# Patient Record
Sex: MALE | Race: WHITE | Employment: OTHER | URBAN - METROPOLITAN AREA
[De-identification: names, ages, dates, MRNs, and addresses within clinical notes are randomized per-mention and may not be internally consistent; named-entity substitution may affect disease eponyms.]

---

## 2020-06-06 ENCOUNTER — APPOINTMENT (OUTPATIENT)
Dept: GENERAL RADIOLOGY | Age: 65
DRG: 854 | End: 2020-06-06
Payer: MEDICARE

## 2020-06-06 ENCOUNTER — APPOINTMENT (OUTPATIENT)
Dept: CT IMAGING | Age: 65
DRG: 854 | End: 2020-06-06
Payer: MEDICARE

## 2020-06-06 ENCOUNTER — HOSPITAL ENCOUNTER (INPATIENT)
Age: 65
LOS: 10 days | Discharge: HOME | DRG: 854 | End: 2020-06-16
Attending: EMERGENCY MEDICINE | Admitting: HOSPITALIST
Payer: MEDICARE

## 2020-06-06 PROBLEM — L08.9 DIABETIC FOOT INFECTION (HCC): Status: ACTIVE | Noted: 2020-06-06

## 2020-06-06 PROBLEM — E11.610 CHARCOT FOOT DUE TO DIABETES MELLITUS (HCC): Status: ACTIVE | Noted: 2020-06-06

## 2020-06-06 PROBLEM — L97.529 FOOT ULCERATION, LEFT, WITH UNSPECIFIED SEVERITY (HCC): Status: ACTIVE | Noted: 2020-06-06

## 2020-06-06 PROBLEM — R50.9 FEVER: Status: ACTIVE | Noted: 2020-06-06

## 2020-06-06 PROBLEM — D72.829 LEUKOCYTOSIS: Status: ACTIVE | Noted: 2020-06-06

## 2020-06-06 PROBLEM — E11.628 DIABETIC FOOT INFECTION (HCC): Status: ACTIVE | Noted: 2020-06-06

## 2020-06-06 LAB
A/G RATIO: 1.1 (ref 1.1–2.2)
ALBUMIN SERPL-MCNC: 4 G/DL (ref 3.4–5)
ALP BLD-CCNC: 84 U/L (ref 40–129)
ALT SERPL-CCNC: 7 U/L (ref 10–40)
ANION GAP SERPL CALCULATED.3IONS-SCNC: 15 MMOL/L (ref 3–16)
AST SERPL-CCNC: 10 U/L (ref 15–37)
BASE EXCESS VENOUS: -4.4 MMOL/L
BASOPHILS ABSOLUTE: 0 K/UL (ref 0–0.2)
BASOPHILS RELATIVE PERCENT: 0.2 %
BETA-HYDROXYBUTYRATE: 0.16 MMOL/L (ref 0–0.27)
BILIRUB SERPL-MCNC: 0.7 MG/DL (ref 0–1)
BILIRUBIN URINE: NEGATIVE
BLOOD, URINE: ABNORMAL
BUN BLDV-MCNC: 28 MG/DL (ref 7–20)
C-REACTIVE PROTEIN: 17.2 MG/L (ref 0–5.1)
CALCIUM SERPL-MCNC: 9.2 MG/DL (ref 8.3–10.6)
CARBOXYHEMOGLOBIN: 0.9 %
CHLORIDE BLD-SCNC: 102 MMOL/L (ref 99–110)
CLARITY: CLEAR
CO2: 18 MMOL/L (ref 21–32)
COLOR: YELLOW
CREAT SERPL-MCNC: 1.1 MG/DL (ref 0.8–1.3)
EKG ATRIAL RATE: 65 BPM
EKG ATRIAL RATE: 85 BPM
EKG DIAGNOSIS: NORMAL
EKG DIAGNOSIS: NORMAL
EKG P AXIS: 14 DEGREES
EKG P AXIS: 74 DEGREES
EKG P-R INTERVAL: 242 MS
EKG P-R INTERVAL: 344 MS
EKG Q-T INTERVAL: 404 MS
EKG Q-T INTERVAL: 480 MS
EKG QRS DURATION: 136 MS
EKG QRS DURATION: 144 MS
EKG QTC CALCULATION (BAZETT): 420 MS
EKG QTC CALCULATION (BAZETT): 571 MS
EKG R AXIS: -65 DEGREES
EKG R AXIS: -81 DEGREES
EKG T AXIS: 20 DEGREES
EKG T AXIS: 73 DEGREES
EKG VENTRICULAR RATE: 65 BPM
EKG VENTRICULAR RATE: 85 BPM
EOSINOPHILS ABSOLUTE: 0 K/UL (ref 0–0.6)
EOSINOPHILS RELATIVE PERCENT: 0 %
EPITHELIAL CELLS, UA: 0 /HPF (ref 0–5)
GFR AFRICAN AMERICAN: >60
GFR NON-AFRICAN AMERICAN: >60
GLOBULIN: 3.8 G/DL
GLUCOSE BLD-MCNC: 125 MG/DL (ref 70–99)
GLUCOSE BLD-MCNC: 138 MG/DL (ref 70–99)
GLUCOSE BLD-MCNC: 158 MG/DL (ref 70–99)
GLUCOSE BLD-MCNC: 175 MG/DL (ref 70–99)
GLUCOSE URINE: NEGATIVE MG/DL
HCO3 VENOUS: 19 MMOL/L (ref 23–29)
HCT VFR BLD CALC: 34.5 % (ref 40.5–52.5)
HEMOGLOBIN: 11.1 G/DL (ref 13.5–17.5)
HYALINE CASTS: 1 /LPF (ref 0–8)
KETONES, URINE: NEGATIVE MG/DL
LACTIC ACID, SEPSIS: 3.2 MMOL/L (ref 0.4–1.9)
LACTIC ACID, SEPSIS: 4 MMOL/L (ref 0.4–1.9)
LEUKOCYTE ESTERASE, URINE: NEGATIVE
LIPASE: 42 U/L (ref 13–60)
LYMPHOCYTES ABSOLUTE: 0.3 K/UL (ref 1–5.1)
LYMPHOCYTES RELATIVE PERCENT: 2.4 %
MCH RBC QN AUTO: 25.2 PG (ref 26–34)
MCHC RBC AUTO-ENTMCNC: 32.3 G/DL (ref 31–36)
MCV RBC AUTO: 77.9 FL (ref 80–100)
METHEMOGLOBIN VENOUS: 0.2 %
MICROSCOPIC EXAMINATION: YES
MONOCYTES ABSOLUTE: 0.5 K/UL (ref 0–1.3)
MONOCYTES RELATIVE PERCENT: 3.4 %
NEUTROPHILS ABSOLUTE: 13.4 K/UL (ref 1.7–7.7)
NEUTROPHILS RELATIVE PERCENT: 94 %
NITRITE, URINE: NEGATIVE
O2 CONTENT, VEN: 14 ML/DL
O2 SAT, VEN: 90 %
O2 THERAPY: ABNORMAL
PCO2, VEN: 27.6 MMHG (ref 40–50)
PDW BLD-RTO: 16.5 % (ref 12.4–15.4)
PERFORMED ON: ABNORMAL
PH UA: 5.5 (ref 5–8)
PH VENOUS: 7.44 (ref 7.35–7.45)
PLATELET # BLD: 247 K/UL (ref 135–450)
PMV BLD AUTO: 7.1 FL (ref 5–10.5)
PO2, VEN: 56 MMHG
POTASSIUM SERPL-SCNC: 4.3 MMOL/L (ref 3.5–5.1)
PRO-BNP: 703 PG/ML (ref 0–124)
PROTEIN UA: NEGATIVE MG/DL
RBC # BLD: 4.43 M/UL (ref 4.2–5.9)
RBC UA: 6 /HPF (ref 0–4)
SARS-COV-2, NAAT: NOT DETECTED
SEDIMENTATION RATE, ERYTHROCYTE: 40 MM/HR (ref 0–20)
SODIUM BLD-SCNC: 135 MMOL/L (ref 136–145)
SPECIFIC GRAVITY UA: 1.02 (ref 1–1.03)
TCO2 CALC VENOUS: 20 MMOL/L
TOTAL PROTEIN: 7.8 G/DL (ref 6.4–8.2)
TROPONIN: 0.02 NG/ML
TROPONIN: 0.04 NG/ML
URINE REFLEX TO CULTURE: ABNORMAL
URINE TYPE: ABNORMAL
UROBILINOGEN, URINE: 0.2 E.U./DL
WBC # BLD: 14.2 K/UL (ref 4–11)
WBC UA: 1 /HPF (ref 0–5)

## 2020-06-06 PROCEDURE — 51701 INSERT BLADDER CATHETER: CPT

## 2020-06-06 PROCEDURE — 87081 CULTURE SCREEN ONLY: CPT

## 2020-06-06 PROCEDURE — 83690 ASSAY OF LIPASE: CPT

## 2020-06-06 PROCEDURE — 99223 1ST HOSP IP/OBS HIGH 75: CPT | Performed by: INTERNAL MEDICINE

## 2020-06-06 PROCEDURE — 84484 ASSAY OF TROPONIN QUANT: CPT

## 2020-06-06 PROCEDURE — 6370000000 HC RX 637 (ALT 250 FOR IP): Performed by: HOSPITALIST

## 2020-06-06 PROCEDURE — 83605 ASSAY OF LACTIC ACID: CPT

## 2020-06-06 PROCEDURE — 96365 THER/PROPH/DIAG IV INF INIT: CPT

## 2020-06-06 PROCEDURE — 6360000002 HC RX W HCPCS: Performed by: EMERGENCY MEDICINE

## 2020-06-06 PROCEDURE — 36415 COLL VENOUS BLD VENIPUNCTURE: CPT

## 2020-06-06 PROCEDURE — 82010 KETONE BODYS QUAN: CPT

## 2020-06-06 PROCEDURE — 83880 ASSAY OF NATRIURETIC PEPTIDE: CPT

## 2020-06-06 PROCEDURE — 6360000002 HC RX W HCPCS: Performed by: HOSPITALIST

## 2020-06-06 PROCEDURE — 2060000000 HC ICU INTERMEDIATE R&B

## 2020-06-06 PROCEDURE — 2580000003 HC RX 258: Performed by: EMERGENCY MEDICINE

## 2020-06-06 PROCEDURE — 6370000000 HC RX 637 (ALT 250 FOR IP): Performed by: EMERGENCY MEDICINE

## 2020-06-06 PROCEDURE — 73630 X-RAY EXAM OF FOOT: CPT

## 2020-06-06 PROCEDURE — 93005 ELECTROCARDIOGRAM TRACING: CPT | Performed by: EMERGENCY MEDICINE

## 2020-06-06 PROCEDURE — 96375 TX/PRO/DX INJ NEW DRUG ADDON: CPT

## 2020-06-06 PROCEDURE — 87070 CULTURE OTHR SPECIMN AEROBIC: CPT

## 2020-06-06 PROCEDURE — 81001 URINALYSIS AUTO W/SCOPE: CPT

## 2020-06-06 PROCEDURE — 87040 BLOOD CULTURE FOR BACTERIA: CPT

## 2020-06-06 PROCEDURE — 85652 RBC SED RATE AUTOMATED: CPT

## 2020-06-06 PROCEDURE — 87186 SC STD MICRODIL/AGAR DIL: CPT

## 2020-06-06 PROCEDURE — 85025 COMPLETE CBC W/AUTO DIFF WBC: CPT

## 2020-06-06 PROCEDURE — 74176 CT ABD & PELVIS W/O CONTRAST: CPT

## 2020-06-06 PROCEDURE — 82803 BLOOD GASES ANY COMBINATION: CPT

## 2020-06-06 PROCEDURE — 80053 COMPREHEN METABOLIC PANEL: CPT

## 2020-06-06 PROCEDURE — 86140 C-REACTIVE PROTEIN: CPT

## 2020-06-06 PROCEDURE — 87205 SMEAR GRAM STAIN: CPT

## 2020-06-06 PROCEDURE — 2580000003 HC RX 258: Performed by: HOSPITALIST

## 2020-06-06 PROCEDURE — 96367 TX/PROPH/DG ADDL SEQ IV INF: CPT

## 2020-06-06 PROCEDURE — 93005 ELECTROCARDIOGRAM TRACING: CPT | Performed by: HOSPITALIST

## 2020-06-06 PROCEDURE — U0002 COVID-19 LAB TEST NON-CDC: HCPCS

## 2020-06-06 PROCEDURE — 87077 CULTURE AEROBIC IDENTIFY: CPT

## 2020-06-06 PROCEDURE — 93010 ELECTROCARDIOGRAM REPORT: CPT | Performed by: INTERNAL MEDICINE

## 2020-06-06 PROCEDURE — 99285 EMERGENCY DEPT VISIT HI MDM: CPT

## 2020-06-06 PROCEDURE — 71045 X-RAY EXAM CHEST 1 VIEW: CPT

## 2020-06-06 RX ORDER — 0.9 % SODIUM CHLORIDE 0.9 %
30 INTRAVENOUS SOLUTION INTRAVENOUS ONCE
Status: COMPLETED | OUTPATIENT
Start: 2020-06-06 | End: 2020-06-06

## 2020-06-06 RX ORDER — PANTOPRAZOLE SODIUM 40 MG/1
40 TABLET, DELAYED RELEASE ORAL 2 TIMES DAILY
Status: DISCONTINUED | OUTPATIENT
Start: 2020-06-06 | End: 2020-06-06

## 2020-06-06 RX ORDER — INSULIN GLARGINE 100 [IU]/ML
24 INJECTION, SOLUTION SUBCUTANEOUS NIGHTLY
Status: DISCONTINUED | OUTPATIENT
Start: 2020-06-06 | End: 2020-06-14

## 2020-06-06 RX ORDER — POLYETHYLENE GLYCOL 3350 17 G/17G
17 POWDER, FOR SOLUTION ORAL DAILY PRN
Status: DISCONTINUED | OUTPATIENT
Start: 2020-06-06 | End: 2020-06-16 | Stop reason: HOSPADM

## 2020-06-06 RX ORDER — SPIRONOLACTONE 25 MG/1
25 TABLET ORAL DAILY
Status: ON HOLD | COMMUNITY
End: 2022-09-20 | Stop reason: HOSPADM

## 2020-06-06 RX ORDER — TAMSULOSIN HYDROCHLORIDE 0.4 MG/1
0.4 CAPSULE ORAL NIGHTLY
Status: ON HOLD | COMMUNITY
End: 2020-07-31

## 2020-06-06 RX ORDER — NICOTINE POLACRILEX 4 MG
15 LOZENGE BUCCAL PRN
Status: DISCONTINUED | OUTPATIENT
Start: 2020-06-06 | End: 2020-06-16 | Stop reason: HOSPADM

## 2020-06-06 RX ORDER — TAMSULOSIN HYDROCHLORIDE 0.4 MG/1
0.4 CAPSULE ORAL NIGHTLY
Status: DISCONTINUED | OUTPATIENT
Start: 2020-06-06 | End: 2020-06-16 | Stop reason: HOSPADM

## 2020-06-06 RX ORDER — ONDANSETRON 2 MG/ML
4 INJECTION INTRAMUSCULAR; INTRAVENOUS ONCE
Status: COMPLETED | OUTPATIENT
Start: 2020-06-06 | End: 2020-06-06

## 2020-06-06 RX ORDER — ATORVASTATIN CALCIUM 20 MG/1
20 TABLET, FILM COATED ORAL DAILY
Status: DISCONTINUED | OUTPATIENT
Start: 2020-06-06 | End: 2020-06-08

## 2020-06-06 RX ORDER — DEXTROSE MONOHYDRATE 50 MG/ML
100 INJECTION, SOLUTION INTRAVENOUS PRN
Status: DISCONTINUED | OUTPATIENT
Start: 2020-06-06 | End: 2020-06-16 | Stop reason: HOSPADM

## 2020-06-06 RX ORDER — DEXTROSE MONOHYDRATE 25 G/50ML
12.5 INJECTION, SOLUTION INTRAVENOUS PRN
Status: DISCONTINUED | OUTPATIENT
Start: 2020-06-06 | End: 2020-06-16 | Stop reason: HOSPADM

## 2020-06-06 RX ORDER — SODIUM CHLORIDE 9 MG/ML
INJECTION, SOLUTION INTRAVENOUS CONTINUOUS
Status: DISCONTINUED | OUTPATIENT
Start: 2020-06-06 | End: 2020-06-07

## 2020-06-06 RX ORDER — FUROSEMIDE 40 MG/1
40 TABLET ORAL DAILY
Status: ON HOLD | COMMUNITY
End: 2020-06-14 | Stop reason: HOSPADM

## 2020-06-06 RX ORDER — ACETAMINOPHEN 650 MG/1
650 SUPPOSITORY RECTAL EVERY 6 HOURS PRN
Status: DISCONTINUED | OUTPATIENT
Start: 2020-06-06 | End: 2020-06-16 | Stop reason: HOSPADM

## 2020-06-06 RX ORDER — ACETAMINOPHEN 325 MG/1
650 TABLET ORAL EVERY 6 HOURS PRN
Status: DISCONTINUED | OUTPATIENT
Start: 2020-06-06 | End: 2020-06-16 | Stop reason: HOSPADM

## 2020-06-06 RX ORDER — SODIUM CHLORIDE 0.9 % (FLUSH) 0.9 %
10 SYRINGE (ML) INJECTION EVERY 12 HOURS SCHEDULED
Status: DISCONTINUED | OUTPATIENT
Start: 2020-06-06 | End: 2020-06-16 | Stop reason: HOSPADM

## 2020-06-06 RX ORDER — SUCRALFATE 1 G/1
1 TABLET ORAL 4 TIMES DAILY
Status: DISCONTINUED | OUTPATIENT
Start: 2020-06-06 | End: 2020-06-06

## 2020-06-06 RX ORDER — ONDANSETRON 2 MG/ML
4 INJECTION INTRAMUSCULAR; INTRAVENOUS EVERY 6 HOURS PRN
Status: DISCONTINUED | OUTPATIENT
Start: 2020-06-06 | End: 2020-06-16 | Stop reason: HOSPADM

## 2020-06-06 RX ORDER — ATORVASTATIN CALCIUM 20 MG/1
20 TABLET, FILM COATED ORAL DAILY
Status: ON HOLD | COMMUNITY
End: 2020-06-14 | Stop reason: SDUPTHER

## 2020-06-06 RX ORDER — SODIUM CHLORIDE 0.9 % (FLUSH) 0.9 %
10 SYRINGE (ML) INJECTION PRN
Status: DISCONTINUED | OUTPATIENT
Start: 2020-06-06 | End: 2020-06-16 | Stop reason: HOSPADM

## 2020-06-06 RX ORDER — ACETAMINOPHEN 500 MG
1000 TABLET ORAL ONCE
Status: COMPLETED | OUTPATIENT
Start: 2020-06-06 | End: 2020-06-06

## 2020-06-06 RX ADMIN — Medication 1500 MG: at 21:01

## 2020-06-06 RX ADMIN — ATORVASTATIN CALCIUM 20 MG: 20 TABLET, FILM COATED ORAL at 17:45

## 2020-06-06 RX ADMIN — ONDANSETRON 4 MG: 2 INJECTION INTRAMUSCULAR; INTRAVENOUS at 07:58

## 2020-06-06 RX ADMIN — ENOXAPARIN SODIUM 40 MG: 40 INJECTION SUBCUTANEOUS at 21:00

## 2020-06-06 RX ADMIN — SODIUM CHLORIDE, PRESERVATIVE FREE 10 ML: 5 INJECTION INTRAVENOUS at 21:00

## 2020-06-06 RX ADMIN — PIPERACILLIN AND TAZOBACTAM 3.38 G: 3; .375 INJECTION, POWDER, LYOPHILIZED, FOR SOLUTION INTRAVENOUS at 13:57

## 2020-06-06 RX ADMIN — VANCOMYCIN HYDROCHLORIDE 1500 MG: 5 INJECTION, POWDER, LYOPHILIZED, FOR SOLUTION INTRAVENOUS at 08:40

## 2020-06-06 RX ADMIN — TAMSULOSIN HYDROCHLORIDE 0.4 MG: 0.4 CAPSULE ORAL at 21:00

## 2020-06-06 RX ADMIN — PIPERACILLIN AND TAZOBACTAM 3.38 G: 3; .375 INJECTION, POWDER, LYOPHILIZED, FOR SOLUTION INTRAVENOUS at 07:58

## 2020-06-06 RX ADMIN — ACETAMINOPHEN 1000 MG: 500 TABLET, FILM COATED ORAL at 08:24

## 2020-06-06 RX ADMIN — INSULIN GLARGINE 24 UNITS: 100 INJECTION, SOLUTION SUBCUTANEOUS at 21:46

## 2020-06-06 RX ADMIN — INSULIN LISPRO 1 UNITS: 100 INJECTION, SOLUTION INTRAVENOUS; SUBCUTANEOUS at 17:45

## 2020-06-06 RX ADMIN — SODIUM CHLORIDE, PRESERVATIVE FREE 10 ML: 5 INJECTION INTRAVENOUS at 12:10

## 2020-06-06 RX ADMIN — SODIUM CHLORIDE: 9 INJECTION, SOLUTION INTRAVENOUS at 12:10

## 2020-06-06 RX ADMIN — PIPERACILLIN AND TAZOBACTAM 3.38 G: 3; .375 INJECTION, POWDER, LYOPHILIZED, FOR SOLUTION INTRAVENOUS at 23:38

## 2020-06-06 RX ADMIN — SODIUM CHLORIDE 3450 ML: 9 INJECTION, SOLUTION INTRAVENOUS at 07:58

## 2020-06-06 ASSESSMENT — PAIN SCALES - GENERAL
PAINLEVEL_OUTOF10: 3
PAINLEVEL_OUTOF10: 0
PAINLEVEL_OUTOF10: 0
PAINLEVEL_OUTOF10: 1
PAINLEVEL_OUTOF10: 0

## 2020-06-06 ASSESSMENT — PAIN DESCRIPTION - DESCRIPTORS: DESCRIPTORS: ACHING

## 2020-06-06 ASSESSMENT — PAIN DESCRIPTION - PAIN TYPE
TYPE: ACUTE PAIN

## 2020-06-06 ASSESSMENT — PAIN DESCRIPTION - FREQUENCY: FREQUENCY: CONTINUOUS

## 2020-06-06 ASSESSMENT — PAIN DESCRIPTION - ORIENTATION: ORIENTATION: MID

## 2020-06-06 ASSESSMENT — PAIN DESCRIPTION - LOCATION: LOCATION: ABDOMEN

## 2020-06-06 NOTE — PROGRESS NOTES
Pt arrived to floor from ER at 1120 via stretcher. Pt oriented to room, call light, policies and procedures, the menu and ordering. Call light within reach. Bed in lowest position, bed alarm on, and wheels locked. Pt verbalized understanding. No complaints, questions or concerns at this time.   Electronically signed by Haider Tracey RN on 6/6/2020 at 11:34 AM

## 2020-06-06 NOTE — ED PROVIDER NOTES
[facility]  eMERGENCY dEPARTMENT eNCOUnter      Pt Name: Quang Rendon  MRN: [id number]  [place] 1955  Date of evaluation: 6/6/2020  Provider: Sunita Maldonado MD    [address] [place] [address]       Chief Complaint   Patient presents with    Fever     started last night    Nausea     startedn last night    Emesis     started last night         CRITICAL CARE TIME   Total Critical Care time was 0 minutes, excluding separately reportable procedures. There was a high probability of clinically significant/life threatening deterioration in the patient's condition which required my urgent intervention. HISTORY OF PRESENT ILLNESS  (Location/Symptom, Timing/Onset, Context/Setting, Quality, Duration, Modifying Factors, Severity.)   Quang Rendon is a 75 y.o. male who presents to the emergency department complaining of getting sick since 1 AM this morning. He states she developed nausea has had vomited several times. He has had some mid abdominal pain, but is not hurting at this time. He states around 4 AM he had a temperature of 103 degrees, he did not take anything for his fever. He states that he believes he felt okay when he went to bed last night. The patient is a diabetic, he is insulin-dependent, he last took his insulin yesterday evening. He lives alone. He states he was too weak this morning to stand up and get out of bed. Nursing Notes were reviewed and I agree. REVIEW OF SYSTEMS    (2-9 systems for level 4, 10 or more for level 5)     General: Fever of 103 degrees. ENT: Nasal congestion. No earache or sore throat. Respiratory: Mild cough, shortness of breath. Cardiovascular: Midsternal chest pain sometime during the night, none at this time. GI: Mid abdominal pain during the night, not at this time. Nausea with multiple episodes of vomiting. No diarrhea. : No frequency urgency or dysuria. Neuro: Generalized weakness.     Except as noted above the remainder of the review of systems was reviewed and negative. PAST MEDICAL HISTORY     Past Medical History:   Diagnosis Date    Diabetes mellitus ([address]     Gallstones     Pancreatitis     Ulcers of both lower legs ([address]     bilateral feet         SURGICAL HISTORY       Past Surgical History:   Procedure Laterality Date    ERCP  4/8/14    LITHOTRYPSY & PANCREATIC STENT PLACEMENT    FOOT SURGERY Left 1967         CURRENT MEDICATIONS       Previous Medications    AMMONIUM LACTATE (LAC-HYDRIN) 12 % LOTION    Apply to BL feet and legs bid with dressing changes    GLUCOSE BLOOD VI TEST STRIPS (ASCENSIA AUTODISC VI;ONE TOUCH ULTRA TEST VI) STRIP    1 each by In Vitro route daily. As needed. GLUCOSE MONITORING KIT (FREESTYLE) MONITORING KIT    1 kit by Does not apply route daily as needed. INSULIN GLARGINE (LANTUS SOLOSTAR) 100 UNIT/ML INJECTION PEN    Inject 10 Units into the skin every morning (before breakfast). INSULIN PEN NEEDLE 32G X 4 MM MISC    1 each by Does not apply route daily. LANCETS MISC    Once daily    LISINOPRIL (PRINIVIL;ZESTRIL) 5 MG TABLET    Take 1 tablet by mouth daily. PANTOPRAZOLE (PROTONIX) 40 MG TABLET    Take 1 tablet by mouth 2 times daily for 30 days. SUCRALFATE (CARAFATE) 1 GM TABLET    Take 1 tablet by mouth 4 times daily for 7 days. ALLERGIES     Patient has no known allergies.     FAMILY HISTORY       Family History   Problem Relation Age of Onset    Other Mother         PVD s/p toe amputation by Dr Ghada Bonilla History     Socioeconomic History    Marital status: Single     Spouse name: None    Number of children: None    Years of education: None    Highest education level: None   Occupational History    None   Social Needs    Financial resource strain: None    Food insecurity     Worry: None     Inability: None    Transportation needs     Medical: None     Non-medical: None   Tobacco Use    Smoking status: Never Smoker    Smokeless tobacco: Never Used   Substance and Sexual Activity    Alcohol use: No    Drug use: No    Sexual activity: None   Lifestyle    Physical activity     Days per week: None     Minutes per session: None    Stress: None   Relationships    Social connections     Talks on phone: None     Gets together: None     Attends Jain service: None     Active member of club or organization: None     Attends meetings of clubs or organizations: None     Relationship status: None    Intimate partner violence     Fear of current or ex partner: None     Emotionally abused: None     Physically abused: None     Forced sexual activity: None   Other Topics Concern    None   Social History Narrative    None         PHYSICAL EXAM    (up to 7 for level 4, 8 or more for level 5)     ED Triage Vitals [06/06/20 0718]   BP Temp Temp Source Pulse Resp SpO2 Height Weight   134/71 101.3 °F (38.5 °C) Oral 88 16 98 % 6' 3\" (1.905 m) 253 lb 8.5 oz (115 kg)       General: Alert moderately obese white male in no acute distress. Head: Atraumatic and normocephalic. Eyes: No conjunctival injection. Pupils equal round reactive. No pallor. ENT: TMs are normal.  Nose clear. Oropharynx is moist without erythema. Neck: Supple without adenopathy, nontender. Heart: Regular rate and rhythm. No murmurs or gallops noted. Lungs: Breath sounds equal bilaterally and clear. Abdomen: Soft, nondistended, nontender. No masses organomegaly. Musculoskeletal: Large deep ulceration over the plantar aspect of the left foot as pictured below. Laceration extends deep into the subcutaneous tissue. There is diffuse erythema of the plantar aspect of the foot. He has 2-3+ nonpitting edema bilateral lower extremities. Skin: Diaphoretic and clammy. Stasis dermatitis over the lower extremities. Neuro: Awake, alert, oriented. Symmetrical reactive pupils. Intact extraocular movements. No facial asymmetry. following components:       Result Value    WBC 14.2 (*)     Hemoglobin 11.1 (*)     Hematocrit 34.5 (*)     MCV 77.9 (*)     MCH 25.2 (*)     RDW 16.5 (*)     Neutrophils Absolute 13.4 (*)     Lymphocytes Absolute 0.3 (*)     All other components within normal limits    Narrative:     Performed at:  [address] [facility] [place]   Phone [phone]   COMPREHENSIVE METABOLIC PANEL - Abnormal; Notable for the following components:    Sodium 135 (*)     CO2 18 (*)     Glucose 158 (*)     BUN 28 (*)     ALT 7 (*)     AST 10 (*)     All other components within normal limits    Narrative:     Performed at:  [address] [place]   Phone [phone]   BLOOD GAS, VENOUS - Abnormal; Notable for the following components:    pCO2, Dieter 27.6 (*)     HCO3, Venous 19 (*)     All other components within normal limits    Narrative:     Performed at:  [address] [place]   Phone [phone]   LACTATE, SEPSIS - Abnormal; Notable for the following components:    Lactic Acid, Sepsis 4.0 (*)     All other components within normal limits    Narrative:     Noemy Khan tel. [phone],  Chemistry results called to and read back by [facility] RN, 06/06/2020 08:11, by  WILMA  Performed at:  [address] [facility] [place]   Phone [phone]   URINE RT REFLEX TO CULTURE - Abnormal; Notable for the following components:    Blood, Urine TRACE (*)     All other components within normal limits    Narrative:     Performed at:  [address] [facility] [place]   Phone [phone]   TROPONIN - Abnormal; Notable for the following components:    Troponin 0.04 (*)     All other components within normal limits    Narrative:     Performed at:  [facility]  [address] [facility] [address]   Phone [phone]   BRAIN NATRIURETIC PEPTIDE - Abnormal; Notable for the following components:    Pro- (*)     All other components within normal limits    Narrative:     Performed at:  [facility]  [address] [facility] [address]   Phone [phone]   CULTURE, BLOOD 1   CULTURE, BLOOD 2   CULTURE, WOUND   LIPASE    Narrative:     Performed at:  [facility]  [address] [place] [facility] [address]   Phone [phone]   BETA-HYDROXYBUTYRATE    Narrative:     Performed at:  [facility]  [address]  [place] [facility] [address]   Phone [phone]   LACTATE, SEPSIS   MICROSCOPIC URINALYSIS       All other labs were within normal range or not returned as of this dictation. EMERGENCY DEPARTMENT COURSE and DIFFERENTIAL DIAGNOSIS/MDM:   Vitals:    Vitals:    06/06/20 0718 06/06/20 0830 06/06/20 0915 06/06/20 0930   BP: 134/71 (!) 111/55 119/64 (!) 114/52   Pulse: 88 82 78 75   Resp: 16 15 18 21   Temp: 101.3 °F (38.5 °C)      TempSrc: Oral      SpO2: 98% 98% 97% 95%   Weight: 253 lb 8.5 oz (115 kg)      Height: 6' 3\" (1.905 m)          This patient presents with onset of nausea vomiting and fever during the night. He states he is vomited multiple times. States he was too weak to get out of bed this morning. The patient is a diabetic, insulin-dependent. He has a large wound on his left foot. The wound has foul-smelling brownish to clear drainage. There is some diffuse erythema over the plantar aspect of the foot. He reported a temperature of 103 degrees this morning. His temperature was 101.3 on arrival.  He was not tachycardic. His blood pressure was stable. His abdomen was benign. He has no acute intra-abdominal findings on his CT scan. His lactic acid was elevated at 4.   His urinalysis shows no significant pyuria. No infiltrate on his chest x-ray. I think his diabetic foot infection as a source of his fever/sepsis. He was given sepsis fluids. Blood cultures were obtained. Wound culture was obtained. He was given IV Zosyn and vancomycin. The hospitalist will be consulted for admission. Test results, diagnosis, and treatment plan were discussed with the patient. Patient understand the treatment plan and need for admission and is agreeable. CONSULTS:  IP CONSULT TO HOSPITALIST    PROCEDURES:  None    FINAL IMPRESSION      1. Diabetic foot infection ([facility]    2. Septicemia ([facility]    3. Non-intractable vomiting with nausea, unspecified vomiting type          DISPOSITION/PLAN   DISPOSITION        PATIENT REFERRED TO:  No follow-up provider specified.     DISCHARGE MEDICATIONS:  New Prescriptions    No medications on file       (Please note that portions of this note were completed with a voice recognition program.  Efforts were made to edit the dictations but occasionally words are mis-transcribed.)    Maritza Gibbs MD  Attending Emergency Physician        Chelsea Schirmer, MD  06/06/20 4043

## 2020-06-06 NOTE — ED NOTES
Bed: B-07  Expected date:   Expected time:   Means of arrival: [facility]  Comments:  62M n,v,weakness     Ayse Singh RN  06/06/20 6332

## 2020-06-06 NOTE — H&P
Hospital Medicine History & Physical      PCP: KENNY Ruvalcaba - CNP    Date of Admission: 6/6/2020    Date of Service: Pt seen/examined on 06/06/20   and Admitted to Inpatient. Chief Complaint:      Chief Complaint   Patient presents with    Fever     started last night    Nausea     startedn last night    Emesis     started last night         History Of Present Illness: The patient is a 75 y.o. male who presents to [facility] with fever, vomiting, and cough. Patient woke up this morning with high-grade fever, nausea, vomiting, and cough. He also reports bilateral chest pain with cough and breathing. Initially was short of breath but that improved. He also reports generalized weakness and muscle aches. He has not left his house for the last few years, but he has visiting aides and nurses. In the ED he was found to have leukocytosis, fever, and significant foot wound. We were asked to admit him for further evaluation and treatment. When I saw the patient he was feeling better, denies any chest pain, or any shortness of breath. Past Medical History:        Diagnosis Date    Diabetes mellitus ([facility]     Gallstones     Pancreatitis     Ulcers of both lower legs ([address]     bilateral feet       Past Surgical History:        Procedure Laterality Date    ERCP  4/8/14    LITHOTRYPSY & PANCREATIC STENT PLACEMENT    FOOT SURGERY Left 1967       Medications Prior to Admission:    Prior to Admission medications    Medication Sig Start Date End Date Taking?  Authorizing Provider   atorvastatin (LIPITOR) 20 MG tablet Take 20 mg by mouth daily   Yes Historical Provider, MD   furosemide (LASIX) 40 MG tablet Take 40 mg by mouth daily   Yes Historical Provider, MD   spironolactone (ALDACTONE) 25 MG tablet Take 25 mg by mouth daily   Yes Historical Provider, MD tamsulosin (FLOMAX) 0.4 MG capsule Take 0.4 mg by mouth nightly    Yes Historical Provider, MD   metFORMIN (GLUCOPHAGE) 500 MG tablet Take 500 mg by mouth 2 times daily (with meals)   Yes Historical Provider, MD   lisinopril (PRINIVIL;ZESTRIL) 5 MG tablet Take 1 tablet by mouth daily. Patient taking differently: Take 20 mg by mouth daily  2/6/15  Yes Chikis Dobbins MD   ammonium lactate (LAC-HYDRIN) 12 % lotion Apply to BL feet and legs bid with dressing changes 2/6/15  Yes Chikis Dobbins MD   insulin glargine (LANTUS SOLOSTAR) 100 UNIT/ML injection pen Inject 10 Units into the skin every morning (before breakfast). Patient taking differently: Inject 24 Units into the skin nightly  2/6/15  Yes Chikis Dobbins MD   Insulin Pen Needle 32G X 4 MM MISC 1 each by Does not apply route daily. 2/6/15  Yes Chikis Dobbins MD   Lancets MISC Once daily 2/6/15  Yes Chikis Dobbins MD   glucose blood VI test strips (ASCENSIA AUTODISC VI;ONE TOUCH ULTRA TEST VI) strip 1 each by In Vitro route daily. As needed. 2/6/15  Yes Chikis Dobbins MD   glucose monitoring kit (FREESTYLE) monitoring kit 1 kit by Does not apply route daily as needed. 2/6/15  Yes Chikis Dobbins MD       Allergies:  Patient has no known allergies. Social History:  The patient currently lives HOME     TOBACCO:   reports that he has never smoked. He has never used smokeless tobacco.  ETOH:   reports no history of alcohol use. Family History:  Reviewed in detail and negative for DM, Early CAD, Cancer, CVA. Positive as follows:        Problem Relation Age of Onset    Other Mother         PVD s/p toe amputation by Dr Arsenio Anderson:       Constitutional: Negative for chills and fever. HENT: Negative for congestion and hearing loss. Eyes: Negative for blurred vision. Respiratory: PER HPI. Cardiovascular: Negative for , palpitations and leg swelling.    Gastrointestinal: Negative for abdominal pain, blood in stool, constipation, diarrhea, nausea and vomiting. Genitourinary: Negative for dysuria, frequency and urgency. Musculoskeletal: Negative for back pain and neck pain. Skin: Negative for rash. Neurological: Negative for dizziness and headaches. Psychiatric/Behavioral: Negative for depression. The patient is not nervous/anxious. PHYSICAL EXAM:    BP 98/62   Pulse 97   Temp 98.4 °F (36.9 °C) (Oral)   Resp 18   Ht 6' 3\" (1.905 m)   Wt 253 lb 8.5 oz (115 kg)   SpO2 97%   BMI 31.69 kg/m²     General appearance: No apparent distress appears stated age and cooperative. HEENT Normal cephalic, atraumatic without obvious deformity. Pupils equal, round, and reactive to light. Extra ocular muscles intact. Conjunctivae/corneas clear. Neck: Supple, No jugular venous distention/bruits. Trachea midline without thyromegaly or adenopathy with full range of motion. Lungs: Clear to auscultation, bilaterally without Rales/Wheezes/Rhonchi with good respiratory effort. Heart: Regular rate and rhythm with Normal S1/S2 without murmurs, rubs or gallops, point of maximum impulse non-displaced  Abdomen: Soft, non-tender or non-distended without rigidity or guarding and positive bowel sounds all four quadrants. Extremities: Bilateral lower extremity edema . multiple toe amputations   Left Charcot foot with large ulcer on the plantar side . Skin: Multiple skin lesions on the legs  Neurologic: Alert and oriented X 3, neurovascularly intact with sensory/motor intact upper extremities/lower extremities, bilaterally. Cranial nerves: II-XII intact, grossly non-focal.  Mental status: Alert, oriented, thought content appropriate. Capillary Refill: Acceptable  < 3 seconds  Peripheral Pulses: +3 Easily felt, not easily obliterated with pressure              CXR: No acute finding  EKG: Per ED provider no significant EKG findings. Cannot find the EKG. will order EKG.     CBC   Recent Labs     06/06/20  0745   WBC 14.2*   HGB 11.1*   HCT 34.5*    RENAL  Recent Labs     06/06/20  0744   *   K 4.3      CO2 18*   BUN 28*   CREATININE 1.1     LFT'S  Recent Labs     06/06/20  0744   AST 10*   ALT 7*   BILITOT 0.7   ALKPHOS 84     COAG  No results for input(s): INR in the last 72 hours. CARDIAC ENZYMES  Recent Labs     06/06/20  0744   TROPONINI 0.04*       U/A:    Lab Results   Component Value Date    COLORU YELLOW 06/06/2020    WBCUA 1 06/06/2020    RBCUA 6 06/06/2020    BACTERIA 3+ 04/01/2014    CLARITYU Clear 06/06/2020    SPECGRAV 1.018 06/06/2020    LEUKOCYTESUR Negative 06/06/2020    BLOODU TRACE 06/06/2020    GLUCOSEU Negative 06/06/2020       ABG  No results found for: YGJ5WUG, BEART, I8UUBORS, PHART, THGBART, KUB9EYX, PO2ART, YJU9DVJ        Active Hospital Problems    Diagnosis Date Noted    [facility] [G59.953, L08.9] 06/06/2020         PHYSICIANS CERTIFICATION:    I certify that Pepe Seymour is expected to be hospitalized for more than 2 midnights based on the following assessment and plan:      ASSESSMENT/PLAN:      Fever:  Sepsis: Fever and leukocytosis  Possible  diabetic foot infection   Only obvious source of infection is his foot  X-ray without signs of osteomyelitis. Check ESR and CRP. Follow blood culture. Was started on Vanco and Zosyn for possible diabetic foot infection  Podiatry and infectious disease consult      Cough and vomiting: This could be related to his foot infection.   Will rule out COVID  No signs of pneumonia on the x-ray      Lactic acidosis  Most likely secondary to above  Trending down      Type 2 diabetes mellitus complicated with possible diabetic foot infection  Lantus and sliding scale insulin    Hypertension  Hold Lasix, lisinopril, and Aldactone secondary to hypotension      Elevated troponin  Check EKG, trend troponin  0.04  Could be related to SEPSIS      DVT Prophylaxis: Lovenox   Diet: DIET CARB CONTROL;  Code Status: Full Code    Dispo -   Pending clinical improvement. James Kenny MD    Thank you Silvester Runner, APRN - CNP for the opportunity to be involved in this patient's care.

## 2020-06-06 NOTE — ED NOTES
Pt to CT. Pt aware we need to obtain a urine sample when he returns.       Gaurav Vital RN  06/06/20 8735

## 2020-06-06 NOTE — PROGRESS NOTES
Clinical Pharmacy Note  Vancomycin Consult    Pepe Seymour is a 75 y.o. male ordered Vancomycin for diabetic foot wound; consult received from Dr. Shivam Pendleton to manage therapy. Also receiving Zosyn. Patient Active Problem List   Diagnosis    Abdominal pain, acute    BPH (benign prostatic hyperplasia)    RLQ abdominal pain    Abnormal ECG    HTN (hypertension)    Diabetes mellitus type II, uncontrolled ([address]    Diabetic foot infection ([address]       Allergies:  Patient has no known allergies. Temp max:  Temp (24hrs), Av.2 °F (37.3 °C), Min:98 °F (36.7 °C), Max:101.3 °F (38.5 °C)      Recent Labs     20  0745   WBC 14.2*       Recent Labs     20  0744   BUN 28*   CREATININE 1.1         Intake/Output Summary (Last 24 hours) at 2020 1143  Last data filed at 2020 1010  Gross per 24 hour   Intake 300 ml   Output --   Net 300 ml       Culture Results:  Wound- pending    Ht Readings from Last 1 Encounters:   20 6' 3\" (1.905 m)        Wt Readings from Last 1 Encounters:   20 253 lb 8.5 oz (115 kg)         Estimated Creatinine Clearance: 90 mL/min (based on SCr of 1.1 mg/dL). Assessment/Plan:  Vancomycin 1500 mg IV  X 1 was given in ED this morning. Scr 1.1  Will continue 1500 mg every 12 hours ordered. Regimen projects a trough level of 15-20 mg/L. Level ordered for 20 @ 0800    Thank you for the consult. Will continue to follow.   Kanu Martin, [facility]

## 2020-06-06 NOTE — PROGRESS NOTES
4 Eyes Admission Assessment     I agree as the admission nurse that 2 RN's have performed a thorough Head to Toe Skin Assessment on the patient. ALL assessment sites listed below have been assessed on admission. Areas assessed by both nurses: Sandria Pickup  [x]   Head, Face, and Ears   [x]   Shoulders, Back, and Chest  [x]   Arms, Elbows, and Hands   [x]   Coccyx, Sacrum, and Ischum  [x]   Legs, Feet, and Heels        Does the Patient have Skin Breakdown? Yes a wound was noted on the Admission Assessment and an LDA was Initiated documentation include the Tiffanie-wound, Wound Assessment, Measurements, Dressing Treatment, Drainage, and Color\", Diabetic Foot Ulcer on LLE, Scattered wounds/Scabs on BLE.         Jorje Prevention initiated:  Yes   Wound Care Orders initiated:  Yes      [id number]  nurse consulted for Pressure Injury (Stage 3,4, Unstageable, DTI, NWPT, and Complex wounds):  Yes      Nurse 1 eSignature: Electronically signed by Lamar Schmitt RN on 6/6/20 at 2:23 PM EDT    **SHARE this note so that the co-signing nurse is able to place an eSignature**    Nurse 2 eSignature: Electronically signed by Maranda Rust RN on 6/6/20 at 11:55 AM EDT

## 2020-06-06 NOTE — PROGRESS NOTES
Pt resting in bed, denies pain. Podiatry called back in regards to consult, this RN given verbal wound care orders until tomorrow AM, pt LLE wound dressed in iodine wet-to-dry. Pt tolerated well, has neuropathy in BLE, decreased sensation, feeling no pain or discomfort. Pt able to move all extremities independently. Pt legs elevated on pillow supports. No other needs or concerns at this time, call light within reach. Will continue to monitor and reassess.  Electronically signed by Eliel Goldsmith RN on 6/6/2020 at 6:14 PM

## 2020-06-06 NOTE — ED NOTES
Spoke to Felix Pereira RN on floor for repot. Denies further questions.       Demi Wood RN  06/06/20 7432

## 2020-06-07 LAB
ANION GAP SERPL CALCULATED.3IONS-SCNC: 10 MMOL/L (ref 3–16)
BASOPHILS ABSOLUTE: 0 K/UL (ref 0–0.2)
BASOPHILS RELATIVE PERCENT: 0.2 %
BUN BLDV-MCNC: 25 MG/DL (ref 7–20)
CALCIUM SERPL-MCNC: 8 MG/DL (ref 8.3–10.6)
CHLORIDE BLD-SCNC: 106 MMOL/L (ref 99–110)
CO2: 21 MMOL/L (ref 21–32)
CREAT SERPL-MCNC: 0.9 MG/DL (ref 0.8–1.3)
EOSINOPHILS ABSOLUTE: 0.1 K/UL (ref 0–0.6)
EOSINOPHILS RELATIVE PERCENT: 0.8 %
ESTIMATED AVERAGE GLUCOSE: 151.3 MG/DL
GFR AFRICAN AMERICAN: >60
GFR NON-AFRICAN AMERICAN: >60
GLUCOSE BLD-MCNC: 120 MG/DL (ref 70–99)
GLUCOSE BLD-MCNC: 122 MG/DL (ref 70–99)
GLUCOSE BLD-MCNC: 123 MG/DL (ref 70–99)
GLUCOSE BLD-MCNC: 142 MG/DL (ref 70–99)
GLUCOSE BLD-MCNC: 151 MG/DL (ref 70–99)
GLUCOSE BLD-MCNC: 160 MG/DL (ref 70–99)
HBA1C MFR BLD: 6.9 %
HCT VFR BLD CALC: 27.9 % (ref 40.5–52.5)
HEMOGLOBIN: 9.1 G/DL (ref 13.5–17.5)
LACTIC ACID: 1.2 MMOL/L (ref 0.4–2)
LYMPHOCYTES ABSOLUTE: 1.6 K/UL (ref 1–5.1)
LYMPHOCYTES RELATIVE PERCENT: 10.4 %
MCH RBC QN AUTO: 25.6 PG (ref 26–34)
MCHC RBC AUTO-ENTMCNC: 32.6 G/DL (ref 31–36)
MCV RBC AUTO: 78.5 FL (ref 80–100)
MONOCYTES ABSOLUTE: 0.7 K/UL (ref 0–1.3)
MONOCYTES RELATIVE PERCENT: 4.3 %
MRSA CULTURE ONLY: NORMAL
NEUTROPHILS ABSOLUTE: 12.7 K/UL (ref 1.7–7.7)
NEUTROPHILS RELATIVE PERCENT: 84.3 %
PDW BLD-RTO: 16.9 % (ref 12.4–15.4)
PERFORMED ON: ABNORMAL
PLATELET # BLD: 177 K/UL (ref 135–450)
PMV BLD AUTO: 7.4 FL (ref 5–10.5)
POTASSIUM REFLEX MAGNESIUM: 4.2 MMOL/L (ref 3.5–5.1)
RBC # BLD: 3.56 M/UL (ref 4.2–5.9)
SODIUM BLD-SCNC: 137 MMOL/L (ref 136–145)
TROPONIN: 0.02 NG/ML
VANCOMYCIN TROUGH: 15.1 UG/ML (ref 10–20)
WBC # BLD: 15.1 K/UL (ref 4–11)

## 2020-06-07 PROCEDURE — 83036 HEMOGLOBIN GLYCOSYLATED A1C: CPT

## 2020-06-07 PROCEDURE — 2580000003 HC RX 258: Performed by: HOSPITALIST

## 2020-06-07 PROCEDURE — 85025 COMPLETE CBC W/AUTO DIFF WBC: CPT

## 2020-06-07 PROCEDURE — 80202 ASSAY OF VANCOMYCIN: CPT

## 2020-06-07 PROCEDURE — 0HBRXZZ EXCISION OF TOE NAIL, EXTERNAL APPROACH: ICD-10-PCS | Performed by: PODIATRIST

## 2020-06-07 PROCEDURE — 36415 COLL VENOUS BLD VENIPUNCTURE: CPT

## 2020-06-07 PROCEDURE — 94760 N-INVAS EAR/PLS OXIMETRY 1: CPT

## 2020-06-07 PROCEDURE — 0KBW0ZZ EXCISION OF LEFT FOOT MUSCLE, OPEN APPROACH: ICD-10-PCS | Performed by: PODIATRIST

## 2020-06-07 PROCEDURE — 80048 BASIC METABOLIC PNL TOTAL CA: CPT

## 2020-06-07 PROCEDURE — 6360000002 HC RX W HCPCS: Performed by: HOSPITALIST

## 2020-06-07 PROCEDURE — 2060000000 HC ICU INTERMEDIATE R&B

## 2020-06-07 PROCEDURE — 6370000000 HC RX 637 (ALT 250 FOR IP): Performed by: HOSPITALIST

## 2020-06-07 RX ORDER — ASPIRIN 81 MG/1
81 TABLET ORAL DAILY
Status: DISCONTINUED | OUTPATIENT
Start: 2020-06-07 | End: 2020-06-16 | Stop reason: HOSPADM

## 2020-06-07 RX ORDER — LINEZOLID 2 MG/ML
600 INJECTION, SOLUTION INTRAVENOUS EVERY 12 HOURS
Status: DISCONTINUED | OUTPATIENT
Start: 2020-06-07 | End: 2020-06-08

## 2020-06-07 RX ADMIN — TAMSULOSIN HYDROCHLORIDE 0.4 MG: 0.4 CAPSULE ORAL at 21:05

## 2020-06-07 RX ADMIN — ATORVASTATIN CALCIUM 20 MG: 20 TABLET, FILM COATED ORAL at 08:56

## 2020-06-07 RX ADMIN — SODIUM CHLORIDE, PRESERVATIVE FREE 10 ML: 5 INJECTION INTRAVENOUS at 08:57

## 2020-06-07 RX ADMIN — INSULIN LISPRO 1 UNITS: 100 INJECTION, SOLUTION INTRAVENOUS; SUBCUTANEOUS at 21:03

## 2020-06-07 RX ADMIN — LINEZOLID 600 MG: 600 INJECTION, SOLUTION INTRAVENOUS at 13:17

## 2020-06-07 RX ADMIN — INSULIN GLARGINE 24 UNITS: 100 INJECTION, SOLUTION SUBCUTANEOUS at 21:04

## 2020-06-07 RX ADMIN — PIPERACILLIN AND TAZOBACTAM 3.38 G: 3; .375 INJECTION, POWDER, LYOPHILIZED, FOR SOLUTION INTRAVENOUS at 17:44

## 2020-06-07 RX ADMIN — ENOXAPARIN SODIUM 40 MG: 40 INJECTION SUBCUTANEOUS at 21:05

## 2020-06-07 RX ADMIN — INSULIN LISPRO 1 UNITS: 100 INJECTION, SOLUTION INTRAVENOUS; SUBCUTANEOUS at 13:17

## 2020-06-07 RX ADMIN — SODIUM CHLORIDE, PRESERVATIVE FREE 10 ML: 5 INJECTION INTRAVENOUS at 21:06

## 2020-06-07 RX ADMIN — ASPIRIN 81 MG: 81 TABLET, COATED ORAL at 13:17

## 2020-06-07 RX ADMIN — ACETAMINOPHEN 650 MG: 325 TABLET ORAL at 21:04

## 2020-06-07 RX ADMIN — PIPERACILLIN AND TAZOBACTAM 3.38 G: 3; .375 INJECTION, POWDER, LYOPHILIZED, FOR SOLUTION INTRAVENOUS at 05:58

## 2020-06-07 ASSESSMENT — PAIN DESCRIPTION - ORIENTATION
ORIENTATION: MID
ORIENTATION: MID

## 2020-06-07 ASSESSMENT — PAIN DESCRIPTION - PAIN TYPE
TYPE: ACUTE PAIN

## 2020-06-07 ASSESSMENT — PAIN - FUNCTIONAL ASSESSMENT
PAIN_FUNCTIONAL_ASSESSMENT: PREVENTS OR INTERFERES SOME ACTIVE ACTIVITIES AND ADLS
PAIN_FUNCTIONAL_ASSESSMENT: PREVENTS OR INTERFERES SOME ACTIVE ACTIVITIES AND ADLS

## 2020-06-07 ASSESSMENT — PAIN SCALES - GENERAL
PAINLEVEL_OUTOF10: 0
PAINLEVEL_OUTOF10: 0
PAINLEVEL_OUTOF10: 1
PAINLEVEL_OUTOF10: 3
PAINLEVEL_OUTOF10: 0
PAINLEVEL_OUTOF10: 0

## 2020-06-07 ASSESSMENT — PAIN DESCRIPTION - DESCRIPTORS
DESCRIPTORS: ACHING
DESCRIPTORS: ACHING

## 2020-06-07 ASSESSMENT — PAIN DESCRIPTION - LOCATION
LOCATION: ABDOMEN
LOCATION: ABDOMEN

## 2020-06-07 ASSESSMENT — PAIN DESCRIPTION - FREQUENCY
FREQUENCY: CONTINUOUS
FREQUENCY: CONTINUOUS

## 2020-06-07 ASSESSMENT — PAIN DESCRIPTION - ONSET
ONSET: ON-GOING
ONSET: ON-GOING

## 2020-06-07 ASSESSMENT — PAIN DESCRIPTION - PROGRESSION
CLINICAL_PROGRESSION: GRADUALLY IMPROVING
CLINICAL_PROGRESSION: GRADUALLY WORSENING

## 2020-06-07 NOTE — PLAN OF CARE
Problem: Falls - Risk of:  Goal: Will remain free from falls  Description: Will remain free from falls  Outcome: Ongoing  Note: No falls noted this shift. Patient ambulates with x1 staff assistance without difficulty. Bed kept in low position. Safe environment maintained. Bedside table & call light in reach. Uses call light appropriately when needing assistance. Goal: Absence of physical injury  Description: Absence of physical injury  Outcome: Ongoing  Note: Fall risk assessment completed . Fall precautions in place, bed/ chair alarm on, side rails 2/4 up, call light in reach, educated pt on calling for assistance when needed, room clear of clutter. Pt verbalized understanding.

## 2020-06-07 NOTE — PROGRESS NOTES
Hospitalist Progress Note    CC:     Fever (started last night); Nausea (startedn last night); and Emesis (started last night)      Admit date: 6/6/2020  Days in hospital:  1    Subjective:     He was feeling well. Denies any more fever or cough. No acute events overnight    ROS:   Review of Systems   Constitutional: Negative for chills and fever. Respiratory: Negative for cough and shortness of breath. Cardiovascular: Negative for chest pain and palpitations. Gastrointestinal: Negative for abdominal pain, constipation and diarrhea. Genitourinary: Negative for dysuria and urgency. Neurological: Negative for headaches. Objective:    /61   Pulse 56   Temp 98.3 °F (36.8 °C) (Oral)   Resp 15   Ht 6' 3\" (1.905 m)   Wt 255 lb 1.2 oz (115.7 kg)   SpO2 96%   BMI 31.88 kg/m²     Gen: alert, NAD  HEENT: NC/AT, moist mucous membranes, no oropharyngeal erythema or exudate  Neck: supple, trachea midline, no anterior cervical or SC LAD  Heart: Normal s1/s2, RRR, no murmurs, gallops, or rubs. Lungs: CTA bilaterally, no wheezing, no rales, no rhonchi, no use of accessory muscles  Abd: bowel sounds present, soft, nondistended, none tender  Extrem: Left foot in dressing. Psych: A & O x3  , affect appropriate  Neuro: grossly intact, moves all four extremities spontaneously.       Medications:  Scheduled Meds:   aspirin  81 mg Oral Daily    linezolid  600 mg Intravenous Q12H    insulin glargine  24 Units Subcutaneous Nightly    sodium chloride flush  10 mL Intravenous 2 times per day    enoxaparin  40 mg Subcutaneous Nightly    piperacillin-tazobactam  3.375 g Intravenous Q8H    insulin lispro  0-6 Units Subcutaneous TID     insulin lispro  0-3 Units Subcutaneous Nightly    atorvastatin  20 mg Oral Daily    tamsulosin  0.4 mg Oral Nightly       PRN Meds:  sodium chloride flush, acetaminophen **OR** acetaminophen, polyethylene glycol, ondansetron, glucose, dextrose, glucagon fluids  Improved        Type 2 diabetes mellitus complicated with possible diabetic foot infection  A1c 6.9  Lantus and sliding scale insulin     Hypertension  Hold Lasix, lisinopril, and Aldactone secondary to hypotension        Elevated troponin  Check EKG, trend troponin  0.04>>0.02>>0.02  Was elevated back in 2014 and 2015 also.   Could be related to SEPSIS    Code status:  Full   DVT prophylaxis: Lovenox   Disposition:   Pending clinical improvement    Electronically signed by Naheed Lemon MD on 6/7/2020 at 1:22 PM

## 2020-06-07 NOTE — PLAN OF CARE
Problem: Falls - Risk of:  Goal: Will remain free from falls  Description: Will remain free from falls  6/7/2020 1257 by Steve Ramirez RN  Outcome: Ongoing  Note: Fall risk assessment completed. Fall precautions in place. Call light within reach. Pt educated on calling for assistance before getting up. Walkway free of clutter. Will continue to monitor.   Electronically signed by Steve Ramirez RN on 6/7/2020 at 12:58 PM

## 2020-06-07 NOTE — CONSULTS
Department of Podiatry Consult Note  Lynne Mendoza. Kelly Mehta DPM Attending       Reason for Consult:  LEFT foot wound with Sepsis  Requesting Physician:  Summer Myers MD    CHIEF COMPLAINT:  Heavy bleeding of wound to plantar LEFT foot    HISTORY OF PRESENT ILLNESS:                The patient is a 75 y.o. male with significant past medical history of diabetes with peripheral neuropathy and Charcot arthropathy who is consulted for large ovoid wound of plantar LEFT midfoot due to Charcot disfigurement / rocker-bottom structure. Patient relates having felt poorly for 8 hours on Saturday early morning with onset of fever and chills, emesis, with decision to report to Emergency Department. Upon admission, he is feeling much improved with IV antibiotics, and in fact able to eat with full return of apetitie.  Adelaida Echavarria states that there was a considerable amount of blood loss from his wound, but never any purulence    Past Medical History:        Diagnosis Date    Diabetes mellitus ([address]     Gallstones     Pancreatitis     Ulcers of both lower legs ([address]     bilateral feet     Past Surgical History:        Procedure Laterality Date    ERCP  4/8/14    LITHOTRYPSY & PANCREATIC STENT PLACEMENT    FOOT SURGERY Left 1967     Current Medications:    Current Facility-Administered Medications: insulin glargine (LANTUS) injection vial 24 Units, 24 Units, Subcutaneous, Nightly  sodium chloride flush 0.9 % injection 10 mL, 10 mL, Intravenous, 2 times per day  sodium chloride flush 0.9 % injection 10 mL, 10 mL, Intravenous, PRN  acetaminophen (TYLENOL) tablet 650 mg, 650 mg, Oral, Q6H PRN **OR** acetaminophen (TYLENOL) suppository 650 mg, 650 mg, Rectal, Q6H PRN  polyethylene glycol (GLYCOLAX) packet 17 g, 17 g, Oral, Daily PRN  enoxaparin (LOVENOX) injection 40 mg, 40 mg, Subcutaneous, Nightly  ondansetron (ZOFRAN) injection 4 mg, 4 mg, Intravenous, Q6H PRN  glucose (GLUTOSE) 40 % oral gel 15 g, 15 g, Oral, PRN  dextrose 50 % IV solution, 12.5 g, Intravenous, PRN  glucagon (rDNA) injection 1 mg, 1 mg, Intramuscular, PRN  dextrose 5 % solution, 100 mL/hr, Intravenous, PRN  piperacillin-tazobactam (ZOSYN) 3.375 g in dextrose 5 % 100 mL IVPB extended infusion (mini-bag), 3.375 g, Intravenous, Q8H  insulin lispro (HUMALOG) injection vial 0-6 Units, 0-6 Units, Subcutaneous, TID WC  insulin lispro (HUMALOG) injection vial 0-3 Units, 0-3 Units, Subcutaneous, Nightly  vancomycin (VANCOCIN) intermittent dosing (placeholder), , Other, RX Placeholder  vancomycin (VANCOCIN) 1500 mg in dextrose 5 % 250 mL IVPB, 1,500 mg, Intravenous, Q12H  0.9 % sodium chloride infusion, , Intravenous, Continuous  atorvastatin (LIPITOR) tablet 20 mg, 20 mg, Oral, Daily  tamsulosin (FLOMAX) capsule 0.4 mg, 0.4 mg, Oral, Nightly  Allergies:   Patient has no known allergies. Social History:    TOBACCO:  Never used tobacco  ETOH:  Never drank alcohol  ACTIVITIES OF DAILY LIVING:  Patient has assistance twice weekly from aids to perform home and hygiene duties  Patient currently lives lost his mother in 2019, now lives alone, reclusive  Family History:       Problem Relation Age of Onset    Other Mother         PVD s/p toe amputation by Dr Darvin Reed:    CONSTITUTIONAL:  Denies fever, chills or nausea, with emesis having resolved yesterday  INTEGUMENT/BREAST:  Diabetic ulcerations of BOTH feet, LEFT is of greatest concern being 4.4 cm length 2.7 cm width and depth of 0.6 cm with muscle and sub Q exposure, but no bone or joint exposed.  Xerotic skin of both lower extremities  MUSCULOSKELETAL:  Enlarged RIGHT hallux, LEFT foot Charcoid deformity with rocker-bottom architecture and plantar ulcer  NEUROLOGICAL:  positive for gait problems and numbness  PHYSICAL EXAM:      Vitals:    /61   Pulse 56   Temp 98.3 °F (36.8 °C) (Oral)   Resp 15   Ht 6' 3\" (1.905 m)   Wt 255 lb 1.2 oz (115.7 kg)   SpO2 96%   BMI 31.88 kg/m²     LABS:   Recent Labs 06/06/20  0745 06/07/20  0538   WBC 14.2* 15.1*   HGB 11.1* 9.1*   HCT 34.5* 27.9*    177     Recent Labs     06/07/20  0538      K 4.2      CO2 21   BUN 25*   CREATININE 0.9     Recent Labs     06/06/20  0744   PROT 7.8       LOWER EXTREMITY EXAMINATION   SKIN:        Plantar LEFT mid-foot wound of sub Q and muscle depth with mixed fibrous / granular wound previously treated with Silver-Cell by home nursing. Heavy periwound hyperkeratosis, but no purulence and no odor. Wound MUGS 3 of 4.4 x 2.7 x 0.6 cm depth Fascia / Muscle visible    Extreme elongation of 10 pedal nails    NEUROLOGIC:    Pain sensation isdecreased Bilateral.  Light touch is decreasedBilateral. positive history of paresthesia Bilateral. negativehistory of burning Bilateral. Deep tendon reflexes are 2 to include patellar and achilles Bilateral. [place]--Jose 5.07 monofilament sensitivity is abnormal 6 to 6 spots tested Bilateral.    VASCULAR:    bilaterally Dorsalis pedis is 2. bilaterally Posterior tibial pulse is difficult to assess due to overlying xerotic skin. 2+ edema bilaterally. mild Varicosities bilaterally. MUSCULOSKELETAL:  [place] is untested due to plantar LEFT foot wound. Muscle strength is 4/5 to all groups tested to include dorsiflexion, plantarflexion, inversion, eversion, and digital Bilateral . The ranges of motion of all joints tested from ankle distal is decreased there is no crepitus noted Bilateral.    Radiographs: 6/6/2020  Cellulitis and ulceration to the medial aspect of the left foot.  No   radiographic evidence of osteomyelitis       Awaiting results of MRI 6/7/202    IMPRESSION/RECOMMENDATIONS    1. Cellulitis of LEFT foot secondary to wound of plantar arch  +Afebrile, but continued Leukocytosis  Vanc / Zosyn with recommendations for Linezolid, for kidney sparing      2. MUGS 3 ulceration of LEFT foot  +Santyl for further chemical debridement and conversion to greater granular tissues    3. Onychomycosis of 10 pedal nails  +Debridement of 10 nails in both thickness and length    4. Diabetes with peripheral neuropathy and Charcoid arthropathy  +Need for CROW boot for LEFT foot, specialty shoe RIGHT foot as acromegaloid hallux    Procedure:   Debridement of 10 pedal nails in both thickness and length, with Iodine applied interdigitally for heavy dermatophytic burden    EXCISIONAL Debridement of LEFT plantar midfoot ulcer of MUGS 3 ulcer 4.4 x 2.7 cm with depth of Muscle / Fascia 0.6 cm. Using a #15 blade the wound was debrided of non-viable tissues with removal of Muscle and Fascia to final wound measurement of 4.5 x 3.0 x 0.7 cm depth with application of sterile antibacterial dressing, and eventual use of Collagenase    Disposition: Patient will require greater wound care attention out-patient, but don't anticipate surgical needs at this time unless leukocytosis does not improve. Negative radiographic evidence, elevated but not high Sed Rate, and improvement of constitutional symptoms. Nail and wound debridement performed    Thank you for the opportunity to take part in the patient's care.     Aydee Haile DPM  [facility]  [phone]

## 2020-06-08 LAB
ANION GAP SERPL CALCULATED.3IONS-SCNC: 11 MMOL/L (ref 3–16)
BASOPHILS ABSOLUTE: 0 K/UL (ref 0–0.2)
BASOPHILS RELATIVE PERCENT: 0.3 %
BUN BLDV-MCNC: 17 MG/DL (ref 7–20)
CALCIUM SERPL-MCNC: 8.3 MG/DL (ref 8.3–10.6)
CHLORIDE BLD-SCNC: 107 MMOL/L (ref 99–110)
CHOLESTEROL, TOTAL: 79 MG/DL (ref 0–199)
CO2: 20 MMOL/L (ref 21–32)
CREAT SERPL-MCNC: 1 MG/DL (ref 0.8–1.3)
EOSINOPHILS ABSOLUTE: 0.2 K/UL (ref 0–0.6)
EOSINOPHILS RELATIVE PERCENT: 1.7 %
FERRITIN: 75.6 NG/ML (ref 30–400)
GFR AFRICAN AMERICAN: >60
GFR NON-AFRICAN AMERICAN: >60
GLUCOSE BLD-MCNC: 131 MG/DL (ref 70–99)
GLUCOSE BLD-MCNC: 133 MG/DL (ref 70–99)
GLUCOSE BLD-MCNC: 138 MG/DL (ref 70–99)
GLUCOSE BLD-MCNC: 151 MG/DL (ref 70–99)
GLUCOSE BLD-MCNC: 155 MG/DL (ref 70–99)
GRAM STAIN RESULT: ABNORMAL
HCT VFR BLD CALC: 28.6 % (ref 40.5–52.5)
HCT VFR BLD CALC: 28.8 % (ref 40.5–52.5)
HDLC SERPL-MCNC: 21 MG/DL (ref 40–60)
HEMOGLOBIN: 9.3 G/DL (ref 13.5–17.5)
IMMATURE RETIC FRACT: 0.3 (ref 0.21–0.37)
IRON SATURATION: 6 % (ref 20–50)
IRON: 14 UG/DL (ref 59–158)
LDL CHOLESTEROL CALCULATED: 37 MG/DL
LYMPHOCYTES ABSOLUTE: 1.5 K/UL (ref 1–5.1)
LYMPHOCYTES RELATIVE PERCENT: 13.9 %
MCH RBC QN AUTO: 25.7 PG (ref 26–34)
MCHC RBC AUTO-ENTMCNC: 32.7 G/DL (ref 31–36)
MCV RBC AUTO: 78.6 FL (ref 80–100)
MONOCYTES ABSOLUTE: 0.5 K/UL (ref 0–1.3)
MONOCYTES RELATIVE PERCENT: 4.8 %
NEUTROPHILS ABSOLUTE: 8.6 K/UL (ref 1.7–7.7)
NEUTROPHILS RELATIVE PERCENT: 79.3 %
ORGANISM: ABNORMAL
PDW BLD-RTO: 16.7 % (ref 12.4–15.4)
PERFORMED ON: ABNORMAL
PLATELET # BLD: 194 K/UL (ref 135–450)
PMV BLD AUTO: 7.3 FL (ref 5–10.5)
POTASSIUM REFLEX MAGNESIUM: 4.3 MMOL/L (ref 3.5–5.1)
RBC # BLD: 3.64 M/UL (ref 4.2–5.9)
RETICULOCYTE ABSOLUTE COUNT: 0.05 M/UL
RETICULOCYTE COUNT PCT: 1.4 % (ref 0.5–2.18)
SODIUM BLD-SCNC: 138 MMOL/L (ref 136–145)
TOTAL IRON BINDING CAPACITY: 240 UG/DL (ref 260–445)
TRIGL SERPL-MCNC: 105 MG/DL (ref 0–150)
VLDLC SERPL CALC-MCNC: 21 MG/DL
WBC # BLD: 10.9 K/UL (ref 4–11)
WOUND/ABSCESS: ABNORMAL

## 2020-06-08 PROCEDURE — 82728 ASSAY OF FERRITIN: CPT

## 2020-06-08 PROCEDURE — 36415 COLL VENOUS BLD VENIPUNCTURE: CPT

## 2020-06-08 PROCEDURE — 6370000000 HC RX 637 (ALT 250 FOR IP): Performed by: NURSE PRACTITIONER

## 2020-06-08 PROCEDURE — 99223 1ST HOSP IP/OBS HIGH 75: CPT | Performed by: INTERNAL MEDICINE

## 2020-06-08 PROCEDURE — 99233 SBSQ HOSP IP/OBS HIGH 50: CPT | Performed by: INTERNAL MEDICINE

## 2020-06-08 PROCEDURE — 80048 BASIC METABOLIC PNL TOTAL CA: CPT

## 2020-06-08 PROCEDURE — 6370000000 HC RX 637 (ALT 250 FOR IP): Performed by: PODIATRIST

## 2020-06-08 PROCEDURE — 85045 AUTOMATED RETICULOCYTE COUNT: CPT

## 2020-06-08 PROCEDURE — 6360000002 HC RX W HCPCS: Performed by: HOSPITALIST

## 2020-06-08 PROCEDURE — 2580000003 HC RX 258: Performed by: HOSPITALIST

## 2020-06-08 PROCEDURE — 94760 N-INVAS EAR/PLS OXIMETRY 1: CPT

## 2020-06-08 PROCEDURE — 6370000000 HC RX 637 (ALT 250 FOR IP): Performed by: HOSPITALIST

## 2020-06-08 PROCEDURE — 83540 ASSAY OF IRON: CPT

## 2020-06-08 PROCEDURE — 85025 COMPLETE CBC W/AUTO DIFF WBC: CPT

## 2020-06-08 PROCEDURE — 83550 IRON BINDING TEST: CPT

## 2020-06-08 PROCEDURE — 80061 LIPID PANEL: CPT

## 2020-06-08 PROCEDURE — 2060000000 HC ICU INTERMEDIATE R&B

## 2020-06-08 RX ORDER — LISINOPRIL 5 MG/1
5 TABLET ORAL DAILY
Status: DISCONTINUED | OUTPATIENT
Start: 2020-06-08 | End: 2020-06-12

## 2020-06-08 RX ORDER — LACTOBACILLUS RHAMNOSUS GG 10B CELL
1 CAPSULE ORAL 2 TIMES DAILY WITH MEALS
Status: DISCONTINUED | OUTPATIENT
Start: 2020-06-08 | End: 2020-06-16 | Stop reason: HOSPADM

## 2020-06-08 RX ORDER — ATORVASTATIN CALCIUM 40 MG/1
40 TABLET, FILM COATED ORAL DAILY
Status: DISCONTINUED | OUTPATIENT
Start: 2020-06-09 | End: 2020-06-16 | Stop reason: HOSPADM

## 2020-06-08 RX ADMIN — LINEZOLID 600 MG: 600 INJECTION, SOLUTION INTRAVENOUS at 00:22

## 2020-06-08 RX ADMIN — ASPIRIN 81 MG: 81 TABLET, COATED ORAL at 10:17

## 2020-06-08 RX ADMIN — LINEZOLID 600 MG: 600 INJECTION, SOLUTION INTRAVENOUS at 12:18

## 2020-06-08 RX ADMIN — INSULIN LISPRO 1 UNITS: 100 INJECTION, SOLUTION INTRAVENOUS; SUBCUTANEOUS at 17:44

## 2020-06-08 RX ADMIN — LISINOPRIL 5 MG: 5 TABLET ORAL at 10:16

## 2020-06-08 RX ADMIN — COLLAGENASE SANTYL: 250 OINTMENT TOPICAL at 22:00

## 2020-06-08 RX ADMIN — PIPERACILLIN AND TAZOBACTAM 3.38 G: 3; .375 INJECTION, POWDER, LYOPHILIZED, FOR SOLUTION INTRAVENOUS at 00:58

## 2020-06-08 RX ADMIN — ATORVASTATIN CALCIUM 20 MG: 20 TABLET, FILM COATED ORAL at 10:17

## 2020-06-08 RX ADMIN — INSULIN GLARGINE 24 UNITS: 100 INJECTION, SOLUTION SUBCUTANEOUS at 20:08

## 2020-06-08 RX ADMIN — PIPERACILLIN AND TAZOBACTAM 3.38 G: 3; .375 INJECTION, POWDER, LYOPHILIZED, FOR SOLUTION INTRAVENOUS at 10:17

## 2020-06-08 RX ADMIN — SODIUM CHLORIDE, PRESERVATIVE FREE 10 ML: 5 INJECTION INTRAVENOUS at 10:18

## 2020-06-08 RX ADMIN — ENOXAPARIN SODIUM 40 MG: 40 INJECTION SUBCUTANEOUS at 20:07

## 2020-06-08 RX ADMIN — PIPERACILLIN AND TAZOBACTAM 3.38 G: 3; .375 INJECTION, POWDER, LYOPHILIZED, FOR SOLUTION INTRAVENOUS at 17:45

## 2020-06-08 RX ADMIN — TAMSULOSIN HYDROCHLORIDE 0.4 MG: 0.4 CAPSULE ORAL at 20:09

## 2020-06-08 RX ADMIN — INSULIN LISPRO 1 UNITS: 100 INJECTION, SOLUTION INTRAVENOUS; SUBCUTANEOUS at 12:17

## 2020-06-08 ASSESSMENT — PAIN SCALES - GENERAL: PAINLEVEL_OUTOF10: 0

## 2020-06-08 NOTE — PROGRESS NOTES
Infectious Disease Follow up Notes  Admit Date: 6/6/2020  Hospital Day: 3    Antibiotics :   IV Linezolid  IV Zosyn      CHIEF COMPLAINT:     Fevers  Emesis  Diabetic foot infection   Diabetic foot ulcer      Subjective interval History :  75 y.o. man with DM and Neuropahty, with on going ulcer Left foot and drainage now with worsening infection, fevers WBC elevation. X ray left foot negative for any bone changes MRI pending. Past Medical History:    Past Medical History:   Diagnosis Date    Diabetes mellitus ([address]     Gallstones     Pancreatitis     Ulcers of both lower legs ([address]     bilateral feet       Past Surgical History:    Past Surgical History:   Procedure Laterality Date    ERCP  4/8/14    LITHOTRYPSY & PANCREATIC STENT PLACEMENT    FOOT SURGERY Left 1967       Current Medications:    Outpatient Medications Marked as Taking for the 6/6/20 encounter [facility] Encounter)   Medication Sig Dispense Refill    atorvastatin (LIPITOR) 20 MG tablet Take 20 mg by mouth daily      furosemide (LASIX) 40 MG tablet Take 40 mg by mouth daily      spironolactone (ALDACTONE) 25 MG tablet Take 25 mg by mouth daily      tamsulosin (FLOMAX) 0.4 MG capsule Take 0.4 mg by mouth nightly       metFORMIN (GLUCOPHAGE) 500 MG tablet Take 500 mg by mouth 2 times daily (with meals)      lisinopril (PRINIVIL;ZESTRIL) 5 MG tablet Take 1 tablet by mouth daily. (Patient taking differently: Take 20 mg by mouth daily ) 30 tablet 3    ammonium lactate (LAC-HYDRIN) 12 % lotion Apply to BL feet and legs bid with dressing changes 1 Bottle 0    insulin glargine (LANTUS SOLOSTAR) 100 UNIT/ML injection pen Inject 10 Units into the skin every morning (before breakfast). (Patient taking differently: Inject 24 Units into the skin nightly ) 1 Pen 3    Insulin Pen Needle 32G X 4 MM MISC 1 each by Does not apply route daily.  100 each 3    Lancets MISC Once daily 100 each 3    glucose blood VI test strips (ASCENSIA AUTODISC VI;ONE TOUCH ULTRA TEST VI) strip 1 each by In Vitro route daily. As needed. 100 each 3    glucose monitoring kit (FREESTYLE) monitoring kit 1 kit by Does not apply route daily as needed. 1 kit 0       Allergies:  Patient has no known allergies. Immunizations : There is no immunization history on file for this patient. Social History:    Social History     Tobacco Use    Smoking status: Never Smoker    Smokeless tobacco: Never Used   Substance Use Topics    Alcohol use: No    Drug use: No     Social History     Tobacco Use   Smoking Status Never Smoker   Smokeless Tobacco Never Used      Family History   Problem Relation Age of Onset    Other Mother         PVD s/p toe amputation by Dr Mary Grace Brito:    No fever / chills / sweats. No weight loss. No visual change, eye pain, eye discharge. No oral lesion, sore throat, dysphagia. Denies cough / sputum/Sob   Denies chest pain, palpitations/ dizziness  Denies nausea/ vomiting/abdominal pain/diarrhea. Denies dysuria or change in urinary function. Denies joint swelling or pain. No myalgia, arthralgia. No rashes, skin lesions   Denies focal weakness, sensory change or other neurologic symptoms  No lymph node swelling or tenderness.       Left foot ulceration, cellulitis and Lower leg changes+ diabetic foot infection   PHYSICAL EXAM:      Vitals:  T max  101.3   BP (!) 143/69   Pulse 57   Temp 99 °F (37.2 °C) (Oral)   Resp 16   Ht 6' 3\" (1.905 m)   Wt 254 lb 10.1 oz (115.5 kg)   SpO2 98%   BMI 31.83 kg/m²     General Appearance: alert,in some  acute distress, no pallor, no icterus obesity ++  Skin: warm and dry, no rash or erythema  Head: normocephalic and atraumatic  Eyes: pupils equal, round, and reactive to light, conjunctivae normal  ENT: tympanic membrane, external ear and ear canal normal bilaterally, nose without deformity, nasal mucosa and turbinates normal without polyps  Neck: supple and non-tender without mass, no thyromegaly  no cervical lymphadenopathy  Pulmonary/Chest: clear to auscultation bilaterally- no wheezes, rales or rhonchi, normal air movement, no respiratory distress  Cardiovascular: normal rate, regular rhythm, normal S1 and S2, no murmurs, rubs, clicks, or gallops, no carotid bruits  Abdomen: soft, non-tender, non-distended, normal bowel sounds, no masses or organomegaly  Extremities: no cyanosis, clubbing or edema  Musculoskeletal: normal range of motion, no joint swelling, deformity or tenderness  Neurologic: reflexes normal and symmetric, no cranial nerve deficit  Psych:  Orientation, sensorium, mood normal  Lines:  IV  Left foot charcot changes and medial foot ulcer, cellulitis and skin changes from DM+   Ace wraps++     Data Review:    Lab Results   Component Value Date    WBC 10.9 06/08/2020    HGB 9.3 (L) 06/08/2020    HCT 28.6 (L) 06/08/2020    MCV 78.6 (L) 06/08/2020     06/08/2020     Lab Results   Component Value Date    CREATININE 1.0 06/08/2020    BUN 17 06/08/2020     06/08/2020    K 4.3 06/08/2020     06/08/2020    CO2 20 (L) 06/08/2020       Hepatic Function Panel:   Lab Results   Component Value Date    ALKPHOS 84 06/06/2020    ALT 7 06/06/2020    AST 10 06/06/2020    PROT 7.8 06/06/2020    BILITOT 0.7 06/06/2020    LABALBU 4.0 06/06/2020       UA:  Lab Results   Component Value Date    COLORU YELLOW 06/06/2020    CLARITYU Clear 06/06/2020    GLUCOSEU Negative 06/06/2020    BILIRUBINUR Negative 06/06/2020    KETUA Negative 06/06/2020    SPECGRAV 1.018 06/06/2020    BLOODU TRACE 06/06/2020    PHUR 5.5 06/06/2020    PROTEINU Negative 06/06/2020    UROBILINOGEN 0.2 06/06/2020    NITRU Negative 06/06/2020    LEUKOCYTESUR Negative 06/06/2020    LABMICR YES 06/06/2020    URINETYPE NotGiven 06/06/2020      Urine Microscopic:   Lab Results   Component Value Date    BACTERIA 3+ 04/01/2014    HYALCAST 1 06/06/2020    WBCUA 1 06/06/2020    RBCUA 6 06/06/2020    EPIU 0 06/06/2020       Lactic acid  3.2     CRP 17.2     Wbc  14.2  Down to  10.9    Vanco  15.1     Esr 40       MICRO: cultures reviewed and updated by me            Culture, Wound [[id number]] (Abnormal)  Collected: 06/06/20 0754   Order Status: Completed Specimen: Foot Updated: 06/08/20 0839    Gram Stain Result 3+ Gram positive cocci   3+ Gram positive rods   3+ Gram negative rods   1+ WBC's (Mononuclear)   1+ Epithelial Cells   Abnormal     Organism Proteus mirabilisAbnormal     WOUND/ABSCESS Heavy growth    Organism BHS Group B (Strep agalacticae)Abnormal     WOUND/ABSCESS --    Heavy growth   Susceptibility testing of penicillin and other beta lactams is   not necessary for beta hemolytic Streptococci since resistant   strains have not been identified. (CLSI M100)     Organism DiphtheroidsAbnormal     WOUND/ABSCESS --    Heavy growth   No further workup    Narrative:     ORDER#: [id number]                          ORDERED BY: Franklyn Ceron  SOURCE: Foot                               COLLECTED:  06/06/20 07:54  ANTIBIOTICS AT [place]. :                      RECEIVED :  06/06/20 07:54  Performed at:  [facility]  [address] [facility] [place] [employer] [address]   Phone [phone]   Culture, MRSA, Screening [[id number]] Collected: 06/06/20 1730   Order Status: Completed Specimen: Nares Updated: 06/07/20 1300    MRSA Culture Only --    No S aureus MRSA isolated   S aureus MSSA present    Narrative:     ORDER#: [id number]                          ORDERED BY: Ramin Smith  SOURCE: Nares                              COLLECTED:  06/06/20 17:30  ANTIBIOTICS AT [place]. :                      RECEIVED :  06/06/20 17:44  Performed at:  [facility]  [address] [facility] [place], [place] [employer] [address]   Phone [phone]   Culture, Blood 1 [[id number]] Collected: 06/06/20 0744   Order Status: Completed Specimen: Blood Updated: 06/07/20 0915    Blood Culture, Routine No Growth to date.  Any change in status will be called. Narrative:     ORDER#: [id number]                          ORDERED BY: Bishop Valenzuela  SOURCE: Blood                              COLLECTED:  06/06/20 07:44  ANTIBIOTICS AT [place]. :                      RECEIVED :  06/06/20 07:44  If child <=2 yrs old please draw pediatric bottle. ~Blood Culture #1  Performed at:  [facility]  [address], [place] [facility] [place] [employer] [place] [address]   Phone [phone]   Culture, Blood 2 [[id number]] Collected: 06/06/20 0754   Order Status: Completed Specimen: Blood Updated: 06/07/20 0915    Culture, Blood 2 No Growth to date.  Any change in status will be called. Narrative:     ORDER#: [id number]                          ORDERED BY: Bishop Valenzuela  SOURCE: Blood                              COLLECTED:  06/06/20 07:54  ANTIBIOTICS AT [place]. :                      RECEIVED :  06/06/20 07:54  If child <=2 yrs old please draw pediatric bottle. ~Blood Culture #2  Performed at:  [facility]  [address], [place] [employer] [address]   Phone [phone]   MRSA DNA Probe, Nasal [[id number]] Collected: 06/06/20 1730   Order Status: Canceled Specimen: Nasal from Nares      Proteus mirabilis (1)     Antibiotic Interpretation BRIAN Status    ampicillin Sensitive <=8 mcg/mL     ceFAZolin Sensitive <=2 mcg/mL     cefepime Sensitive <=2 mcg/mL     cefotaxime Sensitive <=2 mcg/mL     cefTRIAXone Sensitive <=1 mcg/mL     cefuroxime Sensitive 8 mcg/mL     ciprofloxacin Sensitive <=1 mcg/mL     ertapenem Sensitive <=0.5 mcg/mL     gentamicin Sensitive <=4 mcg/mL     meropenem Sensitive <=1 mcg/mL     piperacillin-tazobactam Sensitive <=16 mcg/mL     trimethoprim-sulfamethoxazole Sensitive <=2/38 mcg/mL           IMAGING:    XR FOOT LEFT (MIN 3 VIEWS)   Final Result   Cellulitis and ulceration to the medial aspect of the left foot.   No radiographic evidence of osteomyelitis. XR CHEST PORTABLE   Final Result   No acute cardiopulmonary process. CT ABDOMEN PELVIS WO CONTRAST Additional Contrast? None   Final Result   1. Atrophic pancreas with scattered calcifications along with 1 large   calcification in the central pancreatic duct which is dilated. Spectrum of   findings would favor chronic pancreatitis. No significant inflammation on   the current exam but early acute on chronic pancreatitis may be considered   clinically. 2. No CT evidence of mass lesion in the pancreatic head. 3. Cardiomegaly and bibasilar atelectasis in the visualized lungs.                All the pertinent images and reports for the current Hospitalization were reviewed by me     Scheduled Meds:   lisinopril  5 mg Oral Daily    aspirin  81 mg Oral Daily    linezolid  600 mg Intravenous Q12H    insulin glargine  24 Units Subcutaneous Nightly    sodium chloride flush  10 mL Intravenous 2 times per day    enoxaparin  40 mg Subcutaneous Nightly    piperacillin-tazobactam  3.375 g Intravenous Q8H    insulin lispro  0-6 Units Subcutaneous TID WC    insulin lispro  0-3 Units Subcutaneous Nightly    atorvastatin  20 mg Oral Daily    tamsulosin  0.4 mg Oral Nightly       Continuous Infusions:   dextrose         PRN Meds:  sodium chloride flush, acetaminophen **OR** acetaminophen, polyethylene glycol, ondansetron, glucose, dextrose, glucagon (rDNA), dextrose      Assessment:     Patient Active Problem List   Diagnosis    Abdominal pain, acute    BPH (benign prostatic hyperplasia)    RLQ abdominal pain    Abnormal ECG    HTN (hypertension)    Diabetes mellitus type II, uncontrolled ([facility]    Diabetic foot infection (HCC)    Fever    Leukocytosis    Charcot foot due to diabetes mellitus (HCC)    Foot ulceration, left, with unspecified severity (HCC)     Sepsis  Fevers  WBC elevation  Lactic acidosis  Left complicated diabetic foot infection  Charcot foot   Neuropathy   Dm+  Wound cx mixed errol    Left foot is the source for infection and sepsis, MRI Left foot pending and will cont IV Zosyn to cover Mixed infectious process      Labs, Microbiology, Radiology and all the pertinent results from current hospitalization and  care every where were reviewed  by me as a part of the evaluation   Plan:   1. Cont IV Zosyn x 3.375 gm Q 8 HR  2. D/c IV Linezolid  3. MRI left foot pending   4. Local foot care  5. Trend WBC      Discussed with patient/Family and Nursing staff   Risk of Complications/Morbidity: High      · Illness(es)/ Infection present that pose threat to bodily function. · There is potential for severe exacerbation of infection/side effects of treatment. · Therapy requires intensive monitoring for antimicrobial agent toxicity. Thanks for allowing me to participate in your patient's care and please call me with any questions or concerns.     Adrienne Navarro MD  Infectious Disease  [facility] Physician  Phone: [phone]   Fax : [phone]

## 2020-06-08 NOTE — PLAN OF CARE
Problem: Falls - Risk of:  Goal: Will remain free from falls  Description: Will remain free from falls  Outcome: Ongoing  Note: Fall risk assessment completed. Fall precautions in place. Call light within reach. Pt educated on calling for assistance before getting up. Walkway free of clutter. Will continue to monitor.    Electronically signed by Naga Nevarez RN on 6/8/2020 at 3:20 PM    Goal: Absence of physical injury  Description: Absence of physical injury  Outcome: Ongoing

## 2020-06-08 NOTE — PROGRESS NOTES
Hospital Medicine Progress Note      Admit Date: 6/6/2020         Overnight Events: none    CC: F/U for fever, vomiting, chest pain and a cough    Hospital Course: This is a 26-year-old male with a history of previous pancreatitis, gallstones, chronic bilateral ulcerations on his feet, and diabetes who presented to the ED with complaints of fever, vomiting and a cough. He also complained of chest pain with cough and breathing. He states that he was initially short of breath however that had resolved. He does complain of generalized body aches and weakness. No close contacts but has had visiting aides and nurses come to his house. Interval History/Subjective:   Denies any fevers, chills, cp, sob, nausea or other sx at present    Review of Systems:     Comprehensive ROS negative except as mentioned above. Past Medical History:        Diagnosis Date    Diabetes mellitus ([address]     Gallstones     Pancreatitis     Ulcers of both lower legs ([address]     bilateral feet       Past Surgical History:        Procedure Laterality Date    ERCP  4/8/14    LITHOTRYPSY & PANCREATIC STENT PLACEMENT    FOOT SURGERY Left 1967       Allergies:  Patient has no known allergies. Past medical and surgical history reviewed. Any changes have been noted.      Scheduled and prn Medications:    Scheduled Meds:   lisinopril  5 mg Oral Daily    [START ON 6/9/2020] atorvastatin  40 mg Oral Daily    aspirin  81 mg Oral Daily    linezolid  600 mg Intravenous Q12H    insulin glargine  24 Units Subcutaneous Nightly    sodium chloride flush  10 mL Intravenous 2 times per day    enoxaparin  40 mg Subcutaneous Nightly    piperacillin-tazobactam  3.375 g Intravenous Q8H    insulin lispro  0-6 Units Subcutaneous TID     insulin lispro  0-3 Units Subcutaneous Nightly    tamsulosin  0.4 mg Oral Nightly     Continuous Infusions:   dextrose       PRN Meds:.perflutren lipid microspheres, sodium chloride flush, acetaminophen **OR** acetaminophen, polyethylene glycol, ondansetron, glucose, dextrose, glucagon (rDNA), dextrose    PHYSICAL EXAM:  BP (!) 143/69   Pulse 57   Temp 99 °F (37.2 °C) (Oral)   Resp 16   Ht 6' 3\" (1.905 m)   Wt 254 lb 10.1 oz (115.5 kg)   SpO2 98%   BMI 31.83 kg/m²       Intake/Output Summary (Last 24 hours) at 6/8/2020 1806  Last data filed at 6/8/2020 1746  Gross per 24 hour   Intake 1820 ml   Output 1660 ml   Net 160 ml       General: Alert and oriented. Resting in bed in no apparent distress. cantankerous   HEENT: Normocephalic. Atraumatic. Pupils equal and reactive. EOM intact. Oral mucosa pink/moist/intact. Neck: Supple. Symmetrical. Trachea midline. Lungs: Clear to auscultation bilaterally. Respirations even and unlabored. Chest: Exam unremarkable. Cardiac: S1/S2 noted. Regular Rhythm and rate. Abdomen/GI: Soft. Non-tender. Non-distended. BS+. Extremities: PP+. Atraumatic. No redness/cyanosis/edema noted. Brisk cap refill. Left foot with large arch ulcer, right foot great toe \"collapsed\" and toe nail is on plantar foot  Skin: Dry and intact. No lesions noted. Neuro: Grossly intact. No focal deficits noted. LABS:    Lab Results   Component Value Date    WBC 10.9 06/08/2020    HGB 9.3 (L) 06/08/2020    HCT 28.8 (L) 06/08/2020    HCT 28.6 (L) 06/08/2020    MCV 78.6 (L) 06/08/2020     06/08/2020    LYMPHOPCT 13.9 06/08/2020    RBC 3.64 (L) 06/08/2020    MCH 25.7 (L) 06/08/2020    MCHC 32.7 06/08/2020    RDW 16.7 (H) 06/08/2020       Lab Results   Component Value Date    CREATININE 1.0 06/08/2020    BUN 17 06/08/2020     06/08/2020    K 4.3 06/08/2020     06/08/2020    CO2 20 (L) 06/08/2020       Lab Results   Component Value Date    MG 1.90 02/06/2015       Lab Results   Component Value Date    ALT 7 (L) 06/06/2020    AST 10 (L) 06/06/2020    ALKPHOS 84 06/06/2020    BILITOT 0.7 06/06/2020        No flowsheet data found.     Imaging:  XR FOOT LEFT (MIN 3 VIEWS)   Final Result Cellulitis and ulceration to the medial aspect of the left foot. No   radiographic evidence of osteomyelitis. XR CHEST PORTABLE   Final Result   No acute cardiopulmonary process. CT ABDOMEN PELVIS WO CONTRAST Additional Contrast? None   Final Result   1. Atrophic pancreas with scattered calcifications along with 1 large   calcification in the central pancreatic duct which is dilated. Spectrum of   findings would favor chronic pancreatitis. No significant inflammation on   the current exam but early acute on chronic pancreatitis may be considered   clinically. 2. No CT evidence of mass lesion in the pancreatic head. 3. Cardiomegaly and bibasilar atelectasis in the visualized lungs. MRI FOOT LEFT W WO CONTRAST    (Results Pending)       Assessment & Plan:      Sepsis, POA  -Thought to be related to diabetic foot wound/Charcot foot  -X-rays without signs of osteo, getting MRI to be sure  -ESR CRP mildly elevated  -Blood cultures with no growth to date  -ID consulted and recommends Zyvox and Zosyn for now  -Podiatry consulted, had bedside debridement and recommending chemical debridement with Santyl with daily dressing changes  -also with onchoymycosis and debridement of 10 nails.     Diabetic foot ulcer with Charcot foot  -Culture showing Proteus and group B strep  -ID following closely  - as above    Atypical chest pain  - had on arrival with elevated troponin and concern for new LBBB  - ECHO ordered to evaluate further  - asked cards to take a look    Cough and vomiting  -Unsure of etiology  -Could be related to foot infection  -CXR without acute findings  -Urinalysis unremarkable  -Resolved    Lactic acidosis  -Likely related to #1 #2  -IV fluids  -Levels improved    Diabetes mellitus type 2 without hyperglycemia  -A1c noted at 6.9  -Lantus and sliding scale for now  - BS stable, follow closely    Essential hypertension  -Holding Lasix, lisinopril and Aldactone secondary to hypotension  -Monitor closely to resume these  - resumed Lisinopril today    Obese  Body mass index is 31.83 kg/m². The patient and / or the family were informed of the results of any tests, a time was given to answer questions, a plan was proposed and they agreed with plan.     DVT prophylaxis: [x] Lovenox  [] SQ Heparin  [] SCDs because of  [] warfarin/oral direct thrombin inhibitor [] Encourage ambulation    GI prophylaxis: [] PPI/D4qeduoka  [x] not indicated    Probiotic if on abx: [x] Yes [] No [] Not Indicated    Diet: DIET CARB CONTROL;    Consults:  IP CONSULT TO HOSPITALIST  IP CONSULT TO INFECTIOUS DISEASES  IP CONSULT TO PODIATRY  IP CONSULT TO CARDIOLOGY    Disposition:  [] Home  [x] Home with home health [] Rehab [] Psych [] SNF  [] LTAC  [] Long term nursing home or group home [] Transfer to ICU  [] Transfer to PCU [] Other: has Visiting NP    Code Status: Full Code    ELOS: tbd      KENNY Nelson CNP  06/08/20

## 2020-06-08 NOTE — PROGRESS NOTES
Pt alert and oriented, AM medications and shift assessment complete, pt has no other needs at this time, will continue to monitor and assess. Electronically signed by Thomas Chaney RN on 6/8/2020 at 3:18 PM

## 2020-06-08 NOTE — CONSULTS
nursing record and/or below  Prior to Admission medications    Medication Sig Start Date End Date Taking? Authorizing Provider   atorvastatin (LIPITOR) 20 MG tablet Take 20 mg by mouth daily   Yes Historical Provider, MD   furosemide (LASIX) 40 MG tablet Take 40 mg by mouth daily   Yes Historical Provider, MD   spironolactone (ALDACTONE) 25 MG tablet Take 25 mg by mouth daily   Yes Historical Provider, MD   tamsulosin (FLOMAX) 0.4 MG capsule Take 0.4 mg by mouth nightly    Yes Historical Provider, MD   metFORMIN (GLUCOPHAGE) 500 MG tablet Take 500 mg by mouth 2 times daily (with meals)   Yes Historical Provider, MD   lisinopril (PRINIVIL;ZESTRIL) 5 MG tablet Take 1 tablet by mouth daily. Patient taking differently: Take 20 mg by mouth daily  2/6/15  Yes Arvin Marshall MD   ammonium lactate (LAC-HYDRIN) 12 % lotion Apply to BL feet and legs bid with dressing changes 2/6/15  Yes Arvin Marshall MD   insulin glargine (LANTUS SOLOSTAR) 100 UNIT/ML injection pen Inject 10 Units into the skin every morning (before breakfast). Patient taking differently: Inject 24 Units into the skin nightly  2/6/15  Yes Arvin Marshall MD   Insulin Pen Needle 32G X 4 MM MISC 1 each by Does not apply route daily. 2/6/15  Yes Arvin Marshall MD   Lancets MISC Once daily 2/6/15  Yes Arvin Marshall MD   glucose blood VI test strips (ASCENSIA AUTODISC VI;ONE TOUCH ULTRA TEST VI) strip 1 each by In Vitro route daily. As needed. 2/6/15  Yes Arvin Marshall MD   glucose monitoring kit (FREESTYLE) monitoring kit 1 kit by Does not apply route daily as needed.  2/6/15  Yes Arvin Marshall MD        CURRENT Medications:  lisinopril (PRINIVIL;ZESTRIL) tablet 5 mg, Daily  perflutren lipid microspheres (DEFINITY) injection 1.65 mg, ONCE PRN  aspirin EC tablet 81 mg, Daily  linezolid (ZYVOX) IVPB 600 mg, Q12H  insulin glargine (LANTUS) injection vial 24 Units, Nightly  sodium chloride flush 0.9 % injection 10 mL, 2 times per day  sodium chloride flush 0.9 % injection 10 mL, PRN  acetaminophen (TYLENOL) tablet 650 mg, Q6H PRN    Or  acetaminophen (TYLENOL) suppository 650 mg, Q6H PRN  polyethylene glycol (GLYCOLAX) packet 17 g, Daily PRN  enoxaparin (LOVENOX) injection 40 mg, Nightly  ondansetron (ZOFRAN) injection 4 mg, Q6H PRN  glucose (GLUTOSE) 40 % oral gel 15 g, PRN  dextrose 50 % IV solution, PRN  glucagon (rDNA) injection 1 mg, PRN  dextrose 5 % solution, PRN  piperacillin-tazobactam (ZOSYN) 3.375 g in dextrose 5 % 100 mL IVPB extended infusion (mini-bag), Q8H  insulin lispro (HUMALOG) injection vial 0-6 Units, TID WC  insulin lispro (HUMALOG) injection vial 0-3 Units, Nightly  atorvastatin (LIPITOR) tablet 20 mg, Daily  tamsulosin (FLOMAX) capsule 0.4 mg, Nightly        Allergies:  Patient has no known allergies. Review of Systems:   · Constitutional: no unanticipated weight loss. There's been no change in energy level, sleep pattern, or activity level. No fevers, chills. · Eyes: No visual changes or diplopia. No scleral icterus. · ENT: No Headaches, hearing loss or vertigo. No mouth sores or sore throat. · Cardiovascular: as reviewed in HPI  · Respiratory: No cough or wheezing, no sputum production. No hemoptysis. · Gastrointestinal: No abdominal pain, appetite loss, blood in stools. No change in bowel or bladder habits. · Genitourinary: No dysuria, trouble voiding, or hematuria. · Musculoskeletal:  No gait disturbance, no joint complaints. · Integumentary: No rash or pruritis. · Neurological: No headache, diplopia, change in muscle strength, numbness or tingling. · Psychiatric: No anxiety or depression. · Endocrine: No temperature intolerance. No excessive thirst, fluid intake, or urination. No tremor. · Hematologic/Lymphatic: No abnormal bruising or bleeding, blood clots or swollen lymph nodes. · Allergic/Immunologic: No nasal congestion or hives.     Objective:   PHYSICAL EXAM:    Vitals:    06/08/20 0914   BP:  143/69   Pulse:  57   Resp:  16   Temp: 101.3 (T max)   SpO2: 98%    Weight: 254 lb 10.1 oz (115.5 kg)       General Appearance:  Alert, cooperative, no respiratory distress, appears stated age. Head:  Normocephalic, without obvious abnormality, atraumatic. Eyes:  Pupils equal and round. No scleral icterus. Mouth: Moist mucosa, no pharyngeal erythema. Nose: Nares normal. No drainage or sinus tenderness. Neck: Supple, symmetrical, trachea midline. No adenopathy. No tenderness/mass/nodules. No carotid bruit or elevated JVD. Lungs:   Clear to auscultation bilaterally, respirations unlabored. No wheeze, rales, or rhonchi. Chest Wall:  No tenderness or deformity. Heart:  Regular rate. S1/S2 normal. No murmur, rub, or gallop. Abdomen:   Soft, non-tender, bowel sounds active. Musculoskeletal: No muscle wasting or digital clubbing.  + Charcot arthropathy. Extremities: Extremities normal, atraumatic. No cyanosis. 1+ BLE edema. Pulses: 2+ radial and carotid pulses, symmetric. Skin:  Left foot ulcer at plantar arch. Bilateral hyperpigmentation of lower extremities. Pysch: Normal mood and affect. Alert and oriented x 4. Neurologic:  Diminished sensation to bilateral lower extremity. Moves all extremities. Follows commands.       Labs     CBC:   Lab Results   Component Value Date    WBC 10.9 06/08/2020    RBC 3.64 06/08/2020    HGB 9.3 06/08/2020    HCT 28.6 06/08/2020    MCV 78.6 06/08/2020    RDW 16.7 06/08/2020     06/08/2020     CMP:  Lab Results   Component Value Date     06/08/2020    K 4.3 06/08/2020     06/08/2020    CO2 20 06/08/2020    BUN 17 06/08/2020    CREATININE 1.0 06/08/2020    GFRAA >60 06/08/2020    AGRATIO 1.1 06/06/2020    LABGLOM >60 06/08/2020    GLUCOSE 133 06/08/2020    PROT 7.8 06/06/2020    CALCIUM 8.3 06/08/2020    BILITOT 0.7 06/06/2020    ALKPHOS 84 06/06/2020    AST 10 06/06/2020    ALT 7 06/06/2020     PT/INR:  No results found for: PTINR  HgBA1c:  Lab Results   Component Value Date

## 2020-06-08 NOTE — DISCHARGE INSTR - COC
Continuity of Care Form    Patient Name: Fang Ross   :  1955  MRN:  [id number]    Admit date:  2020  Discharge date:  20    Code Status Order: Full Code   Advance Directives:   Advance Care Flowsheet Documentation     Date/Time Healthcare Directive Type of Healthcare Directive Copy in [address] Agent's Name Healthcare Agent's Phone Number    20 1123  Yes, patient has an advance directive for healthcare treatment  Durable power of  for health care  No, copy requested from family  Healthcare power of   Tod Norma  [phone]          Admitting Physician:  Ebony Julian MD  PCP: KENNY Weber CNP    Discharging Nurse: [facility] Unit/Room#: G4G-4667/3119-01  Discharging Unit Phone Number: [phone]    Emergency Contact:   Extended Emergency Contact Information  Primary Emergency Contact: Emerita Cheng  Address: [address] [place] [address] Phone: [phone]  Relation: None  Secondary Emergency Contact: eneida finch  Mobile Phone: [phone]  Relation: Other  Preferred language: English    Past Surgical History:  Past Surgical History:   Procedure Laterality Date    ERCP  14    LITHOTRYPSY & PANCREATIC STENT PLACEMENT    FOOT SURGERY Left        Immunization History: There is no immunization history on file for this patient.     Active Problems:  Patient Active Problem List   Diagnosis Code    Abdominal pain, acute R10.9    BPH (benign prostatic hyperplasia) N40.0    RLQ abdominal pain R10.31    Abnormal ECG R94.31    HTN (hypertension) I10    Diabetes mellitus type II, uncontrolled ([address] E11.65    Diabetic foot infection ([address] E11.628, L08.9    Fever R50.9    Leukocytosis D72.829    Charcot foot due to diabetes mellitus ([address] E11.610    Foot ulceration, left, with unspecified severity ([address] L97.529 Isolation/Infection:   Isolation          No Isolation        Patient Infection Status     Infection Onset Added Last Indicated Last Indicated By Review Planned Expiration Resolved Resolved By    None active    Resolved    COVID-19 Rule Out 06/06/20 06/06/20 06/06/20 COVID-19 (Ordered)   06/06/20 Rule-Out Test Resulted          Nurse Assessment:  Last Vital Signs: BP (!) 143/69   Pulse 57   Temp 99 °F (37.2 °C) (Oral)   Resp 16   Ht 6' 3\" (1.905 m)   Wt 254 lb 10.1 oz (115.5 kg)   SpO2 98%   BMI 31.83 kg/m²     Last documented pain score (0-10 scale): Pain Level: 1  Last Weight:   Wt Readings from Last 1 Encounters:   06/08/20 254 lb 10.1 oz (115.5 kg)     Mental Status:  oriented and alert    IV Access:  - PICC - site  L Basilic, insertion date: 6/11/20    Nursing Mobility/ADLs:  Walking   Assisted  Transfer  Assisted  Bathing  Assisted  Dressing  Assisted  Toileting  Assisted  Feeding  Independent  Med Admin  Independent  Med Delivery   whole    Wound Care Documentation and Therapy:  Incision 04/04/14 Toe (Comment  which one) Right (Active)   Number of days: 2257        Elimination:  Continence:   · Bowel: Yes  · Bladder: Yes  Urinary Catheter: None   Colostomy/Ileostomy/Ileal Conduit: No       Date of Last BM: 6/11/20    Intake/Output Summary (Last 24 hours) at 6/8/2020 1307  Last data filed at 6/8/2020 1218  Gross per 24 hour   Intake 1940 ml   Output 2360 ml   Net -420 ml     I/O last 3 completed shifts:   In: 1700 [P.O.:1300; IV Piggyback:400]  Out: 2450 [Urine:2450]    Safety Concerns:     History of Falls (last 30 days) and At Risk for Falls    Impairments/Disabilities:      None    Nutrition Therapy:  Current Nutrition Therapy:   - Oral Diet:  Carb Control 4 carbs/meal (1800kcals/day)    Routes of Feeding: Oral  Liquids: No Restrictions  Daily Fluid Restriction: no  Last Modified Barium Swallow with Video (Video Swallowing Test): not done    Treatments at the Time of Hospital Discharge: Respiratory Treatments:   Oxygen Therapy:  is not on home oxygen therapy. Ventilator:    - No ventilator support    Rehab Therapies: Physical Therapy and Occupational Therapy  Weight Bearing Status/Restrictions: No weight bearing restrictions with CROW boot in place, Non weight bearing to L foot without boot. Other Medical Equipment (for information only, NOT a DME order):  walker  Other Treatments: none    Patient's personal belongings (please select all that are sent with patient):  None    RN SIGNATURE:  Electronically signed by Kristy Cranker, RN on 6/16/20 at 10:41 AM EDT    CASE MANAGEMENT/SOCIAL WORK SECTION    Inpatient Status Date: 6/6/20    Readmission Risk Assessment Score:  Readmission Risk              Risk of Unplanned Readmission:        11           Discharging to Facility/ Agency   · Name: [facility]  · Address:  · Phone:[phone]  · Fax:    Dialysis Facility (if applicable)   · Name:  · Address:  · Dialysis Schedule:  · Phone:  · Fax:    / signature: Electronically signed by Nicolette Pereira on 6/16/20 at 10:16 AM EDT    PHYSICIAN SECTION    Prognosis: Good    Condition at Discharge: Stable    Rehab Potential (if transferring to Rehab): Good    Recommended Labs or Other Treatments After Discharge:   IV Ertapenem X 1 GM q 24 hrs x stop date 7/21  CBC with diff, BMP,ESR, CRP weekly  Fax results to  [phone]  PICC     [address] [facility]  Phone: [phone]   Fax : [phone]      Physician Certification: I certify the above information and transfer of Jenniffer Vera  is necessary for the continuing treatment of the diagnosis listed and that he requires [address] for less 30 days.      Update Admission H&P: No change in H&P    PHYSICIAN SIGNATURE:  Electronically signed by KENNY Sheehan CNP on 6/13/20 at 2:41 PM EDT/Dr. Robin Capone

## 2020-06-08 NOTE — PROGRESS NOTES
Patient voiding blood in urine. Urine is reddish pink in color. Small clots were noted. Pt complained of low abdominal pain and was medicated with tylenol with good results. Pt denied complaints of nausea/vomiting. Will monitor pt urine.

## 2020-06-08 NOTE — CARE COORDINATION
INITIAL CASE MANAGEMENT ASSESSMENT    Reviewed chart, met with patient to assess possible discharge needs. Explained Case Management role/services. Living Situation: Patient lives alone. He has 8 steps to enter home [place]. He reported he would need transport to home. ADLs: patient manges his own personal care. He has MOW through [address]. DME: walker and cane, mostly uses the cane    PT/OT Recs: not ordered as of yet     Active Services: COA for MOW and aide through [facility] ([phone]). Patient also reported he has an Rn through this agency. Sw confirmed COA services. [facility]. Transportation: patient will need transport to home--8 steps to enter; house [place] hill. Medications: takes on his own     PCP: KENNY Carr CNP      PLAN/COMMENTS: patient plans to return home at [place] and resume his home services. Electronically signed by Benji Davis on 6/8/2020 at 1:12 PM      2:00 PM  Lily from [employer] called and confirmed that they see patient on Tuesdays. They do not provide for his skilled care. Sw spoke with patient's PCP office--they reported patient is active with [facility]. Yoandy spoke with Fredi Brown at [facility] and confirmed they are active.      Electronically signed by Benji Davis on 6/8/2020 at 2:33 PM

## 2020-06-08 NOTE — PROGRESS NOTES
MRI checklist complete and in chart, pt in MRI gown.  Electronically signed by Kisha Gillespie RN on 6/8/2020 at 4:42 PM

## 2020-06-08 NOTE — PROGRESS NOTES
Department of Podiatry Progress Note  Benja Hairston DPM Attending       Reason for Consult:  LEFT foot wound with Sepsis  Requesting Physician:  Deshaun Valderrama MD    CHIEF COMPLAINT:  Heavy bleeding of wound to plantar LEFT foot    HISTORY OF PRESENT ILLNESS:                The patient is a 75 y.o. male with significant past medical history of diabetes with peripheral neuropathy and Charcot arthropathy who is consulted for large ovoid wound of plantar LEFT midfoot due to Charcot disfigurement / rocker-bottom structure. Patient relates having felt poorly for 8 hours on Saturday early morning with onset of fever and chills, emesis, with decision to report to Emergency Department. Upon admission, he is feeling much improved with IV antibiotics, and in fact able to eat with full return of apetitie.  Sariah Webb states that there was a considerable amount of blood loss from his wound, but never any purulence    Past Medical History:        Diagnosis Date    Diabetes mellitus ([facility] [address]     Gallstones     Pancreatitis     Ulcers of both lower legs (HCC)     bilateral feet     Past Surgical History:        Procedure Laterality Date    ERCP  4/8/14    LITHOTRYPSY & PANCREATIC STENT PLACEMENT    FOOT SURGERY Left 1967     Current Medications:    Current Facility-Administered Medications: lisinopril (PRINIVIL;ZESTRIL) tablet 5 mg, 5 mg, Oral, Daily  perflutren lipid microspheres (DEFINITY) injection 1.65 mg, 1.5 mL, Intravenous, ONCE PRN  [START ON 6/9/2020] atorvastatin (LIPITOR) tablet 40 mg, 40 mg, Oral, Daily  aspirin EC tablet 81 mg, 81 mg, Oral, Daily  linezolid (ZYVOX) IVPB 600 mg, 600 mg, Intravenous, Q12H  insulin glargine (LANTUS) injection vial 24 Units, 24 Units, Subcutaneous, Nightly  sodium chloride flush 0.9 % injection 10 mL, 10 mL, Intravenous, 2 times per day  sodium chloride flush 0.9 % injection 10 mL, 10 mL, Intravenous, PRN  acetaminophen (TYLENOL) tablet 650 mg, 650 mg, Oral, Q6H PRN **OR** acetaminophen (TYLENOL) suppository 650 mg, 650 mg, Rectal, Q6H PRN  polyethylene glycol (GLYCOLAX) packet 17 g, 17 g, Oral, Daily PRN  enoxaparin (LOVENOX) injection 40 mg, 40 mg, Subcutaneous, Nightly  ondansetron (ZOFRAN) injection 4 mg, 4 mg, Intravenous, Q6H PRN  glucose (GLUTOSE) 40 % oral gel 15 g, 15 g, Oral, PRN  dextrose 50 % IV solution, 12.5 g, Intravenous, PRN  glucagon (rDNA) injection 1 mg, 1 mg, Intramuscular, PRN  dextrose 5 % solution, 100 mL/hr, Intravenous, PRN  piperacillin-tazobactam (ZOSYN) 3.375 g in dextrose 5 % 100 mL IVPB extended infusion (mini-bag), 3.375 g, Intravenous, Q8H  insulin lispro (HUMALOG) injection vial 0-6 Units, 0-6 Units, Subcutaneous, TID WC  insulin lispro (HUMALOG) injection vial 0-3 Units, 0-3 Units, Subcutaneous, Nightly  tamsulosin (FLOMAX) capsule 0.4 mg, 0.4 mg, Oral, Nightly  Allergies:   Patient has no known allergies. Social History:    TOBACCO:  Never used tobacco  ETOH:  Never drank alcohol  ACTIVITIES OF DAILY LIVING:  Patient has assistance twice weekly from aids to perform home and hygiene duties  Patient currently lives lost his mother in 2019, now lives alone, reclusive  Family History:       Problem Relation Age of Onset    Colon Cancer Mother         PVD s/p toe amputation by Dr Rohit Kohler Father      REVIEW OF SYSTEMS:    CONSTITUTIONAL:  Denies fever, chills or nausea, with emesis having resolved yesterday  INTEGUMENT/BREAST:  Diabetic ulcerations of BOTH feet, LEFT is of greatest concern being 4.4 cm length 2.7 cm width and depth of 0.6 cm with muscle and sub Q exposure, but no bone or joint exposed.  Xerotic skin of both lower extremities  MUSCULOSKELETAL:  Enlarged RIGHT hallux, LEFT foot Charcoid deformity with rocker-bottom architecture and plantar ulcer  NEUROLOGICAL:  positive for gait problems and numbness  PHYSICAL EXAM:      Vitals:    BP (!) 143/69   Pulse 57   Temp 99 °F (37.2 °C) (Oral)   Resp 16   Ht 6' 3\" (1.905 m)   Wt 254 lb 10.1 oz (115.5 kg)   SpO2 98%   BMI 31.83 kg/m²     LABS:   Recent Labs     06/07/20  0538 06/08/20  0559   WBC 15.1* 10.9   HGB 9.1* 9.3*   HCT 27.9* 28.8*  28.6*    194     Recent Labs     06/08/20  0559      K 4.3      CO2 20*   BUN 17   CREATININE 1.0     Recent Labs     06/06/20  0744   PROT 7.8       LOWER EXTREMITY EXAMINATION   SKIN:        Plantar LEFT mid-foot wound of sub Q and muscle depth with mixed fibrous / granular wound previously treated with Silver-Cell by home nursing. Heavy periwound hyperkeratosis, but no purulence and no odor. Wound MUGS 3 of 4.4 x 2.7 x 0.6 cm depth Fascia / Muscle visible    Extreme elongation of 10 pedal nails    NEUROLOGIC:    Pain sensation isdecreased Bilateral.  Light touch is decreasedBilateral. positive history of paresthesia Bilateral. negativehistory of burning Bilateral. Deep tendon reflexes are 2 to include patellar and achilles Bilateral. [place]--Jose 5.07 monofilament sensitivity is abnormal 6 to 6 spots tested Bilateral.    VASCULAR:    bilaterally Dorsalis pedis is 2. bilaterally Posterior tibial pulse is difficult to assess due to overlying xerotic skin. 2+ edema bilaterally. mild Varicosities bilaterally. MUSCULOSKELETAL:  [place] is untested due to plantar LEFT foot wound. Muscle strength is 4/5 to all groups tested to include dorsiflexion, plantarflexion, inversion, eversion, and digital Bilateral . The ranges of motion of all joints tested from ankle distal is decreased there is no crepitus noted Bilateral.    Radiographs: 6/6/2020  Cellulitis and ulceration to the medial aspect of the left foot.  No   radiographic evidence of osteomyelitis       Awaiting results of MRI 6/7/202    IMPRESSION/RECOMMENDATIONS    1. Cellulitis of LEFT foot secondary to wound of plantar arch  +Afebrile, absent Leukocytosis  Vanc / Zosyn with recommendations for Linezolid, for kidney sparing      2.  MUGS 3 ulceration of LEFT foot  +Santyl for further chemical debridement and conversion to greater granular tissues    3. Onychomycosis of 10 pedal nails  +Debridement of 10 nails in both thickness and length    4. Diabetes with peripheral neuropathy and Charcoid arthropathy  +Need for CROW boot for LEFT foot, specialty shoe RIGHT foot as acromegaloid hallux    Debridement of 10 pedal nails previously  LEFT plantar midfoot ulcer of MUGS 3 ulcer  to final wound measurement of 4.5 x 3.0 x 0.7 cm depth with application of sterile antibacterial dressing, and eventual use of Collagenase    Disposition: awaiting MRI for determination of surgical needs, however Leukocytosis continues to decline and wound appears pink and without purulence    Thank you for the opportunity to take part in the patient's care.     Albert Basurto DPM  [facility]  [phone]

## 2020-06-09 ENCOUNTER — APPOINTMENT (OUTPATIENT)
Dept: MRI IMAGING | Age: 65
DRG: 854 | End: 2020-06-09
Payer: MEDICARE

## 2020-06-09 LAB
ALBUMIN SERPL-MCNC: 3.1 G/DL (ref 3.4–5)
ANION GAP SERPL CALCULATED.3IONS-SCNC: 11 MMOL/L (ref 3–16)
BASOPHILS ABSOLUTE: 0 K/UL (ref 0–0.2)
BASOPHILS RELATIVE PERCENT: 0.5 %
BUN BLDV-MCNC: 12 MG/DL (ref 7–20)
CALCIUM SERPL-MCNC: 8.7 MG/DL (ref 8.3–10.6)
CHLORIDE BLD-SCNC: 105 MMOL/L (ref 99–110)
CO2: 19 MMOL/L (ref 21–32)
CREAT SERPL-MCNC: 0.9 MG/DL (ref 0.8–1.3)
EOSINOPHILS ABSOLUTE: 0.1 K/UL (ref 0–0.6)
EOSINOPHILS RELATIVE PERCENT: 1.6 %
FOLATE: 5.16 NG/ML (ref 4.78–24.2)
GFR AFRICAN AMERICAN: >60
GFR NON-AFRICAN AMERICAN: >60
GLUCOSE BLD-MCNC: 100 MG/DL (ref 70–99)
GLUCOSE BLD-MCNC: 105 MG/DL (ref 70–99)
GLUCOSE BLD-MCNC: 113 MG/DL (ref 70–99)
GLUCOSE BLD-MCNC: 114 MG/DL (ref 70–99)
GLUCOSE BLD-MCNC: 145 MG/DL (ref 70–99)
HCT VFR BLD CALC: 30.6 % (ref 40.5–52.5)
HEMOGLOBIN: 10.2 G/DL (ref 13.5–17.5)
LV EF: 58 %
LVEF MODALITY: NORMAL
LYMPHOCYTES ABSOLUTE: 1.7 K/UL (ref 1–5.1)
LYMPHOCYTES RELATIVE PERCENT: 20.9 %
MCH RBC QN AUTO: 26 PG (ref 26–34)
MCHC RBC AUTO-ENTMCNC: 33.4 G/DL (ref 31–36)
MCV RBC AUTO: 77.9 FL (ref 80–100)
MONOCYTES ABSOLUTE: 0.4 K/UL (ref 0–1.3)
MONOCYTES RELATIVE PERCENT: 5.1 %
NEUTROPHILS ABSOLUTE: 5.9 K/UL (ref 1.7–7.7)
NEUTROPHILS RELATIVE PERCENT: 71.9 %
PDW BLD-RTO: 16.6 % (ref 12.4–15.4)
PERFORMED ON: ABNORMAL
PHOSPHORUS: 3.2 MG/DL (ref 2.5–4.9)
PLATELET # BLD: 223 K/UL (ref 135–450)
PMV BLD AUTO: 7.2 FL (ref 5–10.5)
POTASSIUM SERPL-SCNC: 4.1 MMOL/L (ref 3.5–5.1)
RBC # BLD: 3.93 M/UL (ref 4.2–5.9)
SODIUM BLD-SCNC: 135 MMOL/L (ref 136–145)
VITAMIN B-12: 902 PG/ML (ref 211–911)
WBC # BLD: 8.2 K/UL (ref 4–11)

## 2020-06-09 PROCEDURE — 6370000000 HC RX 637 (ALT 250 FOR IP): Performed by: NURSE PRACTITIONER

## 2020-06-09 PROCEDURE — 6360000004 HC RX CONTRAST MEDICATION: Performed by: NURSE PRACTITIONER

## 2020-06-09 PROCEDURE — 80069 RENAL FUNCTION PANEL: CPT

## 2020-06-09 PROCEDURE — 6370000000 HC RX 637 (ALT 250 FOR IP): Performed by: HOSPITALIST

## 2020-06-09 PROCEDURE — 82746 ASSAY OF FOLIC ACID SERUM: CPT

## 2020-06-09 PROCEDURE — 6370000000 HC RX 637 (ALT 250 FOR IP): Performed by: INTERNAL MEDICINE

## 2020-06-09 PROCEDURE — 85025 COMPLETE CBC W/AUTO DIFF WBC: CPT

## 2020-06-09 PROCEDURE — 2580000003 HC RX 258: Performed by: HOSPITALIST

## 2020-06-09 PROCEDURE — 94760 N-INVAS EAR/PLS OXIMETRY 1: CPT

## 2020-06-09 PROCEDURE — 6360000002 HC RX W HCPCS: Performed by: HOSPITALIST

## 2020-06-09 PROCEDURE — 2060000000 HC ICU INTERMEDIATE R&B

## 2020-06-09 PROCEDURE — 73720 MRI LWR EXTREMITY W/O&W/DYE: CPT

## 2020-06-09 PROCEDURE — 36415 COLL VENOUS BLD VENIPUNCTURE: CPT

## 2020-06-09 PROCEDURE — 82607 VITAMIN B-12: CPT

## 2020-06-09 PROCEDURE — A9577 INJ MULTIHANCE: HCPCS | Performed by: NURSE PRACTITIONER

## 2020-06-09 RX ADMIN — PIPERACILLIN AND TAZOBACTAM 3.38 G: 3; .375 INJECTION, POWDER, LYOPHILIZED, FOR SOLUTION INTRAVENOUS at 00:33

## 2020-06-09 RX ADMIN — PERFLUTREN 1.65 MG: 6.52 INJECTION, SUSPENSION INTRAVENOUS at 15:46

## 2020-06-09 RX ADMIN — ENOXAPARIN SODIUM 40 MG: 40 INJECTION SUBCUTANEOUS at 21:07

## 2020-06-09 RX ADMIN — Medication 1 CAPSULE: at 17:21

## 2020-06-09 RX ADMIN — Medication 1 CAPSULE: at 08:15

## 2020-06-09 RX ADMIN — INSULIN LISPRO 1 UNITS: 100 INJECTION, SOLUTION INTRAVENOUS; SUBCUTANEOUS at 17:24

## 2020-06-09 RX ADMIN — TAMSULOSIN HYDROCHLORIDE 0.4 MG: 0.4 CAPSULE ORAL at 21:07

## 2020-06-09 RX ADMIN — SODIUM CHLORIDE, PRESERVATIVE FREE 10 ML: 5 INJECTION INTRAVENOUS at 08:16

## 2020-06-09 RX ADMIN — GADOBENATE DIMEGLUMINE 20 ML: 529 INJECTION, SOLUTION INTRAVENOUS at 10:41

## 2020-06-09 RX ADMIN — ATORVASTATIN CALCIUM 40 MG: 40 TABLET, FILM COATED ORAL at 08:15

## 2020-06-09 RX ADMIN — SODIUM CHLORIDE, PRESERVATIVE FREE 10 ML: 5 INJECTION INTRAVENOUS at 21:57

## 2020-06-09 RX ADMIN — PIPERACILLIN AND TAZOBACTAM 3.38 G: 3; .375 INJECTION, POWDER, LYOPHILIZED, FOR SOLUTION INTRAVENOUS at 17:21

## 2020-06-09 RX ADMIN — INSULIN GLARGINE 24 UNITS: 100 INJECTION, SOLUTION SUBCUTANEOUS at 21:56

## 2020-06-09 RX ADMIN — COLLAGENASE SANTYL: 250 OINTMENT TOPICAL at 08:28

## 2020-06-09 RX ADMIN — ASPIRIN 81 MG: 81 TABLET, COATED ORAL at 08:15

## 2020-06-09 RX ADMIN — LISINOPRIL 5 MG: 5 TABLET ORAL at 08:15

## 2020-06-09 RX ADMIN — PIPERACILLIN AND TAZOBACTAM 3.38 G: 3; .375 INJECTION, POWDER, LYOPHILIZED, FOR SOLUTION INTRAVENOUS at 08:17

## 2020-06-09 ASSESSMENT — PAIN SCALES - GENERAL
PAINLEVEL_OUTOF10: 0
PAINLEVEL_OUTOF10: 0

## 2020-06-09 NOTE — PROGRESS NOTES
Pt resting comfortably in bed at this time. Shift assessment and PM medications administered. A&O x4. Pt request door to be closed due to noise in halls. Call light in reach. Will continue to monitor.

## 2020-06-09 NOTE — PLAN OF CARE
Problem: Falls - Risk of:  Goal: Will remain free from falls  Description: Will remain free from falls  6/9/2020 1404 by Steve Ramirez RN  Outcome: Ongoing  Note: Fall risk assessment completed. Fall precautions in place. Call light within reach. Pt educated on calling for assistance before getting up. Walkway free of clutter. Will continue to monitor. Electronically signed by Stvee Ramirez RN on 6/9/2020 at 2:04 PM      Problem: Skin Integrity:  Goal: Demonstration of wound healing without infection will improve  Description: Demonstration of wound healing without infection will improve  Outcome: Ongoing  Note: Will monitor skin and mucous membranes. Will turn patient every 2 hours, monitor for friction and sheering, and change dressings as needed. Will preform skin assessment every shift.    Electronically signed by Steve Ramirez RN on 6/9/2020 at 2:04 PM

## 2020-06-09 NOTE — PROGRESS NOTES
Dr. Maikol Seo changed dressing on left foot wound. NWB per Dr. Maikol Seo. New IV placed in right forearm. IV Zosyn infusing. Patient denies any further needs at this time. Will continue to monitor.

## 2020-06-09 NOTE — PROGRESS NOTES
Department of Podiatry Progress Note  Billie Trujillo  6/9/2020          HISTORY OF PRESENT ILLNESS:                The patient is a 75 y.o. male with significant past medical history of diabetes with peripheral neuropathy and Charcot arthropathy who is consulted for large ovoid wound of plantar LEFT midfoot due to Charcot disfigurement / rocker-bottom structure. Patient relates having felt poorly for 8 hours on Saturday early morning with onset of fever and chills, emesis, with decision to report to Emergency Department. Upon admission, he is feeling much improved with IV antibiotics, and in fact able to eat with full return of apetitie. Sariah Webb states that there was a considerable amount of blood loss from his wound, but never any purulence    The patient has been treated at home by [facility]. He has had a wound in the same location in the past that has healed up. The patient had a CROW boot made for him 3 to 4 years ago and wore it for only 2 hours before taking it off because he didn't like the way it made his leg feel. He reports he wears shoes most of the time. He lives by himself and is adamantly against going to rehab anywhere. He does not want surgery and has no plans to stay off of his foot. His mother whom he lived with has passed away and now he lives by himself in the inherited home. PHYSICAL EXAM:      Vitals:    BP (!) 140/55   Pulse 60   Temp 98.6 °F (37 °C) (Oral)   Resp 20   Ht 6' 3\" (1.905 m)   Wt 255 lb 8.2 oz (115.9 kg)   SpO2 94%   BMI 31.94 kg/m²     LABS:   Recent Labs     06/08/20  0559 06/09/20  0535   WBC 10.9 8.2   HGB 9.3* 10.2*   HCT 28.8*  28.6* 30.6*    223     Recent Labs     06/09/20  0535   *   K 4.1      CO2 19*   PHOS 3.2   BUN 12   CREATININE 0.9     No results for input(s): PROT, INR, APTT in the last 72 hours.     LOWER EXTREMITY EXAMINATION   SKIN:        Plantar LEFT mid-foot wound of sub Q and muscle depth with mixed fibrous / granular wound previously treated with Silver-Cell by home nursing. Heavy periwound hyperkeratosis, but no purulence and no odor. Wound MUGS 3 of 4.4 x 2.7 x 0.6 cm depth Fascia / Muscle visible with a hard end to palpation without grossly exposed bone. NEUROLOGIC:    Pain sensation isdecreased Bilateral.  Light touch is decreasedBilateral. positive history of paresthesia Bilateral. negativehistory of burning Bilateral. Deep tendon reflexes are 2 to include patellar and achilles Bilateral. [place]--Jose 5.07 monofilament sensitivity is abnormal 6 to 6 spots tested Bilateral.    VASCULAR:    bilaterally Dorsalis pedis is 2. bilaterally Posterior tibial pulse is difficult to assess due to overlying xerotic skin. 2+ edema bilaterally. mild Varicosities bilaterally. MUSCULOSKELETAL:  The patient has a fixed deformity that is part Charcot with rockerbottom and part endstage PTTD. He is walking off of the medial cuneiform with every step and has fixed lateral subluxation of the forefoot. There is evidence of accommodation of the deformity with excess soft tissue surrounding the deformity. The patient has no pain in the foot despite the severe deformity. Radiographs: 6/6/2020  Cellulitis and ulceration to the medial aspect of the left foot.  No   radiographic evidence of osteomyelitis       MRI:   Impression   Skin thickening and soft tissue edema noted along the medial plantar aspect   of the foot where there is a large ulceration extending to underlying 1st   metatarsal bone.       Mild ill-defined increased T2 signal noted within the lateral cuneiform with   mild corresponding low T1 signal. No discrete fracture line visualized. Findings may represent early osteomyelitis.  No other marrow changes   suspicious of osteomyelitis.            Assessment:  Diabetes with left foot deformity  Peripheral neuropathy  Neuropathic ulceration, left foot  Non-compliance with protective shoegear and bracing    Plan: 1. Left foot diabetic foot ulceration and osteomyelitis  - Osteo appears early without much destruction  - Plan for off loading  - Referral to [facility] for custom CROW boot manufacture  - Patient is poor surgical candidate given his outright refusal to off load, modify activity, or go to rehab. Therefore we will treat conservatively  - Patient can follow up in the wound center for treatment. - Continue antibiotics  - Daily wound care and off loading will be key to healing. The patient mentioned that he is aware he may eventually need his foot cut off, but he doesn't want that right now. Oswaldo Nunez discussion regarding his need to stay off of the foot  - PT/OT to work with patient on modified gait and use of the CROW boot once he has it made. DISPO: If he can get CROW prior to d/c home and get DME recommendations prior to d/c then he has less chance of bounce back for repeat foot infection. Thank you for the opportunity to take part in the patient's care.     Jed Leach DPM  [facility]  Pager: [phone]  Office: [phone]  Fax: [phone]

## 2020-06-09 NOTE — PROGRESS NOTES
Hospital Medicine Progress Note      Admit Date: 6/6/2020         Overnight Events: none    CC: F/U for fever, vomiting, chest pain and a cough    Hospital Course: This is a 78-year-old male with a history of previous pancreatitis, gallstones, chronic bilateral ulcerations on his feet, and diabetes who presented to the ED with complaints of fever, vomiting and a cough. He also complained of chest pain with cough and breathing. He states that he was initially short of breath however that had resolved. He does complain of generalized body aches and weakness. No close contacts but has had visiting aides and nurses come to his house. Given his complaints of chest pain and elevated troponin cardiology was consulted. Elevation thought to be related to demand ischemia. Echocardiogram pending at this time. Interval History/Subjective:   Denies fevers, chills, chest pain or shortness of breath. No nausea or vomiting. No other symptoms at present. Review of Systems:     Comprehensive ROS negative except as mentioned above. Past Medical History:        Diagnosis Date    Diabetes mellitus ([facility] [address]     Gallstones     Pancreatitis     Ulcers of both lower legs ([facility] [address]     bilateral feet       Past Surgical History:        Procedure Laterality Date    ERCP  4/8/14    LITHOTRYPSY & PANCREATIC STENT PLACEMENT    FOOT SURGERY Left 1967       Allergies:  Patient has no known allergies. Past medical and surgical history reviewed. Any changes have been noted.      Scheduled and prn Medications:    Scheduled Meds:   lisinopril  5 mg Oral Daily    atorvastatin  40 mg Oral Daily    lactobacillus  1 capsule Oral BID WC    collagenase   Topical Daily    aspirin  81 mg Oral Daily    insulin glargine  24 Units Subcutaneous Nightly    sodium chloride flush  10 mL Intravenous 2 times per day    enoxaparin  40 mg Subcutaneous Nightly    piperacillin-tazobactam  3.375 g Intravenous Q8H    insulin lispro 0-6 Units Subcutaneous TID WC    insulin lispro  0-3 Units Subcutaneous Nightly    tamsulosin  0.4 mg Oral Nightly     Continuous Infusions:   dextrose       PRN Meds:.perflutren lipid microspheres, sodium chloride flush, acetaminophen **OR** acetaminophen, polyethylene glycol, ondansetron, glucose, dextrose, glucagon (rDNA), dextrose    PHYSICAL EXAM:  BP (!) 140/55   Pulse 60   Temp 98.6 °F (37 °C) (Oral)   Resp 20   Ht 6' 3\" (1.905 m)   Wt 255 lb 8.2 oz (115.9 kg)   SpO2 94%   BMI 31.94 kg/m²       Intake/Output Summary (Last 24 hours) at 6/9/2020 1454  Last data filed at 6/9/2020 1401  Gross per 24 hour   Intake 1280 ml   Output 2075 ml   Net -795 ml       General: Alert and oriented. Resting in bed in no apparent distress. cantankerous   HEENT: Normocephalic. Atraumatic. Pupils equal and reactive. EOM intact. Oral mucosa pink/moist/intact. Neck: Supple. Symmetrical. Trachea midline. Lungs: Clear to auscultation bilaterally. Respirations even and unlabored. Chest: Exam unremarkable. Cardiac: S1/S2 noted. Regular Rhythm and rate. Abdomen/GI: Soft. Non-tender. Non-distended. BS+. Extremities: PP+. Atraumatic. No redness/cyanosis/edema noted. Brisk cap refill.  right foot great toe \"collapsed\" and toe nail is on plantar foot, left foot with ulceration noted, pink tissue inside with surrounding dead tissue  Skin: Dry and intact. No lesions noted. Neuro: Grossly intact. No focal deficits noted.              LABS:    Lab Results   Component Value Date    WBC 8.2 06/09/2020    HGB 10.2 (L) 06/09/2020    HCT 30.6 (L) 06/09/2020    MCV 77.9 (L) 06/09/2020     06/09/2020    LYMPHOPCT 20.9 06/09/2020    RBC 3.93 (L) 06/09/2020    MCH 26.0 06/09/2020    MCHC 33.4 06/09/2020    RDW 16.6 (H) 06/09/2020       Lab Results   Component Value Date    CREATININE 0.9 06/09/2020    BUN 12 06/09/2020     (L) 06/09/2020    K 4.1 06/09/2020     06/09/2020    CO2 19 (L) 06/09/2020       Lab Results Component Value Date    MG 1.90 02/06/2015       Lab Results   Component Value Date    ALT 7 (L) 06/06/2020    AST 10 (L) 06/06/2020    ALKPHOS 84 06/06/2020    BILITOT 0.7 06/06/2020        No flowsheet data found. Imaging:  MRI FOOT LEFT W WO CONTRAST   Final Result   Skin thickening and soft tissue edema noted along the medial plantar aspect   of the foot where there is a large ulceration extending to underlying 1st   metatarsal bone. Mild ill-defined increased T2 signal noted within the lateral cuneiform with   mild corresponding low T1 signal. No discrete fracture line visualized. Findings may represent early osteomyelitis. No other marrow changes   suspicious of osteomyelitis. XR FOOT LEFT (MIN 3 VIEWS)   Final Result   Cellulitis and ulceration to the medial aspect of the left foot. No   radiographic evidence of osteomyelitis. XR CHEST PORTABLE   Final Result   No acute cardiopulmonary process. CT ABDOMEN PELVIS WO CONTRAST Additional Contrast? None   Final Result   1. Atrophic pancreas with scattered calcifications along with 1 large   calcification in the central pancreatic duct which is dilated. Spectrum of   findings would favor chronic pancreatitis. No significant inflammation on   the current exam but early acute on chronic pancreatitis may be considered   clinically. 2. No CT evidence of mass lesion in the pancreatic head. 3. Cardiomegaly and bibasilar atelectasis in the visualized lungs. Assessment & Plan:      Sepsis, POA  -Thought to be related to diabetic foot wound/Charcot foot  -X-rays without signs of osteo  -MRI concerning for early changes of osteomyelitis, may need longer-term IV antibiotics. Patient wants to do home care however has no assistance at home for this.  Currently refusing any SNF  -ESR CRP mildly elevated  -Blood cultures with no growth to date  -ID consulted and following closely  -Day 3 of Zosyn, Zyvox stopped  -Podiatry consulted, had bedside debridement and recommending chemical debridement with Santyl with daily dressing changes  -also with onchoymycosis and debridement of 10 nails.  -Proteus and BHS strep on wound cx    Diabetic foot ulcer with Charcot foot  -Culture showing Proteus and group B strep  -ID following closely  - as above    Atypical chest pain with mildly elevated troponin  - had on arrival with elevated troponin and concern for new LBBB?  - ECHO pending  - asked cards to take a look  -Likely demand ischemia    Diabetes mellitus type 2 without hyperglycemia  -A1c noted at 6.9  -Lantus and sliding scale for now  - BS stable, follow closely    Essential hypertension  -Holding Lasix, lisinopril and Aldactone secondary to hypotension  -Monitor closely to resume these  -Lisinopril restarted yesterday, improved    Cough and vomiting  -Unsure of etiology  -Could be related to foot infection  -CXR without acute findings  -Urinalysis unremarkable  -Resolved    Lactic acidosis  -Likely related to #1 #2  -IV fluids  -Resolved        Obese  Body mass index is 31.94 kg/m². The patient and / or the family were informed of the results of any tests, a time was given to answer questions, a plan was proposed and they agreed with plan.     DVT prophylaxis: [x] Lovenox  [] SQ Heparin  [] SCDs because of  [] warfarin/oral direct thrombin inhibitor [] Encourage ambulation    GI prophylaxis: [] [id number]  [x] not indicated    Probiotic if on abx: [x] Yes [] No [] Not Indicated    Diet: DIET CARB CONTROL;    Consults:  IP CONSULT TO HOSPITALIST  IP CONSULT TO INFECTIOUS DISEASES  IP CONSULT TO PODIATRY  IP CONSULT TO CARDIOLOGY    Disposition:  [] Home  [x] Home with home health [] Rehab [] Psych [] SNF  [] LTAC  [] Long term nursing home or group home [] Transfer to ICU  [] Transfer to PCU [] Other: has Visiting NP    Code Status: Full Code    ELOS: tbd      KENNY Flores - JAY  06/09/20

## 2020-06-09 NOTE — PLAN OF CARE
Problem: Falls - Risk of:  Goal: Will remain free from falls  Description: Will remain free from falls  Note: Pt remains free from falls this shift. Fall precautions in place. Will continue to monitor.

## 2020-06-09 NOTE — PROGRESS NOTES
Pt resting in bed comfortably this AM. Pt voices no further needs at this time. Pt understands he will be having an MRI today. Call light in reach. Will continue to monitor.

## 2020-06-09 NOTE — PROGRESS NOTES
Pt in Echo during rounds and tolerating IV abx ok d/w RN - MRI  Foot done will follow on the results

## 2020-06-10 LAB
BLOOD CULTURE, ROUTINE: NORMAL
C-REACTIVE PROTEIN: 35.9 MG/L (ref 0–5.1)
CULTURE, BLOOD 2: NORMAL
GLUCOSE BLD-MCNC: 104 MG/DL (ref 70–99)
GLUCOSE BLD-MCNC: 147 MG/DL (ref 70–99)
GLUCOSE BLD-MCNC: 183 MG/DL (ref 70–99)
GLUCOSE BLD-MCNC: 97 MG/DL (ref 70–99)
PERFORMED ON: ABNORMAL
PERFORMED ON: NORMAL
SEDIMENTATION RATE, ERYTHROCYTE: 66 MM/HR (ref 0–20)

## 2020-06-10 PROCEDURE — 2580000003 HC RX 258: Performed by: HOSPITALIST

## 2020-06-10 PROCEDURE — 6360000002 HC RX W HCPCS: Performed by: HOSPITALIST

## 2020-06-10 PROCEDURE — 2060000000 HC ICU INTERMEDIATE R&B

## 2020-06-10 PROCEDURE — 36415 COLL VENOUS BLD VENIPUNCTURE: CPT

## 2020-06-10 PROCEDURE — 6370000000 HC RX 637 (ALT 250 FOR IP): Performed by: NURSE PRACTITIONER

## 2020-06-10 PROCEDURE — 6370000000 HC RX 637 (ALT 250 FOR IP): Performed by: HOSPITALIST

## 2020-06-10 PROCEDURE — 94760 N-INVAS EAR/PLS OXIMETRY 1: CPT

## 2020-06-10 PROCEDURE — 86140 C-REACTIVE PROTEIN: CPT

## 2020-06-10 PROCEDURE — 85652 RBC SED RATE AUTOMATED: CPT

## 2020-06-10 PROCEDURE — 99233 SBSQ HOSP IP/OBS HIGH 50: CPT | Performed by: INTERNAL MEDICINE

## 2020-06-10 PROCEDURE — 6370000000 HC RX 637 (ALT 250 FOR IP): Performed by: INTERNAL MEDICINE

## 2020-06-10 RX ORDER — SODIUM CHLORIDE 0.9 % (FLUSH) 0.9 %
10 SYRINGE (ML) INJECTION EVERY 12 HOURS SCHEDULED
Status: DISCONTINUED | OUTPATIENT
Start: 2020-06-10 | End: 2020-06-10 | Stop reason: SDUPTHER

## 2020-06-10 RX ORDER — LIDOCAINE HYDROCHLORIDE 10 MG/ML
5 INJECTION, SOLUTION EPIDURAL; INFILTRATION; INTRACAUDAL; PERINEURAL ONCE
Status: DISCONTINUED | OUTPATIENT
Start: 2020-06-10 | End: 2020-06-16 | Stop reason: HOSPADM

## 2020-06-10 RX ORDER — FUROSEMIDE 40 MG/1
40 TABLET ORAL DAILY
Status: DISCONTINUED | OUTPATIENT
Start: 2020-06-10 | End: 2020-06-12

## 2020-06-10 RX ORDER — SODIUM CHLORIDE 0.9 % (FLUSH) 0.9 %
10 SYRINGE (ML) INJECTION PRN
Status: DISCONTINUED | OUTPATIENT
Start: 2020-06-10 | End: 2020-06-10 | Stop reason: SDUPTHER

## 2020-06-10 RX ORDER — SPIRONOLACTONE 25 MG/1
25 TABLET ORAL DAILY
Status: DISCONTINUED | OUTPATIENT
Start: 2020-06-10 | End: 2020-06-16 | Stop reason: HOSPADM

## 2020-06-10 RX ADMIN — Medication 1 CAPSULE: at 18:00

## 2020-06-10 RX ADMIN — PIPERACILLIN AND TAZOBACTAM 3.38 G: 3; .375 INJECTION, POWDER, LYOPHILIZED, FOR SOLUTION INTRAVENOUS at 18:01

## 2020-06-10 RX ADMIN — INSULIN LISPRO 1 UNITS: 100 INJECTION, SOLUTION INTRAVENOUS; SUBCUTANEOUS at 18:00

## 2020-06-10 RX ADMIN — FUROSEMIDE 40 MG: 40 TABLET ORAL at 16:19

## 2020-06-10 RX ADMIN — PIPERACILLIN AND TAZOBACTAM 3.38 G: 3; .375 INJECTION, POWDER, LYOPHILIZED, FOR SOLUTION INTRAVENOUS at 09:04

## 2020-06-10 RX ADMIN — TAMSULOSIN HYDROCHLORIDE 0.4 MG: 0.4 CAPSULE ORAL at 21:34

## 2020-06-10 RX ADMIN — PIPERACILLIN AND TAZOBACTAM 3.38 G: 3; .375 INJECTION, POWDER, LYOPHILIZED, FOR SOLUTION INTRAVENOUS at 00:34

## 2020-06-10 RX ADMIN — Medication 1 CAPSULE: at 09:04

## 2020-06-10 RX ADMIN — ATORVASTATIN CALCIUM 40 MG: 40 TABLET, FILM COATED ORAL at 09:04

## 2020-06-10 RX ADMIN — LISINOPRIL 5 MG: 5 TABLET ORAL at 09:04

## 2020-06-10 RX ADMIN — SPIRONOLACTONE 25 MG: 25 TABLET ORAL at 16:19

## 2020-06-10 RX ADMIN — INSULIN GLARGINE 24 UNITS: 100 INJECTION, SOLUTION SUBCUTANEOUS at 21:34

## 2020-06-10 RX ADMIN — ENOXAPARIN SODIUM 40 MG: 40 INJECTION SUBCUTANEOUS at 21:33

## 2020-06-10 RX ADMIN — COLLAGENASE SANTYL: 250 OINTMENT TOPICAL at 09:06

## 2020-06-10 RX ADMIN — SODIUM CHLORIDE, PRESERVATIVE FREE 10 ML: 5 INJECTION INTRAVENOUS at 09:04

## 2020-06-10 RX ADMIN — ASPIRIN 81 MG: 81 TABLET, COATED ORAL at 09:04

## 2020-06-10 RX ADMIN — INSULIN LISPRO 1 UNITS: 100 INJECTION, SOLUTION INTRAVENOUS; SUBCUTANEOUS at 21:34

## 2020-06-10 ASSESSMENT — PAIN SCALES - GENERAL
PAINLEVEL_OUTOF10: 0

## 2020-06-10 NOTE — PROGRESS NOTES
Spoke with Andrew from [facility]. Practitioner unable to fit patient for left crow boot today. Will be by tomorrow around 0800. Patient made aware.

## 2020-06-10 NOTE — PROGRESS NOTES
Department of Podiatry Progress Note  Alicia Gutiérrez  6/10/2020          HISTORY OF PRESENT ILLNESS:                The patient is a 75 y.o. male with significant past medical history of diabetes with peripheral neuropathy and Charcot arthropathy who is consulted for large ovoid wound of plantar LEFT midfoot due to Charcot disfigurement / rocker-bottom structure. The patient was recommended for conservative treatment only for his refusal to do any form of compliance with non-weightbearing or rehab. Today he refused his PICC line. We discussed the importance of antibiotics to prevent spread of deep infection. He has some fairly strong beliefs on medical procedures which are based on false pre-conceived notions of his \"friends who had bad outcomes. \" He voiced some willingness to die instead of treat the foot. He has no plan, but doesn't have much interest in self preservation either. We talked about the need to treat the infection to avoid a worsening of the infection. He said he would think on it this evening and let us know tomorrow what he wants to do. We might need to get the Durable POA involved if the patient is not able to safely make a medical decision. PHYSICAL EXAM:      Vitals:    BP (!) 157/82   Pulse 56   Temp 98.4 °F (36.9 °C) (Oral)   Resp 14   Ht 6' 3\" (1.905 m)   Wt 251 lb 15.8 oz (114.3 kg)   SpO2 98%   BMI 31.50 kg/m²     LABS:   Recent Labs     06/08/20  0559 06/09/20  0535   WBC 10.9 8.2   HGB 9.3* 10.2*   HCT 28.8*  28.6* 30.6*    223     Recent Labs     06/09/20  0535   *   K 4.1      CO2 19*   PHOS 3.2   BUN 12   CREATININE 0.9     No results for input(s): PROT, INR, APTT in the last 72 hours. LOWER EXTREMITY EXAMINATION   SKIN:        Plantar LEFT mid-foot wound of sub Q and muscle depth with mixed fibrous / granular wound previously treated with Silver-Cell by home nursing. Heavy periwound hyperkeratosis, but no purulence and no odor.  Wound MUGS 3 of 4.4 x 2.7 x 0.6 cm depth Fascia / Muscle visible with a hard end to palpation without grossly exposed bone. No cellulitis. No evidence of progression    NEUROLOGIC:    Pain sensation isdecreased Bilateral.  Light touch is decreasedBilateral. positive history of paresthesia Bilateral. negativehistory of burning Bilateral. Deep tendon reflexes are 2 to include patellar and achilles Bilateral. [place]--Jose 5.07 monofilament sensitivity is abnormal 6 to 6 spots tested Bilateral.    VASCULAR:    bilaterally Dorsalis pedis is 2. bilaterally Posterior tibial pulse is difficult to assess due to overlying xerotic skin. 2+ edema bilaterally. mild Varicosities bilaterally. MUSCULOSKELETAL:  The patient has a fixed deformity that is part Charcot with rockerbottom and part endstage PTTD. He is walking off of the medial cuneiform with every step and has fixed lateral subluxation of the forefoot. There is evidence of accommodation of the deformity with excess soft tissue surrounding the deformity. The patient has no pain in the foot despite the severe deformity. Radiographs: 6/6/2020  Cellulitis and ulceration to the medial aspect of the left foot.  No   radiographic evidence of osteomyelitis       MRI:   Impression   Skin thickening and soft tissue edema noted along the medial plantar aspect   of the foot where there is a large ulceration extending to underlying 1st   metatarsal bone.       Mild ill-defined increased T2 signal noted within the lateral cuneiform with   mild corresponding low T1 signal. No discrete fracture line visualized. Findings may represent early osteomyelitis.  No other marrow changes   suspicious of osteomyelitis. Assessment:  Diabetes with left foot deformity  Peripheral neuropathy  Neuropathic ulceration, left foot  Non-compliance with protective shoegear and bracing    Plan:     1.  Left foot diabetic foot ulceration and osteomyelitis  - Osteo appears early without much destruction  - Plan for off loading  - Referral to [facility] for custom CROW boot manufacture  - Patient is poor surgical candidate given his outright refusal to off load, modify activity, or go to rehab. Therefore we will treat conservatively  - Patient can follow up in the wound center for treatment. - Continue antibiotics  - Daily wound care and off loading will be key to healing. The patient mentioned that he is aware he may eventually need his foot cut off, but he doesn't want that right now. Antonio Herrera discussion regarding his need to stay off of the foot  - PT/OT to work with patient on modified gait and use of the CROW boot once he has it made. DISPO: If he can get CROW prior to d/c home and get DME recommendations prior to d/c then he has less chance of bounce back for repeat foot infection. Thank you for the opportunity to take part in the patient's care.     Yoli Lucero DPM  [facility]  Pager: [phone]  Office: [phone]  Fax: [phone]

## 2020-06-10 NOTE — PROGRESS NOTES
This PCA offered pt a bath pt refused at this time RN Sabina Numbers aware.  Electronically signed by Gaurav Pardo on 6/10/2020 at 9:54 AM

## 2020-06-10 NOTE — PROGRESS NOTES
Pt AAO x4. No c/o pain at this time. Assessment completed and charted. Dressing to Left foot noted to be CDI. Pt denies needs at this time. Call light in reach. Will monitor.

## 2020-06-10 NOTE — PROGRESS NOTES
Infectious Disease Follow up Notes  Admit Date: 6/6/2020  Hospital Day: 5    Antibiotics :   IV Zosyn      CHIEF COMPLAINT:     Fevers  Emesis  Diabetic foot infection   Diabetic foot ulcer      Subjective interval History :  75 y.o. man with DM and Neuropahty, with on going ulcer Left foot and drainage now with worsening infection, fevers WBC elevation. X ray left foot negative. MRI Left foot with osteomyelitis and less drainage and has flat foot with deformity and Podiatry discussions noted and pt does not want surgery or off loading and will try IV abx and local care given the risk for limb loss pt understands       Past Medical History:    Past Medical History:   Diagnosis Date    Diabetes mellitus ([address]     Gallstones     Pancreatitis     Ulcers of both lower legs (HCC)     bilateral feet       Past Surgical History:    Past Surgical History:   Procedure Laterality Date    ERCP  4/8/14    LITHOTRYPSY & PANCREATIC STENT PLACEMENT    FOOT SURGERY Left 1967       Current Medications:    Outpatient Medications Marked as Taking for the 6/6/20 encounter [facility] Encounter)   Medication Sig Dispense Refill    atorvastatin (LIPITOR) 20 MG tablet Take 20 mg by mouth daily      furosemide (LASIX) 40 MG tablet Take 40 mg by mouth daily      spironolactone (ALDACTONE) 25 MG tablet Take 25 mg by mouth daily      tamsulosin (FLOMAX) 0.4 MG capsule Take 0.4 mg by mouth nightly       metFORMIN (GLUCOPHAGE) 500 MG tablet Take 500 mg by mouth 2 times daily (with meals)      lisinopril (PRINIVIL;ZESTRIL) 5 MG tablet Take 1 tablet by mouth daily.  (Patient taking differently: Take 20 mg by mouth daily ) 30 tablet 3    ammonium lactate (LAC-HYDRIN) 12 % lotion Apply to BL feet and legs bid with dressing changes 1 Bottle 0    insulin glargine (LANTUS SOLOSTAR) 100 UNIT/ML injection pen Inject 10 Units into the skin every morning (before erythema  Head: normocephalic and atraumatic  Eyes: pupils equal, round, and reactive to light, conjunctivae normal  ENT: tympanic membrane, external ear and ear canal normal bilaterally, nose without deformity, nasal mucosa and turbinates normal without polyps  Neck: supple and non-tender without mass, no thyromegaly  no cervical lymphadenopathy  Pulmonary/Chest: clear to auscultation bilaterally- no wheezes, rales or rhonchi, normal air movement, no respiratory distress  Cardiovascular: normal rate, regular rhythm, normal S1 and S2, no murmurs, rubs, clicks, or gallops, no carotid bruits  Abdomen: soft, non-tender, non-distended, normal bowel sounds, no masses or organomegaly  Extremities: no cyanosis, clubbing or edema  Musculoskeletal: normal range of motion, no joint swelling, deformity or tenderness  Neurologic: reflexes normal and symmetric, no cranial nerve deficit  Psych:  Orientation, sensorium, mood normal  Lines:  IV  Left foot charcot changes and medial foot ulcer, cellulitis and skin changes from DM+   Ace wraps++     Data Review:    Lab Results   Component Value Date    WBC 8.2 06/09/2020    HGB 10.2 (L) 06/09/2020    HCT 30.6 (L) 06/09/2020    MCV 77.9 (L) 06/09/2020     06/09/2020     Lab Results   Component Value Date    CREATININE 0.9 06/09/2020    BUN 12 06/09/2020     (L) 06/09/2020    K 4.1 06/09/2020     06/09/2020    CO2 19 (L) 06/09/2020       Hepatic Function Panel:   Lab Results   Component Value Date    ALKPHOS 84 06/06/2020    ALT 7 06/06/2020    AST 10 06/06/2020    PROT 7.8 06/06/2020    BILITOT 0.7 06/06/2020    LABALBU 3.1 06/09/2020       UA:  Lab Results   Component Value Date    COLORU YELLOW 06/06/2020    CLARITYU Clear 06/06/2020    GLUCOSEU Negative 06/06/2020    BILIRUBINUR Negative 06/06/2020    KETUA Negative 06/06/2020    SPECGRAV 1.018 06/06/2020    BLOODU TRACE 06/06/2020    PHUR 5.5 06/06/2020    PROTEINU Negative 06/06/2020    UROBILINOGEN 0.2 piperacillin-tazobactam Sensitive <=16 mcg/mL     trimethoprim-sulfamethoxazole Sensitive <=2/38 mcg/mL           IMAGING:    MRI FOOT LEFT W WO CONTRAST   Final Result   Skin thickening and soft tissue edema noted along the medial plantar aspect   of the foot where there is a large ulceration extending to underlying 1st   metatarsal bone. Mild ill-defined increased T2 signal noted within the lateral cuneiform with   mild corresponding low T1 signal. No discrete fracture line visualized. Findings may represent early osteomyelitis. No other marrow changes   suspicious of osteomyelitis. XR FOOT LEFT (MIN 3 VIEWS)   Final Result   Cellulitis and ulceration to the medial aspect of the left foot. No   radiographic evidence of osteomyelitis. XR CHEST PORTABLE   Final Result   No acute cardiopulmonary process. CT ABDOMEN PELVIS WO CONTRAST Additional Contrast? None   Final Result   1. Atrophic pancreas with scattered calcifications along with 1 large   calcification in the central pancreatic duct which is dilated. Spectrum of   findings would favor chronic pancreatitis. No significant inflammation on   the current exam but early acute on chronic pancreatitis may be considered   clinically. 2. No CT evidence of mass lesion in the pancreatic head. 3. Cardiomegaly and bibasilar atelectasis in the visualized lungs.                All the pertinent images and reports for the current Hospitalization were reviewed by me     Scheduled Meds:   lisinopril  5 mg Oral Daily    atorvastatin  40 mg Oral Daily    lactobacillus  1 capsule Oral BID WC    collagenase   Topical Daily    aspirin  81 mg Oral Daily    insulin glargine  24 Units Subcutaneous Nightly    sodium chloride flush  10 mL Intravenous 2 times per day    enoxaparin  40 mg Subcutaneous Nightly    piperacillin-tazobactam  3.375 g Intravenous Q8H    insulin lispro  0-6 Units Subcutaneous TID     insulin lispro  0-3 Units Subcutaneous Nightly    tamsulosin  0.4 mg Oral Nightly       Continuous Infusions:   dextrose         PRN Meds:  sodium chloride flush, acetaminophen **OR** acetaminophen, polyethylene glycol, ondansetron, glucose, dextrose, glucagon (rDNA), dextrose      Assessment:     Patient Active Problem List   Diagnosis    Abdominal pain, acute    BPH (benign prostatic hyperplasia)    RLQ abdominal pain    Abnormal ECG    HTN (hypertension)    Diabetes mellitus type II, uncontrolled ([address]    Diabetic foot infection ([address]    Fever    Leukocytosis    Charcot foot due to diabetes mellitus (HCC)    Foot ulceration, left, with unspecified severity ([address]    [facility]and ischemia [facility]    Controlled type 2 diabetes mellitus with hyperglycemia, with long-term current use of insulin (HCC)    Sepsis (HCC)     Sepsis  Fevers  WBC elevation  Lactic acidosis  Left complicated diabetic foot infection  Charcot foot   Neuropathy   Dm+  Wound cx mixed errol    Left foot is the source for infection and sepsis, MRI Left foot with osteomyelitis and given flat foot and if not off loading risk for worsening and limb loss  OPAT d/w pt he would like to try IV abx Podiatry notes reviewed      Labs, Microbiology, Radiology and all the pertinent results from current hospitalization and  care every where were reviewed  by me as a part of the evaluation   Plan:   1. Cont IV Zosyn x 3.375 gm Q 8 HR as in patient    2. PICC line   3. MRI left foot osteomyelitis   4. Local foot care  5. Trend WBC    6. At d/c will change to IV Ertapenem x 1 gm Q 24hr will improve compliance and therapy with once a day regimen   7. Need to off load the foot - CROW boot per Podiatry     Discussed with patient/Family and Nursing staff   Risk of Complications/Morbidity: High      · Illness(es)/ Infection present that pose threat to bodily function. · There is potential for severe exacerbation of infection/side effects of treatment.   · Therapy requires intensive monitoring for antimicrobial agent toxicity. Thanks for allowing me to participate in your patient's care and please call me with any questions or concerns.     Sherron Kong MD  Infectious Disease  [facility]) Physician  Phone: [phone]   Fax : [phone]

## 2020-06-10 NOTE — PROGRESS NOTES
Patient made aware of order for PICC to be placed for abx after discharge. Patient refused to give consent at this time. Patient states, \"I don't know what I want to do yet. \" Jeana Escamilla NP and Dr. Laura Farrell made aware.

## 2020-06-10 NOTE — PROGRESS NOTES
Pt rested quietly overnight. No c/o pain. Dressing to left foot remains CDI. Denies needs at this time. Call light in reach. Will monitor.

## 2020-06-10 NOTE — PLAN OF CARE
Problem: Falls - Risk of:  Goal: Will remain free from falls  Description: Will remain free from falls  6/10/2020 0045 by Fernanda Singh RN  Outcome: Ongoing     Problem: Falls - Risk of:  Goal: Absence of physical injury  Description: Absence of physical injury  Outcome: Ongoing   Fall risk assessment completed every shift. All precautions in place. Pt has call light within reach at all times. Room clear of clutter. Pt aware to call for assistance when getting up. Problem: Skin Integrity:  Goal: Demonstration of wound healing without infection will improve  Description: Demonstration of wound healing without infection will improve  6/10/2020 0045 by Fernanda Singh RN  Outcome: Ongoing     Problem: Skin Integrity:  Goal: Complications related to intravenous access or infusion will be avoided or minimized  Description: Complications related to intravenous access or infusion will be avoided or minimized  Outcome: Ongoing   Skin assessment completed every shift. Pt encouraged to turn/rotate every 2 hours. Assistance provided if pt unable to do so themselves.

## 2020-06-10 NOTE — PROGRESS NOTES
Patient alert and oriented. Denies pain. Breakfast eaten. No insulin coverage needed. Voids per urinal. Maintaining NWB status on left foot. Dressing clean, dry, and intact. IV Zosyn infusing. Call light and belongings within reach. Will continue to monitor.

## 2020-06-10 NOTE — PROGRESS NOTES
Hospital Medicine Progress Note      Admit Date: 6/6/2020         Overnight Events: none    CC: F/U for fever, vomiting, chest pain and a cough    Hospital Course: This is a 68-year-old male with a history of previous pancreatitis, gallstones, chronic bilateral ulcerations on his feet, and diabetes who presented to the ED with complaints of fever, vomiting and a cough. He also complained of chest pain with cough and breathing. He has associated generalized body aches and weakness. COVID testing negative. He was found to have Sepsis related to foot infection. Podiatry and ID were consulted. Xrays performed without notable osteo, however MRI shows signs of early disease. Wound cx grew Proteus and BHS. Given his complaints of chest pain and elevated troponin cardiology was consulted. Elevation thought to be related to demand ischemia. Echocardiogram was normal.     Interval History/Subjective:   Denies any fevers, chills, cp, sob, nausea, vomiting or other sx at this time. Review of Systems:     Comprehensive ROS negative except as mentioned above. Past Medical History:        Diagnosis Date    Diabetes mellitus ([address]     Gallstones     Pancreatitis     Ulcers of both lower legs ([address]     bilateral feet       Past Surgical History:        Procedure Laterality Date    ERCP  4/8/14    LITHOTRYPSY & PANCREATIC STENT PLACEMENT    FOOT SURGERY Left 1967       Allergies:  Patient has no known allergies. Past medical and surgical history reviewed. Any changes have been noted.      Scheduled and prn Medications:    Scheduled Meds:   furosemide  40 mg Oral Daily    spironolactone  25 mg Oral Daily    lisinopril  5 mg Oral Daily    atorvastatin  40 mg Oral Daily    lactobacillus  1 capsule Oral BID WC    collagenase   Topical Daily    aspirin  81 mg Oral Daily    insulin glargine  24 Units Subcutaneous Nightly    sodium chloride flush  10 mL Intravenous 2 times per day    enoxaparin  40 mg Subcutaneous Nightly    piperacillin-tazobactam  3.375 g Intravenous Q8H    insulin lispro  0-6 Units Subcutaneous TID WC    insulin lispro  0-3 Units Subcutaneous Nightly    tamsulosin  0.4 mg Oral Nightly     Continuous Infusions:   dextrose       PRN Meds:.sodium chloride flush, acetaminophen **OR** acetaminophen, polyethylene glycol, ondansetron, glucose, dextrose, glucagon (rDNA), dextrose    PHYSICAL EXAM:  BP (!) 157/82   Pulse 56   Temp 98.4 °F (36.9 °C) (Oral)   Resp 14   Ht 6' 3\" (1.905 m)   Wt 251 lb 15.8 oz (114.3 kg)   SpO2 98%   BMI 31.50 kg/m²       Intake/Output Summary (Last 24 hours) at 6/10/2020 1447  Last data filed at 6/10/2020 1225  Gross per 24 hour   Intake 1280 ml   Output 2950 ml   Net -1670 ml       General: Alert and oriented. Resting in bed in no apparent distress. HEENT: Normocephalic. Atraumatic. Pupils equal and reactive. EOM intact. Oral mucosa pink/moist/intact. Neck: Supple. Symmetrical. Trachea midline. Lungs: Clear to auscultation bilaterally. Respirations even and unlabored. Chest: Exam unremarkable. Cardiac: S1/S2 noted. Regular Rhythm and rate. Abdomen/GI: Soft. Non-tender. Non-distended. BS+. Extremities: PP+. Atraumatic. No redness/cyanosis/edema noted. Brisk cap refill.  right foot great toe \"collapsed\" and toe nail is on plantar foot, left foot with ulceration noted, pink tissue inside with surrounding dead tissue  Skin: Dry and intact. No lesions noted. Neuro: Grossly intact. No focal deficits noted.          LABS:    Lab Results   Component Value Date    WBC 8.2 06/09/2020    HGB 10.2 (L) 06/09/2020    HCT 30.6 (L) 06/09/2020    MCV 77.9 (L) 06/09/2020     06/09/2020    LYMPHOPCT 20.9 06/09/2020    RBC 3.93 (L) 06/09/2020    MCH 26.0 06/09/2020    MCHC 33.4 06/09/2020    RDW 16.6 (H) 06/09/2020       Lab Results   Component Value Date    CREATININE 0.9 06/09/2020    BUN 12 06/09/2020     (L) 06/09/2020    K 4.1 06/09/2020     06/09/2020    CO2 19 (L) 06/09/2020       Lab Results   Component Value Date    MG 1.90 02/06/2015       Lab Results   Component Value Date    ALT 7 (L) 06/06/2020    AST 10 (L) 06/06/2020    ALKPHOS 84 06/06/2020    BILITOT 0.7 06/06/2020        No flowsheet data found. Imaging:  MRI FOOT LEFT W WO CONTRAST   Final Result   Skin thickening and soft tissue edema noted along the medial plantar aspect   of the foot where there is a large ulceration extending to underlying 1st   metatarsal bone. Mild ill-defined increased T2 signal noted within the lateral cuneiform with   mild corresponding low T1 signal. No discrete fracture line visualized. Findings may represent early osteomyelitis. No other marrow changes   suspicious of osteomyelitis. XR FOOT LEFT (MIN 3 VIEWS)   Final Result   Cellulitis and ulceration to the medial aspect of the left foot. No   radiographic evidence of osteomyelitis. XR CHEST PORTABLE   Final Result   No acute cardiopulmonary process. CT ABDOMEN PELVIS WO CONTRAST Additional Contrast? None   Final Result   1. Atrophic pancreas with scattered calcifications along with 1 large   calcification in the central pancreatic duct which is dilated. Spectrum of   findings would favor chronic pancreatitis. No significant inflammation on   the current exam but early acute on chronic pancreatitis may be considered   clinically. 2. No CT evidence of mass lesion in the pancreatic head. 3. Cardiomegaly and bibasilar atelectasis in the visualized lungs. Assessment & Plan:      Diabetic foot ulcer with Charcot foot  -Culture showing Proteus and group B strep  -ID following closely  -X-rays without signs of osteo  -MRI concerning for early changes of osteomyelitis, poor candidate for any treatment, refusing ECF for IV abx and do not feel he would be capable of doing this at home by himself. He is also concerned about having a PICC placed.  He refused to off load ORTHOTIST/PROSTHETIST    Disposition:  [] Home  [x] Home with home health [] Rehab [] Psych [] SNF  [] LTAC  [] Long term nursing home or group home [] Transfer to ICU  [] Transfer to PCU [] Other: has Visiting NP    Code Status: Full Code    ELOS: tbd      KENNY Flores CNP  06/10/20

## 2020-06-10 NOTE — CARE COORDINATION
6/10 per patient request- this writer notified  ph# [phone] to cancel MOW delivery for tomorrow Thursday 6/11/2020 (spoke w/ Anita).   Electronically signed by John Salinas on 6/10/2020 at 12:11 PM

## 2020-06-11 LAB
GLUCOSE BLD-MCNC: 140 MG/DL (ref 70–99)
GLUCOSE BLD-MCNC: 140 MG/DL (ref 70–99)
GLUCOSE BLD-MCNC: 147 MG/DL (ref 70–99)
GLUCOSE BLD-MCNC: 169 MG/DL (ref 70–99)
PERFORMED ON: ABNORMAL

## 2020-06-11 PROCEDURE — 99233 SBSQ HOSP IP/OBS HIGH 50: CPT | Performed by: INTERNAL MEDICINE

## 2020-06-11 PROCEDURE — 2580000003 HC RX 258: Performed by: HOSPITALIST

## 2020-06-11 PROCEDURE — 36569 INSJ PICC 5 YR+ W/O IMAGING: CPT

## 2020-06-11 PROCEDURE — 76937 US GUIDE VASCULAR ACCESS: CPT

## 2020-06-11 PROCEDURE — 2060000000 HC ICU INTERMEDIATE R&B

## 2020-06-11 PROCEDURE — 02HV33Z INSERTION OF INFUSION DEVICE INTO SUPERIOR VENA CAVA, PERCUTANEOUS APPROACH: ICD-10-PCS | Performed by: PHYSICAL MEDICINE & REHABILITATION

## 2020-06-11 PROCEDURE — 6370000000 HC RX 637 (ALT 250 FOR IP): Performed by: INTERNAL MEDICINE

## 2020-06-11 PROCEDURE — 6360000002 HC RX W HCPCS: Performed by: HOSPITALIST

## 2020-06-11 PROCEDURE — 6370000000 HC RX 637 (ALT 250 FOR IP): Performed by: HOSPITALIST

## 2020-06-11 PROCEDURE — 94760 N-INVAS EAR/PLS OXIMETRY 1: CPT

## 2020-06-11 PROCEDURE — C1751 CATH, INF, PER/CENT/MIDLINE: HCPCS

## 2020-06-11 PROCEDURE — 6370000000 HC RX 637 (ALT 250 FOR IP): Performed by: NURSE PRACTITIONER

## 2020-06-11 RX ADMIN — Medication 1 CAPSULE: at 09:13

## 2020-06-11 RX ADMIN — INSULIN GLARGINE 24 UNITS: 100 INJECTION, SOLUTION SUBCUTANEOUS at 20:46

## 2020-06-11 RX ADMIN — FUROSEMIDE 40 MG: 40 TABLET ORAL at 09:12

## 2020-06-11 RX ADMIN — Medication 1 CAPSULE: at 17:23

## 2020-06-11 RX ADMIN — COLLAGENASE SANTYL: 250 OINTMENT TOPICAL at 09:16

## 2020-06-11 RX ADMIN — INSULIN LISPRO 1 UNITS: 100 INJECTION, SOLUTION INTRAVENOUS; SUBCUTANEOUS at 17:23

## 2020-06-11 RX ADMIN — INSULIN LISPRO 1 UNITS: 100 INJECTION, SOLUTION INTRAVENOUS; SUBCUTANEOUS at 20:43

## 2020-06-11 RX ADMIN — SODIUM CHLORIDE, PRESERVATIVE FREE 10 ML: 5 INJECTION INTRAVENOUS at 09:14

## 2020-06-11 RX ADMIN — SPIRONOLACTONE 25 MG: 25 TABLET ORAL at 09:13

## 2020-06-11 RX ADMIN — ATORVASTATIN CALCIUM 40 MG: 40 TABLET, FILM COATED ORAL at 09:12

## 2020-06-11 RX ADMIN — ENOXAPARIN SODIUM 40 MG: 40 INJECTION SUBCUTANEOUS at 20:46

## 2020-06-11 RX ADMIN — LISINOPRIL 5 MG: 5 TABLET ORAL at 09:12

## 2020-06-11 RX ADMIN — TAMSULOSIN HYDROCHLORIDE 0.4 MG: 0.4 CAPSULE ORAL at 20:46

## 2020-06-11 RX ADMIN — INSULIN LISPRO 1 UNITS: 100 INJECTION, SOLUTION INTRAVENOUS; SUBCUTANEOUS at 12:04

## 2020-06-11 RX ADMIN — PIPERACILLIN AND TAZOBACTAM 3.38 G: 3; .375 INJECTION, POWDER, LYOPHILIZED, FOR SOLUTION INTRAVENOUS at 02:00

## 2020-06-11 RX ADMIN — INSULIN LISPRO 1 UNITS: 100 INJECTION, SOLUTION INTRAVENOUS; SUBCUTANEOUS at 09:12

## 2020-06-11 RX ADMIN — PIPERACILLIN AND TAZOBACTAM 3.38 G: 3; .375 INJECTION, POWDER, LYOPHILIZED, FOR SOLUTION INTRAVENOUS at 09:13

## 2020-06-11 RX ADMIN — ASPIRIN 81 MG: 81 TABLET, COATED ORAL at 09:12

## 2020-06-11 RX ADMIN — PIPERACILLIN AND TAZOBACTAM 3.38 G: 3; .375 INJECTION, POWDER, LYOPHILIZED, FOR SOLUTION INTRAVENOUS at 17:23

## 2020-06-11 ASSESSMENT — PAIN SCALES - GENERAL: PAINLEVEL_OUTOF10: 0

## 2020-06-11 NOTE — PROGRESS NOTES
Pt A&O x4. VSS. Denies pain. AM meds completed. Representative from [facility] in room now fitting pt with crow boot. Will monitor.   Electronically signed by Radha Whitaker RN on 6/11/2020 at 9:19 AM

## 2020-06-11 NOTE — PROGRESS NOTES
Nutrition Assessment (Low Risk)    Type and Reason for Visit: Initial(LOS)    Nutrition Recommendations:   Carb Control diet   Will monitor nutritional adequacy, nutrition-related labs, weights, BMs, and clinical progress     Nutrition Assessment:  Patient assessed for nutritional risk. Deemed to be at low risk at this time. Will continue to monitor for changes in status.       Malnutrition Assessment:  · Malnutrition Status: No malnutrition    Nutrition Risk Level   Risk Level: Low    Nutrition Diagnosis:   · Problem: No nutrition diagnosis at this time    Nutrition Intervention:  Food and/or Delivery: Continue current diet  Nutrition Education/Counseling/Coordination of Care:  Continued Inpatient Monitoring      Electronically signed by Kaitlin Barrow RD, LD on 6/11/20 at 1:06 PM EDT    Contact Number: [phone]

## 2020-06-11 NOTE — PROGRESS NOTES
Dressing changed to L foot per orders. Pt tolerated well. Dressing clean, dry, intact. Denies pain. Pt eating, drinking, voiding well this shift. Will monitor.   Electronically signed by Matthew Dye RN on 6/11/2020 at 6:57 PM

## 2020-06-11 NOTE — PROGRESS NOTES
skin every morning (before breakfast). (Patient taking differently: Inject 24 Units into the skin nightly ) 1 Pen 3    Insulin Pen Needle 32G X 4 MM MISC 1 each by Does not apply route daily. 100 each 3    Lancets MISC Once daily 100 each 3    glucose blood VI test strips (ASCENSIA AUTODISC VI;ONE TOUCH ULTRA TEST VI) strip 1 each by In Vitro route daily. As needed. 100 each 3    glucose monitoring kit (FREESTYLE) monitoring kit 1 kit by Does not apply route daily as needed. 1 kit 0       Allergies:  Patient has no known allergies. Immunizations : There is no immunization history on file for this patient. Social History:    Social History     Tobacco Use    Smoking status: Never Smoker    Smokeless tobacco: Never Used   Substance Use Topics    Alcohol use: No    Drug use: No     Social History     Tobacco Use   Smoking Status Never Smoker   Smokeless Tobacco Never Used      Family History   Problem Relation Age of Onset    Colon Cancer Mother         PVD s/p toe amputation by Dr Barb Rajan Father         REVIEW OF SYSTEMS:    No fever / chills / sweats. No weight loss. No visual change, eye pain, eye discharge. No oral lesion, sore throat, dysphagia. Denies cough / sputum/Sob   Denies chest pain, palpitations/ dizziness  Denies nausea/ vomiting/abdominal pain/diarrhea. Denies dysuria or change in urinary function. Denies joint swelling or pain. No myalgia, arthralgia. No rashes, skin lesions   Denies focal weakness, sensory change or other neurologic symptoms  No lymph node swelling or tenderness.       Left foot ulceration, cellulitis and Lower leg changes+ diabetic foot infection   PHYSICAL EXAM:      Vitals:  T max  101.3   BP (!) 156/77   Pulse 53   Temp 98.3 °F (36.8 °C) (Oral)   Resp 14   Ht 6' 3\" (1.905 m)   Wt 255 lb 11.7 oz (116 kg)   SpO2 98%   BMI 31.96 kg/m²     General Appearance: alert,in some  acute distress, no pallor, no icterus obesity ++  Skin: warm and UROBILINOGEN 0.2 06/06/2020    NITRU Negative 06/06/2020    LEUKOCYTESUR Negative 06/06/2020    LABMICR YES 06/06/2020    URINETYPE NotGiven 06/06/2020      Urine Microscopic:   Lab Results   Component Value Date    BACTERIA 3+ 04/01/2014    HYALCAST 1 06/06/2020    WBCUA 1 06/06/2020    RBCUA 6 06/06/2020    EPIU 0 06/06/2020       Lactic acid  3.2     CRP  35.9    ESR 66     Wbc  14.2  Down to  10.9    Vanco  15.1     Esr 40       MICRO: cultures reviewed and updated by me            Culture, Wound [[id number]] (Abnormal)  Collected: 06/06/20 0754   Order Status: Completed Specimen: Foot Updated: 06/08/20 0839    Gram Stain Result 3+ Gram positive cocci   3+ Gram positive rods   3+ Gram negative rods   1+ WBC's (Mononuclear)   1+ Epithelial Cells   Abnormal     Organism Proteus mirabilisAbnormal     WOUND/ABSCESS Heavy growth    Organism BHS Group B (Strep agalacticae)Abnormal     WOUND/ABSCESS --    Heavy growth   Susceptibility testing of penicillin and other beta lactams is   not necessary for beta hemolytic Streptococci since resistant   strains have not been identified. (CLSI M100)     Organism DiphtheroidsAbnormal     WOUND/ABSCESS --    Heavy growth   No further workup    Narrative:     ORDER#: [id number]                          ORDERED BY: Shelbi Funez  SOURCE: Foot                               COLLECTED:  06/06/20 07:54  ANTIBIOTICS AT [facility]. :                      RECEIVED :  06/06/20 07:54  Performed at:  [facility]  [address] [place], [place] [address]   Phone [phone]   Culture, MRSA, Screening [[id number]] Collected: 06/06/20 1730   Order Status: Completed Specimen: Nares Updated: 06/07/20 1300    MRSA Culture Only --    No S aureus MRSA isolated   S aureus MSSA present    Narrative:     ORDER#: [id number]                          ORDERED BY: Lily Kohli  SOURCE: Nares                              COLLECTED:  06/06/20 17:30  ANTIBIOTICS AT [facility]. :                      RECEIVED :  06/06/20 17:44  Performed at:  [facility]  [address], [place] [address]   Phone [phone]   Culture, Blood 1 [[id number]] Collected: 06/06/20 0744   Order Status: Completed Specimen: Blood Updated: 06/07/20 0915    Blood Culture, Routine No Growth to date.  Any change in status will be called. Narrative:     ORDER#: [id number]                          ORDERED BY: Sujatha Turk  SOURCE: Blood                              COLLECTED:  06/06/20 07:44  ANTIBIOTICS AT [facility]. :                      RECEIVED :  06/06/20 07:44  If child <=2 yrs old please draw pediatric bottle. ~Blood Culture #1  Performed at:  [facility]  [address] [facility], [place] [address]   Phone [phone]   Culture, Blood 2 [[id number]] Collected: 06/06/20 0754   Order Status: Completed Specimen: Blood Updated: 06/07/20 0915    Culture, Blood 2 No Growth to date.  Any change in status will be called. Narrative:     ORDER#: [id number]                          ORDERED BY: Sujatha Turk  SOURCE: Blood                              COLLECTED:  06/06/20 07:54  ANTIBIOTICS AT [facility]. :                      RECEIVED :  06/06/20 07:54  If child <=2 yrs old please draw pediatric bottle. ~Blood Culture #2  Performed at:  [facility]  [address] [facility], [place] [address]   Phone [phone]   MRSA DNA Probe, Nasal [[id number]] Collected: 06/06/20 1730   Order Status: Canceled Specimen: Nasal from Nares      Proteus mirabilis (1)     Antibiotic Interpretation BRIAN Status    ampicillin Sensitive <=8 mcg/mL     ceFAZolin Sensitive <=2 mcg/mL     cefepime Sensitive <=2 mcg/mL     cefotaxime Sensitive <=2 mcg/mL     cefTRIAXone Sensitive <=1 mcg/mL     cefuroxime Sensitive 8 mcg/mL     ciprofloxacin Sensitive <=1 mcg/mL     ertapenem Sensitive <=0.5 mcg/mL     gentamicin Sensitive <=4 mcg/mL     meropenem Sensitive <=1 mcg/mL     piperacillin-tazobactam Sensitive <=16 mcg/mL     trimethoprim-sulfamethoxazole Sensitive <=2/38 mcg/mL           IMAGING:    MRI FOOT LEFT W WO CONTRAST   Final Result   Skin thickening and soft tissue edema noted along the medial plantar aspect   of the foot where there is a large ulceration extending to underlying 1st   metatarsal bone. Mild ill-defined increased T2 signal noted within the lateral cuneiform with   mild corresponding low T1 signal. No discrete fracture line visualized. Findings may represent early osteomyelitis. No other marrow changes   suspicious of osteomyelitis. XR FOOT LEFT (MIN 3 VIEWS)   Final Result   Cellulitis and ulceration to the medial aspect of the left foot. No   radiographic evidence of osteomyelitis. XR CHEST PORTABLE   Final Result   No acute cardiopulmonary process. CT ABDOMEN PELVIS WO CONTRAST Additional Contrast? None   Final Result   1. Atrophic pancreas with scattered calcifications along with 1 large   calcification in the central pancreatic duct which is dilated. Spectrum of   findings would favor chronic pancreatitis. No significant inflammation on   the current exam but early acute on chronic pancreatitis may be considered   clinically. 2. No CT evidence of mass lesion in the pancreatic head. 3. Cardiomegaly and bibasilar atelectasis in the visualized lungs.                All the pertinent images and reports for the current Hospitalization were reviewed by me     Scheduled Meds:   furosemide  40 mg Oral Daily    spironolactone  25 mg Oral Daily    lidocaine 1 % injection  5 mL Intradermal Once    lisinopril  5 mg Oral Daily    atorvastatin  40 mg Oral Daily    lactobacillus  1 capsule Oral BID WC    collagenase   Topical Daily    aspirin  81 mg Oral Daily    insulin glargine  24 Units Subcutaneous Nightly    sodium chloride flush  10 mL Intravenous 2 times per day    enoxaparin  40 mg Subcutaneous Nightly  piperacillin-tazobactam  3.375 g Intravenous Q8H    insulin lispro  0-6 Units Subcutaneous TID WC    insulin lispro  0-3 Units Subcutaneous Nightly    tamsulosin  0.4 mg Oral Nightly       Continuous Infusions:   dextrose         PRN Meds:  sodium chloride flush, acetaminophen **OR** acetaminophen, polyethylene glycol, ondansetron, glucose, dextrose, glucagon (rDNA), dextrose      Assessment:     Patient Active Problem List   Diagnosis    Abdominal pain, acute    BPH (benign prostatic hyperplasia)    RLQ abdominal pain    Abnormal ECG    HTN (hypertension)    Diabetes mellitus type II, uncontrolled ([address]    Diabetic foot infection ([address]    Fever    Leukocytosis    Charcot foot due to diabetes mellitus (HCC)    Foot ulceration, left, with unspecified severity ([address]    Demand ischemia ([facility])    Controlled type 2 diabetes mellitus with hyperglycemia, with long-term current use of insulin (HCC)    Sepsis ([facility])     Sepsis  Fevers  WBC elevation  Lactic acidosis  Left complicated diabetic foot infection  Charcot foot   Neuropathy   Dm+  Wound cx mixed errol    Left foot is the source for infection and sepsis, MRI Left foot with osteomyelitis and given flat foot and if not off loading risk for worsening and limb loss  OPAT d/w pt he would like to try IV abx Podiatry notes reviewed    PICC line orders placed and will choose IV Ertapenem for d/c planning     Labs, Microbiology, Radiology and all the pertinent results from current hospitalization and  care every where were reviewed  by me as a part of the evaluation   Plan:   1. Cont IV Zosyn x 3.375 gm Q 8 HR as in patient    2. PICC line   3. MRI left foot osteomyelitis   4. Local foot care  5. Trend WBC    6. At d/c will change to IV Ertapenem x 1 gm Q 24hr will improve compliance and therapy with once a day regimen x 6 weeks  7. Need to off load the foot - CROW boot per Podiatry   8. MAIKOL done     Discussed with patient/Family and Nursing staff   Risk of Complications/Morbidity: High      · Illness(es)/ Infection present that pose threat to bodily function. · There is potential for severe exacerbation of infection/side effects of treatment. · Therapy requires intensive monitoring for antimicrobial agent toxicity. Thanks for allowing me to participate in your patient's care and please call me with any questions or concerns.     Sita Merino MD  Infectious Disease  [facility] ([facility]) Physician  Phone: [phone]   Fax : [phone]

## 2020-06-11 NOTE — PROGRESS NOTES
Hospital Medicine Progress Note      Admit Date: 6/6/2020         Overnight Events: none    CC: F/U for fever, vomiting, chest pain and a cough    Hospital Course: This is a 49-year-old male with a history of previous pancreatitis, gallstones, chronic bilateral ulcerations on his feet, and diabetes who presented to the ED with complaints of fever, vomiting and a cough. He also complained of chest pain with cough and breathing. He has associated generalized body aches and weakness. COVID testing negative. He was found to have Sepsis related to foot infection. Podiatry and ID were consulted. Xrays performed without notable osteo, however MRI shows signs of early disease. Wound cx grew Proteus and BHS. Given his complaints of chest pain and elevated troponin cardiology was consulted. Elevation thought to be related to demand ischemia. Echocardiogram was normal.     Interval History/Subjective:   No fevers, chills, cp. Sob, nausea, vomiting or other sx    Review of Systems:     Comprehensive ROS negative except as mentioned above. Past Medical History:        Diagnosis Date    Diabetes mellitus ([address]     Gallstones     Pancreatitis     Ulcers of both lower legs ([address]     bilateral feet       Past Surgical History:        Procedure Laterality Date    ERCP  4/8/14    LITHOTRYPSY & PANCREATIC STENT PLACEMENT    FOOT SURGERY Left 1967       Allergies:  Patient has no known allergies. Past medical and surgical history reviewed. Any changes have been noted.      Scheduled and prn Medications:    Scheduled Meds:   furosemide  40 mg Oral Daily    spironolactone  25 mg Oral Daily    lidocaine 1 % injection  5 mL Intradermal Once    lisinopril  5 mg Oral Daily    atorvastatin  40 mg Oral Daily    lactobacillus  1 capsule Oral BID WC    collagenase   Topical Daily    aspirin  81 mg Oral Daily    insulin glargine  24 Units Subcutaneous Nightly    sodium chloride flush  10 mL Intravenous 2 times per day    enoxaparin  40 mg Subcutaneous Nightly    piperacillin-tazobactam  3.375 g Intravenous Q8H    insulin lispro  0-6 Units Subcutaneous TID WC    insulin lispro  0-3 Units Subcutaneous Nightly    tamsulosin  0.4 mg Oral Nightly     Continuous Infusions:   dextrose       PRN Meds:.sodium chloride flush, acetaminophen **OR** acetaminophen, polyethylene glycol, ondansetron, glucose, dextrose, glucagon (rDNA), dextrose    PHYSICAL EXAM:  BP (!) 156/77   Pulse 53   Temp 98.3 °F (36.8 °C) (Oral)   Resp 14   Ht 6' 3\" (1.905 m)   Wt 255 lb 11.7 oz (116 kg)   SpO2 98%   BMI 31.96 kg/m²       Intake/Output Summary (Last 24 hours) at 6/11/2020 1544  Last data filed at 6/11/2020 1419  Gross per 24 hour   Intake 1300 ml   Output 2670 ml   Net -1370 ml       General: Alert and oriented. Resting in bed in no apparent distress. HEENT: Normocephalic. Atraumatic. Pupils equal and reactive. EOM intact. Oral mucosa pink/moist/intact. Neck: Supple. Symmetrical. Trachea midline. Lungs: unable to assess today  Chest: Exam unremarkable. Cardiac: unable to assess today   Abdomen/GI: round appearing  Extremities: LLE wrapped  Skin: Dry and intact. No lesions noted. Neuro: Grossly intact. No focal deficits noted.  VILLARREAL without difficulty        LABS:    Lab Results   Component Value Date    WBC 8.2 06/09/2020    HGB 10.2 (L) 06/09/2020    HCT 30.6 (L) 06/09/2020    MCV 77.9 (L) 06/09/2020     06/09/2020    LYMPHOPCT 20.9 06/09/2020    RBC 3.93 (L) 06/09/2020    MCH 26.0 06/09/2020    MCHC 33.4 06/09/2020    RDW 16.6 (H) 06/09/2020       Lab Results   Component Value Date    CREATININE 0.9 06/09/2020    BUN 12 06/09/2020     (L) 06/09/2020    K 4.1 06/09/2020     06/09/2020    CO2 19 (L) 06/09/2020       Lab Results   Component Value Date    MG 1.90 02/06/2015       Lab Results   Component Value Date    ALT 7 (L) 06/06/2020    AST 10 (L) 06/06/2020    ALKPHOS 84 06/06/2020    BILITOT 0.7 06/06/2020 BHS strep on wound cx  - PICC line placement for long term abx at home    Sepsis, POA  -Thought to be related to diabetic foot wound/Charcot foot  -ESR CRP mildly elevated  -Blood cultures with no growth to date  -ID consulted and following closely  -Day 4 of Zosyn    Atypical chest pain with mildly elevated troponin  - had on arrival with elevated troponin and concern for new LBBB?  - ECHO normal  - Evaluated by cards, likely demand ischemia    Diabetes mellitus type 2 without hyperglycemia  -A1c noted at 6.9  -Lantus and sliding scale for now  - BS stable, follow closely    Essential hypertension  -initially meds were held d/t hypotension  - Lisinopril added but still with some elevation  - aldactone lasix resume  - monitor BP    Cough and vomiting  -Unsure of etiology  -Could be related to foot infection  -CXR without acute findings  -Urinalysis unremarkable  -Resolved    Lactic acidosis  -Likely related to #1 #2  -IV fluids  -Resolved    Obese  Body mass index is 31.96 kg/m². The patient and / or the family were informed of the results of any tests, a time was given to answer questions, a plan was proposed and they agreed with plan.     DVT prophylaxis: [x] Lovenox  [] SQ Heparin  [] SCDs because of  [] warfarin/oral direct thrombin inhibitor [] Encourage ambulation    GI prophylaxis: [] PPI/L3garzhvi  [x] not indicated    Probiotic if on abx: [x] Yes [] No [] Not Indicated    Diet: DIET CARB CONTROL;    Consults:  IP CONSULT TO HOSPITALIST  IP CONSULT TO INFECTIOUS DISEASES  IP CONSULT TO PODIATRY  IP CONSULT TO CARDIOLOGY  INPATIENT CONSULT TO ORTHOTIST/PROSTHETIST    Disposition:  [] Home  [x] Home with home health [] Rehab [] Psych [] SNF  [] LTAC  [] Long term nursing home or group home [] Transfer to ICU  [] Transfer to PCU [] Other: has Visiting NP    Code Status: Full Code    ELOS: KENNY Martin - JAY  06/11/20

## 2020-06-11 NOTE — PLAN OF CARE
Problem: Falls - Risk of:  Goal: Will remain free from falls  Description: Will remain free from falls  6/11/2020 0927 by Yesenia Osman RN  Outcome: Ongoing  Note: Fall risk assessment completed. Fall precautions in place. Call light within reach. Pt educated on calling for assistance before getting up. Walkway free of clutter. Will continue to monitor. Problem: Skin Integrity:  Goal: Demonstration of wound healing without infection will improve  Description: Demonstration of wound healing without infection will improve  Outcome: Ongoing  Note: VSS and WDL. Vitals monitored per floor orders. Wound assessed. Skin assessed. Wound care per orders. Labs monitored. Antibiotics administered as directed.

## 2020-06-11 NOTE — PROGRESS NOTES
Pt resting comfortably in bed this AM. Pt aware of possible PICC placement today. Pt voices no further concerns at this time. Call light in reach. Will continue to monitor.

## 2020-06-11 NOTE — PROGRESS NOTES
discussion regarding his need to stay off of the foot  - PT/OT to work with patient on modified gait and use of the CROW boot once he has it made. DISPO: If he can get CROW prior to d/c home and get DME recommendations prior to d/c then he has less chance of bounce back for repeat foot infection. Thank you for the opportunity to take part in the patient's care.     Melquiades Raza DPM  [facility]  Pager: [phone]  Office: [phone]  Fax: [phone]

## 2020-06-11 NOTE — PROGRESS NOTES
PICC PLACEMENT:  Preprocedure & timeout done w primary RN MARIZA; no difficulty accessing R BASILIC vein; picc tip is verified using Shopperception 3cg Confirmation system w positive pwave and no negative deflection visualized at 0cm out; waveform printed and placed in chart; reported same and ok to use PICC to primary RN; pt tolerated procedure very well; he is instructed to remain in bed at rest for 30 min post procedure to promote hemostasis to the insertion site; pt is left in a position of comfort w siderails up x2, call light in reach and bed is locked in lowest position; Order for OK to use PICC is placed in EMR

## 2020-06-12 LAB
ALBUMIN SERPL-MCNC: 3.3 G/DL (ref 3.4–5)
ANION GAP SERPL CALCULATED.3IONS-SCNC: 11 MMOL/L (ref 3–16)
BASOPHILS ABSOLUTE: 0 K/UL (ref 0–0.2)
BASOPHILS RELATIVE PERCENT: 0.6 %
BUN BLDV-MCNC: 16 MG/DL (ref 7–20)
CALCIUM SERPL-MCNC: 8.9 MG/DL (ref 8.3–10.6)
CHLORIDE BLD-SCNC: 101 MMOL/L (ref 99–110)
CO2: 22 MMOL/L (ref 21–32)
CREAT SERPL-MCNC: 0.9 MG/DL (ref 0.8–1.3)
EOSINOPHILS ABSOLUTE: 0.2 K/UL (ref 0–0.6)
EOSINOPHILS RELATIVE PERCENT: 1.8 %
GFR AFRICAN AMERICAN: >60
GFR NON-AFRICAN AMERICAN: >60
GLUCOSE BLD-MCNC: 123 MG/DL (ref 70–99)
GLUCOSE BLD-MCNC: 134 MG/DL (ref 70–99)
GLUCOSE BLD-MCNC: 142 MG/DL (ref 70–99)
GLUCOSE BLD-MCNC: 154 MG/DL (ref 70–99)
GLUCOSE BLD-MCNC: 158 MG/DL (ref 70–99)
HCT VFR BLD CALC: 35.2 % (ref 40.5–52.5)
HEMOGLOBIN: 11.6 G/DL (ref 13.5–17.5)
LYMPHOCYTES ABSOLUTE: 2 K/UL (ref 1–5.1)
LYMPHOCYTES RELATIVE PERCENT: 22.8 %
MCH RBC QN AUTO: 25.6 PG (ref 26–34)
MCHC RBC AUTO-ENTMCNC: 33 G/DL (ref 31–36)
MCV RBC AUTO: 77.6 FL (ref 80–100)
MONOCYTES ABSOLUTE: 0.6 K/UL (ref 0–1.3)
MONOCYTES RELATIVE PERCENT: 6.8 %
NEUTROPHILS ABSOLUTE: 5.8 K/UL (ref 1.7–7.7)
NEUTROPHILS RELATIVE PERCENT: 68 %
PDW BLD-RTO: 16.6 % (ref 12.4–15.4)
PERFORMED ON: ABNORMAL
PHOSPHORUS: 3.3 MG/DL (ref 2.5–4.9)
PLATELET # BLD: 290 K/UL (ref 135–450)
PMV BLD AUTO: 7 FL (ref 5–10.5)
POTASSIUM SERPL-SCNC: 4.2 MMOL/L (ref 3.5–5.1)
RBC # BLD: 4.54 M/UL (ref 4.2–5.9)
SODIUM BLD-SCNC: 134 MMOL/L (ref 136–145)
WBC # BLD: 8.6 K/UL (ref 4–11)

## 2020-06-12 PROCEDURE — 94760 N-INVAS EAR/PLS OXIMETRY 1: CPT

## 2020-06-12 PROCEDURE — 80069 RENAL FUNCTION PANEL: CPT

## 2020-06-12 PROCEDURE — 6360000002 HC RX W HCPCS: Performed by: INTERNAL MEDICINE

## 2020-06-12 PROCEDURE — 85025 COMPLETE CBC W/AUTO DIFF WBC: CPT

## 2020-06-12 PROCEDURE — 99232 SBSQ HOSP IP/OBS MODERATE 35: CPT | Performed by: INTERNAL MEDICINE

## 2020-06-12 PROCEDURE — 2580000003 HC RX 258: Performed by: HOSPITALIST

## 2020-06-12 PROCEDURE — 2580000003 HC RX 258: Performed by: INTERNAL MEDICINE

## 2020-06-12 PROCEDURE — 2060000000 HC ICU INTERMEDIATE R&B

## 2020-06-12 PROCEDURE — 6370000000 HC RX 637 (ALT 250 FOR IP): Performed by: HOSPITALIST

## 2020-06-12 PROCEDURE — 6370000000 HC RX 637 (ALT 250 FOR IP): Performed by: NURSE PRACTITIONER

## 2020-06-12 PROCEDURE — 6370000000 HC RX 637 (ALT 250 FOR IP): Performed by: INTERNAL MEDICINE

## 2020-06-12 PROCEDURE — 36415 COLL VENOUS BLD VENIPUNCTURE: CPT

## 2020-06-12 PROCEDURE — 6360000002 HC RX W HCPCS: Performed by: HOSPITALIST

## 2020-06-12 RX ORDER — LISINOPRIL 10 MG/1
10 TABLET ORAL DAILY
Status: DISCONTINUED | OUTPATIENT
Start: 2020-06-13 | End: 2020-06-16 | Stop reason: HOSPADM

## 2020-06-12 RX ADMIN — ATORVASTATIN CALCIUM 40 MG: 40 TABLET, FILM COATED ORAL at 09:54

## 2020-06-12 RX ADMIN — PIPERACILLIN AND TAZOBACTAM 3.38 G: 3; .375 INJECTION, POWDER, LYOPHILIZED, FOR SOLUTION INTRAVENOUS at 09:55

## 2020-06-12 RX ADMIN — INSULIN LISPRO 1 UNITS: 100 INJECTION, SOLUTION INTRAVENOUS; SUBCUTANEOUS at 20:49

## 2020-06-12 RX ADMIN — TAMSULOSIN HYDROCHLORIDE 0.4 MG: 0.4 CAPSULE ORAL at 20:48

## 2020-06-12 RX ADMIN — LISINOPRIL 5 MG: 5 TABLET ORAL at 09:54

## 2020-06-12 RX ADMIN — Medication 1 CAPSULE: at 17:16

## 2020-06-12 RX ADMIN — ENOXAPARIN SODIUM 40 MG: 40 INJECTION SUBCUTANEOUS at 20:48

## 2020-06-12 RX ADMIN — FUROSEMIDE 40 MG: 40 TABLET ORAL at 09:54

## 2020-06-12 RX ADMIN — SPIRONOLACTONE 25 MG: 25 TABLET ORAL at 09:54

## 2020-06-12 RX ADMIN — PIPERACILLIN AND TAZOBACTAM 3.38 G: 3; .375 INJECTION, POWDER, LYOPHILIZED, FOR SOLUTION INTRAVENOUS at 01:07

## 2020-06-12 RX ADMIN — INSULIN LISPRO 1 UNITS: 100 INJECTION, SOLUTION INTRAVENOUS; SUBCUTANEOUS at 11:49

## 2020-06-12 RX ADMIN — ASPIRIN 81 MG: 81 TABLET, COATED ORAL at 09:54

## 2020-06-12 RX ADMIN — SODIUM CHLORIDE, PRESERVATIVE FREE 10 ML: 5 INJECTION INTRAVENOUS at 09:55

## 2020-06-12 RX ADMIN — COLLAGENASE SANTYL: 250 OINTMENT TOPICAL at 10:00

## 2020-06-12 RX ADMIN — PIPERACILLIN AND TAZOBACTAM 3.38 G: 3; .375 INJECTION, POWDER, LYOPHILIZED, FOR SOLUTION INTRAVENOUS at 17:17

## 2020-06-12 RX ADMIN — ERTAPENEM SODIUM 1000 MG: 1 INJECTION, POWDER, LYOPHILIZED, FOR SOLUTION INTRAMUSCULAR; INTRAVENOUS at 09:55

## 2020-06-12 RX ADMIN — Medication 1 CAPSULE: at 09:54

## 2020-06-12 RX ADMIN — INSULIN GLARGINE 24 UNITS: 100 INJECTION, SOLUTION SUBCUTANEOUS at 20:49

## 2020-06-12 RX ADMIN — INSULIN LISPRO 1 UNITS: 100 INJECTION, SOLUTION INTRAVENOUS; SUBCUTANEOUS at 17:16

## 2020-06-12 ASSESSMENT — PAIN SCALES - GENERAL: PAINLEVEL_OUTOF10: 0

## 2020-06-12 NOTE — CARE COORDINATION
Referral received to check IV Benefits. Information faxed to [employer] office awaiting call back on costs. Therapy:Ertapenem 1 gm  QD, Zosyn Cont, cefepime 2g Q24    Pt.  Copay:$2.00/Wk  For Ertapenem, $23.00/Day for per ksenia  $1.30/week for Zosyn $23/day for per ksenia  $1.30/week for Cefepime $23/day for per ksenia    Electronically signed by Zeinab Rivas on 6/12/2020 at 12:37 PM   Cell Ph# [phone], Office # [phone]

## 2020-06-12 NOTE — PROGRESS NOTES
Physical Therapy  Status Note  6/12/2020  Bennett Fitzpatrick  [id number]  [id number]    Therapy orders noted and chart briefly reviewed. Also spoke with RN. Patient was fit for CROW boot for L LE  (by Tamiko Orozco) 6-, with anticipated delivery 6-. Will initiate therapy once CROW boot here.      Thank You,  Electronically signed by Kostas Myers, PT [id number] on 6/12/2020 at 11:29 AM

## 2020-06-12 NOTE — PROGRESS NOTES
Pt doing okay. Called [facility] and CROW boot should be here around 10am on 6/13/20. Pt dressing to L foot changed by Dr Rolande Kawasaki. Dressing remains clean, dry, intact. Pt denies pain. Denies nausea, but does have complaints of decreased appetite. When assessed further he says he is just sick of hospital food. I&O monitored. Will monitor.   Electronically signed by Mauricio Hoff RN on 6/12/2020 at 6:59 PM

## 2020-06-12 NOTE — PROGRESS NOTES
Hospital Medicine Progress Note      Admit Date: 6/6/2020         Overnight Events: none    CC: F/U for fever, vomiting, chest pain and a cough    Hospital Course: This is a 76-year-old male with a history of previous pancreatitis, gallstones, chronic bilateral ulcerations on his feet, and diabetes who presented to the ED with complaints of fever, vomiting and a cough. He also complained of chest pain with cough and breathing. He has associated generalized body aches and weakness. COVID testing negative. He was found to have Sepsis related to foot infection. Podiatry and ID were consulted. Xrays performed without notable osteo, however MRI shows signs of early disease. Wound cx grew Proteus and BHS. Given his complaints of chest pain and elevated troponin cardiology was consulted. Elevation thought to be related to demand ischemia. Echocardiogram was normal.     Interval History/Subjective:   Complains of nausea after eating but has now resolved. No vomiting. No headaches fevers or chills. No chest pain or shortness of breath. Review of Systems:     Comprehensive ROS negative except as mentioned above. Past Medical History:        Diagnosis Date    Diabetes mellitus ([address]     Gallstones     Pancreatitis     Ulcers of both lower legs ([address]     bilateral feet       Past Surgical History:        Procedure Laterality Date    ERCP  4/8/14    LITHOTRYPSY & PANCREATIC STENT PLACEMENT    FOOT SURGERY Left 1967       Allergies:  Patient has no known allergies. Past medical and surgical history reviewed. Any changes have been noted.      Scheduled and prn Medications:    Scheduled Meds:   [START ON 6/13/2020] lisinopril  10 mg Oral Daily    spironolactone  25 mg Oral Daily    lidocaine 1 % injection  5 mL Intradermal Once    atorvastatin  40 mg Oral Daily    lactobacillus  1 capsule Oral BID WC    collagenase   Topical Daily    aspirin  81 mg Oral Daily    insulin glargine  24 Units Subcutaneous Nightly    sodium chloride flush  10 mL Intravenous 2 times per day    enoxaparin  40 mg Subcutaneous Nightly    piperacillin-tazobactam  3.375 g Intravenous Q8H    insulin lispro  0-6 Units Subcutaneous TID     insulin lispro  0-3 Units Subcutaneous Nightly    tamsulosin  0.4 mg Oral Nightly     Continuous Infusions:   dextrose       PRN Meds:.sodium chloride flush, acetaminophen **OR** acetaminophen, polyethylene glycol, ondansetron, glucose, dextrose, glucagon (rDNA), dextrose    PHYSICAL EXAM:  BP (!) 145/77   Pulse 60   Temp 97.7 °F (36.5 °C) (Oral)   Resp 14   Ht 6' 3\" (1.905 m)   Wt 251 lb 5.2 oz (114 kg)   SpO2 97%   BMI 31.41 kg/m²       Intake/Output Summary (Last 24 hours) at 6/12/2020 1201  Last data filed at 6/12/2020 1103  Gross per 24 hour   Intake 1590 ml   Output 1625 ml   Net -35 ml       General: Alert and oriented. Resting in bed in no apparent distress. HEENT: Normocephalic. Atraumatic. Pupils equal and reactive. EOM intact. Oral mucosa pink/moist/intact. Neck: Supple. Symmetrical. Trachea midline. Lungs: Clear to auscultation all fields with good respiratory effort  Chest: Exam unremarkable. Cardiac: S1-S2 noted with no murmur gallop or rub  Abdomen/GI: Round, soft, nontender with positive bowel sounds all 4 quadrants  Extremities: Pulses intact bilaterally, left lower extremity wrapped in Ace, capillary refill within normal limits  Skin: Dry and intact. No lesions noted. Neuro: Grossly intact. No focal deficits noted.  VILLARREAL without difficulty        LABS:    Lab Results   Component Value Date    WBC 8.6 06/12/2020    HGB 11.6 (L) 06/12/2020    HCT 35.2 (L) 06/12/2020    MCV 77.6 (L) 06/12/2020     06/12/2020    LYMPHOPCT 22.8 06/12/2020    RBC 4.54 06/12/2020    MCH 25.6 (L) 06/12/2020    MCHC 33.0 06/12/2020    RDW 16.6 (H) 06/12/2020       Lab Results   Component Value Date    CREATININE 0.9 06/12/2020    BUN 16 06/12/2020     (L) 06/12/2020 K 4.2 06/12/2020     06/12/2020    CO2 22 06/12/2020       Lab Results   Component Value Date    MG 1.90 02/06/2015       Lab Results   Component Value Date    ALT 7 (L) 06/06/2020    AST 10 (L) 06/06/2020    ALKPHOS 84 06/06/2020    BILITOT 0.7 06/06/2020        No flowsheet data found. Imaging:  MRI FOOT LEFT W WO CONTRAST   Final Result   Skin thickening and soft tissue edema noted along the medial plantar aspect   of the foot where there is a large ulceration extending to underlying 1st   metatarsal bone. Mild ill-defined increased T2 signal noted within the lateral cuneiform with   mild corresponding low T1 signal. No discrete fracture line visualized. Findings may represent early osteomyelitis. No other marrow changes   suspicious of osteomyelitis. XR FOOT LEFT (MIN 3 VIEWS)   Final Result   Cellulitis and ulceration to the medial aspect of the left foot. No   radiographic evidence of osteomyelitis. XR CHEST PORTABLE   Final Result   No acute cardiopulmonary process. CT ABDOMEN PELVIS WO CONTRAST Additional Contrast? None   Final Result   1. Atrophic pancreas with scattered calcifications along with 1 large   calcification in the central pancreatic duct which is dilated. Spectrum of   findings would favor chronic pancreatitis. No significant inflammation on   the current exam but early acute on chronic pancreatitis may be considered   clinically. 2. No CT evidence of mass lesion in the pancreatic head. 3. Cardiomegaly and bibasilar atelectasis in the visualized lungs. Assessment & Plan:      Diabetic foot ulcer with Charcot foot  -Culture showing Proteus and group B strep  -ID following closely  -X-rays without signs of osteo  -MRI concerning for early changes of osteomyelitis, poor candidate for any treatment, refusing ECF for IV abx and do not feel he would be capable of doing this at home by himself. He is also concerned about having a PICC placed.

## 2020-06-12 NOTE — PROGRESS NOTES
Occupational Therapy  Evaluation Attempt    Yolie Cassette  6/12/2020  [id number]    OT orders noted. Pt being treated for large ovoid wound on plantar LEFT midfoot due to Charcot. Per Dr. Elita Seip note, pt is being fit for CROW boot and would like assessment of his mobility with CROW boot and any other DME needed for d/c. Per chart review pt is adamant about d/c home vs. SNF. [facility] fit patient for boot on 6/11/20 however takes ~2 days to make and will arrive on Saturday. Will complete formal evaluation and mobility when CROW boot arrives.      Electronically signed by Young Mariee, OTR/L#[id number] on 6/12/2020 at 11:29 AM

## 2020-06-12 NOTE — PROGRESS NOTES
Pt resting in bed comfortably. Shift assessment and PM medications administrated at this time. Pt request to be repositioned in bed. Blood sugar obtained and insulin given. Pt voices no further needs at this time. Call light in reach. Will continue to monitor.

## 2020-06-12 NOTE — PLAN OF CARE
Problem: Falls - Risk of:  Goal: Will remain free from falls  Description: Will remain free from falls  Outcome: Ongoing  Note: Fall risk assessment completed. Fall precautions in place. Call light within reach. Pt educated on calling for assistance before getting up. Walkway free of clutter. Will continue to monitor. Problem: Skin Integrity:  Goal: Demonstration of wound healing without infection will improve  Description: Demonstration of wound healing without infection will improve  Outcome: Ongoing  Note: VSS and WDL. Vitals monitored per floor orders. Antibiotics administered as directed. Labs monitored. Wound care per orders. Podiatry and ID following.

## 2020-06-12 NOTE — PLAN OF CARE
Problem: Falls - Risk of:  Goal: Will remain free from falls  Description: Will remain free from falls  6/12/2020 1945 by Castro Zelaya RN  Outcome: Ongoing  Note: Patient educated on fall prevention. Call light is within reach, bed locked in lowest position, personal items within reach, and bed alarm is on. Will round on patient per unit guidelines. Problem: Falls - Risk of:  Goal: Absence of physical injury  Description: Absence of physical injury  6/12/2020 1945 by Castro Zelaya RN  Outcome: Ongoing  Note: Pt is free of injury. No injury noted. Fall precautions in place. Call light within reach. Will monitor.         Problem: Skin Integrity:  Goal: Demonstration of wound healing without infection will improve  Description: Demonstration of wound healing without infection will improve  6/12/2020 1945 by Castro Zelaya RN  Outcome: Ongoing     Problem: Skin Integrity:  Goal: Complications related to intravenous access or infusion will be avoided or minimized  Description: Complications related to intravenous access or infusion will be avoided or minimized  6/12/2020 1945 by Castro Zelaya RN  Outcome: Ongoing

## 2020-06-12 NOTE — PROGRESS NOTES
Infectious Disease Follow up Notes  Admit Date: 6/6/2020  Hospital Day: 7    Antibiotics :   IV Zosyn      CHIEF COMPLAINT:     Fevers  Emesis  Diabetic foot infection   Diabetic foot ulcer      Subjective interval History :  75 y.o. man with DM and Neuropahty, with on going ulcer Left foot and drainage now with worsening infection, fevers WBC elevation. X ray left foot negative. MRI Left foot with osteomyelitis and less drainage and has flat foot with deformity and Podiatry discussions noted and pt does not want surgery. S/p  PICC placement and foot dressing+ and wants to do IV abx at home d/w  about IV abx options for home going        Past Medical History:    Past Medical History:   Diagnosis Date    Diabetes mellitus ([address]     Gallstones     Pancreatitis     Ulcers of both lower legs ([address]     bilateral feet       Past Surgical History:    Past Surgical History:   Procedure Laterality Date    ERCP  4/8/14    LITHOTRYPSY & PANCREATIC STENT PLACEMENT    FOOT SURGERY Left 1967       Current Medications:    Outpatient Medications Marked as Taking for the 6/6/20 encounter [facility] Encounter)   Medication Sig Dispense Refill    atorvastatin (LIPITOR) 20 MG tablet Take 20 mg by mouth daily      furosemide (LASIX) 40 MG tablet Take 40 mg by mouth daily      spironolactone (ALDACTONE) 25 MG tablet Take 25 mg by mouth daily      tamsulosin (FLOMAX) 0.4 MG capsule Take 0.4 mg by mouth nightly       metFORMIN (GLUCOPHAGE) 500 MG tablet Take 500 mg by mouth 2 times daily (with meals)      lisinopril (PRINIVIL;ZESTRIL) 5 MG tablet Take 1 tablet by mouth daily.  (Patient taking differently: Take 20 mg by mouth daily ) 30 tablet 3    ammonium lactate (LAC-HYDRIN) 12 % lotion Apply to BL feet and legs bid with dressing changes 1 Bottle 0    insulin glargine (LANTUS SOLOSTAR) 100 UNIT/ML injection pen Inject 10 Units into the skin every morning (before breakfast). (Patient taking differently: Inject 24 Units into the skin nightly ) 1 Pen 3    Insulin Pen Needle 32G X 4 MM MISC 1 each by Does not apply route daily. 100 each 3    Lancets MISC Once daily 100 each 3    glucose blood VI test strips (ASCENSIA AUTODISC VI;ONE TOUCH ULTRA TEST VI) strip 1 each by In Vitro route daily. As needed. 100 each 3    glucose monitoring kit (FREESTYLE) monitoring kit 1 kit by Does not apply route daily as needed. 1 kit 0       Allergies:  Patient has no known allergies. Immunizations : There is no immunization history on file for this patient. Social History:    Social History     Tobacco Use    Smoking status: Never Smoker    Smokeless tobacco: Never Used   Substance Use Topics    Alcohol use: No    Drug use: No     Social History     Tobacco Use   Smoking Status Never Smoker   Smokeless Tobacco Never Used      Family History   Problem Relation Age of Onset    Colon Cancer Mother         PVD s/p toe amputation by Dr Brook Wilson Father         REVIEW OF SYSTEMS:    No fever / chills / sweats. No weight loss. No visual change, eye pain, eye discharge. No oral lesion, sore throat, dysphagia. Denies cough / sputum/Sob   Denies chest pain, palpitations/ dizziness  Denies nausea/ vomiting/abdominal pain/diarrhea. Denies dysuria or change in urinary function. Denies joint swelling or pain. No myalgia, arthralgia. No rashes, skin lesions   Denies focal weakness, sensory change or other neurologic symptoms  No lymph node swelling or tenderness.       Left foot ulceration, cellulitis and Lower leg changes+ diabetic foot infection   PHYSICAL EXAM:      Vitals:  T max  101.3   BP (!) 145/77   Pulse 60   Temp 97.7 °F (36.5 °C) (Oral)   Resp 14   Ht 6' 3\" (1.905 m)   Wt 251 lb 5.2 oz (114 kg)   SpO2 97%   BMI 31.41 kg/m²     General Appearance: alert,in some  acute distress, no pallor, no icterus obesity ++  Skin: 06/06/2020    UROBILINOGEN 0.2 06/06/2020    NITRU Negative 06/06/2020    LEUKOCYTESUR Negative 06/06/2020    LABMICR YES 06/06/2020    URINETYPE NotGiven 06/06/2020      Urine Microscopic:   Lab Results   Component Value Date    BACTERIA 3+ 04/01/2014    HYALCAST 1 06/06/2020    WBCUA 1 06/06/2020    RBCUA 6 06/06/2020    EPIU 0 06/06/2020       Lactic acid  3.2     CRP  35.9    ESR 66     Wbc  14.2  Down to  10.9    Vanco  15.1     Esr 40       MICRO: cultures reviewed and updated by me            Culture, Wound [[id number]] (Abnormal)  Collected: 06/06/20 0754   Order Status: Completed Specimen: Foot Updated: 06/08/20 0839    Gram Stain Result 3+ Gram positive cocci   3+ Gram positive rods   3+ Gram negative rods   1+ WBC's (Mononuclear)   1+ Epithelial Cells   Abnormal     Organism Proteus mirabilisAbnormal     WOUND/ABSCESS Heavy growth    Organism BHS Group B (Strep agalacticae)Abnormal     WOUND/ABSCESS --    Heavy growth   Susceptibility testing of penicillin and other beta lactams is   not necessary for beta hemolytic Streptococci since resistant   strains have not been identified. (CLSI M100)     Organism DiphtheroidsAbnormal     WOUND/ABSCESS --    Heavy growth   No further workup    Narrative:     ORDER#: [id number]                          ORDERED BY: Chadd Alonso  SOURCE: Foot                               COLLECTED:  06/06/20 07:54  ANTIBIOTICS AT [facility]. :                      RECEIVED :  06/06/20 07:54  Performed at:  [facility]  [address] [facility] [place] [address]   Phone [phone]   Culture, MRSA, Screening [[id number]] Collected: 06/06/20 1730   Order Status: Completed Specimen: Nares Updated: 06/07/20 1300    MRSA Culture Only --    No S aureus MRSA isolated   S aureus MSSA present    Narrative:     ORDER#: [id number]                          ORDERED BY: Valente Duvall  SOURCE: Nares                              COLLECTED:  06/06/20 17:30  ANTIBIOTICS AT [facility].:                      RECEIVED :  06/06/20 17:44  Performed at:  [address] [place] [address]   Phone [phone]   Culture, Blood 1 [[id number]] Collected: 06/06/20 0744   Order Status: Completed Specimen: Blood Updated: 06/07/20 0915    Blood Culture, Routine No Growth to date.  Any change in status will be called. Narrative:     ORDER#: [id number]                          ORDERED BY: Jalen Rooney  SOURCE: Blood                              COLLECTED:  06/06/20 07:44  ANTIBIOTICS AT [facility]. :                      RECEIVED :  06/06/20 07:44  If child <=2 yrs old please draw pediatric bottle. ~Blood Culture #1  Performed at:  [address] [place] [address]   Phone [phone]   Culture, Blood 2 [[id number]] Collected: 06/06/20 0754   Order Status: Completed Specimen: Blood Updated: 06/07/20 0915    Culture, Blood 2 No Growth to date.  Any change in status will be called. Narrative:     ORDER#: [id number]                          ORDERED BY: Jalen Rooney  SOURCE: Blood                              COLLECTED:  06/06/20 07:54  ANTIBIOTICS AT [facility]. :                      RECEIVED :  06/06/20 07:54  If child <=2 yrs old please draw pediatric bottle. ~Blood Culture #2  Performed at:  [address] [place] [address]   Phone [phone]   MRSA DNA Probe, Nasal [[id number]] Collected: 06/06/20 1730   Order Status: Canceled Specimen: Nasal from Nares      Proteus mirabilis (1)     Antibiotic Interpretation BRIAN Status    ampicillin Sensitive <=8 mcg/mL     ceFAZolin Sensitive <=2 mcg/mL     cefepime Sensitive <=2 mcg/mL     cefotaxime Sensitive <=2 mcg/mL     cefTRIAXone Sensitive <=1 mcg/mL     cefuroxime Sensitive 8 mcg/mL     ciprofloxacin Sensitive <=1 mcg/mL     ertapenem Sensitive <=0.5 mcg/mL     gentamicin Sensitive <=4 mcg/mL meropenem Sensitive <=1 mcg/mL     piperacillin-tazobactam Sensitive <=16 mcg/mL     trimethoprim-sulfamethoxazole Sensitive <=2/38 mcg/mL           IMAGING:    MRI FOOT LEFT W WO CONTRAST   Final Result   Skin thickening and soft tissue edema noted along the medial plantar aspect   of the foot where there is a large ulceration extending to underlying 1st   metatarsal bone. Mild ill-defined increased T2 signal noted within the lateral cuneiform with   mild corresponding low T1 signal. No discrete fracture line visualized. Findings may represent early osteomyelitis. No other marrow changes   suspicious of osteomyelitis. XR FOOT LEFT (MIN 3 VIEWS)   Final Result   Cellulitis and ulceration to the medial aspect of the left foot. No   radiographic evidence of osteomyelitis. XR CHEST PORTABLE   Final Result   No acute cardiopulmonary process. CT ABDOMEN PELVIS WO CONTRAST Additional Contrast? None   Final Result   1. Atrophic pancreas with scattered calcifications along with 1 large   calcification in the central pancreatic duct which is dilated. Spectrum of   findings would favor chronic pancreatitis. No significant inflammation on   the current exam but early acute on chronic pancreatitis may be considered   clinically. 2. No CT evidence of mass lesion in the pancreatic head. 3. Cardiomegaly and bibasilar atelectasis in the visualized lungs.                All the pertinent images and reports for the current Hospitalization were reviewed by me     Scheduled Meds:   ertapenem (INVANZ) IVPB  1 g Intravenous Once    furosemide  40 mg Oral Daily    spironolactone  25 mg Oral Daily    lidocaine 1 % injection  5 mL Intradermal Once    lisinopril  5 mg Oral Daily    atorvastatin  40 mg Oral Daily    lactobacillus  1 capsule Oral BID WC    collagenase   Topical Daily    aspirin  81 mg Oral Daily    insulin glargine  24 Units Subcutaneous Nightly    sodium chloride flush  10 mL Intravenous 2 times per day    enoxaparin  40 mg Subcutaneous Nightly    piperacillin-tazobactam  3.375 g Intravenous Q8H    insulin lispro  0-6 Units Subcutaneous TID WC    insulin lispro  0-3 Units Subcutaneous Nightly    tamsulosin  0.4 mg Oral Nightly       Continuous Infusions:   dextrose         PRN Meds:  sodium chloride flush, acetaminophen **OR** acetaminophen, polyethylene glycol, ondansetron, glucose, dextrose, glucagon (rDNA), dextrose      Assessment:     Patient Active Problem List   Diagnosis    Abdominal pain, acute    BPH (benign prostatic hyperplasia)    RLQ abdominal pain    Abnormal ECG    HTN (hypertension)    Diabetes mellitus type II, uncontrolled ([address]    [facility]abetic foot [facility]    Fever    Leukocytosis    Charcot foot due to diabetes mellitus (HCC)    Foot ulceration, left, with unspecified severity ([facility]    Demand ischemia (HCC)    Controlled type 2 diabetes mellitus with hyperglycemia, with long-term current use of insulin (HCC)    Sepsis (HCC)     Sepsis  resolving   Fevers improving  WBC elevation  Lactic acidosis better   Left complicated diabetic foot infection  Charcot foot   Neuropathy   Dm+  Wound cx mixed errol    Left foot is the source for infection and sepsis, MRI Left foot with osteomyelitis and given flat foot and if not off loading risk for worsening and limb loss  OPAT d/w pt he would like to try IV abx Podiatry notes reviewed    PICC line orders placed and will choose IV Ertapenem for d/c planning  If its cost prohibitive for pt other options d/w  to coordinate      Labs, Microbiology, Radiology and all the pertinent results from current hospitalization and  care every where were reviewed  by me as a part of the evaluation   Plan:   1. Cont IV Zosyn x 3.375 gm Q 8 HR as in patient    2. PICC line  Placed   3. MRI left foot osteomyelitis   4. Local foot care  5. Trend WBC    6.  At d/c will change to IV Ertapenem x 1 gm Q 24hr will improve compliance and therapy with once a day regimen x 6 weeks  7. Need to off load the foot - CROW boot per Podiatry   8. MAIKOL done   9. IF Ertapenem is expensive will be able to choose IV Zosyn as continuous infusion or IV Ceftriaxone x 2 gm Q 24 h  is an option as well     Discussed with patient/Family and Nursing staff     Thanks for allowing me to participate in your patient's care and please call me with any questions or concerns.     Leonel Mohan MD  Infectious Disease  [facility]) Physician  Phone: [phone]   Fax : [phone]

## 2020-06-12 NOTE — PROGRESS NOTES
Pt A&O x4. VSS. AM meds completed. Dressing to L foot clean, dry, intact. Pt ate breakfast well. Denies nausea. Denies pain. Will monitor.   Electronically signed by Mauricio Hoff RN on 6/12/2020 at 10:07 AM

## 2020-06-12 NOTE — PROGRESS NOTES
Department of Podiatry Progress Note  Kizzy Crowder  6/12/2020          HISTORY OF PRESENT ILLNESS:                The patient is a 75 y.o. male with significant past medical history of diabetes with peripheral neuropathy and Charcot arthropathy who is consulted for large ovoid wound of plantar LEFT midfoot due to Charcot disfigurement / rocker-bottom structure. The patient was recommended for conservative treatment only for his refusal to do any form of compliance with non-weightbearing or rehab. No Crow boot today. Resting in bed. He is pleasantly surprised at the lack of swelling in his ankles. PHYSICAL EXAM:      Vitals:    BP (!) 145/77   Pulse 60   Temp 97.7 °F (36.5 °C) (Oral)   Resp 14   Ht 6' 3\" (1.905 m)   Wt 251 lb 5.2 oz (114 kg)   SpO2 97%   BMI 31.41 kg/m²     LABS:   Recent Labs     06/12/20  0508   WBC 8.6   HGB 11.6*   HCT 35.2*        Recent Labs     06/12/20  0508   *   K 4.2      CO2 22   PHOS 3.3   BUN 16   CREATININE 0.9     No results for input(s): PROT, INR, APTT in the last 72 hours. LOWER EXTREMITY EXAMINATION   SKIN:        Plantar LEFT mid-foot wound of sub Q and muscle depth with mixed fibrous / granular wound previously treated with Silver-Cell by home nursing. Heavy periwound hyperkeratosis, but no purulence and no odor. Wound MUGS 3 of 4.4 x 2.7 x 0.6 cm depth Fascia / Muscle visible with a hard end to palpation without grossly exposed bone. No cellulitis. No evidence of progression    NEUROLOGIC:    Pain sensation isdecreased Bilateral.  Light touch is decreasedBilateral. positive history of paresthesia Bilateral. negativehistory of burning Bilateral. Deep tendon reflexes are 2 to include patellar and achilles Bilateral. [place]--Jose 5.07 monofilament sensitivity is abnormal 6 to 6 spots tested Bilateral.    VASCULAR:    bilaterally Dorsalis pedis is 2. bilaterally Posterior tibial pulse is difficult to assess due to overlying xerotic skin. 2+ edema bilaterally. mild Varicosities bilaterally. MUSCULOSKELETAL:  The patient has a fixed deformity that is part Charcot with rockerbottom and part endstage PTTD. He is walking off of the medial cuneiform with every step and has fixed lateral subluxation of the forefoot. There is evidence of accommodation of the deformity with excess soft tissue surrounding the deformity. The patient has no pain in the foot despite the severe deformity. Radiographs: 6/6/2020  Cellulitis and ulceration to the medial aspect of the left foot.  No   radiographic evidence of osteomyelitis       MRI:   Impression   Skin thickening and soft tissue edema noted along the medial plantar aspect   of the foot where there is a large ulceration extending to underlying 1st   metatarsal bone.       Mild ill-defined increased T2 signal noted within the lateral cuneiform with   mild corresponding low T1 signal. No discrete fracture line visualized. Findings may represent early osteomyelitis.  No other marrow changes   suspicious of osteomyelitis. Assessment:  Diabetes with left foot deformity  Peripheral neuropathy  Neuropathic ulceration, left foot  Non-compliance with protective shoegear and bracing    Plan:     1. Left foot diabetic foot ulceration and osteomyelitis  - Osteo appears early without much destruction  - Plan for off loading  - Referral to [place] [id number] for custom CROW boot manufacture  - Patient is poor surgical candidate given his outright refusal to off load, modify activity, or go to rehab. Therefore we will treat conservatively  - Patient can follow up in the wound center for treatment. - Continue antibiotics  - Daily wound care and off loading will be key to healing. The patient mentioned that he is aware he may eventually need his foot cut off, but he doesn't want that right now.  Loen David discussion regarding his need to stay off of the foot  - PT/OT to work with patient on modified gait and use of the JOE boot once he has it made. DISPO: If he can get CROW prior to d/c home and get DME recommendations prior to d/c then he has less chance of bounce back for repeat foot infection. Thank you for the opportunity to take part in the patient's care.     Tc Fuchs DPM  [facility]  Pager: [phone]  Office: [phone]  Fax: [phone]

## 2020-06-13 LAB
GLUCOSE BLD-MCNC: 134 MG/DL (ref 70–99)
GLUCOSE BLD-MCNC: 139 MG/DL (ref 70–99)
GLUCOSE BLD-MCNC: 148 MG/DL (ref 70–99)
GLUCOSE BLD-MCNC: 183 MG/DL (ref 70–99)
PERFORMED ON: ABNORMAL

## 2020-06-13 PROCEDURE — 6360000002 HC RX W HCPCS: Performed by: NURSE PRACTITIONER

## 2020-06-13 PROCEDURE — 2580000003 HC RX 258: Performed by: HOSPITALIST

## 2020-06-13 PROCEDURE — 6370000000 HC RX 637 (ALT 250 FOR IP): Performed by: NURSE PRACTITIONER

## 2020-06-13 PROCEDURE — 97162 PT EVAL MOD COMPLEX 30 MIN: CPT

## 2020-06-13 PROCEDURE — 94760 N-INVAS EAR/PLS OXIMETRY 1: CPT

## 2020-06-13 PROCEDURE — 6370000000 HC RX 637 (ALT 250 FOR IP): Performed by: HOSPITALIST

## 2020-06-13 PROCEDURE — 2060000000 HC ICU INTERMEDIATE R&B

## 2020-06-13 PROCEDURE — 6370000000 HC RX 637 (ALT 250 FOR IP): Performed by: INTERNAL MEDICINE

## 2020-06-13 PROCEDURE — 2580000003 HC RX 258: Performed by: NURSE PRACTITIONER

## 2020-06-13 PROCEDURE — 97530 THERAPEUTIC ACTIVITIES: CPT

## 2020-06-13 PROCEDURE — 6360000002 HC RX W HCPCS: Performed by: HOSPITALIST

## 2020-06-13 PROCEDURE — 99232 SBSQ HOSP IP/OBS MODERATE 35: CPT | Performed by: INTERNAL MEDICINE

## 2020-06-13 RX ADMIN — INSULIN GLARGINE 24 UNITS: 100 INJECTION, SOLUTION SUBCUTANEOUS at 20:30

## 2020-06-13 RX ADMIN — ACETAMINOPHEN 650 MG: 325 TABLET ORAL at 23:32

## 2020-06-13 RX ADMIN — INSULIN LISPRO 1 UNITS: 100 INJECTION, SOLUTION INTRAVENOUS; SUBCUTANEOUS at 20:30

## 2020-06-13 RX ADMIN — PIPERACILLIN AND TAZOBACTAM 3.38 G: 3; .375 INJECTION, POWDER, LYOPHILIZED, FOR SOLUTION INTRAVENOUS at 17:30

## 2020-06-13 RX ADMIN — Medication 1 CAPSULE: at 09:18

## 2020-06-13 RX ADMIN — LISINOPRIL 10 MG: 10 TABLET ORAL at 09:18

## 2020-06-13 RX ADMIN — SPIRONOLACTONE 25 MG: 25 TABLET ORAL at 09:18

## 2020-06-13 RX ADMIN — INSULIN LISPRO 1 UNITS: 100 INJECTION, SOLUTION INTRAVENOUS; SUBCUTANEOUS at 12:04

## 2020-06-13 RX ADMIN — ASPIRIN 81 MG: 81 TABLET, COATED ORAL at 09:18

## 2020-06-13 RX ADMIN — COLLAGENASE SANTYL: 250 OINTMENT TOPICAL at 09:18

## 2020-06-13 RX ADMIN — PIPERACILLIN AND TAZOBACTAM 3.38 G: 3; .375 INJECTION, POWDER, LYOPHILIZED, FOR SOLUTION INTRAVENOUS at 09:18

## 2020-06-13 RX ADMIN — ATORVASTATIN CALCIUM 40 MG: 40 TABLET, FILM COATED ORAL at 09:18

## 2020-06-13 RX ADMIN — TAMSULOSIN HYDROCHLORIDE 0.4 MG: 0.4 CAPSULE ORAL at 20:30

## 2020-06-13 RX ADMIN — PIPERACILLIN AND TAZOBACTAM 3.38 G: 3; .375 INJECTION, POWDER, LYOPHILIZED, FOR SOLUTION INTRAVENOUS at 01:39

## 2020-06-13 RX ADMIN — Medication 1 CAPSULE: at 17:30

## 2020-06-13 RX ADMIN — ENOXAPARIN SODIUM 40 MG: 40 INJECTION SUBCUTANEOUS at 20:30

## 2020-06-13 ASSESSMENT — PAIN DESCRIPTION - ORIENTATION: ORIENTATION: MID

## 2020-06-13 ASSESSMENT — PAIN DESCRIPTION - PAIN TYPE: TYPE: ACUTE PAIN

## 2020-06-13 ASSESSMENT — PAIN DESCRIPTION - FREQUENCY: FREQUENCY: CONTINUOUS

## 2020-06-13 ASSESSMENT — PAIN - FUNCTIONAL ASSESSMENT: PAIN_FUNCTIONAL_ASSESSMENT: PREVENTS OR INTERFERES SOME ACTIVE ACTIVITIES AND ADLS

## 2020-06-13 ASSESSMENT — PAIN SCALES - GENERAL
PAINLEVEL_OUTOF10: 0
PAINLEVEL_OUTOF10: 3

## 2020-06-13 ASSESSMENT — PAIN DESCRIPTION - LOCATION: LOCATION: ABDOMEN

## 2020-06-13 ASSESSMENT — PAIN DESCRIPTION - PROGRESSION: CLINICAL_PROGRESSION: GRADUALLY IMPROVING

## 2020-06-13 ASSESSMENT — PAIN DESCRIPTION - DESCRIPTORS: DESCRIPTORS: ACHING

## 2020-06-13 ASSESSMENT — PAIN DESCRIPTION - ONSET: ONSET: ON-GOING

## 2020-06-13 NOTE — PROGRESS NOTES
Patient A&O. No complaints at this time. Shift uneventful. Call light within reach, able to make needs knows, fall precautions in place. Will continue to monitor. Electronically signed by Beatriz Turcios RN on 6/13/2020 at 6:18 AM

## 2020-06-13 NOTE — PROGRESS NOTES
 lactobacillus  1 capsule Oral BID WC    collagenase   Topical Daily    aspirin  81 mg Oral Daily    insulin glargine  24 Units Subcutaneous Nightly    sodium chloride flush  10 mL Intravenous 2 times per day    enoxaparin  40 mg Subcutaneous Nightly    piperacillin-tazobactam  3.375 g Intravenous Q8H    insulin lispro  0-6 Units Subcutaneous TID WC    insulin lispro  0-3 Units Subcutaneous Nightly    tamsulosin  0.4 mg Oral Nightly     Continuous Infusions:   dextrose       PRN Meds:.sodium chloride flush, acetaminophen **OR** acetaminophen, polyethylene glycol, ondansetron, glucose, dextrose, glucagon (rDNA), dextrose    PHYSICAL EXAM:  /71   Pulse 61   Temp 98.1 °F (36.7 °C) (Oral)   Resp 16   Ht 6' 3\" (1.905 m)   Wt 253 lb 1.4 oz (114.8 kg)   SpO2 97%   BMI 31.63 kg/m²       Intake/Output Summary (Last 24 hours) at 6/13/2020 1530  Last data filed at 6/13/2020 1454  Gross per 24 hour   Intake 2080 ml   Output 2250 ml   Net -170 ml       General: Alert and oriented. Resting in bed in no apparent distress. HEENT: Normocephalic. Atraumatic. Pupils equal and reactive. EOM intact. Oral mucosa pink/moist/intact. Neck: Supple. Symmetrical. Trachea midline. Lungs: good resp effort, rate is normal  Chest: Exam unremarkable. Cardiac: unable to assess  Abdomen/GI: unable to assess  Extremities:  left lower extremity wrapped in Ace, LLE boot on  Skin: Dry and intact. No lesions noted. Neuro: Grossly intact. No focal deficits noted.  VILLARREAL without difficulty        LABS:    Lab Results   Component Value Date    WBC 8.6 06/12/2020    HGB 11.6 (L) 06/12/2020    HCT 35.2 (L) 06/12/2020    MCV 77.6 (L) 06/12/2020     06/12/2020    LYMPHOPCT 22.8 06/12/2020    RBC 4.54 06/12/2020    MCH 25.6 (L) 06/12/2020    MCHC 33.0 06/12/2020    RDW 16.6 (H) 06/12/2020       Lab Results   Component Value Date    CREATININE 0.9 06/12/2020    BUN 16 06/12/2020     (L) 06/12/2020    K 4.2 06/12/2020  06/12/2020    CO2 22 06/12/2020       Lab Results   Component Value Date    MG 1.90 02/06/2015       Lab Results   Component Value Date    ALT 7 (L) 06/06/2020    AST 10 (L) 06/06/2020    ALKPHOS 84 06/06/2020    BILITOT 0.7 06/06/2020        No flowsheet data found. Imaging:  MRI FOOT LEFT W WO CONTRAST   Final Result   Skin thickening and soft tissue edema noted along the medial plantar aspect   of the foot where there is a large ulceration extending to underlying 1st   metatarsal bone. Mild ill-defined increased T2 signal noted within the lateral cuneiform with   mild corresponding low T1 signal. No discrete fracture line visualized. Findings may represent early osteomyelitis. No other marrow changes   suspicious of osteomyelitis. XR FOOT LEFT (MIN 3 VIEWS)   Final Result   Cellulitis and ulceration to the medial aspect of the left foot. No   radiographic evidence of osteomyelitis. XR CHEST PORTABLE   Final Result   No acute cardiopulmonary process. CT ABDOMEN PELVIS WO CONTRAST Additional Contrast? None   Final Result   1. Atrophic pancreas with scattered calcifications along with 1 large   calcification in the central pancreatic duct which is dilated. Spectrum of   findings would favor chronic pancreatitis. No significant inflammation on   the current exam but early acute on chronic pancreatitis may be considered   clinically. 2. No CT evidence of mass lesion in the pancreatic head. 3. Cardiomegaly and bibasilar atelectasis in the visualized lungs. Assessment & Plan:      Diabetic foot ulcer with Charcot foot  -Culture showing Proteus and group B strep  -ID following closely  -X-rays without signs of osteo  -MRI concerning for early changes of osteomyelitis, poor candidate for any treatment, refusing ECF for IV abx and do not feel he would be capable of doing this at home by himself. He is also concerned about having a PICC placed.  He refused to off load and states that he has to [employer] on it\" and CROW boot was ordered   -Podiatry consulted and following, had bedside debridement and recommending chemical debridement with Santyl with daily dressing changes- these have been ordered  -Proteus and BHS strep on wound cx- MAIKOL completed by ID  -PICC line placed 6/11- will give dose of invanz prior to discharge, d/w [address] [place] delivered today, PT/OT to see this afternoon and d/w them for 1st thing tomorrow to optimize discharge and plan for tomorrow morning    Sepsis, POA  -Thought to be related to diabetic foot wound/Charcot foot  -ESR CRP mildly elevated  -Blood cultures with no growth to date  -ID consulted and following closely  -Day 7 of Zosyn    Atypical chest pain with mildly elevated troponin  - had on arrival with elevated troponin and concern for new LBBB?  - ECHO normal  - Evaluated by cards, likely demand ischemia    Diabetes mellitus type 2 without hyperglycemia  -A1c noted at 6.9  -Lantus and sliding scale for now  -No changes    Essential hypertension  -Increase lisinopril to 10 mg, stop Lasix continue Aldactone    Cough and vomiting  -Unsure of etiology  -Could be related to foot infection  -CXR without acute findings  -Urinalysis unremarkable  -Resolved    Lactic acidosis  -Likely related to #1 #2  -IV fluids  -Resolved    Obese  Body mass index is 31.63 kg/m². The patient and / or the family were informed of the results of any tests, a time was given to answer questions, a plan was proposed and they agreed with plan.     DVT prophylaxis: [x] Lovenox  [] SQ Heparin  [] SCDs because of  [] warfarin/oral direct thrombin inhibitor [] Encourage ambulation    GI prophylaxis: [] PPI/T7nsueivs  [x] not indicated    Probiotic if on abx: [x] Yes [] No [] Not Indicated    Diet: DIET CARB CONTROL;    Consults:  IP CONSULT TO HOSPITALIST  IP CONSULT TO INFECTIOUS DISEASES  IP CONSULT TO PODIATRY  IP CONSULT TO CARDIOLOGY  INPATIENT CONSULT TO ORTHOTIST/PROSTHETIST  IP CONSULT TO PHARMACY    Disposition:  [] Home  [x] Home with home health [] Rehab [] Psych [] SNF  [] LTAC  [] Long term nursing home or group home [] Transfer to ICU  [] Transfer to PCU [] Other: has Visiting NP    Code Status: Full Code    ELOS: tbd      KENNY Valdes - CNP  06/13/20

## 2020-06-13 NOTE — PROGRESS NOTES
Patient A&O in the bed. Patient tolerating PO intake well. PM medications given without complications. Patient denies pain, nausea or vomiting. Dressing to L foot clean, dry, intact. Call light within reach, able to make needs known, fall precautions in place. Will monitor. Electronically signed by Vonda Mckeon RN on 6/12/2020 at 9:11 PM

## 2020-06-13 NOTE — PROGRESS NOTES
Department of Podiatry Progress Note  Forrest Stevens  6/13/2020          HISTORY OF PRESENT ILLNESS:                The patient is a 75 y.o. male with significant past medical history of diabetes with peripheral neuropathy and Charcot arthropathy who is consulted for large ovoid wound of plantar LEFT midfoot due to Charcot disfigurement / rocker-bottom structure. No new complaints. No boot yet today    PHYSICAL EXAM:      Vitals:    /71   Pulse 61   Temp 98.1 °F (36.7 °C) (Oral)   Resp 16   Ht 6' 3\" (1.905 m)   Wt 253 lb 1.4 oz (114.8 kg)   SpO2 94%   BMI 31.63 kg/m²     LABS:   Recent Labs     06/12/20  0508   WBC 8.6   HGB 11.6*   HCT 35.2*        Recent Labs     06/12/20  0508   *   K 4.2      CO2 22   PHOS 3.3   BUN 16   CREATININE 0.9     No results for input(s): PROT, INR, APTT in the last 72 hours. LOWER EXTREMITY EXAMINATION   SKIN:        Plantar LEFT mid-foot wound of sub Q and muscle depth with mixed fibrous / granular wound previously treated with Silver-Cell by home nursing. Heavy periwound hyperkeratosis, but no purulence and no odor. Wound MUGS 3 of 4.4 x 2.7 x 0.6 cm depth Fascia / Muscle visible with a hard end to palpation without grossly exposed bone. No cellulitis. No evidence of progression    NEUROLOGIC:    Pain sensation isdecreased Bilateral.  Light touch is decreasedBilateral. positive history of paresthesia Bilateral. negativehistory of burning Bilateral. Deep tendon reflexes are 2 to include patellar and achilles Bilateral. [place]--Jose 5.07 monofilament sensitivity is abnormal 6 to 6 spots tested Bilateral.    VASCULAR:    bilaterally Dorsalis pedis is 2. bilaterally Posterior tibial pulse is difficult to assess due to overlying xerotic skin. 2+ edema bilaterally. mild Varicosities bilaterally. MUSCULOSKELETAL:  The patient has a fixed deformity that is part Charcot with rockerbottom and part endstage PTTD.  He is walking off of the medial cuneiform with every step and has fixed lateral subluxation of the forefoot. There is evidence of accommodation of the deformity with excess soft tissue surrounding the deformity. The patient has no pain in the foot despite the severe deformity. Radiographs: 6/6/2020  Cellulitis and ulceration to the medial aspect of the left foot.  No   radiographic evidence of osteomyelitis       MRI:   Impression   Skin thickening and soft tissue edema noted along the medial plantar aspect   of the foot where there is a large ulceration extending to underlying 1st   metatarsal bone.       Mild ill-defined increased T2 signal noted within the lateral cuneiform with   mild corresponding low T1 signal. No discrete fracture line visualized. Findings may represent early osteomyelitis.  No other marrow changes   suspicious of osteomyelitis. Assessment:  Diabetes with left foot deformity  Peripheral neuropathy  Neuropathic ulceration, left foot  Non-compliance with protective shoegear and bracing    Plan:     1. Left foot diabetic foot ulceration and osteomyelitis  - Osteo appears early without much destruction  - Plan for off loading  - Referral to [facility]  for custom CROW boot manufacture  - Patient is poor surgical candidate given his outright refusal to off load, modify activity, or go to rehab. Therefore we will treat conservatively  - Patient can follow up in the wound center for treatment. - Continue antibiotics  - Daily wound care and off loading will be key to healing. The patient mentioned that he is aware he may eventually need his foot cut off, but he doesn't want that right now. María Elena Plain discussion regarding his need to stay off of the foot  - PT/OT to work with patient on modified gait and use of the CROW boot once he has it made. DISPO: If he can get CROW prior to d/c home and get DME recommendations prior to d/c then he has less chance of bounce back for repeat foot infection.     Thank you for the opportunity to take part in the patient's care.     Phuong Alejandre DPM  [facility]  Pager: [phone]  Office: [phone]  Fax: [phone]

## 2020-06-13 NOTE — PLAN OF CARE
Problem: Falls - Risk of:  Goal: Will remain free from falls  Description: Will remain free from falls  6/13/2020 0736 by Anthony Willis RN  Note: Fall precautions in place. Hourly rounding, call light & belongings in reach. Bed in lowest position, wheels locked. Side rails up x2. Walk way free of clutter. 6/12/2020 1945 by Tomás Morales RN  Outcome: Ongoing  Note: Patient educated on fall prevention. Call light is within reach, bed locked in lowest position, personal items within reach, and bed alarm is on. Will round on patient per unit guidelines. Goal: Absence of physical injury  Description: Absence of physical injury  6/12/2020 1945 by Tomás Morales RN  Outcome: Ongoing  Note: Pt is free of injury. No injury noted. Fall precautions in place. Call light within reach. Will monitor.         Problem: Skin Integrity:  Goal: Demonstration of wound healing without infection will improve  Description: Demonstration of wound healing without infection will improve  6/12/2020 1945 by Tomás Morales RN  Outcome: Ongoing  Goal: Complications related to intravenous access or infusion will be avoided or minimized  Description: Complications related to intravenous access or infusion will be avoided or minimized  6/12/2020 1945 by Tomás Morales RN  Outcome: Ongoing

## 2020-06-13 NOTE — PROGRESS NOTES
Infectious Disease Follow up Notes  Admit Date: 6/6/2020  Hospital Day: 8    Antibiotics :   IV Zosyn      CHIEF COMPLAINT:     Fevers  Emesis  Diabetic foot infection   Diabetic foot ulcer      Subjective interval History :  75 y.o. man with DM and Neuropahty, with on going ulcer Left foot and drainage now with worsening infection, fevers WBC elevation. X ray left foot negative. MRI Left foot with osteomyelitis and less drainage and has flat foot with deformity and Podiatry discussions noted and pt does not want surgery. S/p  PICC placement and received Ertapenem tolerated it well ad has questions about CROW boot and home IV therapy       Past Medical History:    Past Medical History:   Diagnosis Date    Diabetes mellitus ([address]     Gallstones     Pancreatitis     Ulcers of both lower legs (HCC)     bilateral feet       Past Surgical History:    Past Surgical History:   Procedure Laterality Date    ERCP  4/8/14    LITHOTRYPSY & PANCREATIC STENT PLACEMENT    FOOT SURGERY Left 1967       Current Medications:    Outpatient Medications Marked as Taking for the 6/6/20 encounter [facility] HOSPITAL Encounter)   Medication Sig Dispense Refill    atorvastatin (LIPITOR) 20 MG tablet Take 20 mg by mouth daily      furosemide (LASIX) 40 MG tablet Take 40 mg by mouth daily      spironolactone (ALDACTONE) 25 MG tablet Take 25 mg by mouth daily      tamsulosin (FLOMAX) 0.4 MG capsule Take 0.4 mg by mouth nightly       metFORMIN (GLUCOPHAGE) 500 MG tablet Take 500 mg by mouth 2 times daily (with meals)      lisinopril (PRINIVIL;ZESTRIL) 5 MG tablet Take 1 tablet by mouth daily.  (Patient taking differently: Take 20 mg by mouth daily ) 30 tablet 3    ammonium lactate (LAC-HYDRIN) 12 % lotion Apply to BL feet and legs bid with dressing changes 1 Bottle 0    insulin glargine (LANTUS SOLOSTAR) 100 UNIT/ML injection pen Inject 10 Units into the skin every morning (before breakfast). (Patient taking differently: Inject 24 Units into the skin nightly ) 1 Pen 3    Insulin Pen Needle 32G X 4 MM MISC 1 each by Does not apply route daily. 100 each 3    Lancets MISC Once daily 100 each 3    glucose blood VI test strips (ASCENSIA AUTODISC VI;ONE TOUCH ULTRA TEST VI) strip 1 each by In Vitro route daily. As needed. 100 each 3    glucose monitoring kit (FREESTYLE) monitoring kit 1 kit by Does not apply route daily as needed. 1 kit 0       Allergies:  Patient has no known allergies. Immunizations : There is no immunization history on file for this patient. Social History:    Social History     Tobacco Use    Smoking status: Never Smoker    Smokeless tobacco: Never Used   Substance Use Topics    Alcohol use: No    Drug use: No     Social History     Tobacco Use   Smoking Status Never Smoker   Smokeless Tobacco Never Used      Family History   Problem Relation Age of Onset    Colon Cancer Mother         PVD s/p toe amputation by Dr Geiger Fearing Father         REVIEW OF SYSTEMS:    No fever / chills / sweats. No weight loss. No visual change, eye pain, eye discharge. No oral lesion, sore throat, dysphagia. Denies cough / sputum/Sob   Denies chest pain, palpitations/ dizziness  Denies nausea/ vomiting/abdominal pain/diarrhea. Denies dysuria or change in urinary function. Denies joint swelling or pain. No myalgia, arthralgia. No rashes, skin lesions   Denies focal weakness, sensory change or other neurologic symptoms  No lymph node swelling or tenderness.       Left foot ulceration, cellulitis and Lower leg changes+ diabetic foot infection   PHYSICAL EXAM:      Vitals:  T max  101.3   /71   Pulse 61   Temp 98.1 °F (36.7 °C) (Oral)   Resp 16   Ht 6' 3\" (1.905 m)   Wt 253 lb 1.4 oz (114.8 kg)   SpO2 97%   BMI 31.63 kg/m²     General Appearance: alert,in some  acute distress, no pallor, no icterus obesity ++  Skin: warm and dry, no rash or erythema  Head: normocephalic and atraumatic  Eyes: pupils equal, round, and reactive to light, conjunctivae normal  ENT: tympanic membrane, external ear and ear canal normal bilaterally, nose without deformity, nasal mucosa and turbinates normal without polyps  Neck: supple and non-tender without mass, no thyromegaly  no cervical lymphadenopathy  Pulmonary/Chest: clear to auscultation bilaterally- no wheezes, rales or rhonchi, normal air movement, no respiratory distress  Cardiovascular: normal rate, regular rhythm, normal S1 and S2, no murmurs, rubs, clicks, or gallops, no carotid bruits  Abdomen: soft, non-tender, non-distended, normal bowel sounds, no masses or organomegaly  Extremities: no cyanosis, clubbing or edema  Musculoskeletal: normal range of motion, no joint swelling, deformity or tenderness  Neurologic: reflexes normal and symmetric, no cranial nerve deficit  Psych:  Orientation, sensorium, mood normal  Lines:  PICC  Left foot charcot changes and medial foot ulcer, cellulitis resolving and skin changes from DM+   Ace wraps++     Data Review:    Lab Results   Component Value Date    WBC 8.6 06/12/2020    HGB 11.6 (L) 06/12/2020    HCT 35.2 (L) 06/12/2020    MCV 77.6 (L) 06/12/2020     06/12/2020     Lab Results   Component Value Date    CREATININE 0.9 06/12/2020    BUN 16 06/12/2020     (L) 06/12/2020    K 4.2 06/12/2020     06/12/2020    CO2 22 06/12/2020       Hepatic Function Panel:   Lab Results   Component Value Date    ALKPHOS 84 06/06/2020    ALT 7 06/06/2020    AST 10 06/06/2020    PROT 7.8 06/06/2020    BILITOT 0.7 06/06/2020    LABALBU 3.3 06/12/2020       UA:  Lab Results   Component Value Date    COLORU YELLOW 06/06/2020    CLARITYU Clear 06/06/2020    GLUCOSEU Negative 06/06/2020    BILIRUBINUR Negative 06/06/2020    KETUA Negative 06/06/2020    SPECGRAV 1.018 06/06/2020    BLOODU TRACE 06/06/2020    PHUR 5.5 06/06/2020    PROTEINU Negative 06/06/2020 UROBILINOGEN 0.2 06/06/2020    NITRU Negative 06/06/2020    LEUKOCYTESUR Negative 06/06/2020    LABMICR YES 06/06/2020    URINETYPE NotGiven 06/06/2020      Urine Microscopic:   Lab Results   Component Value Date    BACTERIA 3+ 04/01/2014    HYALCAST 1 06/06/2020    WBCUA 1 06/06/2020    RBCUA 6 06/06/2020    EPIU 0 06/06/2020       Lactic acid  3.2     CRP  35.9    ESR 66     Wbc  14.2  Down to  10.9    Vanco  15.1     Esr 40       MICRO: cultures reviewed and updated by me            Culture, Wound [[id number]] (Abnormal)  Collected: 06/06/20 0754   Order Status: Completed Specimen: Foot Updated: 06/08/20 0839    Gram Stain Result 3+ Gram positive cocci   3+ Gram positive rods   3+ Gram negative rods   1+ WBC's (Mononuclear)   1+ Epithelial Cells   Abnormal     Organism Proteus mirabilisAbnormal     WOUND/ABSCESS Heavy growth    Organism BHS Group B (Strep agalacticae)Abnormal     WOUND/ABSCESS --    Heavy growth   Susceptibility testing of penicillin and other beta lactams is   not necessary for beta hemolytic Streptococci since resistant   strains have not been identified. (CLSI M100)     Organism DiphtheroidsAbnormal     WOUND/ABSCESS --    Heavy growth   No further workup    Narrative:     ORDER#: [id number]                          ORDERED BY: Dilcia Santos  SOURCE: Foot                               COLLECTED:  06/06/20 07:54  ANTIBIOTICS AT [facility]. :                      RECEIVED :  06/06/20 07:54  Performed at:  [facility]  [address] [facility] [address]   Phone [phone]   Culture, MRSA, Screening [[id number]] Collected: 06/06/20 1730   Order Status: Completed Specimen: Nares Updated: 06/07/20 1300    MRSA Culture Only --    No S aureus MRSA isolated   S aureus MSSA present    Narrative:     ORDER#: [id number]                          ORDERED BY: Maury Paige  SOURCE: Nares                              COLLECTED:  06/06/20 17:30  ANTIBIOTICS AT [place]. :                      RECEIVED :  06/06/20 17:44  Performed at:  [facility]  [address] [place] [employer] [address]   Phone [phone]   Culture, Blood 1 [[id number]] Collected: 06/06/20 0744   Order Status: Completed Specimen: Blood Updated: 06/07/20 0915    Blood Culture, Routine No Growth to date.  Any change in status will be called. Narrative:     ORDER#: [id number]                          ORDERED BY: Alfredito Jarrell  SOURCE: Blood                              COLLECTED:  06/06/20 07:44  ANTIBIOTICS AT [facility]. :                      RECEIVED :  06/06/20 07:44  If child <=2 yrs old please draw pediatric bottle. ~Blood Culture #1  Performed at:  [facility]  [address] [employer] [address]   Phone [phone]   Culture, Blood 2 [[id number]] Collected: 06/06/20 0754   Order Status: Completed Specimen: Blood Updated: 06/07/20 0915    Culture, Blood 2 No Growth to date.  Any change in status will be called. Narrative:     ORDER#: [id number]                          ORDERED BY: Alfredito Jarrell  SOURCE: Blood                              COLLECTED:  06/06/20 07:54  ANTIBIOTICS AT [facility]. :                      RECEIVED :  06/06/20 07:54  If child <=2 yrs old please draw pediatric bottle. ~Blood Culture #2  Performed at:  [facility]  [address] [employer] [address]   Phone [phone]   MRSA DNA Probe, Nasal [[id number]] Collected: 06/06/20 1730   Order Status: Canceled Specimen: Nasal from Nares      Proteus mirabilis (1)     Antibiotic Interpretation BRIAN Status    ampicillin Sensitive <=8 mcg/mL     ceFAZolin Sensitive <=2 mcg/mL     cefepime Sensitive <=2 mcg/mL     cefotaxime Sensitive <=2 mcg/mL     cefTRIAXone Sensitive <=1 mcg/mL     cefuroxime Sensitive 8 mcg/mL     ciprofloxacin Sensitive <=1 mcg/mL     ertapenem Sensitive <=0.5 mcg/mL     gentamicin Sensitive <=4 mcg/mL     meropenem Sensitive <=1 mcg/mL     piperacillin-tazobactam Sensitive <=16 mcg/mL     trimethoprim-sulfamethoxazole Sensitive <=2/38 mcg/mL           IMAGING:    MRI FOOT LEFT W WO CONTRAST   Final Result   Skin thickening and soft tissue edema noted along the medial plantar aspect   of the foot where there is a large ulceration extending to underlying 1st   metatarsal bone. Mild ill-defined increased T2 signal noted within the lateral cuneiform with   mild corresponding low T1 signal. No discrete fracture line visualized. Findings may represent early osteomyelitis. No other marrow changes   suspicious of osteomyelitis. XR FOOT LEFT (MIN 3 VIEWS)   Final Result   Cellulitis and ulceration to the medial aspect of the left foot. No   radiographic evidence of osteomyelitis. XR CHEST PORTABLE   Final Result   No acute cardiopulmonary process. CT ABDOMEN PELVIS WO CONTRAST Additional Contrast? None   Final Result   1. Atrophic pancreas with scattered calcifications along with 1 large   calcification in the central pancreatic duct which is dilated. Spectrum of   findings would favor chronic pancreatitis. No significant inflammation on   the current exam but early acute on chronic pancreatitis may be considered   clinically. 2. No CT evidence of mass lesion in the pancreatic head. 3. Cardiomegaly and bibasilar atelectasis in the visualized lungs.                All the pertinent images and reports for the current Hospitalization were reviewed by me     Scheduled Meds:   [START ON 6/14/2020] ertapenem (INVANZ) IVPB  1 g Intravenous Once    piperacillin-tazobactam  3.375 g Intravenous Q8H    lisinopril  10 mg Oral Daily    spironolactone  25 mg Oral Daily    lidocaine 1 % injection  5 mL Intradermal Once    atorvastatin  40 mg Oral Daily    lactobacillus  1 capsule Oral BID WC    collagenase   Topical Daily    aspirin  81 mg Oral Daily    insulin glargine  24 Units Subcutaneous Nightly    sodium chloride flush  10 mL Intravenous 2 times per day    enoxaparin  40 mg Subcutaneous Nightly    insulin lispro  0-6 Units Subcutaneous TID WC    insulin lispro  0-3 Units Subcutaneous Nightly    tamsulosin  0.4 mg Oral Nightly       Continuous Infusions:   dextrose         PRN Meds:  sodium chloride flush, acetaminophen **OR** acetaminophen, polyethylene glycol, ondansetron, glucose, dextrose, glucagon (rDNA), dextrose      Assessment:     Patient Active Problem List   Diagnosis    Abdominal pain, acute    BPH (benign prostatic hyperplasia)    RLQ abdominal pain    Abnormal ECG    HTN (hypertension)    Diabetes mellitus type II, uncontrolled ([address]    Diabetic foot infection ([address]    Fever    Leukocytosis    Charcot foot due to diabetes mellitus (HCC)    Foot ulceration, left, with unspecified severity ([address]    Demand ischemia ([address]    Controlled type 2 diabetes mellitus with hyperglycemia, with long-term current use of insulin (HCC)    Sepsis (HCC)     Sepsis  resolving   Fevers improving  WBC elevation  Lactic acidosis better   Left complicated diabetic foot infection  Charcot foot   Neuropathy   Dm+  Wound cx mixed errol    Left foot is the source for infection and sepsis, MRI Left foot with osteomyelitis and given flat foot and if not off loading risk for worsening and limb loss  OPAT d/w pt he would like to try IV abx Podiatry notes reviewed    PICC line orders placed and will choose IV Ertapenem for d/c planning  If its cost prohibitive for pt other options d/w  to coordinate      He has CROW boot per RN he did work with PT for training and pt insists on going home to do IV abx     Labs, Microbiology, Radiology and all the pertinent results from current hospitalization and  care every where were reviewed  by me as a part of the evaluation   Plan:   1. Cont IV Zosyn x 3.375 gm Q 8 HR as in patient    2. PICC line  Placed   3. MRI left foot osteomyelitis   4. Local foot care  5. Trend WBC    6. At d/c will change to IV Ertapenem x 1 gm Q 24hr will improve compliance and therapy with once a day regimen x 6 weeks  7. Need to off load the foot - CROW boot per Podiatry   8. MAIKOL done   9. IF Ertapenem is expensive will be able to choose IV Zosyn as continuous infusion or IV Ceftriaxone x 2 gm Q 24 h  is an option as well  D/w RN to up date me if the plan changes     Discussed with patient/Family and Nursing staff     Thanks for allowing me to participate in your patient's care and please call me with any questions or concerns.     Yuli Masters MD  Infectious Disease  [facility]) Physician  Phone: [phone]   Fax : [phone]

## 2020-06-13 NOTE — PLAN OF CARE
Problem: Falls - Risk of:  Goal: Will remain free from falls  Description: Will remain free from falls  6/13/2020 1945 by Chelsey Kennedy RN  Outcome: Ongoing  Note: Patient educated on fall prevention. Call light is within reach, bed locked in lowest position, personal items within reach, and bed alarm is on. Will round on patient per unit guidelines. Problem: Falls - Risk of:  Goal: Absence of physical injury  Description: Absence of physical injury  Outcome: Ongoing  Note: Pt is free of injury. No injury noted. Fall precautions in place. Call light within reach. Will monitor.         Problem: Skin Integrity:  Goal: Demonstration of wound healing without infection will improve  Description: Demonstration of wound healing without infection will improve  Outcome: Ongoing     Problem: Skin Integrity:  Goal: Complications related to intravenous access or infusion will be avoided or minimized  Description: Complications related to intravenous access or infusion will be avoided or minimized  Outcome: Ongoing

## 2020-06-13 NOTE — CARE COORDINATION
Discharge Planning:  SW spoke with pt to inform pt that his IV benefits have been verified. SW explained that Nii Avendaño with [facility] verified the following: \"Therapy:Ertapenem 1 gm  QD, Zosyn Cont, cefepime 2g Q24     Pt. Copay:$2.00/Wk  For Ertapenem, $23.00/Day for per ksenia  $1.30/week for Zosyn $23/day for per ksenia  $1.30/week for Cefepime $23/day for per ksenia\"    Currently, pt is being ordered Ertapenem at d/c. Pt stated that he will not be discharged tomorrow and that if a d/c order is written, he plans to appeal this. Sw will continue to follow to assist with d/c planning.   Electronically signed by Abebe Lane on 6/13/2020 at 2:57 PM

## 2020-06-13 NOTE — PROGRESS NOTES
Physical Therapy    Facility/Department: [address] ORTHOPEDICS  Initial Assessment  This note serves as patient discharge summary if pt discharges prior to next PT visit      NAME: Yogi Curry  : 1955  MRN: [id number]    Date of Service: 2020    Discharge Recommendations:  3-5 sessions per week   Yogi Curry scored a  on the AM-PAC short mobility form. Current research shows that an AM-PAC score of 17 or less is typically not associated with a discharge to the patient's home setting. Based on the patient's AM-PAC score and their current functional mobility deficits, it is recommended that the patient have 3-5 sessions per week of Physical Therapy at d/c to increase the patient's independence. At this time, this patient lacks the endurance, and/or tolerance for 3 hours of therapy/day, 5-7x/wk and would benefit most from a follow up treatment frequency of 3-5x/wk. Please see assessment section for further patient specific details. PT Equipment Recommendations  Other: continue to assess    Assessment   Body structures, Functions, Activity limitations: Decreased functional mobility ; Decreased safe awareness;Decreased ROM; Decreased cognition  Assessment: 74 yo male with history of DM, peripheral neuropathy, and Charcot arthropathy, with large ovoid wound of plantar left midfoot due to Charcot disfigurement / rocker-bottom structure. Now with CROW boot L LE, assume [facility] lives alone in home with multiple steps to enter. Reports independence in functional mobility throughout home with wh walker (not maintaining L LE NWBng), ADLs (sponge bathing, and ordering food to home). Aide assists once weekly for light cleaning and grocery shopping. 2020 status: Mod Indep bed mobility. Transfers EOB to wh walker SBA, but with weight bearing through L LE.   Unable to trial ambulation, as patient is unable to maintain L LE NWBng, other than during static stance at [employer] (10 seconds). Patient presents/states as if he plans to return to home and resume home mobility without regard for L LE WBng. He obviously would be better served, and medically protected by ongoing skilled therapy to attempt to progress mobility NWBng L LE. Patient has WC, which he states is non-functional in his home. Will continue to see while in the hospital    Treatment Diagnosis: Impaired functional mobility  Prognosis: Good  Decision Making: Medium Complexity  History: as noted  Clinical Presentation: Evolving  PT Education: Goals;PT Role;Transfer Training;Weight-bearing Education  Patient Education: See \"Subjetive\" section of note. This writer explains the risk to patient of ongoing WBng L LE, and the reason skilled therapy is recommended. He acknowleges, but does not seem to care. Activity Tolerance  Activity Tolerance: Patient Tolerated treatment well       Patient Diagnosis(es): The primary encounter diagnosis was Diabetic foot infection ([address] Diagnoses of Septicemia ([address] and Non-intractable vomiting with nausea, unspecified vomiting type were also pertinent to this visit. has a past medical history of Diabetes mellitus ([address] Gallstones, Pancreatitis, and Ulcers of both lower legs ([address] has a past surgical history that includes Foot surgery (Left, 1967) and ERCP (4/8/14). Restrictions  Restrictions/Precautions  Restrictions/Precautions: Weight Bearing, Fall Risk  Lower Extremity Weight Bearing Restrictions  Left Lower Extremity Weight Bearing: Non Weight Bearing  Position Activity Restriction  Other position/activity restrictions: In [facility]. RN placed call to Dr. Brigid Vides 6- to clarify L LE WBng status in CROW boot; assume L LE NWBng.  Vision/Hearing  Vision: Within Functional Limits  Hearing: Within functional limits     Subjective  General  Chart Reviewed:  Yes  Additional Pertinent Hx: Per Sophie Dial APRN - CNP consult note 6-: \"This is a 38-year-old male with a history of previous pancreatitis, gallstones, chronic bilateral ulcerations on his feet, and diabetes who presented to the ED with complaints of fever, vomiting and a cough. He also complained of chest pain with cough and breathing. He has associated generalized body aches and weakness. COVID testing negative. He was found to have Sepsis related to foot infection. Podiatry and ID were consulted. Xrays performed without notable osteo, however MRI shows signs of early disease. Wound cx grew Proteus and BHS. Given his complaints of chest pain and elevated troponin cardiology was consulted. Elevation thought to be related to demand ischemia. Echocardiogram was normal.\" Podiatry orders CROW boot for L foot, which is delivered 6-. Response To Previous Treatment: Not applicable  Family / Caregiver Present: No  Referring Practitioner: Dr. Arline Brown  Referral Date : 06/11/20(eval delayed whle awaiting CROW boot)  Subjective  Subjective: Patient in bed. Agreeable to therapy. Denies pain--but lacks sensation B feet. On one hand states he \"needs therapy\" prior to returning home, then states \"I'm going home no matter what. \" States \"my foot isn't going to heal anyway, so it doesn't matter if I put pressure on it. It is going to have to be cut off sooner or later. \"  Therapist then points out to patient that if L foot is gone (amputated), he will have to then also have to be able to ambulate NWBng L LE. So therapy for progression of activity NWBng L LE is important and necessary. Patient acknowledges, but shrugs shoulders. States, \"I think you all just want to send me to a nursing home. \" Therapist explains concerns of patient being [address] to home and being unable to function while maintaining L LE NWBng, thus leading for other medical complications.    Pain Screening  Patient Currently in Pain: Denies    Orientation  Orientation  Overall Orientation Status: Within Functional Limits     Social/Functional History  Social/Functional History  Lives With: Alone  Type of Home: House  Home Layout: Multi-level, Able to Live on Main level with bedroom/bathroom  Home Access: Stairs to enter with rails  Entrance Stairs - Number of Steps: 2 sets of ~5 steps   Entrance Stairs - Rails: Both(far spaced)  Bathroom Shower/Tub: Shower chair with back, Tub/Shower unit(takes sponge baths)  Bathroom Toilet: Standard(with riser w/rails)  Bathroom Equipment: 3-in-1 commode  Home Equipment: Wheelchair-manual, Cane, Rolling walker, Standard walker, Quad cane(WC doesn't fit through doorways-doesn't use)  ADL Assistance: Independent  Homemaking Assistance: Needs assistance(aide once weekly for grocery, cleaning. Patient Lance Cummings out\" his clothes. )  Ambulation Assistance: Independent(with wh walker. Short dstances. )  Transfer Assistance: Independent  Active : No  Additional Comments: Sleeps in recliner. Aide once weekly. States he \"doesn't leave the house. \"     Cognition   Cognition  Overall Cognitive Status: Exceptions  Arousal/Alertness: Appropriate responses to stimuli  Following Commands: Follows all commands without difficulty; Follows one step commands consistently  Attention Span: Appears intact  Memory: Appears intact  Safety Judgement: Decreased awareness of need for assistance;Decreased awareness of need for safety  Problem Solving: Assistance required to correct errors made;Assistance required to identify errors made;Assistance required to implement solutions;Assistance required to generate solutions  Insights: Decreased awareness of deficits  Initiation: Does not require cues  Sequencing: Requires cues for some    Objective  AROM RLE (degrees)  RLE AROM: WFL  RLE General AROM: Except: knee flexion ~100 degrees sitting. Reports knee is \"stiff. \" Trace ankle DF  AROM LLE (degrees)  LLE AROM : WFL  LLE General AROM: ankle DF to neutral  Strength RLE  Strength RLE: WFL  Strength LLE  Comment: able to maintain NWBng L LE in static stance at walker x 10 seconds. MMT 3+ > 4/5  Sensation  Overall Sensation Status: (decreased or absent B feet)  Bed mobility  Supine to Sit: Modified independent(HOB up, 1 rail)  Sit to Supine: Modified independent(HOB up)  Scooting: Supervision(Bed flat. B UEs pulling at headboard. Cues to not bear through L LE)  Comment: Patient able to place L foot in CROW boot. Therapist places front piece and fastens. Patient able to remove CROW Boot. Transfers  Sit to Stand: Stand by assistance(EOB to wh walker. Patient with WBng through L LE during. Unable to transfer to stand and maintain L LE NWBng. )  Stand to sit: Stand by assistance  Ambulation  Ambulation?: No(Patient unable to maintain L LE NWBng. )  Balance  Posture: Fair  Sitting - Static: Good  Sitting - Dynamic: Good  Standing - Static: Good(At RW. Limited ability to stand at wh walker and remain NWBng L LE)  Exercises  Straight Leg Raise: x 10 B  Heelslides: x 10 B   Knee Long Arc Quad: x 10 B  Comments: cues for slow controlled technique     Plan   Plan  Times per week: 3-5  Current Treatment Recommendations: Functional Mobility Training  Safety Devices  Type of devices: All fall risk precautions in place, Bed alarm in place, Gait belt, Left in bed, Nurse notified, Call light within reach(RN Samantha fontana)    AM-PAC Score  AM-PAC Inpatient Mobility Raw Score : 12 (06/13/20 1718)  AM-PAC Inpatient T-Scale Score : 35.33 (06/13/20 1718)  Mobility Inpatient CMS 0-100% Score: 68.66 (06/13/20 1718)  Mobility Inpatient CMS G-Code Modifier : CL (06/13/20 1718)     Goals  Short term goals  Time Frame for Short term goals: By acute DC  Short term goal 1: Transfers (sit<>stand, stand pivot) CGA-Min A NWBng L LE. Short term goal 2: 5' wh walker amb, NWBng L LE. Short term goal 3: Tolerates 10 reps each B LE exs. Patient Goals   Patient goals : \"I'm going home no matter what. \"       Therapy Time   Individual Concurrent Group Co-treatment   Time In 1620         Time Out 6999 Mirta [address] [phone], PT  Electronically signed by Marly Parsons, PT [id number] on 6/13/2020 at 6:20 PM

## 2020-06-13 NOTE — PROGRESS NOTES
Occupational Therapy  Attempt Note      Name: Mary Saldaña  : 1955  MRN: [id number]  Room: 3119  Date: 2020    Per OT note from 2020: \"OT orders noted. Pt being treated for large ovoid wound on plantar LEFT midfoot due to Charcot. Per Dr. Brigid Vides note, pt is being fit for CROW boot and would like assessment of his mobility with CROW boot and any other DME needed for d/c. Per chart review pt is adamant about d/c home vs. SNF. [facility] fit patient for boot on 20 however takes ~2 days to make and will arrive on Saturday. Will complete formal evaluation and mobility when CROW boot arrives. \"     However, as of 2020, per Dr Glenn Cameron note, pt still does not have the boot yet as of today. Therefore, will await to complete evaluation once CROW boot arrives.     Electronically signed by Jenelle Granados OTR/L #[id number] on 20 at 12:30 PM GEORGE

## 2020-06-14 LAB
GLUCOSE BLD-MCNC: 121 MG/DL (ref 70–99)
GLUCOSE BLD-MCNC: 137 MG/DL (ref 70–99)
GLUCOSE BLD-MCNC: 141 MG/DL (ref 70–99)
GLUCOSE BLD-MCNC: 144 MG/DL (ref 70–99)
PERFORMED ON: ABNORMAL

## 2020-06-14 PROCEDURE — 6370000000 HC RX 637 (ALT 250 FOR IP): Performed by: NURSE PRACTITIONER

## 2020-06-14 PROCEDURE — 6360000002 HC RX W HCPCS: Performed by: NURSE PRACTITIONER

## 2020-06-14 PROCEDURE — 6360000002 HC RX W HCPCS: Performed by: HOSPITALIST

## 2020-06-14 PROCEDURE — 2580000003 HC RX 258: Performed by: NURSE PRACTITIONER

## 2020-06-14 PROCEDURE — 2060000000 HC ICU INTERMEDIATE R&B

## 2020-06-14 PROCEDURE — 6370000000 HC RX 637 (ALT 250 FOR IP): Performed by: INTERNAL MEDICINE

## 2020-06-14 PROCEDURE — 2580000003 HC RX 258: Performed by: HOSPITALIST

## 2020-06-14 PROCEDURE — 6370000000 HC RX 637 (ALT 250 FOR IP): Performed by: HOSPITALIST

## 2020-06-14 RX ORDER — ASPIRIN 81 MG/1
81 TABLET ORAL DAILY
Qty: 30 TABLET | Refills: 0 | COMMUNITY
Start: 2020-06-15 | End: 2022-09-06

## 2020-06-14 RX ORDER — LISINOPRIL 10 MG/1
5 TABLET ORAL DAILY
Qty: 30 TABLET | Refills: 0 | Status: ON HOLD
Start: 2020-06-14 | End: 2021-08-09 | Stop reason: ALTCHOICE

## 2020-06-14 RX ORDER — INSULIN GLARGINE 100 [IU]/ML
26 INJECTION, SOLUTION SUBCUTANEOUS NIGHTLY
Status: DISCONTINUED | OUTPATIENT
Start: 2020-06-14 | End: 2020-06-16 | Stop reason: HOSPADM

## 2020-06-14 RX ORDER — INSULIN GLARGINE 100 [IU]/ML
26 INJECTION, SOLUTION SUBCUTANEOUS
Qty: 1 PEN | Refills: 0 | Status: ON HOLD
Start: 2020-06-14 | End: 2020-11-29 | Stop reason: SDUPTHER

## 2020-06-14 RX ORDER — LACTOBACILLUS RHAMNOSUS GG 10B CELL
1 CAPSULE ORAL 2 TIMES DAILY WITH MEALS
Qty: 30 CAPSULE | Refills: 0
Start: 2020-06-14 | End: 2020-07-14

## 2020-06-14 RX ORDER — ATORVASTATIN CALCIUM 40 MG/1
40 TABLET, FILM COATED ORAL DAILY
Qty: 30 TABLET | Refills: 0
Start: 2020-06-14 | End: 2021-06-23 | Stop reason: DRUGHIGH

## 2020-06-14 RX ADMIN — ACETAMINOPHEN 650 MG: 325 TABLET ORAL at 09:34

## 2020-06-14 RX ADMIN — ENOXAPARIN SODIUM 40 MG: 40 INJECTION SUBCUTANEOUS at 21:03

## 2020-06-14 RX ADMIN — ERTAPENEM SODIUM 1000 MG: 1 INJECTION, POWDER, LYOPHILIZED, FOR SOLUTION INTRAMUSCULAR; INTRAVENOUS at 09:50

## 2020-06-14 RX ADMIN — ASPIRIN 81 MG: 81 TABLET, COATED ORAL at 09:29

## 2020-06-14 RX ADMIN — INSULIN GLARGINE 26 UNITS: 100 INJECTION, SOLUTION SUBCUTANEOUS at 21:03

## 2020-06-14 RX ADMIN — COLLAGENASE SANTYL: 250 OINTMENT TOPICAL at 09:36

## 2020-06-14 RX ADMIN — TAMSULOSIN HYDROCHLORIDE 0.4 MG: 0.4 CAPSULE ORAL at 21:03

## 2020-06-14 RX ADMIN — Medication 1 CAPSULE: at 18:22

## 2020-06-14 RX ADMIN — Medication 1 CAPSULE: at 09:34

## 2020-06-14 RX ADMIN — PIPERACILLIN AND TAZOBACTAM 3.38 G: 3; .375 INJECTION, POWDER, LYOPHILIZED, FOR SOLUTION INTRAVENOUS at 01:47

## 2020-06-14 RX ADMIN — SPIRONOLACTONE 25 MG: 25 TABLET ORAL at 09:29

## 2020-06-14 RX ADMIN — LISINOPRIL 10 MG: 10 TABLET ORAL at 09:29

## 2020-06-14 RX ADMIN — INSULIN LISPRO 1 UNITS: 100 INJECTION, SOLUTION INTRAVENOUS; SUBCUTANEOUS at 12:22

## 2020-06-14 RX ADMIN — INSULIN LISPRO 1 UNITS: 100 INJECTION, SOLUTION INTRAVENOUS; SUBCUTANEOUS at 09:28

## 2020-06-14 RX ADMIN — SODIUM CHLORIDE, PRESERVATIVE FREE 10 ML: 5 INJECTION INTRAVENOUS at 21:07

## 2020-06-14 RX ADMIN — ATORVASTATIN CALCIUM 40 MG: 40 TABLET, FILM COATED ORAL at 09:29

## 2020-06-14 ASSESSMENT — PAIN DESCRIPTION - FREQUENCY: FREQUENCY: CONTINUOUS

## 2020-06-14 ASSESSMENT — PAIN - FUNCTIONAL ASSESSMENT: PAIN_FUNCTIONAL_ASSESSMENT: PREVENTS OR INTERFERES SOME ACTIVE ACTIVITIES AND ADLS

## 2020-06-14 ASSESSMENT — PAIN SCALES - WONG BAKER: WONGBAKER_NUMERICALRESPONSE: 0

## 2020-06-14 ASSESSMENT — PAIN DESCRIPTION - ORIENTATION: ORIENTATION: LOWER

## 2020-06-14 ASSESSMENT — PAIN DESCRIPTION - PROGRESSION: CLINICAL_PROGRESSION: GRADUALLY IMPROVING

## 2020-06-14 ASSESSMENT — PAIN DESCRIPTION - ONSET: ONSET: ON-GOING

## 2020-06-14 ASSESSMENT — PAIN SCALES - GENERAL
PAINLEVEL_OUTOF10: 0
PAINLEVEL_OUTOF10: 3

## 2020-06-14 ASSESSMENT — PAIN DESCRIPTION - DESCRIPTORS: DESCRIPTORS: ACHING

## 2020-06-14 ASSESSMENT — PAIN DESCRIPTION - PAIN TYPE: TYPE: ACUTE PAIN

## 2020-06-14 ASSESSMENT — PAIN DESCRIPTION - LOCATION: LOCATION: BACK

## 2020-06-14 NOTE — PROGRESS NOTES
Patient A&O in the bed. Patient tolerating PO intake well. PM medications given without complications. Patient called this nurse and said that he might have torn his lower back muscle while working with therapy yesterday, he thinks the boot for his leg is very heavy that caused his muscle tear. He rated his pain 3/10, prn tylenol and ice pack given. Pt satisfied for now. Call light within reach, able to make needs known, fall precautions in place. Will monitor. Electronically signed by Beatrice Kramer RN on 6/14/2020 at 12:50 AM

## 2020-06-14 NOTE — PROGRESS NOTES
Department of Podiatry Progress Note  Luke Dewey  6/14/2020          HISTORY OF PRESENT ILLNESS:                The patient is a 75 y.o. male with significant past medical history of diabetes with peripheral neuropathy and Charcot arthropathy who is consulted for large ovoid wound of plantar LEFT midfoot due to Charcot disfigurement / rocker-bottom structure. No new complaints. Patient has his boot today. He doesn't feel like he has had enough therapy yet. He thinks he can stay until Tuesday now and it will be enough. Clarified with patient and RN that he is supposed to walk in the boot. PHYSICAL EXAM:      Vitals:    BP (!) 145/78   Pulse 65   Temp 97.8 °F (36.6 °C) (Oral)   Resp 16   Ht 6' 3\" (1.905 m)   Wt 253 lb 4.9 oz (114.9 kg)   SpO2 96%   BMI 31.66 kg/m²     LABS:   Recent Labs     06/12/20  0508   WBC 8.6   HGB 11.6*   HCT 35.2*        Recent Labs     06/12/20  0508   *   K 4.2      CO2 22   PHOS 3.3   BUN 16   CREATININE 0.9     No results for input(s): PROT, INR, APTT in the last 72 hours. LOWER EXTREMITY EXAMINATION   SKIN:        Wound is improved in depth and size. There is still penetrating wound in the center, but it is not frankly open. Granular base. NEUROLOGIC:    Pain sensation isdecreased Bilateral.  Light touch is decreasedBilateral. positive history of paresthesia Bilateral. negativehistory of burning Bilateral. Deep tendon reflexes are 2 to include patellar and achilles Bilateral. Dix--Jose 5.07 monofilament sensitivity is abnormal 6 to 6 spots tested Bilateral.    VASCULAR:    bilaterally Dorsalis pedis is 2. bilaterally Posterior tibial pulse is difficult to assess due to overlying xerotic skin. 2+ edema bilaterally. mild Varicosities bilaterally. MUSCULOSKELETAL:  The patient has a fixed deformity that is part Charcot with rockerbottom and part endstage PTTD.  He is walking off of the medial cuneiform with every step and has fixed lateral subluxation of the forefoot. There is evidence of accommodation of the deformity with excess soft tissue surrounding the deformity. The patient has no pain in the foot despite the severe deformity. Radiographs: 6/6/2020  Cellulitis and ulceration to the medial aspect of the left foot.  No   radiographic evidence of osteomyelitis       MRI:   Impression   Skin thickening and soft tissue edema noted along the medial plantar aspect   of the foot where there is a large ulceration extending to underlying 1st   metatarsal bone.       Mild ill-defined increased T2 signal noted within the lateral cuneiform with   mild corresponding low T1 signal. No discrete fracture line visualized. Findings may represent early osteomyelitis.  No other marrow changes   suspicious of osteomyelitis. Assessment:  Diabetes with left foot deformity  Peripheral neuropathy  Neuropathic ulceration, left foot  Non-compliance with protective shoegear and bracing    Plan:     1. Left foot diabetic foot ulceration and osteomyelitis  - Osteo appears early without much destruction  - Plan for off loading  - Referral to [facility] for custom CROW boot manufacture  - Patient is poor surgical candidate given his outright refusal to off load, modify activity, or go to rehab. Therefore we will treat conservatively  - Patient can follow up in the wound center for treatment. - Continue antibiotics  - Daily wound care and off loading will be key to healing.  - PT/OT to work with patient on modified gait and use of the CROW boot once he has it made. OK to ambulate in the CROW boot. DISPO: ok to d/c when stable. Patient likely would benefit from rehab in SNF for wound care and limited mobility. Thank you for the opportunity to take part in the patient's care.     Barry Car DPM  [facility]  Pager: [phone]  Office: [phone]  Fax: [phone]

## 2020-06-14 NOTE — PLAN OF CARE
Problem: Falls - Risk of:  Goal: Will remain free from falls  Description: Will remain free from falls  Outcome: Ongoing  Note: Fall risk assessment completed. Fall precautions in place. Call light within reach. Pt educated on calling for assistance before getting up. Walkway free of clutter. Will continue to monitor. Electronically signed by Naga Nevarez RN on 6/14/2020 at 11:38 AM    Goal: Absence of physical injury  Description: Absence of physical injury  Outcome: Ongoing     Problem: Skin Integrity:  Goal: Demonstration of wound healing without infection will improve  Description: Demonstration of wound healing without infection will improve  Outcome: Ongoing  Note: Left diabetic foot ulcer continues to improve, will continue to monitor and assess.  Electronically signed by Naga Nevarez RN on 6/14/2020 at 11:38 AM    Goal: Complications related to intravenous access or infusion will be avoided or minimized  Description: Complications related to intravenous access or infusion will be avoided or minimized  Outcome: Ongoing

## 2020-06-14 NOTE — PROGRESS NOTES
AM assessment and medications complete, dressing change done to left foot with podiatry at bedside, no other needs at this time. Pt is c/o back pain he states because of his therapy he did yesterday. Pt took Tylenol for back pain, no other needs.  Electronically signed by Nona Joe RN on 6/14/2020 at 11:35 AM

## 2020-06-14 NOTE — PROGRESS NOTES
Hospital Medicine Progress Note      Admit Date: 6/6/2020         Overnight Events: none    CC: F/U for fever, vomiting, chest pain and a cough    Hospital Course: This is a 57-year-old male with a history of previous pancreatitis, gallstones, chronic bilateral ulcerations on his feet, and diabetes who presented to the ED with complaints of fever, vomiting and a cough. He also complained of chest pain with cough and breathing. He has associated generalized body aches and weakness. COVID testing negative. He was found to have Sepsis related to foot infection. Podiatry and ID were consulted. Xrays performed without notable osteo, however MRI shows signs of early disease. Wound cx grew Proteus and BHS. Given his complaints of chest pain and elevated troponin cardiology was consulted. Elevation thought to be related to demand ischemia. Echocardiogram was normal.   He received CROW boot on 6/13/2020 and was evaluated by PT/OT. Stable d/c and order placed on 6/14/2020. Now agreeable to rehab and consult placed, waiting on evaluation and precert. Interval History/Subjective:   Complains of low back pain overnight. Denies fevers chills chest pain or shortness of breath. No other symptoms noted at present. Review of Systems:     Comprehensive ROS negative except as mentioned above. Past Medical History:        Diagnosis Date    Diabetes mellitus ([address]     Gallstones     Pancreatitis     Ulcers of both lower legs ([address]     bilateral feet       Past Surgical History:        Procedure Laterality Date    ERCP  4/8/14    LITHOTRYPSY & PANCREATIC STENT PLACEMENT    FOOT SURGERY Left 1967       Allergies:  Patient has no known allergies. Past medical and surgical history reviewed. Any changes have been noted.      Scheduled and prn Medications:    Scheduled Meds:   insulin glargine  26 Units Subcutaneous Nightly    lisinopril  10 mg Oral Daily    spironolactone  25 mg Oral Daily    lidocaine 1 % injection  5 mL Intradermal Once    atorvastatin  40 mg Oral Daily    lactobacillus  1 capsule Oral BID WC    collagenase   Topical Daily    aspirin  81 mg Oral Daily    sodium chloride flush  10 mL Intravenous 2 times per day    enoxaparin  40 mg Subcutaneous Nightly    insulin lispro  0-6 Units Subcutaneous TID WC    insulin lispro  0-3 Units Subcutaneous Nightly    tamsulosin  0.4 mg Oral Nightly     Continuous Infusions:   dextrose       PRN Meds:.sodium chloride flush, acetaminophen **OR** acetaminophen, polyethylene glycol, ondansetron, glucose, dextrose, glucagon (rDNA), dextrose    PHYSICAL EXAM:  BP (!) 145/78   Pulse 65   Temp 97.8 °F (36.6 °C) (Oral)   Resp 16   Ht 6' 3\" (1.905 m)   Wt 253 lb 4.9 oz (114.9 kg)   SpO2 96%   BMI 31.66 kg/m²       Intake/Output Summary (Last 24 hours) at 6/14/2020 1502  Last data filed at 6/14/2020 1342  Gross per 24 hour   Intake 840 ml   Output 1390 ml   Net -550 ml       General: Alert and oriented. Resting in bed in no apparent distress. HEENT: Normocephalic. Atraumatic. Pupils equal and reactive. EOM intact. Oral mucosa pink/moist/intact. Neck: Supple. Symmetrical. Trachea midline. Lungs: good resp effort, cta all fields  Chest: Exam unremarkable. Cardiac: s1s2 noted without murmur gallop or rub. Abdomen/GI: bs +, nontender, soft  Extremities:  left lower extremity wrapped in Ace, LLE boot on  Skin: Dry and intact. No lesions noted. Neuro: Grossly intact. No focal deficits noted.  VILLARREAL without difficulty        LABS:    Lab Results   Component Value Date    WBC 8.6 06/12/2020    HGB 11.6 (L) 06/12/2020    HCT 35.2 (L) 06/12/2020    MCV 77.6 (L) 06/12/2020     06/12/2020    LYMPHOPCT 22.8 06/12/2020    RBC 4.54 06/12/2020    MCH 25.6 (L) 06/12/2020    MCHC 33.0 06/12/2020    RDW 16.6 (H) 06/12/2020       Lab Results   Component Value Date    CREATININE 0.9 06/12/2020    BUN 16 06/12/2020     (L) 06/12/2020    K 4.2 06/12/2020    CL 101 06/12/2020    CO2 22 06/12/2020       Lab Results   Component Value Date    MG 1.90 02/06/2015       Lab Results   Component Value Date    ALT 7 (L) 06/06/2020    AST 10 (L) 06/06/2020    ALKPHOS 84 06/06/2020    BILITOT 0.7 06/06/2020        No flowsheet data found. Imaging:  MRI FOOT LEFT W WO CONTRAST   Final Result   Skin thickening and soft tissue edema noted along the medial plantar aspect   of the foot where there is a large ulceration extending to underlying 1st   metatarsal bone. Mild ill-defined increased T2 signal noted within the lateral cuneiform with   mild corresponding low T1 signal. No discrete fracture line visualized. Findings may represent early osteomyelitis. No other marrow changes   suspicious of osteomyelitis. XR FOOT LEFT (MIN 3 VIEWS)   Final Result   Cellulitis and ulceration to the medial aspect of the left foot. No   radiographic evidence of osteomyelitis. XR CHEST PORTABLE   Final Result   No acute cardiopulmonary process. CT ABDOMEN PELVIS WO CONTRAST Additional Contrast? None   Final Result   1. Atrophic pancreas with scattered calcifications along with 1 large   calcification in the central pancreatic duct which is dilated. Spectrum of   findings would favor chronic pancreatitis. No significant inflammation on   the current exam but early acute on chronic pancreatitis may be considered   clinically. 2. No CT evidence of mass lesion in the pancreatic head. 3. Cardiomegaly and bibasilar atelectasis in the visualized lungs. Assessment & Plan:      Diabetic foot ulcer with Charcot foot  -Culture showing Proteus and group B strep  -ID following closely  -X-rays without signs of osteo  -MRI concerning for early changes of osteomyelitis, poor candidate for any treatment, refusing ECF for IV abx and do not feel he would be capable of doing this at home by himself. He is also concerned about having a PICC placed.  He refused to off load REHAB    Disposition:  [] Home  [x] Home with home health [] Rehab [] Psych [] SNF  [] LTAC  [] Long term nursing home or group home [] Transfer to ICU  [] Transfer to PCU [] Other: has Visiting NP    Code Status: Full Code    ELOS: tbd      KENNY Madrid - JAY  06/14/20

## 2020-06-14 NOTE — PROGRESS NOTES
Patient A&O. No complaints at this time. Shift uneventful. Call light within reach, able to make needs knows, fall precautions in place. Will continue to monitor. Electronically signed by Sona Santoro RN on 6/14/2020 at 6:15 AM

## 2020-06-15 LAB
GLUCOSE BLD-MCNC: 115 MG/DL (ref 70–99)
GLUCOSE BLD-MCNC: 136 MG/DL (ref 70–99)
GLUCOSE BLD-MCNC: 157 MG/DL (ref 70–99)
GLUCOSE BLD-MCNC: 158 MG/DL (ref 70–99)
PERFORMED ON: ABNORMAL

## 2020-06-15 PROCEDURE — 99232 SBSQ HOSP IP/OBS MODERATE 35: CPT | Performed by: INTERNAL MEDICINE

## 2020-06-15 PROCEDURE — 6370000000 HC RX 637 (ALT 250 FOR IP): Performed by: NURSE PRACTITIONER

## 2020-06-15 PROCEDURE — 6370000000 HC RX 637 (ALT 250 FOR IP): Performed by: INTERNAL MEDICINE

## 2020-06-15 PROCEDURE — 2060000000 HC ICU INTERMEDIATE R&B

## 2020-06-15 PROCEDURE — 97110 THERAPEUTIC EXERCISES: CPT

## 2020-06-15 PROCEDURE — 94760 N-INVAS EAR/PLS OXIMETRY 1: CPT

## 2020-06-15 PROCEDURE — 6360000002 HC RX W HCPCS: Performed by: HOSPITALIST

## 2020-06-15 PROCEDURE — 97116 GAIT TRAINING THERAPY: CPT

## 2020-06-15 PROCEDURE — 97166 OT EVAL MOD COMPLEX 45 MIN: CPT

## 2020-06-15 PROCEDURE — 6370000000 HC RX 637 (ALT 250 FOR IP): Performed by: HOSPITALIST

## 2020-06-15 PROCEDURE — 2580000003 HC RX 258: Performed by: HOSPITALIST

## 2020-06-15 PROCEDURE — 97530 THERAPEUTIC ACTIVITIES: CPT

## 2020-06-15 RX ADMIN — LISINOPRIL 10 MG: 10 TABLET ORAL at 08:35

## 2020-06-15 RX ADMIN — INSULIN LISPRO 1 UNITS: 100 INJECTION, SOLUTION INTRAVENOUS; SUBCUTANEOUS at 20:30

## 2020-06-15 RX ADMIN — COLLAGENASE SANTYL: 250 OINTMENT TOPICAL at 08:36

## 2020-06-15 RX ADMIN — ASPIRIN 81 MG: 81 TABLET, COATED ORAL at 08:35

## 2020-06-15 RX ADMIN — SODIUM CHLORIDE, PRESERVATIVE FREE 10 ML: 5 INJECTION INTRAVENOUS at 08:35

## 2020-06-15 RX ADMIN — TAMSULOSIN HYDROCHLORIDE 0.4 MG: 0.4 CAPSULE ORAL at 20:29

## 2020-06-15 RX ADMIN — Medication 1 CAPSULE: at 08:34

## 2020-06-15 RX ADMIN — SODIUM CHLORIDE, PRESERVATIVE FREE 10 ML: 5 INJECTION INTRAVENOUS at 20:29

## 2020-06-15 RX ADMIN — SPIRONOLACTONE 25 MG: 25 TABLET ORAL at 08:35

## 2020-06-15 RX ADMIN — ENOXAPARIN SODIUM 40 MG: 40 INJECTION SUBCUTANEOUS at 20:29

## 2020-06-15 RX ADMIN — Medication 1 CAPSULE: at 16:54

## 2020-06-15 RX ADMIN — INSULIN GLARGINE 26 UNITS: 100 INJECTION, SOLUTION SUBCUTANEOUS at 20:30

## 2020-06-15 RX ADMIN — ATORVASTATIN CALCIUM 40 MG: 40 TABLET, FILM COATED ORAL at 08:35

## 2020-06-15 RX ADMIN — INSULIN LISPRO 1 UNITS: 100 INJECTION, SOLUTION INTRAVENOUS; SUBCUTANEOUS at 12:03

## 2020-06-15 ASSESSMENT — PAIN SCALES - GENERAL
PAINLEVEL_OUTOF10: 0
PAINLEVEL_OUTOF10: 0

## 2020-06-15 NOTE — CARE COORDINATION
Yoandy spoke with Chadwick Miranda on acute rehab regarding status of precert. She will let Yoandy know.      Electronically signed by Sharath Rubio on 6/15/2020 at 9:36 AM

## 2020-06-15 NOTE — PROGRESS NOTES
Patient A&O in the bed. Patient tolerating PO intake well. PM medications given without complications. Patient denies pain, nausea or vomiting. Foot dressing CDI. Call light within reach, able to make needs known, fall precautions in place. Will monitor. Electronically signed by Jonathan Erickson RN on 6/14/2020 at 10:40 PM

## 2020-06-15 NOTE — PROGRESS NOTES
Occupational Therapy   Occupational Therapy Initial Assessment  Date: 6/15/2020   Patient Name: Quang Rendon  MRN: [id number]     : 1955    Date of Service: 6/15/2020    Assessment: Pt is 75 y.o. M who presents with fever, cough and vomiting. Pt found to have diabetic foot infection. Pt is WBAT in CROW boot. PTA pt lives alone in one story home with ~10 DONNY - reports transports with ambulance each time home. Pt reports independence in self-care tasks, has assist from aide for homemaking responsibilities, and completes functional mobility with RW. Currently, pt presents with ROM/strength in [facility] for self-care and transfers. Pt completed bed mobility without assist and sit <> stand transfers with min A. Pt completed functional mobility with RW with CGA and cues for safety. Pt required assist to don CROW boot and anticipate mod A for ADL needs. Pt with limited awareness and safety insight - beginning to verbalize understanding of the need to allow foot to heal but unsure of motivation/compliance. Pt is unsafe to d/c home alone and would benefit from continued therapy to increase mobility, safety, and independence. Discharge Recommendations:  5-7 sessions per week  OT Equipment Recommendations  Other: TBD     Quang Rendon scored a 17/24 on the AM-PAC ADL Inpatient form. Current research shows that an AM-PAC score of 17 or less is typically not associated with a discharge to the patient's home setting. Based on the patient's AM-PAC score and their current ADL deficits, it is recommended that the patient have 5-7 sessions per week of Occupational Therapy at d/c to increase the patient's independence. At this time, this patient demonstrates the endurance, and/or tolerance for 3 hours of therapy each day, with a treatment frequency of 5-7x/wk. Please see assessment section for further patient specific details.     Assessment   Performance deficits / Impairments: Decreased functional mobility ;Decreased safe awareness;Decreased ADL status; Decreased cognition;Decreased endurance;Decreased balance  Assessment: Pt is 75 y.o. M who presents with fever, cough and vomiting. Pt found to have diabetic foot infection. Pt is WBAT in CROW boot. PTA pt lives alone in one story home with ~10 DONNY - reports transports with ambulance each time home. Pt reports independence in self-care tasks, has assist from aide for homemaking responsibilities, and completes functional mobility with RW. Currently, pt presents with ROM/strength in [facility] for self-care and transfers. Pt completed bed mobility without assist and sit <> stand transfers with min A. Pt completed functional mobility with RW with CGA and cues for safety. Pt required assist to don CROW boot and anticipate mod A for ADL needs. Pt with limited awareness and safety insight - beginning to verbalize understanding of the need to allow foot to heal but unsure of motivation/compliance. Pt is unsafe to d/c home alone and would benefit from continued therapy to increase mobility, safety, and independence. Prognosis: Fair  Decision Making: Medium Complexity  History: PMH: DM, Charcot foot   Exam: ADLs, transfers, func mob, bed mob  Assistance / Modification: CGA/min A for mobility, min/mod A for ADLs   OT Education: OT Role;Plan of Care;ADL Adaptive Strategies;Transfer Training;Precautions  REQUIRES OT FOLLOW UP: Yes  Activity Tolerance  Activity Tolerance: Patient Tolerated treatment well;Treatment limited secondary to decreased cognition  Safety Devices  Safety Devices in place: Yes(RN [employer]) notified, Left in care of PT )  Type of devices: Left in chair;Nurse notified;Gait belt;Call light within reach           Patient Diagnosis(es): The primary encounter diagnosis was Diabetic foot infection ([facility] [address] Diagnoses of Septicemia ([facility] [address] and Non-intractable vomiting with nausea, unspecified vomiting type were also pertinent to this visit.      has a past medical history of Diabetes mellitus ([facility] [address] Gallstones, Pancreatitis, and Ulcers of both lower legs ([facility] [address] has a past surgical history that includes Foot surgery (Left, 1967) and ERCP (4/8/14). Restrictions  Restrictions/Precautions  Restrictions/Precautions: Weight Bearing, Fall Risk  Lower Extremity Weight Bearing Restrictions  Left Lower Extremity Weight Bearing: Weight Bearing As Tolerated  Partial Weight Bearing Percentage Or Pounds: in CROW boot  Position Activity Restriction  Other position/activity restrictions: WBAT in CROW boot Per Dr. Radha Nichols: \"PT/OT to work with patient on modified gait and use of the CROW boot once he has it made. OK to ambulate in the CROW boot. \"    Subjective   General  Chart Reviewed: Yes  Patient assessed for rehabilitation services?: Yes  Additional Pertinent Hx: Per Wes Lion MD: Pt is 75 y.o. who \"woke up this morning with high-grade fever, nausea, vomiting, and cough. He also reports bilateral chest pain with cough and breathing. Initially was short of breath but that improved. He also reports generalized weakness and muscle aches. He has not left his house for the last few years, but he has visiting aides and nurses. \"  Family / Caregiver Present: No  Referring Practitioner: Jodie Sneed DPM  Diagnosis: Diabetic foot infection  Subjective  Subjective: Pt met bedside, resting supine in bed. Pt agreeable for therapy evaluation and OOB activity.    Patient Currently in Pain: (No complaints of pain throughout session - reports hurt his back )  Vital Signs  Resp: 16  Patient Currently in Pain: (No complaints of pain throughout session - reports hurt his back )  Oxygen Therapy  SpO2: 95 %  Pulse Oximeter Device Mode: Intermittent  Pulse Oximeter Device Location: Finger     Social/Functional History  Social/Functional History  Lives With: Alone  Type of Home: House  Home Layout: Multi-level, Able to Live on Main level with bedroom/bathroom  Home Access: Stairs to enter with increase the patient's independence. At this time, this patient demonstrates the endurance, and/or tolerance for 3 hours of therapy each day, with a treatment frequency of 5-7x/wk. Please see assessment section for further patient specific details. If patient discharges prior to next session this note will serve as a discharge summary. Please see below for the latest assessment towards goals. AM-PAC Inpatient Daily Activity Raw Score: 17 (06/15/20 1058)  AM-PAC Inpatient ADL T-Scale Score : 37.26 (06/15/20 1058)  ADL Inpatient CMS 0-100% Score: 50.11 (06/15/20 1058)  ADL Inpatient CMS G-Code Modifier : CK (06/15/20 1058)    Goals  Short term goals  Time Frame for Short term goals: prior to d/c  Short term goal 1: Pt will complete functional mobility with RW with CGA  Short term goal 2: Pt will complete transfers with CGA  Short term goal 3: Pt will complete toileting with CGA  Short term goal 4: Pt will don/doff CROW boot with CGA   Patient Goals   Patient goals : \"to get more practice so I can walk with this\"       Therapy Time   Individual Concurrent Group Co-treatment   Time In 1010         Time Out 1040         Minutes 30         Timed Code Treatment Minutes: 15 Minutes(15 min eval )     If pt is discharged prior to next OT session, this note will serve as the discharge summary.     Vikash Graham, FEP/I#[id number]

## 2020-06-15 NOTE — PLAN OF CARE
Problem: Falls - Risk of:  Goal: Will remain free from falls  Description: Will remain free from falls  Outcome: Ongoing  Note: Fall risk assessment completed. Fall precautions in place. Bed in lowest position, wheels locked, bed/chair exit alarm in place, call light within reach, and non skid footwear on. Walkway free of clutter. Pt alert and oriented and able to make needs known. Pt educated to use call light when needing to get up, and pt utilizes call light to make needs known. Will continue to monitor. Goal: Absence of physical injury  Description: Absence of physical injury  Outcome: Ongoing     Problem: Skin Integrity:  Goal: Demonstration of wound healing without infection will improve  Description: Demonstration of wound healing without infection will improve  Outcome: Ongoing  Note: Will monitor skin and mucous members. Will turn patient every 2 hours, monitor for friction and sheering, and change dressings as needed. Will preform skin assessment every shift.       Goal: Complications related to intravenous access or infusion will be avoided or minimized  Description: Complications related to intravenous access or infusion will be avoided or minimized  Outcome: Ongoing   Electronically signed by Maximus Lockwood RN on 6/15/2020 at 1:12 PM

## 2020-06-15 NOTE — PROGRESS NOTES
Department of Podiatry Progress Note  Bennett Fitzpatrick  6/15/2020          HISTORY OF PRESENT ILLNESS:                The patient is a 75 y.o. male with significant past medical history of diabetes with peripheral neuropathy and Charcot arthropathy who is consulted for large ovoid wound of plantar LEFT midfoot due to Charcot disfigurement / rocker-bottom structure. No new complaints. Patient has his boot today. He doesn't feel like he has had enough therapy yet. He thinks he can stay until Tuesday now and it will be enough. Clarified with patient and RN that he is supposed to walk in the boot. PHYSICAL EXAM:      Vitals:    /74   Pulse 67   Temp 98.3 °F (36.8 °C) (Oral)   Resp 16   Ht 6' 3\" (1.905 m)   Wt 254 lb 6.6 oz (115.4 kg)   SpO2 95%   BMI 31.80 kg/m²     LABS:   No results for input(s): WBC, HGB, HCT, PLT in the last 72 hours. No results for input(s): NA, K, CL, CO2, PHOS, BUN, CREATININE in the last 72 hours. Invalid input(s): CA  No results for input(s): PROT, INR, APTT in the last 72 hours. LOWER EXTREMITY EXAMINATION   SKIN:        Wound is improved in depth and size. There is still penetrating wound in the center, but it is not frankly open. Granular base, 100%. NEUROLOGIC:    Pain sensation isdecreased Bilateral.  Light touch is decreasedBilateral. positive history of paresthesia Bilateral. negativehistory of burning Bilateral. Deep tendon reflexes are 2 to include patellar and achilles Bilateral. Manteo--Jose 5.07 monofilament sensitivity is abnormal 6 to 6 spots tested Bilateral.    VASCULAR:    bilaterally Dorsalis pedis is 2. bilaterally Posterior tibial pulse is difficult to assess due to overlying xerotic skin. 2+ edema bilaterally. mild Varicosities bilaterally. MUSCULOSKELETAL:  The patient has a fixed deformity that is part Charcot with rockerbottom and part endstage PTTD.  He is walking off of the medial cuneiform with every step and has fixed lateral subluxation of the forefoot. There is evidence of accommodation of the deformity with excess soft tissue surrounding the deformity. The patient has no pain in the foot despite the severe deformity. Radiographs: 6/6/2020  Cellulitis and ulceration to the medial aspect of the left foot.  No   radiographic evidence of osteomyelitis       MRI:   Impression   Skin thickening and soft tissue edema noted along the medial plantar aspect   of the foot where there is a large ulceration extending to underlying 1st   metatarsal bone.       Mild ill-defined increased T2 signal noted within the lateral cuneiform with   mild corresponding low T1 signal. No discrete fracture line visualized. Findings may represent early osteomyelitis.  No other marrow changes   suspicious of osteomyelitis. Assessment:  Diabetes with left foot deformity  Peripheral neuropathy  Neuropathic ulceration, left foot  Non-compliance with protective shoegear and bracing    Plan:     1. Left foot diabetic foot ulceration and osteomyelitis  - Osteo appears early without much destruction  - Plan for off loading  - [employer] for custom CROW wood  - Patient is poor surgical candidate given his outright refusal to off load, modify activity, or go to rehab. Therefore we will treat conservatively  - Patient can follow up in the wound center for treatment. - Continue antibiotics  - Daily wound care and off loading will be key to healing.  - PT/OT to work with patient on modified gait and use of the CROW boot. OK to ambulate in the CROW boot. DISPO: ok to d/c when stable. Patient likely would benefit from rehab in SNF for wound care and limited mobility, but understand patient difficulties and wishes for home. Thank you for the opportunity to take part in the patient's care.   Sg Enriquez [facility]  [facility]  [phone]

## 2020-06-15 NOTE — CONSULTS
Consult Note  Physical Medicine and Rehabilitation    Patient: Kizzy Crowder  [id number]  Date: 6/15/2020      Chief Complaint: weakness, fatigue, difficulty ambulating    History of Present Illness/Hospital Course:  Mr. Jeffrey Montes is a 76 yo M with pmh DM2, neuropathy, bilateral Charcot deformities, venous stasis, and chronic left plantar ulcer who initially presented 6/6/2020 with fever, cough, and vomiting. SARS-CoV-2 testing negative. Found to have sepsis due to cellulitis of chronic left foot ulcer. Cultures + proteus and beta hemolytic strep. MRI with early changes of osteomyelitis. He has been treated with IV antibiotics. Podiatry was consulted and performed debridement. Patient is WBAT in [address] [place] [address]. Course complicated by atypical chest pain and elevated troponin (thought to be demand ischemia). Currently, patient reports generalized weakness and fatigue. He feels somewhat unsteady with ambulation in CROW boot. Has mild pain in bilateral lower extremities. Worse with weight bearing, improves with rest. He has diminished sensation in bilateral lower extremities, stable. He is interested in coming to ARU to improve his function prior to returning home.       Prior Level of Function:  Modified independent for mobility (wheeled walker), ADLs  HHA assists with IADLs weekly    Current Level of Function:  Nichole for mobility  modA for ADLs    Pertinent Social History:  Support: Lives alone  Home set-up: Home with multiple (~5+5) steps to enter, FFSU available    Past Medical History:   Diagnosis Date    Diabetes mellitus ([facility] [address]     Gallstones     Pancreatitis     Ulcers of both lower legs ([facility] [address]     bilateral feet       Past Surgical History:   Procedure Laterality Date    ERCP  4/8/14    LITHOTRYPSY & PANCREATIC STENT PLACEMENT    FOOT SURGERY Left 1967       Family History   Problem Relation Age of Onset    Colon Cancer Mother         PVD s/p toe amputation by Dr Tessy Wiggins Father Social History     Socioeconomic History    Marital status: Single     Spouse name: None    Number of children: None    Years of education: None    Highest education level: None   Occupational History    None   Social Needs    Financial resource strain: None    Food insecurity     Worry: None     Inability: None    Transportation needs     Medical: None     Non-medical: None   Tobacco Use    Smoking status: Never Smoker    Smokeless tobacco: Never Used   Substance and Sexual Activity    Alcohol use: No    Drug use: No    Sexual activity: None   Lifestyle    Physical activity     Days per week: None     Minutes per session: None    Stress: None   Relationships    Social connections     Talks on phone: None     Gets together: None     Attends [employer] service: None     Active member of club or organization: None     Attends meetings of clubs or organizations: None     Relationship status: None    Intimate partner violence     Fear of current or ex partner: None     Emotionally abused: None     Physically abused: None     Forced sexual activity: None   Other Topics Concern    None   Social History Narrative    None           REVIEW OF SYSTEMS:   CONSTITUTIONAL: negative for fevers, chills, diaphoresis, appetite change, night sweats, unexpected weight change, +fatigue  EYES: negative for blurred vision, eye discharge, visual disturbance and icterus  HEENT: negative for hearing loss, tinnitus, ear drainage, sinus pressure, nasal congestion, epistaxis and snoring  RESPIRATORY: Negative for hemoptysis, sputum production; + cough  CARDIOVASCULAR: negative for chest pain, palpitations, exertional chest pressure/discomfort, syncope, edema  GASTROINTESTINAL: negative for nausea, diarrhea, blood in stool, abdominal pain, constipation; +vomiting  GENITOURINARY: negative for frequency, dysuria, urinary incontinence, decreased urine volume, and hematuria  HEMATOLOGIC/LYMPHATIC: negative for easy bruising, bleeding and lymphadenopathy  ALLERGIC/IMMUNOLOGIC: negative for recurrent infections, angioedema, anaphylaxis and drug reactions  ENDOCRINE: negative for weight changes and diabetic symptoms including polyuria, polydipsia and polyphagia  MUSCULOSKELETAL: refer to HPI  NEUROLOGICAL: negative for headaches, slurred speech, unilateral weakness  PSYCHIATRIC/BEHAVIORAL: negative for hallucinations, behavioral problems, confusion and agitation. Physical Examination:  Vitals:   Patient Vitals for the past 24 hrs:   BP Temp Temp src Pulse Resp SpO2 Weight   06/15/20 1009 -- -- -- -- 16 95 % --   06/15/20 0749 130/74 98.3 °F (36.8 °C) Oral 67 16 96 % --   06/15/20 0108 -- -- -- -- -- -- 254 lb 6.6 oz (115.4 kg)   06/14/20 2033 124/81 97.5 °F (36.4 °C) -- 68 18 100 % --     Const: Alert. WDWN. No distress  Eyes: Conjunctiva noninjected, no icterus noted; pupils equal, round, and reactive to light. HENT: Atraumatic, normocephalic; Oral mucosa moist  Neck: Trachea midline, neck supple. No thyromegaly noted. CV: Regular rate and rhythm, no murmur rub or gallop noted  Resp: Lungs decreased at bases bilaterally, no rales wheezes or ronchi, no retractions. Respirations unlabored. GI: Soft, nontender, nondistended. Normal bowel sounds. No palpable masses. Skin: Normal temperature and turgor. No rashes or breakdown noted. Ext: BLE venous stasis changes, LLE wound dressed, c/d/i. MSK: Bilateral feet with Charcot deformities and decreased ROM (L worse than R). No joint erythema, warmth noted. Neuro: Alert, oriented, appropriate. No cranial nerve deficits appreciated. Sensation intact to light touch except diminished/absent in distal BLEs. Motor examination reveals strength overall 5-/5 in BUE and 4/5 in BLE. No abnormalities with finger/nose. Reflexes absent, symmetric.   Psych: Stable mood, normal judgement, normal affect     Lab Results   Component Value Date    WBC 8.6 06/12/2020    HGB 11.6 (L) 06/12/2020    HCT 35.2 (L) 06/12/2020    MCV 77.6 (L) 06/12/2020     06/12/2020     Lab Results   Component Value Date    INR 1.17 (H) 04/01/2014    PROTIME 13.0 (H) 04/01/2014     Lab Results   Component Value Date    CREATININE 0.9 06/12/2020    BUN 16 06/12/2020     (L) 06/12/2020    K 4.2 06/12/2020     06/12/2020    CO2 22 06/12/2020     Lab Results   Component Value Date    ALT 7 (L) 06/06/2020    AST 10 (L) 06/06/2020    ALKPHOS 84 06/06/2020    BILITOT 0.7 06/06/2020       Most recent echocardiogram revealed:   Normal left ventricle size, wall thickness, and systolic function with an   estimated ejection fraction of 55-60%. No regional wall motion abnormalities   are seen. The right ventricle is not well visualized but appears grossly normal in   size and function. Mild tricuspid regurgitation. Most recent EKG revealed:  Sinus rhythm with prolonged AV conductionLeft axis deviationLeft bundle branch blockCannot rule out Septal infarct (cited on or before 05-FEB-2015)Abnormal ECGWhen compared with ECG of 05-FEB-2015 00:35,Questionable change in QRS durationQuestionable change in initial forces of Anteroseptal leadsLeft bundle branch block has replaced Right bundle branch block and left anterior fascicular blockConfirmed by Lakisha, [address] [place] [address] ([id number]) on 6/6/2020 8:49:14 PM    Most recent imaging studies revealed:    MRI left foot  Skin thickening and soft tissue edema noted along the medial plantar aspect   of the foot where there is a large ulceration extending to underlying 1st   metatarsal bone.       Mild ill-defined increased T2 signal noted within the lateral cuneiform with   mild corresponding low T1 signal. No discrete fracture line visualized. Findings may represent early osteomyelitis.  No other marrow changes   suspicious of osteomyelitis. Xray left foot  Cellulitis and ulceration to the medial aspect of the left foot.  No   radiographic evidence of osteomyelitis.      CT abdomen, pelvis  1. Atrophic pancreas with scattered calcifications along with 1 large   calcification in the central pancreatic duct which is dilated.  Spectrum of   findings would favor chronic pancreatitis.  No significant inflammation on   the current exam but early acute on chronic pancreatitis may be considered   clinically. 2. No CT evidence of mass lesion in the pancreatic head. 3. Cardiomegaly and bibasilar atelectasis in the visualized lungs. On my review, CXR displays mildly enlarged cardiac silhouette, no effusions or consolidations. Assessment:  1. Debility  2. Sepsis due to complicated left diabetic foot infection  3. Chronic left plantar ulcer with acute cellulitis and early changes of osteomyelitis - S/p debridement by Podiatry. Will need long-term IV antibiotics. Poor surgical candidate per Dr. Kp Pelaez due to patient refusal to off-load. Currently WBAT in [address] [place] [address]. 4. DM2 with neuropathy  5. Charcot deformity  6. Chronic venous stasis  7. Atypical chest pain with elevated troponin - demand ischemia    Impairments: decreased sensation BLEs, Charcot deformity with decreased foot/ankle ROM, decreased balance, endurance, generalized weakness    Recommendations:    Patient is a candidate for ARU. He has new functional deficits and ongoing medical complexity. Demonstrates ability to tolerate 3 hours therapy/day. Will require insurance pre-cert. His insurance does not typically cover for this diagnosis, therefore may need to consider SNF. Patient stating he will refuse. Informed him of risks of discharging home given complex wound care and IV antibiotic needs. Will follow. Thank you for this consult. Please contact me with any questions or concerns. [address].  Caty Bess MD 6/15/2020, 12:05 PM

## 2020-06-15 NOTE — CARE COORDINATION
Referral to [address] [place] [address] received and the patient was discussed with PM&R physician. The patient can be accepted to rehab pending insurance pre-cert approval. Pre-cert was initiated, although this particular insurance usually does not approve patient's for rehab with this diagnosis and a secondary discharge plan should be discussed and looked into.

## 2020-06-15 NOTE — PROGRESS NOTES
Infectious Disease Follow up Notes  Admit Date: 6/6/2020  Hospital Day: 10    Antibiotics :   IV Zosyn      CHIEF COMPLAINT:     Fevers  Emesis  Diabetic foot infection   Diabetic foot ulcer      Subjective interval History :  75 y.o. man with DM and Neuropahty, with on going ulcer Left foot and drainage now with worsening infection, fevers WBC elevation. X ray left foot negative. MRI Left foot with osteomyelitis and less drainage and has flat foot with deformity and Podiatry discussions noted and pt does not want surgery. Has received IV Ertapenem and no issues and tolerating the CROW boot thinks its heavy now being evaluated for ARU will follow     Past Medical History:    Past Medical History:   Diagnosis Date    Diabetes mellitus ([address]     Gallstones     Pancreatitis     Ulcers of both lower legs ([address]     bilateral feet       Past Surgical History:    Past Surgical History:   Procedure Laterality Date    ERCP  4/8/14    LITHOTRYPSY & PANCREATIC STENT PLACEMENT    FOOT SURGERY Left 1967       Current Medications:    Outpatient Medications Marked as Taking for the 6/6/20 encounter [facility] HOSP[facility] Encounter)   Medication Sig Dispense Refill    aspirin 81 MG EC tablet Take 1 tablet by mouth daily 30 tablet 0    insulin glargine (LANTUS SOLOSTAR) 100 UNIT/ML injection pen Inject 26 Units into the skin every morning (before breakfast) 1 pen 0    lactobacillus (CULTURELLE) capsule Take 1 capsule by mouth 2 times daily (with meals) 30 capsule 0    atorvastatin (LIPITOR) 40 MG tablet Take 1 tablet by mouth daily 30 tablet 0    lisinopril (PRINIVIL;ZESTRIL) 10 MG tablet Take 0.5 tablets by mouth daily 30 tablet 0    collagenase 250 UNIT/GM ointment Apply topically daily.  1 Tube 0    spironolactone (ALDACTONE) 25 MG tablet Take 25 mg by mouth daily      tamsulosin (FLOMAX) 0.4 MG capsule Take 0.4 mg by mouth nightly       metFORMIN (GLUCOPHAGE) 500 MG tablet Take 500 mg by mouth 2 times daily (with meals)      ammonium lactate (LAC-HYDRIN) 12 % lotion Apply to BL feet and legs bid with dressing changes 1 Bottle 0    Insulin Pen Needle 32G X 4 MM MISC 1 each by Does not apply route daily. 100 each 3    Lancets MISC Once daily 100 each 3    glucose blood VI test strips (ASCENSIA AUTODISC VI;ONE TOUCH ULTRA TEST VI) strip 1 each by In Vitro route daily. As needed. 100 each 3    glucose monitoring kit (FREESTYLE) monitoring kit 1 kit by Does not apply route daily as needed. 1 kit 0       Allergies:  Patient has no known allergies. Immunizations : There is no immunization history on file for this patient. Social History:    Social History     Tobacco Use    Smoking status: Never Smoker    Smokeless tobacco: Never Used   Substance Use Topics    Alcohol use: No    Drug use: No     Social History     Tobacco Use   Smoking Status Never Smoker   Smokeless Tobacco Never Used      Family History   Problem Relation Age of Onset    Colon Cancer Mother         PVD s/p toe amputation by Dr Tyree Kirkpatrick Father         REVIEW OF SYSTEMS:    No fever / chills / sweats. No weight loss. No visual change, eye pain, eye discharge. No oral lesion, sore throat, dysphagia. Denies cough / sputum/Sob   Denies chest pain, palpitations/ dizziness  Denies nausea/ vomiting/abdominal pain/diarrhea. Denies dysuria or change in urinary function. Denies joint swelling or pain. No myalgia, arthralgia. No rashes, skin lesions   Denies focal weakness, sensory change or other neurologic symptoms  No lymph node swelling or tenderness.       Left foot ulceration, cellulitis and Lower leg changes+ diabetic foot infection   PHYSICAL EXAM:      Vitals:  T max  101.3   /74   Pulse 67   Temp 98.3 °F (36.8 °C) (Oral)   Resp 16   Ht 6' 3\" (1.905 m)   Wt 254 lb 6.6 oz (115.4 kg)   SpO2 96%   BMI 31.80 kg/m²     General Appearance: alert,in some  acute distress, no pallor, no icterus obesity ++  Skin: warm and dry, no rash or erythema  Head: normocephalic and atraumatic  Eyes: pupils equal, round, and reactive to light, conjunctivae normal  ENT: tympanic membrane, external ear and ear canal normal bilaterally, nose without deformity, nasal mucosa and turbinates normal without polyps  Neck: supple and non-tender without mass, no thyromegaly  no cervical lymphadenopathy  Pulmonary/Chest: clear to auscultation bilaterally- no wheezes, rales or rhonchi, normal air movement, no respiratory distress  Cardiovascular: normal rate, regular rhythm, normal S1 and S2, no murmurs, rubs, clicks, or gallops, no carotid bruits  Abdomen: soft, non-tender, non-distended, normal bowel sounds, no masses or organomegaly  Extremities: no cyanosis, clubbing or edema  Musculoskeletal: normal range of motion, no joint swelling, deformity or tenderness  Neurologic: reflexes normal and symmetric, no cranial nerve deficit  Psych:  Orientation, sensorium, mood normal  Lines:  PICC  Left foot charcot changes and medial foot ulcer,  skin changes from DM+   Ace wraps++     Data Review:    Lab Results   Component Value Date    WBC 8.6 06/12/2020    HGB 11.6 (L) 06/12/2020    HCT 35.2 (L) 06/12/2020    MCV 77.6 (L) 06/12/2020     06/12/2020     Lab Results   Component Value Date    CREATININE 0.9 06/12/2020    BUN 16 06/12/2020     (L) 06/12/2020    K 4.2 06/12/2020     06/12/2020    CO2 22 06/12/2020       Hepatic Function Panel:   Lab Results   Component Value Date    ALKPHOS 84 06/06/2020    ALT 7 06/06/2020    AST 10 06/06/2020    PROT 7.8 06/06/2020    BILITOT 0.7 06/06/2020    LABALBU 3.3 06/12/2020       UA:  Lab Results   Component Value Date    COLORU YELLOW 06/06/2020    CLARITYU Clear 06/06/2020    GLUCOSEU Negative 06/06/2020    BILIRUBINUR Negative 06/06/2020    KETUA Negative 06/06/2020    SPECGRAV 1.018 06/06/2020    BLOODU TRACE 06/06/2020 PHUR 5.5 06/06/2020    PROTEINU Negative 06/06/2020    UROBILINOGEN 0.2 06/06/2020    NITRU Negative 06/06/2020    LEUKOCYTESUR Negative 06/06/2020    LABMICR YES 06/06/2020    URINETYPE NotGiven 06/06/2020      Urine Microscopic:   Lab Results   Component Value Date    BACTERIA 3+ 04/01/2014    HYALCAST 1 06/06/2020    WBCUA 1 06/06/2020    RBCUA 6 06/06/2020    EPIU 0 06/06/2020       Lactic acid  3.2     CRP  35.9    ESR 66     Wbc  14.2  Down to  10.9    Vanco  15.1     Esr 40       MICRO: cultures reviewed and updated by me            Culture, Wound [[id number]] (Abnormal)  Collected: 06/06/20 0754   Order Status: Completed Specimen: Foot Updated: 06/08/20 0839    Gram Stain Result 3+ Gram positive cocci   3+ Gram positive rods   3+ Gram negative rods   1+ WBC's (Mononuclear)   1+ Epithelial Cells   Abnormal     Organism Proteus mirabilisAbnormal     WOUND/ABSCESS Heavy growth    Organism BHS Group B (Strep agalacticae)Abnormal     WOUND/ABSCESS --    Heavy growth   Susceptibility testing of penicillin and other beta lactams is   not necessary for beta hemolytic Streptococci since resistant   strains have not been identified. (CLSI M100)     Organism DiphtheroidsAbnormal     WOUND/ABSCESS --    Heavy growth   No further workup    Narrative:     ORDER#: [id number]                          ORDERED BY: Gisela Eubanks  SOURCE: Foot                               COLLECTED:  06/06/20 07:54  ANTIBIOTICS AT [facility]. :                      RECEIVED :  06/06/20 07:54  Performed at:  [facility]  [address] [place]   Phone [phone]   Culture, MRSA, Screening [[id number]] Collected: 06/06/20 1730   Order Status: Completed Specimen: Nares Updated: 06/07/20 1300    MRSA Culture Only --    No S aureus MRSA isolated   S aureus MSSA present    Narrative:     ORDER#: [id number]                          ORDERED BY: Flory Lara  SOURCE: Nares                              COLLECTED:  06/06/20 17:30  ANTIBIOTICS AT [facility]. :                      RECEIVED :  06/06/20 17:44  Performed at:  [address] [place] [address]   Phone [phone]   Culture, Blood 1 [[id number]] Collected: 06/06/20 0744   Order Status: Completed Specimen: Blood Updated: 06/07/20 0915    Blood Culture, Routine No Growth to date.  Any change in status will be called. Narrative:     ORDER#: [id number]                          ORDERED BY: Charan Keyes  SOURCE: Blood                              COLLECTED:  06/06/20 07:44  ANTIBIOTICS AT [facility]. :                      RECEIVED :  06/06/20 07:44  If child <=2 yrs old please draw pediatric bottle. ~Blood Culture #1  Performed at:  [address] [place] [address]   Phone [phone]   Culture, Blood 2 [[id number]] Collected: 06/06/20 0754   Order Status: Completed Specimen: Blood Updated: 06/07/20 0915    Culture, Blood 2 No Growth to date.  Any change in status will be called. Narrative:     ORDER#: [id number]                          ORDERED BY: Charan Keyes  SOURCE: Blood                              COLLECTED:  06/06/20 07:54  ANTIBIOTICS AT [facility]. :                      RECEIVED :  06/06/20 07:54  If child <=2 yrs old please draw pediatric bottle. ~Blood Culture #2  Performed at:  [address] [place] [address]   Phone [phone]   MRSA DNA Probe, Nasal [[id number]] Collected: 06/06/20 1730   Order Status: Canceled Specimen: Nasal from Nares      Proteus mirabilis (1)     Antibiotic Interpretation BRIAN Status    ampicillin Sensitive <=8 mcg/mL     ceFAZolin Sensitive <=2 mcg/mL     cefepime Sensitive <=2 mcg/mL     cefotaxime Sensitive <=2 mcg/mL     cefTRIAXone Sensitive <=1 mcg/mL     cefuroxime Sensitive 8 mcg/mL     ciprofloxacin Sensitive <=1 mcg/mL     ertapenem Sensitive <=0.5 mcg/mL gentamicin Sensitive <=4 mcg/mL     meropenem Sensitive <=1 mcg/mL     piperacillin-tazobactam Sensitive <=16 mcg/mL     trimethoprim-sulfamethoxazole Sensitive <=2/38 mcg/mL           IMAGING:    MRI FOOT LEFT W WO CONTRAST   Final Result   Skin thickening and soft tissue edema noted along the medial plantar aspect   of the foot where there is a large ulceration extending to underlying 1st   metatarsal bone. Mild ill-defined increased T2 signal noted within the lateral cuneiform with   mild corresponding low T1 signal. No discrete fracture line visualized. Findings may represent early osteomyelitis. No other marrow changes   suspicious of osteomyelitis. XR FOOT LEFT (MIN 3 VIEWS)   Final Result   Cellulitis and ulceration to the medial aspect of the left foot. No   radiographic evidence of osteomyelitis. XR CHEST PORTABLE   Final Result   No acute cardiopulmonary process. CT ABDOMEN PELVIS WO CONTRAST Additional Contrast? None   Final Result   1. Atrophic pancreas with scattered calcifications along with 1 large   calcification in the central pancreatic duct which is dilated. Spectrum of   findings would favor chronic pancreatitis. No significant inflammation on   the current exam but early acute on chronic pancreatitis may be considered   clinically. 2. No CT evidence of mass lesion in the pancreatic head. 3. Cardiomegaly and bibasilar atelectasis in the visualized lungs.                All the pertinent images and reports for the current Hospitalization were reviewed by me     Scheduled Meds:   insulin glargine  26 Units Subcutaneous Nightly    lisinopril  10 mg Oral Daily    spironolactone  25 mg Oral Daily    lidocaine 1 % injection  5 mL Intradermal Once    atorvastatin  40 mg Oral Daily    lactobacillus  1 capsule Oral BID WC    collagenase   Topical Daily    aspirin  81 mg Oral Daily    sodium chloride flush  10 mL Intravenous 2 times per day    enoxaparin 40 mg Subcutaneous Nightly    insulin lispro  0-6 Units Subcutaneous TID WC    insulin lispro  0-3 Units Subcutaneous Nightly    tamsulosin  0.4 mg Oral Nightly       Continuous Infusions:   dextrose         PRN Meds:  sodium chloride flush, acetaminophen **OR** acetaminophen, polyethylene glycol, ondansetron, glucose, dextrose, glucagon (rDNA), dextrose      Assessment:     Patient Active Problem List   Diagnosis    Abdominal pain, acute    BPH (benign prostatic hyperplasia)    RLQ abdominal pain    Abnormal ECG    HTN (hypertension)    Diabetes mellitus type II, uncontrolled ([address]    [facility]    Fever    Leukocytosis    Charcot foot due to diabetes mellitus (HCC)    Foot ulceration, left, with unspecified severity ([address]    Demand ischemia ([facility])    Controlled type 2 diabetes mellitus with hyperglycemia, with long-term current use of insulin (HCC)    Sepsis ([facility])     Sepsis  resolving   Fevers improving  WBC elevation  Lactic acidosis better   Left complicated diabetic foot infection  Charcot foot   Neuropathy   Dm+  Wound cx mixed errol    Left foot is the source for infection and sepsis, MRI Left foot with osteomyelitis and given flat foot and if not off loading risk for worsening and limb loss  OPAT d/w pt he would like to try IV abx Podiatry notes reviewed    PICC line orders placed and will choose IV Ertapenem for d/c planning  If its cost prohibitive for pt other options d/w  to coordinate      He has CROW boot able to wear and ambulate thinks its heavy     ARU placement evaluation in process will follow and adjust IV abx plan accordingly     Labs, Microbiology, Radiology and all the pertinent results from current hospitalization and  care every where were reviewed  by me as a part of the evaluation   Plan:   1. Cont IV Zosyn x 3.375 gm Q 8 HR as in patient  If he goes to ARU for now has received IV Ertapenem today    2. PICC line  Placed   3.  MRI left foot osteomyelitis   4. Local foot care  5. Trend WBC    6. At d/c will change to IV Ertapenem x 1 gm Q 24hr will improve compliance and therapy with once a day regimen x 6 weeks if he goes home  7. Need to off load the foot - CROW boot per Podiatry   8. MAIKOL done   9. Await ARU decision     Discussed with patient/Family and Nursing staff     Thanks for allowing me to participate in your patient's care and please call me with any questions or concerns.     Raul Carpio MD  Infectious Disease  [facility]) Physician  Phone: [phone]   Fax : [phone]

## 2020-06-15 NOTE — PROGRESS NOTES
Hospital Medicine Progress Note      Admit Date: 6/6/2020         Overnight Events: none    CC: F/U for fever, vomiting, chest pain and a cough    Hospital Course: This is a 76-year-old male with a history of previous pancreatitis, gallstones, chronic bilateral ulcerations on his feet, and diabetes who presented to the ED with complaints of fever, vomiting and a cough. He also complained of chest pain with cough and breathing. He has associated generalized body aches and weakness. COVID testing negative. He was found to have Sepsis related to foot infection. Podiatry and ID were consulted. Xrays performed without notable osteo, however MRI shows signs of early disease. Wound cx grew Proteus and BHS. Given his complaints of chest pain and elevated troponin cardiology was consulted. Elevation thought to be related to demand ischemia. Echocardiogram was normal.   He received CROW boot on 6/13/2020 and was evaluated by PT/OT. Stable d/c and order placed on 6/14/2020. Now agreeable to rehab and consult placed, waiting on evaluation and precert. Interval History/Subjective:   Complains of low back pain overnight. Denies fevers chills chest pain or shortness of breath. No other symptoms noted at present. Review of Systems:     Comprehensive ROS negative except as mentioned above. Past Medical History:        Diagnosis Date    Diabetes mellitus ([address]     Gallstones     Pancreatitis     Ulcers of both lower legs ([address]     bilateral feet       Past Surgical History:        Procedure Laterality Date    ERCP  4/8/14    LITHOTRYPSY & PANCREATIC STENT PLACEMENT    FOOT SURGERY Left 1967       Allergies:  Patient has no known allergies. Past medical and surgical history reviewed. Any changes have been noted.      Scheduled and prn Medications:    Scheduled Meds:   insulin glargine  26 Units Subcutaneous Nightly    lisinopril  10 mg Oral Daily    spironolactone  25 mg Oral Daily    lidocaine 1 % injection  5 mL Intradermal Once    atorvastatin  40 mg Oral Daily    lactobacillus  1 capsule Oral BID WC    collagenase   Topical Daily    aspirin  81 mg Oral Daily    sodium chloride flush  10 mL Intravenous 2 times per day    enoxaparin  40 mg Subcutaneous Nightly    insulin lispro  0-6 Units Subcutaneous TID WC    insulin lispro  0-3 Units Subcutaneous Nightly    tamsulosin  0.4 mg Oral Nightly     Continuous Infusions:   dextrose       PRN Meds:.sodium chloride flush, acetaminophen **OR** acetaminophen, polyethylene glycol, ondansetron, glucose, dextrose, glucagon (rDNA), dextrose    PHYSICAL EXAM:  /74   Pulse 67   Temp 98.3 °F (36.8 °C) (Oral)   Resp 16   Ht 6' 3\" (1.905 m)   Wt 254 lb 6.6 oz (115.4 kg)   SpO2 95%   BMI 31.80 kg/m²       Intake/Output Summary (Last 24 hours) at 6/15/2020 1644  Last data filed at 6/15/2020 1217  Gross per 24 hour   Intake 1500 ml   Output 720 ml   Net 780 ml       General: Alert and oriented. Resting in bed in no apparent distress. HEENT: Normocephalic. Atraumatic. Pupils equal and reactive. EOM intact. Oral mucosa pink/moist/intact. Neck: Supple. Symmetrical. Trachea midline. Lungs: good resp effort, cta all fields  Chest: Exam unremarkable. Cardiac: s1s2 noted without murmur gallop or rub. Abdomen/GI: bs +, nontender, soft  Extremities:  left lower extremity wrapped in Ace, LLE boot on  Skin: Dry and intact. No lesions noted. Neuro: Grossly intact. No focal deficits noted.  VILLARREAL without difficulty        LABS:    Lab Results   Component Value Date    WBC 8.6 06/12/2020    HGB 11.6 (L) 06/12/2020    HCT 35.2 (L) 06/12/2020    MCV 77.6 (L) 06/12/2020     06/12/2020    LYMPHOPCT 22.8 06/12/2020    RBC 4.54 06/12/2020    MCH 25.6 (L) 06/12/2020    MCHC 33.0 06/12/2020    RDW 16.6 (H) 06/12/2020       Lab Results   Component Value Date    CREATININE 0.9 06/12/2020    BUN 16 06/12/2020     (L) 06/12/2020    K 4.2 06/12/2020     06/12/2020    CO2 22 06/12/2020       Lab Results   Component Value Date    MG 1.90 02/06/2015       Lab Results   Component Value Date    ALT 7 (L) 06/06/2020    AST 10 (L) 06/06/2020    ALKPHOS 84 06/06/2020    BILITOT 0.7 06/06/2020        No flowsheet data found. Imaging:  MRI FOOT LEFT W WO CONTRAST   Final Result   Skin thickening and soft tissue edema noted along the medial plantar aspect   of the foot where there is a large ulceration extending to underlying 1st   metatarsal bone. Mild ill-defined increased T2 signal noted within the lateral cuneiform with   mild corresponding low T1 signal. No discrete fracture line visualized. Findings may represent early osteomyelitis. No other marrow changes   suspicious of osteomyelitis. XR FOOT LEFT (MIN 3 VIEWS)   Final Result   Cellulitis and ulceration to the medial aspect of the left foot. No   radiographic evidence of osteomyelitis. XR CHEST PORTABLE   Final Result   No acute cardiopulmonary process. CT ABDOMEN PELVIS WO CONTRAST Additional Contrast? None   Final Result   1. Atrophic pancreas with scattered calcifications along with 1 large   calcification in the central pancreatic duct which is dilated. Spectrum of   findings would favor chronic pancreatitis. No significant inflammation on   the current exam but early acute on chronic pancreatitis may be considered   clinically. 2. No CT evidence of mass lesion in the pancreatic head. 3. Cardiomegaly and bibasilar atelectasis in the visualized lungs. Assessment & Plan:      Diabetic foot ulcer with Charcot foot  -Culture showing Proteus and group B strep  -ID following closely  -X-rays without signs of osteo  -MRI concerning for early changes of osteomyelitis, poor candidate for any treatment, refusing ECF for IV abx and do not feel he would be capable of doing this at home by himself. He is also concerned about having a PICC placed.  He refused to off load and states that he has to [employer] on it\" and CROW boot was ordered   -Podiatry consulted and following, had bedside debridement and recommending chemical debridement with Santyl with daily dressing changes- these have been ordered  -Proteus and BHS strep on wound cx- MAIKOL completed by ID  - CROW boot obtained and evaluated by Pt/ot  - now agreeable to rehab-if michelle approved for ARU will need home care  - received a dose of invanz today for d/c home but now agreeable to rehab and will need precert, d/w SW and nursing    Sepsis, POA  -Thought to be related to diabetic foot wound/Charcot foot  -ESR CRP mildly elevated  -Blood cultures with no growth to date  -ID consulted and following closely  - resolved    Atypical chest pain with mildly elevated troponin  - ECHO normal  - Evaluated by cards, likely demand ischemia  - resolved     Diabetes mellitus type 2 without hyperglycemia  -A1c noted at 6.9  -Lantus and sliding scale for now  -increased Lantus to 26 units    Essential hypertension  -BP stable with increase in Lisinopril, d/c with increased dose    Cough and vomiting  -Unsure of etiology  -Could be related to foot infection  -CXR without acute findings  -Urinalysis unremarkable  -Resolved    Lactic acidosis  -Likely related to #1 #2  -IV fluids  -Resolved    Obese  Body mass index is 31.8 kg/m². The patient and / or the family were informed of the results of any tests, a time was given to answer questions, a plan was proposed and they agreed with plan.     DVT prophylaxis: [x] Lovenox  [] SQ Heparin  [] SCDs because of  [] warfarin/oral direct thrombin inhibitor [] Encourage ambulation    GI prophylaxis: [] [id number]  [x] not indicated    Probiotic if on abx: [x] Yes [] No [] Not Indicated    Diet: DIET CARB CONTROL;    Consults:  IP CONSULT TO HOSPITALIST  IP CONSULT TO INFECTIOUS DISEASES  IP CONSULT TO PODIATRY  IP CONSULT TO CARDIOLOGY  INPATIENT CONSULT TO ORTHOTIST/PROSTHETIST  IP CONSULT TO PHARMACY  IP CONSULT TO PHYSICAL MEDICINE REHAB    Disposition:  [] Home  [x] Home with home health [] Rehab [] Psych [] SNF  [] LTAC  [] Long term nursing home or group home [] Transfer to ICU  [] Transfer to PCU [] Other: has Visiting NP    Code Status: Full Code    ELOS: tbd      KENNY Richard - JAY  06/15/20

## 2020-06-15 NOTE — PROGRESS NOTES
Physical Therapy    Facility/Department: [address] ORTHOPEDICS  Daily Treatment Note    This note serves as patient discharge summary if pt discharges prior to next PT visit      NAME: Alicia Gutiérrez  : 1955  MRN: [id number]    Date of Service: 6/15/2020    Discharge Recommendations:  5-7 sessions per week   Alicia Gutiérrez scored a 16/24 on the AM-PAC short mobility form. Current research shows that an AM-PAC score of 17 or less is typically not associated with a discharge to the patient's home setting. Based on the patient's AM-PAC score and their current functional mobility deficits, it is recommended that the patient have 5-7 sessions per week of Physical Therapy at d/c to increase the patient's independence. At this time, this patient demonstrates the endurance, and/or tolerance for 3 hours of therapy each day, with a treatment frequency of 5-7x/wk. Please see assessment section for further patient specific details. PT Equipment Recommendations  Other: continue to assess    Assessment   Body structures, Functions, Activity limitations: Decreased functional mobility ; Decreased safe awareness;Decreased ROM; Decreased cognition  Assessment: 76 yo male with history of DM, peripheral neuropathy, and Charcot arthropathy, with large ovoid wound of plantar left midfoot due to Charcot disfigurement / rocker-bottom structure. Now with CROW boot L LE, weight bearing as tolerated per podiatry. Patient lives alone in home with multiple steps to enter. Reports independence in functional mobility throughout home with wh walker (not maintaining L LE NWBng), ADLs (sponge bathing, and ordering food to home). Aide assists once weekly for light cleaning and grocery shopping. 6- status:  Transfers from [facility] chair with Min A, with patient using momentum to complete. Wh walker ambulation x 10' with CGA-Min A, with fair gait quality and decreased stance stability during turns.  Patient with motivation to improve function, but lacks insight to his deficits and the risk to him of further injury if he is unable to function safely. He is a high fall risk. He requires ongoing skilled therapy to optimize function, strength, and balance, but as important or more importantly, he requires instruction in safe self care and safety with mobility. He would also benefit from ongoing medical management for resolution/maximization of medical status. He would tolerate high intensity therapy. Will continue to see while in the hospital    Treatment Diagnosis: Impaired functional mobility  Prognosis: Good  Decision Making: Medium Complexity  History: as noted  PT Education: Goals;PT Role;Transfer Training;Gait Training;General Safety; Disease Specific Education  Activity Tolerance  Activity Tolerance: Patient Tolerated treatment well       Patient Diagnosis(es): The primary encounter diagnosis was Diabetic foot infection ([facility] [address] Diagnoses of Septicemia ([facility] [address] and Non-intractable vomiting with nausea, unspecified vomiting type were also pertinent to this visit. has a past medical history of Diabetes mellitus ([facility] [address] Gallstones, Pancreatitis, and Ulcers of both lower legs ([address] has a past surgical history that includes Foot surgery (Left, 1967) and ERCP (4/8/14). Restrictions  Restrictions/Precautions  Restrictions/Precautions: Weight Bearing, Fall Risk  Lower Extremity Weight Bearing Restrictions  Left Lower Extremity Weight Bearing: Weight Bearing As Tolerated  Partial Weight Bearing Percentage Or Pounds: in CROW boot  Position Activity Restriction  Other position/activity restrictions: WBAT in CROW boot Per Dr. Jennifer Jean: \"PT/OT to work with patient on modified gait and use of the CROW boot once he has it made. OK to ambulate in the CROW boot. \"        Subjective  General  Chart Reviewed: Yes  Patient assessed for rehabilitation services?: Yes  Additional Pertinent Hx: Per Juan Manuel COX - CNP consult note 6-: \"This is a 68-year-old male with a history of previous pancreatitis, gallstones, chronic bilateral ulcerations on his feet, and diabetes who presented to the ED with complaints of fever, vomiting and a cough. He also complained of chest pain with cough and breathing. He has associated generalized body aches and weakness. COVID testing negative. He was found to have Sepsis related to foot infection. Podiatry and ID were consulted. Xrays performed without notable osteo, however MRI shows signs of early disease. Wound cx grew Proteus and BHS. Given his complaints of chest pain and elevated troponin cardiology was consulted. Elevation thought to be related to demand ischemia. Echocardiogram was normal.\" Podiatry orders CROW boot for L foot, which is delivered 6-. Response To Previous Treatment: Patient with no complaints from previous session. Family / Caregiver Present: No  Referring Practitioner: Dr. Tiffanie Liz  Subjective  Subjective: Patient in [facility] chair. Agreeable to therapy. Denies pain, but without sensation, in B feet. Reports chronic LBP.     Pain Screening  Patient Currently in Pain: (No complaints of pain throughout session - reports hurt his back )    Orientation  Orientation  Overall Orientation Status: Within Functional Limits     Social/Functional History  Social/Functional History  Lives With: Alone  Type of Home: House  Home Layout: Multi-level, Able to Live on Main level with bedroom/bathroom  Home Access: Stairs to enter with rails  Entrance Stairs - Number of Steps: 2 sets of ~5 steps   Entrance Stairs - Rails: Both(far spaced)  Bathroom Shower/Tub: Shower chair with back, Tub/Shower unit(takes sponge baths)  Bathroom Toilet: Standard(with riser w/rails)  Bathroom Equipment: 3-in-1 commode  Home Equipment: Wheelchair-manual, Cane, Rolling walker, Standard walker, Quad cane(WC doesn't fit through doorways-doesn't use)  Receives Help From: (COA 1x/week, nurse 2x/week to change bandages, NP 1x/month - MOA )  ADL Assistance: Independent  Homemaking Assistance: Needs assistance(aide once weekly for grocery, cleaning. Patient Arcelia Trinidad out\" his clothes. )  Ambulation Assistance: Independent(with wh walker. Short dstances. )  Transfer Assistance: Independent  Active : No  Additional Comments: Sleeps in recliner. Aide once weekly. States he \"doesn't leave the house. \"     Cognition   Cognition  Overall Cognitive Status: Exceptions  Arousal/Alertness: Appropriate responses to stimuli  Following Commands: Follows all commands without difficulty; Follows one step commands consistently  Attention Span: Appears intact  Memory: Appears intact  Safety Judgement: Decreased awareness of need for assistance;Decreased awareness of need for safety  Problem Solving: Assistance required to correct errors made;Assistance required to identify errors made;Assistance required to implement solutions;Assistance required to generate solutions  Insights: Decreased awareness of deficits  Initiation: Does not require cues  Sequencing: Requires cues for some    Objective  Transfers  Sit to Stand: Stand by assistance;Contact guard assistance(BS chair to wh walker. Cues for technique)  Stand to sit: Minimal Assistance(walker to sitting in BS chair. Uses)  Ambulation  Ambulation?: Yes  Ambulation 1  Surface: level tile  Device: Rolling Walker(wide wh walker, to allow clearance of L CROW Boot)  Assistance: Contact guard assistance;Minimal assistance  Quality of Gait: L lower leg externally rotated, with wide base of support. At times stands behind wh walker base, but corrects with cues. Slow beto, partial step through pattern. Decreased B LE stance stability during turns. Distance: 10', multiple turns. Comments: Seems less impulsive than he was for other staff earlier this date. Reports and appears mod fatigue at above distance.    Stairs/Curb  Stairs?: Yes(Not yet with enough endurance to safely trial )  Balance  Posture: Fair  Sitting - Static: Good  Sitting - Dynamic: Good  Standing - Static: Good(At RW)  Standing - Dynamic: Good;-(at RW)  Exercises  Quad Sets: x 15 B cues throughout  Gluteal Sets: x 10 B  cues throughout  Knee Long Arc Quad: x 10 B  Comments: cues throughout for slow controlled technique     Plan   Plan  Times per week: 3-5  Current Treatment Recommendations: Functional Mobility Training  Safety Devices  Type of devices: Call light within reach, Gait belt, Left in chair, Chair alarm in place    AM-PAC Score  AM-PAC Inpatient Mobility Raw Score : 16 (06/15/20 1044)  AM-PAC Inpatient T-Scale Score : 40.78 (06/15/20 1044)  Mobility Inpatient CMS 0-100% Score: 54.16 (06/15/20 1044)  Mobility Inpatient CMS G-Code Modifier : CK (06/15/20 1044)     Goals  Short term goals  Time Frame for Short term goals: By acute DC. Goals revised 6-15  Short term goal 1: Transfers (sit<>stand, stand pivot) CGA-Min A NWBng L LE. 6-: Transfers Mod Indep  Short term goal 2: 5' wh walker amb, NWBng L LE. 6-: Wh walker amb 30' Mod indep  Short term goal 3: Tolerates 10 reps each B LE exs. 6-: goal met and ongoing. Patient Goals   Patient goals : \"I'm going home no matter what. \"       Therapy Time   Individual Concurrent Group Co-treatment   Time In 0386         Time Out 1100         Minutes 27            Abigail Jacobs PT  Electronically signed by Brayan Pereira, LUCIANO 1023 on 6/15/2020 at 11:14 AM

## 2020-06-15 NOTE — PROGRESS NOTES
Attempted to call Dr. Loni Jacobs for dressing change orders. Was told MD was doing the dressing change, pt states they did not do the change today when they came to see him. No orders in the chart. Will attempt to page again.   Electronically signed by Rufina Booth RN on 6/15/2020 at 7:34 PM

## 2020-06-15 NOTE — PLAN OF CARE
Problem: Falls - Risk of:  Goal: Will remain free from falls  Description: Will remain free from falls  6/14/2020 2239 by Beatrice Kramer RN  Outcome: Ongoing  Note: Patient educated on fall prevention. Call light is within reach, bed locked in lowest position, personal items within reach, and bed alarm is on. Will round on patient per unit guidelines. Problem: Falls - Risk of:  Goal: Absence of physical injury  Description: Absence of physical injury  6/14/2020 2239 by Beatrice Kramer RN  Outcome: Ongoing  Note: Pt is free of injury. No injury noted. Fall precautions in place. Call light within reach. Will monitor.         Problem: Skin Integrity:  Goal: Demonstration of wound healing without infection will improve  Description: Demonstration of wound healing without infection will improve  6/14/2020 2239 by Beatrice Kramer RN  Outcome: Ongoing     Problem: Skin Integrity:  Goal: Complications related to intravenous access or infusion will be avoided or minimized  Description: Complications related to intravenous access or infusion will be avoided or minimized  6/14/2020 2239 by Beatrice Kramer RN  Outcome: Ongoing

## 2020-06-15 NOTE — PROGRESS NOTES
Pt resting quietly in bed with eyes closed, resp easy and unlabored. No s/s of acute distress noted at this time. Appears comfortable. Call light is within reach. Will continue to monitor.        Electronically signed by Jerrell Zamora RN on 6/15/2020 at 3:22 AM

## 2020-06-15 NOTE — CARE COORDINATION
Yoandy informed patient precert for acute rehab was started but his insurance may not approve this level of care given his diagnosis. Yoandy asked patient about snf placement again. He stated, if he cannot go to acute rehab, he would dc home with home care and would have the IV antibiotics at home.      Electronically signed by Leticia Rivas on 6/15/2020 at 4:28 PM

## 2020-06-16 VITALS
BODY MASS INDEX: 31.3 KG/M2 | DIASTOLIC BLOOD PRESSURE: 73 MMHG | HEART RATE: 74 BPM | TEMPERATURE: 98.7 F | RESPIRATION RATE: 16 BRPM | WEIGHT: 251.77 LBS | SYSTOLIC BLOOD PRESSURE: 127 MMHG | HEIGHT: 75 IN | OXYGEN SATURATION: 97 %

## 2020-06-16 LAB
ANION GAP SERPL CALCULATED.3IONS-SCNC: 8 MMOL/L (ref 3–16)
BASOPHILS ABSOLUTE: 0.1 K/UL (ref 0–0.2)
BASOPHILS RELATIVE PERCENT: 0.8 %
BUN BLDV-MCNC: 22 MG/DL (ref 7–20)
CALCIUM SERPL-MCNC: 9.1 MG/DL (ref 8.3–10.6)
CHLORIDE BLD-SCNC: 101 MMOL/L (ref 99–110)
CO2: 23 MMOL/L (ref 21–32)
CREAT SERPL-MCNC: 0.8 MG/DL (ref 0.8–1.3)
EOSINOPHILS ABSOLUTE: 0.2 K/UL (ref 0–0.6)
EOSINOPHILS RELATIVE PERCENT: 2.1 %
GFR AFRICAN AMERICAN: >60
GFR NON-AFRICAN AMERICAN: >60
GLUCOSE BLD-MCNC: 129 MG/DL (ref 70–99)
GLUCOSE BLD-MCNC: 148 MG/DL (ref 70–99)
GLUCOSE BLD-MCNC: 153 MG/DL (ref 70–99)
HCT VFR BLD CALC: 37.1 % (ref 40.5–52.5)
HEMOGLOBIN: 12 G/DL (ref 13.5–17.5)
LYMPHOCYTES ABSOLUTE: 2.6 K/UL (ref 1–5.1)
LYMPHOCYTES RELATIVE PERCENT: 34.3 %
MCH RBC QN AUTO: 25.5 PG (ref 26–34)
MCHC RBC AUTO-ENTMCNC: 32.2 G/DL (ref 31–36)
MCV RBC AUTO: 79.1 FL (ref 80–100)
MONOCYTES ABSOLUTE: 0.5 K/UL (ref 0–1.3)
MONOCYTES RELATIVE PERCENT: 6.9 %
NEUTROPHILS ABSOLUTE: 4.2 K/UL (ref 1.7–7.7)
NEUTROPHILS RELATIVE PERCENT: 55.9 %
PDW BLD-RTO: 16.7 % (ref 12.4–15.4)
PERFORMED ON: ABNORMAL
PERFORMED ON: ABNORMAL
PLATELET # BLD: 300 K/UL (ref 135–450)
PMV BLD AUTO: 7 FL (ref 5–10.5)
POTASSIUM SERPL-SCNC: 4.2 MMOL/L (ref 3.5–5.1)
RBC # BLD: 4.69 M/UL (ref 4.2–5.9)
SODIUM BLD-SCNC: 132 MMOL/L (ref 136–145)
WBC # BLD: 7.6 K/UL (ref 4–11)

## 2020-06-16 PROCEDURE — 6360000002 HC RX W HCPCS: Performed by: NURSE PRACTITIONER

## 2020-06-16 PROCEDURE — 80048 BASIC METABOLIC PNL TOTAL CA: CPT

## 2020-06-16 PROCEDURE — 2580000003 HC RX 258: Performed by: NURSE PRACTITIONER

## 2020-06-16 PROCEDURE — 6370000000 HC RX 637 (ALT 250 FOR IP): Performed by: INTERNAL MEDICINE

## 2020-06-16 PROCEDURE — 85025 COMPLETE CBC W/AUTO DIFF WBC: CPT

## 2020-06-16 PROCEDURE — 6370000000 HC RX 637 (ALT 250 FOR IP): Performed by: HOSPITALIST

## 2020-06-16 PROCEDURE — 2580000003 HC RX 258: Performed by: HOSPITALIST

## 2020-06-16 PROCEDURE — 94760 N-INVAS EAR/PLS OXIMETRY 1: CPT

## 2020-06-16 PROCEDURE — 6370000000 HC RX 637 (ALT 250 FOR IP): Performed by: NURSE PRACTITIONER

## 2020-06-16 RX ADMIN — PIPERACILLIN AND TAZOBACTAM 3.38 G: 3; .375 INJECTION, POWDER, LYOPHILIZED, FOR SOLUTION INTRAVENOUS at 09:33

## 2020-06-16 RX ADMIN — INSULIN LISPRO 1 UNITS: 100 INJECTION, SOLUTION INTRAVENOUS; SUBCUTANEOUS at 08:06

## 2020-06-16 RX ADMIN — Medication 1 CAPSULE: at 08:06

## 2020-06-16 RX ADMIN — ASPIRIN 81 MG: 81 TABLET, COATED ORAL at 08:06

## 2020-06-16 RX ADMIN — LISINOPRIL 10 MG: 10 TABLET ORAL at 08:06

## 2020-06-16 RX ADMIN — ATORVASTATIN CALCIUM 40 MG: 40 TABLET, FILM COATED ORAL at 08:06

## 2020-06-16 RX ADMIN — SODIUM CHLORIDE, PRESERVATIVE FREE 10 ML: 5 INJECTION INTRAVENOUS at 08:07

## 2020-06-16 RX ADMIN — SPIRONOLACTONE 25 MG: 25 TABLET ORAL at 08:06

## 2020-06-16 NOTE — PLAN OF CARE
Problem: Falls - Risk of:  Goal: Will remain free from falls  Description: Will remain free from falls  Outcome: Ongoing  Note: Fall risk assessment completed . Fall precautions in place, bed alarm on, side rails 2/4 up, call light in reach, educated pt on calling for assistance when needed, room clear of clutter. Pt verbalized understanding.      Goal: Absence of physical injury  Description: Absence of physical injury  Outcome: Ongoing     Problem: Skin Integrity:  Goal: Demonstration of wound healing without infection will improve  Description: Demonstration of wound healing without infection will improve  Outcome: Ongoing  Note: Patient is alert and oriented, afebrile, has no complaints of pain, skin is intact and appropriate for ethnicity in color    Goal: Complications related to intravenous access or infusion will be avoided or minimized  Description: Complications related to intravenous access or infusion will be avoided or minimized  Outcome: Ongoing

## 2020-06-16 NOTE — PROGRESS NOTES
Patient discharged with belongings and durable medical equipment via stretcher via [employer] transportation to private residence. Patient given discharge instructions, patient verbalized understanding, no questions at this time. Prescriptions given to patient/family.    Electronically signed by Elaine Isidro RN on 6/16/2020 at 3:48 PM

## 2020-06-16 NOTE — CARE COORDINATION
Pre-cert was DENIED by insurance for rehab admission. Peer to Peer is available by calling Jenn Crespo at [phone] with MD name and available times. REF#  [id number].

## 2020-06-16 NOTE — PROGRESS NOTES
Changed patient foot dressing per MD orders, patient tolerated it well. No other needs voiced.  Call light is within reach    Electronically signed by Autumn Rosado RN on 6/15/2020 at 11:09 PM

## 2020-06-16 NOTE — PROGRESS NOTES
Dr. Kenyatta Flor called this RN and instructed me to change the dressing, he stated that I use Santyl, gauze, abd pad, and soft cling wrap.      Electronically signed by Stephanie Fernandez RN on 6/15/2020 at 11:08 PM

## 2020-06-16 NOTE — PROGRESS NOTES
and early changes of osteomyelitis - S/p debridement by Podiatry. Will need long-term IV antibiotics. Poor surgical candidate per Dr. Stanislav Renteria due to patient refusal to off-load. Currently WBAT in [address] [place] [address]. 4. DM2 with neuropathy  5. Charcot deformity  6. Chronic venous stasis  7. Atypical chest pain with elevated troponin - demand ischemia     Impairments: decreased sensation BLEs, Charcot deformity with decreased foot/ankle ROM, decreased balance, endurance, generalized weakness     Recommendations:     Insurance has denied ARU. SNF recommended for ongoing therapy, wound care, and IV antibiotics  However patient is adamantly opposed to SNF and prefers to discharge home. Recommend HH PT, OT, RN, Aide. Patient agreeable. I had a discussion with patient about the importance of pressure off-loading and use of CROW boot per Podiatry recommendations. He is aware of risks of further systemic infection and potential amputation.      Thank you for this consult. Please contact me with any questions or concerns. Danica De La Rosa.  Daja Rg MD 6/16/2020, 3:10 PM

## 2020-06-16 NOTE — CARE COORDINATION
Yoandy aware patient's acute rehab being denied by insurance. Yoandy spoke with patient regarding above and discussed snf option again. Patient does not want snf placement. He prefers to go home and resume [facility]. He is aware he will have IV antibiotics at home through [facility]. Patient reported he would need stretcher transport home and has 10 steps to enter. Yoandy set up [employer] for 245pm . Yoandy informed Vaishali at [facility] order--she will pull orders from epic. Yoandy informed Rubio Portillo at [facility] today. Rn aware of above.      Electronically signed by Chavez Rg on 6/16/2020 at 2:22 PM

## 2020-06-16 NOTE — PROGRESS NOTES
Checking on patient Q2H for nutrition needs, hygiene needs, comfort measures, mobility, fall risk interventions, and safe environment. All precautions and interventions in place. Educated patient on use of call light and telephone. Patient verbalizes understanding. Call light/telephone in reach.   Electronically signed by Federico Saldaña RN on 6/16/2020 at 7:20 AM

## 2020-06-16 NOTE — PROGRESS NOTES
Hospital Medicine Discharge Summary    Patient ID: Emmie Ashford      Patient's PCP: Tan Patel, APRN - CNP    Admit Date: 6/6/2020     Discharge Date:   6/16/2020    Admitting Physician: Ronni Steiner MD     Discharge Physician: KENNY De La O CNP     Discharge Diagnoses: Active Hospital Problems    Diagnosis Date Noted    Demand ischemia ([facility] [I24.8]     Controlled type 2 diabetes mellitus with hyperglycemia, with long-term current use of insulin ([facility]) [E11.65, Z79.4]     Sepsis ([facility] [A41.9]     Diabetic foot infection ([facility] [X56.471, L08.9] 06/06/2020    Fever [R50.9] 06/06/2020    Leukocytosis [D72.829] 06/06/2020    Charcot foot due to diabetes mellitus ([facility] [E11.610] 06/06/2020    Foot ulceration, left, with unspecified severity [facility]) Megan Morelos 06/06/2020       The patient was seen and examined on day of discharge and this discharge summary is in conjunction with any daily progress note from day of discharge. Hospital Course: This is a 73-year-old male with a history of previous pancreatitis, gallstones, chronic bilateral ulcerations on his feet, and diabetes who presented to the ED with complaints of fever, vomiting and a cough. He also complained of chest pain with cough and breathing. He has associated generalized body aches and weakness. COVID testing negative. He was found to have Sepsis related to foot infection. Podiatry and ID were consulted. Xrays performed without notable osteo, however MRI shows signs of early disease. Wound cx grew Proteus and BHS. Given his complaints of chest pain and elevated troponin cardiology was consulted. Elevation thought to be related to demand ischemia. Echocardiogram was normal.   He received CROW boot on 6/13/2020 and was evaluated by PT/OT. Stable d/c and order placed on 6/14/2020.  Now agreeable to rehab and consult placed, 6/16 Insurance denied pre-cert, agreeable to  Home care.     Diabetic foot ulcer with Charcot foot- improving  -Culture showing Proteus and group B strep  -ID following closely  -X-rays without signs of osteo  -MRI concerning for early changes of osteomyelitis, poor candidate for any treatment, refusing ECF for IV abx and do not feel he would be capable of doing this at home by himself. He is also concerned about having a PICC placed. He refused to off load and states that he has to [employer] on it\" and CROW boot was ordered   -Podiatry consulted and following, had bedside debridement and recommending chemical debridement with Santyl with daily dressing changes- these have been ordered  -Proteus and BHS strep on wound cx- MAIKOL completed by ID  - CROW boot obtained and evaluated by Pt/ot  - will go home with home care, Hernando Rohan till 7/21/2020     Sepsis, POA-resolved  -Thought to be related to diabetic foot wound/Charcot foot  -ESR CRP mildly elevated  -Blood cultures with no growth to date  -ID consulted and following closely  - resolved     Atypical chest pain with mildly elevated troponin-resolved  - ECHO normal  - Evaluated by cards, likely demand ischemia  - resolved      Diabetes mellitus type 2 without hyperglycemia-chronic and stable  -A1c noted at 6.9  -Lantus and sliding scale for now  -increased Lantus to 26 units     Essential hypertension-chronic and stable  -BP stable with increase in Lisinopril, d/c with increased dose     Cough and vomiting-resolved  -Unsure of etiology  -Could be related to foot infection  -CXR without acute findings  -Urinalysis unremarkable  -Resolved     Lactic acidosis-resolved  -Likely related to #1 #2  -IV fluids  -Resolved     Obese  Body mass index is 31.47 kg/m². Exam:     /73   Pulse 74   Temp 98.7 °F (37.1 °C) (Oral)   Resp 16   Ht 6' 3\" (1.905 m)   Wt 251 lb 12.3 oz (114.2 kg)   SpO2 97%   BMI 31.47 kg/m²     General: Alert and oriented. Resting in bed in no apparent distress. HEENT: Normocephalic. Atraumatic. Pupils equal and reactive. EOM intact. Oral mucosa pink/moist/intact. Neck: Supple. Symmetrical. Trachea midline. Lungs: good resp effort, cta all fields  Chest: Exam unremarkable. Cardiac: s1s2 noted without murmur gallop or rub. Abdomen/GI: bs +, nontender, soft  Extremities:  left lower extremity wrapped in Ace, LLE boot on  Skin: Dry and intact. No lesions noted. Neuro: Grossly intact. No focal deficits noted. VILLARREAL without difficulty      Consults:     IP CONSULT TO HOSPITALIST  IP CONSULT TO INFECTIOUS DISEASES  IP CONSULT TO PODIATRY  IP CONSULT TO CARDIOLOGY  INPATIENT CONSULT TO ORTHOTIST/PROSTHETIST  IP CONSULT TO PHARMACY  IP CONSULT TO PHYSICAL MEDICINE REHAB  IP CONSULT TO HOME CARE NEEDS    Significant Diagnostic Studies:     MRI FOOT LEFT W WO CONTRAST   Final Result   Skin thickening and soft tissue edema noted along the medial plantar aspect   of the foot where there is a large ulceration extending to underlying 1st   metatarsal bone. Mild ill-defined increased T2 signal noted within the lateral cuneiform with   mild corresponding low T1 signal. No discrete fracture line visualized. Findings may represent early osteomyelitis. No other marrow changes   suspicious of osteomyelitis. XR FOOT LEFT (MIN 3 VIEWS)   Final Result   Cellulitis and ulceration to the medial aspect of the left foot. No   radiographic evidence of osteomyelitis. XR CHEST PORTABLE   Final Result   No acute cardiopulmonary process. CT ABDOMEN PELVIS WO CONTRAST Additional Contrast? None   Final Result   1. Atrophic pancreas with scattered calcifications along with 1 large   calcification in the central pancreatic duct which is dilated. Spectrum of   findings would favor chronic pancreatitis. No significant inflammation on   the current exam but early acute on chronic pancreatitis may be considered   clinically. 2. No CT evidence of mass lesion in the pancreatic head.    3. Cardiomegaly and bibasilar atelectasis in the visualized lungs. Disposition:  Home with home care     Condition at Discharge: Stable    Discharge Instructions/Follow-up:  pcp in 1 week    Code Status:  Full Code     Activity: activity as tolerated    Diet: diabetic diet      Labs: For convenience and continuity at follow-up the following most recent labs are provided:      CBC:    Lab Results   Component Value Date    WBC 7.6 06/16/2020    HGB 12.0 06/16/2020    HCT 37.1 06/16/2020     06/16/2020       Renal:    Lab Results   Component Value Date     06/16/2020    K 4.2 06/16/2020    K 4.3 06/08/2020     06/16/2020    CO2 23 06/16/2020    BUN 22 06/16/2020    CREATININE 0.8 06/16/2020    CALCIUM 9.1 06/16/2020    PHOS 3.3 06/12/2020       Discharge Medications:     Current Discharge Medication List           Details   ertapenem (INVANZ) infusion Infuse 1,000 mg intravenously every 24 hours Compound per protocol. Qty: 74821 mg, Refills: 0      aspirin 81 MG EC tablet Take 1 tablet by mouth daily  Qty: 30 tablet, Refills: 0      lactobacillus (CULTURELLE) capsule Take 1 capsule by mouth 2 times daily (with meals)  Qty: 30 capsule, Refills: 0      collagenase 250 UNIT/GM ointment Apply topically daily.   Qty: 1 Tube, Refills: 0              Details   insulin glargine (LANTUS SOLOSTAR) 100 UNIT/ML injection pen Inject 26 Units into the skin every morning (before breakfast)  Qty: 1 pen, Refills: 0      atorvastatin (LIPITOR) 40 MG tablet Take 1 tablet by mouth daily  Qty: 30 tablet, Refills: 0      lisinopril (PRINIVIL;ZESTRIL) 10 MG tablet Take 0.5 tablets by mouth daily  Qty: 30 tablet, Refills: 0              Details   spironolactone (ALDACTONE) 25 MG tablet Take 25 mg by mouth daily      tamsulosin (FLOMAX) 0.4 MG capsule Take 0.4 mg by mouth nightly       metFORMIN (GLUCOPHAGE) 500 MG tablet Take 500 mg by mouth 2 times daily (with meals)      ammonium lactate (LAC-HYDRIN) 12 % lotion Apply to BL feet and legs bid with dressing changes  Qty: 1 Bottle, Refills: 0      Insulin Pen Needle 32G X 4 MM MISC 1 each by Does not apply route daily. Qty: 100 each, Refills: 3      Lancets MISC Once daily  Qty: 100 each, Refills: 3      glucose blood VI test strips (ASCENSIA AUTODISC VI;ONE TOUCH ULTRA TEST VI) strip 1 each by In Vitro route daily. As needed. Qty: 100 each, Refills: 3      glucose monitoring kit (FREESTYLE) monitoring kit 1 kit by Does not apply route daily as needed. Qty: 1 kit, Refills: 0             No future appointments. Time Spent on discharge is more than 1 hour in the examination, evaluation, counseling and review of medications and discharge plan. Signed:    Marolyn Hashimoto, APRN - CNP   6/16/2020      Thank you KENNY Weber CNP for the opportunity to be involved in this patient's care. If you have any questions or concerns please feel free to contact me at [phone].

## 2020-06-16 NOTE — PROGRESS NOTES
Report received from [facility]. This RN to resume care.      Electronically signed by Clemon Riedel, RN on 6/15/2020 at 11:26 PM

## 2020-07-31 ENCOUNTER — HOSPITAL ENCOUNTER (OUTPATIENT)
Age: 65
Setting detail: OBSERVATION
Discharge: HOME | DRG: 641 | End: 2020-08-04
Attending: EMERGENCY MEDICINE | Admitting: STUDENT IN AN ORGANIZED HEALTH CARE EDUCATION/TRAINING PROGRAM
Payer: MEDICARE

## 2020-07-31 ENCOUNTER — APPOINTMENT (OUTPATIENT)
Dept: CT IMAGING | Age: 65
DRG: 641 | End: 2020-07-31
Payer: MEDICARE

## 2020-07-31 PROBLEM — R19.7 DIARRHEA: Status: ACTIVE | Noted: 2020-07-31

## 2020-07-31 LAB
A/G RATIO: 1 (ref 1.1–2.2)
ALBUMIN SERPL-MCNC: 3.6 G/DL (ref 3.4–5)
ALP BLD-CCNC: 91 U/L (ref 40–129)
ALT SERPL-CCNC: 8 U/L (ref 10–40)
ANION GAP SERPL CALCULATED.3IONS-SCNC: 12 MMOL/L (ref 3–16)
AST SERPL-CCNC: 10 U/L (ref 15–37)
BASOPHILS ABSOLUTE: 0.1 K/UL (ref 0–0.2)
BASOPHILS RELATIVE PERCENT: 0.7 %
BILIRUB SERPL-MCNC: 0.5 MG/DL (ref 0–1)
BILIRUBIN URINE: NEGATIVE
BLOOD, URINE: ABNORMAL
BUN BLDV-MCNC: 32 MG/DL (ref 7–20)
C DIFF TOXIN/ANTIGEN: NORMAL
CALCIUM SERPL-MCNC: 9.4 MG/DL (ref 8.3–10.6)
CHLORIDE BLD-SCNC: 107 MMOL/L (ref 99–110)
CLARITY: CLEAR
CO2: 18 MMOL/L (ref 21–32)
COLOR: YELLOW
CREAT SERPL-MCNC: 1.1 MG/DL (ref 0.8–1.3)
EOSINOPHILS ABSOLUTE: 0.1 K/UL (ref 0–0.6)
EOSINOPHILS RELATIVE PERCENT: 1.5 %
EPITHELIAL CELLS, UA: 1 /HPF (ref 0–5)
GFR AFRICAN AMERICAN: >60
GFR NON-AFRICAN AMERICAN: >60
GLOBULIN: 3.7 G/DL
GLUCOSE BLD-MCNC: 115 MG/DL (ref 70–99)
GLUCOSE BLD-MCNC: 97 MG/DL (ref 70–99)
GLUCOSE URINE: NEGATIVE MG/DL
HCT VFR BLD CALC: 33 % (ref 40.5–52.5)
HEMOGLOBIN: 10.9 G/DL (ref 13.5–17.5)
HYALINE CASTS: 1 /LPF (ref 0–8)
KETONES, URINE: NEGATIVE MG/DL
LACTIC ACID: 1.5 MMOL/L (ref 0.4–2)
LEUKOCYTE ESTERASE, URINE: NEGATIVE
LIPASE: 8 U/L (ref 13–60)
LYMPHOCYTES ABSOLUTE: 2.1 K/UL (ref 1–5.1)
LYMPHOCYTES RELATIVE PERCENT: 22.4 %
MCH RBC QN AUTO: 25.9 PG (ref 26–34)
MCHC RBC AUTO-ENTMCNC: 32.9 G/DL (ref 31–36)
MCV RBC AUTO: 78.7 FL (ref 80–100)
MICROSCOPIC EXAMINATION: YES
MONOCYTES ABSOLUTE: 0.7 K/UL (ref 0–1.3)
MONOCYTES RELATIVE PERCENT: 7.3 %
NEUTROPHILS ABSOLUTE: 6.5 K/UL (ref 1.7–7.7)
NEUTROPHILS RELATIVE PERCENT: 68.1 %
NITRITE, URINE: NEGATIVE
PDW BLD-RTO: 17.3 % (ref 12.4–15.4)
PERFORMED ON: NORMAL
PH UA: 5 (ref 5–8)
PLATELET # BLD: 259 K/UL (ref 135–450)
PMV BLD AUTO: 7.8 FL (ref 5–10.5)
POTASSIUM REFLEX MAGNESIUM: 4.5 MMOL/L (ref 3.5–5.1)
PROTEIN UA: NEGATIVE MG/DL
RBC # BLD: 4.19 M/UL (ref 4.2–5.9)
RBC UA: 2 /HPF (ref 0–4)
SODIUM BLD-SCNC: 137 MMOL/L (ref 136–145)
SPECIFIC GRAVITY UA: 1.02 (ref 1–1.03)
TOTAL PROTEIN: 7.3 G/DL (ref 6.4–8.2)
URINE REFLEX TO CULTURE: ABNORMAL
URINE TYPE: ABNORMAL
UROBILINOGEN, URINE: 0.2 E.U./DL
WBC # BLD: 9.5 K/UL (ref 4–11)
WBC UA: 2 /HPF (ref 0–5)

## 2020-07-31 PROCEDURE — 74177 CT ABD & PELVIS W/CONTRAST: CPT

## 2020-07-31 PROCEDURE — 83690 ASSAY OF LIPASE: CPT

## 2020-07-31 PROCEDURE — 85025 COMPLETE CBC W/AUTO DIFF WBC: CPT

## 2020-07-31 PROCEDURE — 81001 URINALYSIS AUTO W/SCOPE: CPT

## 2020-07-31 PROCEDURE — G0378 HOSPITAL OBSERVATION PER HR: HCPCS

## 2020-07-31 PROCEDURE — 99285 EMERGENCY DEPT VISIT HI MDM: CPT

## 2020-07-31 PROCEDURE — 80053 COMPREHEN METABOLIC PANEL: CPT

## 2020-07-31 PROCEDURE — 36415 COLL VENOUS BLD VENIPUNCTURE: CPT

## 2020-07-31 PROCEDURE — 2580000003 HC RX 258: Performed by: EMERGENCY MEDICINE

## 2020-07-31 PROCEDURE — 6360000004 HC RX CONTRAST MEDICATION: Performed by: EMERGENCY MEDICINE

## 2020-07-31 PROCEDURE — 87040 BLOOD CULTURE FOR BACTERIA: CPT

## 2020-07-31 PROCEDURE — 87449 NOS EACH ORGANISM AG IA: CPT

## 2020-07-31 PROCEDURE — 83605 ASSAY OF LACTIC ACID: CPT

## 2020-07-31 PROCEDURE — 96361 HYDRATE IV INFUSION ADD-ON: CPT

## 2020-07-31 PROCEDURE — 87505 NFCT AGENT DETECTION GI: CPT

## 2020-07-31 PROCEDURE — 87324 CLOSTRIDIUM AG IA: CPT

## 2020-07-31 PROCEDURE — 96360 HYDRATION IV INFUSION INIT: CPT

## 2020-07-31 PROCEDURE — 2580000003 HC RX 258: Performed by: NURSE PRACTITIONER

## 2020-07-31 RX ORDER — MAGNESIUM SULFATE 1 G/100ML
1 INJECTION INTRAVENOUS PRN
Status: DISCONTINUED | OUTPATIENT
Start: 2020-07-31 | End: 2020-08-04 | Stop reason: HOSPADM

## 2020-07-31 RX ORDER — PROMETHAZINE HYDROCHLORIDE 25 MG/1
12.5 TABLET ORAL EVERY 6 HOURS PRN
Status: DISCONTINUED | OUTPATIENT
Start: 2020-07-31 | End: 2020-08-04 | Stop reason: HOSPADM

## 2020-07-31 RX ORDER — ASPIRIN 81 MG/1
81 TABLET ORAL DAILY
Status: DISCONTINUED | OUTPATIENT
Start: 2020-08-01 | End: 2020-08-04 | Stop reason: HOSPADM

## 2020-07-31 RX ORDER — 0.9 % SODIUM CHLORIDE 0.9 %
1000 INTRAVENOUS SOLUTION INTRAVENOUS ONCE
Status: COMPLETED | OUTPATIENT
Start: 2020-07-31 | End: 2020-07-31

## 2020-07-31 RX ORDER — FUROSEMIDE 40 MG/1
40 TABLET ORAL DAILY
Status: DISCONTINUED | OUTPATIENT
Start: 2020-08-01 | End: 2020-08-04 | Stop reason: HOSPADM

## 2020-07-31 RX ORDER — ONDANSETRON 2 MG/ML
4 INJECTION INTRAMUSCULAR; INTRAVENOUS EVERY 6 HOURS PRN
Status: DISCONTINUED | OUTPATIENT
Start: 2020-07-31 | End: 2020-08-04 | Stop reason: HOSPADM

## 2020-07-31 RX ORDER — LISINOPRIL 5 MG/1
5 TABLET ORAL DAILY
Status: DISCONTINUED | OUTPATIENT
Start: 2020-08-01 | End: 2020-08-04 | Stop reason: HOSPADM

## 2020-07-31 RX ORDER — ACETAMINOPHEN 650 MG/1
650 SUPPOSITORY RECTAL EVERY 6 HOURS PRN
Status: DISCONTINUED | OUTPATIENT
Start: 2020-07-31 | End: 2020-08-04 | Stop reason: HOSPADM

## 2020-07-31 RX ORDER — POTASSIUM CHLORIDE 7.45 MG/ML
10 INJECTION INTRAVENOUS PRN
Status: DISCONTINUED | OUTPATIENT
Start: 2020-07-31 | End: 2020-08-04 | Stop reason: HOSPADM

## 2020-07-31 RX ORDER — SODIUM CHLORIDE 9 MG/ML
INJECTION, SOLUTION INTRAVENOUS CONTINUOUS
Status: DISCONTINUED | OUTPATIENT
Start: 2020-07-31 | End: 2020-08-01

## 2020-07-31 RX ORDER — LACTOBACILLUS RHAMNOSUS GG 10B CELL
1 CAPSULE ORAL
Status: DISCONTINUED | OUTPATIENT
Start: 2020-08-01 | End: 2020-08-04 | Stop reason: HOSPADM

## 2020-07-31 RX ORDER — CHOLECALCIFEROL (VITAMIN D3) 125 MCG
1500 CAPSULE ORAL DAILY
Status: ON HOLD | COMMUNITY
End: 2021-06-23

## 2020-07-31 RX ORDER — SODIUM CHLORIDE 0.9 % (FLUSH) 0.9 %
10 SYRINGE (ML) INJECTION PRN
Status: DISCONTINUED | OUTPATIENT
Start: 2020-07-31 | End: 2020-08-04 | Stop reason: HOSPADM

## 2020-07-31 RX ORDER — SPIRONOLACTONE 25 MG/1
25 TABLET ORAL DAILY
Status: DISCONTINUED | OUTPATIENT
Start: 2020-08-01 | End: 2020-08-04 | Stop reason: HOSPADM

## 2020-07-31 RX ORDER — SODIUM CHLORIDE 0.9 % (FLUSH) 0.9 %
10 SYRINGE (ML) INJECTION EVERY 12 HOURS SCHEDULED
Status: DISCONTINUED | OUTPATIENT
Start: 2020-07-31 | End: 2020-08-04 | Stop reason: HOSPADM

## 2020-07-31 RX ORDER — ACETAMINOPHEN 325 MG/1
650 TABLET ORAL EVERY 6 HOURS PRN
Status: DISCONTINUED | OUTPATIENT
Start: 2020-07-31 | End: 2020-08-04 | Stop reason: HOSPADM

## 2020-07-31 RX ORDER — FUROSEMIDE 40 MG/1
40 TABLET ORAL DAILY
Status: ON HOLD | COMMUNITY
End: 2020-07-31

## 2020-07-31 RX ADMIN — SODIUM CHLORIDE 1000 ML: 9 INJECTION, SOLUTION INTRAVENOUS at 13:10

## 2020-07-31 RX ADMIN — SODIUM CHLORIDE, PRESERVATIVE FREE 10 ML: 5 INJECTION INTRAVENOUS at 22:05

## 2020-07-31 RX ADMIN — IOPAMIDOL 75 ML: 755 INJECTION, SOLUTION INTRAVENOUS at 14:16

## 2020-07-31 RX ADMIN — SODIUM CHLORIDE 1000 ML: 9 INJECTION, SOLUTION INTRAVENOUS at 18:36

## 2020-07-31 RX ADMIN — SODIUM CHLORIDE: 9 INJECTION, SOLUTION INTRAVENOUS at 22:05

## 2020-07-31 ASSESSMENT — PAIN DESCRIPTION - ONSET: ONSET: PROGRESSIVE

## 2020-07-31 ASSESSMENT — PAIN SCALES - GENERAL
PAINLEVEL_OUTOF10: 0
PAINLEVEL_OUTOF10: 3

## 2020-07-31 ASSESSMENT — PAIN DESCRIPTION - PROGRESSION: CLINICAL_PROGRESSION: GRADUALLY WORSENING

## 2020-07-31 ASSESSMENT — PAIN - FUNCTIONAL ASSESSMENT: PAIN_FUNCTIONAL_ASSESSMENT: PREVENTS OR INTERFERES SOME ACTIVE ACTIVITIES AND ADLS

## 2020-07-31 ASSESSMENT — PAIN DESCRIPTION - LOCATION: LOCATION: BUTTOCKS

## 2020-07-31 ASSESSMENT — PAIN DESCRIPTION - DESCRIPTORS: DESCRIPTORS: TENDER

## 2020-07-31 ASSESSMENT — PAIN DESCRIPTION - FREQUENCY: FREQUENCY: INTERMITTENT

## 2020-07-31 ASSESSMENT — PAIN DESCRIPTION - PAIN TYPE: TYPE: ACUTE PAIN

## 2020-07-31 NOTE — ED NOTES
Patient alert, oriented x4. Patient provided stool sample for specimen. Specimen sent to lab via tube system. Provider Dr. Zaina Beltran notified.       Claribel Wayne RN  07/31/20 2811

## 2020-07-31 NOTE — ED PROVIDER NOTES
[facility]      Pt Name: Julia Tapia  MRN: [id number]  [place] 1955  Date of evaluation: 7/31/2020  Provider: Rebecca Robb, [facility]  Chief Complaint   Patient presents with    Diarrhea       I wore personal protective equipment when I was in the room the entire time. This includes gloves, N95 mask, face shield, and a glove over my stethoscope for protection. HPI  Julia Tapia is a 75 y.o. male who presents with diarrhea that is been present for the 5 days. It started Monday night. He has had 3 bouts of diarrhea today. His nurse practitioner saw him in stating need to come to the emergency department. He is having perirectal pain secondary to the diarrhea. He states his nurse practitioner told him that he looked \"pale. \"  He denies any blood in his stools he states is been brown and watery. He was on antibiotics from June 16, 2022 July 21, 2020. He does not know the name of the antibiotic. No any fever chills. Denies any nausea vomiting. He has been lightheaded. He states his blood pressure is low. He describes her symptoms as severe. Standing up makes it worse and rest makes it better. ? REVIEW OF SYSTEMS  All systems negative except as noted in the HPI. Reviewed Nurses' notes and concur. No LMP for male patient. PAST MEDICAL HISTORY  Past Medical History:   Diagnosis Date    Diabetes mellitus ([address]     Gallstones     Pancreatitis     Ulcers of both lower legs ([address]     bilateral feet       FAMILY HISTORY  Family History   Problem Relation Age of Onset    Colon Cancer Mother         PVD s/p toe amputation by Dr Chelsi Martin   reports that he has never smoked. He has never used smokeless tobacco. He reports that he does not drink alcohol or use drugs.     SURGICAL HISTORY  Past Surgical History:   Procedure Laterality Date    ERCP  4/8/14    LITHOTRYPSY & PANCREATIC STENT PLACEMENT    FOOT SURGERY Left 1967       CURRENT MEDICATIONS      ALLERGIES  No Known Allergies      PHYSICAL EXAM  VITAL SIGNS: /67   Pulse 65   Temp 98.2 °F (36.8 °C) (Oral)   Resp 16   Wt 254 lb (115.2 kg)   SpO2 100%   BMI 31.75 kg/m²   Constitutional: Well-developed, well-nourished, appears normal, nontoxic, activity: Resting comfortably on the cart, in no obvious pain, speaking full sentences. HEENT: Normocephalic, Atraumatic, Bilateral ears are normal, Oropharynx moist, No oral exudates, Nose normal.  Eyes: PERRLA, EOMI, Conjunctiva normal, No discharge. No scleral icterus. Neck: Normal range of motion, No tenderness, Supple,  Lymphatic: No lymphadenopathy noted. Cardiovascular: Normal heart rate, Normal rhythm, no murmurs, no gallops, no rubs. Thorax & Lungs: Normal breath sounds, No respiratory distress, No wheezing,  Abdomen: Soft, no tender with mild tympany and distension, no masses, no pulsatile masses, no hepatosplenomegaly, cavernous bowel sounds. Skin: Warm, Dry, No erythema, No rash. Back: No tenderness, Full range of motion, No scoliosis. Extremities: Chronic woody edema, No tenderness, No cyanosis, No clubbing. No amputations, capillary refill less than 2 seconds. Musculoskeletal: Good range of motion in all major joints, No tenderness to palpation or major deformities noted.   Neurologic: Alert & oriented x 3  Psychiatric: Affect normal, Mood normal.    LABORATORY  Labs Reviewed   CBC WITH AUTO DIFFERENTIAL - Abnormal; Notable for the following components:       Result Value    RBC 4.19 (*)     Hemoglobin 10.9 (*)     Hematocrit 33.0 (*)     MCV 78.7 (*)     MCH 25.9 (*)     RDW 17.3 (*)     All other components within normal limits    Narrative:     Performed at:  [address] [address]   Phone [phone]   COMPREHENSIVE METABOLIC PANEL W/ REFLEX TO MG FOR LOW K - Abnormal; Notable for the following components:    CO2 18 (*)     Glucose 115 (*)     BUN 32 (*)     Albumin/Globulin Ratio 1.0 (*)     ALT 8 (*)     AST 10 (*)     All other components within normal limits    Narrative:     Performed at:  [address] [address]   Phone [phone]   LIPASE - Abnormal; Notable for the following components:    Lipase 8.0 (*)     All other components within normal limits    Narrative:     Performed at:  [address] [address]   Phone [phone]   URINE RT REFLEX TO CULTURE - Abnormal; Notable for the following components:    Blood, Urine TRACE (*)     All other components within normal limits    Narrative:     Performed at:  [address] [address]   Phone [phone]   C DIFF TOXIN/ANTIGEN    Narrative:     ORDER#: [id number]                          ORDERED BY: Chung Pathak  SOURCE: Stool                              COLLECTED:  07/31/20 16:42  ANTIBIOTICS AT [facility].:                      RECEIVED :  07/31/20 17:00  Collect White vial (sterile container)  Performed at:  [address] [address]   Phone [phone]   GASTROINTESTINAL PANEL, MOLECULAR   C DIFF TOXIN/ANTIGEN   CULTURE, BLOOD 1   CULTURE, BLOOD 2   LACTIC ACID, PLASMA    Narrative:     Performed at:  [address] [place]   Phone [phone]   MICROSCOPIC URINALYSIS    Narrative:     Performed at:  [address] [place]   Phone [phone]   BASIC METABOLIC PANEL W/ REFLEX TO MG FOR LOW K   CBC   HEMOGLOBIN A1C   POCT GLUCOSE     RADIOLOGY/PROCEDURES  I personally reviewed the images for this case.   CT CHEST ABDOMEN PELVIS W CONTRAST   Final Result Tiny indeterminate noncalcified pulmonary nodules. No pneumonia or edema. Gaseous distention is seen throughout the abdomen. No bowel obstruction. Calcifications are seen in the pancreas with large stone in the pancreatic   duct and pancreatic ductal dilatation. Correlate with serum lipase      Nonobstructing left renal calculi      RECOMMENDATIONS:   Fleischner Society guidelines for follow-up and management of incidentally   detected pulmonary nodules:      Multiple Solid Nodules:      Nodule size less than 6 mm   In a low-risk patient, no routine follow-up. In a high-risk patient, optional CT at 12 months. Nodule size equals 6-8 mm   In a low-risk patient, CT at 3-6 months, then consider CT at 18-24 months. In a high-risk patient, CT at 3-6 months, then CT at 18-24 months. Nodule size greater than 8 mm   In a low-risk patient, CT at 3-6 months, then consider CT at 18-24 months. In a high-risk patient, CT at 3-6 months, then CT at 18-24 months. - Low risk patients include individuals with minimal or absent history of   smoking and other known risk factors. - High risk patients include individuals with a history or smoking or known   risk factors. Radiology 2017 http://pubs. rsna.org/doi/full/10.1148/radiol. 8580107307             COURSE & MEDICAL DECISION MAKING  Pertinent Labs & Imaging studies reviewed.  (See chart for details)    Vitals:    07/31/20 1526 07/31/20 1649 07/31/20 1832 07/31/20 1939   BP: 109/69 111/70 105/62 113/67   Pulse: 68 68 63 65   Resp: 16 16 16 16   Temp:  98.2 °F (36.8 °C)  98.2 °F (36.8 °C)   TempSrc:  Oral  Oral   SpO2: 99% 100% 100% 100%   Weight:           Medications   0.9 % sodium chloride infusion (has no administration in time range)   aspirin EC tablet 81 mg (has no administration in time range)   furosemide (LASIX) tablet 40 mg (has no administration in time range)   lisinopril (PRINIVIL;ZESTRIL) tablet 5 mg (has no administration in time range)   spironolactone (ALDACTONE) tablet 25 mg (has no administration in time range)   insulin lispro (HUMALOG) injection vial 0-12 Units (has no administration in time range)   insulin lispro (HUMALOG) injection vial 0-6 Units (has no administration in time range)   sodium chloride flush 0.9 % injection 10 mL (has no administration in time range)   sodium chloride flush 0.9 % injection 10 mL (has no administration in time range)   acetaminophen (TYLENOL) tablet 650 mg (has no administration in time range)     Or   acetaminophen (TYLENOL) suppository 650 mg (has no administration in time range)   promethazine (PHENERGAN) tablet 12.5 mg (has no administration in time range)     Or   ondansetron (ZOFRAN) injection 4 mg (has no administration in time range)   enoxaparin (LOVENOX) injection 40 mg (has no administration in time range)   magnesium sulfate 1 g in dextrose 5% 100 mL IVPB (has no administration in time range)   potassium chloride 10 mEq/100 mL IVPB (Peripheral Line) (has no administration in time range)   lactobacillus (CULTURELLE) capsule 1 capsule (has no administration in time range)   0.9 % sodium chloride bolus (0 mLs Intravenous Stopped 7/31/20 1424)   iopamidol (ISOVUE-370) 76 % injection 75 mL (75 mLs Intravenous Given 7/31/20 1416)   0.9 % sodium chloride bolus (0 mLs Intravenous Stopped 7/31/20 1938)       Current Discharge Medication List          SEP-1 CORE MEASURE DATA  Exclusion criteria: the patient is NOT to be included for sepsis due to:  SIRS criteria are not met    Patient remained stable in the ED. his laboratory work and x-rays did not reveal a cause for his symptoms. He did have a metabolic acidosis suggesting moderate diarrhea. His blood pressure was in the 90s and finally responded to 2 L of fluids. Therefore I feel he needs be admitted for further evaluation treatment. His C. difficile was negative. Patient was admitted to the hospital for observation.   Hospitalist was

## 2020-07-31 NOTE — ED NOTES
Bed: B-08  Expected date: 7/31/20  Expected time:   Means of arrival: [facility]  Comments:  Sam Chavez RN  07/31/20 1322

## 2020-07-31 NOTE — ED TRIAGE NOTES
Patient brought from home via EMS. Patient home health nurse there for nurse visit. Patient with wound to left bottom of foot. Nurse felt patient needed to be seen due to diarrhea. Patient quit taking antibiotics on the 21st of July. Patient was on antibiotics for wound to foot. Patient with hx of diabetes. Patient denies blood in stool at this time. Patient is alert, pale, oriented x4. Patient uses a cane at home.

## 2020-08-01 LAB
ANION GAP SERPL CALCULATED.3IONS-SCNC: 10 MMOL/L (ref 3–16)
BUN BLDV-MCNC: 21 MG/DL (ref 7–20)
CALCIUM SERPL-MCNC: 8.2 MG/DL (ref 8.3–10.6)
CHLORIDE BLD-SCNC: 109 MMOL/L (ref 99–110)
CO2: 18 MMOL/L (ref 21–32)
CREAT SERPL-MCNC: 0.8 MG/DL (ref 0.8–1.3)
ESTIMATED AVERAGE GLUCOSE: 142.7 MG/DL
GFR AFRICAN AMERICAN: >60
GFR NON-AFRICAN AMERICAN: >60
GI BACTERIAL PATHOGENS BY PCR: NORMAL
GLUCOSE BLD-MCNC: 114 MG/DL (ref 70–99)
GLUCOSE BLD-MCNC: 142 MG/DL (ref 70–99)
GLUCOSE BLD-MCNC: 151 MG/DL (ref 70–99)
GLUCOSE BLD-MCNC: 152 MG/DL (ref 70–99)
GLUCOSE BLD-MCNC: 93 MG/DL (ref 70–99)
HBA1C MFR BLD: 6.6 %
HCT VFR BLD CALC: 32 % (ref 40.5–52.5)
HEMOGLOBIN: 10.6 G/DL (ref 13.5–17.5)
MCH RBC QN AUTO: 26.1 PG (ref 26–34)
MCHC RBC AUTO-ENTMCNC: 33 G/DL (ref 31–36)
MCV RBC AUTO: 79.1 FL (ref 80–100)
PDW BLD-RTO: 17.4 % (ref 12.4–15.4)
PERFORMED ON: ABNORMAL
PLATELET # BLD: 238 K/UL (ref 135–450)
PMV BLD AUTO: 7.5 FL (ref 5–10.5)
POTASSIUM REFLEX MAGNESIUM: 4.2 MMOL/L (ref 3.5–5.1)
RBC # BLD: 4.04 M/UL (ref 4.2–5.9)
SODIUM BLD-SCNC: 137 MMOL/L (ref 136–145)
WBC # BLD: 6.6 K/UL (ref 4–11)

## 2020-08-01 PROCEDURE — 94760 N-INVAS EAR/PLS OXIMETRY 1: CPT

## 2020-08-01 PROCEDURE — 85027 COMPLETE CBC AUTOMATED: CPT

## 2020-08-01 PROCEDURE — 2580000003 HC RX 258: Performed by: NURSE PRACTITIONER

## 2020-08-01 PROCEDURE — 36415 COLL VENOUS BLD VENIPUNCTURE: CPT

## 2020-08-01 PROCEDURE — 6370000000 HC RX 637 (ALT 250 FOR IP): Performed by: NURSE PRACTITIONER

## 2020-08-01 PROCEDURE — G0378 HOSPITAL OBSERVATION PER HR: HCPCS

## 2020-08-01 PROCEDURE — 83036 HEMOGLOBIN GLYCOSYLATED A1C: CPT

## 2020-08-01 PROCEDURE — 80048 BASIC METABOLIC PNL TOTAL CA: CPT

## 2020-08-01 RX ADMIN — LISINOPRIL 5 MG: 5 TABLET ORAL at 08:38

## 2020-08-01 RX ADMIN — SODIUM CHLORIDE, PRESERVATIVE FREE 10 ML: 5 INJECTION INTRAVENOUS at 21:10

## 2020-08-01 RX ADMIN — INSULIN LISPRO 2 UNITS: 100 INJECTION, SOLUTION INTRAVENOUS; SUBCUTANEOUS at 12:21

## 2020-08-01 RX ADMIN — FUROSEMIDE 40 MG: 40 TABLET ORAL at 08:38

## 2020-08-01 RX ADMIN — SPIRONOLACTONE 25 MG: 25 TABLET ORAL at 08:38

## 2020-08-01 RX ADMIN — Medication 1 CAPSULE: at 08:38

## 2020-08-01 RX ADMIN — ASPIRIN 81 MG: 81 TABLET, COATED ORAL at 08:38

## 2020-08-01 RX ADMIN — INSULIN LISPRO 1 UNITS: 100 INJECTION, SOLUTION INTRAVENOUS; SUBCUTANEOUS at 21:10

## 2020-08-01 ASSESSMENT — PAIN SCALES - GENERAL
PAINLEVEL_OUTOF10: 0
PAINLEVEL_OUTOF10: 0

## 2020-08-01 NOTE — H&P
Hospital Medicine History & Physical      PCP: Americo Edy, APRN - CNP    Date of Admission: 7/31/2020    Date of Service: Pt seen/examined on 7/31/2020 and Admitted to Inpatient     Chief Complaint: Low blood pressure, diarrhea      History Of Present Illness: The patient is a 75 y.o. male who presents to [facility] with diarrhea since Monday, x5 days. Has roughly 3 bouts of brown liquid diarrhea daily. He complains of some generalized abdominal cramping. Denies food intolerance. States he hears a lot of rumbling and gurgling in the lower abdomen. Reports some increased hunger actually. Denies syncope or near syncope. Denies fevers or chills. Denies cough or cold. Denies hematochezia or melena. Reports being tested for COVID last month and was negative. Nurse practitioner or a home nurse that came to check his foot wound visited him today, took his blood pressure and reported that the reading was 75/60. She suggested that he come in and be evaluated. Patient states his blood pressure has been steadily dropping over the course of the last 5 to 6 months. Denies any new medications. Has a chronic foot ulcer that is being managed by wound care. States that the wound care team visits him 3 times a week. He is being managed by podiatrist Dr. Ryan hCapin. Reports that he was hospitalized earlier in the month and actually just finished antibiotics for the chronic foot wound last week. Antibiotic therapy: June 16 through July 21.     Past Medical History:        Diagnosis Date    Diabetes mellitus ([address]     Gallstones     Pancreatitis     Ulcers of both lower legs ([address]     bilateral feet       Past Surgical History:        Procedure Laterality Date    ERCP  4/8/14    LITHOTRYPSY & PANCREATIC STENT PLACEMENT    FOOT SURGERY Left 1967 Medications Prior to Admission:    Prior to Admission medications    Medication Sig Start Date End Date Taking? Authorizing Provider   vitamin B-12 (CYANOCOBALAMIN) 500 MCG tablet Take 1,500 mcg by mouth daily   Yes Historical Provider, MD   aspirin 81 MG EC tablet Take 1 tablet by mouth daily 6/15/20  Yes KENNY Mckeon CNP   insulin glargine (LANTUS SOLOSTAR) 100 UNIT/ML injection pen Inject 26 Units into the skin every morning (before breakfast)  Patient taking differently: Inject 24 Units into the skin every morning (before breakfast)  6/14/20  Yes KENNY Mckeon CNP   atorvastatin (LIPITOR) 40 MG tablet Take 1 tablet by mouth daily 6/14/20  Yes KENNY Mckeon CNP   lisinopril (PRINIVIL;ZESTRIL) 10 MG tablet Take 0.5 tablets by mouth daily  Patient taking differently: Take 20 mg by mouth daily  6/14/20 7/31/20 Yes KENNY Mckeon CNP   spironolactone (ALDACTONE) 25 MG tablet Take 25 mg by mouth daily   Yes Historical Provider, MD   metFORMIN (GLUCOPHAGE) 500 MG tablet Take 500 mg by mouth 2 times daily (with meals)   Yes Historical Provider, MD   ammonium lactate (LAC-HYDRIN) 12 % lotion Apply to BL feet and legs bid with dressing changes 2/6/15   Sathya Murray MD   Insulin Pen Needle 32G X 4 MM MISC 1 each by Does not apply route daily. 2/6/15   Sathya Murray MD   Lancets MISC Once daily 2/6/15   Sathya Murray MD   glucose blood VI test strips (ASCENSIA AUTODISC VI;ONE TOUCH ULTRA TEST VI) strip 1 each by In Vitro route daily. As needed. 2/6/15   Sathya Murray MD   glucose monitoring kit (FREESTYLE) monitoring kit 1 kit by Does not apply route daily as needed. 2/6/15   Sathya Murray MD       Allergies:  Patient has no known allergies. Social History:  The patient currently lives by self at home. TOBACCO:   reports that he has never smoked. He has never used smokeless tobacco.  ETOH:   reports no history of alcohol use.       Family History:  Reviewed in detail and negative for DM, Early CAD, Cancer, CVA. Positive as follows:        Problem Relation Age of Onset    Colon Cancer Mother         PVD s/p toe amputation by Dr Rubio Saenz Father        REVIEW OF SYSTEMS:   Positive for diarrhea, chronic left plantar foot ulcer and as noted in the HPI. All other systems reviewed and negative. PHYSICAL EXAM:    /69   Pulse 58   Temp 97.8 °F (36.6 °C) (Oral)   Resp 16   Ht 6' 3\" (1.905 m)   Wt 251 lb 1.7 oz (113.9 kg)   SpO2 98%   BMI 31.39 kg/m²     General appearance: No apparent distress appears stated age and cooperative. HEENT Normal cephalic, atraumatic without obvious deformity. Pupils equal, round, and reactive to light. Extra ocular muscles intact. Conjunctivae/corneas clear. Neck: Supple, No jugular venous distention/bruits. Trachea midline without thyromegaly or adenopathy with full range of motion. Lungs: Clear to auscultation, bilaterally without Rales/Wheezes/Rhonchi with good respiratory effort. Heart: Regular rate and rhythm with Normal S1/S2 without murmurs, rubs or gallops, point of maximum impulse non-displaced  Abdomen: Soft, non-tender or non-distended without rigidity or guarding and positive bowel sounds all four quadrants, somewhat hyperactive  Extremities: No clubbing, cyanosis, or edema bilaterally. Full range of motion without deformity and normal gait intact. Skin: Skin color pale, texture, turgor normal.  No rashes or lesions. Left foot plantar stage III ulcer. No eschar. No bogginess. No surrounding erythema. Granulating tissue. Wound bed is moist.  Reference picture and record. Neurologic: Alert and oriented X 3, neurovascularly intact with sensory/motor intact upper extremities/lower extremities, bilaterally. Cranial nerves: II-XII intact, grossly non-focal.  Mental status: Alert, oriented, thought content appropriate.   Capillary Refill: Acceptable  < 3 seconds  Peripheral Pulses: +3 Easily felt, not easily obliterated with pressure            CXR:  I have reviewed the CXR with the following interpretation: N/A  EKG:  I have reviewed the EKG with the following interpretation: N/A    CBC   Recent Labs     07/31/20  1257   WBC 9.5   HGB 10.9*   HCT 33.0*         RENAL  Recent Labs     07/31/20  1257      K 4.5      CO2 18*   BUN 32*   CREATININE 1.1     LFT'S  Recent Labs     07/31/20  1257   AST 10*   ALT 8*   BILITOT 0.5   ALKPHOS 91     COAG  No results for input(s): INR in the last 72 hours. CARDIAC ENZYMES  No results for input(s): CKTOTAL, CKMB, CKMBINDEX, TROPONINI in the last 72 hours. U/A:    Lab Results   Component Value Date    COLORU YELLOW 07/31/2020    WBCUA 2 07/31/2020    RBCUA 2 07/31/2020    BACTERIA 3+ 04/01/2014    CLARITYU Clear 07/31/2020    SPECGRAV 1.024 07/31/2020    LEUKOCYTESUR Negative 07/31/2020    BLOODU TRACE 07/31/2020    GLUCOSEU Negative 07/31/2020       ABG  No results found for: NFN7IUL, BEART, S7GLNAFB, PHART, THGBART, FIU5SZT, PO2ART, ZGB3TJU        Active Hospital Problems    Diagnosis Date Noted    Diarrhea [R19.7] 07/31/2020         PHYSICIANS CERTIFICATION:    I certify that Heri Desai is expected to be hospitalized for less than than 2 midnights based on the following assessment and plan:      ASSESSMENT/PLAN:    Diarrhea: DDX: functional, viral, foodborne, medication side effect                            Fungal, bacterial, clostridium, etc,                            Initial C. difficile in the ED negative. Follow-up C. difficile                                     stool testing ordered                            GI pathogen stool testing also ordered for ova, parasites,                                      Giardia, Salmonella, etc.                            Probiotic                            CT abdomen and pelvis negative small bowel obstruction,                                  no evidence of colitis. Fluid in the colon noted.     Pulmonary nodule: Noted on CT chest abdomen and pelvis    Hypotension: Seems to have resolved:                        Patient received IV fluids in the ED                        Blood pressure: 104-117/60-69                        We will continue to monitor                        Held Flomax, continued home hypertensive meds  History of hypertension: Continue home medications  Hyperlipidemia: Continued home medication statin    Diabetes: Sliding scale insulin                   Blood sugar stable 115                   Hemoglobin A1c    Chronic foot ulcer: No evidence of cellulitis or systemic infection at this time                                Wound care consulted                           Dehydration: BUN 32, continue IV fluids                        Trend labs    NAG MA: CO2 18, anion gap 12, BUN 32                 Likely from fluid loss 2/2 diarrhea                 IV fluids                 Trend labs    FEN: Sodium 135, potassium 4.5           Stable, replete for abnormality                         DVT Prophylaxis: Lovenox  Diet: DIET CARB CONTROL;  Code Status: Full Code  PT/OT Eval Status: To be evaluated    Dispo -admitted stable       KENNY Vu CNP    Thank you KENNY Knight CNP for the opportunity to be involved in this patient's care. If you have any questions or concerns please feel free to contact me at [phone].

## 2020-08-01 NOTE — PROGRESS NOTES
Pharmacy Medication Reconciliation Note     List of medications patient is currently taking is complete. Source of information:   Nurse Elisabeth reviewed all meds on home list and whether or not they were taken.   Notes regarding home medications:   Verified with Nurse and med list is up to date    Marilee Gordillo Pharmacy Intern  7/31/2020  9:16 PM

## 2020-08-01 NOTE — PLAN OF CARE
Problem: Falls - Risk of:  Goal: Will remain free from falls  Description: Will remain free from falls  8/1/2020 0850 by Nolvia Choudhury RN  Outcome: Ongoing  8/1/2020 0123 by Macrina Bonilla RN  Outcome: Ongoing  Goal: Absence of physical injury  Description: Absence of physical injury  8/1/2020 0850 by Nolvia Choudhury RN  Outcome: Ongoing  8/1/2020 0123 by Macrina Bonilla RN  Outcome: Ongoing     Problem: Pain Control  Goal: Maintain pain level at or below patient's acceptable level (or 5 if patient is unable to determine acceptable level)  8/1/2020 0850 by Nolvia Choudhury RN  Outcome: Ongoing  8/1/2020 0123 by Macrina Bonilla RN  Outcome: Ongoing  Goal: Improvement in pain related behaviors BP/HR WNL  8/1/2020 0850 by Nolvia Choudhury RN  Outcome: Ongoing  8/1/2020 0123 by Macrina Bonilla RN  Outcome: Ongoing     Problem: Skin Integrity/Risk  Goal: No skin breakdown during hospitalization  8/1/2020 0850 by Nolvia Choudhury RN  Outcome: Ongoing  8/1/2020 0123 by Macrina Bonilla RN  Outcome: Ongoing  Goal: Wound healing  8/1/2020 0850 by Nolvia Choudhury RN  Outcome: Ongoing  8/1/2020 0123 by Macrina Bonilla RN  Outcome: Ongoing     Problem: Spiritual  Goal: Coping methods/spiritual issues explored  8/1/2020 0850 by Nolvia Choudhury RN  Outcome: Ongoing  8/1/2020 0123 by Macrina Bonilla RN  Outcome: Ongoing  Goal: Acknowledge understanding of advance directives  8/1/2020 0850 by Nolvia Choudhury RN  Outcome: Ongoing  8/1/2020 0123 by Macrina Bonilla RN  Outcome: Ongoing  Goal: Supportive care provided  8/1/2020 0850 by Nolvia Choudhury RN  Outcome: Ongoing  8/1/2020 0123 by Macrina Bonilla RN  Outcome: Ongoing

## 2020-08-01 NOTE — PROGRESS NOTES
4 Eyes Skin Assessment     The patient is being assess for  Admission    I agree that 2 RN's have performed a thorough Head to Toe Skin Assessment on the patient. ALL assessment sites listed below have been assessed. Areas assessed by both nurses:   [x]   Head, Face, and Ears   [x]   Shoulders, Back, and Chest  [x]   Arms, Elbows, and Hands   [x]   Coccyx, Sacrum, and IschIum  [x]   Legs, Feet, and Heels- existing wound to left foot, patient refused this nurse to assess        Does the Patient have Skin Breakdown?   Yes LDA WOUND CARE was Initiated documentation include the Tiffanie-wound, Wound Assessment, Measurements, Dressing Treatment, Drainage, and Color\",         Jorje Prevention initiated:  NA   Wound Care Orders initiated:  No      WOC nurse consulted for Pressure Injury (Stage 3,4, Unstageable, DTI, NWPT, and Complex wounds), New and Established Ostomies:  Yes      Nurse 1 eSignature: Electronically signed by Monse Vazquez RN on 8/1/20 at 1:55 AM EDT    **SHARE this note so that the co-signing nurse is able to place an eSignature**    Nurse 2 eSignature: Electronically signed by Carmen Lanza RN on 8/1/2020 at 1:55 AM

## 2020-08-01 NOTE — PROGRESS NOTES
Patient admitted to [phone] from ED. Patient oriented to room, call light, tv, telephone, and floor policies and procedures. Patient is alert and oriented, admission assessment complete. VSS. 4 eyes complete- patient stated to leave his foot wound (angelianic, present on admission, sees Dr. Dumont Record) alone and that the wound care nurse can address it in the morning. BS 97, snack given. IVF fluids infusing per order. Patient resting in bed with call light in reach, will continue to monitor.

## 2020-08-01 NOTE — PROGRESS NOTES
Hospitalist Progress Note      PCP: KENNY Barrow - CNP    Date of Admission: 7/31/2020    Chief Complaint: [facility] Course:     Subjective: hasn't had a stool since admission. Feeling better with fluids. Medications:  Reviewed    Infusion Medications   Scheduled Medications    aspirin  81 mg Oral Daily    furosemide  40 mg Oral Daily    lisinopril  5 mg Oral Daily    spironolactone  25 mg Oral Daily    insulin lispro  0-12 Units Subcutaneous TID WC    insulin lispro  0-6 Units Subcutaneous Nightly    sodium chloride flush  10 mL Intravenous 2 times per day    enoxaparin  40 mg Subcutaneous Daily    lactobacillus  1 capsule Oral Daily with breakfast     PRN Meds: sodium chloride flush, acetaminophen **OR** acetaminophen, promethazine **OR** ondansetron, magnesium sulfate, potassium chloride      Intake/Output Summary (Last 24 hours) at 8/1/2020 1930  Last data filed at 8/1/2020 1643  Gross per 24 hour   Intake 2562 ml   Output 2035 ml   Net 527 ml       Exam:    /71   Pulse 64   Temp 98 °F (36.7 °C) (Oral)   Resp 16   Ht 6' 3\" (1.905 m)   Wt 251 lb 8 oz (114.1 kg)   SpO2 98%   BMI 31.44 kg/m²     General appearance: No apparent distress, appears stated age and cooperative. HEENT: Pupils equal, round, and reactive to light. Conjunctivae/corneas clear. Neck: Supple, with full range of motion. No jugular venous distention. Trachea midline. Respiratory:  Normal respiratory effort. Clear to auscultation, bilaterally without Rales/Wheezes/Rhonchi. Cardiovascular: Regular rate and rhythm with normal S1/S2 without murmurs, rubs or gallops. Abdomen: Soft, non-tender, non-distended with normal bowel sounds. Musculoskeletal: No clubbing, cyanosis or edema bilaterally. Full range of motion without deformity. Skin: Skin color, texture, turgor normal.  No rashes or lesions. Neurologic:  Neurovascularly intact without any focal sensory/motor deficits.  Cranial nerves: II-XII intact, grossly non-focal.  Psychiatric: Alert and oriented, thought content appropriate, normal insight  Capillary Refill: Brisk,< 3 seconds   Peripheral Pulses: +2 palpable, equal bilaterally       Labs:   Recent Labs     07/31/20  1257 08/01/20  0726   WBC 9.5 6.6   HGB 10.9* 10.6*   HCT 33.0* 32.0*    238     Recent Labs     07/31/20  1257 08/01/20  0726    137   K 4.5 4.2    109   CO2 18* 18*   BUN 32* 21*   CREATININE 1.1 0.8   CALCIUM 9.4 8.2*     Recent Labs     07/31/20  1257   AST 10*   ALT 8*   BILITOT 0.5   ALKPHOS 91     No results for input(s): INR in the last 72 hours. No results for input(s): Lisa Moellers in the last 72 hours. Urinalysis:      Lab Results   Component Value Date    NITRU Negative 07/31/2020    WBCUA 2 07/31/2020    BACTERIA 3+ 04/01/2014    RBCUA 2 07/31/2020    BLOODU TRACE 07/31/2020    SPECGRAV 1.024 07/31/2020    GLUCOSEU Negative 07/31/2020       Radiology:  CT CHEST ABDOMEN PELVIS W CONTRAST   Final Result   Tiny indeterminate noncalcified pulmonary nodules. No pneumonia or edema. Gaseous distention is seen throughout the abdomen. No bowel obstruction. Calcifications are seen in the pancreas with large stone in the pancreatic   duct and pancreatic ductal dilatation. Correlate with serum lipase      Nonobstructing left renal calculi      RECOMMENDATIONS:   Fleischner Society guidelines for follow-up and management of incidentally   detected pulmonary nodules:      Multiple Solid Nodules:      Nodule size less than 6 mm   In a low-risk patient, no routine follow-up. In a high-risk patient, optional CT at 12 months. Nodule size equals 6-8 mm   In a low-risk patient, CT at 3-6 months, then consider CT at 18-24 months. In a high-risk patient, CT at 3-6 months, then CT at 18-24 months. Nodule size greater than 8 mm   In a low-risk patient, CT at 3-6 months, then consider CT at 18-24 months.    In a high-risk patient, CT at 3-6 months, then CT at 18-24 months. - Low risk patients include individuals with minimal or absent history of   smoking and other known risk factors. - High risk patients include individuals with a history or smoking or known   risk factors. Radiology 2017 http://pubs. rsna.org/doi/full/10.1148/radiol. 0125295541                 Assessment/Plan:    Active Hospital Problems    Diagnosis Date Noted    Diarrhea [R19.7] 07/31/2020       Diarrhea: DDX:  - functional vs infectious  - has actually spontaneously improved being in the hospital  - C dif and stool studis pending  - no signs of ischemia on imaging  - cont to monitor and treat symptoms     Pulmonary nodule: Noted on CT chest abdomen and pelvis     Hypotension:   Resolved with fluids. Cont to monitor    History of hypertension: Continue home medications  Hyperlipidemia: Continued home medication statin     Diabetes: Sliding scale insulin                   Blood sugar stable 115                   Hemoglobin A1c     Chronic foot ulcer:   No evidence of cellulitis or systemic infection at this time  Wound care consulted                            DVT Prophylaxis: lovenox  Diet: DIET CARB CONTROL;  Code Status: Full Code    PT/OT Eval Status: pending    Dispo - MedSurg. Pt requesting PT eval prior to discharge, doesn't feel safe going home.     Alex Gonzalez MD

## 2020-08-01 NOTE — PLAN OF CARE
Problem: Falls - Risk of:  Goal: Will remain free from falls  Description: Will remain free from falls  Outcome: Ongoing     Problem: Pain Control  Goal: Maintain pain level at or below patient's acceptable level (or 5 if patient is unable to determine acceptable level)  Outcome: Ongoing     Problem: Skin Integrity/Risk  Goal: No skin breakdown during hospitalization  Outcome: Ongoing     Problem: Skin Integrity/Risk  Goal: Wound healing  Outcome: Ongoing

## 2020-08-02 LAB
GLUCOSE BLD-MCNC: 105 MG/DL (ref 70–99)
GLUCOSE BLD-MCNC: 112 MG/DL (ref 70–99)
GLUCOSE BLD-MCNC: 122 MG/DL (ref 70–99)
GLUCOSE BLD-MCNC: 133 MG/DL (ref 70–99)
PERFORMED ON: ABNORMAL

## 2020-08-02 PROCEDURE — 96372 THER/PROPH/DIAG INJ SC/IM: CPT

## 2020-08-02 PROCEDURE — 2580000003 HC RX 258: Performed by: NURSE PRACTITIONER

## 2020-08-02 PROCEDURE — 6360000002 HC RX W HCPCS: Performed by: NURSE PRACTITIONER

## 2020-08-02 PROCEDURE — 6370000000 HC RX 637 (ALT 250 FOR IP): Performed by: NURSE PRACTITIONER

## 2020-08-02 PROCEDURE — G0378 HOSPITAL OBSERVATION PER HR: HCPCS

## 2020-08-02 RX ADMIN — ENOXAPARIN SODIUM 40 MG: 40 INJECTION SUBCUTANEOUS at 09:06

## 2020-08-02 RX ADMIN — SODIUM CHLORIDE, PRESERVATIVE FREE 10 ML: 5 INJECTION INTRAVENOUS at 20:33

## 2020-08-02 RX ADMIN — SODIUM CHLORIDE, PRESERVATIVE FREE 10 ML: 5 INJECTION INTRAVENOUS at 09:04

## 2020-08-02 RX ADMIN — Medication 1 CAPSULE: at 09:07

## 2020-08-02 RX ADMIN — ASPIRIN 81 MG: 81 TABLET, COATED ORAL at 09:07

## 2020-08-02 RX ADMIN — SPIRONOLACTONE 25 MG: 25 TABLET ORAL at 09:07

## 2020-08-02 RX ADMIN — Medication 10 ML: at 20:23

## 2020-08-02 RX ADMIN — FUROSEMIDE 40 MG: 40 TABLET ORAL at 09:07

## 2020-08-02 RX ADMIN — LISINOPRIL 5 MG: 5 TABLET ORAL at 09:07

## 2020-08-02 ASSESSMENT — PAIN SCALES - GENERAL
PAINLEVEL_OUTOF10: 0

## 2020-08-02 ASSESSMENT — PAIN DESCRIPTION - PROGRESSION
CLINICAL_PROGRESSION: NOT CHANGED

## 2020-08-02 NOTE — DISCHARGE INSTR - ACTIVITY
Up as tolerated with walker - Non-weight bearing on left foot. Learning About Diabetes and Exercise  Can you exercise if you have diabetes? When you have diabetes, it's important to get regular exercise. This helps control your blood sugar level. You can still play sports, run, ride a bike, go swimming, and do other activities when you have diabetes. How can exercise help you manage diabetes? Your body turns the food you eat into glucose, a type of sugar. You need this sugar for energy. When you have diabetes, the sugar builds up in your blood. But when you exercise, your body uses sugar. This helps keep it from building up in your blood and results in lower blood sugar and better control of diabetes. Exercise may help you in other ways too. It can help you reach and stay at a healthy weight. It also helps improve blood pressure and cholesterol, which can reduce the risk of heart disease. Exercise can make you feel stronger and happier. It can help you relax and sleep better, and give you confidence in other things you do. How can you exercise safely? Before you start a new exercise program, talk to your doctor about how and when to exercise. You may need to have a medical exam and tests before you begin. Some types of exercise can be harmful if your diabetes is causing other problems, such as problems with your feet. Your doctor can tell you what types of exercise are good choices for you. These tips can help you exercise safely when you have diabetes. If your diabetes is controlled by diet or medicine that doesn't lower your blood sugar, you don't need to eat a snack before you exercise. · Check your blood sugar before you exercise. And be careful about what you eat. ? If your blood sugar is less than 100, eat a carbohydrate snack before you exercise. ? Be careful when you exercise if your blood sugar is over 300. High blood sugar can make you dehydrated.  And that makes your blood sugar levels go to know your test results and keep a list of the medicines you take. Where can you learn more? Go to [email or web address] org and sign in to your [employer] account. Enter C737 in the [facility] box to learn more about \"Learning About Diabetes and Exercise. \"     If you do not have an account, please click on the \"Sign Up Now\" link. Current as of: December 20, 2019               Content Version: 12.5  © 6927-2640 [employer], [employer]. Care instructions adapted under license by [facility] [address] If you have questions about a medical condition or this instruction, always ask your healthcare professional. [address] any warranty or liability for your use of this information.

## 2020-08-02 NOTE — PROGRESS NOTES
8/2/2020 Pt refused PT. Pt says he doesn't need physical therapy and he moves around fine. When he was voicing concerns about discharge it was in regards to having diarrhea not his physical mobility. Pt discharged from PT as per his wishes. Encouraged continued mobility during the day. Pt agreed.

## 2020-08-02 NOTE — PLAN OF CARE
Problem: Falls - Risk of:  Goal: Will remain free from falls  Description: Will remain free from falls  Outcome: Ongoing  Goal: Absence of physical injury  Description: Absence of physical injury  Outcome: Ongoing     Problem: Pain Control  Goal: Maintain pain level at or below patient's acceptable level (or 5 if patient is unable to determine acceptable level)  Outcome: Ongoing  Goal: Improvement in pain related behaviors BP/HR WNL  Outcome: Ongoing     Problem: Skin Integrity/Risk  Goal: No skin breakdown during hospitalization  Outcome: Ongoing  Goal: Wound healing  Outcome: Ongoing     Problem: Spiritual  Goal: Coping methods/spiritual issues explored  Outcome: Ongoing  Goal: Acknowledge understanding of advance directives  Outcome: Ongoing  Goal: Supportive care provided  Outcome: Ongoing

## 2020-08-02 NOTE — DISCHARGE INSTR - COC
Continuity of Care Form    Patient Name: Elkin Arias   :  1955  MRN:  [id number]    Admit date:  2020  Discharge date:  20    Code Status Order: Full Code   Advance Directives:   [facility] Documentation     Date/Time Healthcare Directive Type of Healthcare Directive Copy in [facility] [address] Agent's Name Healthcare Agent's Phone Number    20  Yes, patient has an advance directive for healthcare treatment  Durable power of  for health care;Living will;Health care treatment directive  No, copy requested from family  Healthcare power of   Phani Teran (cousin)  --          Admitting Physician:  Jun Rodriguez DO  PCP: KENNY Hoffman - CNP    Discharging Nurse: [facility] Unit/Room#: E1D-9084/6631-95  Discharging Unit Phone Number: [phone]    Emergency Contact:   Extended Emergency Contact Information  Primary Emergency Contact: misaelbrendaeneida  Home Phone: [phone]  Mobile Phone: [phone]  Relation: Other  Preferred language: English    Past Surgical History:  Past Surgical History:   Procedure Laterality Date    ERCP  14    LITHOTRYPSY & PANCREATIC STENT PLACEMENT    FOOT SURGERY Left        Immunization History: There is no immunization history on file for this patient.     Active Problems:  Patient Active Problem List   Diagnosis Code    Abdominal pain, acute R10.9    BPH (benign prostatic hyperplasia) N40.0    RLQ abdominal pain R10.31    Abnormal ECG R94.31    HTN (hypertension) I10    Diabetes mellitus type II, uncontrolled ([address] E11.65    Diabetic foot infection ([address] E11.628, L08.9    Fever R50.9    Leukocytosis D72.829    Charcot foot due to diabetes mellitus ([address] E11.610    Foot ulceration, left, with unspecified severity ([address] L97.529    Demand ischemia ([address] I24.8    Controlled type 2 diabetes mellitus with hyperglycemia, with long-term current 24 hours) at 8/2/2020 1344  Last data filed at 8/2/2020 0845  Gross per 24 hour   Intake 240 ml   Output 1475 ml   Net -1235 ml     I/O last 3 completed shifts: In: 720 [P.O.:720]  Out: 1925 [Urine:1925]    Safety Concerns: At Risk for Falls    Impairments/Disabilities:      None    Nutrition Therapy:  Current Nutrition Therapy:   - Oral Diet:  Carb Control 4 carbs/meal (1800kcals/day)    Routes of Feeding: Oral  Liquids: No Restrictions  Daily Fluid Restriction: no  Last Modified Barium Swallow with Video (Video Swallowing Test): not done    Treatments at the Time of Hospital Discharge:   Respiratory Treatments: NA  Oxygen Therapy:  is not on home oxygen therapy. Ventilator:    - No ventilator support    Rehab Therapies: Physical Therapy and Occupational Therapy  Weight Bearing Status/Restrictions: Non-weight bearing on left leg  Other Medical Equipment (for information only, NOT a DME order):  cane and walker  Other Treatments: NA    Patient's personal belongings (please select all that are sent with patient):  None    RN SIGNATURE:  Electronically signed by Alison Jean RN on 8/4/20 at 9:55 AM EDT    CASE MANAGEMENT/SOCIAL WORK SECTION    Inpatient Status Date: 7/31/2020    Readmission Risk Assessment Score:      Discharging to Facility/ Agency   · Name: [facility]  · Address:  · Phone: [phone]  · Fax: [phone]      / signature: Electronically signed by BILLY Choi, ADEW on 8/2/20 at 2:54 PM EDT    PHYSICIAN SECTION    Prognosis: Good    Condition at Discharge: Stable    Rehab Potential (if transferring to Rehab): Good    Recommended Labs or Other Treatments After Discharge: pt, ot    Physician Certification: I certify the above information and transfer of Gerry Worthington  is necessary for the continuing treatment of the diagnosis listed and that he requires [address] for greater 30 days.      Update Admission H&P: No change in H&P    PHYSICIAN SIGNATURE:  Electronically signed by Greg Hoffman DO on 8/4/20 at 2:52 PM EDT

## 2020-08-03 PROBLEM — M19.90 CHRONIC OSTEOARTHRITIS: Status: ACTIVE | Noted: 2020-08-03

## 2020-08-03 LAB
ANION GAP SERPL CALCULATED.3IONS-SCNC: 15 MMOL/L (ref 3–16)
BASOPHILS ABSOLUTE: 0.1 K/UL (ref 0–0.2)
BASOPHILS RELATIVE PERCENT: 0.8 %
BUN BLDV-MCNC: 16 MG/DL (ref 7–20)
C-REACTIVE PROTEIN: 6.6 MG/L (ref 0–5.1)
CALCIUM SERPL-MCNC: 8.9 MG/DL (ref 8.3–10.6)
CHLORIDE BLD-SCNC: 106 MMOL/L (ref 99–110)
CO2: 19 MMOL/L (ref 21–32)
CREAT SERPL-MCNC: 0.8 MG/DL (ref 0.8–1.3)
EOSINOPHILS ABSOLUTE: 0.2 K/UL (ref 0–0.6)
EOSINOPHILS RELATIVE PERCENT: 2.8 %
GFR AFRICAN AMERICAN: >60
GFR NON-AFRICAN AMERICAN: >60
GLUCOSE BLD-MCNC: 107 MG/DL (ref 70–99)
GLUCOSE BLD-MCNC: 109 MG/DL (ref 70–99)
GLUCOSE BLD-MCNC: 131 MG/DL (ref 70–99)
GLUCOSE BLD-MCNC: 139 MG/DL (ref 70–99)
GLUCOSE BLD-MCNC: 172 MG/DL (ref 70–99)
GLUCOSE BLD-MCNC: 174 MG/DL (ref 70–99)
HCT VFR BLD CALC: 35.4 % (ref 40.5–52.5)
HEMOGLOBIN: 11.7 G/DL (ref 13.5–17.5)
LYMPHOCYTES ABSOLUTE: 1.9 K/UL (ref 1–5.1)
LYMPHOCYTES RELATIVE PERCENT: 27.7 %
MCH RBC QN AUTO: 25.8 PG (ref 26–34)
MCHC RBC AUTO-ENTMCNC: 33 G/DL (ref 31–36)
MCV RBC AUTO: 78.2 FL (ref 80–100)
MONOCYTES ABSOLUTE: 0.5 K/UL (ref 0–1.3)
MONOCYTES RELATIVE PERCENT: 7.8 %
NEUTROPHILS ABSOLUTE: 4.1 K/UL (ref 1.7–7.7)
NEUTROPHILS RELATIVE PERCENT: 60.9 %
PDW BLD-RTO: 17.1 % (ref 12.4–15.4)
PERFORMED ON: ABNORMAL
PLATELET # BLD: 242 K/UL (ref 135–450)
PMV BLD AUTO: 7.4 FL (ref 5–10.5)
POTASSIUM REFLEX MAGNESIUM: 4.3 MMOL/L (ref 3.5–5.1)
PROCALCITONIN: 0.04 NG/ML (ref 0–0.15)
RBC # BLD: 4.53 M/UL (ref 4.2–5.9)
SEDIMENTATION RATE, ERYTHROCYTE: 29 MM/HR (ref 0–20)
SODIUM BLD-SCNC: 140 MMOL/L (ref 136–145)
WBC # BLD: 6.8 K/UL (ref 4–11)

## 2020-08-03 PROCEDURE — G0378 HOSPITAL OBSERVATION PER HR: HCPCS

## 2020-08-03 PROCEDURE — 85652 RBC SED RATE AUTOMATED: CPT

## 2020-08-03 PROCEDURE — 1200000000 HC SEMI PRIVATE

## 2020-08-03 PROCEDURE — 84145 PROCALCITONIN (PCT): CPT

## 2020-08-03 PROCEDURE — 6370000000 HC RX 637 (ALT 250 FOR IP): Performed by: NURSE PRACTITIONER

## 2020-08-03 PROCEDURE — 6360000002 HC RX W HCPCS: Performed by: NURSE PRACTITIONER

## 2020-08-03 PROCEDURE — 99222 1ST HOSP IP/OBS MODERATE 55: CPT | Performed by: INTERNAL MEDICINE

## 2020-08-03 PROCEDURE — 80048 BASIC METABOLIC PNL TOTAL CA: CPT

## 2020-08-03 PROCEDURE — 2580000003 HC RX 258: Performed by: FAMILY MEDICINE

## 2020-08-03 PROCEDURE — 2580000003 HC RX 258: Performed by: NURSE PRACTITIONER

## 2020-08-03 PROCEDURE — 93925 LOWER EXTREMITY STUDY: CPT

## 2020-08-03 PROCEDURE — 85025 COMPLETE CBC W/AUTO DIFF WBC: CPT

## 2020-08-03 PROCEDURE — 86140 C-REACTIVE PROTEIN: CPT

## 2020-08-03 PROCEDURE — 36415 COLL VENOUS BLD VENIPUNCTURE: CPT

## 2020-08-03 PROCEDURE — 6360000002 HC RX W HCPCS: Performed by: FAMILY MEDICINE

## 2020-08-03 RX ORDER — DIPHENOXYLATE HYDROCHLORIDE AND ATROPINE SULFATE 2.5; .025 MG/1; MG/1
1 TABLET ORAL 4 TIMES DAILY PRN
Status: DISCONTINUED | OUTPATIENT
Start: 2020-08-03 | End: 2020-08-04 | Stop reason: HOSPADM

## 2020-08-03 RX ADMIN — ENOXAPARIN SODIUM 40 MG: 40 INJECTION SUBCUTANEOUS at 08:50

## 2020-08-03 RX ADMIN — PIPERACILLIN AND TAZOBACTAM 3.38 G: 3; .375 INJECTION, POWDER, LYOPHILIZED, FOR SOLUTION INTRAVENOUS at 18:09

## 2020-08-03 RX ADMIN — SPIRONOLACTONE 25 MG: 25 TABLET ORAL at 08:50

## 2020-08-03 RX ADMIN — PIPERACILLIN AND TAZOBACTAM 3.38 G: 3; .375 INJECTION, POWDER, LYOPHILIZED, FOR SOLUTION INTRAVENOUS at 02:11

## 2020-08-03 RX ADMIN — INSULIN LISPRO 1 UNITS: 100 INJECTION, SOLUTION INTRAVENOUS; SUBCUTANEOUS at 20:44

## 2020-08-03 RX ADMIN — PIPERACILLIN AND TAZOBACTAM 3.38 G: 3; .375 INJECTION, POWDER, LYOPHILIZED, FOR SOLUTION INTRAVENOUS at 10:04

## 2020-08-03 RX ADMIN — LISINOPRIL 5 MG: 5 TABLET ORAL at 09:00

## 2020-08-03 RX ADMIN — INSULIN LISPRO 2 UNITS: 100 INJECTION, SOLUTION INTRAVENOUS; SUBCUTANEOUS at 17:19

## 2020-08-03 RX ADMIN — SODIUM CHLORIDE, PRESERVATIVE FREE 10 ML: 5 INJECTION INTRAVENOUS at 08:51

## 2020-08-03 RX ADMIN — Medication 10 ML: at 18:09

## 2020-08-03 RX ADMIN — ASPIRIN 81 MG: 81 TABLET, COATED ORAL at 08:49

## 2020-08-03 RX ADMIN — FUROSEMIDE 40 MG: 40 TABLET ORAL at 08:49

## 2020-08-03 RX ADMIN — SODIUM CHLORIDE, PRESERVATIVE FREE 10 ML: 5 INJECTION INTRAVENOUS at 21:49

## 2020-08-03 RX ADMIN — Medication 1 CAPSULE: at 08:50

## 2020-08-03 ASSESSMENT — PAIN SCALES - GENERAL
PAINLEVEL_OUTOF10: 0

## 2020-08-03 ASSESSMENT — PAIN DESCRIPTION - PAIN TYPE
TYPE: ACUTE PAIN;CHRONIC PAIN

## 2020-08-03 NOTE — PROGRESS NOTES
Pt leaving unit with transport; going to vascular. VSS, patient reports no pain or complaints at this time.    Electronically signed by Vesna Raoms RN on 8/3/2020 at 1:38 PM

## 2020-08-03 NOTE — PROGRESS NOTES
Pt back to unit from vascular. Reporting no pain. Urinated after arrival and had one small bowel movement that has improved from watery to \"mush-like\". Pt currently resting quietly in bed and has no complaints at this time.     Electronically signed by Hussain Smith RN on 8/3/2020 at 1:35 PM

## 2020-08-03 NOTE — PROGRESS NOTES
Occupational Therapy    OT referral received and appreciated. Patient's chart reviewed. Patient adamantly refusing OT evaluation this date reporting no concerns with mobility or ability to care for self at home. Per chart review, patient with hx of non compliance with therapy recommendations. OT educated patient's on Kranthi Turner DP note stating \"non-weightbearing is a MUST. \"  Patient stated, \" I don't care what he says, I am going to walk on it. \" OT will sign off and not follow up per patient's request. RN notified of refusal.     Electronically signed by ELVIRA Moore on 8/3/2020 at 11:20 AM

## 2020-08-03 NOTE — PROGRESS NOTES
Hospitalist Progress Note      PCP: Emelyn Valentine, APRN - JAY    Date of Admission: 7/31/2020    Chief Complaint: [facility] Course:     Subjective:  Has had three loose bowel movements last night. No fevers, chills. Medications:  Reviewed    Infusion Medications   Scheduled Medications    piperacillin-tazobactam  3.375 g Intravenous Q8H    aspirin  81 mg Oral Daily    furosemide  40 mg Oral Daily    lisinopril  5 mg Oral Daily    spironolactone  25 mg Oral Daily    insulin lispro  0-12 Units Subcutaneous TID WC    insulin lispro  0-6 Units Subcutaneous Nightly    sodium chloride flush  10 mL Intravenous 2 times per day    enoxaparin  40 mg Subcutaneous Daily    lactobacillus  1 capsule Oral Daily with breakfast     PRN Meds: sodium chloride flush, acetaminophen **OR** acetaminophen, promethazine **OR** ondansetron, magnesium sulfate, potassium chloride      Intake/Output Summary (Last 24 hours) at 8/3/2020 0139  Last data filed at 8/2/2020 1858  Gross per 24 hour   Intake 840 ml   Output --   Net 840 ml       Exam:    /73   Pulse (!) 49 Comment: nurse notified   Temp 97.7 °F (36.5 °C) (Oral)   Resp 16   Ht 6' 3\" (1.905 m)   Wt 243 lb 9.7 oz (110.5 kg)   SpO2 97%   BMI 30.45 kg/m²     General appearance: No apparent distress, appears stated age and cooperative. HEENT: Pupils equal, round, and reactive to light. Conjunctivae/corneas clear. Neck: Supple, with full range of motion. No jugular venous distention. Trachea midline. Respiratory:  Normal respiratory effort. Clear to auscultation, bilaterally without Rales/Wheezes/Rhonchi. Cardiovascular: Regular rate and rhythm with normal S1/S2 without murmurs, rubs or gallops. Abdomen: Soft, non-tender, non-distended with normal bowel sounds. Musculoskeletal: No clubbing, cyanosis or edema bilaterally. Full range of motion without deformity.   Skin: Skin color, texture, turgor normal.  Bilateral lower legs with erythema, mild edema. Left plantar foot with 4cm wound, covered with dressing from several days ago. No oozing. Neurologic:  Neurovascularly intact without any focal sensory/motor deficits. Cranial nerves: II-XII intact, grossly non-focal.  Psychiatric: Alert and oriented, thought content appropriate, normal insight  Capillary Refill: Brisk,< 3 seconds   Peripheral Pulses: +2 palpable, equal bilaterally       Labs:   Recent Labs     07/31/20  1257 08/01/20  0726   WBC 9.5 6.6   HGB 10.9* 10.6*   HCT 33.0* 32.0*    238     Recent Labs     07/31/20  1257 08/01/20  0726    137   K 4.5 4.2    109   CO2 18* 18*   BUN 32* 21*   CREATININE 1.1 0.8   CALCIUM 9.4 8.2*     Recent Labs     07/31/20  1257   AST 10*   ALT 8*   BILITOT 0.5   ALKPHOS 91     No results for input(s): INR in the last 72 hours. No results for input(s): Serene [place] in the last 72 hours. Urinalysis:      Lab Results   Component Value Date    NITRU Negative 07/31/2020    WBCUA 2 07/31/2020    BACTERIA 3+ 04/01/2014    RBCUA 2 07/31/2020    BLOODU TRACE 07/31/2020    SPECGRAV 1.024 07/31/2020    GLUCOSEU Negative 07/31/2020       Radiology:  CT CHEST ABDOMEN PELVIS W CONTRAST   Final Result   Tiny indeterminate noncalcified pulmonary nodules. No pneumonia or edema. Gaseous distention is seen throughout the abdomen. No bowel obstruction. Calcifications are seen in the pancreas with large stone in the pancreatic   duct and pancreatic ductal dilatation. Correlate with serum lipase      Nonobstructing left renal calculi      RECOMMENDATIONS:   Fleischner Society guidelines for follow-up and management of incidentally   detected pulmonary nodules:      Multiple Solid Nodules:      Nodule size less than 6 mm   In a low-risk patient, no routine follow-up. In a high-risk patient, optional CT at 12 months. Nodule size equals 6-8 mm   In a low-risk patient, CT at 3-6 months, then consider CT at 18-24 months.    In a high-risk patient, CT at 3-6 months, then CT at 18-24 months. Nodule size greater than 8 mm   In a low-risk patient, CT at 3-6 months, then consider CT at 18-24 months. In a high-risk patient, CT at 3-6 months, then CT at 18-24 months. - Low risk patients include individuals with minimal or absent history of   smoking and other known risk factors. - High risk patients include individuals with a history or smoking or known   risk factors. Radiology 2017 http://pubs. rsna.org/doi/full/10.1148/radiol. 7018736907                 Assessment/Plan:    Active Hospital Problems    Diagnosis Date Noted    Chronic osteoarthritis [M19.90] 08/03/2020    Diarrhea [R19.7] 07/31/2020       Chronic oesteomyelitis with possible cellulitis:   -One week ago finished IV Ertapenem for 6 weeks for early osteomyelitis of left plantar foot wound  -on exam, has possible cellulitis  -check arterial duplex ultrasound, consider MRI  -zosyn empirically. Hold on vanc as was not indicated 6 weeks ago  -consult ID and podiatry     Diarrhea: DDX:  - functional vs infectious  - has actually spontaneously improved being in the hospital  - C dif and stool studies pending  - no signs of ischemia on imaging  - cont to monitor and treat symptoms     Pulmonary nodule:   - Noted on CT chest abdomen and pelvis     Hypotension:   - Resolved with fluids.  - Cont to monitor    History of hypertension: Continue home medications  Hyperlipidemia: Continued home medication statin     Diabetes: Sliding scale insulin                   Blood sugar stable 115                   Hemoglobin A1c                           DVT Prophylaxis: lovenox  Diet: DIET CARB CONTROL;  Code Status: Full Code    PT/OT Eval Status: N/A    Dispo - MedSurg.       Celsa Sargent MD

## 2020-08-03 NOTE — CONSULTS
Infectious Diseases Inpatient Consult Note      Reason for Consult: Left foot infection and diarrhea      Requesting Physician:  Malika Juarez     Primary Care Physician: KENNY Lisa CNP    History Obtained From:  Pt and Medical records    CHIEF COMPLAINT:     Chief Complaint   Patient presents with    Diarrhea      Left foot infection     HISTORY OF PRESENT ILLNESS:  75 y.o. man with Diabetic foot infection and Left foot medial ulcer and callus from charcot foot with deformity was here in June 2020 and diagnosed with osteomyelitis of the foot and pt declined surgical intervention opted for IV abx and completed a long course of IV Ertapenem at home now admitted with diarrhea and abd pain. He has not been wearing the boot per patient and podiatry is following here and CT abd/pelvis on admit with pancreatic duct dilatation wit stone and C diff negative and GI pathogen on the stool negative we are consulted for IV abx recommendations. ESR, CRP have improved from June 2020.         Past Medical History:    Past Medical History:   Diagnosis Date    Diabetes mellitus ([address]     Gallstones     Pancreatitis     Ulcers of both lower legs ([address]     bilateral feet       Past Surgical History:    Past Surgical History:   Procedure Laterality Date    ERCP  4/8/14    LITHOTRYPSY & PANCREATIC STENT PLACEMENT    FOOT SURGERY Left 1967       Current Medications:    Outpatient Medications Marked as Taking for the 7/31/20 encounter [facility] Encounter)   Medication Sig Dispense Refill    vitamin B-12 (CYANOCOBALAMIN) 500 MCG tablet Take 1,500 mcg by mouth daily      aspirin 81 MG EC tablet Take 1 tablet by mouth daily 30 tablet 0    insulin glargine (LANTUS SOLOSTAR) 100 UNIT/ML injection pen Inject 26 Units into the skin every morning (before breakfast) (Patient taking differently: Inject 24 Units into the skin every morning (before breakfast) ) 1 pen 0    atorvastatin (LIPITOR) 40 MG tablet Take 1 tablet by mouth daily 30 tablet 0    lisinopril (PRINIVIL;ZESTRIL) 10 MG tablet Take 0.5 tablets by mouth daily (Patient taking differently: Take 20 mg by mouth daily ) 30 tablet 0    spironolactone (ALDACTONE) 25 MG tablet Take 25 mg by mouth daily      metFORMIN (GLUCOPHAGE) 500 MG tablet Take 500 mg by mouth 2 times daily (with meals)         Allergies:  Patient has no known allergies. Immunizations : There is no immunization history on file for this patient. Social History:    Social History     Tobacco Use    Smoking status: Never Smoker    Smokeless tobacco: Never Used   Substance Use Topics    Alcohol use: No    Drug use: No     Social History     Tobacco Use   Smoking Status Never Smoker   Smokeless Tobacco Never Used      Family History   Problem Relation Age of Onset    Colon Cancer Mother         PVD s/p toe amputation by Dr Merlin Marroquin Father          REVIEW OF SYSTEMS:    No fever / chills / sweats. No weight loss. No visual change, eye pain, eye discharge. No oral lesion, sore throat, dysphagia. Denies cough / sputum/Sob   Denies chest pain, palpitations/ dizziness  Denies nausea/ vomiting/abdominal pain/diarrhea. Denies dysuria or change in urinary function. Denies joint swelling or pain. No myalgia, arthralgia. No rashes, skin lesions   Denies focal weakness, sensory change or other neurologic symptoms  No lymph node swelling or tenderness.     Diarrhea, lEFT FOOT wound     PHYSICAL EXAM:      Vitals:    /77   Pulse 65   Temp 98 °F (36.7 °C) (Oral)   Resp 16   Ht 6' 3\" (1.905 m)   Wt 243 lb 9.7 oz (110.5 kg)   SpO2 99%   BMI 30.45 kg/m²     General Appearance: alert,in no acute distress, ++  pallor, no icterus   Skin: warm and dry, no rash or erythema  Head: normocephalic and atraumatic  Eyes: pupils equal, round, and reactive to light, conjunctivae normal  ENT: tympanic membrane, external ear and ear canal normal bilaterally, nose without deformity, nasal mucosa and turbinates normal without polyps  Neck: supple and non-tender without mass, no thyromegaly  no cervical lymphadenopathy  Pulmonary/Chest: clear to auscultation bilaterally- no wheezes, rales or rhonchi, normal air movement, no respiratory distress  Cardiovascular: normal rate, regular rhythm, normal S1 and S2, no murmurs, rubs, clicks, or gallops, no carotid bruits  Abdomen: soft, non-tender, non-distended, normal bowel sounds, no masses or organomegaly  Extremities: no cyanosis, clubbing or edema  Musculoskeletal: normal range of motion, no joint swelling, deformity or tenderness  Neurologic: reflexes normal and symmetric, no cranial nerve deficit  Psych:  Orientation, sensorium, mood normal  Lines:  IV  Left foot charcot foot medial ulcer wound images reviewed  Rt foot chronic changes and Rt leg skin flaking off with pigmentation          DATA:    Lab Results   Component Value Date    WBC 6.8 08/03/2020    HGB 11.7 (L) 08/03/2020    HCT 35.4 (L) 08/03/2020    MCV 78.2 (L) 08/03/2020     08/03/2020     Lab Results   Component Value Date    CREATININE 0.8 08/03/2020    BUN 16 08/03/2020     08/03/2020    K 4.3 08/03/2020     08/03/2020    CO2 19 (L) 08/03/2020       Hepatic Function Panel:   Lab Results   Component Value Date    ALKPHOS 91 07/31/2020    ALT 8 07/31/2020    AST 10 07/31/2020    PROT 7.3 07/31/2020    BILITOT 0.5 07/31/2020    LABALBU 3.6 07/31/2020     UA:  Lab Results   Component Value Date    COLORU YELLOW 07/31/2020    CLARITYU Clear 07/31/2020    GLUCOSEU Negative 07/31/2020    BILIRUBINUR Negative 07/31/2020    KETUA Negative 07/31/2020    SPECGRAV 1.024 07/31/2020    BLOODU TRACE 07/31/2020    PHUR 5.0 07/31/2020    PROTEINU Negative 07/31/2020    UROBILINOGEN 0.2 07/31/2020    NITRU Negative 07/31/2020    LEUKOCYTESUR Negative 07/31/2020    LABMICR YES 07/31/2020    URINETYPE NotGiven 07/31/2020      Urine Microscopic:   Lab Results   Component Value Date management of incidentally   detected pulmonary nodules:      Multiple Solid Nodules:      Nodule size less than 6 mm   In a low-risk patient, no routine follow-up. In a high-risk patient, optional CT at 12 months. Nodule size equals 6-8 mm   In a low-risk patient, CT at 3-6 months, then consider CT at 18-24 months. In a high-risk patient, CT at 3-6 months, then CT at 18-24 months. Nodule size greater than 8 mm   In a low-risk patient, CT at 3-6 months, then consider CT at 18-24 months. In a high-risk patient, CT at 3-6 months, then CT at 18-24 months. - Low risk patients include individuals with minimal or absent history of   smoking and other known risk factors. - High risk patients include individuals with a history or smoking or known   risk factors. Radiology 2017 http://pubs. rsna.org/doi/full/10.1148/radiol. 0774024432         VL DUP LOWER EXTREMITY ARTERIES BILATERAL    (Results Pending)       All pertinent images and reports for the current Hospitalization were reviewed by me.     IMPRESSION:    Patient Active Problem List   Diagnosis    Abdominal pain, acute    BPH (benign prostatic hyperplasia)    RLQ abdominal pain    Abnormal ECG    HTN (hypertension)    Diabetes mellitus type II, uncontrolled ([address]    Diabetic foot infection ([address]    Fever    Leukocytosis    Charcot foot due to diabetes mellitus ([address]    Foot ulceration, left, with unspecified severity ([address]    Demand ischemia ([address]    Controlled type 2 diabetes mellitus with hyperglycemia, with long-term current use of insulin (HCC)    Sepsis (HCC)    Diarrhea    Chronic osteoarthritis         Diarrhea on admit  Infectious screen negative  C diff -ve  Diabetic foot infection Left  Left foot charcot ulcer  Neuropathy+  ESR  CRP trend down   Completed x 6 weeks of IV abx course recently   Pt admits not wearing his CROW boot as he cannot ambulate well per patient   CT abd/pelvis with pancreatic duct stone but not much elevation in Pancreatic enzymes     Unfortunately the foot wound may not heal with out off loading and he is having difficulty with CROW boot and to keep the pressure off the wound -       Labs, Microbiology, Radiology and pertinent results from current hospitalization and care every where were reviewed by me as a part of the consultation. PLAN :  1. Cont IV Zosyn as in patient   2. Change to Oral Augmentin x 875 mg Q 12 HRS   10 days   3.  C diff and GI pathogens -ve  4. Cont local care   5. Can try lomotil for Diarrhea OTC  6. [id number] for d/c plans     Discussed with patient/Family and Nursing     Thanks for allowing me to participate in your patient's care please call me with any questions or concerns.     Dr. Edwin Carr MD  [address] [place] [address] Physician  Phone: [phone]   Fax : [phone]

## 2020-08-03 NOTE — CONSULTS
acetaminophen (TYLENOL) suppository 650 mg, 650 mg, Rectal, Q6H PRN  promethazine (PHENERGAN) tablet 12.5 mg, 12.5 mg, Oral, Q6H PRN **OR** ondansetron (ZOFRAN) injection 4 mg, 4 mg, Intravenous, Q6H PRN  enoxaparin (LOVENOX) injection 40 mg, 40 mg, Subcutaneous, Daily  magnesium sulfate 1 g in dextrose 5% 100 mL IVPB, 1 g, Intravenous, PRN  potassium chloride 10 mEq/100 mL IVPB (Peripheral Line), 10 mEq, Intravenous, PRN  lactobacillus (CULTURELLE) capsule 1 capsule, 1 capsule, Oral, Daily with breakfast  Allergies:   Patient has no known allergies. Social History:    TOBACCO:  Never used tobacco  ETOH:  Never drank alcohol  Family History:       Problem Relation Age of Onset    Colon Cancer Mother         PVD s/p toe amputation by Dr Darlin Triana Father      REVIEW OF SYSTEMS:    CONSTITUTIONAL:  Denies current F/C/N/V  INTEGUMENT/BREAST:  positive for skin color change, changes in hair, changes in nails and ulceration of muscle depth to plantar LEFT mid-arch  MUSCULOSKELETAL:  positive for  arthralgias, joint swelling, decreased range of motion and Charcot arthropathy  NEUROLOGICAL:  positive for gait problems and numbness  PHYSICAL EXAM:      Vitals:    BP (!) 143/84 Comment: nurse notified  Pulse 61   Temp (!) 39.2 °F (4 °C)   Resp 16   Ht 6' 3\" (1.905 m)   Wt 243 lb 9.7 oz (110.5 kg)   SpO2 99%   BMI 30.45 kg/m²     LABS:   Recent Labs     08/01/20  0726 08/03/20  0744   WBC 6.6 6.8   HGB 10.6* 11.7*   HCT 32.0* 35.4*    242     Recent Labs     08/01/20  0726      K 4.2      CO2 18*   BUN 21*   CREATININE 0.8     Recent Labs     07/31/20  1257   PROT 7.3       LOWER EXTREMITY EXAMINATION   SKIN:    Open wound of MUGS 2 to plantar LEFT mid arch, secondary to Charcot athtropathy deformity. Wound measures 3.1 x 3.3 cm and is of muscle depth, without erythema, iwthout calor, without drainage. One [place] percent granular base with minimal periwound hyperkeratosis.  No debridement necessary. NEUROLOGIC:    Pain sensation isdecreased Left. Light touch is decreasedLeft. positive history of paresthesia Bilateral. negativehistory of burning Left. Deep tendon reflexes are 2 to include patellar and achilles Bilateral. [place]--[place] 5.07 monofilament sensitivity is abnormal 5 to 5 spots tested Bilateral.    VASCULAR:    left Dorsalis pedis is 3. left Posterior tibial pulse is 2. 1+ edema left. mild Varicosities bilaterally. MUSCULOSKELETAL:  [place] is untested, but is NON-weight bearing to LEFT foot due to wound. Muscle strength is 4/5 to all groups tested to include dorsiflexion, plantarflexion, inversion, eversion, and digital Left . The ranges of motion of all joints tested from ankle distal is decreased there is no crepitus noted Left. IMPRESSION/RECOMMENDATIONS    1. Med. Ulcer Grading System wound 2 of LEFT plantar foot, of muscle depth, without evidence of infection. Silver Cell wound dressing so as to promote further granulation, after the [employer] wound cleanse    2. Charcot arthropathy with residual foot deformity that leads to chronicity of ulcer  +Off-loading, and non-weight bearing is a MUST for wound healing    Disposition: The foot wound does NOT appear to be cause of GI upset, diarrhea as there are no indicators of infection. No imaging necessary, and no surgery anticipated. Patient may be treated as OUT-patient follow up with 3x weekly nursing visits for dressing changes, once cleared medically. [employer] provided for home use, as wound was cleansed and dressed. Thank you for the opportunity to take part in the patient's care.     Meera Granados DPM  [facility]  [phone]

## 2020-08-03 NOTE — PROGRESS NOTES
Nutrition Assessment     Type and Reason for Visit: Positive Nutrition Screen, Wound(diabetic wound)    Nutrition Recommendations/Plan:   Continue Carb control diet. Start Ensure HP daily. Nutrition Assessment:     Patient assessed for nutritional risk. Deemed to be at low risk at this time. Will continue to monitor for changes in status. Malnutrition Assessment:  Malnutrition Status:  No malnutrition    Nutrition Related Findings: BM 8/3      Current Nutrition Therapies:    DIET CARB CONTROL;     Anthropometric Measures:  · Height: 6' 3\" (190.5 cm)  · Current Body Wt: 243 lb (110.2 kg)   · BMI: 30.4    Nutrition Diagnosis:   · Increased nutrient needs related to acute injury/trauma as evidenced by wounds      Nutrition Interventions:   Food and/or Nutrient Delivery:  Continue Current Diet, Start Nutritional supplement  Nutrition Education/Counseling:  No recommendation at this time   Coordination of Nutrition Care:  Continued Inpatient Monitoring    Electronically signed by Eda Currie RD, TARA on 8/3/20 at 11:11 AM EDT    Contact: [phone]

## 2020-08-04 VITALS
OXYGEN SATURATION: 97 % | WEIGHT: 244.8 LBS | HEIGHT: 75 IN | SYSTOLIC BLOOD PRESSURE: 116 MMHG | HEART RATE: 74 BPM | TEMPERATURE: 98.1 F | BODY MASS INDEX: 30.44 KG/M2 | DIASTOLIC BLOOD PRESSURE: 67 MMHG | RESPIRATION RATE: 16 BRPM

## 2020-08-04 LAB
ANION GAP SERPL CALCULATED.3IONS-SCNC: 14 MMOL/L (ref 3–16)
BASOPHILS ABSOLUTE: 0.1 K/UL (ref 0–0.2)
BASOPHILS RELATIVE PERCENT: 0.8 %
BLOOD CULTURE, ROUTINE: NORMAL
BUN BLDV-MCNC: 18 MG/DL (ref 7–20)
CALCIUM SERPL-MCNC: 8.6 MG/DL (ref 8.3–10.6)
CHLORIDE BLD-SCNC: 105 MMOL/L (ref 99–110)
CO2: 19 MMOL/L (ref 21–32)
CREAT SERPL-MCNC: 0.9 MG/DL (ref 0.8–1.3)
EOSINOPHILS ABSOLUTE: 0.2 K/UL (ref 0–0.6)
EOSINOPHILS RELATIVE PERCENT: 2.5 %
GFR AFRICAN AMERICAN: >60
GFR NON-AFRICAN AMERICAN: >60
GLUCOSE BLD-MCNC: 108 MG/DL (ref 70–99)
GLUCOSE BLD-MCNC: 114 MG/DL (ref 70–99)
GLUCOSE BLD-MCNC: 192 MG/DL (ref 70–99)
HCT VFR BLD CALC: 36 % (ref 40.5–52.5)
HEMOGLOBIN: 11.9 G/DL (ref 13.5–17.5)
LYMPHOCYTES ABSOLUTE: 2 K/UL (ref 1–5.1)
LYMPHOCYTES RELATIVE PERCENT: 26.1 %
MCH RBC QN AUTO: 25.8 PG (ref 26–34)
MCHC RBC AUTO-ENTMCNC: 33 G/DL (ref 31–36)
MCV RBC AUTO: 78.3 FL (ref 80–100)
MONOCYTES ABSOLUTE: 0.6 K/UL (ref 0–1.3)
MONOCYTES RELATIVE PERCENT: 7.6 %
NEUTROPHILS ABSOLUTE: 4.7 K/UL (ref 1.7–7.7)
NEUTROPHILS RELATIVE PERCENT: 63 %
PDW BLD-RTO: 17.3 % (ref 12.4–15.4)
PERFORMED ON: ABNORMAL
PERFORMED ON: ABNORMAL
PLATELET # BLD: 244 K/UL (ref 135–450)
PMV BLD AUTO: 7.5 FL (ref 5–10.5)
POTASSIUM REFLEX MAGNESIUM: 4.2 MMOL/L (ref 3.5–5.1)
RBC # BLD: 4.6 M/UL (ref 4.2–5.9)
SEDIMENTATION RATE, ERYTHROCYTE: 21 MM/HR (ref 0–20)
SODIUM BLD-SCNC: 138 MMOL/L (ref 136–145)
WBC # BLD: 7.5 K/UL (ref 4–11)

## 2020-08-04 PROCEDURE — 6360000002 HC RX W HCPCS: Performed by: FAMILY MEDICINE

## 2020-08-04 PROCEDURE — 6370000000 HC RX 637 (ALT 250 FOR IP): Performed by: INTERNAL MEDICINE

## 2020-08-04 PROCEDURE — 85652 RBC SED RATE AUTOMATED: CPT

## 2020-08-04 PROCEDURE — 2580000003 HC RX 258: Performed by: FAMILY MEDICINE

## 2020-08-04 PROCEDURE — G0378 HOSPITAL OBSERVATION PER HR: HCPCS

## 2020-08-04 PROCEDURE — 94760 N-INVAS EAR/PLS OXIMETRY 1: CPT

## 2020-08-04 PROCEDURE — 2580000003 HC RX 258: Performed by: NURSE PRACTITIONER

## 2020-08-04 PROCEDURE — 85025 COMPLETE CBC W/AUTO DIFF WBC: CPT

## 2020-08-04 PROCEDURE — 6360000002 HC RX W HCPCS: Performed by: NURSE PRACTITIONER

## 2020-08-04 PROCEDURE — 80048 BASIC METABOLIC PNL TOTAL CA: CPT

## 2020-08-04 PROCEDURE — 6370000000 HC RX 637 (ALT 250 FOR IP): Performed by: NURSE PRACTITIONER

## 2020-08-04 RX ORDER — LACTOBACILLUS RHAMNOSUS GG 10B CELL
1 CAPSULE ORAL 2 TIMES DAILY WITH MEALS
Qty: 40 CAPSULE | Refills: 0 | Status: SHIPPED | OUTPATIENT
Start: 2020-08-04 | End: 2020-08-24

## 2020-08-04 RX ORDER — AMOXICILLIN AND CLAVULANATE POTASSIUM 875; 125 MG/1; MG/1
1 TABLET, FILM COATED ORAL 2 TIMES DAILY
Qty: 20 TABLET | Refills: 0 | Status: SHIPPED | OUTPATIENT
Start: 2020-08-04 | End: 2020-08-14

## 2020-08-04 RX ORDER — DIPHENOXYLATE HYDROCHLORIDE AND ATROPINE SULFATE 2.5; .025 MG/1; MG/1
1 TABLET ORAL 4 TIMES DAILY PRN
Qty: 40 TABLET | Refills: 0 | Status: SHIPPED | OUTPATIENT
Start: 2020-08-04 | End: 2020-08-14

## 2020-08-04 RX ADMIN — INSULIN LISPRO 2 UNITS: 100 INJECTION, SOLUTION INTRAVENOUS; SUBCUTANEOUS at 11:43

## 2020-08-04 RX ADMIN — ASPIRIN 81 MG: 81 TABLET, COATED ORAL at 09:17

## 2020-08-04 RX ADMIN — PIPERACILLIN AND TAZOBACTAM 3.38 G: 3; .375 INJECTION, POWDER, LYOPHILIZED, FOR SOLUTION INTRAVENOUS at 02:00

## 2020-08-04 RX ADMIN — SPIRONOLACTONE 25 MG: 25 TABLET ORAL at 09:18

## 2020-08-04 RX ADMIN — PIPERACILLIN AND TAZOBACTAM 3.38 G: 3; .375 INJECTION, POWDER, LYOPHILIZED, FOR SOLUTION INTRAVENOUS at 09:18

## 2020-08-04 RX ADMIN — Medication 1 CAPSULE: at 09:17

## 2020-08-04 RX ADMIN — SODIUM CHLORIDE, PRESERVATIVE FREE 10 ML: 5 INJECTION INTRAVENOUS at 09:18

## 2020-08-04 RX ADMIN — ENOXAPARIN SODIUM 40 MG: 40 INJECTION SUBCUTANEOUS at 09:18

## 2020-08-04 RX ADMIN — LISINOPRIL 5 MG: 5 TABLET ORAL at 09:17

## 2020-08-04 RX ADMIN — FUROSEMIDE 40 MG: 40 TABLET ORAL at 09:18

## 2020-08-04 RX ADMIN — DIPHENOXYLATE HYDROCHLORIDE AND ATROPINE SULFATE 1 TABLET: 2.5; .025 TABLET ORAL at 09:17

## 2020-08-04 NOTE — PLAN OF CARE
Problem: Infection:  Goal: Will remain free from infection  Description: Will remain free from infection  Outcome: Ongoing     Problem: Safety:  Goal: Free from accidental physical injury  Description: Free from accidental physical injury  Outcome: Ongoing  Goal: Free from intentional harm  Description: Free from intentional harm  Outcome: Ongoing     Problem: Daily Care:  Goal: Daily care needs are met  Description: Daily care needs are met  Outcome: Ongoing     Problem: Pain:  Goal: Patient's pain/discomfort is manageable  Description: Patient's pain/discomfort is manageable  Outcome: Ongoing     Problem: Skin Integrity:  Goal: Skin integrity will stabilize  Description: Skin integrity will stabilize  Outcome: Ongoing     Problem: Discharge Planning:  Goal: Patients continuum of care needs are met  Description: Patients continuum of care needs are met  Outcome: Ongoing     Problem: Skin Integrity:  Goal: Will show no infection signs and symptoms  Description: Will show no infection signs and symptoms  Outcome: Ongoing  Goal: Absence of new skin breakdown  Description: Absence of new skin breakdown  Outcome: Ongoing

## 2020-08-04 NOTE — PROGRESS NOTES
Orders to d/c patient. IV removed- pt tolerated well. AVS information reviewed and signed. Pt acknowledged understanding of information on AVS including: Activity, Diet, New medications, Continued medications, medications to stop taking, follow up appointments, and education on diagnosis. Pt being D/C'd to home with home care. Waiting on transport to arrive about [phone].

## 2020-08-04 NOTE — DISCHARGE SUMMARY
with serum lipase      Nonobstructing left renal calculi      RECOMMENDATIONS:   Fleischner Society guidelines for follow-up and management of incidentally   detected pulmonary nodules:      Multiple Solid Nodules:      Nodule size less than 6 mm   In a low-risk patient, no routine follow-up. In a high-risk patient, optional CT at 12 months. Nodule size equals 6-8 mm   In a low-risk patient, CT at 3-6 months, then consider CT at 18-24 months. In a high-risk patient, CT at 3-6 months, then CT at 18-24 months. Nodule size greater than 8 mm   In a low-risk patient, CT at 3-6 months, then consider CT at 18-24 months. In a high-risk patient, CT at 3-6 months, then CT at 18-24 months. - Low risk patients include individuals with minimal or absent history of   smoking and other known risk factors. - High risk patients include individuals with a history or smoking or known   risk factors. Radiology 2017 http://pubs. rsna.org/doi/full/10.1148/radiol. 1761530671             Discharge Medications:   Current Discharge Medication List      START taking these medications    Details   diphenoxylate-atropine (LOMOTIL) 2.5-0.025 MG per tablet Take 1 tablet by mouth 4 times daily as needed for Diarrhea for up to 10 days.   Qty: 40 tablet, Refills: 0    Associated Diagnoses: Diarrhea, unspecified type      lactobacillus (CULTURELLE) capsule Take 1 capsule by mouth 2 times daily (with meals) for 20 days  Qty: 40 capsule, Refills: 0      amoxicillin-clavulanate (AUGMENTIN) 875-125 MG per tablet Take 1 tablet by mouth 2 times daily for 10 days  Qty: 20 tablet, Refills: 0           Current Discharge Medication List        Current Discharge Medication List      CONTINUE these medications which have NOT CHANGED    Details   vitamin B-12 (CYANOCOBALAMIN) 500 MCG tablet Take 1,500 mcg by mouth daily      aspirin 81 MG EC tablet Take 1 tablet by mouth daily  Qty: 30 tablet, Refills: 0      insulin glargine (LANTUS

## 2020-08-04 NOTE — DISCHARGE SUMMARY
Hospital Medicine Discharge Summary    Patient: Dagmar Russo     Gender: male  : 1955   Age: 75 y.o. MRN: [id number]    Code Status: Full Code     Primary Care Provider: KENNY Duvall CNP    Admit Date: 2020   Discharge Date:  2020    Admitting Physician: Susie Shepard,   Discharge Physician: Devaughn Botello, DO     Discharge Diagnoses: Active Hospital Problems    Diagnosis Date Noted    Chronic osteoarthritis [M19.90] 2020    Diarrhea [R19.7] 2020       Hospital Course: A 76 yo male admitted with diarrhea. Stool studies were negative. Chronic cellulitis and plantar foot wound:   -One week ago finished IV Ertapenem for 6 weeks for early osteomyelitis of left plantar foot wound  -on exam, has possible cellulitis  - No significant limitation in arterial blood flow on arterial duplex ultrasound  - zosyn empirically as in pt, discharged with augmentin x 10 more days  - podiatry consulted: Good wound healing no surgical intervention  -consult ID: augmentin     Diarrhea: DDX:  - functional vs infectious   - has actually spontaneously improved being in the hospital  - C dif and stool studies negative  - no signs of ischemia on imaging  - lomotil      Pulmonary nodule:   - Noted on CT chest abdomen and pelvis  - repeat ct in 6 weeks     History of hypertension: Continue home medications  Hyperlipidemia: Continued home medication statin     Diabetes: Sliding scale insulin                   Blood sugar stable 115                                  Disposition:  Home    Exam:     /79   Pulse 55   Temp 97.2 °F (36.2 °C) (Oral)   Resp 16   Ht 6' 3\" (1.905 m)   Wt 244 lb 12.8 oz (111 kg)   SpO2 98%   BMI 30.60 kg/m²     General appearance:  No apparent distress, appears stated age and cooperative. HEENT:  Normal cephalic, atraumatic without obvious deformity. Pupils equal, round, and reactive to light. Extra ocular muscles intact. Conjunctivae/corneas clear. Neck: Supple, with full range of motion. No jugular venous distention. Trachea midline. Respiratory:  Normal respiratory effort. Clear to auscultation, bilaterally without Rales/Wheezes/Rhonchi. Cardiovascular:  Regular rate and rhythm with normal S1/S2 without murmurs, rubs or gallops. Abdomen: Soft, non-tender, non-distended with normal bowel sounds. Musculoskeletal:  No clubbing, cyanosis or edema bilaterally. Full range of motion without deformity. Skin: Skin color, texture, turgor normal.  No rashes or lesions. Neurologic:  Neurovascularly intact without any focal sensory/motor deficits. Cranial nerves: II-XII intact, grossly non-focal.  Psychiatric:  Alert and oriented, thought content appropriate, normal insight    Consults:     IP CONSULT TO PODIATRY  IP CONSULT TO INFECTIOUS DISEASES  IP CONSULT TO HOME CARE NEEDS    Labs: For convenience and continuity at follow-up the following most recent labs are provided:    Lab Results   Component Value Date    WBC 7.5 08/04/2020    HGB 11.9 08/04/2020    HCT 36.0 08/04/2020    MCV 78.3 08/04/2020     08/04/2020     08/04/2020    K 4.2 08/04/2020     08/04/2020    CO2 19 08/04/2020    BUN 18 08/04/2020    CREATININE 0.9 08/04/2020    CALCIUM 8.6 08/04/2020    PHOS 3.3 06/12/2020    ALKPHOS 91 07/31/2020    ALT 8 07/31/2020    AST 10 07/31/2020    BILITOT 0.5 07/31/2020    LABALBU 3.6 07/31/2020    LDLCALC 37 06/08/2020    TRIG 105 06/08/2020     Lab Results   Component Value Date    INR 1.17 (H) 04/01/2014       Radiology:  VL DUP LOWER EXTREMITY ARTERIES BILATERAL   Final Result      CT CHEST ABDOMEN PELVIS W CONTRAST   Final Result   Tiny indeterminate noncalcified pulmonary nodules. No pneumonia or edema. Gaseous distention is seen throughout the abdomen. No bowel obstruction. Calcifications are seen in the pancreas with large stone in the pancreatic   duct and pancreatic ductal dilatation.   Correlate with serum lipase      Nonobstructing left renal calculi      RECOMMENDATIONS:   Fleischner Society guidelines for follow-up and management of incidentally   detected pulmonary nodules:      Multiple Solid Nodules:      Nodule size less than 6 mm   In a low-risk patient, no routine follow-up. In a high-risk patient, optional CT at 12 months. Nodule size equals 6-8 mm   In a low-risk patient, CT at 3-6 months, then consider CT at 18-24 months. In a high-risk patient, CT at 3-6 months, then CT at 18-24 months. Nodule size greater than 8 mm   In a low-risk patient, CT at 3-6 months, then consider CT at 18-24 months. In a high-risk patient, CT at 3-6 months, then CT at 18-24 months. - Low risk patients include individuals with minimal or absent history of   smoking and other known risk factors. - High risk patients include individuals with a history or smoking or known   risk factors. Radiology 2017 http://pubs. rsna.org/doi/full/10.1148/radiol. 8184367314             Discharge Medications:   Current Discharge Medication List      START taking these medications    Details   diphenoxylate-atropine (LOMOTIL) 2.5-0.025 MG per tablet Take 1 tablet by mouth 4 times daily as needed for Diarrhea for up to 10 days.   Qty: 40 tablet, Refills: 0    Associated Diagnoses: Diarrhea, unspecified type      lactobacillus (CULTURELLE) capsule Take 1 capsule by mouth 2 times daily (with meals) for 20 days  Qty: 40 capsule, Refills: 0           Current Discharge Medication List        Current Discharge Medication List      CONTINUE these medications which have NOT CHANGED    Details   vitamin B-12 (CYANOCOBALAMIN) 500 MCG tablet Take 1,500 mcg by mouth daily      aspirin 81 MG EC tablet Take 1 tablet by mouth daily  Qty: 30 tablet, Refills: 0      insulin glargine (LANTUS SOLOSTAR) 100 UNIT/ML injection pen Inject 26 Units into the skin every morning (before breakfast)  Qty: 1 pen, Refills: 0      atorvastatin (LIPITOR) 40 MG tablet Take 1 tablet by mouth daily  Qty: 30 tablet, Refills: 0      lisinopril (PRINIVIL;ZESTRIL) 10 MG tablet Take 0.5 tablets by mouth daily  Qty: 30 tablet, Refills: 0      spironolactone (ALDACTONE) 25 MG tablet Take 25 mg by mouth daily      metFORMIN (GLUCOPHAGE) 500 MG tablet Take 500 mg by mouth 2 times daily (with meals)      ammonium lactate (LAC-HYDRIN) 12 % lotion Apply to BL feet and legs bid with dressing changes  Qty: 1 Bottle, Refills: 0      Insulin Pen Needle 32G X 4 MM MISC 1 each by Does not apply route daily. Qty: 100 each, Refills: 3      Lancets MISC Once daily  Qty: 100 each, Refills: 3      glucose blood VI test strips (ASCENSIA AUTODISC VI;ONE TOUCH ULTRA TEST VI) strip 1 each by In Vitro route daily. As needed. Qty: 100 each, Refills: 3      glucose monitoring kit (FREESTYLE) monitoring kit 1 kit by Does not apply route daily as needed. Qty: 1 kit, Refills: 0           Current Discharge Medication List      STOP taking these medications       furosemide (LASIX) 40 MG tablet Comments:   Reason for Stopping:         tamsulosin (FLOMAX) 0.4 MG capsule Comments:   Reason for Stopping: Follow-up appointments:  one week    Provider Follow-up:    pcp    Condition at Discharge:  Stable    The patient was seen and examined on day of discharge and this discharge summary is in conjunction with any daily progress note from day of discharge. Time Spent on discharge is 45 minutes  in the examination, evaluation, counseling and review of medications and discharge plan. Signed:    Sanju Shoemaker DO   8/4/2020      Thank you KENNY Lynne - JAY for the opportunity to be involved in this patient's care. If you have any questions or concerns please feel free to contact me at [phone].

## 2020-08-04 NOTE — PROGRESS NOTES
Hospitalist Progress Note      PCP: Kailee Nino, KENNY - CNP    Date of Admission: 7/31/2020    Chief Complaint: [facility] Course:     Subjective:  Has few loose stools, no new complaints    Medications:  Reviewed    Infusion Medications   Scheduled Medications    piperacillin-tazobactam  3.375 g Intravenous Q8H    aspirin  81 mg Oral Daily    furosemide  40 mg Oral Daily    lisinopril  5 mg Oral Daily    spironolactone  25 mg Oral Daily    insulin lispro  0-12 Units Subcutaneous TID WC    insulin lispro  0-6 Units Subcutaneous Nightly    sodium chloride flush  10 mL Intravenous 2 times per day    enoxaparin  40 mg Subcutaneous Daily    lactobacillus  1 capsule Oral Daily with breakfast     PRN Meds: diphenoxylate-atropine, sodium chloride flush, acetaminophen **OR** acetaminophen, promethazine **OR** ondansetron, magnesium sulfate, potassium chloride      Intake/Output Summary (Last 24 hours) at 8/3/2020 7671  Last data filed at 8/3/2020 1809  Gross per 24 hour   Intake 1425.81 ml   Output --   Net 1425.81 ml       Exam:    /71   Pulse 67   Temp 98.1 °F (36.7 °C) (Oral)   Resp 16   Ht 6' 3\" (1.905 m)   Wt 243 lb 9.7 oz (110.5 kg)   SpO2 98%   BMI 30.45 kg/m²     General appearance: No apparent distress, appears stated age and cooperative. HEENT: Pupils equal, round, and reactive to light. Conjunctivae/corneas clear. Neck: Supple, with full range of motion. No jugular venous distention. Trachea midline. Respiratory:  Normal respiratory effort. Clear to auscultation, bilaterally without Rales/Wheezes/Rhonchi. Cardiovascular: Regular rate and rhythm with normal S1/S2 without murmurs, rubs or gallops. Abdomen: Soft, non-tender, non-distended with normal bowel sounds. Musculoskeletal: No clubbing, cyanosis or edema bilaterally. Full range of motion without deformity. Skin: Skin color, texture, turgor normal.  Bilateral lower legs with erythema, mild edema.   Left then consider CT at 18-24 months. In a high-risk patient, CT at 3-6 months, then CT at 18-24 months. Nodule size greater than 8 mm   In a low-risk patient, CT at 3-6 months, then consider CT at 18-24 months. In a high-risk patient, CT at 3-6 months, then CT at 18-24 months. - Low risk patients include individuals with minimal or absent history of   smoking and other known risk factors. - High risk patients include individuals with a history or smoking or known   risk factors. Radiology 2017 http://pubs. rsna.org/doi/full/10.1148/radiol. 1174899461                 Assessment/Plan:    Active Hospital Problems    Diagnosis Date Noted    Chronic osteoarthritis [M19.90] 08/03/2020    Diarrhea [R19.7] 07/31/2020       Chronic cellulitis and plantar foot wound:   -One week ago finished IV Ertapenem for 6 weeks for early osteomyelitis of left plantar foot wound  -on exam, has possible cellulitis  -check arterial duplex ultrasound, consider MRI  -zosyn empirically. Hold on vanc as was not indicated 6 weeks ago  - podiatry consulted:  Good wound healing no surgical intervention  -consult ID:  recs pending    Diarrhea: DDX:  - functional vs infectious  - has actually spontaneously improved being in the hospital  - C dif and stool studies pending  - no signs of ischemia on imaging  - cont to monitor and treat symptoms     Pulmonary nodule:   - Noted on CT chest abdomen and pelvis     Hypotension:   - Resolved with fluids.  - Cont to monitor    History of hypertension: Continue home medications  Hyperlipidemia: Continued home medication statin     Diabetes: Sliding scale insulin                   Blood sugar stable 115                   Hemoglobin A1c                           DVT Prophylaxis: lovenox  Diet: DIET CARB CONTROL; Dietary Nutrition Supplements: Low Calorie High Protein Supplement  Code Status: Full Code    PT/OT Eval Status: N/A    Dispo - MedSurg.       Andrea Evans MD

## 2020-11-08 ENCOUNTER — APPOINTMENT (OUTPATIENT)
Dept: GENERAL RADIOLOGY | Age: 65
End: 2020-11-08
Payer: MEDICARE

## 2020-11-08 ENCOUNTER — HOSPITAL ENCOUNTER (EMERGENCY)
Age: 65
Discharge: HOME | End: 2020-11-08
Attending: EMERGENCY MEDICINE
Payer: MEDICARE

## 2020-11-08 VITALS
BODY MASS INDEX: 31.69 KG/M2 | HEART RATE: 80 BPM | WEIGHT: 254.85 LBS | RESPIRATION RATE: 16 BRPM | OXYGEN SATURATION: 99 % | SYSTOLIC BLOOD PRESSURE: 114 MMHG | DIASTOLIC BLOOD PRESSURE: 68 MMHG | HEIGHT: 75 IN | TEMPERATURE: 98.1 F

## 2020-11-08 LAB
A/G RATIO: 1 (ref 1.1–2.2)
ALBUMIN SERPL-MCNC: 3.7 G/DL (ref 3.4–5)
ALP BLD-CCNC: 81 U/L (ref 40–129)
ALT SERPL-CCNC: 8 U/L (ref 10–40)
ANION GAP SERPL CALCULATED.3IONS-SCNC: 12 MMOL/L (ref 3–16)
AST SERPL-CCNC: 12 U/L (ref 15–37)
BASOPHILS ABSOLUTE: 0 K/UL (ref 0–0.2)
BASOPHILS RELATIVE PERCENT: 0.3 %
BILIRUB SERPL-MCNC: 0.8 MG/DL (ref 0–1)
BUN BLDV-MCNC: 25 MG/DL (ref 7–20)
CALCIUM SERPL-MCNC: 9.5 MG/DL (ref 8.3–10.6)
CHLORIDE BLD-SCNC: 102 MMOL/L (ref 99–110)
CO2: 20 MMOL/L (ref 21–32)
CREAT SERPL-MCNC: 1 MG/DL (ref 0.8–1.3)
EKG ATRIAL RATE: 77 BPM
EKG DIAGNOSIS: NORMAL
EKG P-R INTERVAL: 352 MS
EKG Q-T INTERVAL: 440 MS
EKG QRS DURATION: 148 MS
EKG QTC CALCULATION (BAZETT): 497 MS
EKG R AXIS: -72 DEGREES
EKG T AXIS: 31 DEGREES
EKG VENTRICULAR RATE: 77 BPM
EOSINOPHILS ABSOLUTE: 0 K/UL (ref 0–0.6)
EOSINOPHILS RELATIVE PERCENT: 0 %
GFR AFRICAN AMERICAN: >60
GFR NON-AFRICAN AMERICAN: >60
GLOBULIN: 3.8 G/DL
GLUCOSE BLD-MCNC: 150 MG/DL (ref 70–99)
HCT VFR BLD CALC: 35.5 % (ref 40.5–52.5)
HEMOGLOBIN: 11.4 G/DL (ref 13.5–17.5)
LYMPHOCYTES ABSOLUTE: 0.6 K/UL (ref 1–5.1)
LYMPHOCYTES RELATIVE PERCENT: 3.8 %
MCH RBC QN AUTO: 25.6 PG (ref 26–34)
MCHC RBC AUTO-ENTMCNC: 32.2 G/DL (ref 31–36)
MCV RBC AUTO: 79.5 FL (ref 80–100)
MONOCYTES ABSOLUTE: 0.9 K/UL (ref 0–1.3)
MONOCYTES RELATIVE PERCENT: 6.3 %
NEUTROPHILS ABSOLUTE: 13.1 K/UL (ref 1.7–7.7)
NEUTROPHILS RELATIVE PERCENT: 89.6 %
PDW BLD-RTO: 17 % (ref 12.4–15.4)
PLATELET # BLD: 227 K/UL (ref 135–450)
PMV BLD AUTO: 7.7 FL (ref 5–10.5)
POTASSIUM REFLEX MAGNESIUM: 4.5 MMOL/L (ref 3.5–5.1)
PRO-BNP: 1130 PG/ML (ref 0–124)
RBC # BLD: 4.46 M/UL (ref 4.2–5.9)
SODIUM BLD-SCNC: 134 MMOL/L (ref 136–145)
TOTAL PROTEIN: 7.5 G/DL (ref 6.4–8.2)
TROPONIN: <0.01 NG/ML
WBC # BLD: 14.6 K/UL (ref 4–11)

## 2020-11-08 PROCEDURE — 80053 COMPREHEN METABOLIC PANEL: CPT

## 2020-11-08 PROCEDURE — 2580000003 HC RX 258: Performed by: EMERGENCY MEDICINE

## 2020-11-08 PROCEDURE — 73560 X-RAY EXAM OF KNEE 1 OR 2: CPT

## 2020-11-08 PROCEDURE — 83880 ASSAY OF NATRIURETIC PEPTIDE: CPT

## 2020-11-08 PROCEDURE — 93010 ELECTROCARDIOGRAM REPORT: CPT | Performed by: INTERNAL MEDICINE

## 2020-11-08 PROCEDURE — 93005 ELECTROCARDIOGRAM TRACING: CPT | Performed by: EMERGENCY MEDICINE

## 2020-11-08 PROCEDURE — 73562 X-RAY EXAM OF KNEE 3: CPT

## 2020-11-08 PROCEDURE — 84484 ASSAY OF TROPONIN QUANT: CPT

## 2020-11-08 PROCEDURE — 99284 EMERGENCY DEPT VISIT MOD MDM: CPT

## 2020-11-08 PROCEDURE — 85025 COMPLETE CBC W/AUTO DIFF WBC: CPT

## 2020-11-08 PROCEDURE — 71101 X-RAY EXAM UNILAT RIBS/CHEST: CPT

## 2020-11-08 RX ORDER — 0.9 % SODIUM CHLORIDE 0.9 %
500 INTRAVENOUS SOLUTION INTRAVENOUS ONCE
Status: COMPLETED | OUTPATIENT
Start: 2020-11-08 | End: 2020-11-08

## 2020-11-08 RX ADMIN — SODIUM CHLORIDE 500 ML: 9 INJECTION, SOLUTION INTRAVENOUS at 03:33

## 2020-11-08 ASSESSMENT — PAIN DESCRIPTION - ORIENTATION: ORIENTATION: LEFT;LOWER

## 2020-11-08 ASSESSMENT — PAIN DESCRIPTION - PAIN TYPE: TYPE: ACUTE PAIN

## 2020-11-08 ASSESSMENT — PAIN DESCRIPTION - LOCATION: LOCATION: BACK;RIB CAGE

## 2020-11-08 ASSESSMENT — PAIN SCALES - GENERAL
PAINLEVEL_OUTOF10: 3
PAINLEVEL_OUTOF10: 0

## 2020-11-08 ASSESSMENT — PAIN DESCRIPTION - DESCRIPTORS: DESCRIPTORS: ACHING

## 2020-11-08 NOTE — ED NOTES
Pt spilled his urinal on himself so complete bed change was performed. Pt now resting comfortably in bed with no other needs at this time. Will continue to monitor.      Harsh Colunga RN  11/08/20 [id number]

## 2020-11-08 NOTE — ED NOTES
Pt keeps asking to call other places to get him a ride home because \"he can't stay in this hospital any longer\". I explained to patient that [facility] is the only ambulance company that has accepted the ride.       Loco Floyd RN  11/08/20 6630

## 2020-11-08 NOTE — ED NOTES
[place] ambulance scheduled to pick patient up at 10:30am. Pt is aware of this time.      Madeline Garza RN  11/08/20 2030

## 2020-11-26 ENCOUNTER — APPOINTMENT (OUTPATIENT)
Dept: CT IMAGING | Age: 65
End: 2020-11-26
Payer: MEDICARE

## 2020-11-26 ENCOUNTER — HOSPITAL ENCOUNTER (OUTPATIENT)
Age: 65
Setting detail: OBSERVATION
Discharge: HOME | End: 2020-11-29
Attending: EMERGENCY MEDICINE | Admitting: INTERNAL MEDICINE
Payer: MEDICARE

## 2020-11-26 PROBLEM — R07.9 CHEST PAIN: Status: ACTIVE | Noted: 2020-11-26

## 2020-11-26 LAB
A/G RATIO: 1 (ref 1.1–2.2)
ALBUMIN SERPL-MCNC: 3.7 G/DL (ref 3.4–5)
ALP BLD-CCNC: 108 U/L (ref 40–129)
ALT SERPL-CCNC: 7 U/L (ref 10–40)
ANION GAP SERPL CALCULATED.3IONS-SCNC: 11 MMOL/L (ref 3–16)
AST SERPL-CCNC: 8 U/L (ref 15–37)
BASOPHILS ABSOLUTE: 0 K/UL (ref 0–0.2)
BASOPHILS RELATIVE PERCENT: 0.6 %
BILIRUB SERPL-MCNC: 0.5 MG/DL (ref 0–1)
BILIRUBIN URINE: NEGATIVE
BLOOD, URINE: NEGATIVE
BUN BLDV-MCNC: 24 MG/DL (ref 7–20)
CALCIUM SERPL-MCNC: 9.1 MG/DL (ref 8.3–10.6)
CHLORIDE BLD-SCNC: 102 MMOL/L (ref 99–110)
CLARITY: ABNORMAL
CO2: 21 MMOL/L (ref 21–32)
COLOR: YELLOW
CREAT SERPL-MCNC: 1 MG/DL (ref 0.8–1.3)
EOSINOPHILS ABSOLUTE: 0.1 K/UL (ref 0–0.6)
EOSINOPHILS RELATIVE PERCENT: 0.7 %
EPITHELIAL CELLS, UA: 1 /HPF (ref 0–5)
GFR AFRICAN AMERICAN: >60
GFR NON-AFRICAN AMERICAN: >60
GLOBULIN: 3.8 G/DL
GLUCOSE BLD-MCNC: 136 MG/DL (ref 70–99)
GLUCOSE URINE: NEGATIVE MG/DL
HCT VFR BLD CALC: 37.8 % (ref 40.5–52.5)
HEMOGLOBIN: 12.2 G/DL (ref 13.5–17.5)
HYALINE CASTS: 3 /LPF (ref 0–8)
KETONES, URINE: NEGATIVE MG/DL
LACTIC ACID: 2.1 MMOL/L (ref 0.4–2)
LEUKOCYTE ESTERASE, URINE: NEGATIVE
LIPASE: 11 U/L (ref 13–60)
LYMPHOCYTES ABSOLUTE: 1.8 K/UL (ref 1–5.1)
LYMPHOCYTES RELATIVE PERCENT: 22.7 %
MCH RBC QN AUTO: 25.6 PG (ref 26–34)
MCHC RBC AUTO-ENTMCNC: 32.3 G/DL (ref 31–36)
MCV RBC AUTO: 79.4 FL (ref 80–100)
MICROSCOPIC EXAMINATION: YES
MONOCYTES ABSOLUTE: 0.5 K/UL (ref 0–1.3)
MONOCYTES RELATIVE PERCENT: 6.6 %
NEUTROPHILS ABSOLUTE: 5.6 K/UL (ref 1.7–7.7)
NEUTROPHILS RELATIVE PERCENT: 69.4 %
NITRITE, URINE: NEGATIVE
PDW BLD-RTO: 16.2 % (ref 12.4–15.4)
PH UA: 5.5 (ref 5–8)
PLATELET # BLD: 260 K/UL (ref 135–450)
PMV BLD AUTO: 7.6 FL (ref 5–10.5)
POTASSIUM REFLEX MAGNESIUM: 4.9 MMOL/L (ref 3.5–5.1)
PRO-BNP: 414 PG/ML (ref 0–124)
PROTEIN UA: NEGATIVE MG/DL
RBC # BLD: 4.76 M/UL (ref 4.2–5.9)
RBC UA: 3 /HPF (ref 0–4)
SARS-COV-2, NAAT: NOT DETECTED
SODIUM BLD-SCNC: 134 MMOL/L (ref 136–145)
SPECIFIC GRAVITY UA: 1.02 (ref 1–1.03)
TOTAL PROTEIN: 7.5 G/DL (ref 6.4–8.2)
TROPONIN: <0.01 NG/ML
URINE TYPE: ABNORMAL
UROBILINOGEN, URINE: 0.2 E.U./DL
WBC # BLD: 8.1 K/UL (ref 4–11)
WBC UA: 0 /HPF (ref 0–5)

## 2020-11-26 PROCEDURE — 6360000004 HC RX CONTRAST MEDICATION: Performed by: EMERGENCY MEDICINE

## 2020-11-26 PROCEDURE — 83605 ASSAY OF LACTIC ACID: CPT

## 2020-11-26 PROCEDURE — 6370000000 HC RX 637 (ALT 250 FOR IP): Performed by: EMERGENCY MEDICINE

## 2020-11-26 PROCEDURE — 80053 COMPREHEN METABOLIC PANEL: CPT

## 2020-11-26 PROCEDURE — 93005 ELECTROCARDIOGRAM TRACING: CPT | Performed by: EMERGENCY MEDICINE

## 2020-11-26 PROCEDURE — 84484 ASSAY OF TROPONIN QUANT: CPT

## 2020-11-26 PROCEDURE — P9612 CATHETERIZE FOR URINE SPEC: HCPCS

## 2020-11-26 PROCEDURE — 85025 COMPLETE CBC W/AUTO DIFF WBC: CPT

## 2020-11-26 PROCEDURE — 99284 EMERGENCY DEPT VISIT MOD MDM: CPT

## 2020-11-26 PROCEDURE — 81001 URINALYSIS AUTO W/SCOPE: CPT

## 2020-11-26 PROCEDURE — 96375 TX/PRO/DX INJ NEW DRUG ADDON: CPT

## 2020-11-26 PROCEDURE — 96374 THER/PROPH/DIAG INJ IV PUSH: CPT

## 2020-11-26 PROCEDURE — G0378 HOSPITAL OBSERVATION PER HR: HCPCS

## 2020-11-26 PROCEDURE — 74177 CT ABD & PELVIS W/CONTRAST: CPT

## 2020-11-26 PROCEDURE — 83690 ASSAY OF LIPASE: CPT

## 2020-11-26 PROCEDURE — 6360000002 HC RX W HCPCS: Performed by: EMERGENCY MEDICINE

## 2020-11-26 PROCEDURE — 2580000003 HC RX 258: Performed by: EMERGENCY MEDICINE

## 2020-11-26 PROCEDURE — 83880 ASSAY OF NATRIURETIC PEPTIDE: CPT

## 2020-11-26 PROCEDURE — U0002 COVID-19 LAB TEST NON-CDC: HCPCS

## 2020-11-26 RX ORDER — POTASSIUM CHLORIDE 7.45 MG/ML
10 INJECTION INTRAVENOUS PRN
Status: DISCONTINUED | OUTPATIENT
Start: 2020-11-26 | End: 2020-11-29 | Stop reason: HOSPADM

## 2020-11-26 RX ORDER — MAGNESIUM SULFATE IN WATER 40 MG/ML
2 INJECTION, SOLUTION INTRAVENOUS PRN
Status: DISCONTINUED | OUTPATIENT
Start: 2020-11-26 | End: 2020-11-29 | Stop reason: HOSPADM

## 2020-11-26 RX ORDER — TAMSULOSIN HYDROCHLORIDE 0.4 MG/1
0.4 CAPSULE ORAL EVERY EVENING
COMMUNITY

## 2020-11-26 RX ORDER — DEXTROSE MONOHYDRATE 50 MG/ML
100 INJECTION, SOLUTION INTRAVENOUS PRN
Status: DISCONTINUED | OUTPATIENT
Start: 2020-11-26 | End: 2020-11-29 | Stop reason: HOSPADM

## 2020-11-26 RX ORDER — SODIUM CHLORIDE 9 MG/ML
1000 INJECTION, SOLUTION INTRAVENOUS ONCE
Status: COMPLETED | OUTPATIENT
Start: 2020-11-26 | End: 2020-11-27

## 2020-11-26 RX ORDER — PROMETHAZINE HYDROCHLORIDE 25 MG/1
12.5 TABLET ORAL EVERY 6 HOURS PRN
Status: DISCONTINUED | OUTPATIENT
Start: 2020-11-26 | End: 2020-11-29 | Stop reason: HOSPADM

## 2020-11-26 RX ORDER — ONDANSETRON 2 MG/ML
4 INJECTION INTRAMUSCULAR; INTRAVENOUS ONCE
Status: COMPLETED | OUTPATIENT
Start: 2020-11-26 | End: 2020-11-26

## 2020-11-26 RX ORDER — FAMOTIDINE 20 MG/1
20 TABLET, FILM COATED ORAL DAILY PRN
Status: DISCONTINUED | OUTPATIENT
Start: 2020-11-26 | End: 2020-11-29 | Stop reason: HOSPADM

## 2020-11-26 RX ORDER — 0.9 % SODIUM CHLORIDE 0.9 %
1000 INTRAVENOUS SOLUTION INTRAVENOUS ONCE
Status: COMPLETED | OUTPATIENT
Start: 2020-11-26 | End: 2020-11-26

## 2020-11-26 RX ORDER — DEXTROSE MONOHYDRATE 25 G/50ML
12.5 INJECTION, SOLUTION INTRAVENOUS PRN
Status: DISCONTINUED | OUTPATIENT
Start: 2020-11-26 | End: 2020-11-29 | Stop reason: HOSPADM

## 2020-11-26 RX ORDER — MECLIZINE HCL 12.5 MG/1
12.5 TABLET ORAL ONCE
Status: COMPLETED | OUTPATIENT
Start: 2020-11-26 | End: 2020-11-26

## 2020-11-26 RX ORDER — SODIUM CHLORIDE 0.9 % (FLUSH) 0.9 %
10 SYRINGE (ML) INJECTION EVERY 12 HOURS SCHEDULED
Status: DISCONTINUED | OUTPATIENT
Start: 2020-11-26 | End: 2020-11-29 | Stop reason: HOSPADM

## 2020-11-26 RX ORDER — NICOTINE POLACRILEX 4 MG
15 LOZENGE BUCCAL PRN
Status: DISCONTINUED | OUTPATIENT
Start: 2020-11-26 | End: 2020-11-29 | Stop reason: HOSPADM

## 2020-11-26 RX ORDER — ACETAMINOPHEN 325 MG/1
650 TABLET ORAL EVERY 6 HOURS PRN
Status: DISCONTINUED | OUTPATIENT
Start: 2020-11-26 | End: 2020-11-29 | Stop reason: HOSPADM

## 2020-11-26 RX ORDER — ONDANSETRON 2 MG/ML
4 INJECTION INTRAMUSCULAR; INTRAVENOUS EVERY 6 HOURS PRN
Status: DISCONTINUED | OUTPATIENT
Start: 2020-11-26 | End: 2020-11-29 | Stop reason: HOSPADM

## 2020-11-26 RX ORDER — SODIUM CHLORIDE 0.9 % (FLUSH) 0.9 %
10 SYRINGE (ML) INJECTION PRN
Status: DISCONTINUED | OUTPATIENT
Start: 2020-11-26 | End: 2020-11-29 | Stop reason: HOSPADM

## 2020-11-26 RX ORDER — ACETAMINOPHEN 650 MG/1
650 SUPPOSITORY RECTAL EVERY 6 HOURS PRN
Status: DISCONTINUED | OUTPATIENT
Start: 2020-11-26 | End: 2020-11-29 | Stop reason: HOSPADM

## 2020-11-26 RX ORDER — ASPIRIN 300 MG/1
300 SUPPOSITORY RECTAL ONCE
Status: DISCONTINUED | OUTPATIENT
Start: 2020-11-26 | End: 2020-11-28

## 2020-11-26 RX ADMIN — ONDANSETRON 4 MG: 2 INJECTION INTRAMUSCULAR; INTRAVENOUS at 19:29

## 2020-11-26 RX ADMIN — MECLIZINE 12.5 MG: 12.5 TABLET ORAL at 19:29

## 2020-11-26 RX ADMIN — IOPAMIDOL 75 ML: 755 INJECTION, SOLUTION INTRAVENOUS at 20:17

## 2020-11-26 RX ADMIN — SODIUM CHLORIDE 1000 ML: 9 INJECTION, SOLUTION INTRAVENOUS at 19:29

## 2020-11-26 ASSESSMENT — PAIN DESCRIPTION - ORIENTATION: ORIENTATION: LEFT

## 2020-11-26 ASSESSMENT — PAIN DESCRIPTION - PAIN TYPE: TYPE: ACUTE PAIN

## 2020-11-26 ASSESSMENT — PAIN SCALES - GENERAL
PAINLEVEL_OUTOF10: 2
PAINLEVEL_OUTOF10: 2

## 2020-11-26 ASSESSMENT — PAIN DESCRIPTION - DESCRIPTORS: DESCRIPTORS: DULL;ACHING

## 2020-11-26 ASSESSMENT — PAIN DESCRIPTION - FREQUENCY: FREQUENCY: INTERMITTENT

## 2020-11-26 ASSESSMENT — PAIN DESCRIPTION - LOCATION: LOCATION: CHEST

## 2020-11-27 ENCOUNTER — APPOINTMENT (OUTPATIENT)
Dept: GENERAL RADIOLOGY | Age: 65
End: 2020-11-27
Payer: MEDICARE

## 2020-11-27 PROBLEM — R42 VERTIGO: Status: ACTIVE | Noted: 2020-11-27

## 2020-11-27 PROBLEM — K86.1 CHRONIC CALCIFIC PANCREATITIS (HCC): Status: ACTIVE | Noted: 2020-11-27

## 2020-11-27 PROBLEM — I44.1 ATRIOVENTRICULAR BLOCK, MOBITZ TYPE 1, WENCKEBACH: Status: ACTIVE | Noted: 2020-11-27

## 2020-11-27 LAB
A/G RATIO: 1 (ref 1.1–2.2)
ALBUMIN SERPL-MCNC: 3.5 G/DL (ref 3.4–5)
ALP BLD-CCNC: 104 U/L (ref 40–129)
ALT SERPL-CCNC: 7 U/L (ref 10–40)
ANION GAP SERPL CALCULATED.3IONS-SCNC: 7 MMOL/L (ref 3–16)
AST SERPL-CCNC: 8 U/L (ref 15–37)
BASOPHILS ABSOLUTE: 0.1 K/UL (ref 0–0.2)
BASOPHILS RELATIVE PERCENT: 0.7 %
BILIRUB SERPL-MCNC: 0.5 MG/DL (ref 0–1)
BUN BLDV-MCNC: 18 MG/DL (ref 7–20)
CALCIUM SERPL-MCNC: 9.1 MG/DL (ref 8.3–10.6)
CHLORIDE BLD-SCNC: 103 MMOL/L (ref 99–110)
CO2: 26 MMOL/L (ref 21–32)
CREAT SERPL-MCNC: 0.9 MG/DL (ref 0.8–1.3)
EKG ATRIAL RATE: 70 BPM
EKG ATRIAL RATE: 77 BPM
EKG DIAGNOSIS: NORMAL
EKG DIAGNOSIS: NORMAL
EKG P AXIS: 14 DEGREES
EKG Q-T INTERVAL: 422 MS
EKG Q-T INTERVAL: 468 MS
EKG QRS DURATION: 138 MS
EKG QRS DURATION: 140 MS
EKG QTC CALCULATION (BAZETT): 451 MS
EKG QTC CALCULATION (BAZETT): 483 MS
EKG R AXIS: -54 DEGREES
EKG R AXIS: -69 DEGREES
EKG T AXIS: 36 DEGREES
EKG T AXIS: 37 DEGREES
EKG VENTRICULAR RATE: 56 BPM
EKG VENTRICULAR RATE: 79 BPM
EOSINOPHILS ABSOLUTE: 0.1 K/UL (ref 0–0.6)
EOSINOPHILS RELATIVE PERCENT: 1.6 %
ESTIMATED AVERAGE GLUCOSE: 154.2 MG/DL
GFR AFRICAN AMERICAN: >60
GFR NON-AFRICAN AMERICAN: >60
GLOBULIN: 3.6 G/DL
GLUCOSE BLD-MCNC: 101 MG/DL (ref 70–99)
GLUCOSE BLD-MCNC: 112 MG/DL (ref 70–99)
GLUCOSE BLD-MCNC: 135 MG/DL (ref 70–99)
GLUCOSE BLD-MCNC: 233 MG/DL (ref 70–99)
GLUCOSE BLD-MCNC: 85 MG/DL (ref 70–99)
GLUCOSE BLD-MCNC: 95 MG/DL (ref 70–99)
GLUCOSE BLD-MCNC: 98 MG/DL (ref 70–99)
HBA1C MFR BLD: 7 %
HCT VFR BLD CALC: 35.5 % (ref 40.5–52.5)
HEMOGLOBIN: 11.7 G/DL (ref 13.5–17.5)
LV EF: 60 %
LV EF: 78 %
LVEF MODALITY: NORMAL
LVEF MODALITY: NORMAL
LYMPHOCYTES ABSOLUTE: 2 K/UL (ref 1–5.1)
LYMPHOCYTES RELATIVE PERCENT: 26 %
MCH RBC QN AUTO: 25.9 PG (ref 26–34)
MCHC RBC AUTO-ENTMCNC: 32.9 G/DL (ref 31–36)
MCV RBC AUTO: 78.8 FL (ref 80–100)
MONOCYTES ABSOLUTE: 0.6 K/UL (ref 0–1.3)
MONOCYTES RELATIVE PERCENT: 7.6 %
NEUTROPHILS ABSOLUTE: 5 K/UL (ref 1.7–7.7)
NEUTROPHILS RELATIVE PERCENT: 64.1 %
PDW BLD-RTO: 16.9 % (ref 12.4–15.4)
PERFORMED ON: ABNORMAL
PERFORMED ON: NORMAL
PLATELET # BLD: 266 K/UL (ref 135–450)
PMV BLD AUTO: 7.6 FL (ref 5–10.5)
POTASSIUM REFLEX MAGNESIUM: 4.1 MMOL/L (ref 3.5–5.1)
RBC # BLD: 4.5 M/UL (ref 4.2–5.9)
SODIUM BLD-SCNC: 136 MMOL/L (ref 136–145)
TOTAL PROTEIN: 7.1 G/DL (ref 6.4–8.2)
WBC # BLD: 7.8 K/UL (ref 4–11)

## 2020-11-27 PROCEDURE — 93005 ELECTROCARDIOGRAM TRACING: CPT | Performed by: INTERNAL MEDICINE

## 2020-11-27 PROCEDURE — 83036 HEMOGLOBIN GLYCOSYLATED A1C: CPT

## 2020-11-27 PROCEDURE — G0378 HOSPITAL OBSERVATION PER HR: HCPCS

## 2020-11-27 PROCEDURE — 2580000003 HC RX 258: Performed by: INTERNAL MEDICINE

## 2020-11-27 PROCEDURE — 85025 COMPLETE CBC W/AUTO DIFF WBC: CPT

## 2020-11-27 PROCEDURE — 3430000000 HC RX DIAGNOSTIC RADIOPHARMACEUTICAL: Performed by: INTERNAL MEDICINE

## 2020-11-27 PROCEDURE — 6360000002 HC RX W HCPCS: Performed by: INTERNAL MEDICINE

## 2020-11-27 PROCEDURE — 73630 X-RAY EXAM OF FOOT: CPT

## 2020-11-27 PROCEDURE — 6370000000 HC RX 637 (ALT 250 FOR IP): Performed by: INTERNAL MEDICINE

## 2020-11-27 PROCEDURE — 80053 COMPREHEN METABOLIC PANEL: CPT

## 2020-11-27 PROCEDURE — 99204 OFFICE O/P NEW MOD 45 MIN: CPT | Performed by: INTERNAL MEDICINE

## 2020-11-27 PROCEDURE — 93306 TTE W/DOPPLER COMPLETE: CPT

## 2020-11-27 PROCEDURE — 93010 ELECTROCARDIOGRAM REPORT: CPT | Performed by: INTERNAL MEDICINE

## 2020-11-27 PROCEDURE — A9502 TC99M TETROFOSMIN: HCPCS | Performed by: INTERNAL MEDICINE

## 2020-11-27 PROCEDURE — 78452 HT MUSCLE IMAGE SPECT MULT: CPT

## 2020-11-27 PROCEDURE — 93017 CV STRESS TEST TRACING ONLY: CPT

## 2020-11-27 PROCEDURE — 36415 COLL VENOUS BLD VENIPUNCTURE: CPT

## 2020-11-27 RX ADMIN — SODIUM CHLORIDE, PRESERVATIVE FREE 10 ML: 5 INJECTION INTRAVENOUS at 08:11

## 2020-11-27 RX ADMIN — INSULIN LISPRO 2 UNITS: 100 INJECTION, SOLUTION INTRAVENOUS; SUBCUTANEOUS at 22:34

## 2020-11-27 RX ADMIN — TETROFOSMIN 30 MILLICURIE: 1.38 INJECTION, POWDER, LYOPHILIZED, FOR SOLUTION INTRAVENOUS at 13:10

## 2020-11-27 RX ADMIN — SODIUM CHLORIDE, PRESERVATIVE FREE 10 ML: 5 INJECTION INTRAVENOUS at 00:23

## 2020-11-27 RX ADMIN — SODIUM CHLORIDE 1000 ML: 9 INJECTION, SOLUTION INTRAVENOUS at 00:23

## 2020-11-27 RX ADMIN — TETROFOSMIN 10 MILLICURIE: 1.38 INJECTION, POWDER, LYOPHILIZED, FOR SOLUTION INTRAVENOUS at 10:35

## 2020-11-27 RX ADMIN — REGADENOSON 0.4 MG: 0.08 INJECTION, SOLUTION INTRAVENOUS at 12:25

## 2020-11-27 RX ADMIN — SODIUM CHLORIDE, PRESERVATIVE FREE 10 ML: 5 INJECTION INTRAVENOUS at 21:06

## 2020-11-27 ASSESSMENT — PAIN SCALES - GENERAL: PAINLEVEL_OUTOF10: 0

## 2020-11-27 NOTE — ED PROVIDER NOTES
[facility]      Pt Name: Angele Aschoff  MRN: [id number]  [place] 1955  Date of evaluation: 11/26/2020  Provider: Kamila Skaggs MD    CHIEF COMPLAINT       Chief Complaint   Patient presents with    Nausea     started last night, unable to keep anything down     Dizziness     started yesterday          HISTORY OF PRESENT ILLNESS   (Location/Symptom, Timing/Onset,Context/Setting, Quality, Duration, Modifying Factors, Severity)  Note limiting factors. Angele Aschoff is a 77 y.o. male who presents to the emergency department for evaluation of nausea, vomiting, and dizziness. Patient reports that symptoms started last night. He states that he has dizziness, which he cannot describe if it is more lightheaded or spinning sensation but does report that it is worse when he lies down in bed or moves his head. He does report mild dizziness at the time of my initial evaluation. He also reports nausea and inability to keep food or liquid down today. He was unable to keep his medications down today either secondary to vomiting. He states he has had some dull left-sided chest pain throughout the day, which does not radiate and he has not noted any exacerbating or alleviating factors. He also reports some epigastric abdominal discomfort. He denies any diarrhea, recent fevers, cough, shortness of breath. He does have bilateral foot ulcers, with a wound VAC to the right foot. NursingNotes were reviewed. REVIEW OF SYSTEMS    (2-9 systems for level 4, 10 or more for level 5)       Constitutional: No fever or chills. Dizziness. Eye: No visual disturbances. No eye pain. Ear/Nose/Mouth/Throat: No nasal congestion. No sore throat. Respiratory: No cough, No shortness of breath, No sputum production. Cardiovascular: No chest pain. No palpitations. Gastrointestinal: Epigastric abdominal pain.   Nausea and vomiting  Genitourinary: No dysuria. No hematuria. Hematology/Lymphatics: No bleeding or bruising tendency. Immunologic: No malaise. No swollen glands. Musculoskeletal: No back pain. No joint pain. Integumentary: No rash. No abrasions. Neurologic: No headache. No focal numbness or weakness. PAST MEDICAL HISTORY     Past Medical History:   Diagnosis Date    Diabetes mellitus ([address]     Gallstones     Pancreatitis     Ulcers of both lower legs ([address]     bilateral feet         SURGICALHISTORY       Past Surgical History:   Procedure Laterality Date    ERCP  4/8/14    LITHOTRYPSY & PANCREATIC STENT PLACEMENT    FOOT SURGERY Left 1967         CURRENT MEDICATIONS       Previous Medications    AMMONIUM LACTATE (LAC-HYDRIN) 12 % LOTION    Apply to BL feet and legs bid with dressing changes    ASPIRIN 81 MG EC TABLET    Take 1 tablet by mouth daily    ATORVASTATIN (LIPITOR) 40 MG TABLET    Take 1 tablet by mouth daily    GLUCOSE BLOOD VI TEST STRIPS (ASCENSIA AUTODISC VI;ONE TOUCH ULTRA TEST VI) STRIP    1 each by In Vitro route daily. As needed. GLUCOSE MONITORING KIT (FREESTYLE) MONITORING KIT    1 kit by Does not apply route daily as needed. INSULIN GLARGINE (LANTUS SOLOSTAR) 100 UNIT/ML INJECTION PEN    Inject 26 Units into the skin every morning (before breakfast)    INSULIN PEN NEEDLE 32G X 4 MM MISC    1 each by Does not apply route daily. LANCETS MISC    Once daily    LISINOPRIL (PRINIVIL;ZESTRIL) 10 MG TABLET    Take 0.5 tablets by mouth daily    METFORMIN (GLUCOPHAGE) 500 MG TABLET    Take 500 mg by mouth 2 times daily (with meals)    SPIRONOLACTONE (ALDACTONE) 25 MG TABLET    Take 25 mg by mouth daily    TAMSULOSIN (FLOMAX) 0.4 MG CAPSULE    Take 0.4 mg by mouth daily    VITAMIN B-12 (CYANOCOBALAMIN) 500 MCG TABLET    Take 1,500 mcg by mouth daily       ALLERGIES     Patient has no known allergies.     FAMILY HISTORY       Family History   Problem Relation Age of Onset    Colon Cancer Mother         PVD s/p toe amputation by Dr Daniele Garcia Father           SOCIAL HISTORY       Social History     Socioeconomic History    Marital status: Single     Spouse name: None    Number of children: None    Years of education: None    Highest education level: None   Occupational History    None   Social Needs    Financial resource strain: None    Food insecurity     Worry: None     Inability: None    Transportation needs     Medical: None     Non-medical: None   Tobacco Use    Smoking status: Never Smoker    Smokeless tobacco: Never Used   Substance and Sexual Activity    Alcohol use: No    Drug use: No    Sexual activity: None   Lifestyle    Physical activity     Days per week: None     Minutes per session: None    Stress: None   Relationships    Social connections     Talks on phone: None     Gets together: None     Attends Jewish service: None     Active member of club or organization: None     Attends meetings of clubs or organizations: None     Relationship status: None    Intimate partner violence     Fear of current or ex partner: None     Emotionally abused: None     Physically abused: None     Forced sexual activity: None   Other Topics Concern    None   Social History Narrative    None       SCREENINGS   NIH Stroke Scale  Level of Consciousness (1a. ): Alert  LOC Questions (1b. ):  Answers both correctly  LOC Commands (1c. ): Performs both tasks correctly  Best Gaze (2. ): Normal  Visual (3. ): No visual loss  Facial Palsy (4. ): Normal symmetrical movement  Motor Arm, Left (5a. ): No drift  Motor Arm, Right (5b. ): No drift  Motor Leg, Left (6a. ): No drift  Motor Leg, Right (6b. ): No drift  Limb Ataxia (7. ): Absent  Best Language (9. ): No aphasia  Extinction and Inattention (11): No abnormalityGlasgow Coma Scale  Eye Opening: Spontaneous  Best Verbal Response: Oriented  Best Motor Response: Obeys commands  Aby Coma Scale Score: 15        PHYSICAL EXAM    (up to 7 for level 4, 8 or more for level 5)     ED Triage Vitals [11/26/20 1906]   BP Temp Temp Source Pulse Resp SpO2 Height Weight   130/81 97.6 °F (36.4 °C) Oral 79 18 100 % 6' 2\" (1.88 m) 255 lb 4.7 oz (115.8 kg)       General: Alert and oriented, No acute distress. Eye: Normal conjunctiva. Pupils equal and reactive. HENT: Oral mucosa is moist.  Respiratory: Lungs are clear to auscultation, Respirations are non-labored. Cardiovascular: Normal rate, Regular rhythm. Gastrointestinal: Soft, Non-tender, Non-distended. Musculoskeletal: No swelling. Integumentary: Warm, Dry. Neurologic: Alert, Oriented, No focal defects. Normal speech and cranial nerve exam.  Motor and sensation intact in all extremities, no ataxia with finger-to-nose or heel-to-shin testing. NIHSS of 0. Normal test of skew. No truncal ataxia  Psychiatric: Cooperative. DIAGNOSTIC RESULTS     EKG: All EKG's are interpreted by the Emergency Department Physician who either signs or Co-signsthis chart in the absence of a cardiologist.    Per my interpretation:    Electrocardiogram (ECG) 11/26/2020 1913  RATE: normal  RHYTHM: normal sinus  AXIS: normal  INTERVALS: normal  ST-T WAVE CHANGES: No evidence of ST segment elevation or T-wave inversion, right bundle branch block no change from prior EKG  Prior for comparison 11/8/2020    RADIOLOGY:   Dimitrios Bigger such as CT, Ultrasound and MRI are read by the radiologist. Plain radiographic images are visualized and preliminarily interpreted by the emergency physician with the below findings:      Interpretation per the Radiologist below, if available at the time ofthis note:    CT ABDOMEN PELVIS W IV CONTRAST Additional Contrast? None   Final Result   1. No acute findings within the abdomen or pelvis   2. CT findings consistent with chronic pancreatitis   3. Nonobstructing left intrarenal calculi   4.  Small hiatal hernia               ED BEDSIDE ULTRASOUND:   Performed by ED Physician - none    LABS:  Labs Reviewed   CBC WITH AUTO DIFFERENTIAL - Abnormal; Notable for the following components:       Result Value    Hemoglobin 12.2 (*)     Hematocrit 37.8 (*)     MCV 79.4 (*)     MCH 25.6 (*)     RDW 16.2 (*)     All other components within normal limits    Narrative:     Performed at:  [address] [facility]   Phone [phone]   COMPREHENSIVE METABOLIC PANEL W/ REFLEX TO MG FOR LOW K - Abnormal; Notable for the following components:    Sodium 134 (*)     Glucose 136 (*)     BUN 24 (*)     Albumin/Globulin Ratio 1.0 (*)     ALT 7 (*)     AST 8 (*)     All other components within normal limits    Narrative:     Performed at:  [address] [facility]   Phone [phone]   LIPASE - Abnormal; Notable for the following components:    Lipase 11.0 (*)     All other components within normal limits    Narrative:     Performed at:  [address] [facility]   Phone [phone]   BRAIN NATRIURETIC PEPTIDE - Abnormal; Notable for the following components:    Pro- (*)     All other components within normal limits    Narrative:     Performed at:  [address] [facility]   Phone [phone]   URINALYSIS - Abnormal; Notable for the following components:    Clarity, UA CLOUDY (*)     All other components within normal limits    Narrative:     Performed at:  [address] [facility]   Phone [phone]   LACTIC ACID, PLASMA - Abnormal; Notable for the following components:    Lactic Acid 2.1 (*)     All other components within normal limits    Narrative:     Performed at:  [address] [facility]   Phone [phone] TROPONIN    Narrative:     Performed at:  [facility]  [address] [place], [place] [address]   Phone [phone]    Narrative:     Performed at:  [facility]  [address] [place], [place] [address]   Phone [phone]   MICROSCOPIC URINALYSIS    Narrative:     Performed at:  [facility]  [address] [facility] [address]   Phone [phone]       All other labs were within normal range or not returned as of this dictation. EMERGENCY DEPARTMENT COURSE and DIFFERENTIAL DIAGNOSIS/MDM:   Vitals:    Vitals:    11/26/20 1906 11/26/20 2009 11/26/20 2124 11/26/20 2138   BP: 130/81 138/65 132/82 (!) 141/78   Pulse: 79 76 76 75   Resp: 18 16 17 18   Temp: 97.6 °F (36.4 °C)      TempSrc: Oral      SpO2: 100% 99% 97% 100%   Weight: 255 lb 4.7 oz (115.8 kg)      Height: 6' 2\" (1.88 m)        HEART Score:  History: Slightly suspicious -0  EKG: nonspecific repolarization disturbance -1  Age: greater than 65 - 2  Risk factors (Hypertension, high cholesterol, diabetes, obesity, smoking, family history, known CAD, PAD)  1-2 risk factors-1  Initial troponin: normal - 0  Total heart score: 4    Medical decision making: This is a 57-year-old male who presents for evaluation of dizziness, and intractable nausea and vomiting since last night, states he was unable to tolerate liquids or his medications at home today secondary to vomiting. The patient does endorse some epigastric discomfort as well as some dull left-sided chest pain. On exam he is well-appearing, afebrile, with normal vital signs. Differential diagnosis includes atypical ACS, pancreatitis, cholecystitis, bowel obstruction, dehydration. Patient has a normal neurologic exam with an NIHSS of 0 at the time of assessment, therefore do not suspect posterior CVA. EKG shows no evidence of acute ischemia. Troponin is negative.   Patient

## 2020-11-27 NOTE — CONSULTS
[address]                                  CONSULTATION    PATIENT NAME: Arik Wilkins                :        1955  MED REC NO:   [id number]                          ROOM:       3109  ACCOUNT NO:   [de-identified]                           ADMIT DATE: 2020  PROVIDER:     Gisela Hurd MD    CARDIAC CONSULTATION    CONSULT DATE:  2020    HISTORY OF PRESENT ILLNESS:  This is a 70-year-old male with a history  of diabetes, chronic pancreatitis, who presented to [facility] Emergency  Room with symptoms of dizziness, nausea, vomiting. His symptoms started  Wednesday night and got progressively worse. He also had complaint of  left-sided chest pain which was sharp in nature. He denies any  diaphoresis or so. He felt that his head was spinning and he felt he  was losing his balance. He had no diaphoresis with these symptoms. In  the emergency room, his workup showed a slight troponin elevation  leading to this cardiac consultation. He has not been experiencing any  exertional chest tightness or pressure. He was in the emergency room  not too long ago with abdominal pain and he was diagnosed with  pancreatitis. REVIEW OF SYSTEMS:  Please see HPI. All other systems are reviewed and  they are negative. PAST MEDICAL HISTORY:  1. History of diabetes. 2.  History of pancreatitis. 3.  Ulcers of both lower extremities, being seen by Dr. Jaylin Henry. 4.  History of gallstones. PAST SURGICAL HISTORY:  He had a lithotripsy and foot surgery in the  past as well as ERCP. SOCIAL HISTORY:  Denies any smoking or alcohol abuse. FAMILY HISTORY:  Grandparents  of heart disease at early age. MEDICATIONS:  Have been reviewed. ALLERGIES:  Have been reviewed.     PHYSICAL EXAMINATION:  VITAL SIGNS:  Pulse is 66 and regular, blood pressure 124/59,  respirations are 16, oxygen saturation 98%.  CONSTITUTIONAL:  He is alert and oriented. HEENT:  His neck is supple. No JVD. No thyromegaly. CARDIAC:  Regular rate and rhythm. No gallop, murmur, or rub. LUNGS:  Largely clear to auscultation and palpation. ABDOMEN:  Soft, nontender. Bowel sounds are present. EXTREMITIES:  Changes of chronic venous insufficiency plus ulceration in  his right lower extremity with evidence of possible gangrene. NEUROLOGICAL:  No focal deficit. LABORATORY DATA:  Sodium 136, potassium 4.1, chloride 103, BUN is 16,  creatinine 0.9. ProBNP 415. Troponin is less than 0.01. White count  7.8, hemoglobin 11.7, hematocrit is 35.5. His COVID status is negative. EKG showed possibly a junctional rhythm with evidence of AV  dissociation. The patient also appears to have episodes of third-degree  AV block at times on the monitor. IMPRESSION:  1. This is a 68-year-old male who has presented with atypical chest  pain. The patient's pain appears atypical for angina but has multiple  risk factors including diabetes, hypertension, and positive family  history. Underlying ischemic heart disease will have to be excluded. 2.  Cardiac arrhythmias. The patient appears to have third-degree AV  block. Etiology remains unclear. I am not sure whether the patient  will need a pacemaker or not but we will certainly consult EP service  for that. 3.  History of lower extremity ulcerations. Rule out peripheral  arterial disease. RECOMMENDATION:  1. We will have cardiac chemical stress test to make sure there is no  evidence of ischemic heart disease. 2.  We will ask EP to see the patient about the patient's cardiac  arrhythmias to see if there is any further recommendation including  pacemaker has to be considered or not. The patient had a vascular  Doppler study which was negative in 08/2020. Complexity of medical decision making is high.   I appreciate the  opportunity to participate in the care of this pleasant male.        Baron Yamel MD    D: 11/27/2020 10:32:55       T: 11/27/2020 13:28:19     [email or web address]  Job#: [id number]     Doc#: [id number]    CC:

## 2020-11-27 NOTE — PROGRESS NOTES
4 Eyes Skin Assessment     NAME:  Alva Cook  YOB: 1955  MEDICAL RECORD NUMBER:  [id number]    The patient is being assess for  Admission    I agree that 2 RN's have performed a thorough Head to Toe Skin Assessment on the patient. ALL assessment sites listed below have been assessed. Areas assessed by both nurses:    Head, Face, Ears, Shoulders, Back, Chest, Arms, Elbows, Hands, Sacrum. Buttock, Coccyx, Ischium and Legs. Feet and Heels        Does the Patient have a Wound? Yes wound(s) were present on assessment.  LDA wound assessment was Initiated and completed        Jorje Prevention initiated:  No   Wound Care Orders initiated:  Yes    Pressure Injury (Stage 3,4, Unstageable, DTI, NWPT, and Complex wounds) if present place consult order under [de-identified] Yes    New and Established Ostomies if present place consult order under : No      Nurse 1 eSignature: Electronically signed by Ean Fuller RN on 11/27/20 at 12:02 AM EST    **SHARE this note so that the co-signing nurse is able to place an eSignature**    Nurse 2 eSignature: Electronically signed by Matt Sanchez RN on 11/27/20 at 12:05 AM EST

## 2020-11-27 NOTE — PROGRESS NOTES
Patient A&O. Patient is currently NPO, and denies pain at this time. Call light within reach. Will continue to monitor and reassess.  Electronically signed by Ying Thomas RN on 11/27/2020

## 2020-11-27 NOTE — PROGRESS NOTES
This nurse spoke with Dr. Gt Trotter regarding podiatry consult. Dr. Gt Trotter will see patient in the AM.  See new orders for x-rays.  Electronically signed by Antoinette Cartagena RN on 11/27/2020 at 6:47 PM

## 2020-11-27 NOTE — PROGRESS NOTES
EKG performed. Mobitz 1 ekg result MD notified. Patient states ,\" I do feel a little dizzy when I put my head back. \" Electronically signed by Taras Portillo RN on 11/27/2020 at 5:10 AM

## 2020-11-27 NOTE — PROGRESS NOTES
Dr. Rosalind Felix with cardioloogy has seen the patient. Dr. Rosalind Felix is recommending stress test.  Keep patient NPO and hold lovenox at this time per Dr. Rosalind Felix.   Electronically signed by Dragan Fuller RN on 11/27/2020

## 2020-11-27 NOTE — PRE-PROCEDURE INSTRUCTIONS
Writer received call from [employer] at [facility] on behalf of Dr. Kirsten Suarez requesting nuclearr stress test today. Troponin T negative x 1.     Electronically signed by Paige Plascencia RN on 11/27/2020 at 10:30 AM

## 2020-11-27 NOTE — PROGRESS NOTES
Physical Therapy    Vincent Handler    Attempt to see patient for evaluation but going down for stress test at present. Will stop by this afternoon. Patient's nurse, Erin rubalcava.   Electronically signed by Phil Hernandez PT [id number] on 11/27/2020 at 11:34 AM

## 2020-11-27 NOTE — CARE COORDINATION
[facility] Continence Nurse  Consult Note       NAME:  Mukund Cannon RECORD NUMBER:  [id number]  AGE: 77 y.o. GENDER: male  : 1955  TODAY'S DATE:  2020    Subjective   Reason for WOCN Evaluation and Assessment: diabetic foot ulcers      Forrest Stevens is a 77 y.o. male referred by:   [] Physician  [x] Nursing  [] Other:     Wound Identification:  Wound Type: diabetic  Contributing Factors: diabetes and poor glucose control    Wound History: Pt follows with Dr. Julieta Pérez outpatient. [facility] for home care who does home vac and dressing change 3x a week. Current Wound Care Treatment:  Aquacel silver packing to L foot wound, R foot wound home vac    Patient Goal of Care:  [x] Wound Healing  [] Odor Control  [] Palliative Care  [] Pain Control   [] Other:         PAST MEDICAL HISTORY        Diagnosis Date    Diabetes mellitus ([address]     Gallstones     Pancreatitis     Ulcers of both lower legs ([address]     bilateral feet       PAST SURGICAL HISTORY    Past Surgical History:   Procedure Laterality Date    ERCP  14    LITHOTRYPSY & PANCREATIC STENT PLACEMENT    FOOT SURGERY Left        FAMILY HISTORY    Family History   Problem Relation Age of Onset    Colon Cancer Mother         PVD s/p toe amputation by Dr Justice Chamberlain Father        SOCIAL HISTORY    Social History     Tobacco Use    Smoking status: Never Smoker    Smokeless tobacco: Never Used   Substance Use Topics    Alcohol use: No    Drug use: No       ALLERGIES    No Known Allergies    MEDICATIONS    No current facility-administered medications on file prior to encounter.       Current Outpatient Medications on File Prior to Encounter   Medication Sig Dispense Refill    tamsulosin (FLOMAX) 0.4 MG capsule Take 0.4 mg by mouth daily      vitamin B-12 (CYANOCOBALAMIN) 500 MCG tablet Take 1,500 mcg by mouth daily      aspirin 81 MG EC tablet Take 1 tablet by mouth daily 30 tablet 0 E11.65    Diabetic foot infection ([facility]) E11.628, L08.9    Fever R50.9    Leukocytosis D72.829    Charcot foot due to diabetes mellitus ([facility] E11.610    Foot ulceration, left, with unspecified severity ([facility] L97.529    Demand ischemia ([facility]) I24.8    Controlled type 2 diabetes mellitus with hyperglycemia, with long-term current use of insulin ([facility]) E11.65, Z79.4    Sepsis ([facility]) A41.9    Diarrhea R19.7    Chronic osteoarthritis M19.90    Chest pain R07.9    Chronic calcific pancreatitis ([facility]) K86.1    Atrioventricular block, Mobitz type 1, Wenckebach I44.1    Vertigo R42       Measurements:  Incision 04/04/14 Toe (Comment  which one) Right (Active)   Number of days: 2429       Wound Foot Left; Inner (Active)   Number of days:        Wound 07/31/20 Heel Left (Active)   Number of days: 118       Wound 11/27/20 Foot Left;Plantar (Active)   Wound Image   11/27/20 1209   Wound Etiology Diabetic 11/27/20 1209   Dressing Status New dressing applied 11/27/20 1209   Wound Cleansed Cleansed with saline 11/27/20 1209   Dressing/Treatment Alginate with Ag; Ace wrap;ABD;Moist to dry; Roll gauze 11/27/20 1209   Wound Length (cm) 2.5 cm 11/27/20 1209   Wound Width (cm) 2.5 cm 11/27/20 1209   Wound Depth (cm) 1 cm 11/27/20 1209   Wound Surface Area (cm^2) 6.25 cm^2 11/27/20 1209   Wound Volume (cm^3) 6.25 cm^3 11/27/20 1209   Wound Assessment Pink/red 11/27/20 1209   Drainage Amount Small 11/27/20 1209   Drainage Description Serosanguinous 11/27/20 1209   Odor None 11/27/20 1209   Tiffanie-wound Assessment Maceration 11/27/20 1209   Margins Defined edges 11/27/20 1209   Number of days: 0       Wound 11/27/20 Foot Right;Plantar (Active)   Wound Image   11/27/20 1209   Wound Etiology Diabetic 11/27/20 1209   Dressing Status New dressing applied 11/27/20 1209   Dressing/Treatment ABD; Ace wrap;Alginate with Ag;Moist to dry; Roll gauze 11/27/20 1209   Wound Length (cm) 1.5 cm 11/27/20 1209   Wound Width (cm) 1.5 cm 11/27/20 1209 Wound Depth (cm) 0.5 cm 11/27/20 1209   Wound Surface Area (cm^2) 2.25 cm^2 11/27/20 1209   Wound Volume (cm^3) 1.12 cm^3 11/27/20 1209   Wound Assessment Granulation tissue;Pink/red 11/27/20 1209   Drainage Amount Scant 11/27/20 1209   Drainage Description Serosanguinous 11/27/20 1209   Odor Mild 11/27/20 1209   Tiffanie-wound Assessment Maceration 11/27/20 1209   Margins Defined edges 11/27/20 1209   Number of days: 0      Pt awake and alert sitting up in bed. Vac removed to R foot. Dressing removed to L foot. Images taken. Fould odor noted, however pt reports he cannot wash his feet. Unsure if odor from feet or from wound itself. Moisture barrier applied to BLE prior to dressing from denuded skin from what pt reports is from the tape. More skin breakdown noted on RLE with a scab to the R knee. Wounds redressed with aquacel silver ribbon, moist to dry guaze, kerlix and ace. Possible d/c today after stress test, if not would recommend podiatry consult. Dressings every other day. Pt reports no pain. Response to treatment:  Well tolerated by patient.      Plan   Plan of Care: Wound 11/27/20 Foot Left;Plantar-Dressing/Treatment: Alginate with Ag, Ace wrap, ABD, Moist to dry, Roll gauze  Wound 11/27/20 Foot Right;Plantar-Dressing/Treatment: ABD, Ace wrap, Alginate with Ag, Moist to dry, Roll gauze  Possible d/c today, if not podiatry consult    Specialty Bed Required : No   [] Low Air Loss   [] Pressure Redistribution  [] Fluid Immersion  [] Bariatric  [] Total Pressure Relief  [] Other:     Current Diet: Diet NPO Effective Now Exceptions are: Ice Chips, Sips with Meds, Popsicles  Dietician consult:  No    Discharge Plan:  Placement for patient upon discharge: home with support    Patient appropriate for Outpatient [address]: No    Referrals:  []   [] [address] [facility]  [] Supplies  [] Other    Patient/Caregiver Teaching:  Level of patient/caregiver understanding able to:   [] Indicates understanding       [] Needs reinforcement  [] Unsuccessful      [x] Verbal Understanding  [] Demonstrated understanding       [] No evidence of learning  [] Refused teaching         [] N/A       Electronically signed by Rick Johnson RN, on 11/27/2020 at 12:12 PM

## 2020-11-27 NOTE — PROGRESS NOTES
Waiting on the results of patient's tests. Not sure when the results will come in bc of the computer issues. Patient is wanting to eat, and is refusing to stand for his orthostatic vital signs because he cannot eat. Patient he wanted to talk to the doctor about eating and the orthostatic vitals. Dr. María Elena Zuniga aware and notified. Ok to start cardiac diet per Dr. María Elena Zuniga. See new order.   Electronically signed by Melia Salguero RN on 11/27/2020

## 2020-11-27 NOTE — ED NOTES
Dr. Loren Santiago re evaluated patient. Patient updated and agreeable to plan of care at this time.       Osiris Crowe RN  11/26/20 8213

## 2020-11-27 NOTE — CONSULTS
History and Physical  [address]   EP Cardiology    Chief Complaint:  Mobitz 1 av block, vertigo    HPI:     Patient is a 77 y.o. male presents with nausea and vomiting with subsequent atypical lower chest pain, now resolved. He has associated clockwise spinning of room intermittently when he puts his head back which lasts for a few seconds, until he stabilizes head. Head had no other neurologic sx. He says the vertigo feeling has subsided mostly. He does have hearing lost more in right hear. He denies tinnitus. He is not aware of any cardiac disease or rhythm issue. He is home bound from diabetes, feet wound sores, and weakness. EP consult for assessment of av block.        Past Medical History:   Diagnosis Date    [facility] mellitus [facility]     Gallstones     Pancreatitis     Ulcers of both lower legs ([address]     bilateral feet      Past Surgical History:   Procedure Laterality Date    ERCP  4/8/14    LITHOTRYPSY & PANCREATIC STENT PLACEMENT    FOOT SURGERY Left 1967        Medications Prior to Admission: tamsulosin (FLOMAX) 0.4 MG capsule, Take 0.4 mg by mouth daily  vitamin B-12 (CYANOCOBALAMIN) 500 MCG tablet, Take 1,500 mcg by mouth daily  aspirin 81 MG EC tablet, Take 1 tablet by mouth daily  insulin glargine (LANTUS SOLOSTAR) 100 UNIT/ML injection pen, Inject 26 Units into the skin every morning (before breakfast) (Patient taking differently: Inject 24 Units into the skin nightly )  atorvastatin (LIPITOR) 40 MG tablet, Take 1 tablet by mouth daily  lisinopril (PRINIVIL;ZESTRIL) 10 MG tablet, Take 0.5 tablets by mouth daily (Patient taking differently: Take 20 mg by mouth daily )  spironolactone (ALDACTONE) 25 MG tablet, Take 25 mg by mouth daily  metFORMIN (GLUCOPHAGE) 500 MG tablet, Take 500 mg by mouth 2 times daily (with meals)  ammonium lactate (LAC-HYDRIN) 12 % lotion, Apply to BL feet and legs bid with dressing changes  Insulin Pen Needle 32G X 4 MM MISC, 1 each by Does not apply route daily. Lancets MISC, Once daily  glucose blood VI test strips (ASCENSIA AUTODISC VI;ONE TOUCH ULTRA TEST VI) strip, 1 each by In Vitro route daily. As needed. glucose monitoring kit (FREESTYLE) monitoring kit, 1 kit by Does not apply route daily as needed.     No Known Allergies   Social History     Tobacco Use    Smoking status: Never Smoker    Smokeless tobacco: Never Used   Substance Use Topics    Alcohol use: No      Family History   Problem Relation Age of Onset    Colon Cancer Mother         PVD s/p toe amputation by Dr Kristin Osborn Father         Review of Systems:  · Constitutional: No Fever or Weight Loss  · Eyes: No decreased vision  · ENT:  Hearing Loss and Vertigo  · Cardiovascular: post vomiting chest pain( resolved)  · Respiratory: No cough or wheezing    · Gastrointestinal:  abdominal pain intermittently, chronic pancreatis   · Genitourinary: No dysuria, trouble voiding, or hematuria  · Musculoskeletal:  N weakness   · Integumentary: No rash or pruritis  · Neurological: No TIA or stroke symptoms  · Psychiatric: No anxiety or depression  · Endocrine: No malaise, fatigue or temperature intolerance  · Hematologic/Lymphatic: No bleeding problems, blood clots or swollen lymph nodes  · Allergic/Immunologic: No nasal congestion or hives      Objective Data:     /72   Pulse 72   Temp 98.7 °F (37.1 °C) (Oral)   Resp 20   Ht 6' 2\" (1.88 m)   Wt 255 lb (115.7 kg)   SpO2 99%   BMI 32.74 kg/m²     General appearance: alert, appears stated age, cooperative and moderately obese  Alert, awake, oriented x 3  Eyes:  No erythema  Head: atraumatic  Neck: no JVD  Lungs: clear to auscultation bilaterally  Heart: regular rate and rhythm, S1, S2 normal, no murmur, click, rub or gallop  Abdomen: soft, non-tender; bowel sounds normal; no masses,  no organomegaly  Extremities: both feet wrapped  Skin: Skin color, texture, turgor normal. No rashes or lesions  Hematologic: no remarkable bruising ECG: normal sinus rhythm, 2nd degree, Mobitz type 1 heart block and left anterior hemiblock or and intermittent RBBB     Data Review    Echo:  Summary   Normal left ventricle size, wall thickness, and systolic function with an   estimated ejection fraction of 55-60%. No regional wall motion abnormalities   are seen. The right ventricle is not well visualized but appears grossly normal in   size and function. Mild tricuspid regurgitation. Signature      ------------------------------------------------------------------   Electronically signed by Romina Small MD   (Interpreting physician) on 06/09/2020 at 04:29 PM   ------------------------------------------------------------------      Summary [facility] 2/5/2915:  1) Abdominal pain-  -Hx of pancreatic stone s/p ERCP x 2 in April 2014 with unsuccessful attempt with stone extraction. Stent place  -CT with chronic pancreatitis and retained pancreatic duct stent.  -Lipase normal  -LFTs normal  -WBC 9.6. Afebrile  -Significant reflux esophagitis and duodenal bulb erosions noted on ERCP    Recent Labs     11/26/20 1924 11/27/20  0436   * 136   K 4.9 4.1    103   CO2 21 26   BUN 24* 18   CREATININE 1.0 0.9     Recent Labs     11/26/20 1924 11/27/20  0436   WBC 8.1 7.8   HGB 12.2* 11.7*   HCT 37.8* 35.5*   MCV 79.4* 78.8*    266     Lab Results   Component Value Date    TROPONINI <0.01 11/26/2020         Assessment:     Active Problems:    Chest pain    Chronic calcific pancreatitis (HCC)    Atrioventricular block, Mobitz type 1, Wenckebach    Vertigo  Resolved Problems:    * No resolved hospital problems. *      Plan:     1. The patient's history suggests vertigo as dizziness, really clockwise spinning occurs with abrupt head movement. He denies any syncope or feeling near syncopal. His ekgs over the years have shown progressive prolongation of av interval, now a wenckebach pattern.  While he also has RBBB intermittently, I suspect this to be progressive av [place] disease and very unlikely related to his presentation. His troponins are negative, but ischemia can cause av block so that is possible. He has risks for cad and CT revealed calcification of coronaries. Will await stress results, but unlikely his av block is symptomatic or will need any treatment at this time. It deserves monitoring going forward though. 2. His vomiting could be related to chronic pancreatitis which he says flares from time to time. 3. Will review findings with colleagues and EP will follow as needed.

## 2020-11-27 NOTE — ED NOTES
Patient alert and orientated x4. Patient stated he felt a \"slight\" improvement of nausea and dizziness since medication intervention. RN to continue to monitor.       Brice Crowe RN  11/26/20 2016

## 2020-11-27 NOTE — ED NOTES
Fall risk screening completed. Fall risk bracelet applied to patient. Non-skid socks provided and placed on patient. The fall risk is indicated using  dome light . Based on score, a bed alarm was indicated and applied. The call light is within the patient's reach, and instructions for use were provided. The bed is in the lowest position with wheels locked. The patient has been advised to notify staff, using the call light, if there is a need to get up or use restroom. The patient verbalized understanding of safety precautions and how to contact staff for assistance.       Pramod Jaquez RN  11/26/20 5814

## 2020-11-27 NOTE — PROGRESS NOTES
Medication Reconciliation     List of medications patient is currently taking is complete. Source of information:   1. Conversation with patient at bedside  2. EPIC records        Notes regarding home medications:  1. Patient received all of his AM home medications today.       Tarik Harrell, Pharmacy Intern  11/26/2020 9:46 PM

## 2020-11-27 NOTE — ED NOTES
ED SBAR report provider to [facility]. Patient to be transported to Room 3109 via stretcher by transport tech. Patient transported with bedside cardiac monitor and with IV medications infusing. IV site clean, dry, and intact. MEWS score and pain assessed as 0 and documented. Updated patient on plan of care.      Yelena Ruiz RN  11/26/20 0688

## 2020-11-27 NOTE — PROGRESS NOTES
Patient is Mobitz I on telemetry. Dr. Tavares Deutsch aware and notified. Dr. Tavares Deutsch stated to consult cardiology, and to keep patient NPO as well as hold lovenox until cardiology sees patient.  Electronically signed by Gina Zheng RN on 11/27/2020

## 2020-11-27 NOTE — H&P
Hospital Medicine History & Physical      PCP: KENNY Ruvalcaba - CNP    Date of Admission: 11/26/2020    Date of Service: Pt seen/examined on 11/26/20 and  Placed in Observation. Chief Complaint: Nausea, vomiting, chest pain, dizziness      History Of Present Illness:      77 y.o. male with history of diabetes mellitus type 2 with peripheral neuropathy and bilateral lower leg ulcers status post wound VAC, chronic pancreatitis presented to emergency room with complaints of nausea, dizziness, vomiting and chest pain. .  The patient had a mechanical fall on 11/8/20 and sustained left rib pain and low back pain. Chest x-ray at that time was negative. Since then he has complained of intermittent left sided chest pain. 2 days ago he started having nausea, vomiting and yesterday he started complaining of intermittent dizziness, described as vertigo with change in position of his head. . No tinnitus. .. States he was unable to hold medications down with the nausea and vomiting  In the emergency room, BP was 130/80, temperature 99.6, respiratory rate 18, oxygen saturation 90% on room air. . CT abdomen pelvis with contrast showed no acute findings in the abdomen or pelvis. .. He had evidence of chronic pancreatitis, nonobstructing left intrarenal calculi, small hiatal hernia. . Serum lipase was normal..  Patient's symptoms resolved on admission with no further episodes of dizziness/vertigo, nausea vomiting or abdominal pain. [place]  He continued to have mild chest pain with palpation and deep breathing    Past Medical History:          Diagnosis Date    Diabetes mellitus ([address]     Gallstones     Pancreatitis     Ulcers of both lower legs ([address]     bilateral feet       Past Surgical History:          Procedure Laterality Date    ERCP  4/8/14    LITHOTRYPSY & PANCREATIC STENT PLACEMENT    FOOT SURGERY Left 1967       Medications Prior to Admission:      Prior to Admission medications    Medication Sig Start Date End Date Taking? Authorizing Provider   tamsulosin (FLOMAX) 0.4 MG capsule Take 0.4 mg by mouth daily   Yes Historical Provider, MD   vitamin B-12 (CYANOCOBALAMIN) 500 MCG tablet Take 1,500 mcg by mouth daily   Yes Historical Provider, MD   aspirin 81 MG EC tablet Take 1 tablet by mouth daily 6/15/20  Yes KENNY Shell CNP   insulin glargine (LANTUS SOLOSTAR) 100 UNIT/ML injection pen Inject 26 Units into the skin every morning (before breakfast)  Patient taking differently: Inject 24 Units into the skin nightly  6/14/20  Yes KENNY Shell CNP   atorvastatin (LIPITOR) 40 MG tablet Take 1 tablet by mouth daily 6/14/20  Yes KENNY Shell CNP   lisinopril (PRINIVIL;ZESTRIL) 10 MG tablet Take 0.5 tablets by mouth daily  Patient taking differently: Take 20 mg by mouth daily  6/14/20 11/26/20 Yes KENNY Shell CNP   spironolactone (ALDACTONE) 25 MG tablet Take 25 mg by mouth daily   Yes Historical Provider, MD   metFORMIN (GLUCOPHAGE) 500 MG tablet Take 500 mg by mouth 2 times daily (with meals)   Yes Historical Provider, MD   ammonium lactate (LAC-HYDRIN) 12 % lotion Apply to BL feet and legs bid with dressing changes 2/6/15   Akil Das MD   Insulin Pen Needle 32G X 4 MM MISC 1 each by Does not apply route daily. 2/6/15   Akil Das MD   Lancets MISC Once daily 2/6/15   Akil Das MD   glucose blood VI test strips (ASCENSIA AUTODISC VI;ONE TOUCH ULTRA TEST VI) strip 1 each by In Vitro route daily. As needed. 2/6/15   Akil Das MD   glucose monitoring kit (FREESTYLE) monitoring kit 1 kit by Does not apply route daily as needed. 2/6/15   Akil Das MD       Allergies:  Patient has no known allergies. Social History:      The patient currently lives     TOBACCO:   reports that he has never smoked. He has never used smokeless tobacco.  ETOH:   reports no history of alcohol use. Family History:       Reviewed in detail and negative for DM, CAD, Cancer, CVA.  Positive as follows:        Problem Relation Age of Onset    Colon Cancer Mother         PVD s/p toe amputation by Dr Tessy Wiggins Father        REVIEW OF SYSTEMS:   Pertinent positives as noted in the HPI. All other systems reviewed and negative. PHYSICAL EXAM:    BP (!) 124/59   Pulse 66   Temp 98.4 °F (36.9 °C) (Oral)   Resp 16   Ht 6' 2\" (1.88 m)   Wt 255 lb (115.7 kg)   SpO2 98%   BMI 32.74 kg/m²     General appearance:  No apparent distress, appears stated age and cooperative. HEENT:  Normal cephalic, atraumatic without obvious deformity. Pupils equal, round, and reactive to light. Extra ocular muscles intact. Conjunctivae/corneas clear. Neck: Supple, with full range of motion. No jugular venous distention. Trachea midline. Respiratory:  Normal respiratory effort. Clear to auscultation, bilaterally without Rales/Wheezes/Rhonchi. Cardiovascular:  Regular rate and rhythm with normal S1/S2 without murmurs, rubs or gallops. Abdomen: Soft, non-tender, non-distended with normal bowel sounds. Musculoskeletal:  No clubbing, cyanosis or edema bilaterally. Bilateral leg ulcers, chronic venous stasis changes  Neurologic:  Neurovascularly intact without any focal sensory/motor deficits.  Cranial nerves: II-XII intact, grossly non-focal.  Psychiatric:  Alert and oriented, thought content appropriate, normal insight  Capillary Refill: Brisk,< 3 seconds   Peripheral Pulses: +2 palpable, equal bilaterally       CXR:  I have reviewed the CXR with the following interpretation:   EKG:  I have reviewed the EKG with the following interpretation:     Labs:     Recent Labs     11/26/20 1924 11/27/20 0436   WBC 8.1 7.8   HGB 12.2* 11.7*   HCT 37.8* 35.5*    266     Recent Labs     11/26/20 1924 11/27/20 0436   * 136   K 4.9 4.1    103   CO2 21 26   BUN 24* 18   CREATININE 1.0 0.9   CALCIUM 9.1 9.1     Recent Labs     11/26/20 1924 11/27/20 0436   AST 8* 8*   ALT 7* 7*   BILITOT 0.5 0.5 ALKPHOS 108 104     No results for input(s): INR in the last 72 hours. Recent Labs     11/26/20 1924   TROPONINI <0.01       Urinalysis:      Lab Results   Component Value Date    NITRU Negative 11/26/2020    WBCUA 0 11/26/2020    BACTERIA 3+ 04/01/2014    RBCUA 3 11/26/2020    BLOODU Negative 11/26/2020    SPECGRAV 1.017 11/26/2020    GLUCOSEU Negative 11/26/2020         ASSESSMENT:      -Atypical chest pain-most likely musculoskeletal but need to rule out ACS  -Abnormal EKG--Mobitz type II  -Positional vertigo--resolved-suspect peripheral  -Nausea/vomiting-onset 24 hours before onset of vertigo-resolved  -Chronic calcific pancreatitis--normal serum lipase-prior history of ERCP with stent placement-denies alcohol use  -Nonobstructing left intrarenal calculi  -Diabetes mellitus type 2  -Essential hypertension  -Chronic diabetic foot ulcers status post right leg wound VAC-follows with podiatry--reviewed arterial Doppler ultrasound 8/2020 that showed no significant limitation in arterial blood flow  -Diabetic peripheral neuropathy    PLAN:  Cycle troponins  Echocardiogram and nuclear medicine stress test ordered by cardiology  Continue chronic home medications  Continue wound care and follow-up with podiatry discharge  Resume chronic home medications  If dizziness/vertigo recurs, may consider neuro imaging but will observe for now as symptoms are resolved  Check orthostatic vital signs    DVT Prophylaxis: Lovenox  Diet: Diet NPO, After Midnight Exceptions are: Ice Chips, Sips with Meds, Popsicles  Code Status: Full Code           Doug Oliver MD    Thank you KENNY De Anda - JAY for the opportunity to be involved in this patient's care. If you have any questions or concerns please feel free to contact me at [phone].

## 2020-11-28 LAB
A/G RATIO: 0.9 (ref 1.1–2.2)
ALBUMIN SERPL-MCNC: 3.4 G/DL (ref 3.4–5)
ALP BLD-CCNC: 102 U/L (ref 40–129)
ALT SERPL-CCNC: 6 U/L (ref 10–40)
ANION GAP SERPL CALCULATED.3IONS-SCNC: 10 MMOL/L (ref 3–16)
AST SERPL-CCNC: 8 U/L (ref 15–37)
BASOPHILS ABSOLUTE: 0 K/UL (ref 0–0.2)
BASOPHILS RELATIVE PERCENT: 0.6 %
BILIRUB SERPL-MCNC: 0.4 MG/DL (ref 0–1)
BUN BLDV-MCNC: 18 MG/DL (ref 7–20)
CALCIUM SERPL-MCNC: 9.1 MG/DL (ref 8.3–10.6)
CHLORIDE BLD-SCNC: 102 MMOL/L (ref 99–110)
CO2: 23 MMOL/L (ref 21–32)
CREAT SERPL-MCNC: 1.1 MG/DL (ref 0.8–1.3)
EOSINOPHILS ABSOLUTE: 0.1 K/UL (ref 0–0.6)
EOSINOPHILS RELATIVE PERCENT: 1.4 %
GFR AFRICAN AMERICAN: >60
GFR NON-AFRICAN AMERICAN: >60
GLOBULIN: 3.6 G/DL
GLUCOSE BLD-MCNC: 122 MG/DL (ref 70–99)
GLUCOSE BLD-MCNC: 131 MG/DL (ref 70–99)
GLUCOSE BLD-MCNC: 133 MG/DL (ref 70–99)
GLUCOSE BLD-MCNC: 155 MG/DL (ref 70–99)
GLUCOSE BLD-MCNC: 165 MG/DL (ref 70–99)
GLUCOSE BLD-MCNC: 166 MG/DL (ref 70–99)
GLUCOSE BLD-MCNC: 172 MG/DL (ref 70–99)
GLUCOSE BLD-MCNC: 225 MG/DL (ref 70–99)
HCT VFR BLD CALC: 35.5 % (ref 40.5–52.5)
HEMOGLOBIN: 11.9 G/DL (ref 13.5–17.5)
LYMPHOCYTES ABSOLUTE: 2 K/UL (ref 1–5.1)
LYMPHOCYTES RELATIVE PERCENT: 27.1 %
MCH RBC QN AUTO: 26.2 PG (ref 26–34)
MCHC RBC AUTO-ENTMCNC: 33.7 G/DL (ref 31–36)
MCV RBC AUTO: 77.7 FL (ref 80–100)
MONOCYTES ABSOLUTE: 0.7 K/UL (ref 0–1.3)
MONOCYTES RELATIVE PERCENT: 8.9 %
NEUTROPHILS ABSOLUTE: 4.6 K/UL (ref 1.7–7.7)
NEUTROPHILS RELATIVE PERCENT: 62 %
PDW BLD-RTO: 16.1 % (ref 12.4–15.4)
PERFORMED ON: ABNORMAL
PLATELET # BLD: 250 K/UL (ref 135–450)
PMV BLD AUTO: 7.6 FL (ref 5–10.5)
POTASSIUM REFLEX MAGNESIUM: 4.5 MMOL/L (ref 3.5–5.1)
RBC # BLD: 4.56 M/UL (ref 4.2–5.9)
SODIUM BLD-SCNC: 135 MMOL/L (ref 136–145)
TOTAL PROTEIN: 7 G/DL (ref 6.4–8.2)
WBC # BLD: 7.3 K/UL (ref 4–11)

## 2020-11-28 PROCEDURE — G0378 HOSPITAL OBSERVATION PER HR: HCPCS

## 2020-11-28 PROCEDURE — 2580000003 HC RX 258: Performed by: INTERNAL MEDICINE

## 2020-11-28 PROCEDURE — 97162 PT EVAL MOD COMPLEX 30 MIN: CPT

## 2020-11-28 PROCEDURE — 97116 GAIT TRAINING THERAPY: CPT

## 2020-11-28 PROCEDURE — 36415 COLL VENOUS BLD VENIPUNCTURE: CPT

## 2020-11-28 PROCEDURE — 97530 THERAPEUTIC ACTIVITIES: CPT

## 2020-11-28 PROCEDURE — 97166 OT EVAL MOD COMPLEX 45 MIN: CPT

## 2020-11-28 PROCEDURE — 80053 COMPREHEN METABOLIC PANEL: CPT

## 2020-11-28 PROCEDURE — 6360000002 HC RX W HCPCS: Performed by: INTERNAL MEDICINE

## 2020-11-28 PROCEDURE — 96376 TX/PRO/DX INJ SAME DRUG ADON: CPT

## 2020-11-28 PROCEDURE — 6370000000 HC RX 637 (ALT 250 FOR IP): Performed by: INTERNAL MEDICINE

## 2020-11-28 PROCEDURE — 96372 THER/PROPH/DIAG INJ SC/IM: CPT

## 2020-11-28 PROCEDURE — 85025 COMPLETE CBC W/AUTO DIFF WBC: CPT

## 2020-11-28 RX ORDER — ATORVASTATIN CALCIUM 40 MG/1
40 TABLET, FILM COATED ORAL DAILY
Status: DISCONTINUED | OUTPATIENT
Start: 2020-11-28 | End: 2020-11-29 | Stop reason: HOSPADM

## 2020-11-28 RX ORDER — LISINOPRIL 5 MG/1
5 TABLET ORAL DAILY
Status: DISCONTINUED | OUTPATIENT
Start: 2020-11-28 | End: 2020-11-28

## 2020-11-28 RX ORDER — LISINOPRIL 5 MG/1
2.5 TABLET ORAL DAILY
Status: DISCONTINUED | OUTPATIENT
Start: 2020-11-28 | End: 2020-11-29 | Stop reason: HOSPADM

## 2020-11-28 RX ADMIN — ATORVASTATIN CALCIUM 40 MG: 40 TABLET, FILM COATED ORAL at 10:19

## 2020-11-28 RX ADMIN — METFORMIN HYDROCHLORIDE 500 MG: 500 TABLET ORAL at 10:18

## 2020-11-28 RX ADMIN — METFORMIN HYDROCHLORIDE 500 MG: 500 TABLET ORAL at 18:23

## 2020-11-28 RX ADMIN — SODIUM CHLORIDE, PRESERVATIVE FREE 10 ML: 5 INJECTION INTRAVENOUS at 21:00

## 2020-11-28 RX ADMIN — INSULIN LISPRO 1 UNITS: 100 INJECTION, SOLUTION INTRAVENOUS; SUBCUTANEOUS at 22:29

## 2020-11-28 RX ADMIN — LISINOPRIL 2.5 MG: 5 TABLET ORAL at 10:18

## 2020-11-28 RX ADMIN — SODIUM CHLORIDE, PRESERVATIVE FREE 10 ML: 5 INJECTION INTRAVENOUS at 10:19

## 2020-11-28 RX ADMIN — INSULIN LISPRO 2 UNITS: 100 INJECTION, SOLUTION INTRAVENOUS; SUBCUTANEOUS at 11:06

## 2020-11-28 RX ADMIN — INSULIN LISPRO 1 UNITS: 100 INJECTION, SOLUTION INTRAVENOUS; SUBCUTANEOUS at 17:04

## 2020-11-28 RX ADMIN — INSULIN LISPRO 1 UNITS: 100 INJECTION, SOLUTION INTRAVENOUS; SUBCUTANEOUS at 14:58

## 2020-11-28 RX ADMIN — ONDANSETRON 4 MG: 2 INJECTION INTRAMUSCULAR; INTRAVENOUS at 22:29

## 2020-11-28 RX ADMIN — ENOXAPARIN SODIUM 40 MG: 40 INJECTION SUBCUTANEOUS at 10:19

## 2020-11-28 NOTE — DISCHARGE INSTR - COC
Continuity of Care Form    Patient Name: Ortiz Dooley   :  1955  MRN:  [id number]    Admit date:  2020  Discharge date:  20    Code Status Order: Full Code   Advance Directives:   [facility] Documentation       Date/Time Healthcare Directive Type of Healthcare Directive Copy in [facility] [address] Agent's Name Healthcare Agent's Phone Number    20 0031  No, patient does not have an advance directive for healthcare treatment -- -- -- -- --            Admitting Physician:  Adilene Golden DO  PCP: KENNY Conn CNP    Discharging Nurse: [facility] Unit/Room#: Q1J-0410/3109-01  Discharging Unit Phone Number: [phone]    Emergency Contact:   Extended Emergency Contact Information  Primary Emergency Contact: eneida finch  Home Phone: [phone]  Mobile Phone: [phone]  Relation: Other  Preferred language: English    Past Surgical History:  Past Surgical History:   Procedure Laterality Date    ERCP  14    LITHOTRYPSY & PANCREATIC STENT PLACEMENT    FOOT SURGERY Left        Immunization History: There is no immunization history on file for this patient.     Active Problems:  Patient Active Problem List   Diagnosis Code    Abdominal pain, acute R10.9    BPH (benign prostatic hyperplasia) N40.0    RLQ abdominal pain R10.31    Abnormal ECG R94.31    HTN (hypertension) I10    Diabetes mellitus type II, uncontrolled ([address] E11.65    Diabetic foot infection ([address] E11.628, L08.9    Fever R50.9    Leukocytosis D72.829    Charcot foot due to diabetes mellitus ([address] E11.610    Foot ulceration, left, with unspecified severity ([address] L97.529    Demand ischemia ([address] I24.8    Controlled type 2 diabetes mellitus with hyperglycemia, with long-term current use of insulin (HCC) E11.65, Z79.4    Sepsis (HCC) A41.9    Diarrhea R19.7    Chronic osteoarthritis M19.90    Chest pain R07.9    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    Atrioventricular block, Mobitz type 1, Wenckebach I44.1    Vertigo R42       Isolation/Infection:   Isolation            No Isolation          Patient Infection Status       Infection Onset Added Last Indicated Last Indicated By Review Planned Expiration Resolved Resolved By    None active    Resolved    COVID-19 Rule Out 11/26/20 11/26/20 11/26/20 COVID-19 (Ordered)   11/26/20 Rule-Out Test Resulted    C-diff Rule Out 07/31/20 07/31/20 07/31/20 Clostridium Difficile Toxin/Antigen (Ordered)   07/31/20 Rule-Out Test Resulted    C-diff Rule Out 07/31/20 07/31/20 07/31/20 Clostridium Difficile Toxin/Antigen (Ordered)   07/31/20 Rule-Out Test Resulted    COVID-19 Rule Out 06/06/20 06/06/20 06/06/20 COVID-19 (Ordered)   06/06/20 Rule-Out Test Resulted            Nurse Assessment:  Last Vital Signs: /75   Pulse 78   Temp 98.5 °F (36.9 °C) (Oral)   Resp 16   Ht 6' 2\" (1.88 m)   Wt 254 lb 10.1 oz (115.5 kg)   SpO2 97%   BMI 32.69 kg/m²     Last documented pain score (0-10 scale): Pain Level: 0  Last Weight:   Wt Readings from Last 1 Encounters:   11/28/20 254 lb 10.1 oz (115.5 kg)     Mental Status:  oriented and alert    IV Access:  - None    Nursing Mobility/ADLs:  Walking   Independent  Transfer  Independent  Bathing  Independent  Dressing  Independent  Toileting  Independent  Feeding  Independent  Med Admin  Independent  Med Delivery   whole    Wound Care Documentation and Therapy:  Incision 04/04/14 Toe (Comment  which one) Right (Active)   Number of days: 2430       Wound Foot Left; Inner (Active)   Number of days:        Wound 07/31/20 Heel Left (Active)   Number of days: 119       Wound 11/27/20 Foot Left;Plantar (Active)   Wound Image   11/27/20 1209   Wound Etiology Diabetic 11/27/20 1209   Dressing Status New dressing applied 11/27/20 1209   Wound Cleansed Cleansed with saline 11/27/20 1209   Dressing/Treatment Alginate with Ag; Ace wrap;ABD;Moist to dry; Roll gauze 11/27/20 1209   Wound Length (cm) 2.5 cm 11/27/20 1209   Wound Width (cm) 2.5 cm 11/27/20 1209   Wound Depth (cm) 1 cm 11/27/20 1209   Wound Surface Area (cm^2) 6.25 cm^2 11/27/20 1209   Wound Volume (cm^3) 6.25 cm^3 11/27/20 1209   Wound Assessment Pink/red 11/27/20 1209   Drainage Amount Small 11/27/20 1209   Drainage Description Serosanguinous 11/27/20 1209   Odor None 11/27/20 1209   Tiffanie-wound Assessment Maceration 11/27/20 1209   Margins Defined edges 11/27/20 1209   Number of days: 1       Wound 11/27/20 Foot Right;Plantar (Active)   Wound Image   11/27/20 1209   Wound Etiology Diabetic 11/27/20 1209   Dressing Status New dressing applied 11/27/20 1209   Dressing/Treatment ABD; Ace wrap;Alginate with Ag;Moist to dry; Roll gauze 11/27/20 1209   Wound Length (cm) 1.5 cm 11/27/20 1209   Wound Width (cm) 1.5 cm 11/27/20 1209   Wound Depth (cm) 0.5 cm 11/27/20 1209   Wound Surface Area (cm^2) 2.25 cm^2 11/27/20 1209   Wound Volume (cm^3) 1.12 cm^3 11/27/20 1209   Wound Assessment Granulation tissue;Pink/red 11/27/20 1209   Drainage Amount Scant 11/27/20 1209   Drainage Description Serosanguinous 11/27/20 1209   Odor Mild 11/27/20 1209   Tiffanie-wound Assessment Maceration 11/27/20 1209   Margins Defined edges 11/27/20 1209   Number of days: 1        Elimination:  Continence:   · Bowel: Yes  · Bladder: Yes  Urinary Catheter: None   Colostomy/Ileostomy/Ileal Conduit: No       Date of Last BM: 11/25/2020    Intake/Output Summary (Last 24 hours) at 11/28/2020 1432  Last data filed at 11/28/2020 1424  Gross per 24 hour   Intake 120 ml   Output 990 ml   Net -870 ml     I/O last 3 completed shifts:   In: 120 [P.O.:120]  Out: 0 [Urine:880]    Safety Concerns:     History of Falls (last 30 days)    Impairments/Disabilities:      None    Nutrition Therapy:  Current Nutrition Therapy:   - Oral Diet:  General    Routes of Feeding: Oral  Liquids: No Restrictions  Daily Fluid Restriction: no  Last Modified Barium Swallow with Video (Video Swallowing Test): not done    Treatments at the Time of Hospital Discharge:   Respiratory Treatments:   Oxygen Therapy:  is not on home oxygen therapy. Ventilator:    - No ventilator support    Rehab Therapies: Physical Therapy and Occupational Therapy  Weight Bearing Status/Restrictions: Non-weight bearing on left leg  Other Medical Equipment (for information only, NOT a DME order):  walker  Other Treatments:     Patient's personal belongings (please select all that are sent with patient):  None    RN SIGNATURE:  Electronically signed by Donna Yeung RN on 11/29/20 at 11:16 AM EST    CASE MANAGEMENT/SOCIAL WORK SECTION    Inpatient Status Date: obs    Readmission Risk Assessment Score:  Readmission Risk              Risk of Unplanned Readmission:        0           Discharging to Facility/ Agency   · Name: [facility]  · Address:[address] [place] [address] [address]  · Phone:[phone]  · Fax:    Dialysis Facility (if applicable)   · Name:  · Address:  · Dialysis Schedule:  · Phone:  · Fax:    / signature: Electronically signed by JUDY Pete on 11/29/20 at 9:18 AM EST    PHYSICIAN SECTION    Prognosis: Fair    Condition at Discharge: Stable    Rehab Potential (if transferring to Rehab): N/A    Recommended Labs or Other Treatments After Discharge:  NON-weight bearing to LEFT foot due to wound  Wound care at home/home podiatary  If Wounds not healing and concerning needs to start following up at the wound care clinic with Dr. Danny Marino home care  PT/OT    Physician Certification: I certify the above information and transfer of Valery Kellogg  is necessary for the continuing treatment of the diagnosis listed and that he requires Home Care for greater 30 days.      Update Admission H&P: No change in H&P    PHYSICIAN SIGNATURE:  Electronically signed by Gisela Thomas MD on 11/28/20 at 2:35 PM EST

## 2020-11-28 NOTE — PROGRESS NOTES
Occupational Therapy   Occupational Therapy Initial Assessment  Date: 2020   Patient Name: Pavithra Moe  MRN: [id number]     : 1955    Date of Service: 2020    Assessment: Pt is 77 y.o. M who presents with light headedness, dizziness, and L side chest pain. Pt with history of wounds on bilateral feet - noncompliant with previous care. PTA pt lives with alone in multilevel home with 10 DONNY. Pt reports independence in sponge bathing/dressing/toileting, has aide for IADL tasks, and completes functional mobility with RW/cane. Currently, pt presents with ROM/strength in [facility] for self-care and transfers. Pt completed bed mobility with SBA and sit <> stand transfers with CGA. Pt completed functional mobility with RW with CGA. Pt required assist to don shoes - anticipate would benefit from increased assistance for thoroughness however not agreeable. Pt refuses continued therapy - plans to d/c home, would benefit more from skilled therapy. Pt would benefit from continued home therapy to increase mobility, safety, and independence. Discharge Recommendations:  Patient would benefit from continued therapy after discharge, Home with Home health OT       Assessment   Performance deficits / Impairments: Decreased functional mobility ; Decreased safe awareness;Decreased balance;Decreased ADL status; Decreased endurance;Decreased strength  Assessment: Pt is 77 y.o. M who presents with light headedness, dizziness, and L side chest pain. Pt with history of wounds on bilateral feet - noncompliant with previous care. PTA pt lives with alone in multilevel home with 10 DONNY. Pt reports independence in sponge bathing/dressing/toileting, has aide for IADL tasks, and completes functional mobility with RW/cane. Currently, pt presents with ROM/strength in [facility] for self-care and transfers. Pt completed bed mobility with SBA and sit <> stand transfers with CGA. Pt completed functional mobility with RW with CGA. Pt required assist to don shoes - anticipate would benefit from increased assistance for thoroughness however not agreeable. Pt refuses continued therapy - plans to d/c home, would benefit more from skilled therapy. Pt would benefit from continued home therapy to increase mobility, safety, and independence. Prognosis: Fair  Decision Making: Medium Complexity  History: PMH: DM, bilatearl foot wounds, pancreatitis  Exam: ADLs, transfers, func mob, bed mob  Assistance / Modification: CGA for mobility, min/mod A for ADLs  OT Education: Transfer Training;OT Role;Plan of Care;Precautions; ADL Adaptive Strategies  REQUIRES OT FOLLOW UP: Yes  Activity Tolerance  Activity Tolerance: Patient Tolerated treatment well  Safety Devices  Safety Devices in place: Yes(DANITA Newsome) notified)  Type of devices: Nurse notified;Gait belt;Call light within reach; Chair alarm in place; Left in chair           Patient Diagnosis(es): The primary encounter diagnosis was Chronic pancreatitis, unspecified pancreatitis type ([facility] A diagnosis of Chest pain, unspecified type was also pertinent to this visit. has a past medical history of Diabetes mellitus ([address] Gallstones, Pancreatitis, and Ulcers of both lower legs ([address] has a past surgical history that includes Foot surgery (Left, 1967) and ERCP (4/8/14). Restrictions  Restrictions/Precautions  Restrictions/Precautions: Fall Risk  Position Activity Restriction  Other position/activity restrictions: Unknown weight bearing - noncompliant at baseline    Subjective   General  Chart Reviewed: Yes  Patient assessed for rehabilitation services?: Yes  Additional Pertinent Hx: Per  Patel Scott MD: Pt is \"34 y.o. male with history of diabetes mellitus type 2 with peripheral neuropathy and bilateral lower leg ulcers status post wound VAC, chronic pancreatitis presented to emergency room with complaints of nausea, dizziness, vomiting and chest pain.  2 days ago he started having nausea, Limits  Hearing: Within functional limits      Orientation  Overall Orientation Status: Within Functional Limits     Standing Balance  Time: ~1-2 minutes  Activity: Func mob, transfers    Functional Mobility  Functional - Mobility Device: Rolling Walker  Activity: Other  Assist Level: Contact guard assistance  Functional Mobility Comments: Pt completed functional mobility ~15 minutes with RW with close CGA. Pt with difficulty stepping inside RW d/t position of feet - cued to go slowly. Anticipate pt is likely supposed to have weight bearing precautions however has been noncompliant. ADL  Toileting: (bed saturated, declined need to use bathroom)  Additional Comments: PTA pt reports independence in self-care tasks and sponge bathing. Anticipate pt to be close to baseline - would benefit from more assistance but previously declines additional assist.     Tone RUE  RUE Tone: Normotonic  Tone LUE  LUE Tone: Normotonic  Coordination  Movements Are Fluid And Coordinated: Yes     Bed mobility  Supine to Sit: Stand by assistance  Sit to Supine: Unable to assess(Up in chair at end of session)     Transfers  Sit to stand: Contact guard assistance  Stand to sit: Contact guard assistance  Transfer Comments: CGA for sit <> stand from EOB to RW and sit to recliner chair - effortful.      Cognition  Overall Cognitive Status: Exceptions  Safety Judgement: Decreased awareness of need for assistance;Decreased awareness of need for safety  Problem Solving: Assistance required to generate solutions;Assistance required to implement solutions        Sensation  Overall Sensation Status: (he reports no sensation in bilateral distal LEs)        LUE AROM (degrees)  LUE AROM : WFL  Left Hand AROM (degrees)  Left Hand AROM: WFL  RUE AROM (degrees)  RUE AROM : WFL  Right Hand AROM (degrees)  Right Hand AROM: WFL     LUE Strength  Gross LUE Strength: WFL  RUE Strength  Gross RUE Strength: WFL          Plan   Plan  Times per week: 3-5  Current Treatment Recommendations: Strengthening, Endurance Training, Balance Training, Functional Mobility Training, Safety Education & Training, Patient/Caregiver Education & Training, Self-Care / ADL, Equipment Evaluation, Education, & procurement      AM-PAC Score   Would benefit from continued skilled therapy however declines, would recommend home health OT however will likely decline. Deshaun Mccarthy scored a 17/24 on the AM-[facility] ADL Inpatient form. Current research shows that an AM-PAC score of 18 or greater is typically associated with a discharge to the patient's home setting. Based on the patient's AM-PAC score, and their current ADL deficits, it is recommended that the patient have 2-3 sessions per week of Occupational Therapy at d/c to increase the patient's independence. At this time, this patient demonstrates the endurance and safety to discharge home with home health OT (home vs OP services) and a follow up treatment frequency of 2-3x/wk. Please see assessment section for further patient specific details. If patient discharges prior to next session this note will serve as a discharge summary. Please see below for the latest assessment towards goals.         AM-[facility] Inpatient Daily Activity Raw Score: 17 (11/28/20 0957)  -PAC Inpatient ADL T-Scale Score : 37.26 (11/28/20 0957)  ADL Inpatient CMS 0-100% Score: 50.11 (11/28/20 0957)  ADL Inpatient CMS G-Code Modifier : CK (11/28/20 0957)    Goals  Short term goals  Time Frame for Short term goals: prior to d/c  Short term goal 1: Pt will complete functional mobility and transfers with SBA  Short term goal 2: Pt will complete toileting with SBA  Short term goal 3: Pt will complete bathing with SBA  Short term goal 4: Pt will complete dressing with SBA  Patient Goals   Patient goals : \"to go home\"       Therapy Time   Individual Concurrent Group Co-treatment   Time In       0925   Time Out       1005   Minutes       40   Timed Code Treatment Minutes:

## 2020-11-28 NOTE — PROGRESS NOTES
Cardiology and Electrophysiology on board  -Abnormal EKG--Mobitz type II, seen by EP, unlikely that vertigo is related to this, as vertigo seem more positional.   -Positional vertigo--resolved-suspect peripheral  -Nausea/vomiting-onset 24 hours before onset of vertigo-resolved, likely due to chronic pancreatitis  -Chronic calcific pancreatitis--normal serum lipase-prior history of ERCP with stent placement-denies alcohol use  -Nonobstructing left intrarenal calculi  -Diabetes mellitus type 2  -Essential hypertension  -Chronic diabetic foot ulcers/Charcot arthopathy status post right leg wound VAC-follows with podiatry--reviewed arterial Doppler ultrasound 8/2020 that showed no significant limitation in arterial blood flow  -Diabetic peripheral neuropathy  - Iron deficiency Anemia - last iron profile -   6/8/2020   Ferritin 75.6   Iron 14 (L)   Iron Saturation 6 (L)   TIBC 240 (L)        PLAN:  NM stress ruled out acute ACS  Add Voleteran gel  Orthostatic vitals are negative  EP saw patient, and unlikely the vertigo episodes related to Mobitz type II block  Continue chronic home medications  Continue wound care and follow-up with podiatry discharge  Resume chronic home medications    DVT Prophylaxis:Lovenox  Diet: DIET GENERAL;  Code Status: Full Code    PT/OT Eval Status: ordered, reviewed recs    Dispo - still complains of chest pain, ACS was ruled out, still having nausea and doesn't feel good, continue tele today and if no acute events will be discharged back home  Poditary discussed with him, he doesn't want to follow wound care center and wants home care as doesn't leave home    Efrain Browne MD  Hospitalist

## 2020-11-28 NOTE — PROGRESS NOTES
Pt A&Ox4. Morning meds given. Assessment complete. IV remains in place and patent. Denies any needs at this time. Up in chair with alarm on. Call light within reach. Will continue to monitor.     Electronically signed by Tommy Moses RN on 11/28/2020 at 11:31 AM

## 2020-11-28 NOTE — PLAN OF CARE
Problem: Falls - Risk of:  Goal: Will remain free from falls  Description: Will remain free from falls  11/27/2020 1953 by Merlyn Miles RN  Outcome: Ongoing  Note: Patient educated on fall prevention. Call light is within reach, bed locked in lowest position, personal items within reach, and bed alarm is on. Will round on patient per unit guidelines. Problem: Falls - Risk of:  Goal: Absence of physical injury  Description: Absence of physical injury  11/27/2020 1955 by Merlyn Miles RN  Outcome: Ongoing  Note: Pt is free of injury. No injury noted. Fall precautions in place. Call light within reach. Will monitor.         Problem: Skin Integrity:  Goal: Will show no infection signs and symptoms  Description: Will show no infection signs and symptoms  11/27/2020 1955 by Merlyn Miles RN  Outcome: Ongoing     Problem: Skin Integrity:  Goal: Absence of new skin breakdown  Description: Absence of new skin breakdown  11/27/2020 1955 by Merlyn Miles RN  Outcome: Ongoing

## 2020-11-28 NOTE — CONSULTS
Department of Podiatry Consult Note        Reason for Consult:  Chronically open wound of RIGHT and LEFT medial plantar foot  Requesting Physician: Fede Niño MD    CHIEF COMPLAINT: Nausea, vomiting, dizziness and chest pain    HISTORY OF PRESENT ILLNESS:                The patient is a 77 y.o. male with significant past medical history of Diabetes with peripheral neuropathy and resultant Charcot arthropathy who is consulted for chronically open wound of RIGHT/LEFT mid-plantar arch secondary to Charcot deformity. Mr. Ludwin Angelo is well known to the Podiatry service, however he has not been following up at the wound care center stating that he is unable to get out of his house. He relates that he has been having a podiatrist come to his house and provide local wound care. He relates that the wound on the right foot was caused when his callous was debrided by Dr. Renetta Holbrook.     Past Medical History:        Diagnosis Date    Diabetes mellitus ([facility] [address]     Gallstones     Pancreatitis     Ulcers of both lower legs ([facility] [address]     bilateral feet     Past Surgical History:        Procedure Laterality Date    ERCP  4/8/14    LITHOTRYPSY & PANCREATIC STENT PLACEMENT    FOOT SURGERY Left 1967     Current Medications:    Current Facility-Administered Medications: atorvastatin (LIPITOR) tablet 40 mg, 40 mg, Oral, Daily  metFORMIN (GLUCOPHAGE) tablet 500 mg, 500 mg, Oral, BID WC  lisinopril (PRINIVIL;ZESTRIL) tablet 2.5 mg, 2.5 mg, Oral, Daily  sodium chloride flush 0.9 % injection 10 mL, 10 mL, Intravenous, 2 times per day  sodium chloride flush 0.9 % injection 10 mL, 10 mL, Intravenous, PRN  potassium chloride 10 mEq/100 mL IVPB (Peripheral Line), 10 mEq, Intravenous, PRN  magnesium sulfate 2 g in 50 mL IVPB premix, 2 g, Intravenous, PRN  acetaminophen (TYLENOL) tablet 650 mg, 650 mg, Oral, Q6H PRN **OR** acetaminophen (TYLENOL) suppository 650 mg, 650 mg, Rectal, Q6H PRN  promethazine (PHENERGAN) tablet 12.5 mg, 12.5 mg, Oral, Q6H PRN **OR** ondansetron (ZOFRAN) injection 4 mg, 4 mg, Intravenous, Q6H PRN  famotidine (PEPCID) tablet 20 mg, 20 mg, Oral, Daily PRN  enoxaparin (LOVENOX) injection 40 mg, 40 mg, Subcutaneous, Daily  insulin lispro (HUMALOG) injection vial 0-6 Units, 0-6 Units, Subcutaneous, Q4H  glucose (GLUTOSE) 40 % oral gel 15 g, 15 g, Oral, PRN  dextrose 50 % IV solution, 12.5 g, Intravenous, PRN  glucagon (rDNA) injection 1 mg, 1 mg, Intramuscular, PRN  dextrose 5 % solution, 100 mL/hr, Intravenous, PRN  Allergies:   Patient has no known allergies.   Social History:    TOBACCO:  Never used tobacco  ETOH:  Never drank alcohol  Family History:       Problem Relation Age of Onset    Colon Cancer Mother         PVD s/p toe amputation by Dr Jm Huang Father      REVIEW OF SYSTEMS:    CONSTITUTIONAL:  Denies current F/C/N/V  INTEGUMENT/BREAST:  positive for skin color change, changes in hair, changes in nails and ulceration of muscle depth to plantar LEFT mid-arch  MUSCULOSKELETAL:  positive for  arthralgias, joint swelling, decreased range of motion and Charcot arthropathy  NEUROLOGICAL:  positive for gait problems and numbness  PHYSICAL EXAM:      Vitals:    /75   Pulse 78   Temp 98.5 °F (36.9 °C) (Oral)   Resp 16   Ht 6' 2\" (1.88 m)   Wt 254 lb 10.1 oz (115.5 kg)   SpO2 97%   BMI 32.69 kg/m²     LABS:   Recent Labs     11/27/20  0436 11/28/20  0507   WBC 7.8 7.3   HGB 11.7* 11.9*   HCT 35.5* 35.5*    250     Recent Labs     11/28/20  0507   *   K 4.5      CO2 23   BUN 18   CREATININE 1.1     Recent Labs     11/27/20  0436 11/28/20  0507   PROT 7.1 7.0       EXAMINATION:    3 XRAY VIEWS OF THE RIGHT FOOT; 3 XRAY VIEWS OF THE LEFT FOOT         11/27/2020 7:48 pm         COMPARISON:    Left foot radiograph 06/06/2020, right foot radiograph 02/03/2015         HISTORY:    ORDERING SYSTEM PROVIDED HISTORY: foot wound    TECHNOLOGIST PROVIDED HISTORY:    Reason for exam:->foot wound underlying findings of necrotizing    fasciitis or osteomyelitis. 2. Soft tissue swelling in each foot likely due to cellulitis. 3. Osteoarthritic changes in each foot as above, most severe at the left    calcaneocuboid joint. 4. Bony demineralization. LOWER EXTREMITY EXAMINATION   SKIN:    Open wound noted right/left medial arch secondary to Charcot athtropathy deformity. There are large hyperkeratotic rims surrounding both of the ulcerations. Wound has fibrotic and nonviable tissue that extends down through and includes the subcutaneous tissue. After debridement the wound has a granular base. There is no surrounding erythema, edema, warmth or malodor noted. The wound does not probe or track to bone. Patient has brawny pigmentary changes noted on the bilateral lower extremities. NEUROLOGIC:    Pain sensation isdecreased Left/right. Light touch is decreasedLeft. positive history of paresthesia Bilateral. negativehistory of burning Left. Deep tendon reflexes are 2 to include patellar and achilles Bilateral. [place]--Jose 5.07 monofilament sensitivity is abnormal 5 to 5 spots tested Bilateral.  Epicritic sensation is grossly absent at all sites tested bilaterally. Negative clonus and babinski reflex is down going. VASCULAR:    DP/PT pulses palpable bilaterally. CFT brisk to all digits. Digits are pink and warm to the touch. Hair growth is absent. No calf pain with palpation noted. 1+ edema left. mild Varicosities bilaterally. MUSCULOSKELETAL:  [place] is untested, but is NON-weight bearing to LEFT foot due to wound. Muscle strength is 4/5 to all groups tested to include dorsiflexion, plantarflexion, inversion, eversion, and digital Left . The ranges of motion of all joints tested from ankle distal is decreased there is no crepitus noted Left/right.   Patient has a rigid pes plano valgus foot type noted bilaterally with complete collapse of the medial arh bilaterally left>right. Procedure Note  Indications:  Based on my examination of this patient's wound(s)/ulcer(s) today, debridement is required to promote healing and evaluate the wound base. Performed by: Murali Rogers DPM    Consent obtained:  Yes, patient gave verbal consent for me to debride his ulcerations. Time out taken:  Yes    Pain Control:  none necessary due to peripheral neuropathy       Debridement: Excisional Debridement    Using #15 blade scalpel the wound(s)/ulcer(s) was/were debrided down through and including the removal of epidermis, dermis and subcutaneous tissue. Devitalized Tissue Debrided:  fibrin, slough and callus    Pre Debridement Measurements:  Are located in the Wound/Ulcer Documentation Flow Sheet    Wound/Ulcer #: 1 and 2    Post Debridement Measurements:  Wound/Ulcer Descriptions are Pre Debridement except measurements:    Incision 04/04/14 Toe (Comment  which one) Right (Active)   Number of days: 2430       Wound Foot Left; Inner (Active)   Number of days:        Wound 07/31/20 Heel Left (Active)   Number of days: 119       Wound 11/27/20 Foot Left;Plantar (Active)   Wound Image -  11/27/20 1209   Wound Etiology Diabetic 11/27/20 1209   Dressing Status New dressing applied 11/27/20 1209   Wound Cleansed Cleansed with saline 11/27/20 1209   Dressing/Treatment Alginate with Ag; Ace wrap;ABD;Moist to dry; Roll gauze 11/27/20 1209   Wound Length (cm) 2.5 cm 11/27/20 1209   Wound Width (cm) 2.5 cm 11/27/20 1209   Wound Depth (cm) 1 cm 11/27/20 1209   Wound Surface Area (cm^2) 6.25 cm^2 11/27/20 1209   Wound Volume (cm^3) 6.25 cm^3 11/27/20 1209   Wound Assessment Pink/red 11/27/20 1209   Drainage Amount Small 11/27/20 1209   Drainage Description Serosanguinous 11/27/20 1209   Odor None 11/27/20 1209   Tiffanie-wound Assessment Maceration 11/27/20 1209   Margins Defined edges 11/27/20 1209   Number of days: 0       Wound 11/27/20 Foot Right;Plantar (Active)   Wound Image -  11/27/20 1209 Wound Etiology Diabetic 11/27/20 1209   Dressing Status New dressing applied 11/27/20 1209   Dressing/Treatment ABD; Ace wrap;Alginate with Ag;Moist to dry; Roll gauze 11/27/20 1209   Wound Length (cm) 1.5 cm 11/27/20 1209   Wound Width (cm) 1.5 cm 11/27/20 1209   Wound Depth (cm) 0.5 cm 11/27/20 1209   Wound Surface Area (cm^2) 2.25 cm^2 11/27/20 1209   Wound Volume (cm^3) 1.12 cm^3 11/27/20 1209   Wound Assessment Granulation tissue;Pink/red 11/27/20 1209   Drainage Amount Scant 11/27/20 1209   Drainage Description Serosanguinous 11/27/20 1209   Odor Mild 11/27/20 1209   Tiffanie-wound Assessment Maceration 11/27/20 1209   Margins Defined edges 11/27/20 1209   Number of days: 0          Percent of Wound(s)/Ulcer(s) Debrided: 100%    Total Surface Area Debrided:  8.45 sq cm     Diabetic/Pressure/Non Pressure Ulcers only:  Ulcer: Diabetic ulcer, fat layer exposed     Estimated Blood Loss:  Minimal    Hemostasis Achieved:  by pressure    Procedural Pain:  0  / 10     Post Procedural Pain:  0 / 10     Response to treatment:  Well tolerated by patient. IMPRESSION/RECOMMENDATIONS    1. High grade wound 2 of LEFT ad RIGHT plantar foot, without evidence of infection. Silver Cell wound dressing so as to promote further granulation, after the [employer] wound cleanse    2. Charcot arthropathy with residual foot deformity that leads to chronicity of ulcer  +Off-loading, and non-weight bearing is a MUST for wound healing  +xrays reviewed, no evidence of osteomyelitis    Disposition: The foot wounds do NOT appear to be cause of GI upset as there are no indicators of infection. Xrays reviewed and no surgery anticipated. Patient may be treated as OUT-patient follow up with 3x weekly nursing visits for dressing changes, once cleared medically. Continue local wound care daily. Discussed with patient that the ulcer has not improved much since his previous admission in June.   Recommend closer follow up for more aggressive

## 2020-11-28 NOTE — PLAN OF CARE
Problem: Falls - Risk of:  Goal: Will remain free from falls  Description: Will remain free from falls  11/28/2020 0750 by Brian Alberto RN  Outcome: Ongoing  Note: Fall risk assessment completed. Fall precautions in place. Call light within reach. Pt educated on calling for assistance before getting up. Walkway free of clutter. Will continue to monitor. Problem: Falls - Risk of:  Goal: Absence of physical injury  Description: Absence of physical injury  11/28/2020 0750 by Brian Alberto RN  Outcome: Ongoing  Note: Pt is free of injury. No injury noted. Fall precautions in place. Call light within reach. Will monitor.         Problem: Skin Integrity:  Goal: Will show no infection signs and symptoms  Description: Will show no infection signs and symptoms  11/28/2020 0750 by Brian Alberto RN  Outcome: Ongoing     Problem: Skin Integrity:  Goal: Absence of new skin breakdown  Description: Absence of new skin breakdown  11/28/2020 0750 by Brian Alberto RN  Outcome: Ongoing  Note: No new skin breakdown

## 2020-11-28 NOTE — PROGRESS NOTES
Physical Therapy    Facility/Department: [facility]  Initial Assessment    NAME: Isaac Maria  : 1955  MRN: [id number]    Date of Service: 2020    Assessment / Discharge Recommendations:  -anticipate discharge to home when medically ready  -will need professional transport into his home (as with prior admissions here)  -recommend nursing use the gemini keys while here due to his severe loss of sensation in bilateral LEs and insufficient friction for him to sense his foot placement (he has carpeting throughout his home0      Body structures, Functions, Activity limitations: Decreased functional mobility ; Decreased balance;Decreased ADL status; Decreased sensation  Prognosis: Good  Decision Making: Medium Complexity  REQUIRES PT FOLLOW UP: Yes  Activity Tolerance  Activity Tolerance: Patient Tolerated treatment well       Patient Diagnosis(es): The primary encounter diagnosis was Chronic pancreatitis, unspecified pancreatitis type ([facility] [address] A diagnosis of Chest pain, unspecified type was also pertinent to this visit. has a past medical history of Diabetes mellitus ([address] Gallstones, Pancreatitis, and Ulcers of both lower legs ([address] has a past surgical history that includes Foot surgery (Left, ) and ERCP (14).     Restrictions  Restrictions/Precautions  Restrictions/Precautions: Fall Risk  Position Activity Restriction  Other position/activity restrictions: Unknown weight bearing - noncompliant at baseline  Vision/Hearing  Vision: Within Functional Limits  Hearing: Within functional limits     Subjective  General  Chart Reviewed: Yes  Patient assessed for rehabilitation services?: Yes  Additional Pertinent Hx: here due to nausea lightheadedness chest pain     PMH includes DM and ongoing garcia to save his feet secondary to Charcot deformities  Response To Previous Treatment: Not applicable  Family / Caregiver Present: No  Follows Commands: Within Functional Limits  Pain Screening  Patient Currently in Pain: No  Orientation  Orientation  Overall Orientation Status: Within Functional Limits  Social/Functional History  Social/Functional History  Lives With: Alone  Type of Home: House  Home Layout: Multi-level, Able to Live on Main level with bedroom/bathroom, Laundry in basement  Home Access: Stairs to enter with rails  Entrance Stairs - Number of Steps: 2 sets of ~5 steps  Entrance Stairs - Rails: Both(too far to reach at same time)  Bathroom Shower/Tub: Shower chair with back, Tub/Shower unit(Sponge bathing)  Bathroom Toilet: Standard(with riser with rails)  Bathroom Equipment: 3-in-1 commode  Home Equipment: Standard walker, Rolling walker, Wheelchair-manual, Quad cane, Cane(WC doesn't fit through doorways-doesn't use)  Receives Help From: (COA 1x/week, nurse 2x/week to change bandages, NP 1x/month - MOA)  ADL Assistance: Independent  Homemaking Assistance: (aide once weekly for grocery, cleaning. Patient \"washes out\" his clothes)  Ambulation Assistance: Independent(short distances with RW or cane)  Transfer Assistance: Independent  Active : No  Additional Comments: Sleeps in recliner. Aide once weekly. States he \"doesn't leave the house. \"  Objective  Sensation  Overall Sensation Status: (he reports no sensation in bilateral distal LEs)  Bed mobility  Supine to Sit: Stand by assistance  Sit to Supine: Unable to assess  Transfers  Sit to Stand: Contact guard assistance  Stand to sit: Contact guard assistance  Ambulation  Ambulation?: Yes  Ambulation 1  Surface: level tile  Device: Rolling Walker  Assistance: Contact guard assistance  Quality of Gait: wide base of support with small step through pattern and feet - due to Charcot deformities very externally rotated  Distance: in room for ~20 feet  Comments: mildly unsteady - not good friction of his slippers to the floor  - he is used to carpeting at home  Stairs/Curb  Stairs?: No  Balance  Comments: midline in sitting at the EOB and in static stance on the walker   -dynamic is fair on rolling walker  Plan   Plan  Times per week: 3-5  Current Treatment Recommendations: Functional Mobility Training, Positioning  Safety Devices  Type of devices:  All fall risk precautions in place, Call light within reach, Chair alarm in place, Left in chair, Nurse notified(Tayler)    AM-PAC Score  AM-PAC Inpatient Mobility Raw Score : 18 (11/28/20 0956)  AM-PAC Inpatient T-Scale Score : 43.63 (11/28/20 0956)  Mobility Inpatient CMS 0-100% Score: 46.58 (11/28/20 0956)  Mobility Inpatient CMS G-Code Modifier : CK (11/28/20 0956)    Goals  Short term goals  Time Frame for Short term goals: 1-2 days  Short term goal 1[place] [facility] [place]  Short term goal 2: transfers at supervision  Short term goal 3: ambulation at [facility] with PT OT using rolling walker (nursing to use stedy) as needed for household distances  Patient Goals   Patient goals : get home       Therapy Time   Individual Concurrent Group Co-treatment   Time In       0925   Time Out       1005   Minutes       LUCIANO Sherwood

## 2020-11-28 NOTE — PROGRESS NOTES
Patient A&O. No complaints at this time. Shift uneventful. Call light within reach, able to make needs knows, fall precautions in place. Will continue to monitor. .Electronically signed by Ervin Britton RN on 11/28/2020 at 6:32 AM

## 2020-11-29 VITALS
HEIGHT: 74 IN | WEIGHT: 252.65 LBS | HEART RATE: 73 BPM | TEMPERATURE: 98.7 F | SYSTOLIC BLOOD PRESSURE: 130 MMHG | RESPIRATION RATE: 16 BRPM | BODY MASS INDEX: 32.42 KG/M2 | OXYGEN SATURATION: 99 % | DIASTOLIC BLOOD PRESSURE: 81 MMHG

## 2020-11-29 LAB
GLUCOSE BLD-MCNC: 130 MG/DL (ref 70–99)
GLUCOSE BLD-MCNC: 144 MG/DL (ref 70–99)
GLUCOSE BLD-MCNC: 145 MG/DL (ref 70–99)
GLUCOSE BLD-MCNC: 157 MG/DL (ref 70–99)
GLUCOSE BLD-MCNC: 233 MG/DL (ref 70–99)
PERFORMED ON: ABNORMAL

## 2020-11-29 PROCEDURE — 6370000000 HC RX 637 (ALT 250 FOR IP): Performed by: INTERNAL MEDICINE

## 2020-11-29 PROCEDURE — 96372 THER/PROPH/DIAG INJ SC/IM: CPT

## 2020-11-29 PROCEDURE — G0378 HOSPITAL OBSERVATION PER HR: HCPCS

## 2020-11-29 PROCEDURE — 6360000002 HC RX W HCPCS: Performed by: INTERNAL MEDICINE

## 2020-11-29 PROCEDURE — 2580000003 HC RX 258: Performed by: INTERNAL MEDICINE

## 2020-11-29 PROCEDURE — 96365 THER/PROPH/DIAG IV INF INIT: CPT

## 2020-11-29 RX ORDER — FERROUS SULFATE TAB EC 324 MG (65 MG FE EQUIVALENT) 324 (65 FE) MG
324 TABLET DELAYED RESPONSE ORAL EVERY OTHER DAY
Qty: 30 TABLET | Refills: 2 | Status: ON HOLD | OUTPATIENT
Start: 2020-11-30 | End: 2021-06-23

## 2020-11-29 RX ORDER — FERROUS SULFATE TAB EC 324 MG (65 MG FE EQUIVALENT) 324 (65 FE) MG
324 TABLET DELAYED RESPONSE ORAL EVERY OTHER DAY
Qty: 30 TABLET | Refills: 2 | Status: SHIPPED | OUTPATIENT
Start: 2020-11-30 | End: 2020-11-29

## 2020-11-29 RX ORDER — FERROUS SULFATE TAB EC 324 MG (65 MG FE EQUIVALENT) 324 (65 FE) MG
324 TABLET DELAYED RESPONSE ORAL EVERY OTHER DAY
Status: DISCONTINUED | OUTPATIENT
Start: 2020-11-30 | End: 2020-11-29 | Stop reason: HOSPADM

## 2020-11-29 RX ORDER — INSULIN GLARGINE 100 [IU]/ML
16 INJECTION, SOLUTION SUBCUTANEOUS
Qty: 1 PEN | Refills: 0 | Status: ON HOLD | OUTPATIENT
Start: 2020-11-29 | End: 2021-08-09 | Stop reason: ALTCHOICE

## 2020-11-29 RX ADMIN — INSULIN LISPRO 1 UNITS: 100 INJECTION, SOLUTION INTRAVENOUS; SUBCUTANEOUS at 10:59

## 2020-11-29 RX ADMIN — ENOXAPARIN SODIUM 40 MG: 40 INJECTION SUBCUTANEOUS at 09:41

## 2020-11-29 RX ADMIN — LISINOPRIL 2.5 MG: 5 TABLET ORAL at 09:40

## 2020-11-29 RX ADMIN — SODIUM CHLORIDE, PRESERVATIVE FREE 10 ML: 5 INJECTION INTRAVENOUS at 09:41

## 2020-11-29 RX ADMIN — INSULIN LISPRO 1 UNITS: 100 INJECTION, SOLUTION INTRAVENOUS; SUBCUTANEOUS at 06:48

## 2020-11-29 RX ADMIN — INSULIN LISPRO 1 UNITS: 100 INJECTION, SOLUTION INTRAVENOUS; SUBCUTANEOUS at 02:41

## 2020-11-29 RX ADMIN — ATORVASTATIN CALCIUM 40 MG: 40 TABLET, FILM COATED ORAL at 09:40

## 2020-11-29 RX ADMIN — METFORMIN HYDROCHLORIDE 500 MG: 500 TABLET ORAL at 09:40

## 2020-11-29 RX ADMIN — IRON SUCROSE 300 MG: 20 INJECTION, SOLUTION INTRAVENOUS at 06:47

## 2020-11-29 NOTE — PLAN OF CARE
Problem: Falls - Risk of:  Goal: Will remain free from falls  Description: Will remain free from falls  11/28/2020 2025 by Roberto Lopez RN  Outcome: Ongoing  Note: Patient educated on fall prevention. Call light is within reach, bed locked in lowest position, personal items within reach, and bed alarm is on. Will round on patient per unit guidelines. Problem: Falls - Risk of:  Goal: Absence of physical injury  Description: Absence of physical injury  11/28/2020 2025 by Roberto Lopez RN  Outcome: Ongoing  Note: Pt is free of injury. No injury noted. Fall precautions in place. Call light within reach. Will monitor.         Problem: Skin Integrity:  Goal: Will show no infection signs and symptoms  Description: Will show no infection signs and symptoms  11/28/2020 2025 by Roberto Lopez RN  Outcome: Ongoing     Problem: Skin Integrity:  Goal: Absence of new skin breakdown  Description: Absence of new skin breakdown  11/28/2020 2025 by Roberto Lopez RN  Outcome: Ongoing

## 2020-11-29 NOTE — DISCHARGE SUMMARY
Hospital Medicine Discharge Summary    Patient ID: Mina Issa      Patient's PCP: Josselynjessica Layla, APRN - CNP    Admit Date: 11/27/2020      Discharge Date:   11/29/2020    Admitting Physician: Serafin Martinez DO     Discharge Physician: Ethel Parker MD     Discharge Diagnoses:  1. Chest pain ruled out ACS, likely MSK  2. Peripheral vertigo  3. Mobitz type II block  4. Nausea and vomit ting due to chronic pancreatitis  5. Iron deficiency Anemia  6. Hypertension  7. Type 2 DM     The patient was seen and examined on day of discharge and this discharge summary is in conjunction with any daily progress note from day of discharge. Hospital Course:     77 y. o. male with history of diabetes mellitus type 2 with peripheral neuropathy and bilateral lower leg ulcers status post wound VAC, chronic pancreatitis presented to emergency room with complaints of nausea, dizziness, vomiting and chest pain. .  The patient had a mechanical fall on 11/8/20 and sustained left rib pain and low back pain.  Chest x-ray at that time was negative. Since then he has complained of intermittent left sided chest pain.     2 days ago he started having nausea, vomiting and yesterday he started complaining of intermittent dizziness, described as vertigo with change in position of his head. . No tinnitus. .. States he was unable to hold medications down with the nausea and vomiting  In the emergency room, BP was 130/80, temperature 99.6, respiratory rate 18, oxygen saturation 90% on room air. . CT abdomen pelvis with contrast showed no acute findings in the abdomen or pelvis. ..Silas Guess had evidence of chronic pancreatitis, nonobstructing left intrarenal calculi, small hiatal hernia. . Serum lipase was normal..  Patient's symptoms resolved on admission with no further episodes of dizziness/vertigo, nausea vomiting or abdominal pain. . He continued to have mild chest pain with palpation and deep breathing     -Atypical chest pain-most likely musculoskeletal, ACS ruled out with negative NM Stress test. Cardiology and Electrophysiology on board  -Abnormal EKG--Mobitz type II, seen by EP, unlikely that vertigo is related to this, as vertigo seem more positional.   -Positional vertigo--resolved-suspect peripheral  -Nausea/vomiting-onset 24 hours before onset of vertigo-resolved, likely due to chronic pancreatitis  -Chronic calcific pancreatitis--normal serum lipase-prior history of ERCP with stent placement-denies alcohol use  -Nonobstructing left intrarenal calculi  -Diabetes mellitus type 2  -Essential hypertension  -Chronic diabetic foot ulcers/Charcot arthopathy status post right leg wound VAC-follows with podiatry--reviewed arterial Doppler ultrasound 8/2020 that showed no significant limitation in arterial blood flow  -Diabetic peripheral neuropathy  - Iron deficiency Anemia - last iron profile -    6/8/2020   Ferritin 75.6   Iron 14 (L)   Iron Saturation 6 (L)   TIBC 240 (L)         PLAN:  NM stress ruled out acute ACS  Add Voleteran gel for MSK cause of chest pain  Orthostatic vitals are negative  EP saw patient, and unlikely the vertigo episodes related to Mobitz type II block  Continue chronic home medications  Continue wound care, he wants to follow with home podiatrist and says he doesn't leave his home and home care is preferred. He was given one dose of IV venofer 300 mg and then dc on Iron sulfate 324 mg every other day  I discussed with the patient that his home insulin doses are high and that here only requiring low dose upto 6 units with sliding scale. He says he doesn't follow Diabetic diet at home and he doesn't have hypoglycemic episodes at home. He is on Lantus 24 units at home. I will reduce it by 1/3rd to 16 units. I have asked him to log his blood glucose levels and follow up with PCP.  He was discharged in stable condition    Physical Exam Performed:     /81   Pulse 73   Temp 98.7 °F (37.1 °C) (Oral) Resp 16   Ht 6' 2\" (1.88 m)   Wt 252 lb 10.4 oz (114.6 kg)   SpO2 99%   BMI 32.44 kg/m²       General appearance:  No apparent distress, appears stated age and cooperative. HEENT:  Normal cephalic, atraumatic without obvious deformity. Pupils equal, round, and reactive to light. Extra ocular muscles intact. Conjunctivae/corneas clear. Neck: Supple, with full range of motion. No jugular venous distention. Trachea midline. Respiratory:  Normal respiratory effort. Clear to auscultation, bilaterally without Rales/Wheezes/Rhonchi. Cardiovascular:  Regular rate and rhythm with normal S1/S2 without murmurs, rubs or gallops. Abdomen: Soft, non-tender, non-distended with normal bowel sounds. Musculoskeletal:    LLE Charcot arthropathy with residual foot deformity that leads to chronicity of ulcer  High grade wound 2 of LEFT ad RIGHT plantar foot, without evidence of infection  Skin: Skin color, texture, turgor normal.  No rashes or lesions. Neurologic:  Neurovascularly intact without any focal sensory/motor deficits. Cranial nerves: II-XII intact, grossly non-focal.  Psychiatric:  Alert and oriented, thought content appropriate, normal insight  Capillary Refill: Brisk,< 3 seconds   Peripheral Pulses: +2 palpable, equal bilaterally       Labs: For convenience and continuity at follow-up the following most recent labs are provided:      CBC:    Lab Results   Component Value Date    WBC 7.3 11/28/2020    HGB 11.9 11/28/2020    HCT 35.5 11/28/2020     11/28/2020       Renal:    Lab Results   Component Value Date     11/28/2020    K 4.5 11/28/2020     11/28/2020    CO2 23 11/28/2020    BUN 18 11/28/2020    CREATININE 1.1 11/28/2020    CALCIUM 9.1 11/28/2020    PHOS 3.3 06/12/2020         Significant Diagnostic Studies    Radiology:   XR FOOT RIGHT (MIN 3 VIEWS)   Final Result   1.  Persistent though improved ulcerations in the medial and plantar left   midfoot and forefoot with no definite underlying findings of necrotizing   fasciitis or osteomyelitis. 2. Soft tissue swelling in each foot likely due to cellulitis. 3. Osteoarthritic changes in each foot as above, most severe at the left   calcaneocuboid joint. 4. Bony demineralization. XR FOOT LEFT (MIN 3 VIEWS)   Final Result   1. Persistent though improved ulcerations in the medial and plantar left   midfoot and forefoot with no definite underlying findings of necrotizing   fasciitis or osteomyelitis. 2. Soft tissue swelling in each foot likely due to cellulitis. 3. Osteoarthritic changes in each foot as above, most severe at the left   calcaneocuboid joint. 4. Bony demineralization. NM Cardiac Stress Test Nuclear Imaging   Final Result      CT ABDOMEN PELVIS W IV CONTRAST Additional Contrast? None   Final Result   1. No acute findings within the abdomen or pelvis   2. CT findings consistent with chronic pancreatitis   3. Nonobstructing left intrarenal calculi   4.  Small hiatal hernia                Consults:     IP CONSULT TO CARDIOLOGY  IP CONSULT TO PODIATRY  IP CONSULT TO HOME CARE NEEDS    Disposition:  Home with Home care    Condition at Discharge: Stable    Discharge Instructions/Follow-up:    NON-weight bearing to LEFT foot due to wound  Wound care  Podiatry home care  PT/OT  Start Iron tablets  Monitor you blood glucose at home  You did not require high doses of insulin at home so I have reduced Lantus dose to 16 units every morning  Montior blood glucose 4 times daily, keep log  Follow up with primary care for further adjustments of Insulin  Watch for low glucose levels, if levels stay below 100 then please consult your primary care physician    Code Status:  Full Code    Activity: activity as tolerated    Diet: diabetic diet      Discharge Medications:     Current Discharge Medication List           Details   ferrous sulfate 324 (65 Fe) MG EC tablet Take 1 tablet by mouth every other day  Qty: 30 tablet, Refills: 2              Details   insulin glargine (LANTUS SOLOSTAR) 100 UNIT/ML injection pen Inject 16 Units into the skin every morning (before breakfast)  Qty: 1 pen, Refills: 0              Details   tamsulosin (FLOMAX) 0.4 MG capsule Take 0.4 mg by mouth daily      vitamin B-12 (CYANOCOBALAMIN) 500 MCG tablet Take 1,500 mcg by mouth daily      aspirin 81 MG EC tablet Take 1 tablet by mouth daily  Qty: 30 tablet, Refills: 0      atorvastatin (LIPITOR) 40 MG tablet Take 1 tablet by mouth daily  Qty: 30 tablet, Refills: 0      lisinopril (PRINIVIL;ZESTRIL) 10 MG tablet Take 0.5 tablets by mouth daily  Qty: 30 tablet, Refills: 0      spironolactone (ALDACTONE) 25 MG tablet Take 25 mg by mouth daily      metFORMIN (GLUCOPHAGE) 500 MG tablet Take 500 mg by mouth 2 times daily (with meals)      ammonium lactate (LAC-HYDRIN) 12 % lotion Apply to BL feet and legs bid with dressing changes  Qty: 1 Bottle, Refills: 0      Insulin Pen Needle 32G X 4 MM MISC 1 each by Does not apply route daily. Qty: 100 each, Refills: 3      Lancets MISC Once daily  Qty: 100 each, Refills: 3      glucose blood VI test strips (ASCENSIA AUTODISC VI;ONE TOUCH ULTRA TEST VI) strip 1 each by In Vitro route daily. As needed. Qty: 100 each, Refills: 3      glucose monitoring kit (FREESTYLE) monitoring kit 1 kit by Does not apply route daily as needed. Qty: 1 kit, Refills: 0             Time Spent on discharge is more than 45 minutes in the examination, evaluation, counseling and review of medications and discharge plan. Signed:    Yoni Wilson MD   11/29/2020      Thank you KENNY Hayes - JAY for the opportunity to be involved in this patient's care. If you have any questions or concerns please feel free to contact me at [phone].

## 2020-11-29 NOTE — PROGRESS NOTES
Data- discharge order received, pt verbalized agreement to discharge, disposition to previous residence, with HHC/DME. Action- discharge instructions prepared and given to pt, pt verbalized understanding. Medication information packet given r/t NEW and/or CHANGED prescriptions emphasizing name/purpose/side effects, pt verbalized understanding. Discharge instruction summary: Diet- general, Activity- up as tolerated, NWB L foot, Primary Care Physician as follows: Gris Gonzalez, KENNY - [phone] f/u appointment within two weeks, prescription medications filled to preferred pharmacy. Response- Pt belongings gathered, IV removed. Disposition is home (with HHC/DME needs), transported with [address] ambulance via stretcher, no complications.      Electronically signed by Edilson Daniels RN on 11/29/2020 at 2:23 PM

## 2020-11-29 NOTE — CARE COORDINATION
SW spoke with pt on the phone. He reports he is active with [facility] home and would like them resumed. Pt reports he does not leave his house as he cannot d/t steps in the front. Once he is inside he reports he can get around. Pt is NWB on left foot but has open wound on both. Pt reports he always goes home by stretcher and prefers [address] as he used them last time. He reports having some steps and a hill. Scheduled transport for 2-230 thru [address] Ambulance. Faxed orders to [facility] and tried to call to notify of dc but no answer.   Electronically signed by [id number] JUDY Moulton on 11/29/2020 at 9:26 AM  Spoke with [facility] and they will resume [facility], [facility].  Electronically signed by [id number] JUDY Moulton on 11/29/2020 at 12:12 PM

## 2020-11-29 NOTE — PROGRESS NOTES
Pt A&Ox4. Morning meds given. Assessment complete. IV remains in place and patent. No needs voiced at this time. Fall prercautions in place. Call light within reach. Will continue to monitor.     Electronically signed by Portia Caldera RN on 11/29/2020 at 10:25 AM

## 2020-11-29 NOTE — PLAN OF CARE
Problem: Falls - Risk of:  Goal: Will remain free from falls  Description: Will remain free from falls  11/29/2020 0753 by Afia Stratton RN  Outcome: Ongoing  Note: Fall risk assessment completed. Fall precautions in place. Call light within reach. Pt educated on calling for assistance before getting up. Walkway free of clutter. Will continue to monitor. Problem: Falls - Risk of:  Goal: Absence of physical injury  Description: Absence of physical injury  11/29/2020 0753 by Afia Stratton RN  Outcome: Ongoing  Note: Pt is free of injury. No injury noted. Fall precautions in place. Call light within reach. Will monitor.         Problem: Skin Integrity:  Goal: Will show no infection signs and symptoms  Description: Will show no infection signs and symptoms  11/29/2020 0753 by Afia Stratton RN  Outcome: Ongoing     Problem: Skin Integrity:  Goal: Absence of new skin breakdown  Description: Absence of new skin breakdown  11/29/2020 0753 by Afia Stratton RN  Outcome: Ongoing  Note: No new skin breakdown noted

## 2021-06-23 ENCOUNTER — APPOINTMENT (OUTPATIENT)
Dept: GENERAL RADIOLOGY | Age: 66
DRG: 639 | End: 2021-06-23
Payer: MEDICARE

## 2021-06-23 ENCOUNTER — APPOINTMENT (OUTPATIENT)
Dept: CT IMAGING | Age: 66
DRG: 639 | End: 2021-06-23
Payer: MEDICARE

## 2021-06-23 ENCOUNTER — HOSPITAL ENCOUNTER (INPATIENT)
Age: 66
LOS: 1 days | Discharge: HOME HEALTH | DRG: 639 | End: 2021-06-24
Attending: STUDENT IN AN ORGANIZED HEALTH CARE EDUCATION/TRAINING PROGRAM | Admitting: INTERNAL MEDICINE
Payer: MEDICARE

## 2021-06-23 DIAGNOSIS — R62.7 FAILURE TO THRIVE IN ADULT: ICD-10-CM

## 2021-06-23 DIAGNOSIS — S39.012A LUMBOSACRAL STRAIN, INITIAL ENCOUNTER: Primary | ICD-10-CM

## 2021-06-23 DIAGNOSIS — M54.16 LUMBAR RADICULOPATHY, ACUTE: ICD-10-CM

## 2021-06-23 PROBLEM — D50.9 MICROCYTIC ANEMIA: Status: ACTIVE | Noted: 2021-06-23

## 2021-06-23 PROBLEM — I44.2 CHB (COMPLETE HEART BLOCK) (HCC): Status: ACTIVE | Noted: 2021-06-23

## 2021-06-23 LAB
A/G RATIO: 0.9 (ref 1.1–2.2)
ALBUMIN SERPL-MCNC: 3.8 G/DL (ref 3.4–5)
ALP BLD-CCNC: 91 U/L (ref 40–129)
ALT SERPL-CCNC: <5 U/L (ref 10–40)
ANION GAP SERPL CALCULATED.3IONS-SCNC: 12 MMOL/L (ref 3–16)
AST SERPL-CCNC: 9 U/L (ref 15–37)
BASOPHILS ABSOLUTE: 0.1 K/UL (ref 0–0.2)
BASOPHILS RELATIVE PERCENT: 1.2 %
BILIRUB SERPL-MCNC: 0.6 MG/DL (ref 0–1)
BUN BLDV-MCNC: 19 MG/DL (ref 7–20)
CALCIUM SERPL-MCNC: 9.3 MG/DL (ref 8.3–10.6)
CHLORIDE BLD-SCNC: 101 MMOL/L (ref 99–110)
CO2: 22 MMOL/L (ref 21–32)
CREAT SERPL-MCNC: 0.9 MG/DL (ref 0.8–1.3)
EKG ATRIAL RATE: 39 BPM
EKG DIAGNOSIS: NORMAL
EKG P-R INTERVAL: 528 MS
EKG Q-T INTERVAL: 520 MS
EKG QRS DURATION: 130 MS
EKG QTC CALCULATION (BAZETT): 418 MS
EKG R AXIS: -38 DEGREES
EKG T AXIS: 20 DEGREES
EKG VENTRICULAR RATE: 39 BPM
EOSINOPHILS ABSOLUTE: 0.1 K/UL (ref 0–0.6)
EOSINOPHILS RELATIVE PERCENT: 1.2 %
GFR AFRICAN AMERICAN: >60
GFR NON-AFRICAN AMERICAN: >60
GLOBULIN: 4.2 G/DL
GLUCOSE BLD-MCNC: 106 MG/DL (ref 70–99)
GLUCOSE BLD-MCNC: 119 MG/DL (ref 70–99)
GLUCOSE BLD-MCNC: 157 MG/DL (ref 70–99)
HCT VFR BLD CALC: 33.1 % (ref 40.5–52.5)
HEMOGLOBIN: 11.1 G/DL (ref 13.5–17.5)
LYMPHOCYTES ABSOLUTE: 1.8 K/UL (ref 1–5.1)
LYMPHOCYTES RELATIVE PERCENT: 20.6 %
MCH RBC QN AUTO: 25.4 PG (ref 26–34)
MCHC RBC AUTO-ENTMCNC: 33.6 G/DL (ref 31–36)
MCV RBC AUTO: 75.5 FL (ref 80–100)
MONOCYTES ABSOLUTE: 0.5 K/UL (ref 0–1.3)
MONOCYTES RELATIVE PERCENT: 5.9 %
NEUTROPHILS ABSOLUTE: 6 K/UL (ref 1.7–7.7)
NEUTROPHILS RELATIVE PERCENT: 71.1 %
PDW BLD-RTO: 18.3 % (ref 12.4–15.4)
PERFORMED ON: ABNORMAL
PERFORMED ON: ABNORMAL
PLATELET # BLD: 270 K/UL (ref 135–450)
PMV BLD AUTO: 7.4 FL (ref 5–10.5)
POTASSIUM REFLEX MAGNESIUM: 4.3 MMOL/L (ref 3.5–5.1)
PROCALCITONIN: 0.04 NG/ML (ref 0–0.15)
RBC # BLD: 4.38 M/UL (ref 4.2–5.9)
SODIUM BLD-SCNC: 135 MMOL/L (ref 136–145)
TOTAL PROTEIN: 8 G/DL (ref 6.4–8.2)
WBC # BLD: 8.5 K/UL (ref 4–11)

## 2021-06-23 PROCEDURE — 99285 EMERGENCY DEPT VISIT HI MDM: CPT

## 2021-06-23 PROCEDURE — 93005 ELECTROCARDIOGRAM TRACING: CPT | Performed by: STUDENT IN AN ORGANIZED HEALTH CARE EDUCATION/TRAINING PROGRAM

## 2021-06-23 PROCEDURE — 72131 CT LUMBAR SPINE W/O DYE: CPT

## 2021-06-23 PROCEDURE — 94761 N-INVAS EAR/PLS OXIMETRY MLT: CPT

## 2021-06-23 PROCEDURE — 96365 THER/PROPH/DIAG IV INF INIT: CPT

## 2021-06-23 PROCEDURE — G0378 HOSPITAL OBSERVATION PER HR: HCPCS

## 2021-06-23 PROCEDURE — 99221 1ST HOSP IP/OBS SF/LOW 40: CPT | Performed by: INTERNAL MEDICINE

## 2021-06-23 PROCEDURE — 2100000000 HC CCU R&B

## 2021-06-23 PROCEDURE — 96375 TX/PRO/DX INJ NEW DRUG ADDON: CPT

## 2021-06-23 PROCEDURE — 6370000000 HC RX 637 (ALT 250 FOR IP): Performed by: INTERNAL MEDICINE

## 2021-06-23 PROCEDURE — 36415 COLL VENOUS BLD VENIPUNCTURE: CPT

## 2021-06-23 PROCEDURE — 96366 THER/PROPH/DIAG IV INF ADDON: CPT

## 2021-06-23 PROCEDURE — 2580000003 HC RX 258: Performed by: STUDENT IN AN ORGANIZED HEALTH CARE EDUCATION/TRAINING PROGRAM

## 2021-06-23 PROCEDURE — 71045 X-RAY EXAM CHEST 1 VIEW: CPT

## 2021-06-23 PROCEDURE — 2580000003 HC RX 258: Performed by: INTERNAL MEDICINE

## 2021-06-23 PROCEDURE — 96367 TX/PROPH/DG ADDL SEQ IV INF: CPT

## 2021-06-23 PROCEDURE — 6370000000 HC RX 637 (ALT 250 FOR IP)

## 2021-06-23 PROCEDURE — 93010 ELECTROCARDIOGRAM REPORT: CPT | Performed by: INTERNAL MEDICINE

## 2021-06-23 PROCEDURE — 6360000002 HC RX W HCPCS: Performed by: STUDENT IN AN ORGANIZED HEALTH CARE EDUCATION/TRAINING PROGRAM

## 2021-06-23 PROCEDURE — 96374 THER/PROPH/DIAG INJ IV PUSH: CPT

## 2021-06-23 PROCEDURE — 80053 COMPREHEN METABOLIC PANEL: CPT

## 2021-06-23 PROCEDURE — 84145 PROCALCITONIN (PCT): CPT

## 2021-06-23 PROCEDURE — 85025 COMPLETE CBC W/AUTO DIFF WBC: CPT

## 2021-06-23 PROCEDURE — 6370000000 HC RX 637 (ALT 250 FOR IP): Performed by: STUDENT IN AN ORGANIZED HEALTH CARE EDUCATION/TRAINING PROGRAM

## 2021-06-23 PROCEDURE — 6360000002 HC RX W HCPCS: Performed by: INTERNAL MEDICINE

## 2021-06-23 RX ORDER — POLYETHYLENE GLYCOL 3350 17 G/17G
17 POWDER, FOR SOLUTION ORAL DAILY PRN
Status: DISCONTINUED | OUTPATIENT
Start: 2021-06-23 | End: 2021-06-24 | Stop reason: HOSPADM

## 2021-06-23 RX ORDER — ASPIRIN 81 MG/1
81 TABLET ORAL DAILY
Status: DISCONTINUED | OUTPATIENT
Start: 2021-06-23 | End: 2021-06-24 | Stop reason: HOSPADM

## 2021-06-23 RX ORDER — SODIUM CHLORIDE 9 MG/ML
25 INJECTION, SOLUTION INTRAVENOUS PRN
Status: DISCONTINUED | OUTPATIENT
Start: 2021-06-23 | End: 2021-06-24 | Stop reason: HOSPADM

## 2021-06-23 RX ORDER — ATORVASTATIN CALCIUM 40 MG/1
40 TABLET, FILM COATED ORAL DAILY
Status: DISCONTINUED | OUTPATIENT
Start: 2021-06-23 | End: 2021-06-23

## 2021-06-23 RX ORDER — CYCLOBENZAPRINE HCL 10 MG
10 TABLET ORAL ONCE
Status: COMPLETED | OUTPATIENT
Start: 2021-06-23 | End: 2021-06-23

## 2021-06-23 RX ORDER — LISINOPRIL 20 MG/1
20 TABLET ORAL DAILY
Status: DISCONTINUED | OUTPATIENT
Start: 2021-06-23 | End: 2021-06-23

## 2021-06-23 RX ORDER — DIAZEPAM 5 MG/1
5 TABLET ORAL ONCE
Status: COMPLETED | OUTPATIENT
Start: 2021-06-23 | End: 2021-06-23

## 2021-06-23 RX ORDER — ONDANSETRON 2 MG/ML
4 INJECTION INTRAMUSCULAR; INTRAVENOUS EVERY 6 HOURS PRN
Status: DISCONTINUED | OUTPATIENT
Start: 2021-06-23 | End: 2021-06-24 | Stop reason: HOSPADM

## 2021-06-23 RX ORDER — MAGNESIUM SULFATE IN WATER 40 MG/ML
2000 INJECTION, SOLUTION INTRAVENOUS PRN
Status: DISCONTINUED | OUTPATIENT
Start: 2021-06-23 | End: 2021-06-24 | Stop reason: HOSPADM

## 2021-06-23 RX ORDER — POTASSIUM CHLORIDE 20 MEQ/1
40 TABLET, EXTENDED RELEASE ORAL PRN
Status: DISCONTINUED | OUTPATIENT
Start: 2021-06-23 | End: 2021-06-24 | Stop reason: HOSPADM

## 2021-06-23 RX ORDER — DEXTROSE MONOHYDRATE 25 G/50ML
12.5 INJECTION, SOLUTION INTRAVENOUS PRN
Status: DISCONTINUED | OUTPATIENT
Start: 2021-06-23 | End: 2021-06-24 | Stop reason: HOSPADM

## 2021-06-23 RX ORDER — SODIUM CHLORIDE 0.9 % (FLUSH) 0.9 %
5-40 SYRINGE (ML) INJECTION EVERY 12 HOURS SCHEDULED
Status: DISCONTINUED | OUTPATIENT
Start: 2021-06-23 | End: 2021-06-24 | Stop reason: HOSPADM

## 2021-06-23 RX ORDER — INSULIN GLARGINE 100 [IU]/ML
20 INJECTION, SOLUTION SUBCUTANEOUS NIGHTLY
Status: DISCONTINUED | OUTPATIENT
Start: 2021-06-23 | End: 2021-06-24 | Stop reason: HOSPADM

## 2021-06-23 RX ORDER — ACETAMINOPHEN 325 MG/1
650 TABLET ORAL EVERY 6 HOURS PRN
Status: DISCONTINUED | OUTPATIENT
Start: 2021-06-23 | End: 2021-06-24 | Stop reason: HOSPADM

## 2021-06-23 RX ORDER — ACETAMINOPHEN 325 MG/1
650 TABLET ORAL ONCE
Status: COMPLETED | OUTPATIENT
Start: 2021-06-23 | End: 2021-06-23

## 2021-06-23 RX ORDER — SODIUM CHLORIDE, SODIUM LACTATE, POTASSIUM CHLORIDE, AND CALCIUM CHLORIDE .6; .31; .03; .02 G/100ML; G/100ML; G/100ML; G/100ML
1000 INJECTION, SOLUTION INTRAVENOUS ONCE
Status: COMPLETED | OUTPATIENT
Start: 2021-06-23 | End: 2021-06-23

## 2021-06-23 RX ORDER — KETOROLAC TROMETHAMINE 30 MG/ML
15 INJECTION, SOLUTION INTRAMUSCULAR; INTRAVENOUS ONCE
Status: COMPLETED | OUTPATIENT
Start: 2021-06-23 | End: 2021-06-23

## 2021-06-23 RX ORDER — TAMSULOSIN HYDROCHLORIDE 0.4 MG/1
0.4 CAPSULE ORAL EVERY EVENING
Status: DISCONTINUED | OUTPATIENT
Start: 2021-06-23 | End: 2021-06-24 | Stop reason: HOSPADM

## 2021-06-23 RX ORDER — SODIUM HYPOCHLORITE 5 MG/ML
SOLUTION TOPICAL DAILY
Status: DISCONTINUED | OUTPATIENT
Start: 2021-06-23 | End: 2021-06-24 | Stop reason: HOSPADM

## 2021-06-23 RX ORDER — SODIUM CHLORIDE 0.9 % (FLUSH) 0.9 %
5-40 SYRINGE (ML) INJECTION PRN
Status: DISCONTINUED | OUTPATIENT
Start: 2021-06-23 | End: 2021-06-24 | Stop reason: HOSPADM

## 2021-06-23 RX ORDER — POTASSIUM CHLORIDE 7.45 MG/ML
10 INJECTION INTRAVENOUS PRN
Status: DISCONTINUED | OUTPATIENT
Start: 2021-06-23 | End: 2021-06-24 | Stop reason: HOSPADM

## 2021-06-23 RX ORDER — ONDANSETRON 4 MG/1
4 TABLET, ORALLY DISINTEGRATING ORAL EVERY 8 HOURS PRN
Status: DISCONTINUED | OUTPATIENT
Start: 2021-06-23 | End: 2021-06-24 | Stop reason: HOSPADM

## 2021-06-23 RX ORDER — SPIRONOLACTONE 25 MG/1
25 TABLET ORAL DAILY
Status: DISCONTINUED | OUTPATIENT
Start: 2021-06-23 | End: 2021-06-24 | Stop reason: HOSPADM

## 2021-06-23 RX ORDER — DEXTROSE MONOHYDRATE 50 MG/ML
100 INJECTION, SOLUTION INTRAVENOUS PRN
Status: DISCONTINUED | OUTPATIENT
Start: 2021-06-23 | End: 2021-06-24 | Stop reason: HOSPADM

## 2021-06-23 RX ORDER — ACETAMINOPHEN 650 MG/1
650 SUPPOSITORY RECTAL EVERY 6 HOURS PRN
Status: DISCONTINUED | OUTPATIENT
Start: 2021-06-23 | End: 2021-06-24 | Stop reason: HOSPADM

## 2021-06-23 RX ORDER — ATORVASTATIN CALCIUM 20 MG/1
20 TABLET, FILM COATED ORAL NIGHTLY
COMMUNITY
Start: 2021-04-20

## 2021-06-23 RX ORDER — NICOTINE POLACRILEX 4 MG
15 LOZENGE BUCCAL PRN
Status: DISCONTINUED | OUTPATIENT
Start: 2021-06-23 | End: 2021-06-24 | Stop reason: HOSPADM

## 2021-06-23 RX ORDER — LISINOPRIL 10 MG/1
10 TABLET ORAL DAILY
Status: DISCONTINUED | OUTPATIENT
Start: 2021-06-23 | End: 2021-06-24 | Stop reason: HOSPADM

## 2021-06-23 RX ORDER — ATORVASTATIN CALCIUM 20 MG/1
20 TABLET, FILM COATED ORAL NIGHTLY
Status: DISCONTINUED | OUTPATIENT
Start: 2021-06-23 | End: 2021-06-24 | Stop reason: HOSPADM

## 2021-06-23 RX ADMIN — Medication 10 ML: at 20:44

## 2021-06-23 RX ADMIN — KETOROLAC TROMETHAMINE 15 MG: 30 INJECTION, SOLUTION INTRAMUSCULAR at 08:16

## 2021-06-23 RX ADMIN — LISINOPRIL 10 MG: 10 TABLET ORAL at 13:55

## 2021-06-23 RX ADMIN — ASPIRIN 81 MG: 81 TABLET, COATED ORAL at 13:55

## 2021-06-23 RX ADMIN — ACETAMINOPHEN 650 MG: 325 TABLET ORAL at 08:16

## 2021-06-23 RX ADMIN — INSULIN GLARGINE 20 UNITS: 100 INJECTION, SOLUTION SUBCUTANEOUS at 20:44

## 2021-06-23 RX ADMIN — VANCOMYCIN HYDROCHLORIDE 1500 MG: 500 INJECTION, POWDER, LYOPHILIZED, FOR SOLUTION INTRAVENOUS at 18:35

## 2021-06-23 RX ADMIN — DIAZEPAM 5 MG: 5 TABLET ORAL at 08:16

## 2021-06-23 RX ADMIN — ATORVASTATIN CALCIUM 20 MG: 20 TABLET, FILM COATED ORAL at 20:44

## 2021-06-23 RX ADMIN — SODIUM CHLORIDE, POTASSIUM CHLORIDE, SODIUM LACTATE AND CALCIUM CHLORIDE 1000 ML: 600; 310; 30; 20 INJECTION, SOLUTION INTRAVENOUS at 08:18

## 2021-06-23 RX ADMIN — CYCLOBENZAPRINE 10 MG: 10 TABLET, FILM COATED ORAL at 08:16

## 2021-06-23 RX ADMIN — TAMSULOSIN HYDROCHLORIDE 0.4 MG: 0.4 CAPSULE ORAL at 17:49

## 2021-06-23 RX ADMIN — SPIRONOLACTONE 25 MG: 25 TABLET ORAL at 13:55

## 2021-06-23 RX ADMIN — MEROPENEM 500 MG: 500 INJECTION, POWDER, FOR SOLUTION INTRAVENOUS at 17:49

## 2021-06-23 RX ADMIN — INSULIN LISPRO 1 UNITS: 100 INJECTION, SOLUTION INTRAVENOUS; SUBCUTANEOUS at 20:44

## 2021-06-23 RX ADMIN — ACETAMINOPHEN 650 MG: 325 TABLET ORAL at 22:33

## 2021-06-23 ASSESSMENT — PAIN DESCRIPTION - LOCATION: LOCATION: BACK

## 2021-06-23 ASSESSMENT — PAIN SCALES - GENERAL
PAINLEVEL_OUTOF10: 3
PAINLEVEL_OUTOF10: 9
PAINLEVEL_OUTOF10: 9
PAINLEVEL_OUTOF10: 3

## 2021-06-23 ASSESSMENT — PAIN DESCRIPTION - PROGRESSION: CLINICAL_PROGRESSION: NOT CHANGED

## 2021-06-23 ASSESSMENT — PAIN DESCRIPTION - ONSET: ONSET: ON-GOING

## 2021-06-23 ASSESSMENT — PAIN - FUNCTIONAL ASSESSMENT: PAIN_FUNCTIONAL_ASSESSMENT: PREVENTS OR INTERFERES WITH MANY ACTIVE NOT PASSIVE ACTIVITIES

## 2021-06-23 ASSESSMENT — PAIN DESCRIPTION - ORIENTATION: ORIENTATION: LOWER;MID

## 2021-06-23 ASSESSMENT — PAIN DESCRIPTION - PAIN TYPE: TYPE: ACUTE PAIN

## 2021-06-23 ASSESSMENT — PAIN DESCRIPTION - DESCRIPTORS: DESCRIPTORS: ACHING

## 2021-06-23 ASSESSMENT — PAIN DESCRIPTION - FREQUENCY: FREQUENCY: CONTINUOUS

## 2021-06-23 NOTE — H&P
Hospital Medicine History & Physical      PCP: Tan Patel, APRN - CNP    Date of Admission: 6/23/2021    Date of Service: Pt seen/examined on 6/23/21 and Admitted to Inpatient     Chief Complaint: Low back pain    History Of Present Illness: The patient is a 77 y.o. male who presents to [facility] with low back pain. Patient also states he had some weakness and fatigue and he was unable to ambulate for the past couple of days. He finally called EMS his he was unable to get around his house or eat or drink anything. Patient does follow with outpatient podiatry for his bilateral lower extremity ulcers. His left lower extremity has a wound VAC in place. In the emergency department patient's heart rate would drop down to the 30s in the 40s and an EKG showed Mobitz type I with concern for the possibility of complete heart block. Cardiology was consulted and confirmed that the patient was in Mobitz type I. Patient will be admitted to the hospital for further care and intervention of his bilateral lower extremity ulcers with foul-smelling appearance. Patient denies any fever chills, nausea vomiting, diarrhea constipation chest pain or shortness of breath. Patient also denies any bowel or bladder incontinence or any saddle anesthesia. Past Medical History:        Diagnosis Date    Diabetes mellitus ([address]     Gallstones     Pancreatitis     Ulcers of both lower legs ([address]     bilateral feet       Past Surgical History:        Procedure Laterality Date    ERCP  4/8/14    LITHOTRYPSY & PANCREATIC STENT PLACEMENT    FOOT SURGERY Left 1967       Medications Prior to Admission:    Prior to Admission medications    Medication Sig Start Date End Date Taking?  Authorizing Provider   atorvastatin (LIPITOR) 20 MG tablet Take 20 mg by mouth nightly  4/20/21 3+ 04/01/2014    CLARITYU CLOUDY 11/26/2020    SPECGRAV 1.017 11/26/2020    LEUKOCYTESUR Negative 11/26/2020    BLOODU Negative 11/26/2020    GLUCOSEU Negative 11/26/2020       ABG  No results found for: USB8WMZ, BEART, T6KSFBKC, PHART, THGBART, NLW1EZY, PO2ART, ZIP1ZSL      PHYSICIANS CERTIFICATION:    I certify that Vincent Handler is expected to be hospitalized for more than 2 midnights based on the following assessment and plan:      ASSESSMENT/PLAN:    Weakness and fatigue  Possibly secondary to bilateral lower extremity foot infections  Continue IV vancomycin  Physical and Occupational Therapy    Diabetic foot ulcers  Podiatry consulted  Vancomycin    Diabetes mellitus type 2  Continue Lantus and sliding scale  Carb controlled diet    Hypertension  Continue home medications    Hyperlipidemia  Continue home statin    BPH  Continue Flomax    DVT Prophylaxis: Lovenox  Diet: ADULT DIET; Regular; 4 carb choices (60 gm/meal); Low Fat/Low Chol/High Fiber/2 gm Na  Code Status: Full Code  PT/OT Eval Status: Ordered    Dispo -inpatient       Chloe Cabrera MD    Thank you KENNY Mar CNP for the opportunity to be involved in this patient's care. If you have any questions or concerns please feel free to contact me at [phone].

## 2021-06-23 NOTE — PROGRESS NOTES
Physical Therapy  PT referral received and chart reviewed. Pt just admit to hospital setting. Await Podiatry consult.   Thank you, Rosa Valenzuela, PT [id number]

## 2021-06-23 NOTE — ED NOTES
Pt to ED with c/o mid lower back pain that radiates to his left groin, worse with movement, unable to ambulate d/t pain. Started 3 days ago. Pt with wounds to left medial foot with wound vac dressing in place, did not bring wound vac. Wounds to right lower foot and ankle with dry dressing in place. States he has a nurse that comes to his home to do his wound care.      Shanelle Manzanares RN  06/23/21 Antonio Fong, DANITA  06/23/21 1937

## 2021-06-23 NOTE — ED NOTES
HR in the 40's, will xiao down into the low 30's. Pt remains alert and oriented x4. ED MD Holly made aware. EKG being obtained.       Elodia Mott RN  06/23/21 1000

## 2021-06-23 NOTE — ED PROVIDER NOTES
Denice Lebron 42      Pt Name: Amara Roberts  MRN: [id number]  [place] 1955  Date of evaluation: 6/23/2021  Provider: Maren Long MD    [address] [place] [address]       Chief Complaint   Patient presents with    Back Pain     Pt to ED with c/o mid lower back pain, radiates to his left groin, worse with movement, unable to ambulate d/t pain. Started 3 days ago. HISTORY OF PRESENT ILLNESS   (Location/Symptom, Timing/Onset,Context/Setting, Quality, Duration, Modifying Factors, Severity)  Note limiting factors. Amara Roberts is a 77 y.o. male who presents to the emergency department c/o back pain x 3 days. Onset gradual, localized to the L paraspinal lumbar back, radiates to the posterior L hip. Sharp in character, severe when attempting to ambulate. Exacerbated by movement, alleviated by immobilization, denies falls, trauma. Denies focal weakness, saddle anesthesia, incontinence, fevers, IVDU. Symptoms not otherwise alleviated or exacerbated by other factors. NursingNotes were reviewed. REVIEW OF SYSTEMS    (2-9 systems for level 4, 10 or more for level 5)       Constitutional: No fever or chills. Eye: No visual disturbances. No eye pain. Ear/Nose/Mouth/Throat: No nasal congestion. No sore throat. Respiratory: No cough, No shortness of breath, No sputum production. Cardiovascular: No chest pain. No palpitations. Gastrointestinal: No abdominal pain. No nausea or vomiting  Genitourinary: No dysuria. No hematuria. Hematology/Lymphatics: No bleeding or bruising tendency. Immunologic: No malaise. No swollen glands. Musculoskeletal: + back pain. No joint pain. Integumentary: No rash. No abrasions. Foot ulcers as below. Neurologic: No headache. No focal numbness or weakness.       PAST MEDICAL HISTORY     Past Medical History:   Diagnosis Date    Diabetes mellitus ([facility]     Gallstones     Pancreatitis     Ulcers of both lower legs ([facility] bilateral feet         SURGICALHISTORY       Past Surgical History:   Procedure Laterality Date    ERCP  4/8/14    LITHOTRYPSY & PANCREATIC STENT PLACEMENT    FOOT SURGERY Left 1967         CURRENT MEDICATIONS       Current Discharge Medication List      CONTINUE these medications which have NOT CHANGED    Details   atorvastatin (LIPITOR) 20 MG tablet Take 20 mg by mouth nightly       insulin glargine (LANTUS SOLOSTAR) 100 UNIT/ML injection pen Inject 16 Units into the skin every morning (before breakfast)  Qty: 1 pen, Refills: 0      tamsulosin (FLOMAX) 0.4 MG capsule Take 0.4 mg by mouth every evening       aspirin 81 MG EC tablet Take 1 tablet by mouth daily  Qty: 30 tablet, Refills: 0      lisinopril (PRINIVIL;ZESTRIL) 10 MG tablet Take 0.5 tablets by mouth daily  Qty: 30 tablet, Refills: 0      spironolactone (ALDACTONE) 25 MG tablet Take 25 mg by mouth daily      metFORMIN (GLUCOPHAGE) 500 MG tablet Take 500 mg by mouth 2 times daily (with meals)      Insulin Pen Needle 32G X 4 MM MISC 1 each by Does not apply route daily. Qty: 100 each, Refills: 3      Lancets MISC Once daily  Qty: 100 each, Refills: 3      glucose blood VI test strips (ASCENSIA AUTODISC VI;ONE TOUCH ULTRA TEST VI) strip 1 each by In Vitro route daily. As needed. Qty: 100 each, Refills: 3      glucose monitoring kit (FREESTYLE) monitoring kit 1 kit by Does not apply route daily as needed. Qty: 1 kit, Refills: 0             ALLERGIES     Patient has no known allergies.     FAMILY HISTORY       Family History   Problem Relation Age of Onset    Colon Cancer Mother         PVD s/p toe amputation by Dr Lori Baum Father           SOCIAL HISTORY       Social History     Socioeconomic History    Marital status: Single     Spouse name: None    Number of children: None    Years of education: None    Highest education level: None   Occupational History    None   Tobacco Use    Smoking status: Never Smoker    Smokeless tobacco: Never Used   Vaping Use    Vaping Use: Never used   Substance and Sexual Activity    Alcohol use: No    Drug use: No    Sexual activity: Not Currently   Other Topics Concern    None   Social History Narrative    None     Social Determinants of Health     Financial Resource Strain:     Difficulty of Paying Living Expenses:    Food Insecurity:     Worried About Running Out of Food in the Last Year:     Ran Out of Food in the Last Year:    Transportation Needs:     Lack of Transportation (Medical):  Lack of Transportation (Non-Medical):    Physical Activity:     Days of Exercise per Week:     Minutes of Exercise per Session:    Stress:     Feeling of Stress :    Social Connections:     Frequency of Communication with Friends and Family:     Frequency of Social Gatherings with Friends and Family:     Attends [employer] Services:     Active Member of Clubs or Organizations:     Attends Club or Organization Meetings:     Marital Status:    Intimate Partner Violence:     Fear of Current or Ex-Partner:     Emotionally Abused:     Physically Abused:     Sexually Abused:        SCREENINGS    Aby Coma Scale  Eye Opening: Spontaneous  Best Verbal Response: Oriented  Best Motor Response: Obeys commands  Sumner Coma Scale Score: 15        PHYSICAL EXAM    (up to 7 for level 4, 8 or more for level 5)     ED Triage Vitals [06/23/21 0709]   BP Temp Temp Source Pulse Resp SpO2 Height Weight   (!) 130/96 98.8 °F (37.1 °C) Oral 74 20 100 % 6' 2\" (1.88 m) 253 lb 12 oz (115.1 kg)       General: Alert and oriented appropriately for age, No acute distress. Eye: Normal conjunctiva. Pupils equal and reactive. HENT: Oral mucosa is moist.  Respiratory: Respirations even and non-labored. Lungs CTAB. Cardiovascular: Irregular rhythm, bradycardic. Pitting edema bilaterally. Gastrointestinal: Soft, Non-tender, Non-distended. Musculoskeletal: No swelling.   L paraspinal lumbar ttp as well as ttp overlying the L4 vertebral spinous process. Integumentary: Warm, xerotic, chronic skin thickening with some lichenification of the feet bilaterally, on the left foot a medial DFU is present with wound vac in place without associated ttp or surrounding erythema, no drainage from the vac site. On the right, chronic nonhealing DFU to the R foot with healthy appearing nonexudative granulation tissue at the base. No purulent drainage and no surrounding erythema. Neurologic: Alert and appropriate for age. GCS 15. No focal deficits. Psychiatric: Cooperative. DIAGNOSTIC RESULTS     EKG: All EKG's are interpreted by the Emergency Department Physician who either signs or Co-signsthis chart in the absence of a cardiologist.    The Ekg interpreted by me shows  3rd degree AV block with a rate of 39 bpm, ventricular escape rhythm. Axis is   Left axis deviation  QTc is  normal  Intervals and Durations are remarkable for what appears to be dissociation of the p wave with the QRS complexes and intraventricular block of different morphology from prior EKG  ST Segments: nonspecific ST abnl, nonspecific intraventricular block likely indicative of ventricular escape rhythm. Compared to prior EKG dated 11/27/2020, it appears as though 2nd degree Mobitz one has deteriorated into complete heart block. RADIOLOGY:   Non-plain filmimages such as CT, Ultrasound and MRI are read by the radiologist. Plain radiographic images are visualized and preliminarily interpreted by the emergency physician with the below findings:      Interpretation per the Radiologist below, if available at the time ofthis note:    XR CHEST PORTABLE   Preliminary Result   Stable mild cardiomegaly without acute airspace consolidation or CHF. CT LUMBAR SPINE WO CONTRAST   Preliminary Result   1. Mild chronic dextroscoliosis of the lumbar spine, without acute fracture   or subluxation. 2. Multilevel lumbar degenerative disc disease is as detailed above.    Consider diabetic foot wounds (L one has a wound vac), HTN, BPH who p/w L paraspinal lumbar pain, c/w lumbar radiculopathy, given his debiliated overall status and overall degree of unkemptness, cannot be certain of no recent falls or trauma and CT lumbar spine obtained that was neg for fracture, showed degenerative changes. Pt remains neuro intact but unable to ambulate 2/2 pain after meds given. Unable to take care of his ADLs (did not eat x 24 hours, unable to ambulate to/from the restroom). Needs PT/OT, likely rehab, pt amenable to rehab, will need wound care to see him for his LLE wound vac. Pt had episode of bradycardia more severe than initial with rate in the 30s, appears CHB on the monitor, EKG confirms, despite irregularity, there is a p wave between the QRS complex and an associated T wave and these p waves appear to march through while pt has irregular ventricular beats. Overall c/w 3rd deg block, consulted cardiology, no recommendations for interventions given as pt is stable, remains on monitor, pads at bedside in case pt does become unstable, admitted to the hospitalist, went up in stable condition. Medications   diazePAM (VALIUM) tablet 5 mg (5 mg Oral Given 6/23/21 0816)   lactated ringers bolus (0 mLs Intravenous Stopped 6/23/21 1006)   ketorolac (TORADOL) injection 15 mg (15 mg Intravenous Given 6/23/21 0816)   cyclobenzaprine (FLEXERIL) tablet 10 mg (10 mg Oral Given 6/23/21 0816)   acetaminophen (TYLENOL) tablet 650 mg (650 mg Oral Given 6/23/21 0816)         CRITICAL CARE TIME   Total Critical Care time was 15 minutes, excluding separately reportable procedures. There was a high probability of clinically significant/life threatening deterioration in the patient's condition which required my urgent intervention. Interpretation of EKG and d/w EP cardiology regarding pt's heart block, telemetry monitoring and frequent reassessments of pt's hemodynamic status.     CONSULTS:  IP CONSULT TO CARDIOLOGY  IP CONSULT TO PODIATRY  PHARMACY TO DOSE VANCOMYCIN    PROCEDURES:  Unless otherwise noted below, none         FINAL IMPRESSION      1. Lumbosacral strain, initial encounter    2. Lumbar radiculopathy, acute    3.  Failure to thrive in adult          DISPOSITION/PLAN   DISPOSITION Admitted 06/23/2021 09:49:32 AM           (Please note that portions of this note were completed with a voice recognition program.Efforts were made to edit the dictations but occasionally words are mis-transcribed.)    Maren Long MD (electronically signed)  Attending Emergency Physician          Maren Long MD  06/23/21 4265

## 2021-06-23 NOTE — CONSULTS
History and Physical  [place] [address]   Cardiology    Chief Complaint: Av block    HPI:     Patient is a 77 y.o. male presents with severe back pain and found to have an irreg rhythm with variable av block. There was concern that this may represent high degree av block. The patient's back pain has resolved post Toradol, tylenol, flexeril, and valium. He denies any cardiac sx, including no chest pain, and no syncope. He is quite limited in activyt for chronic foot ulcers and infection and leg strength per patient. Past Medical History:   Diagnosis Date    Diabetes mellitus ([address]     Gallstones     Pancreatitis     Ulcers of both lower legs ([address]     bilateral feet      Past Surgical History:   Procedure Laterality Date    ERCP  4/8/14    LITHOTRYPSY & PANCREATIC STENT PLACEMENT    FOOT SURGERY Left 1967        Medications Prior to Admission: atorvastatin (LIPITOR) 20 MG tablet, Take 20 mg by mouth nightly   insulin glargine (LANTUS SOLOSTAR) 100 UNIT/ML injection pen, Inject 16 Units into the skin every morning (before breakfast) (Patient taking differently: Inject 20 Units into the skin nightly )  tamsulosin (FLOMAX) 0.4 MG capsule, Take 0.4 mg by mouth every evening   aspirin 81 MG EC tablet, Take 1 tablet by mouth daily  lisinopril (PRINIVIL;ZESTRIL) 10 MG tablet, Take 0.5 tablets by mouth daily (Patient taking differently: Take 10 mg by mouth daily )  spironolactone (ALDACTONE) 25 MG tablet, Take 25 mg by mouth daily  metFORMIN (GLUCOPHAGE) 500 MG tablet, Take 500 mg by mouth 2 times daily (with meals)  [DISCONTINUED] ferrous sulfate 324 (65 Fe) MG EC tablet, Take 1 tablet by mouth every other day  [DISCONTINUED] vitamin B-12 (CYANOCOBALAMIN) 500 MCG tablet, Take 1,500 mcg by mouth daily  Insulin Pen Needle 32G X 4 MM MISC, 1 each by Does not apply route daily.   Lancets MISC, Once daily  glucose blood VI test strips (ASCENSIA AUTODISC VI;ONE TOUCH ULTRA TEST VI) strip, 1 each by In Vitro route daily. As needed. glucose monitoring kit (FREESTYLE) monitoring kit, 1 kit by Does not apply route daily as needed.   [DISCONTINUED] ammonium lactate (LAC-HYDRIN) 12 % lotion, Apply to BL feet and legs bid with dressing changes    No Known Allergies   Social History     Tobacco Use    Smoking status: Never Smoker    Smokeless tobacco: Never Used   Substance Use Topics    Alcohol use: No      Family History   Problem Relation Age of Onset    Colon Cancer Mother         PVD s/p toe amputation by Dr Geiger Fearing Father         Review of Systems:  · Constitutional: No Fever or Weight Loss  · Eyes: No decreased vision  · ENT: No Headaches, Hearing Loss or Vertigo  · Cardiovascular: No chest pain, dyspnea on exertion, palpitations or loss of consciousness  · Respiratory: No cough or wheezing    · Gastrointestinal: No abdominal pain, appetite loss, blood in stools, constipation, diarrhea or heartburn  · Genitourinary: No dysuria, trouble voiding, or hematuria  · Musculoskeletal:   gait disturbance, weakness   · Integumentary: wound vac left foot  · Neurological: No TIA or stroke symptoms  · Psychiatric: No anxiety or depression  · Endocrine: No malaise, fatigue or temperature intolerance  · Hematologic/Lymphatic: No bleeding problems, blood clots or swollen lymph nodes  · Allergic/Immunologic: No nasal congestion or hives      Objective Data:     BP (!) 109/59   Pulse (!) 39   Temp 98.5 °F (36.9 °C) (Oral)   Resp 12   Ht 6' 2\" (1.88 m)   Wt 253 lb 12 oz (115.1 kg)   SpO2 97%   BMI 32.58 kg/m²     General appearance: alert, appears stated age and cooperative  Alert, awake, oriented x 3  Eyes:  No erythema  Head: atraumatic  Neck:  no JVD  Lungs: clear to auscultation bilaterally  Heart: regular rate and rhythm, S1, S2 normal, no murmur, click, rub or gallop  Abdomen: soft, non-tender; bowel sounds normal; no masses,  no organomegaly  Extremities: left foot wrapped, right scaly  Skin: as above  Hematologic: no remarkable bruising       ECG: normal sinus rhythm, 2nd degree, Mobitz type 1 heart block and left anterior hemiblock and intermittent RBBB   No ST changes  Data Review    Echo:   Summary   Normal LV size, septal hypertrophy; EF   60%. Grade I diastolic dysfunction   with normal LV filling pressures. Mild mitral regurgitation. The left atrium is mildly dilated. Trivial aortic regurgitation. Dilated RV with reduced systolic function. Signature      ------------------------------------------------------------------   Electronically signed by Steve Madsen MD (Interpreting   physician) on 11/27/2020 at 05:57 PM   ------------------------------------------------------------------  Summary 11/27/20    Normal myocardial perfusion study.    Normal LV size and systolic function.    Overall findings represent a low risk study.             Recent Labs     06/23/21  0750   *   K 4.3      CO2 22   BUN 19   CREATININE 0.9     Recent Labs     06/23/21  0750   WBC 8.5   HGB 11.1*   HCT 33.1*   MCV 75.5*        Lab Results   Component Value Date    TROPONINI <0.01 11/26/2020         Assessment:     Active Problems:    Diabetic foot infection (HCC)    Foot ulceration, left, with unspecified severity (HCC)    Atrioventricular block, Mobitz type 1, Wenckebach    Microcytic anemia  Resolved Problems:    * No resolved hospital problems. *      Plan:     1. Review of ekgs over the years reveals progressive pr prolongation and now mobitz 1 av block probably more prominent than 8 months ago. He would be candidate for pacer if he had sx, but he denies any and is pretty sedentary with leg and foot issues. Further the chronic skin infection and foot ulcers make him a poor candidate for endovascular pacer device. If he needed one, and he may in the future, then a Micra like device might be preferable. Will monitor here for sx or worsening status and could follow as outpatient. 2.  Reviewed with staff

## 2021-06-23 NOTE — CONSULTS
Clinical Pharmacy Note  Vancomycin Consult    Ortiz Dooley is a 77 y.o. male ordered Vancomycin for skin/soft tissue infection; consult received from Dr. Martín Portillo to manage therapy. Also receiving Merrem 500 mg every 6 hours. Patient Active Problem List   Diagnosis    Abdominal pain, acute    BPH (benign prostatic hyperplasia)    RLQ abdominal pain    Abnormal ECG    HTN (hypertension)    Diabetes mellitus type II, uncontrolled ([address]    Diabetic foot infection ([address]    Fever    Leukocytosis    Charcot foot due to diabetes mellitus ([address]    Foot ulceration, left, with unspecified severity ([address]    Demand ischemia ([address]    Controlled type 2 diabetes mellitus with hyperglycemia, with long-term current use of insulin (HCC)    Sepsis (HCC)    Diarrhea    Chronic osteoarthritis    Chest pain    Chronic calcific pancreatitis (HCC)    Atrioventricular block, Mobitz type 1, Wenckebach    Vertigo    Microcytic anemia       Allergies:  Patient has no known allergies. Temp max:  Temp (24hrs), Av.7 °F (37.1 °C), Min:98.5 °F (36.9 °C), Max:98.8 °F (37.1 °C)      Recent Labs     21  0750   WBC 8.5       Recent Labs     21  0750   BUN 19   CREATININE 0.9         Intake/Output Summary (Last 24 hours) at 2021 1926  Last data filed at 2021 1603  Gross per 24 hour   Intake --   Output 325 ml   Net -325 ml       Culture Results:      Ht Readings from Last 1 Encounters:   21 6' 2\" (1.88 m)        Wt Readings from Last 1 Encounters:   21 253 lb 12 oz (115.1 kg)         Estimated Creatinine Clearance: 107 mL/min (based on SCr of 0.9 mg/dL). Assessment/Plan:  Dx: skin/soft tissue infection: bilateral lower extremity ulcers with foul-smelling appearance. Weight = 115 kg--Dosed based on adjusted body weight = 95 kg  SrCr = 0.9  Vancomycin T- prior to the 4th dose = 21 at 0500  Goal T = 15 - 20 mg/L       Thank you for the consult.      Choco Quispe, RP, PRS 6/23/2021  7:28 PM

## 2021-06-23 NOTE — CARE COORDINATION
Wound consult noted. Spoke with RN, Gloria Ervin. Advised to do saline dressing and remove current home VAC dressing since pt left the pump at home. Call placed to Patricia with [address] ([phone]). Left foot wound tx is collagen or Fred to wound and pump set at 150 mm cont. Has had issues with maceration at times. Right foot wound had been calloused, but now is wound. Using santyl to the edges, fred and ABD. Patricia advises the pt is a hoarder and does not leave his home.  Reagan Goldsmith, MSN, RN, [employer]

## 2021-06-23 NOTE — ED NOTES
Bed: A-17  Expected date: 6/23/21  Expected time: 7:00 AM  Means of arrival: [facility]  Comments:  65M lower back pain        Tanner George [facility]  06/23/21 1644

## 2021-06-23 NOTE — PROGRESS NOTES
@[id number]- hand-off report given to DANITA Reed. End of shift checks completed at bedside.     Electronically signed by Severiano Pickler, RN on 6/23/2021 at 7:27 PM

## 2021-06-23 NOTE — ED NOTES
ED SBAR report provider to DANITA Rodriguez. Patient to be transported to Room 1308 via stretcher by RN. Patient transported with bedside cardiac monitor and with IV medications infusing. IV site clean, dry, and intact. MEWS score and pain assessed and documented. Updated patient on plan of care.      Leana Castorena RN  06/23/21 1037

## 2021-06-23 NOTE — ED NOTES
Pt's home care nurse Patricia with [address] called to check on pt and left her number to call for any questions regarding pt's current wound care orders. Her contact number is [phone].      Alireza Hale RN  06/23/21 1002

## 2021-06-24 VITALS
DIASTOLIC BLOOD PRESSURE: 79 MMHG | BODY MASS INDEX: 32.57 KG/M2 | OXYGEN SATURATION: 98 % | SYSTOLIC BLOOD PRESSURE: 142 MMHG | HEART RATE: 67 BPM | TEMPERATURE: 98 F | HEIGHT: 74 IN | RESPIRATION RATE: 17 BRPM | WEIGHT: 253.75 LBS

## 2021-06-24 LAB
A/G RATIO: 1 (ref 1.1–2.2)
ALBUMIN SERPL-MCNC: 3.5 G/DL (ref 3.4–5)
ALP BLD-CCNC: 73 U/L (ref 40–129)
ALT SERPL-CCNC: <5 U/L (ref 10–40)
ANION GAP SERPL CALCULATED.3IONS-SCNC: 9 MMOL/L (ref 3–16)
AST SERPL-CCNC: 7 U/L (ref 15–37)
BASOPHILS ABSOLUTE: 0.1 K/UL (ref 0–0.2)
BASOPHILS RELATIVE PERCENT: 1.2 %
BILIRUB SERPL-MCNC: 0.4 MG/DL (ref 0–1)
BUN BLDV-MCNC: 19 MG/DL (ref 7–20)
CALCIUM SERPL-MCNC: 8.8 MG/DL (ref 8.3–10.6)
CHLORIDE BLD-SCNC: 102 MMOL/L (ref 99–110)
CO2: 25 MMOL/L (ref 21–32)
CREAT SERPL-MCNC: 1 MG/DL (ref 0.8–1.3)
EOSINOPHILS ABSOLUTE: 0.1 K/UL (ref 0–0.6)
EOSINOPHILS RELATIVE PERCENT: 1.7 %
GFR AFRICAN AMERICAN: >60
GFR NON-AFRICAN AMERICAN: >60
GLOBULIN: 3.6 G/DL
GLUCOSE BLD-MCNC: 112 MG/DL (ref 70–99)
GLUCOSE BLD-MCNC: 122 MG/DL (ref 70–99)
GLUCOSE BLD-MCNC: 175 MG/DL (ref 70–99)
HCT VFR BLD CALC: 32.5 % (ref 40.5–52.5)
HEMOGLOBIN: 10.9 G/DL (ref 13.5–17.5)
LYMPHOCYTES ABSOLUTE: 1.6 K/UL (ref 1–5.1)
LYMPHOCYTES RELATIVE PERCENT: 27.3 %
MCH RBC QN AUTO: 25.5 PG (ref 26–34)
MCHC RBC AUTO-ENTMCNC: 33.5 G/DL (ref 31–36)
MCV RBC AUTO: 76.2 FL (ref 80–100)
MONOCYTES ABSOLUTE: 0.5 K/UL (ref 0–1.3)
MONOCYTES RELATIVE PERCENT: 8.3 %
NEUTROPHILS ABSOLUTE: 3.7 K/UL (ref 1.7–7.7)
NEUTROPHILS RELATIVE PERCENT: 61.5 %
PDW BLD-RTO: 17.7 % (ref 12.4–15.4)
PERFORMED ON: ABNORMAL
PERFORMED ON: ABNORMAL
PLATELET # BLD: 268 K/UL (ref 135–450)
PMV BLD AUTO: 7.3 FL (ref 5–10.5)
POTASSIUM REFLEX MAGNESIUM: 4.7 MMOL/L (ref 3.5–5.1)
RBC # BLD: 4.26 M/UL (ref 4.2–5.9)
SODIUM BLD-SCNC: 136 MMOL/L (ref 136–145)
TOTAL PROTEIN: 7.1 G/DL (ref 6.4–8.2)
WBC # BLD: 6 K/UL (ref 4–11)

## 2021-06-24 PROCEDURE — 6360000002 HC RX W HCPCS: Performed by: INTERNAL MEDICINE

## 2021-06-24 PROCEDURE — 97530 THERAPEUTIC ACTIVITIES: CPT

## 2021-06-24 PROCEDURE — 94761 N-INVAS EAR/PLS OXIMETRY MLT: CPT

## 2021-06-24 PROCEDURE — 80053 COMPREHEN METABOLIC PANEL: CPT

## 2021-06-24 PROCEDURE — 96366 THER/PROPH/DIAG IV INF ADDON: CPT

## 2021-06-24 PROCEDURE — 6370000000 HC RX 637 (ALT 250 FOR IP): Performed by: INTERNAL MEDICINE

## 2021-06-24 PROCEDURE — 36415 COLL VENOUS BLD VENIPUNCTURE: CPT

## 2021-06-24 PROCEDURE — 2580000003 HC RX 258: Performed by: INTERNAL MEDICINE

## 2021-06-24 PROCEDURE — 85025 COMPLETE CBC W/AUTO DIFF WBC: CPT

## 2021-06-24 PROCEDURE — 97162 PT EVAL MOD COMPLEX 30 MIN: CPT

## 2021-06-24 PROCEDURE — G0378 HOSPITAL OBSERVATION PER HR: HCPCS

## 2021-06-24 PROCEDURE — 96372 THER/PROPH/DIAG INJ SC/IM: CPT

## 2021-06-24 PROCEDURE — 6370000000 HC RX 637 (ALT 250 FOR IP): Performed by: PODIATRIST

## 2021-06-24 PROCEDURE — 6370000000 HC RX 637 (ALT 250 FOR IP)

## 2021-06-24 RX ORDER — ACETAMINOPHEN 500 MG
500 TABLET ORAL 4 TIMES DAILY PRN
Qty: 120 TABLET | Refills: 0 | Status: SHIPPED | OUTPATIENT
Start: 2021-06-24 | End: 2022-09-06

## 2021-06-24 RX ADMIN — ENOXAPARIN SODIUM 40 MG: 40 INJECTION SUBCUTANEOUS at 08:15

## 2021-06-24 RX ADMIN — Medication 10 ML: at 08:15

## 2021-06-24 RX ADMIN — MEROPENEM 500 MG: 500 INJECTION, POWDER, FOR SOLUTION INTRAVENOUS at 07:56

## 2021-06-24 RX ADMIN — LISINOPRIL 10 MG: 10 TABLET ORAL at 08:15

## 2021-06-24 RX ADMIN — Medication: at 08:28

## 2021-06-24 RX ADMIN — SPIRONOLACTONE 25 MG: 25 TABLET ORAL at 08:15

## 2021-06-24 RX ADMIN — ACETAMINOPHEN 650 MG: 325 TABLET ORAL at 08:24

## 2021-06-24 RX ADMIN — ACETAMINOPHEN 650 MG: 325 TABLET ORAL at 15:28

## 2021-06-24 RX ADMIN — INSULIN LISPRO 1 UNITS: 100 INJECTION, SOLUTION INTRAVENOUS; SUBCUTANEOUS at 12:19

## 2021-06-24 RX ADMIN — ASPIRIN 81 MG: 81 TABLET, COATED ORAL at 08:14

## 2021-06-24 RX ADMIN — VANCOMYCIN HYDROCHLORIDE 1500 MG: 500 INJECTION, POWDER, LYOPHILIZED, FOR SOLUTION INTRAVENOUS at 05:49

## 2021-06-24 RX ADMIN — MEROPENEM 500 MG: 500 INJECTION, POWDER, FOR SOLUTION INTRAVENOUS at 01:15

## 2021-06-24 ASSESSMENT — PAIN - FUNCTIONAL ASSESSMENT: PAIN_FUNCTIONAL_ASSESSMENT: ACTIVITIES ARE NOT PREVENTED

## 2021-06-24 ASSESSMENT — PAIN DESCRIPTION - LOCATION: LOCATION: BACK

## 2021-06-24 ASSESSMENT — PAIN SCALES - GENERAL
PAINLEVEL_OUTOF10: 2
PAINLEVEL_OUTOF10: 4
PAINLEVEL_OUTOF10: 1
PAINLEVEL_OUTOF10: 2

## 2021-06-24 ASSESSMENT — PAIN DESCRIPTION - PAIN TYPE: TYPE: CHRONIC PAIN

## 2021-06-24 ASSESSMENT — PAIN DESCRIPTION - DESCRIPTORS: DESCRIPTORS: ACHING

## 2021-06-24 NOTE — CARE COORDINATION
Advance Care Planning     Advance Care Planning Activator (Inpatient)  Conversation Note      Date of ACP Conversation: 2021     Conversation Conducted with: Patient with Decision Making Capacity    ACP Activator: One Casimiro Reddy Tae Decision Maker: Jaimie Bray [phone]        Care Preferences    Ventilation: \"If you were in your present state of health and suddenly became very ill and were unable to breathe on your own, what would your preference be about the use of a ventilator (breathing machine) if it were available to you? \"      Would the patient desire the use of ventilator (breathing machine)?: yes    \"If your health worsens and it becomes clear that your chance of recovery is unlikely, what would your preference be about the use of a ventilator (breathing machine) if it were available to you? \"     Would the patient desire the use of ventilator (breathing machine)?: Yes      Resuscitation  \"CPR works best to restart the heart when there is a sudden event, like a heart attack, in someone who is otherwise healthy. Unfortunately, CPR does not typically restart the heart for people who have serious health conditions or who are very sick. \"    \"In the event your heart stopped as a result of an underlying serious health condition, would you want attempts to be made to restart your heart (answer \"yes\" for attempt to resuscitate) or would you prefer a natural death (answer \"no\" for do not attempt to resuscitate)? \" yes       [] Yes   [] No   Educated Patient / Lory Apgar regarding differences between Advance Directives and portable DNR orders.     Length of ACP Conversation in minutes:  5     Conversation Outcomes:  [x] ACP discussion completed  [] Existing advance directive reviewed with patient; no changes to patient's previously recorded wishes  [] New Advance Directive completed  [] Portable Do Not Rescitate prepared for Provider review and signature  [] POLST/POST/MOLST/MOST prepared for

## 2021-06-24 NOTE — PROGRESS NOTES
Physical Therapy    Facility/Department: [address] CVICU  Initial Assessment    NAME: Billie Trujillo  : 1955  MRN: [id number]    Date of Service: 2021    Discharge Recommendations:  Continue to assess pending progress   PT Equipment Recommendations  Equipment Needed: No    Assessment   Body structures, Functions, Activity limitations: Decreased functional mobility   Assessment: 78 y/o male admit 2021 with Lumbosacral Strain, Lumbar Radiculopathy and Inability to Amb. CT Lumbar Spine : negative acute findings. Podiatry consult for Chronic B Foot Wounds : R LE : \"Off Load NWB\", L LE : \"NWB. \"   Ongoing h/o noncompliance with Medical Care and Wgt Bear Restrictions. PMH as noted including DM, Neuropathy, B LE Chronic Ulcers, R L LE Wound Vac. PTA pt living alone in own home; resides on main level (does not leave home) with visiting nurse and HHA (1x/wk to obtain groceries). Pt will not adhere to wgt bear restrictions given by podiatry and declines any other Home Care recommendations. Will cont to monitor. Prognosis: Fair  Decision Making: Medium Complexity  History: 78 y/o male admit 2021 with Lumbosacral Strain, Lumbar Radiculopathy and Inability to Amb. CT Lumbar Spine : negative acute findings. Podiatry consult for Chronic B Foot Wounds : R LE : \"Off Load NWB\", L LE : \"NWB. \"   Ongoing h/o noncompliance with Medical Care and Wgt Bear Restrictions. PMH as noted including DM, Neuropathy, B LE Chronic Ulcers, R L LE Wound Vac. Exam: See above. Clinical Presentation: See above. Patient Education: Role of PT, POC, Need to call for assist.  Barriers to Learning: Limited compliance. REQUIRES PT FOLLOW UP: Yes  Activity Tolerance  Activity Tolerance: Patient Tolerated treatment well  Activity Tolerance: No c/o back pain with oob activities. Pt has not been/nor will he adhere to wgt bear restrictions given by podiatry.        Patient Diagnosis(es): The primary encounter diagnosis was Lumbosacral strain, initial encounter. Diagnoses of Lumbar radiculopathy, acute and Failure to thrive in adult were also pertinent to this visit. has a past medical history of Diabetes mellitus ([address] Gallstones, Pancreatitis, and Ulcers of both lower legs ([address] has a past surgical history that includes Foot surgery (Left, 1967) and ERCP (4/8/14). Restrictions  Restrictions/Precautions  Restrictions/Precautions: Up as Tolerated  Position Activity Restriction  Other position/activity restrictions: Per Podiatry (6/23/2021): R LE \"Off Load NWB\", L LE \"NWB. \"  H/O Noncompliance with medical care and wgt bear restrictions. Vision/Hearing  Vision: Impaired (Wears Glasses (doesn't have them with him). )  Hearing: Within functional limits     Subjective  General  Chart Reviewed: Yes  Patient assessed for rehabilitation services?: Yes  Additional Pertinent Hx: 76 y/o male admit 6/23/2021 with Lumbosacral Strain, Lumbar Radiculopathy and Inability to Amb. CT Lumbar Spine : negative acute findings. Podiatry consult for Chronic B Foot Wounds : R LE : \"Off Load NWB\", L LE : \"NWB. \"   Ongoing h/o noncompliance with Medical Care and Wgt Bear Restrictions. PMH as noted including DM, Neuropathy, B LE Chronic Ulcers, R L LE Wound Vac. Family / Caregiver Present: No  Referring Practitioner: Dr. Katie Parker  Diagnosis: Lumbosacral Strain, Lumbar Radiculopathy and Inability to Amb. H/O Chronic B Foot Ulcers. Other (Comment): Pt follows simple commands; alittle distractable. Subjective  Subjective: Pt agreeable to PT Eval/Rx.   Pain Screening  Patient Currently in Pain: Yes          Orientation  Orientation  Overall Orientation Status: Within Functional Limits (Diminished sight current medical issues and recommendations.)  Social/Functional History  Social/Functional History  Lives With: Alone  Type of Home: House  Home Layout: Multi-level, Able to Live on Main level with bedroom/bathroom, Laundry in basement  Home Access: Stairs to enter with rails (5 steps to enter. (Pt doesn't go out of home). )  Bathroom Shower/Tub: Tub/Shower unit (Pt sponge bathes.)  Bathroom Toilet: Standard (With [employer]  Bathroom Equipment: 3-in-1 commode  Bathroom Accessibility: Accessible  Home Equipment: Standard walker, Rolling walker, Cane, Wheelchair-manual, Quad cane  ADL Assistance: Independent  Homemaking Assistance:  (Pt denies any homemaking assist for cleaning. HHA gets groceries 1x/wk.)  Ambulation Assistance: Independent (Pt reports that he amb with cane (despite wgt restriction recommendation/unwilling to comply). )  Transfer Assistance: Independent  Active : No  Additional Comments: Pt reports that he sleeps on recliner or couch. Visiting nurse 3x/wk. HHA 1x/wk for grocery shopping. Cognition   Cognition  Cognition Comment: Pt alert/oriented although longstand limited compliance with medical and mobility restrictions/recommendations. Objective     Observation/Palpation  Observation: Dressing/Ace Wrap L Foot/Ankle. Dressing R Medial Foot. Ortho deformities B Ankle/Foot. AROM RLE (degrees)  RLE AROM: WFL  AROM LLE (degrees)  LLE AROM : WFL  AROM RUE (degrees)  RUE AROM : WFL  AROM LUE (degrees)  LUE AROM : WFL  Strength RLE  Comment: Adequate for functional activities. Strength LLE  Comment: Adequate for functional activities. Sensation  Overall Sensation Status: Impaired (Impaired B distal LEs.)  Bed mobility  Supine to Sit: Supervision  Transfers  Sit to Stand: Stand by assistance (With Nhung Andrews. Cues for safe hand placement.)  Stand to sit: Stand by assistance (With Nhung Andresw. Cues for safe hand placement.)  Comment: Per Podiatry (6/23/2021): R LE \"Off Load NWB\", L LE \"NWB. \"  H/O Noncompliance with medical care and wgt bear restrictions. PT attempt pt ed re : wgt bear restrictions although pt becoming abit more agitated and refused to comply stating \"this is how I always do it. \"  (Nursing informed).   Ambulation  Ambulation?: Yes  Ambulation 1  Surface: level tile  Device: Rolling Walker  Distance: Pt amb 5-6 steps bed to chair with walker SBA only. No c/o increase back pain. No specific LOB noted. Comments: Per Podiatry (6/23/2021): R LE \"Off Load NWB\", L LE \"NWB. \"  H/O Noncompliance with medical care and wgt bear restrictions. Pt unwilling to comply with wgt bear restrictions. Plan   Plan  Times per week: 3-5x week while in acute care setting. Current Treatment Recommendations: Functional Mobility Training, Transfer Training, Gait Training, Safety Education & Training, Patient/Caregiver Education & Training  Safety Devices  Type of devices: Call light within reach, Chair alarm in place, Left in chair, Nurse notified      AM-PAC Score  AM-PAC Inpatient Mobility Raw Score : 17 (06/24/21 0955)  AM-PAC Inpatient T-Scale Score : 42.13 (06/24/21 0955)  Mobility Inpatient CMS 0-100% Score: 50.57 (06/24/21 0955)  Mobility Inpatient CMS G-Code Modifier : CK (06/24/21 0955)          Goals  Short term goals  Time Frame for Short term goals: Upon d/c acute care setting. Short term goal 1: Bed Mob Independent. Short term goal 2: Transfers with assist device Supervision. Short term goal 3: Amb with assist device 25' SBA. Patient Goals   Patient goals : Go home.        Therapy Time   Individual Concurrent Group Co-treatment   Time In 0645         Time Out 0715         Minutes Edward 1091 Kate Salgado, PT

## 2021-06-24 NOTE — PROGRESS NOTES
1750- Transport here to get patient, last PIV removed without issues. Discharge instructions previously reviewed w/ patient and patient verbalized understanding. Patient discharging to home to resume home health care r/t wounds. Patient left via stretcher at this time.     Electronically signed by Maria Del Carmen Wolfe RN on 6/24/2021 at 5:51 PM

## 2021-06-24 NOTE — CARE COORDINATION
[facility] Continence Nurse  Consult Note       NAME:  Mukund Cannon RECORD NUMBER:  [id number]  AGE: 77 y.o. GENDER: male  : 1955  TODAY'S DATE:  2021    Subjective   Reason for WOCN Evaluation and Assessment: Chronic diabetic foot ulcers      Yogi Curry is a 77 y.o. male referred by:   [] Physician  [x] Nursing  [] Other:     Wound Identification:  Wound Type: diabetic  Contributing Factors: diabetes, poor hygiene and non-adherence, poor glucose control    Wound History: Chronic diabetic foot ulcers. Pt follows with Dr. Amber Ron outpatient. [facility] for home care who does home vac and dressing change 3x a week. Diabetes with peripheral neuropathy. High 2 ulceration, bilaterally. Charcot with midfoot breach and fixed deformity, bilaterally. Current Wound Care Treatment:  Negative pressure therapy    Patient Goal of Care:  [x] Wound Healing  [] Odor Control  [] Palliative Care  [] Pain Control   [] Other:         PAST MEDICAL HISTORY        Diagnosis Date    Diabetes mellitus ([address]     Gallstones     Pancreatitis     Ulcers of both lower legs ([facility]     bilateral feet       PAST SURGICAL HISTORY    Past Surgical History:   Procedure Laterality Date    ERCP  14    LITHOTRYPSY & PANCREATIC STENT PLACEMENT    FOOT SURGERY Left        FAMILY HISTORY    Family History   Problem Relation Age of Onset    Colon Cancer Mother         PVD s/p toe amputation by Dr Florecita Deutsch Father        SOCIAL HISTORY    Social History     Tobacco Use    Smoking status: Never Smoker    Smokeless tobacco: Never Used   Vaping Use    Vaping Use: Never used   Substance Use Topics    Alcohol use: No    Drug use: No       ALLERGIES    No Known Allergies    MEDICATIONS    No current facility-administered medications on file prior to encounter.      Current Outpatient Medications on File Prior to Encounter   Medication Sig Dispense Refill    atorvastatin (LIPITOR) 20 MG tablet Take 20 mg by mouth nightly       insulin glargine (LANTUS SOLOSTAR) 100 UNIT/ML injection pen Inject 16 Units into the skin every morning (before breakfast) (Patient taking differently: Inject 20 Units into the skin nightly ) 1 pen 0    tamsulosin (FLOMAX) 0.4 MG capsule Take 0.4 mg by mouth every evening       aspirin 81 MG EC tablet Take 1 tablet by mouth daily 30 tablet 0    lisinopril (PRINIVIL;ZESTRIL) 10 MG tablet Take 0.5 tablets by mouth daily (Patient taking differently: Take 10 mg by mouth daily ) 30 tablet 0    spironolactone (ALDACTONE) 25 MG tablet Take 25 mg by mouth daily      metFORMIN (GLUCOPHAGE) 500 MG tablet Take 500 mg by mouth 2 times daily (with meals)      Insulin Pen Needle 32G X 4 MM MISC 1 each by Does not apply route daily. 100 each 3    Lancets MISC Once daily 100 each 3    glucose blood VI test strips (ASCENSIA AUTODISC VI;ONE TOUCH ULTRA TEST VI) strip 1 each by In Vitro route daily. As needed. 100 each 3    glucose monitoring kit (FREESTYLE) monitoring kit 1 kit by Does not apply route daily as needed.  1 kit 0       Objective    BP (!) 142/79   Pulse 64   Temp 98 °F (36.7 °C) (Oral)   Resp 25   Ht 6' 2\" (1.88 m)   Wt 253 lb 12 oz (115.1 kg)   SpO2 98%   BMI 32.58 kg/m²     LABS:  WBC:    Lab Results   Component Value Date    WBC 6.0 06/24/2021     H/H:    Lab Results   Component Value Date    HGB 10.9 06/24/2021    HCT 32.5 06/24/2021     PTT:  No results found for: APTT, PTT[APTT}  PT/INR:    Lab Results   Component Value Date    PROTIME 13.0 04/01/2014    INR 1.17 04/01/2014     HgBA1c:    Lab Results   Component Value Date    LABA1C 7.0 11/27/2020       Assessment   Jorje Risk Score: Jorje Scale Score: 21    Patient Active Problem List   Diagnosis Code    Abdominal pain, acute R10.9    BPH (benign prostatic hyperplasia) N40.0    RLQ abdominal pain R10.31    Abnormal ECG R94.31    HTN (hypertension) I10    Diabetes mellitus type II, Wound Length (cm) 3.5 cm 06/24/21 1222   Wound Width (cm) 2.5 cm 06/24/21 1222   Wound Depth (cm) 2 cm 06/24/21 1222   Wound Surface Area (cm^2) 8.75 cm^2 06/24/21 1222   Wound Volume (cm^3) 17.5 cm^3 06/24/21 1222   Wound Assessment Hyper granulation tissue;Pink/red 06/24/21 1222   Drainage Amount Scant 06/24/21 1222   Drainage Description Other (Comment) 06/24/21 1222   Odor Malodorous/putrid 06/24/21 1222   Tiffanie-wound Assessment Intact 06/23/21 2100   Margins Attached edges; Defined edges 06/23/21 2100   Number of days: 1           Pt awake and alert sitting up in bed. Current dressings removed. Foul odor noted. Images obtained. Pt reports he cannot wash his feet. Wounds redressed with dakins soaked 4x4, dry dressing, kerlix, ace wrap. Pt to d/c home today. Response to treatment:  Well tolerated by patient. Pain Assessment:  Severity:  0 / 10  Quality of pain: N/A  Wound Pain Timing/Severity: none  Premedicated: No    Plan   Plan of Care:   R & L diabetic foot wounds: dakins soaked dressings, 4x4, kerlix, ace wrap; daily  Pt to d.c home today. Per Dr. Kp Pelaez pt is not a surgical candidate and is not a candidate for wound vac due to being non compliant. Specialty Bed Required : No   [] Low Air Loss   [] Pressure Redistribution  [] Fluid Immersion  [] Bariatric  [] Total Pressure Relief  [] Other:     Current Diet: ADULT DIET;  Regular; 4 carb choices (60 gm/meal)  Dietician consult:  Yes    Discharge Plan:  Placement for patient upon discharge: home with support    Patient appropriate for Outpatient [address]: N/A    Referrals:  []   [] [address] [employer] [facility]  [] Supplies  [] Other    Patient/Caregiver Teaching:  Level of patient/caregiver understanding able to:   [] Indicates understanding       [] Needs reinforcement  [] Unsuccessful      [x] Verbal Understanding  [] Demonstrated understanding       [] No evidence of learning  [] Refused teaching         [] N/A       Electronically signed by Delbert Petersen RN,  on 6/24/2021 at 12:36 PM

## 2021-06-24 NOTE — PROGRESS NOTES
CLINICAL PHARMACY NOTE: MEDS TO BEDS    Total # of Prescriptions Filled: 1   The following medications were delivered to the patient:  Current Discharge Medication List      START taking these medications    Details   acetaminophen (TYLENOL) 500 MG tablet Take 1 tablet by mouth 4 times daily as needed for Pain  Qty: 120 tablet, Refills: 0         ·     Additional Documentation:

## 2021-06-24 NOTE — CONSULTS
Department of Podiatric Surgery  History and Physical        CHIEF COMPLAINT:    Chief Complaint   Patient presents with    Back Pain     Pt to ED with c/o mid lower back pain, radiates to his left groin, worse with movement, unable to ambulate d/t pain. Started 3 days ago. Reason for Consultation:  Bilateral foot ulcerations    History Obtained From:  patient, electronic medical record    HISTORY OF PRESENT ILLNESS:      The patient is a 77 y.o. male who presents with bilateral foot ulcerations. He is well known to me from prior hospitalizations and he has refused treatment more than dressing changes for these long standing ulcerations. He lives alone without support and reports it took 4 firefighters to bring him down the stairs to get to the hospital. He reports that the traveling podiatrist who comes to his house and cuts his nails every 2 months ordered the wound VAC which has been used since last November. The patient openly admits that he walks on the wounds and has no intention of leaving his house for a SNF or any type of rehab. Thus, he has limited his reconstruction options as he has never followed up as an oupatient despite multiple hospitalizations. We will treat conservatively as these are chronic stable ulcerations with major foot deformity and underlying chronic infections. He is not a surgical candidate due to his persistent and willing non-compliance and follow up.     Past Medical History:        Diagnosis Date    Diabetes mellitus ([address]     Gallstones     Pancreatitis     Ulcers of both lower legs ([facility])     bilateral feet     Past Surgical History:        Procedure Laterality Date    ERCP  4/8/14    LITHOTRYPSY & PANCREATIC STENT PLACEMENT    FOOT SURGERY Left 1967                 FAMILY HISTORY    Family History   Problem Relation Age of Onset    Colon Cancer Mother         PVD s/p toe amputation by Dr Lori Baum Father        SOCIAL HISTORY    Social History Tobacco Use    Smoking status: Never Smoker    Smokeless tobacco: Never Used   Vaping Use    Vaping Use: Never used   Substance Use Topics    Alcohol use: No    Drug use: No       ALLERGIES    No Known Allergies    MEDICATIONS    No current facility-administered medications on file prior to encounter. Current Outpatient Medications on File Prior to Encounter   Medication Sig Dispense Refill    atorvastatin (LIPITOR) 20 MG tablet Take 20 mg by mouth nightly       insulin glargine (LANTUS SOLOSTAR) 100 UNIT/ML injection pen Inject 16 Units into the skin every morning (before breakfast) (Patient taking differently: Inject 20 Units into the skin nightly ) 1 pen 0    tamsulosin (FLOMAX) 0.4 MG capsule Take 0.4 mg by mouth every evening       aspirin 81 MG EC tablet Take 1 tablet by mouth daily 30 tablet 0    lisinopril (PRINIVIL;ZESTRIL) 10 MG tablet Take 0.5 tablets by mouth daily (Patient taking differently: Take 10 mg by mouth daily ) 30 tablet 0    spironolactone (ALDACTONE) 25 MG tablet Take 25 mg by mouth daily      metFORMIN (GLUCOPHAGE) 500 MG tablet Take 500 mg by mouth 2 times daily (with meals)      Insulin Pen Needle 32G X 4 MM MISC 1 each by Does not apply route daily. 100 each 3    Lancets MISC Once daily 100 each 3    glucose blood VI test strips (ASCENSIA AUTODISC VI;ONE TOUCH ULTRA TEST VI) strip 1 each by In Vitro route daily. As needed. 100 each 3    glucose monitoring kit (FREESTYLE) monitoring kit 1 kit by Does not apply route daily as needed. 1 kit 0       Allergies:  Patient has no known allergies.     REVIEW OF SYSTEMS:  CONSTITUTIONAL:  negative  HEENT:  negative  RESPIRATORY:  negative  CARDIOVASCULAR:  negative  ENDOCRINE:  positive for diabetic symptoms including foot ulcerations  MUSCULOSKELETAL:  negative    PHYSICAL EXAM:  VITALS:  BP (!) 109/59   Pulse 56   Temp 98.5 °F (36.9 °C) (Oral)   Resp 16   Ht 6' 2\" (1.88 m)   Wt 253 lb 12 oz (115.1 kg)   SpO2 97% BMI 32.58 kg/m²   CONSTITUTIONAL:  awake, alert, cooperative, no apparent distress, and appears stated age  EYES:  pupils equal, round and reactive to light, extra ocular muscles intact, sclera clear, conjunctiva normal      LOWER EXTREMITY:  VASCULAR: Pedal Pulses present. negative signs of ischemia. MUSCULOSKELETAL:  positive gross deformity. Patient has bilateral foot deformity secondary to Charcot deformities of the midfoot. NEUROLOGIC:  Epicritic sensation, light touch, joint position sense absent  SKIN:  Right foot ulceration is surrounded by a thick hypergranular tissue and a granular wound base. NO active drainage. The wound is located sub 1st MCJ. The left foot wound is located similarly in the medial arch sub 1st MCJ, but this wound is very deep with bone palpable and strong malodor. The wound is draining a mucous type consistence tan drainage. There is no clifton-wound eythema noted. I/O:    Intake/Output Summary (Last 24 hours) at 6/23/2021 2008  Last data filed at 6/23/2021 1603  Gross per 24 hour   Intake --   Output 325 ml   Net -325 ml              Wt Readings from Last 3 Encounters:   06/23/21 253 lb 12 oz (115.1 kg)   11/29/20 252 lb 10.4 oz (114.6 kg)   11/08/20 254 lb 13.6 oz (115.6 kg)       LABS:    Recent Labs     06/23/21  0750   WBC 8.5   HGB 11.1*   HCT 33.1*           Recent Labs     06/23/21  0750   *   K 4.3      CO2 22   BUN 19   CREATININE 0.9        Recent Labs     06/23/21  0750   PROT 8.0       IMAGING:  X-rays ordered for tomorrow. MICRO:   Cultures pending    ASSESSMENT   Diabetes with peripheral neuropathy  High 2 ulceration, bilaterally  Charcot with midfoot breach and fixed deformity, bilaterally    PLAN:  Evaluation and Management x 30 minutes with greater than 50% of the time spent with the patient discussing the etiology and treatment options of the chief complaint.     1. Right foot  Dakins ordered for wet to dry dressing  Off load  Non-weightbearing    2. Left foot  Dakins ordered  Not a good candidate for further wound vac given he is walking on the foot which is counter productive to use of a wound vac and advanced wound care products. Non-weightbearing on the left foot    DISPO: As above. Will follow, but have no plans for surgery. Patient's feet have been stable despite the wounds and will be at more risk if we attempt limb salvage reconstruction and he refuses after care than if we keep the chronic, but stable wounds. Thanks for the opportunity to participate in this patient's care.      Maeve Madrigal DPM DPM  [facility]  Pager: [phone]  Office: [phone]  Fax: [phone]

## 2021-06-24 NOTE — DISCHARGE SUMMARY
Hospital Medicine Discharge Summary    Patient ID: Mina Issa      Patient's PCP: Giancarlo Rich, APRN - CNP    Admit Date: 6/23/2021     Discharge Date:   6/24/21    Admitting Physician: Jessica Zaldivar MD     Discharge Physician: Jessica Zaldivar MD     Discharge Diagnoses: Active Hospital Problems    Diagnosis Date Noted    Microcytic anemia [D50.9] 06/23/2021    Atrioventricular block, Mobitz type 1, Wenckebach [I44.1] 11/27/2020    Foot ulceration, left, with unspecified severity [facility]) [L97.529] 06/06/2020    Diabetic foot infection ([facility] [address] [S76.103, L08.9] 06/06/2020       The patient was seen and examined on day of discharge and this discharge summary is in conjunction with any daily progress note from day of discharge. Hospital Course: The patient is a 77 y.o. male who presents to [facility] with low back pain. Patient also states he had some weakness and fatigue and he was unable to ambulate for the past couple of days. He finally called EMS his he was unable to get around his house or eat or drink anything. Patient does follow with outpatient podiatry for his bilateral lower extremity ulcers. His left lower extremity has a wound VAC in place. In the emergency department patient's heart rate would drop down to the 30s in the 40s and an EKG showed Mobitz type I with concern for the possibility of complete heart block. Cardiology was consulted and confirmed that the patient was in Mobitz type I. Patient will be admitted to the hospital for further care and intervention of his bilateral lower extremity ulcers with foul-smelling appearance.       Patient denies any fever chills, nausea vomiting, diarrhea constipation chest pain or shortness of breath. Patient also denies any bowel or bladder incontinence or any saddle anesthesia.     Weakness and fatigue  Possibly secondary to bilateral lower extremity foot infections  Continue IV vancomycin, stop antibiotics at discharge  Physical and Occupational Therapy: 3-5 sessions per week, patient refuses placement SNF and instead will be sent home with home care     Diabetic foot ulcers  Podiatry consulted: Chronic issues without any need for immediate surgical intervention. Continue to follow-up in the outpatient  Vancomycin  Not a good candidate for further wound VAC as patient walking on his foot    Mobitz type I  Not a candidate for intervention as patient is asymptomatic  If patient does become symptomatic would require pacer  Electrophysiology saw patient while in hospital     Diabetes mellitus type 2  Continue Lantus and sliding scale  Carb controlled diet     Hypertension  Continue home medications     Hyperlipidemia  Continue home statin     BPH  Continue Flomax      Exam:     BP (!) 142/79   Pulse 64   Temp 98 °F (36.7 °C) (Oral)   Resp 25   Ht 6' 2\" (1.88 m)   Wt 253 lb 12 oz (115.1 kg)   SpO2 98%   BMI 32.58 kg/m²     General appearance: No apparent distress appears stated age and cooperative. HEENT Normal cephalic, atraumatic without obvious deformity. Pupils equal, round, and reactive to light. Extra ocular muscles intact. Conjunctivae/corneas clear. Neck: Supple, No jugular venous distention/bruits. Trachea midline without thyromegaly or adenopathy with full range of motion. Lungs: Clear to auscultation, bilaterally without Rales/Wheezes/Rhonchi with good respiratory effort. Heart: Regular rate and rhythm with Normal S1/S2   Abdomen: Soft, non-tender or non-distended without rigidity or guarding and positive bowel sounds all four quadrants. Extremities: BL LE foot ulcers with L foot wound vac  Skin: Skin color, texture, turgor normal.  No rashes or lesions. Neurologic: Alert and oriented X 3, neurovascularly intact with sensory/motor intact upper extremities/lower extremities, bilaterally.   Cranial nerves: II-XII intact, grossly non-focal.  Mental status: Alert, oriented, thought content appropriate. Capillary Refill: Acceptable  < 3 seconds  Peripheral Pulses: +3 Easily felt, not easily obliterated with pressure      Consults:     IP CONSULT TO CARDIOLOGY  IP CONSULT TO PODIATRY  IP CONSULT TO HOME CARE NEEDS    Significant Diagnostic Studies:     XR CHEST PORTABLE   Preliminary Result   Stable mild cardiomegaly without acute airspace consolidation or CHF. CT LUMBAR SPINE WO CONTRAST   Preliminary Result   1. Mild chronic dextroscoliosis of the lumbar spine, without acute fracture   or subluxation. 2. Multilevel lumbar degenerative disc disease is as detailed above. Consider further characterization with a dedicated lumbar MRI, if clinically   indicated and feasible. Disposition: Home with home health    Condition at Discharge: Stable    Discharge Instructions/Follow-up: PCP    Code Status:  Full Code     Activity: activity as tolerated    Diet: diabetic diet      Labs:  For convenience and continuity at follow-up the following most recent labs are provided:      CBC:    Lab Results   Component Value Date    WBC 6.0 06/24/2021    HGB 10.9 06/24/2021    HCT 32.5 06/24/2021     06/24/2021       Renal:    Lab Results   Component Value Date     06/24/2021    K 4.7 06/24/2021     06/24/2021    CO2 25 06/24/2021    BUN 19 06/24/2021    CREATININE 1.0 06/24/2021    CALCIUM 8.8 06/24/2021    PHOS 3.3 06/12/2020       Discharge Medications:     Current Discharge Medication List           Details   acetaminophen (TYLENOL) 500 MG tablet Take 1 tablet by mouth 4 times daily as needed for Pain  Qty: 120 tablet, Refills: 0              Details   atorvastatin (LIPITOR) 20 MG tablet Take 20 mg by mouth nightly       insulin glargine (LANTUS SOLOSTAR) 100 UNIT/ML injection pen Inject 16 Units into the skin every morning (before breakfast)  Qty: 1 pen, Refills: 0      tamsulosin (FLOMAX) 0.4 MG capsule Take 0.4 mg by mouth every evening       aspirin 81 MG EC tablet Take 1 tablet by mouth daily  Qty: 30 tablet, Refills: 0      lisinopril (PRINIVIL;ZESTRIL) 10 MG tablet Take 0.5 tablets by mouth daily  Qty: 30 tablet, Refills: 0      spironolactone (ALDACTONE) 25 MG tablet Take 25 mg by mouth daily      metFORMIN (GLUCOPHAGE) 500 MG tablet Take 500 mg by mouth 2 times daily (with meals)      Insulin Pen Needle 32G X 4 MM MISC 1 each by Does not apply route daily. Qty: 100 each, Refills: 3      Lancets MISC Once daily  Qty: 100 each, Refills: 3      glucose blood VI test strips (ASCENSIA AUTODISC VI;ONE TOUCH ULTRA TEST VI) strip 1 each by In Vitro route daily. As needed. Qty: 100 each, Refills: 3      glucose monitoring kit (FREESTYLE) monitoring kit 1 kit by Does not apply route daily as needed. Qty: 1 kit, Refills: 0             No future appointments. Time Spent on discharge is more than 30 minutes in the examination, evaluation, counseling and review of medications and discharge plan. Signed:    Kirsty Coates MD   6/24/2021      Thank you KENNY Fisher - JAY for the opportunity to be involved in this patient's care. If you have any questions or concerns please feel free to contact me at [phone].

## 2021-06-24 NOTE — DISCHARGE INSTR - COC
Continuity of Care Form    Patient Name: Haley Kelly   :  1955  MRN:  [id number]    Admit date:  2021  Discharge date:  21    Code Status Order: Full Code   Advance Directives:      Admitting Physician:  Lyndon Rosa MD  PCP: KENNY Mejia CNP    Discharging Nurse: [facility] Unit/Room#: V2Q-8383/8164-41  Discharging Unit Phone Number: [phone]    Emergency Contact:   Extended Emergency Contact Information  Primary Emergency Contact: eneida finch  Home Phone: [phone]  Mobile Phone: [phone]  Relation: Other  Preferred language: English    Past Surgical History:  Past Surgical History:   Procedure Laterality Date    ERCP  14    LITHOTRYPSY & PANCREATIC STENT PLACEMENT    FOOT SURGERY Left        Immunization History: There is no immunization history on file for this patient.     Active Problems:  Patient Active Problem List   Diagnosis Code    Abdominal pain, acute R10.9    BPH (benign prostatic hyperplasia) N40.0    RLQ abdominal pain R10.31    Abnormal ECG R94.31    HTN (hypertension) I10    Diabetes mellitus type II, uncontrolled ([address] E11.65    Diabetic foot infection ([address] E11.628, L08.9    Fever R50.9    Leukocytosis D72.829    Charcot foot due to diabetes mellitus ([address] E11.610    Foot ulceration, left, with unspecified severity ([address] L97.529    Demand ischemia ([facility]) I24.8    Controlled type 2 diabetes mellitus with hyperglycemia, with long-term current use of insulin (HCC) E11.65, Z79.4    Sepsis (HCC) A41.9    Diarrhea R19.7    Chronic osteoarthritis M19.90    Chest pain R07.9    Chronic calcific pancreatitis ([facility]) K86.1    Atrioventricular block, Mobitz type 1, Wenckebach I44.1    Vertigo R42    Microcytic anemia D50.9       Isolation/Infection:   Isolation            No Isolation          Patient Infection Status       Infection Onset Added Last Indicated Last Indicated By Review Planned Expiration Resolved Resolved By    None active    Resolved    COVID-19 Rule Out 11/26/20 11/26/20 11/26/20 COVID-19 (Ordered)   11/26/20 Rule-Out Test Resulted    C-diff Rule Out 07/31/20 07/31/20 07/31/20 Clostridium Difficile Toxin/Antigen (Ordered)   07/31/20 Rule-Out Test Resulted    C-diff Rule Out 07/31/20 07/31/20 07/31/20 Clostridium Difficile Toxin/Antigen (Ordered)   07/31/20 Rule-Out Test Resulted    COVID-19 Rule Out 06/06/20 06/06/20 06/06/20 COVID-19 (Ordered)   06/06/20 Rule-Out Test Resulted            Nurse Assessment:  Last Vital Signs: BP (!) 142/79   Pulse 64   Temp 98 °F (36.7 °C) (Oral)   Resp 25   Ht 6' 2\" (1.88 m)   Wt 253 lb 12 oz (115.1 kg)   SpO2 98%   BMI 32.58 kg/m²     Last documented pain score (0-10 scale): Pain Level: 2  Last Weight:   Wt Readings from Last 1 Encounters:   06/23/21 253 lb 12 oz (115.1 kg)     Mental Status:  oriented, alert, coherent and logical    IV Access:  - None    Nursing Mobility/ADLs:  Walking   Assisted  Transfer  Assisted  Bathing  Assisted  Dressing  Assisted  Toileting  Independent  Feeding  Independent  Med Admin  Independent  Med Delivery   whole    Wound Care Documentation and Therapy:  Incision 04/04/14 Toe (Comment  which one) Right (Active)   Number of days: 2638       Wound Foot Left; Inner (Active)   Number of days:        Wound 07/31/20 Heel Left (Active)   Number of days: 327       Wound 11/27/20 Foot Left;Plantar (Active)   Number of days: 208       Wound 11/27/20 Foot Right;Plantar (Active)   Number of days: 208       Wound 06/23/21 Foot Left; Inner (Active)   Wound Etiology Diabetic 06/23/21 2100   Dressing Status Clean;Dry; Intact 06/24/21 0800   Dressing/Treatment Moist to dry 06/23/21 2100   Wound Assessment Other (Comment) 06/23/21 2100   Drainage Amount None 06/23/21 2100   Drainage Description Thin;Clear;Yellow 06/23/21 1200   Odor Malodorous/putrid 06/23/21 1200   Tiffanie-wound Assessment Intact 06/23/21 2100   Margins Attached edges; Defined edges 06/23/21 2100   Number of days: 1        Elimination:  Continence:   · Bowel: Yes  · Bladder: Yes  Urinary Catheter: None   Colostomy/Ileostomy/Ileal Conduit: No       Date of Last BM: 6/23/21    Intake/Output Summary (Last 24 hours) at 6/24/2021 1003  Last data filed at 6/24/2021 0828  Gross per 24 hour   Intake 700 ml   Output 1700 ml   Net -1000 ml     I/O last 3 completed shifts:  In: -   Out: 1400 [Urine:1400]    Safety Concerns: At Risk for Falls    Impairments/Disabilities:      foot ulcers    Nutrition Therapy:  Current Nutrition Therapy:   - Oral Diet:  Carb Control 4 carbs/meal (1800kcals/day)    Routes of Feeding: Oral  Liquids: Thin Liquids  Daily Fluid Restriction: no  Last Modified Barium Swallow with Video (Video Swallowing Test): not done    Treatments at the Time of Hospital Discharge:   Respiratory Treatments:   Oxygen Therapy:  is not on home oxygen therapy. Ventilator:    - No ventilator support    Rehab Therapies: Physical Therapy  Weight Bearing Status/Restrictions: see below restrictions  Other Medical Equipment (for information only, NOT a DME order):  walker  Other Treatments: home wound care per podiatrist:    1. Right foot  Dakins ordered for wet to dry dressing  Off load  Non-weightbearing     2. Left foot  Dakins ordered  Not a good candidate for further wound vac given he is walking on the foot which is counter productive to use of a wound vac and advanced wound care products.    Non-weightbearing on the left foot    Patient's personal belongings (please select all that are sent with patient):  clothing, credit card, key    RN SIGNATURE:  Electronically signed by Landy Ho RN on 6/24/21 at 10:31 AM EDT    CASE MANAGEMENT/SOCIAL WORK SECTION    Inpatient Status Date: 6/23/21      Readmission Risk Assessment Score:  Readmission Risk              Risk of Unplanned Readmission:  13           Discharging to Facility/ Agency   Discharging to Facility/ Agency   · Name: [facility]  · Address:  [address], [address] [place] [address]   · Phone:  [phone]  · Fax:  [phone]      / signature: Electronically signed by Migue Basurto on 6/24/21 at 1:10 PM EDT    PHYSICIAN SECTION    Prognosis: Fair    Condition at Discharge: Stable    Rehab Potential (if transferring to Rehab): Fair    Recommended Labs or Other Treatments After Discharge: PT, OT, HHA, VN    Physician Certification: I certify the above information and transfer of Kizzy Crowder  is necessary for the continuing treatment of the diagnosis listed and that he requires [address] for less 30 days.      Update Admission H&P: No change in H&P    PHYSICIAN SIGNATURE:  Electronically signed by Ary Steele MD on 6/24/21 at 10:04 AM EDT

## 2021-06-24 NOTE — PROGRESS NOTES
Occupational Therapy    OT order received and chart reviewed. Attempted to see pt for OT evaluation. Pt declining therapy stating, \"I don't need therapy. I do just fine and am going home today. \" Pt reports he has all the necessary DME he needs for home. Will sign off at this time, per pt request. RN aware.     Electronically signed by Shamir Gore OT on 6/24/2021 at 11:28 AM

## 2021-08-08 ENCOUNTER — APPOINTMENT (OUTPATIENT)
Dept: GENERAL RADIOLOGY | Age: 66
DRG: 419 | End: 2021-08-08
Payer: MEDICARE

## 2021-08-08 ENCOUNTER — HOSPITAL ENCOUNTER (INPATIENT)
Age: 66
LOS: 5 days | Discharge: HOME HEALTH | DRG: 419 | End: 2021-08-13
Attending: INTERNAL MEDICINE | Admitting: INTERNAL MEDICINE
Payer: MEDICARE

## 2021-08-08 ENCOUNTER — APPOINTMENT (OUTPATIENT)
Dept: CT IMAGING | Age: 66
DRG: 419 | End: 2021-08-08
Payer: MEDICARE

## 2021-08-08 DIAGNOSIS — L08.9 DIABETIC FOOT INFECTION (HCC): ICD-10-CM

## 2021-08-08 DIAGNOSIS — E11.628 DIABETIC FOOT INFECTION (HCC): ICD-10-CM

## 2021-08-08 DIAGNOSIS — K86.89 PANCREATIC STONES: ICD-10-CM

## 2021-08-08 DIAGNOSIS — K81.0 ACUTE CHOLECYSTITIS: Primary | ICD-10-CM

## 2021-08-08 DIAGNOSIS — K81.9 CHOLECYSTITIS: ICD-10-CM

## 2021-08-08 PROBLEM — K80.00 ACUTE CALCULOUS CHOLECYSTITIS: Status: ACTIVE | Noted: 2021-08-08

## 2021-08-08 LAB
A/G RATIO: 1 (ref 1.1–2.2)
ALBUMIN SERPL-MCNC: 3.6 G/DL (ref 3.4–5)
ALP BLD-CCNC: 87 U/L (ref 40–129)
ALT SERPL-CCNC: <5 U/L (ref 10–40)
ANION GAP SERPL CALCULATED.3IONS-SCNC: 11 MMOL/L (ref 3–16)
AST SERPL-CCNC: 7 U/L (ref 15–37)
BASOPHILS ABSOLUTE: 0 K/UL (ref 0–0.2)
BASOPHILS RELATIVE PERCENT: 0.3 %
BILIRUB SERPL-MCNC: 0.6 MG/DL (ref 0–1)
BUN BLDV-MCNC: 20 MG/DL (ref 7–20)
CALCIUM SERPL-MCNC: 9 MG/DL (ref 8.3–10.6)
CHLORIDE BLD-SCNC: 100 MMOL/L (ref 99–110)
CO2: 22 MMOL/L (ref 21–32)
CREAT SERPL-MCNC: 0.9 MG/DL (ref 0.8–1.3)
EOSINOPHILS ABSOLUTE: 0 K/UL (ref 0–0.6)
EOSINOPHILS RELATIVE PERCENT: 0.4 %
GFR AFRICAN AMERICAN: >60
GFR NON-AFRICAN AMERICAN: >60
GLOBULIN: 3.7 G/DL
GLUCOSE BLD-MCNC: 157 MG/DL (ref 70–99)
HCT VFR BLD CALC: 32.5 % (ref 40.5–52.5)
HEMOGLOBIN: 10.8 G/DL (ref 13.5–17.5)
LACTIC ACID: 1.7 MMOL/L (ref 0.4–2)
LIPASE: 11 U/L (ref 13–60)
LYMPHOCYTES ABSOLUTE: 0.6 K/UL (ref 1–5.1)
LYMPHOCYTES RELATIVE PERCENT: 6.5 %
MCH RBC QN AUTO: 25.5 PG (ref 26–34)
MCHC RBC AUTO-ENTMCNC: 33.2 G/DL (ref 31–36)
MCV RBC AUTO: 76.8 FL (ref 80–100)
MONOCYTES ABSOLUTE: 0.6 K/UL (ref 0–1.3)
MONOCYTES RELATIVE PERCENT: 6.4 %
NEUTROPHILS ABSOLUTE: 8.5 K/UL (ref 1.7–7.7)
NEUTROPHILS RELATIVE PERCENT: 86.4 %
PDW BLD-RTO: 16.9 % (ref 12.4–15.4)
PLATELET # BLD: 213 K/UL (ref 135–450)
PMV BLD AUTO: 7.5 FL (ref 5–10.5)
POTASSIUM SERPL-SCNC: 4.2 MMOL/L (ref 3.5–5.1)
RBC # BLD: 4.22 M/UL (ref 4.2–5.9)
SARS-COV-2, NAAT: NOT DETECTED
SODIUM BLD-SCNC: 133 MMOL/L (ref 136–145)
TOTAL PROTEIN: 7.3 G/DL (ref 6.4–8.2)
WBC # BLD: 9.9 K/UL (ref 4–11)

## 2021-08-08 PROCEDURE — 83690 ASSAY OF LIPASE: CPT

## 2021-08-08 PROCEDURE — 73620 X-RAY EXAM OF FOOT: CPT

## 2021-08-08 PROCEDURE — 2580000003 HC RX 258: Performed by: PHYSICIAN ASSISTANT

## 2021-08-08 PROCEDURE — 73630 X-RAY EXAM OF FOOT: CPT

## 2021-08-08 PROCEDURE — 85025 COMPLETE CBC W/AUTO DIFF WBC: CPT

## 2021-08-08 PROCEDURE — 6360000004 HC RX CONTRAST MEDICATION: Performed by: PHYSICIAN ASSISTANT

## 2021-08-08 PROCEDURE — 1200000000 HC SEMI PRIVATE

## 2021-08-08 PROCEDURE — 96374 THER/PROPH/DIAG INJ IV PUSH: CPT

## 2021-08-08 PROCEDURE — 87635 SARS-COV-2 COVID-19 AMP PRB: CPT

## 2021-08-08 PROCEDURE — 2500000003 HC RX 250 WO HCPCS: Performed by: PHYSICIAN ASSISTANT

## 2021-08-08 PROCEDURE — 36415 COLL VENOUS BLD VENIPUNCTURE: CPT

## 2021-08-08 PROCEDURE — 87040 BLOOD CULTURE FOR BACTERIA: CPT

## 2021-08-08 PROCEDURE — 80053 COMPREHEN METABOLIC PANEL: CPT

## 2021-08-08 PROCEDURE — 83605 ASSAY OF LACTIC ACID: CPT

## 2021-08-08 PROCEDURE — 6360000002 HC RX W HCPCS: Performed by: PHYSICIAN ASSISTANT

## 2021-08-08 PROCEDURE — 99284 EMERGENCY DEPT VISIT MOD MDM: CPT

## 2021-08-08 PROCEDURE — 71046 X-RAY EXAM CHEST 2 VIEWS: CPT

## 2021-08-08 PROCEDURE — 74177 CT ABD & PELVIS W/CONTRAST: CPT

## 2021-08-08 PROCEDURE — 96375 TX/PRO/DX INJ NEW DRUG ADDON: CPT

## 2021-08-08 PROCEDURE — 6370000000 HC RX 637 (ALT 250 FOR IP): Performed by: PHYSICIAN ASSISTANT

## 2021-08-08 RX ORDER — 0.9 % SODIUM CHLORIDE 0.9 %
500 INTRAVENOUS SOLUTION INTRAVENOUS ONCE
Status: COMPLETED | OUTPATIENT
Start: 2021-08-08 | End: 2021-08-08

## 2021-08-08 RX ORDER — ACETAMINOPHEN 325 MG/1
650 TABLET ORAL ONCE
Status: COMPLETED | OUTPATIENT
Start: 2021-08-08 | End: 2021-08-08

## 2021-08-08 RX ORDER — LISINOPRIL 10 MG/1
10 TABLET ORAL DAILY
COMMUNITY
End: 2022-08-15

## 2021-08-08 RX ORDER — ONDANSETRON 2 MG/ML
4 INJECTION INTRAMUSCULAR; INTRAVENOUS ONCE
Status: COMPLETED | OUTPATIENT
Start: 2021-08-08 | End: 2021-08-08

## 2021-08-08 RX ORDER — CIPROFLOXACIN 2 MG/ML
400 INJECTION, SOLUTION INTRAVENOUS ONCE
Status: DISCONTINUED | OUTPATIENT
Start: 2021-08-08 | End: 2021-08-08

## 2021-08-08 RX ADMIN — ACETAMINOPHEN 650 MG: 325 TABLET ORAL at 21:51

## 2021-08-08 RX ADMIN — IOPAMIDOL 75 ML: 755 INJECTION, SOLUTION INTRAVENOUS at 20:03

## 2021-08-08 RX ADMIN — SODIUM CHLORIDE 500 ML: 9 INJECTION, SOLUTION INTRAVENOUS at 19:20

## 2021-08-08 RX ADMIN — ONDANSETRON 4 MG: 2 INJECTION INTRAMUSCULAR; INTRAVENOUS at 19:21

## 2021-08-08 RX ADMIN — METRONIDAZOLE 500 MG: 500 INJECTION, SOLUTION INTRAVENOUS at 21:54

## 2021-08-08 ASSESSMENT — ENCOUNTER SYMPTOMS
BLOOD IN STOOL: 0
ABDOMINAL PAIN: 1
COUGH: 1
CONSTIPATION: 1
VOMITING: 1
NAUSEA: 1
RHINORRHEA: 1

## 2021-08-08 ASSESSMENT — PAIN SCALES - GENERAL
PAINLEVEL_OUTOF10: 2
PAINLEVEL_OUTOF10: 0
PAINLEVEL_OUTOF10: 0
PAINLEVEL_OUTOF10: 3
PAINLEVEL_OUTOF10: 3

## 2021-08-08 ASSESSMENT — PAIN DESCRIPTION - PAIN TYPE: TYPE: ACUTE PAIN

## 2021-08-08 ASSESSMENT — PAIN DESCRIPTION - DESCRIPTORS: DESCRIPTORS: ACHING

## 2021-08-08 ASSESSMENT — PAIN DESCRIPTION - PROGRESSION: CLINICAL_PROGRESSION: NOT CHANGED

## 2021-08-08 ASSESSMENT — PAIN DESCRIPTION - FREQUENCY: FREQUENCY: CONTINUOUS

## 2021-08-08 ASSESSMENT — PAIN DESCRIPTION - LOCATION: LOCATION: ABDOMEN

## 2021-08-08 ASSESSMENT — PAIN DESCRIPTION - ORIENTATION: ORIENTATION: MID

## 2021-08-08 ASSESSMENT — PAIN - FUNCTIONAL ASSESSMENT: PAIN_FUNCTIONAL_ASSESSMENT: ACTIVITIES ARE NOT PREVENTED

## 2021-08-08 ASSESSMENT — PAIN DESCRIPTION - ONSET: ONSET: ON-GOING

## 2021-08-08 NOTE — ED NOTES
Report given to receiving DANITA Granados, all questions answered.       Darcie Lane, DANITA  08/08/21 Odalis Brewer

## 2021-08-08 NOTE — ED PROVIDER NOTES
[address] [facility]      Pt Name: Luke Dewey  MRN: [id number]  [place] 1955  Date of evaluation: 8/8/2021  Provider: CONNIE Jhonson Mc    This patient was not seen and evaluated by the attending physician No att. providers found. CHIEF COMPLAINT       Chief Complaint   Patient presents with    Abdominal Pain     Pt to ED via [facility] with c/o n/v, abd pain and fatigue, started 2 days ago.  Fatigue       CRITICAL CARE TIME   I performed a total Critical Care time of 35 minutes, excluding separately reportable procedures. There was a high probability of clinically significant/life threatening deterioration in the patient's condition which required my urgent intervention. Not limited to multiple reexaminations, discussions with attending physician and consultants. HISTORY OF PRESENT ILLNESS  (Location/Symptom, Timing/Onset, Context/Setting, Quality, Duration, Modifying Factors, Severity.)   Luke Dewey is a 77 y.o. male who presents to the emergency department via EMS from home where he lives alone. He states that yesterday he developed a cough productive with white phlegm, rhinorrhea with a white discharge, fatigue, fever, mid upper abdominal pain and nausea and vomiting. He states that he has had episodes of similar abdominal pain on and off since 2014. He has history of gallstones and pancreatitis and has had ERCP procedure in the past.  He states his morning he checked his temperature and was 100.9 °F he took Tylenol but vomited after this reports about 5 episodes of nonbloody emesis. Felt constipated with his last bowel movement couple days ago. He has not noted any dark tarry or bloody stools. Patient states that he is visiting home health nurses who take care of bilateral lower foot wounds that have been present for years. Denies alcohol drug or tobacco abuse. No prior abdominal surgeries.   Past daily.    LANCETS MISC    Once daily    LISINOPRIL (PRINIVIL;ZESTRIL) 10 MG TABLET    Take 0.5 tablets by mouth daily    LISINOPRIL (PRINIVIL;ZESTRIL) 10 MG TABLET    Take 10 mg by mouth daily    METFORMIN (GLUCOPHAGE) 500 MG TABLET    Take 500 mg by mouth 2 times daily (with meals)    SPIRONOLACTONE (ALDACTONE) 25 MG TABLET    Take 25 mg by mouth daily    TAMSULOSIN (FLOMAX) 0.4 MG CAPSULE    Take 0.4 mg by mouth every evening        ALLERGIES     Patient has no known allergies. FAMILY HISTORY           Problem Relation Age of Onset    Colon Cancer Mother         PVD s/p toe amputation by Dr Brendan Gan Father      Family Status   Relation Name Status    Mother  (Not Specified)    Father  (Not Specified)        SOCIAL HISTORY      reports that he has never smoked. He has never used smokeless tobacco. He reports that he does not drink alcohol and does not use drugs. PHYSICAL EXAM    (up to 7 for level 4, 8 or more for level 5)     ED Triage Vitals [08/08/21 1721]   BP Temp Temp Source Pulse Resp SpO2 Height Weight   121/60 101.4 °F (38.6 °C) Oral 84 18 94 % 6' 2\" (1.88 m) 259 lb 4.2 oz (117.6 kg)       Physical Exam  Vitals and nursing note reviewed. Constitutional:       Appearance: He is well-developed. HENT:      Head: Normocephalic and atraumatic. Cardiovascular:      Rate and Rhythm: Normal rate. Pulmonary:      Effort: Pulmonary effort is normal. No respiratory distress. Abdominal:      Tenderness: There is abdominal tenderness in the epigastric area. There is no guarding or rebound. Musculoskeletal:      Right foot: Normal capillary refill. Deformity (contraction deformity toes x 10 years) present. Normal pulse. Left foot: Normal capillary refill. Normal pulse. Feet:    Skin:     General: Skin is warm. Findings: Erythema present. Neurological:      General: No focal deficit present. Mental Status: He is alert and oriented to person, place, and time. Psychiatric:         Mood and Affect: Mood normal.         Behavior: Behavior normal.         DIAGNOSTIC RESULTS     RADIOLOGY:   Non-plain film images such as CT, Ultrasound and MRI are read by the radiologist. Plain radiographic images are visualized and preliminarily interpreted by CONNIE Casarez with the below findings:    Reviewed radiologist's interpretation. Interpretation per the Radiologist below, if available at the time of this note:    CT ABDOMEN PELVIS W IV CONTRAST Additional Contrast? None   Final Result   1. 8 mm pancreatic duct stone with resultant pancreatic duct dilatation and   increase atrophy. 2. There appears to be a 3 mm calculus at the ampulla Vater level, however   the common duct is not dilated. This may be soft tissue calcification. ERCP   or MRCP correlation recommended. 3. Findings suggestive of acute cholecystitis. 4. Punctate, nonobstructing calculi left kidney. 5.  No findings to suggest acute appendicitis; no ureter calculus or   hydronephrosis. XR FOOT LEFT (2 VIEWS)   Preliminary Result   CHEST:      Stable chronic cardiomegaly, without acute airspace consolidation or CHF. RIGHT FOOT:      No acute osseous abnormality of the right foot. Focal soft tissue wound   along the medial aspect of the forefoot, without radiographic evidence of   osteomyelitis. LEFT FOOT:      No acute osseous abnormality of the left foot. Focal soft tissue wound along   the plantar aspect of the midfoot, without radiographic evidence of   osteomyelitis. XR FOOT RIGHT (MIN 3 VIEWS)   Preliminary Result   CHEST:      Stable chronic cardiomegaly, without acute airspace consolidation or CHF. RIGHT FOOT:      No acute osseous abnormality of the right foot. Focal soft tissue wound   along the medial aspect of the forefoot, without radiographic evidence of   osteomyelitis. LEFT FOOT:      No acute osseous abnormality of the left foot.   Focal soft tissue wound along   the plantar aspect of the midfoot, without radiographic evidence of   osteomyelitis. XR CHEST (2 VW)   Preliminary Result   CHEST:      Stable chronic cardiomegaly, without acute airspace consolidation or CHF. RIGHT FOOT:      No acute osseous abnormality of the right foot. Focal soft tissue wound   along the medial aspect of the forefoot, without radiographic evidence of   osteomyelitis. LEFT FOOT:      No acute osseous abnormality of the left foot. Focal soft tissue wound along   the plantar aspect of the midfoot, without radiographic evidence of   osteomyelitis.                LABS:  Labs Reviewed   CBC WITH AUTO DIFFERENTIAL - Abnormal; Notable for the following components:       Result Value    Hemoglobin 10.8 (*)     Hematocrit 32.5 (*)     MCV 76.8 (*)     MCH 25.5 (*)     RDW 16.9 (*)     Neutrophils Absolute 8.5 (*)     Lymphocytes Absolute 0.6 (*)     All other components within normal limits    Narrative:     Performed at:  [address] [place]   Phone [phone]   COMPREHENSIVE METABOLIC PANEL - Abnormal; Notable for the following components:    Sodium 133 (*)     Glucose 157 (*)     Albumin/Globulin Ratio 1.0 (*)     ALT <5 (*)     AST 7 (*)     All other components within normal limits    Narrative:     Performed at:  [address] [place]   Phone [phone]   LIPASE - Abnormal; Notable for the following components:    Lipase 11.0 (*)     All other components within normal limits    Narrative:     Performed at:  [address] [email or web address] [place]   Phone [phone] [place]    Narrative:     Performed at:  [address] [place]   Phone [phone]   CULTURE, BLOOD 2   CULTURE, BLOOD 1   LACTIC ACID, PLASMA    Narrative:     Performed at:  [facility]  [address] [facility] [place]   Phone [phone]   URINE RT REFLEX TO CULTURE       All other labs were within normal range or not returned as of this dictation. EMERGENCY DEPARTMENT COURSE and DIFFERENTIAL DIAGNOSIS/MDM:   Vitals:    Vitals:    08/08/21 1930 08/08/21 2001 08/08/21 2030 08/08/21 2201   BP: (!) 118/56 (!) 120/53 (!) 110/52 (!) 114/56   Pulse: 68  68 63   Resp: 16  15 16   Temp:       TempSrc:       SpO2:       Weight:       Height:         Patient presents febrile 101.4 °F not tachycardic oxygen saturation around 95%. Presents with nausea vomiting cough fever fatigue. He said intermittent episodes of the nausea vomiting with epigastric abdominal pain since 2014. At that time he had ERCP with Dr. Lilo Gonzalez for pancreatic stone with lithotripsy/stent placement. The pain came back again yesterday. He states it will be intermittent every couple of months. He is tender in his epigastrium and CAT scan findings concerning for acute cholecystitis as well as an 8 mm pancreatic duct stone and potential 3 mm calculus at the ampulla [place]. However lab work is reassuring with a normal white count normal LFTs lipase and bilirubin. I contacted general surgery spoke with Dr. Uma Murillo. Patient n.p.o. given IV antibiotics and Dr. Uma Murillo requested medical admission. Will need evaluation from GI as well per surgery. CONSULTS:  Aislinn Harris CONSULT TO HOSPITALIST    PROCEDURES:  Procedures      FINAL IMPRESSION      1. Acute cholecystitis    2. Pancreatic stones    3. Diabetic foot infection ([facility]          DISPOSITION/PLAN   DISPOSITION Admitted 08/08/2021 10:02:35 PM      PATIENT REFERRED TO:  No follow-up provider specified.     DISCHARGE MEDICATIONS:  New Prescriptions    No medications on file       (Please note that portions of this note were completed with a voice recognition program.  Efforts were made to edit the dictations but occasionally words are mis-transcribed.)    Beto Nichols, [address], [place]  08/08/21 6760

## 2021-08-08 NOTE — ED NOTES
Bed: B-34  Expected date: 8/8/21  Expected time: 5:15 PM  Means of arrival: [facility]  Comments:  Headache, runny nose abd pain     Kevin Hill RN  08/08/21 8970

## 2021-08-09 ENCOUNTER — ANESTHESIA EVENT (OUTPATIENT)
Dept: OPERATING ROOM | Age: 66
DRG: 419 | End: 2021-08-09
Payer: MEDICARE

## 2021-08-09 LAB
BILIRUBIN URINE: NEGATIVE
BLOOD, URINE: NEGATIVE
CLARITY: CLEAR
COLOR: YELLOW
GLUCOSE BLD-MCNC: 111 MG/DL (ref 70–99)
GLUCOSE BLD-MCNC: 89 MG/DL (ref 70–99)
GLUCOSE BLD-MCNC: 92 MG/DL (ref 70–99)
GLUCOSE BLD-MCNC: 95 MG/DL (ref 70–99)
GLUCOSE URINE: NEGATIVE MG/DL
KETONES, URINE: NEGATIVE MG/DL
LEUKOCYTE ESTERASE, URINE: NEGATIVE
MICROSCOPIC EXAMINATION: NORMAL
NITRITE, URINE: NEGATIVE
PERFORMED ON: ABNORMAL
PERFORMED ON: NORMAL
PH UA: 6 (ref 5–8)
PROTEIN UA: NEGATIVE MG/DL
SPECIFIC GRAVITY UA: >1.03 (ref 1–1.03)
URINE REFLEX TO CULTURE: NORMAL
URINE TYPE: NORMAL
UROBILINOGEN, URINE: 1 E.U./DL

## 2021-08-09 PROCEDURE — 2580000003 HC RX 258: Performed by: INTERNAL MEDICINE

## 2021-08-09 PROCEDURE — 1200000000 HC SEMI PRIVATE

## 2021-08-09 PROCEDURE — APPNB180 APP NON BILLABLE TIME > 60 MINS: Performed by: PHYSICIAN ASSISTANT

## 2021-08-09 PROCEDURE — 94760 N-INVAS EAR/PLS OXIMETRY 1: CPT

## 2021-08-09 PROCEDURE — 2500000003 HC RX 250 WO HCPCS: Performed by: INTERNAL MEDICINE

## 2021-08-09 PROCEDURE — 99222 1ST HOSP IP/OBS MODERATE 55: CPT | Performed by: SURGERY

## 2021-08-09 PROCEDURE — 6370000000 HC RX 637 (ALT 250 FOR IP): Performed by: INTERNAL MEDICINE

## 2021-08-09 PROCEDURE — 6360000002 HC RX W HCPCS: Performed by: INTERNAL MEDICINE

## 2021-08-09 PROCEDURE — APPSS180 APP SPLIT SHARED TIME > 60 MINUTES: Performed by: PHYSICIAN ASSISTANT

## 2021-08-09 PROCEDURE — 81003 URINALYSIS AUTO W/O SCOPE: CPT

## 2021-08-09 RX ORDER — UREA 10 %
1 LOTION (ML) TOPICAL DAILY
COMMUNITY
End: 2022-09-06

## 2021-08-09 RX ORDER — NICOTINE POLACRILEX 4 MG
15 LOZENGE BUCCAL PRN
Status: DISCONTINUED | OUTPATIENT
Start: 2021-08-09 | End: 2021-08-13 | Stop reason: HOSPADM

## 2021-08-09 RX ORDER — SODIUM CHLORIDE 9 MG/ML
25 INJECTION, SOLUTION INTRAVENOUS PRN
Status: DISCONTINUED | OUTPATIENT
Start: 2021-08-09 | End: 2021-08-13 | Stop reason: HOSPADM

## 2021-08-09 RX ORDER — SODIUM CHLORIDE 0.9 % (FLUSH) 0.9 %
5-40 SYRINGE (ML) INJECTION EVERY 12 HOURS SCHEDULED
Status: DISCONTINUED | OUTPATIENT
Start: 2021-08-09 | End: 2021-08-13 | Stop reason: HOSPADM

## 2021-08-09 RX ORDER — ATORVASTATIN CALCIUM 20 MG/1
20 TABLET, FILM COATED ORAL NIGHTLY
Status: DISCONTINUED | OUTPATIENT
Start: 2021-08-09 | End: 2021-08-13 | Stop reason: HOSPADM

## 2021-08-09 RX ORDER — SODIUM CHLORIDE 0.9 % (FLUSH) 0.9 %
5-40 SYRINGE (ML) INJECTION PRN
Status: DISCONTINUED | OUTPATIENT
Start: 2021-08-09 | End: 2021-08-13 | Stop reason: HOSPADM

## 2021-08-09 RX ORDER — DEXTROSE MONOHYDRATE 25 G/50ML
12.5 INJECTION, SOLUTION INTRAVENOUS PRN
Status: DISCONTINUED | OUTPATIENT
Start: 2021-08-09 | End: 2021-08-13 | Stop reason: HOSPADM

## 2021-08-09 RX ORDER — ONDANSETRON 4 MG/1
4 TABLET, ORALLY DISINTEGRATING ORAL EVERY 8 HOURS PRN
Status: DISCONTINUED | OUTPATIENT
Start: 2021-08-09 | End: 2021-08-13 | Stop reason: HOSPADM

## 2021-08-09 RX ORDER — MAGNESIUM SULFATE IN WATER 40 MG/ML
2000 INJECTION, SOLUTION INTRAVENOUS PRN
Status: DISCONTINUED | OUTPATIENT
Start: 2021-08-09 | End: 2021-08-13 | Stop reason: HOSPADM

## 2021-08-09 RX ORDER — SODIUM HYPOCHLORITE 1.25 MG/ML
473 SOLUTION TOPICAL CONTINUOUS PRN
Status: DISCONTINUED | OUTPATIENT
Start: 2021-08-10 | End: 2021-08-13 | Stop reason: HOSPADM

## 2021-08-09 RX ORDER — INSULIN GLARGINE 100 [IU]/ML
10 INJECTION, SOLUTION SUBCUTANEOUS NIGHTLY
COMMUNITY

## 2021-08-09 RX ORDER — SODIUM CHLORIDE, SODIUM LACTATE, POTASSIUM CHLORIDE, CALCIUM CHLORIDE 600; 310; 30; 20 MG/100ML; MG/100ML; MG/100ML; MG/100ML
INJECTION, SOLUTION INTRAVENOUS CONTINUOUS
Status: DISCONTINUED | OUTPATIENT
Start: 2021-08-09 | End: 2021-08-12

## 2021-08-09 RX ORDER — ASPIRIN 81 MG/1
81 TABLET ORAL DAILY
Status: DISCONTINUED | OUTPATIENT
Start: 2021-08-09 | End: 2021-08-13 | Stop reason: HOSPADM

## 2021-08-09 RX ORDER — DEXTROSE MONOHYDRATE 50 MG/ML
100 INJECTION, SOLUTION INTRAVENOUS PRN
Status: DISCONTINUED | OUTPATIENT
Start: 2021-08-09 | End: 2021-08-13 | Stop reason: HOSPADM

## 2021-08-09 RX ORDER — ONDANSETRON 2 MG/ML
4 INJECTION INTRAMUSCULAR; INTRAVENOUS EVERY 6 HOURS PRN
Status: DISCONTINUED | OUTPATIENT
Start: 2021-08-09 | End: 2021-08-13 | Stop reason: HOSPADM

## 2021-08-09 RX ORDER — POTASSIUM CHLORIDE 7.45 MG/ML
10 INJECTION INTRAVENOUS PRN
Status: DISCONTINUED | OUTPATIENT
Start: 2021-08-09 | End: 2021-08-13 | Stop reason: HOSPADM

## 2021-08-09 RX ORDER — INSULIN GLARGINE 100 [IU]/ML
16 INJECTION, SOLUTION SUBCUTANEOUS
Status: DISCONTINUED | OUTPATIENT
Start: 2021-08-09 | End: 2021-08-13 | Stop reason: HOSPADM

## 2021-08-09 RX ADMIN — ENOXAPARIN SODIUM 40 MG: 40 INJECTION SUBCUTANEOUS at 09:23

## 2021-08-09 RX ADMIN — CEFTRIAXONE 2000 MG: 2 INJECTION, POWDER, FOR SOLUTION INTRAMUSCULAR; INTRAVENOUS at 00:55

## 2021-08-09 RX ADMIN — FAMOTIDINE 20 MG: 10 INJECTION, SOLUTION INTRAVENOUS at 21:05

## 2021-08-09 RX ADMIN — FAMOTIDINE 20 MG: 10 INJECTION, SOLUTION INTRAVENOUS at 01:46

## 2021-08-09 RX ADMIN — METRONIDAZOLE 500 MG: 500 INJECTION, SOLUTION INTRAVENOUS at 06:37

## 2021-08-09 RX ADMIN — SODIUM CHLORIDE, POTASSIUM CHLORIDE, SODIUM LACTATE AND CALCIUM CHLORIDE: 600; 310; 30; 20 INJECTION, SOLUTION INTRAVENOUS at 13:14

## 2021-08-09 RX ADMIN — SODIUM CHLORIDE 25 ML: 9 INJECTION, SOLUTION INTRAVENOUS at 14:47

## 2021-08-09 RX ADMIN — CEFTRIAXONE 1000 MG: 1 INJECTION, POWDER, FOR SOLUTION INTRAMUSCULAR; INTRAVENOUS at 09:34

## 2021-08-09 RX ADMIN — METRONIDAZOLE 500 MG: 500 INJECTION, SOLUTION INTRAVENOUS at 14:51

## 2021-08-09 RX ADMIN — ATORVASTATIN CALCIUM 20 MG: 20 TABLET, FILM COATED ORAL at 21:05

## 2021-08-09 RX ADMIN — METRONIDAZOLE 500 MG: 500 INJECTION, SOLUTION INTRAVENOUS at 21:07

## 2021-08-09 RX ADMIN — SODIUM CHLORIDE 25 ML: 9 INJECTION, SOLUTION INTRAVENOUS at 00:54

## 2021-08-09 RX ADMIN — SODIUM CHLORIDE, POTASSIUM CHLORIDE, SODIUM LACTATE AND CALCIUM CHLORIDE: 600; 310; 30; 20 INJECTION, SOLUTION INTRAVENOUS at 16:32

## 2021-08-09 RX ADMIN — FAMOTIDINE 20 MG: 10 INJECTION, SOLUTION INTRAVENOUS at 09:23

## 2021-08-09 RX ADMIN — ATORVASTATIN CALCIUM 20 MG: 20 TABLET, FILM COATED ORAL at 01:46

## 2021-08-09 RX ADMIN — ASPIRIN 81 MG: 81 TABLET, FILM COATED ORAL at 09:23

## 2021-08-09 RX ADMIN — SODIUM CHLORIDE, POTASSIUM CHLORIDE, SODIUM LACTATE AND CALCIUM CHLORIDE: 600; 310; 30; 20 INJECTION, SOLUTION INTRAVENOUS at 01:46

## 2021-08-09 RX ADMIN — Medication 10 ML: at 09:26

## 2021-08-09 RX ADMIN — SODIUM CHLORIDE 25 ML: 9 INJECTION, SOLUTION INTRAVENOUS at 09:32

## 2021-08-09 ASSESSMENT — PAIN SCALES - GENERAL
PAINLEVEL_OUTOF10: 0

## 2021-08-09 NOTE — PROGRESS NOTES
H/p dictation id [id number]. Date of service 08/08/2021. Acute calculous cholecystitis. Pancreatic duct calculus. Microcytic anemia. Obesity. Dm II.

## 2021-08-09 NOTE — CONSULTS
Department of Podiatry Consult Note  Alphonso Lim.  Fernando Chandra DPM Attending       Reason for Consult:  Ulclerations of both feet secondary to deformity  Requesting Physician:  Manish Cotter MD    CHIEF COMPLAINT:  Open wounds of both feet    HISTORY OF PRESENT ILLNESS:                The patient is a 77 y.o. male with significant past medical history of Diabetes with peripheral neuropathy, deformity of both feet with venous insufficiency and  Lymphedema who is consulted for Diabetic wounds to the mid arch of both feet due to deformity and pressure    Past Medical History:        Diagnosis Date    Diabetes mellitus ([address]     Gallstones     Pancreatitis     Ulcers of both lower legs ([address]     bilateral feet     Past Surgical History:        Procedure Laterality Date    ERCP  4/8/14    LITHOTRYPSY & PANCREATIC STENT PLACEMENT    FOOT SURGERY Left 1967     Current Medications:    Current Facility-Administered Medications: aspirin EC tablet 81 mg, 81 mg, Oral, Daily  atorvastatin (LIPITOR) tablet 20 mg, 20 mg, Oral, Nightly  insulin glargine (LANTUS) injection vial 16 Units, 16 Units, Subcutaneous, QAM AC  sodium chloride flush 0.9 % injection 5-40 mL, 5-40 mL, Intravenous, 2 times per day  sodium chloride flush 0.9 % injection 5-40 mL, 5-40 mL, Intravenous, PRN  0.9 % sodium chloride infusion, 25 mL, Intravenous, PRN  ondansetron (ZOFRAN-ODT) disintegrating tablet 4 mg, 4 mg, Oral, Q8H PRN **OR** ondansetron (ZOFRAN) injection 4 mg, 4 mg, Intravenous, Q6H PRN  enoxaparin (LOVENOX) injection 40 mg, 40 mg, Subcutaneous, Daily  lactated ringers infusion, , Intravenous, Continuous  potassium chloride 10 mEq/100 mL IVPB (Peripheral Line), 10 mEq, Intravenous, PRN  magnesium sulfate 2000 mg in 50 mL IVPB premix, 2,000 mg, Intravenous, PRN  HYDROmorphone (DILAUDID) injection 0.5 mg, 0.5 mg, Intravenous, Q3H PRN  cefTRIAXone (ROCEPHIN) 1000 mg IVPB in 50 mL D5W minibag, 1,000 mg, Intravenous, Daily  metronidazole (FLAGYL) 500 mg in NaCl 100 mL IVPB premix, 500 mg, Intravenous, Q8H  famotidine (PEPCID) injection 20 mg, 20 mg, Intravenous, BID  glucose (GLUTOSE) 40 % oral gel 15 g, 15 g, Oral, PRN  dextrose 50 % IV solution, 12.5 g, Intravenous, PRN  glucagon (rDNA) injection 1 mg, 1 mg, Intramuscular, PRN  dextrose 5 % solution, 100 mL/hr, Intravenous, PRN  [START ON 8/10/2021] sodium hypochlorite (DAKINS) 0.125 % external solution 473 mL, 473 mL, Irrigation, Continuous PRN  Allergies:   Patient has no known allergies.   Social History:    TOBACCO:  Never used tobacco  ETOH:  Never drank alcohol  Family History:       Problem Relation Age of Onset    Colon Cancer Mother         PVD s/p toe amputation by Dr Daniele Garcia Father      REVIEW OF SYSTEMS:    CONSTITUTIONAL:  negative  Denies Fever Chills Nausea or vomiting  INTEGUMENT/BREAST:  positive for rash, skin lesion(s), dryness, skin color change, changes in hair and changes in nails  ENDOCRINE:  positive for hair loss and diabetic symptoms including neither foot ulcerations nor skin dryness  MUSCULOSKELETAL:  positive for  arthralgias, stiff joints, decreased range of motion and muscle weakness  PHYSICAL EXAM:      Vitals:    /67   Pulse 60   Temp 99.1 °F (37.3 °C) (Oral)   Resp 17   Ht 6' 2\" (1.88 m)   Wt (!) 300 lb 11.3 oz (136.4 kg)   SpO2 95%   BMI 38.61 kg/m²     LABS:   Recent Labs     08/08/21  1845   WBC 9.9   HGB 10.8*   HCT 32.5*        Recent Labs     08/08/21  1845   *   K 4.2      CO2 22   BUN 20   CREATININE 0.9     Recent Labs     08/08/21  1845   PROT 7.3       LOWER EXTREMITY EXAMINATION   SKIN:    RIGHT foot MUGS 3 ulceration with ragged and fibrotic deep muscle and capsule of 4.3 x 4.1 cm with depth of 0.8 cm with light periwound erythema of 0.5 cm     LEFT foot in area of talonavicular joint similarly with MUGS 3 ulcer of 5.3 cm x 4.6 cm with depth of 0.7 cm with ragged and fibrotic base of muscle and fascia and surrounding

## 2021-08-09 NOTE — PROGRESS NOTES
4 Eyes Skin Assessment     NAME:  Yesenia Guadarrama  YOB: 1955  MEDICAL RECORD NUMBER:  [id number]    The patient is being assess for  Admission      Extensive diabetic wound on bilateral foot noted. LDA and wound care orders will be initiated. I agree that 2 RN's have performed a thorough Head to Toe Skin Assessment on the patient. ALL assessment sites listed below have been assessed. Areas assessed by both nurses:    Head, Face, Ears, Shoulders, Back, Chest, Arms, Elbows, Hands, Sacrum. Buttock, Coccyx, Ischium and Legs. Feet and Heels        Does the Patient have a Wound? Yes wound(s) were present on assessment.  LDA wound assessment was Initiated and completed        Jorje Prevention initiated:  Yes   Wound Care Orders initiated:  Yes    Pressure Injury (Stage 3,4, Unstageable, DTI, NWPT, and Complex wounds) if present place consult order under [de-identified] Yes    New and Established Ostomies if present place consult order under : No      Nurse 1 eSignature: Electronically signed by Riky Martinez RN on 8/9/21 at 5:17 AM EDT    **SHARE this note so that the co-signing nurse is able to place an eSignature**    Nurse 2 eSignature: Electronically signed by Yuki Mcnally RN on 8/9/21 at 5:22 AM EDT

## 2021-08-09 NOTE — H&P
[address] [facility]                              HISTORY AND PHYSICAL    PATIENT NAME: Elizabeth Cordero                :        1955  MED REC NO:   [id number]                          ROOM:       8086  ACCOUNT NO:   [de-identified]                           ADMIT DATE: 2021  PROVIDER:     Ghislaine Avila MD    I examined the patient in the emergency room on 2021. CHIEF COMPLAINT:  Abdominal pain. HISTORY OF PRESENT ILLNESS:  The patient is a 58-year-old   male, presenting into the hospital with chief complaints of one-day  history of increasing nausea, vomiting, abdominal pain, and fatigue,  that actually started two days ago, but was worse in the last day with  some fevers and chills, but without any other constitutional symptoms. The patient also described this fatigue and cough-like feeling and right  upper quadrant abdominal pain. Apparently, it was similar to what he  had in . PAST MEDICAL/PAST SURGICAL HISTORY:  1. Type 2 diabetes. 2.  _____. 3.  Bilateral lower extremity lymphedema/congestion with right lower  extremity ulceration. PAST SURGICAL HISTORY:  _____. The patient has had a pancreatic stent. FAMILY HISTORY:  Reviewed by me and is currently noncontributory. SOCIAL HISTORY:  Lives at home. Nonsmoker. No illicit substance use. MEDICATIONS:  Tylenol, aspirin, Lipitor, Lantus, lisinopril, metformin,  Aldactone, Flomax. REVIEW OF SYSTEMS:  Significant for the abdominal pain and per the  history of present illness. All other systems have been reviewed and  are negative except for the history of present illness. PHYSICAL EXAMINATION:  The patient was examined by me in the emergency  room. VITAL SIGNS:  Temperature T-max 101.4, respiratory rate is 18, pulse 84,  blood pressure 129/60, saturating 94%. CNS:  Alert, awake and oriented.   PSYCH: The patient is cooperative, answering questions appropriately. HEENT:  Eyes:  Pupils are reactive to light. ENT:  Extraocular muscle  movements are intact. RESPIRATORY:  No obvious rales or rhonchi. CARDIOVASCULAR:  Regular rate and rhythm. ABDOMEN:  The patient has got what appears to be very slight and vague  right upper quadrant tenderness to very deep palpation. MUSCULOSKELETAL:  The patient has got significant deformity of the soles  of the bilateral lower extremities due to severe lymphedema with what  appears to be open ulcerations on the bottom of both soles with  unhealthy-looking granulation tissue in the base of the ulcer. SKIN:  Significant for the lymphedema and unhealthy granulation  tissue-appearing plantar ulcers. DIAGNOSTIC DATA:  Chest x-ray shows no acute abnormalities. X-ray of  the right foot and left foot shows the soft tissue ulceration without  evidence of osteomyelitis. CT abdomen and pelvis shows evidence of 8-mm  pancreatic duct stone with pancreatic duct dilatation along with 3-mm  ampulla of Vater calculus and acute cholecystitis findings. COVID test  negative. BUN 20, creatinine 0.9. Sodium 133, potassium 4.2. Lipase 11. CBC showed a hemoglobin of 10.8, hematocrit 32.5, MCV of 76.8, with  neutrophils of 8.5, platelet count of 827. CONSULTATIONS REQUESTED:  General Surgery. REVIEW OF OLD RECORDS:  Shows an echo from 11/27/2020 that showed an LV  ejection fraction of 60% with normal LV filling pressures and grade 1  diastolic dysfunction. ASSESSMENT:  1. Acute calculus cholecystitis. 2.  Pancreatic ductal calculus with pancreatic ductal dilatation. 3.  Microcytic hypochromic anemia. 4.  Hypertension. 5.  Type 2 diabetes. 6.  Obesity with a BMI of 38.61 kg/m2. PLAN OF CARE:  The patient is admitted to Internal Medicine service. IV  antibiotics will be continued with ceftriaxone and Flagyl. IV hydration  will be continued.   NPO status will be continued. General Surgery  consult has been requested. The patient will also need a GI consult  with possibility of ERCP especially for the pancreatic duct stone. CODE STATUS:  Full. EXPECTED LENGTH OF STAY:  More than two midnights based on the plan of  care above. RISK: High due to the patient's presentation with the acute calculous  cholecystitis requiring IV antibiotics. DISPOSITION:  Admitted to Internal Medicine Service.         Adam Stover MD    D: 08/09/2021 6:31:38       T: 08/09/2021 8:03:13     [email or web address]  Job#: [id number]     Doc#: [id number]    CC:

## 2021-08-09 NOTE — PROGRESS NOTES
Comprehensive Nutrition Assessment    Type and Reason for Visit:  Positive Nutrition Screen, Initial    Nutrition Recommendations/Plan: Tolerate most appropriate form of nutrition    Nutrition Assessment:  Pt triggered a positive screen for altered skin integrity. PMH includes, DM, Pancreatitis, Feet Ulcers. Noted N/V and abd pain x 1 day, pta. Current diet orders are npo. Pt reported that po intake is normally very good. Noted diabetic wounds to feet. Declined offer of supplementation. Encouraged nutrient dense choices, to help with wound healing. Pt agreed to try. Pt reported that he receives Meals on Wheels at home. Malnutrition Assessment:  Malnutrition Status:  No malnutrition    Context:  Acute Illness       Estimated Daily Nutrient Needs:  Energy (kcal):  8751-6571 (15-18 x  kg); Weight Used for Energy Requirements:        Protein (g):  103-172 (1.2-2 x IBW 86 kg);  Weight Used for Protein Requirements:           Fluid (ml/day):   ; Method Used for Fluid Requirements:  1 ml/kcal      Wounds:  Diabetic Ulcer       Current Nutrition Therapies:    Diet NPO    Anthropometric Measures:  · Height: 6' 2\" (188 cm)  · Current Body Weight: 301 lb (136.5 kg)   · Admission Body Weight: 259 lb (117.5 kg)    · Usual Body Weight:  (250s per pt)     · Ideal Body Weight: 190 lbs; % Ideal Body Weight 158.4 %   · BMI: 38.6  · Adjusted Body Weight:  ; No Adjustment   · BMI Categories: Obese Class 2 (BMI 35.0 -39.9)       Nutrition Diagnosis:   · Inadequate oral intake related to altered GI function as evidenced by NPO or clear liquid status due to medical condition      Nutrition Interventions:   Food and/or Nutrient Delivery:  Start Oral Diet (when appropriate)  Nutrition Education/Counseling:  No recommendation at this time   Coordination of Nutrition Care:  Continue to monitor while inpatient    Goals:  tolerate most appropriate form of nutrition       Nutrition Monitoring and Evaluation: Behavioral-Environmental Outcomes:  None Identified   Food/Nutrient Intake Outcomes:  Diet Advancement/Tolerance  Physical Signs/Symptoms Outcomes:  Biochemical Data, Constipation, Diarrhea, Weight, Skin, Fluid Status or Edema     Discharge Planning:     Too soon to determine     Electronically signed by Epifanio Chavsi RD, LD on 8/9/21 at 1:21 PM EDT    Contact: [phone]

## 2021-08-09 NOTE — PROGRESS NOTES
Hospitalist Progress Note      PCP: Kamille Oliver NP, APRN - NP    Date of Admission: 8/8/2021        Subjective: Improved abdominal pain, no nausea or vomiting, no fever chills      Medications:  Reviewed    Infusion Medications    sodium chloride Stopped (08/09/21 1033)    lactated ringers 125 mL/hr at 08/09/21 1432    dextrose      [START ON 8/10/2021] sodium hypochlorite       Scheduled Medications    aspirin  81 mg Oral Daily    atorvastatin  20 mg Oral Nightly    insulin glargine  16 Units Subcutaneous QAM AC    sodium chloride flush  5-40 mL Intravenous 2 times per day    enoxaparin  40 mg Subcutaneous Daily    cefTRIAXone (ROCEPHIN) IV  1,000 mg Intravenous Daily    metroNIDAZOLE  500 mg Intravenous Q8H    famotidine (PEPCID) injection  20 mg Intravenous BID     PRN Meds: sodium chloride flush, sodium chloride, ondansetron **OR** ondansetron, potassium chloride, magnesium sulfate, HYDROmorphone, glucose, dextrose, glucagon (rDNA), dextrose, [START ON 8/10/2021] sodium hypochlorite      Intake/Output Summary (Last 24 hours) at 8/9/2021 1439  Last data filed at 8/9/2021 1432  Gross per 24 hour   Intake 1770.06 ml   Output 150 ml   Net 1620.06 ml       Physical Exam Performed:    /67   Pulse 60   Temp 99.1 °F (37.3 °C) (Oral)   Resp 17   Ht 6' 2\" (1.88 m)   Wt (!) 300 lb 11.3 oz (136.4 kg)   SpO2 95%   BMI 38.61 kg/m²     General appearance: No apparent distress  Neck: Supple  Respiratory:  Normal respiratory effort. Clear to auscultation, bilaterally without Rales/Wheezes/Rhonchi. Cardiovascular: Regular rate and rhythm with normal S1/S2 without murmurs, rubs or gallops. Abdomen: Soft, non-tender, non-distended with normal bowel sounds. Musculoskeletal: No clubbing, cyanosis.   Wrapped both ankles  Skin: Bilateral feet ulcer  Neurologic: No focal weakness  Psychiatric: Alert and oriented  Capillary Refill: Brisk,3 seconds, normal   Peripheral Pulses: +2 palpable, equal Focal soft tissue wound   along the medial aspect of the forefoot, without radiographic evidence of   osteomyelitis. LEFT FOOT:      No acute osseous abnormality of the left foot. Focal soft tissue wound along   the plantar aspect of the midfoot, without radiographic evidence of   osteomyelitis. XR CHEST (2 VW)   Final Result   CHEST:      Stable chronic cardiomegaly, without acute airspace consolidation or CHF. RIGHT FOOT:      No acute osseous abnormality of the right foot. Focal soft tissue wound   along the medial aspect of the forefoot, without radiographic evidence of   osteomyelitis. LEFT FOOT:      No acute osseous abnormality of the left foot. Focal soft tissue wound along   the plantar aspect of the midfoot, without radiographic evidence of   osteomyelitis. Assessment/Plan:    Active Hospital Problems    Diagnosis     Acute calculous cholecystitis [K80.00]      1. Pancreatic duct stone, improved pain, lipase within normal limits, GI consulted patient had ERCP in the past, discussed with GI team pending further recommendations  2. Calculus cholecystitis, patient on IV Rocephin and Flagyl, will continue for now general surgery consulted  3. Bilateral feet ulcers, podiatry consulted  4. Diabetes mellitus, sliding scale  5. Essential hypertension, continue p.o. medication  6.   Chronic anemia, no obvious bleeding at this time    Diet: Diet NPO  Code Status: Full Code        Dispo -going management    Adonis Juarez MD WDL

## 2021-08-09 NOTE — PLAN OF CARE
Problem: Pain:  Goal: Pain level will decrease  Description: Pain level will decrease  8/9/2021 1112 by Waylon Galan RN  Outcome: Ongoing  8/9/2021 1111 by Waylon Galan RN  Outcome: Ongoing  8/9/2021 0520 by Enmanuel Craft RN  Outcome: Ongoing  Goal: Control of acute pain  Description: Control of acute pain  8/9/2021 1112 by Waylon Galan RN  Outcome: Ongoing  8/9/2021 1111 by Waylon Galan RN  Outcome: Ongoing  8/9/2021 0520 by Enmanuel Craft RN  Outcome: Ongoing  Goal: Control of chronic pain  Description: Control of chronic pain  8/9/2021 1112 by Waylon Galan RN  Outcome: Ongoing  8/9/2021 1111 by Waylon Galan RN  Outcome: Ongoing  8/9/2021 0520 by Enmanuel Craft RN  Outcome: Ongoing     Problem: Skin Integrity:  Goal: Will show no infection signs and symptoms  Description: Will show no infection signs and symptoms  8/9/2021 1112 by Waylon Galan RN  Outcome: Ongoing  8/9/2021 1111 by Waylon Galan RN  Outcome: Ongoing  Goal: Absence of new skin breakdown  Description: Absence of new skin breakdown  8/9/2021 1112 by Waylon Galan RN  Outcome: Ongoing  8/9/2021 1111 by Waylon Galan RN  Outcome: Ongoing     Problem: Falls - Risk of:  Goal: Will remain free from falls  Description: Will remain free from falls  8/9/2021 1112 by Waylon Galan RN  Outcome: Ongoing  8/9/2021 1111 by Waylon Galan RN  Outcome: Ongoing  Goal: Absence of physical injury  Description: Absence of physical injury  8/9/2021 1112 by Waylon Galan RN  Outcome: Ongoing  8/9/2021 1111 by Waylon Galan RN  Outcome: Ongoing

## 2021-08-09 NOTE — PROGRESS NOTES
Patient admitted to room 4104. Alert and oriented x4. Oriented to room and call light. All fall precautions place. Bilateral lower led edema, and wound noted. Wound care consult will be placed. Pt does follow outpatient wound care. Wounds weeping from bilateral lower leg. Open to air. No redness. Otherwise surrounding skin dry and flaky. Bed alarm on. Bedside urinal provided.

## 2021-08-09 NOTE — ACP (ADVANCE CARE PLANNING)
Met with patient and verified all ACP information is correct.      OLLIE Cruz-S, Social Work  [phone]    Electronically signed by BILLY Polo LSW on 8/9/2021 at 12:12 PM

## 2021-08-09 NOTE — CONSULTS
Surgery Consult Note     Hardik Alvarenga PA-C  Pt Name: Valery Kellogg  MRN: [id number]  YOB: 1955  Date of evaluation: 8/9/2021  Primary Care Physician: Amelia Dobson NP, APRN - NP  IMPRESSIONS:   1. Pancreatic duct stone. History of pancreatic duct stones in 2014 that several treatments were don't but all failed to remove entire stone  2. CT scan showed a 3 mm calculus at the ampulla of Vater. NO CBD dilation. 3. WBC count WNL  4. LFTs OK  5. Bilirubin WNL  6. Lipase low: 11  PLANS:   1. Monitor and control pain  2. GI to see and further evaluate  3. Will follow along. May need cholecystectomy in future  SUBJECTIVE:   History of Chief Complaint:    Valery Kellogg is a 77 y.o. male who presented with epigastric abdominal pain. HE stated that the pain began yesterday afternoon. He came into the ER and a CT scan was performed and this showed him to have  An 8 mm pancreatic duct stone with pancreatic duct dilatation and increased atrophy. It also showed a possible 3 mm calculus at the ampulla of Vater, but the CBD is not dilated. He has had pancreatic stones back in 2014 that was unable to be removed so stents were placed. Then the stents were removed in 2015 but they were still unable to remove his pancreatic duct stone. He denies having any other pain. Denies any CP, SOB, cough, lightheadedness or dizziness. Past Medical History  Reviewed  has a past medical history of Diabetes mellitus ([address] Gallstones, Pancreatitis, and Ulcers of both lower legs ([address] Past Surgical History  Reviewed has a past surgical history that includes Foot surgery (Left, 1967) and ERCP (4/8/14). Medications  Prior to Admission medications    Medication Sig Start Date End Date Taking?  Authorizing Provider   insulin glargine (LANTUS SOLOSTAR) 100 UNIT/ML injection pen Inject 20 Units into the skin nightly   Yes Historical Provider, MD   Lactobacillus TABS Take 1 tablet by mouth daily   Yes Historical Provider, MD   Cyanocobalamin (VITAMIN B-12) 50 MCG LOZG Take 50 mcg by mouth daily   Yes Historical Provider, MD   CALCIUM-MAGNESIUM-ZINC PO Take 1 tablet by mouth daily   Yes Historical Provider, MD   lisinopril (PRINIVIL;ZESTRIL) 10 MG tablet Take 10 mg by mouth daily   Yes Historical Provider, MD   acetaminophen (TYLENOL) 500 MG tablet Take 1 tablet by mouth 4 times daily as needed for Pain 6/24/21  Yes Caty Artist, MD   atorvastatin (LIPITOR) 20 MG tablet Take 20 mg by mouth nightly  4/20/21  Yes Historical Provider, MD   tamsulosin (FLOMAX) 0.4 MG capsule Take 0.4 mg by mouth every evening    Yes Historical Provider, MD   aspirin 81 MG EC tablet Take 1 tablet by mouth daily 6/15/20  Yes KENNY John - CNP   spironolactone (ALDACTONE) 25 MG tablet Take 25 mg by mouth daily   Yes Historical Provider, MD   metFORMIN (GLUCOPHAGE) 500 MG tablet Take 500 mg by mouth 2 times daily (with meals)   Yes Historical Provider, MD   Insulin Pen Needle 32G X 4 MM MISC 1 each by Does not apply route daily. 2/6/15   Hossein Muniz MD   Lancets MISC Once daily 2/6/15   Hossein Muniz MD   glucose blood VI test strips (ASCENSIA AUTODISC VI;ONE TOUCH ULTRA TEST VI) strip 1 each by In Vitro route daily. As needed. 2/6/15   Hossein Muniz MD   glucose monitoring kit (FREESTYLE) monitoring kit 1 kit by Does not apply route daily as needed.  2/6/15   Hossein Muniz MD    Scheduled Meds:   aspirin  81 mg Oral Daily    atorvastatin  20 mg Oral Nightly    insulin glargine  16 Units Subcutaneous QAM AC    sodium chloride flush  5-40 mL Intravenous 2 times per day    enoxaparin  40 mg Subcutaneous Daily    cefTRIAXone (ROCEPHIN) IV  1,000 mg Intravenous Daily    metroNIDAZOLE  500 mg Intravenous Q8H    famotidine (PEPCID) injection  20 mg Intravenous BID     Continuous Infusions:   sodium chloride 25 mL (08/09/21 0932)    lactated ringers 125 mL/hr at 08/09/21 1314    dextrose       PRN Meds:.sodium chloride flush, sodium chloride, ondansetron **OR** ondansetron, potassium chloride, magnesium sulfate, HYDROmorphone, glucose, dextrose, glucagon (rDNA), dextrose  Allergies  has No Known Allergies. Family History  Reviewedfamily history includes Colon Cancer in his mother; Lung Cancer in his father. Social History   reports that he has never smoked. He has never used smokeless tobacco. He reports that he does not drink alcohol and does not use drugs. EDUCATION  Patient educated about their illness/diagnosis, stated above, and all questions answered. We discussed the importance of nutrition, medications they are taking, and healthy lifestyle. Review of Systems:  General Denies any fever or chills  HEENT Denies any diplopia, tinnitus or vertigo  Resp Denies any shortness of breath, cough or wheezing  Cardiac Denies any chest pain, palpitations, claudication or edema  GI Denies any melena, hematochezia, hematemesis or pyrosis   Denies any frequency, urgency, hesitancy or incontinence  Heme Denies bruising or bleeding easily  Neuro Denies any focal motor or sensory deficits  OBJECTIVE:   VITALS:  height is 6' 2\" (1.88 m) and weight is 300 lb 11.3 oz (136.4 kg) (abnormal). His oral temperature is 98 °F (36.7 °C). His blood pressure is 114/67 and his pulse is 44 (abnormal). His respiration is 16 and oxygen saturation is 98%. CONSTITUTIONAL: Alert and oriented times 3, no acute distress and cooperative to examination with proper mood and affect. SKIN: Skin color, texture, turgor normal. No rashes or lesions. LYMPH: no cervical nodes, no inguinal nodes  HEENT: Head is normocephalic, atraumatic. EOMI, PERRLA. NECK: Supple, symmetrical, trachea midline, no adenopathy, thyroid symmetric, not enlarged and no tenderness, skin normal.  CHEST/LUNGS: chest symmetric with normal A/P diameter, normal respiratory rate and rhythm   CARDIOVASCULAR: Bradycardia  ABDOMEN: Normal shape. Tenderness:epigastric. No rebound or guarding.    RECTAL: appendicitis; no ureter calculus or   hydronephrosis. Thank you for the interesting evaluation. Further recommendations to follow. Araceli Arnold  General and Vascular Surgery [phone]  Electronically signed by CONNIE Saldana on 8/9/2021 at 2:26 PM    Attending      As per note above by Coy San  Patient was personally seen and examined by me today  Chart, labs and imaging reviewed    A/P  Acute cholecystitis   For Laparoscopic Cholecystectomy with Cholangiogram   Continue antibiotics    Possible choledocholithiasis   No sign of biliary obstruction   Will evaluate with cholangiogram    Pancreatic duct stone   Chronic   No intervention per GI    Electronically signed by Anthony Driscoll MD on 8/9/2021 at 5:24 PM

## 2021-08-09 NOTE — CARE COORDINATION
INITIAL CASE MANAGEMENT ASSESSMENT    Reviewed chart, met with patient to assess possible discharge needs. Explained Case Management role/services. Living Situation: confirmed address, lives alone in home on main level, [place] with 6[address]8 DONNY    ADLs: homebound, ambulates with cane      DME: cane, walker, raised toilet seat, bedside commode, wheelchair     PT/OT Recs: not ordered     Active Services: visiting physician through [address], [address], [facility] (M[facility], Aide 1.5 hours weekly), confirmed active with home care through [facility]     Transportation: patient will need stretcher transport home     Medications: RX delivered, no issues     PCP: Denice Hitchcock 92     PLAN/COMMENTS: plan is for patient to return home with active services, will need HHC resumption orders, discussed possible SNF placement for patient who refuses any ECF placement at this time     SW/CM provided contact information for patient or family to call with any questions. SW/CM will follow and assist as needed.     Cr CERVANTES, OLLIE-S, Social Work  [phone]    Electronically signed by BILLY Richey, JUDY on 8/9/2021 at 12:12 PM

## 2021-08-09 NOTE — DISCHARGE INSTR - COC
Continuity of Care Form    Patient Name: Sreedhar Montesinos   :  1955  MRN:  [id number]    Admit date:  2021  Discharge date:  ***    Code Status Order: Full Code   Advance Directives:      Admitting Physician:  Regina Dooley MD  PCP: Carlton Turcios NP, APRN - NP    Discharging Nurse: [facility] Unit/Room#: K8S-0143/8269-65  Discharging Unit Phone Number: ***    Emergency Contact:   Extended Emergency Contact Information  Primary Emergency Contact: eneida finch  Home Phone: [phone]  Mobile Phone: [phone]  Relation: Other  Preferred language: English    Past Surgical History:  Past Surgical History:   Procedure Laterality Date    ERCP  14    LITHOTRYPSY & PANCREATIC STENT PLACEMENT    FOOT SURGERY Left        Immunization History:   Immunization History   Administered Date(s) Administered    COVID-19, J&J, PF, 0.5 mL 2021       Active Problems:  Patient Active Problem List   Diagnosis Code    Abdominal pain, acute R10.9    BPH (benign prostatic hyperplasia) N40.0    RLQ abdominal pain R10.31    Abnormal ECG R94.31    HTN (hypertension) I10    Diabetes mellitus type II, uncontrolled ([address] E11.65    Diabetic foot infection ([address] E11.628, L08.9    Fever R50.9    Leukocytosis D72.829    Charcot foot due to diabetes mellitus ([address] E11.610    Foot ulceration, left, with unspecified severity ([address] L97.529    Demand ischemia ([address] I24.8    Controlled type 2 diabetes mellitus with hyperglycemia, with long-term current use of insulin (HCC) E11.65, Z79.4    Sepsis ([address] A41.9    Diarrhea R19.7    Chronic osteoarthritis M19.90    Chest pain R07.9    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    Atrioventricular block, Mobitz type 1, Wenckebach I44.1    Vertigo R42    Microcytic anemia D50.9    Acute calculous cholecystitis K80.00       Isolation/Infection:   Isolation            No Isolation          Patient Infection Status       Infection Onset Added Last Indicated Last Indicated By Review Planned Expiration Resolved Resolved By    None active    Resolved    COVID-19 Rule Out 08/08/21 08/08/21 08/08/21 COVID-19, Rapid (Ordered)   08/08/21 Rule-Out Test Resulted    COVID-19 Rule Out 11/26/20 11/26/20 11/26/20 COVID-19 (Ordered)   11/26/20 Rule-Out Test Resulted    C-diff Rule Out 07/31/20 07/31/20 07/31/20 Clostridium Difficile Toxin/Antigen (Ordered)   07/31/20 Rule-Out Test Resulted    C-diff Rule Out 07/31/20 07/31/20 07/31/20 Clostridium Difficile Toxin/Antigen (Ordered)   07/31/20 Rule-Out Test Resulted    COVID-19 Rule Out 06/06/20 06/06/20 06/06/20 COVID-19 (Ordered)   06/06/20 Rule-Out Test Resulted            Nurse Assessment:  Last Vital Signs: /67   Pulse (!) 44   Temp 98 °F (36.7 °C) (Oral)   Resp 16   Ht 6' 2\" (1.88 m)   Wt (!) 300 lb 11.3 oz (136.4 kg)   SpO2 98%   BMI 38.61 kg/m²     Last documented pain score (0-10 scale): Pain Level: 0  Last Weight:   Wt Readings from Last 1 Encounters:   08/09/21 (!) 300 lb 11.3 oz (136.4 kg)     Mental Status:  oriented and alert    IV Access:  - None    Nursing Mobility/ADLs:  Walking   Assisted  Transfer  Assisted  Bathing  Assisted  Dressing  Assisted  Toileting  Assisted  Feeding  Independent  Med Admin  Assisted  Med Delivery   whole    Wound Care Documentation and Therapy:  Incision 04/04/14 Toe (Comment  which one) Right (Active)   Number of days: 2684       Wound 08/09/21 Foot Left; Inner (Active)   Wound Etiology Diabetic 08/09/21 1114   Dressing Status Old drainage noted 08/09/21 1114   Wound Cleansed Not Cleansed 08/09/21 1114   Dressing/Treatment Open to air 08/09/21 1114   Drainage Amount Small 08/09/21 0622   Drainage Description Purulent 08/09/21 0622   Odor Malodorous/putrid 08/09/21 0622   Tiffanie-wound Assessment Dry/flaky 08/09/21 0622   Margins Defined edges 08/09/21 0622   Wound Thickness Description not for Pressure Injury Full thickness 08/09/21 0622   Number of days: 0 Wound 07/31/20 Heel Left (Active)   Number of days: 373       Wound 11/27/20 Foot Left;Plantar (Active)   Number of days: 254       Wound 08/09/21 Foot Right (Active)   Wound Etiology Diabetic 08/09/21 1114   Dressing Status Old drainage noted 08/09/21 1114   Wound Cleansed Not Cleansed 08/09/21 1114   Dressing/Treatment Open to air 08/09/21 1114   Drainage Amount Small 08/09/21 1114   Drainage Description Purulent 08/09/21 1114   Odor Malodorous/putrid 08/09/21 1114   Tiffanie-wound Assessment Dry/flaky 08/09/21 1114   Margins Defined edges 08/09/21 1114   Wound Thickness Description not for Pressure Injury Full thickness 08/09/21 1114   Number of days: 0       Wound 06/23/21 Foot Left; Inner (Active)   Number of days: 47        Elimination:  Continence:   · Bowel: Yes  · Bladder: Yes  Urinary Catheter: None   Colostomy/Ileostomy/Ileal Conduit: No       Date of Last BM: ***    Intake/Output Summary (Last 24 hours) at 8/9/2021 1214  Last data filed at 8/9/2021 0834  Gross per 24 hour   Intake 500 ml   Output 150 ml   Net 350 ml     I/O last 3 completed shifts: In: 500 [IV Piggyback:500]  Out: -     Safety Concerns: At Risk for Falls    Impairments/Disabilities:      None    Nutrition Therapy:  Current Nutrition Therapy:   - Oral Diet:  Carb Control 4 carbs/meal (1800kcals/day)    Routes of Feeding: Oral  Liquids: Thin Liquids  Daily Fluid Restriction: no  Last Modified Barium Swallow with Video (Video Swallowing Test): not done    Treatments at the Time of Hospital Discharge:   Respiratory Treatments:   Oxygen Therapy:  is not on home oxygen therapy.   Ventilator:    - No ventilator support    Rehab Therapies: Physical Therapy and Occupational Therapy  Weight Bearing Status/Restrictions: No weight bearing restirctions  Other Medical Equipment (for information only, NOT a DME order):  wheelchair, walker and hospital bed  Other Treatments: ***    Patient's personal belongings (please select all that are sent with patient):  {[facility] Belongings:[id number]}    RN SIGNATURE:  Electronically signed by Geovanna Arriaza RN on 8/13/21 at 2:48 PM EDT    CASE MANAGEMENT/SOCIAL WORK SECTION    Inpatient Status Date: 08/08/2021    Readmission Risk Assessment Score:  Readmission Risk              Risk of Unplanned Readmission:  15           Discharging to Facility/ Agency   · Name: [facility]  · Address:  [address] [place] [address]   · Phone:  [phone]  · Fax:  [phone]    / signature: Electronically signed by IMANI Tejada on 8/13/2021 at 12:07 PM    PHYSICIAN SECTION    Prognosis: Good    Condition at Discharge: Stable    Rehab Potential (if transferring to Rehab): Good    Recommended Labs or Other Treatments After Discharge: PT, OT, visiting nurse,  consult, wound care. Follow up with PCP in 1 week  Follow up with podiatry in 1 week. Follow up with GS in 1 week  follow up with GI in 2 weeks. Physician Certification: I certify the above information and transfer of Deshaun Mccarthy  is necessary for the continuing treatment of the diagnosis listed and that he requires Home Care for greater 30 days.      Update Admission H&P: No change in H&P    PHYSICIAN SIGNATURE:  Electronically signed by Xiomara Reed MD on 8/13/21 at 11:22 AM EDT

## 2021-08-09 NOTE — CONSULTS
GASTROENTEROLOGY INPATIENT CONSULTATION        IDENTIFYING DATA/REASON FOR CONSULTATION   PATIENT:  Ortiz Dooley  MRN:  [id number]  ADMIT DATE: 8/8/2021  TIME OF EVALUATION: 8/9/2021 12:03 PM  HOSPITAL STAY:   LOS: 1 day     REASON FOR CONSULTATION:  Pancreatic duct calculus      HISTORY OF PRESENT ILLNESS   Ortiz Dooley is a 77 y.o. male with a PMH of DM, HTN, HLD who presented on 8/8/2021 with acute onset nausea, vomiting, abdominal pain. Symptoms started yesterday afternoon shortly after eating lunch. Pain located in the epigastric area without radiation. He had a temperature of 100.9. He reports he has had similar episodes occurring every couple months ever since 2014. Has known pancreatic ductal stone. He underwent 2 ERCPs and 2014 with Dr. Lolita Wilks. The initial ERCP was unsuccessful at removing the stone while utilizing a basket, balloon and stent. He therefore underwent a repeat ERCP with pancreatoscopy and lithotripsy. The stone was only able to be partially fragmented. A stent was placed with the proximal tip in the stone. He later had the stent removed in 2015. He has had no follow-up since. CT scan now shows an 8 mm pancreatic duct stone with pancreatic ductal dilation and increased atrophy. There was also a 3 mm calculus at the ampulla Vater however no extra or intrahepatic biliary ductal dilation. He also had evidence of acute cholecystitis with mild gallbladder wall thickening and pericholecystic inflammatory changes. His blood work shows normal lipase, normal LFTs, normal white count. Patient takes a baby aspirin otherwise he is not on any long-term anticoagulation or antiplatelet therapy. He has been placed on Flagyl and Rocephin. General surgery has been consulted as well for cholecystitis. Prior Endoscopic Evaluations:  EGD with pancreatic stent removal 2/5/15 with Dr. Forrest Iraheta  1) LA class C reflux esophagitis    2) Small sliding hiatal hernia.     3) Moderate gastritis noted. No ulcers were seen. Biopsies were obtained from the antrum and body of the stomach to rule out active gastritis and H.pylori gastritis. Some retained food noted in the body of the stomach. 4) Normal duodenal bulb. 5) Retained pancreatic duct stent noted. This was retrieved with a rat tooth forceps and withdrawn through the mouth. ERCP 4/8/2014 with Dr. Howie Diop  1. ERCP with pancreatoscopy and electrohydraulic lithotripsy with partial stone fragmentation but could not break up the entire stone. Stent placed with proximal tip in the stone. ERCP 4/2/14 with Dr. Howie Diop  1. Pancreatic duct stone s/p pancreatic sphincterotomy. I could not remove the stone as it was so lodged in the duct that I could not get any tools above the stone. 2.  Reflux esophagitis. 3.  Pyloric narrowing s/p balloon dilation. 4.  Duodenal erosions.        PAST MEDICAL, SURGICAL, FAMILY, and SOCIAL HISTORY     Past Medical History:   Diagnosis Date    Diabetes mellitus ([address]     Gallstones     Pancreatitis     Ulcers of both lower legs ([address]     bilateral feet     Past Surgical History:   Procedure Laterality Date    ERCP  4/8/14    LITHOTRYPSY & PANCREATIC STENT PLACEMENT    FOOT SURGERY Left 1967     Family History   Problem Relation Age of Onset    Colon Cancer Mother         PVD s/p toe amputation by Dr Reji Swan Father      Social History     Socioeconomic History    Marital status: Single     Spouse name: None    Number of children: None    Years of education: None    Highest education level: None   Occupational History    None   Tobacco Use    Smoking status: Never Smoker    Smokeless tobacco: Never Used   Vaping Use    Vaping Use: Never used   Substance and Sexual Activity    Alcohol use: No    Drug use: No    Sexual activity: Not Currently   Other Topics Concern    None   Social History Narrative    None     Social Determinants of Health     Financial Resource Strain:     Difficulty of Paying Living Expenses:    Food Insecurity:     Worried About Running Out of Food in the Last Year:     [address] in the Last Year:    Transportation Needs:     Lack of Transportation (Medical):      Lack of Transportation (Non-Medical):    Physical Activity:     Days of Exercise per Week:     Minutes of Exercise per Session:    Stress:     Feeling of Stress :    Social Connections:     Frequency of Communication with Friends and Family:     Frequency of Social Gatherings with Friends and Family:     Attends [employer] Services:     Active Member of Clubs or Organizations:     Attends Club or Organization Meetings:     Marital Status:    Intimate Partner Violence:     Fear of Current or Ex-Partner:     Emotionally Abused:     Physically Abused:     Sexually Abused:        MEDICATIONS   SCHEDULED:  aspirin, 81 mg, Daily  atorvastatin, 20 mg, Nightly  insulin glargine, 16 Units, QAM AC  sodium chloride flush, 5-40 mL, 2 times per day  enoxaparin, 40 mg, Daily  cefTRIAXone (ROCEPHIN) IV, 1,000 mg, Daily  metroNIDAZOLE, 500 mg, Q8H  famotidine (PEPCID) injection, 20 mg, BID      FLUIDS/DRIPS:     sodium chloride 25 mL (08/09/21 0932)    lactated ringers 125 mL/hr at 08/09/21 1036    dextrose       PRNs: sodium chloride flush, 5-40 mL, PRN  sodium chloride, 25 mL, PRN  ondansetron, 4 mg, Q8H PRN   Or  ondansetron, 4 mg, Q6H PRN  potassium chloride, 10 mEq, PRN  magnesium sulfate, 2,000 mg, PRN  HYDROmorphone, 0.5 mg, Q3H PRN  glucose, 15 g, PRN  dextrose, 12.5 g, PRN  glucagon (rDNA), 1 mg, PRN  dextrose, 100 mL/hr, PRN      ALLERGIES:  He No Known Allergies    REVIEW OF SYSTEMS   Pertinent ROS noted in HPI    PHYSICAL EXAM     Vitals:    08/08/21 2201 08/09/21 0030 08/09/21 0355 08/09/21 0802   BP: (!) 114/56 97/60 114/67    Pulse: 63 55 (!) 44    Resp: 16 16 16 16   Temp:  98 °F (36.7 °C) 98 °F (36.7 °C)    TempSrc:  Oral Oral    SpO2:   99% 98%   Weight:   (!) 300 lb of the left foot. Focal soft tissue wound along   the plantar aspect of the midfoot, without radiographic evidence of   osteomyelitis. XR FOOT RIGHT (MIN 3 VIEWS)   Final Result   CHEST:      Stable chronic cardiomegaly, without acute airspace consolidation or CHF. RIGHT FOOT:      No acute osseous abnormality of the right foot. Focal soft tissue wound   along the medial aspect of the forefoot, without radiographic evidence of   osteomyelitis. LEFT FOOT:      No acute osseous abnormality of the left foot. Focal soft tissue wound along   the plantar aspect of the midfoot, without radiographic evidence of   osteomyelitis. XR CHEST (2 VW)   Final Result   CHEST:      Stable chronic cardiomegaly, without acute airspace consolidation or CHF. RIGHT FOOT:      No acute osseous abnormality of the right foot. Focal soft tissue wound   along the medial aspect of the forefoot, without radiographic evidence of   osteomyelitis. LEFT FOOT:      No acute osseous abnormality of the left foot. Focal soft tissue wound along   the plantar aspect of the midfoot, without radiographic evidence of   osteomyelitis. ASSESSMENT AND RECOMMENDATIONS   77 y.o. male with a PMH of  PMH of DM, HTN, HLD who presented on 8/8/2021 with acute onset nausea, vomiting, abdominal pain. CT scan shows  8 mm pancreatic duct stone with pancreatic ductal dilation and increased atrophy. There was also a 3 mm calculus at the ampulla Vater however no extra or intrahepatic biliary ductal dilation. He also had evidence of acute cholecystitis with mild gallbladder wall thickening and pericholecystic inflammatory changes    IMPRESSION:  1. Pancreatic duct stone  -lipase and lfts normal.    -present since 2014. Underwent ERCP x2. The initial ERCP was unsuccessful at removing the stone while utilizing a basket, balloon and stent. He therefore underwent a repeat ERCP with pancreatoscopy and lithotripsy.   The stone was only able to be partially fragmented. 2. Acute cholecystitis  -gen surgery following, considering cholecystectomy  -on cipro and rocephin      RECOMMENDATIONS:    Will have Dr. Celena Torres review imaging. Please await for further input and recommendations      If you have any questions or need any further information, please feel free to contact our consult team.  Thank you for allowing us to participate in the care of Vincent Carvajal. The note was completed using Dragon voice recognition transcription. Every effort was made to ensure accuracy; however, inadvertent transcription errors may be present despite my best efforts to edit errors. Radha Grnaados PA-C    Attending physician addendum:      I have personally seen and examined the patient, reviewed the patient's medical record and pertinent labs and clinical imaging. I have personally staffed the case with CONNIE Elias. I agree with her consultation note, exam findings, assessment and plans  as written above. I have made appropriate modifications and edited her assessment and plan where needed to reflect my impression and plans for this patient. This is a 47-year-old  male with a history of debility, diabetes, hypertension, hyperlipidemia, idiopathic pancreatitis, a known history of pancreatic ductal stone, who presented with abrupt onset of nausea vomiting and right upper quadrant abdominal pain after eating lunch yesterday. He had a fever of 100.9. He reports some episodic similar episodes of pain since 2014. He had a previous attempted ERCP x2 in 2014. He had failed attempts at relieving the pancreatic ductal stone and previous pancreatoscopy and pancreatic ductal stenting. He has had attempts at lithotripsy but has not had extracorporeal lithotripsy. He was reportedly referred to [facility] but never followed up with this. The patient was lost to follow-up.   He had a repeat EGD in 2015 to remove a pancreatic ductal stent. He has a prior history of esophagitis and gastritis. The patient's lipase was normal on admission. The patient had a CT scan showing a pancreatic ductal stone measuring 8 mm with proximal pancreatic ductal dilatation and atrophic pancreas. There is also a 3 mm calculus noted at the ampulla Vater without any evidence of extra or intrahepatic biliary ductal dilatation. The patient had normal LFTs without any evidence of leukocytosis. Lipase was normal.  He has been placed empirically on Rocephin and Flagyl. The patient did have findings of gallbladder wall thickening and some pericholecystic inflammatory changes around the gallbladder consistent with acute cholecystitis    /67   Pulse 60   Temp 99.1 °F (37.3 °C) (Oral)   Resp 17   Ht 6' 2\" (1.88 m)   Wt (!) 300 lb 11.3 oz (136.4 kg)   SpO2 95%   BMI 38.61 kg/m²      GEN- NAD  CV- RRR  Lungs CTAB  Abd- RUQ TTP    Labs and CT reviewed    1) Acute cholecystitis  2) Hx of idiopathic pancreatitis- 8mm calcific stone in PD with dilation but no pancreatitis. 3) ? CBD stone- No evidence for high grade obstruction of cholangitis    Plan:  Lap cale with IOC  Continue abx  Patient to follow up with Dr. Malissa Ramires outpatient. May need consideration of re-attempt at lithotripsy vs referral to [facility] for [facility]. Will follow. Thank you for allowing me to participate in this patient's care. If there are any questions or concerns regarding this patient, or the plan we have set in place, please feel free to contact me at [phone].      Jayla Lucas,

## 2021-08-09 NOTE — PLAN OF CARE
Nutrition Problem #1: Inadequate oral intake  Intervention: Food and/or Nutrient Delivery: Start Oral Diet (when appropriate)  Nutritional Goals: tolerate most appropriate form of nutrition

## 2021-08-09 NOTE — PLAN OF CARE
Problem: Pain:  Goal: Pain level will decrease  Description: Pain level will decrease  8/9/2021 1112 by Sky Toney RN  Outcome: Ongoing  8/9/2021 1112 by Sky Toney RN  Outcome: Ongoing  8/9/2021 1111 by Sky Toney RN  Outcome: Ongoing  8/9/2021 0520 by Mayra Christianson RN  Outcome: Ongoing  Goal: Control of acute pain  Description: Control of acute pain  8/9/2021 1112 by Sky Toney RN  Outcome: Ongoing  8/9/2021 1112 by Sky Toney RN  Outcome: Ongoing  8/9/2021 1111 by Sky Toney RN  Outcome: Ongoing  8/9/2021 0520 by Mayra Christianson RN  Outcome: Ongoing  Goal: Control of chronic pain  Description: Control of chronic pain  8/9/2021 1112 by Sky Toney RN  Outcome: Ongoing  8/9/2021 1112 by Sky Toney RN  Outcome: Ongoing  8/9/2021 1111 by Sky Toney RN  Outcome: Ongoing  8/9/2021 0520 by Mayra Christianson RN  Outcome: Ongoing     Problem: Skin Integrity:  Goal: Will show no infection signs and symptoms  Description: Will show no infection signs and symptoms  8/9/2021 1112 by Sky Toney RN  Outcome: Ongoing  8/9/2021 1112 by Sky Toney RN  Outcome: Ongoing  8/9/2021 1111 by Sky Toney RN  Outcome: Ongoing  Goal: Absence of new skin breakdown  Description: Absence of new skin breakdown  8/9/2021 1112 by Sky Toney RN  Outcome: Ongoing  8/9/2021 1112 by Sky Toney RN  Outcome: Ongoing  8/9/2021 1111 by Sky Toney RN  Outcome: Ongoing     Problem: Falls - Risk of:  Goal: Will remain free from falls  Description: Will remain free from falls  8/9/2021 1112 by Sky Toney RN  Outcome: Ongoing  8/9/2021 1112 by Sky Toney RN  Outcome: Ongoing  8/9/2021 1111 by Sky Toney RN  Outcome: Ongoing  Goal: Absence of physical injury  Description: Absence of physical injury  8/9/2021 1112 by Sky Toney RN  Outcome: Ongoing  8/9/2021 1112 by Sky Toney RN  Outcome: Ongoing  8/9/2021 1111 by Julia Steiner RN  Outcome: Ongoing

## 2021-08-09 NOTE — CARE COORDINATION
Spoke with Dr. Robert Conrad regarding patient bilateral foot wounds. Dressings just changed today by podiatry. Will plan for veraflo vac therapy tomorrow with [facility].    Electronically signed by Antonia Hairston RN on 8/9/2021 at 2:22 PM

## 2021-08-10 ENCOUNTER — ANESTHESIA (OUTPATIENT)
Dept: OPERATING ROOM | Age: 66
DRG: 419 | End: 2021-08-10
Payer: MEDICARE

## 2021-08-10 ENCOUNTER — APPOINTMENT (OUTPATIENT)
Dept: GENERAL RADIOLOGY | Age: 66
DRG: 419 | End: 2021-08-10
Payer: MEDICARE

## 2021-08-10 VITALS
DIASTOLIC BLOOD PRESSURE: 83 MMHG | TEMPERATURE: 97.9 F | OXYGEN SATURATION: 100 % | RESPIRATION RATE: 23 BRPM | SYSTOLIC BLOOD PRESSURE: 171 MMHG

## 2021-08-10 LAB
GLUCOSE BLD-MCNC: 126 MG/DL (ref 70–99)
GLUCOSE BLD-MCNC: 176 MG/DL (ref 70–99)
GLUCOSE BLD-MCNC: 293 MG/DL (ref 70–99)
GLUCOSE BLD-MCNC: 95 MG/DL (ref 70–99)
GLUCOSE BLD-MCNC: 96 MG/DL (ref 70–99)
GLUCOSE BLD-MCNC: 99 MG/DL (ref 70–99)
PERFORMED ON: ABNORMAL
PERFORMED ON: NORMAL

## 2021-08-10 PROCEDURE — 3600000014 HC SURGERY LEVEL 4 ADDTL 15MIN: Performed by: SURGERY

## 2021-08-10 PROCEDURE — 47563 LAPARO CHOLECYSTECTOMY/GRAPH: CPT | Performed by: SURGERY

## 2021-08-10 PROCEDURE — 2500000003 HC RX 250 WO HCPCS: Performed by: SURGERY

## 2021-08-10 PROCEDURE — 6360000002 HC RX W HCPCS: Performed by: ANESTHESIOLOGY

## 2021-08-10 PROCEDURE — 1200000000 HC SEMI PRIVATE

## 2021-08-10 PROCEDURE — 2580000003 HC RX 258: Performed by: SURGERY

## 2021-08-10 PROCEDURE — 6360000002 HC RX W HCPCS

## 2021-08-10 PROCEDURE — 3600000004 HC SURGERY LEVEL 4 BASE: Performed by: SURGERY

## 2021-08-10 PROCEDURE — 88304 TISSUE EXAM BY PATHOLOGIST: CPT

## 2021-08-10 PROCEDURE — 7100000000 HC PACU RECOVERY - FIRST 15 MIN: Performed by: SURGERY

## 2021-08-10 PROCEDURE — BF121ZZ FLUOROSCOPY OF GALLBLADDER USING LOW OSMOLAR CONTRAST: ICD-10-PCS | Performed by: SURGERY

## 2021-08-10 PROCEDURE — 2720000010 HC SURG SUPPLY STERILE: Performed by: SURGERY

## 2021-08-10 PROCEDURE — 6370000000 HC RX 637 (ALT 250 FOR IP): Performed by: SURGERY

## 2021-08-10 PROCEDURE — 3700000000 HC ANESTHESIA ATTENDED CARE: Performed by: SURGERY

## 2021-08-10 PROCEDURE — 7100000001 HC PACU RECOVERY - ADDTL 15 MIN: Performed by: SURGERY

## 2021-08-10 PROCEDURE — 2709999900 HC NON-CHARGEABLE SUPPLY: Performed by: SURGERY

## 2021-08-10 PROCEDURE — 2580000003 HC RX 258

## 2021-08-10 PROCEDURE — 74300 X-RAY BILE DUCTS/PANCREAS: CPT

## 2021-08-10 PROCEDURE — 6360000002 HC RX W HCPCS: Performed by: INTERNAL MEDICINE

## 2021-08-10 PROCEDURE — 2500000003 HC RX 250 WO HCPCS: Performed by: INTERNAL MEDICINE

## 2021-08-10 PROCEDURE — 2580000003 HC RX 258: Performed by: INTERNAL MEDICINE

## 2021-08-10 PROCEDURE — 3700000001 HC ADD 15 MINUTES (ANESTHESIA): Performed by: SURGERY

## 2021-08-10 PROCEDURE — 0FT44ZZ RESECTION OF GALLBLADDER, PERCUTANEOUS ENDOSCOPIC APPROACH: ICD-10-PCS | Performed by: SURGERY

## 2021-08-10 PROCEDURE — 2500000003 HC RX 250 WO HCPCS

## 2021-08-10 PROCEDURE — 94761 N-INVAS EAR/PLS OXIMETRY MLT: CPT

## 2021-08-10 PROCEDURE — 6360000004 HC RX CONTRAST MEDICATION: Performed by: SURGERY

## 2021-08-10 RX ORDER — MEPERIDINE HYDROCHLORIDE 25 MG/ML
12.5 INJECTION INTRAMUSCULAR; INTRAVENOUS; SUBCUTANEOUS
Status: DISCONTINUED | OUTPATIENT
Start: 2021-08-10 | End: 2021-08-10 | Stop reason: HOSPADM

## 2021-08-10 RX ORDER — BUPIVACAINE HYDROCHLORIDE 5 MG/ML
INJECTION, SOLUTION EPIDURAL; INTRACAUDAL
Status: COMPLETED | OUTPATIENT
Start: 2021-08-10 | End: 2021-08-10

## 2021-08-10 RX ORDER — PROPOFOL 10 MG/ML
INJECTION, EMULSION INTRAVENOUS PRN
Status: DISCONTINUED | OUTPATIENT
Start: 2021-08-10 | End: 2021-08-10 | Stop reason: SDUPTHER

## 2021-08-10 RX ORDER — OXYCODONE HYDROCHLORIDE 10 MG/1
10 TABLET ORAL EVERY 4 HOURS PRN
Status: DISCONTINUED | OUTPATIENT
Start: 2021-08-10 | End: 2021-08-13 | Stop reason: HOSPADM

## 2021-08-10 RX ORDER — FENTANYL CITRATE 50 UG/ML
25 INJECTION, SOLUTION INTRAMUSCULAR; INTRAVENOUS EVERY 5 MIN PRN
Status: DISCONTINUED | OUTPATIENT
Start: 2021-08-10 | End: 2021-08-10 | Stop reason: HOSPADM

## 2021-08-10 RX ORDER — HYDRALAZINE HYDROCHLORIDE 20 MG/ML
5 INJECTION INTRAMUSCULAR; INTRAVENOUS EVERY 10 MIN PRN
Status: DISCONTINUED | OUTPATIENT
Start: 2021-08-10 | End: 2021-08-10 | Stop reason: HOSPADM

## 2021-08-10 RX ORDER — DEXAMETHASONE SODIUM PHOSPHATE 4 MG/ML
INJECTION, SOLUTION INTRA-ARTICULAR; INTRALESIONAL; INTRAMUSCULAR; INTRAVENOUS; SOFT TISSUE PRN
Status: DISCONTINUED | OUTPATIENT
Start: 2021-08-10 | End: 2021-08-10 | Stop reason: SDUPTHER

## 2021-08-10 RX ORDER — ONDANSETRON 2 MG/ML
INJECTION INTRAMUSCULAR; INTRAVENOUS PRN
Status: DISCONTINUED | OUTPATIENT
Start: 2021-08-10 | End: 2021-08-10 | Stop reason: SDUPTHER

## 2021-08-10 RX ORDER — MIDAZOLAM HYDROCHLORIDE 1 MG/ML
INJECTION INTRAMUSCULAR; INTRAVENOUS PRN
Status: DISCONTINUED | OUTPATIENT
Start: 2021-08-10 | End: 2021-08-10 | Stop reason: SDUPTHER

## 2021-08-10 RX ORDER — SODIUM CHLORIDE, SODIUM LACTATE, POTASSIUM CHLORIDE, CALCIUM CHLORIDE 600; 310; 30; 20 MG/100ML; MG/100ML; MG/100ML; MG/100ML
INJECTION, SOLUTION INTRAVENOUS CONTINUOUS PRN
Status: DISCONTINUED | OUTPATIENT
Start: 2021-08-10 | End: 2021-08-10 | Stop reason: SDUPTHER

## 2021-08-10 RX ORDER — FENTANYL CITRATE 50 UG/ML
INJECTION, SOLUTION INTRAMUSCULAR; INTRAVENOUS PRN
Status: DISCONTINUED | OUTPATIENT
Start: 2021-08-10 | End: 2021-08-10 | Stop reason: SDUPTHER

## 2021-08-10 RX ORDER — FENTANYL CITRATE 50 UG/ML
50 INJECTION, SOLUTION INTRAMUSCULAR; INTRAVENOUS EVERY 5 MIN PRN
Status: DISCONTINUED | OUTPATIENT
Start: 2021-08-10 | End: 2021-08-10 | Stop reason: HOSPADM

## 2021-08-10 RX ORDER — HYDROCODONE BITARTRATE AND ACETAMINOPHEN 5; 325 MG/1; MG/1
2 TABLET ORAL PRN
Status: DISCONTINUED | OUTPATIENT
Start: 2021-08-10 | End: 2021-08-10 | Stop reason: HOSPADM

## 2021-08-10 RX ORDER — PROMETHAZINE HYDROCHLORIDE 25 MG/ML
6.25 INJECTION, SOLUTION INTRAMUSCULAR; INTRAVENOUS
Status: DISCONTINUED | OUTPATIENT
Start: 2021-08-10 | End: 2021-08-10 | Stop reason: HOSPADM

## 2021-08-10 RX ORDER — ONDANSETRON 2 MG/ML
4 INJECTION INTRAMUSCULAR; INTRAVENOUS
Status: COMPLETED | OUTPATIENT
Start: 2021-08-10 | End: 2021-08-10

## 2021-08-10 RX ORDER — ROCURONIUM BROMIDE 10 MG/ML
INJECTION, SOLUTION INTRAVENOUS PRN
Status: DISCONTINUED | OUTPATIENT
Start: 2021-08-10 | End: 2021-08-10 | Stop reason: SDUPTHER

## 2021-08-10 RX ORDER — OXYCODONE HYDROCHLORIDE 5 MG/1
5 TABLET ORAL EVERY 4 HOURS PRN
Status: DISCONTINUED | OUTPATIENT
Start: 2021-08-10 | End: 2021-08-13 | Stop reason: HOSPADM

## 2021-08-10 RX ORDER — PROMETHAZINE HYDROCHLORIDE 25 MG/ML
12.5 INJECTION, SOLUTION INTRAMUSCULAR; INTRAVENOUS ONCE
Status: DISCONTINUED | OUTPATIENT
Start: 2021-08-10 | End: 2021-08-10

## 2021-08-10 RX ORDER — SUCCINYLCHOLINE/SOD CL,ISO/PF 200MG/10ML
SYRINGE (ML) INTRAVENOUS PRN
Status: DISCONTINUED | OUTPATIENT
Start: 2021-08-10 | End: 2021-08-10 | Stop reason: SDUPTHER

## 2021-08-10 RX ORDER — PROMETHAZINE HYDROCHLORIDE 25 MG/ML
12.5 INJECTION, SOLUTION INTRAMUSCULAR; INTRAVENOUS ONCE
Status: COMPLETED | OUTPATIENT
Start: 2021-08-10 | End: 2021-08-10

## 2021-08-10 RX ORDER — MAGNESIUM HYDROXIDE 1200 MG/15ML
LIQUID ORAL CONTINUOUS PRN
Status: COMPLETED | OUTPATIENT
Start: 2021-08-10 | End: 2021-08-10

## 2021-08-10 RX ORDER — HYDROCODONE BITARTRATE AND ACETAMINOPHEN 5; 325 MG/1; MG/1
1 TABLET ORAL PRN
Status: DISCONTINUED | OUTPATIENT
Start: 2021-08-10 | End: 2021-08-10 | Stop reason: HOSPADM

## 2021-08-10 RX ORDER — LIDOCAINE HYDROCHLORIDE 20 MG/ML
INJECTION, SOLUTION EPIDURAL; INFILTRATION; INTRACAUDAL; PERINEURAL PRN
Status: DISCONTINUED | OUTPATIENT
Start: 2021-08-10 | End: 2021-08-10 | Stop reason: SDUPTHER

## 2021-08-10 RX ADMIN — PROMETHAZINE HYDROCHLORIDE 12.5 MG: 25 INJECTION INTRAMUSCULAR; INTRAVENOUS at 15:02

## 2021-08-10 RX ADMIN — METRONIDAZOLE 500 MG: 500 INJECTION, SOLUTION INTRAVENOUS at 21:11

## 2021-08-10 RX ADMIN — ROCURONIUM BROMIDE 10 MG: 10 INJECTION INTRAVENOUS at 13:19

## 2021-08-10 RX ADMIN — SODIUM CHLORIDE, POTASSIUM CHLORIDE, SODIUM LACTATE AND CALCIUM CHLORIDE: 600; 310; 30; 20 INJECTION, SOLUTION INTRAVENOUS at 15:51

## 2021-08-10 RX ADMIN — FAMOTIDINE 20 MG: 10 INJECTION, SOLUTION INTRAVENOUS at 08:10

## 2021-08-10 RX ADMIN — FENTANYL CITRATE 100 MCG: 50 INJECTION INTRAMUSCULAR; INTRAVENOUS at 12:42

## 2021-08-10 RX ADMIN — ONDANSETRON 4 MG: 2 INJECTION INTRAMUSCULAR; INTRAVENOUS at 14:20

## 2021-08-10 RX ADMIN — SODIUM CHLORIDE, POTASSIUM CHLORIDE, SODIUM LACTATE AND CALCIUM CHLORIDE: 600; 310; 30; 20 INJECTION, SOLUTION INTRAVENOUS at 09:54

## 2021-08-10 RX ADMIN — SODIUM CHLORIDE 25 ML: 9 INJECTION, SOLUTION INTRAVENOUS at 08:13

## 2021-08-10 RX ADMIN — ONDANSETRON 4 MG: 2 INJECTION INTRAMUSCULAR; INTRAVENOUS at 12:55

## 2021-08-10 RX ADMIN — LIDOCAINE HYDROCHLORIDE 100 MG: 20 INJECTION, SOLUTION EPIDURAL; INFILTRATION; INTRACAUDAL; PERINEURAL at 12:42

## 2021-08-10 RX ADMIN — SODIUM CHLORIDE, POTASSIUM CHLORIDE, SODIUM LACTATE AND CALCIUM CHLORIDE: 600; 310; 30; 20 INJECTION, SOLUTION INTRAVENOUS at 22:25

## 2021-08-10 RX ADMIN — METRONIDAZOLE 500 MG: 500 INJECTION, SOLUTION INTRAVENOUS at 15:55

## 2021-08-10 RX ADMIN — Medication 140 MG: at 12:42

## 2021-08-10 RX ADMIN — FAMOTIDINE 20 MG: 10 INJECTION, SOLUTION INTRAVENOUS at 12:33

## 2021-08-10 RX ADMIN — DEXAMETHASONE SODIUM PHOSPHATE 10 MG: 4 INJECTION, SOLUTION INTRAMUSCULAR; INTRAVENOUS at 12:55

## 2021-08-10 RX ADMIN — PROPOFOL 100 MG: 10 INJECTION, EMULSION INTRAVENOUS at 12:42

## 2021-08-10 RX ADMIN — FAMOTIDINE 20 MG: 10 INJECTION, SOLUTION INTRAVENOUS at 21:10

## 2021-08-10 RX ADMIN — MIDAZOLAM 2 MG: 1 INJECTION INTRAMUSCULAR; INTRAVENOUS at 12:33

## 2021-08-10 RX ADMIN — ROCURONIUM BROMIDE 40 MG: 10 INJECTION INTRAVENOUS at 12:50

## 2021-08-10 RX ADMIN — ATORVASTATIN CALCIUM 20 MG: 20 TABLET, FILM COATED ORAL at 21:09

## 2021-08-10 RX ADMIN — Medication 10 ML: at 08:13

## 2021-08-10 RX ADMIN — SODIUM CHLORIDE, POTASSIUM CHLORIDE, SODIUM LACTATE AND CALCIUM CHLORIDE: 600; 310; 30; 20 INJECTION, SOLUTION INTRAVENOUS at 01:22

## 2021-08-10 RX ADMIN — CEFTRIAXONE 1000 MG: 1 INJECTION, POWDER, FOR SOLUTION INTRAMUSCULAR; INTRAVENOUS at 08:13

## 2021-08-10 RX ADMIN — Medication 60 MG: at 12:44

## 2021-08-10 RX ADMIN — METRONIDAZOLE 500 MG: 500 INJECTION, SOLUTION INTRAVENOUS at 05:41

## 2021-08-10 RX ADMIN — SODIUM CHLORIDE, SODIUM LACTATE, POTASSIUM CHLORIDE, AND CALCIUM CHLORIDE: .6; .31; .03; .02 INJECTION, SOLUTION INTRAVENOUS at 12:33

## 2021-08-10 ASSESSMENT — PULMONARY FUNCTION TESTS
PIF_VALUE: 30
PIF_VALUE: 34
PIF_VALUE: 32
PIF_VALUE: 25
PIF_VALUE: 25
PIF_VALUE: 34
PIF_VALUE: 34
PIF_VALUE: 1
PIF_VALUE: 28
PIF_VALUE: 29
PIF_VALUE: 2
PIF_VALUE: 0
PIF_VALUE: 12
PIF_VALUE: 30
PIF_VALUE: 0
PIF_VALUE: 23
PIF_VALUE: 6
PIF_VALUE: 24
PIF_VALUE: 0
PIF_VALUE: 2
PIF_VALUE: 12
PIF_VALUE: 25
PIF_VALUE: 25
PIF_VALUE: 32
PIF_VALUE: 12
PIF_VALUE: 2
PIF_VALUE: 25
PIF_VALUE: 31
PIF_VALUE: 0
PIF_VALUE: 0
PIF_VALUE: 26
PIF_VALUE: 30
PIF_VALUE: 12
PIF_VALUE: 4
PIF_VALUE: 34
PIF_VALUE: 25
PIF_VALUE: 28
PIF_VALUE: 12
PIF_VALUE: 1
PIF_VALUE: 30
PIF_VALUE: 32
PIF_VALUE: 12
PIF_VALUE: 0
PIF_VALUE: 25
PIF_VALUE: 31
PIF_VALUE: 31
PIF_VALUE: 26
PIF_VALUE: 33
PIF_VALUE: 1
PIF_VALUE: 35
PIF_VALUE: 33
PIF_VALUE: 26
PIF_VALUE: 33
PIF_VALUE: 12
PIF_VALUE: 29
PIF_VALUE: 1
PIF_VALUE: 29
PIF_VALUE: 31
PIF_VALUE: 27
PIF_VALUE: 33
PIF_VALUE: 30
PIF_VALUE: 32
PIF_VALUE: 12
PIF_VALUE: 24
PIF_VALUE: 1
PIF_VALUE: 33
PIF_VALUE: 27
PIF_VALUE: 31
PIF_VALUE: 29
PIF_VALUE: 25

## 2021-08-10 ASSESSMENT — PAIN SCALES - GENERAL
PAINLEVEL_OUTOF10: 0
PAINLEVEL_OUTOF10: 5
PAINLEVEL_OUTOF10: 0
PAINLEVEL_OUTOF10: 0

## 2021-08-10 ASSESSMENT — LIFESTYLE VARIABLES: SMOKING_STATUS: 0

## 2021-08-10 ASSESSMENT — PAIN DESCRIPTION - LOCATION: LOCATION: ABDOMEN

## 2021-08-10 ASSESSMENT — PAIN DESCRIPTION - ONSET: ONSET: ON-GOING

## 2021-08-10 ASSESSMENT — PAIN - FUNCTIONAL ASSESSMENT: PAIN_FUNCTIONAL_ASSESSMENT: 0-10

## 2021-08-10 ASSESSMENT — PAIN DESCRIPTION - PAIN TYPE: TYPE: SURGICAL PAIN

## 2021-08-10 ASSESSMENT — PAIN DESCRIPTION - DESCRIPTORS: DESCRIPTORS: ACHING

## 2021-08-10 ASSESSMENT — PAIN DESCRIPTION - PROGRESSION: CLINICAL_PROGRESSION: NOT CHANGED

## 2021-08-10 ASSESSMENT — PAIN DESCRIPTION - ORIENTATION: ORIENTATION: MID

## 2021-08-10 ASSESSMENT — PAIN DESCRIPTION - FREQUENCY: FREQUENCY: CONTINUOUS

## 2021-08-10 NOTE — PROGRESS NOTES
Department of Podiatry Progress Note  Benja Griffin DPM Attending       Reason for Consult:  Ulclerations of both feet secondary to deformity      HISTORY OF PRESENT ILLNESS:                The patient is a 77 y.o. male with significant past medical history of Diabetes with peripheral neuropathy, deformity of both feet with venous insufficiency and  Lymphedema who is consulted for Diabetic wounds to the mid arch of both feet due to deformity and pressure  Allergies:   Patient has no known allergies.   Social History:    TOBACCO:  Never used tobacco  ETOH:  Never drank alcohol  Family History:       Problem Relation Age of Onset    Colon Cancer Mother         PVD s/p toe amputation by Dr Nelli Rubio Father      REVIEW OF SYSTEMS:    CONSTITUTIONAL:  negative  Denies Fever Chills Nausea or vomiting  INTEGUMENT/BREAST:  positive for rash, skin lesion(s), dryness, skin color change, changes in hair and changes in nails  ENDOCRINE:  positive for hair loss and diabetic symptoms including neither foot ulcerations nor skin dryness  MUSCULOSKELETAL:  positive for  arthralgias, stiff joints, decreased range of motion and muscle weakness  PHYSICAL EXAM:      Vitals:    BP (!) 162/75   Pulse 78   Temp 97.8 °F (36.6 °C) (Axillary)   Resp 16   Ht 6' 2\" (1.88 m)   Wt 260 lb 9.3 oz (118.2 kg)   SpO2 97%   BMI 33.46 kg/m²     LABS:   Recent Labs     08/08/21  1845   WBC 9.9   HGB 10.8*   HCT 32.5*        Recent Labs     08/08/21  1845   *   K 4.2      CO2 22   BUN 20   CREATININE 0.9     Recent Labs     08/08/21  1845   PROT 7.3       LOWER EXTREMITY EXAMINATION   SKIN:    RIGHT foot MUGS 3 ulceration with ragged and fibrotic deep muscle and capsule of 4.3 x 4.1 cm with depth of 0.8 cm with light periwound erythema of 0.5 cm     LEFT foot in area of talonavicular joint similarly with MUGS 3 ulcer of 5.3 cm x 4.6 cm with depth of 0.7 cm with ragged and fibrotic base of muscle and fascia [facility]  [phone]

## 2021-08-10 NOTE — PROGRESS NOTES
Hospitalist Progress Note      PCP: Margie Red NP, APRN - NP    Date of Admission: 8/8/2021        Subjective: Minimal right upper quadrant pain, no nausea vomiting. No fever or chills      Medications:  Reviewed    Infusion Medications    sodium chloride 25 mL (08/10/21 0813)    lactated ringers 125 mL/hr at 08/10/21 0954    dextrose      sodium hypochlorite       Scheduled Medications    aspirin  81 mg Oral Daily    atorvastatin  20 mg Oral Nightly    insulin glargine  16 Units Subcutaneous QAM AC    sodium chloride flush  5-40 mL Intravenous 2 times per day    enoxaparin  40 mg Subcutaneous Daily    cefTRIAXone (ROCEPHIN) IV  1,000 mg Intravenous Daily    metroNIDAZOLE  500 mg Intravenous Q8H    famotidine (PEPCID) injection  20 mg Intravenous BID     PRN Meds: sodium chloride flush, sodium chloride, ondansetron **OR** ondansetron, potassium chloride, magnesium sulfate, HYDROmorphone, glucose, dextrose, glucagon (rDNA), dextrose, sodium hypochlorite      Intake/Output Summary (Last 24 hours) at 8/10/2021 1021  Last data filed at 8/10/2021 0906  Gross per 24 hour   Intake 3665.35 ml   Output 1970 ml   Net 1695.35 ml       Physical Exam Performed:    BP (!) 147/78   Pulse 52   Temp 98 °F (36.7 °C) (Oral)   Resp 16   Ht 6' 2\" (1.88 m)   Wt 260 lb 9.3 oz (118.2 kg)   SpO2 98%   BMI 33.46 kg/m²     General appearance: No apparent distress. Neck: Supple  Respiratory:  Normal respiratory effort. Clear to auscultation, bilaterally without Rales/Wheezes/Rhonchi. Cardiovascular: Regular rate and rhythm with normal S1/S2 without murmurs, rubs or gallops.   Abdomen: Soft, non-tender, non-distended   Musculoskeletal: Both feet tract  Neurologic:  No focal weakness   Psychiatric: Alert and oriented  Capillary Refill: Brisk,3 seconds, normal   Peripheral Pulses: +2 palpable, equal bilaterally       Labs:   Recent Labs     08/08/21  1845   WBC 9.9   HGB 10.8*   HCT 32.5*        Recent Labs 08/08/21  1845   *   K 4.2      CO2 22   BUN 20   CREATININE 0.9   CALCIUM 9.0     Recent Labs     08/08/21  1845   AST 7*   ALT <5*   BILITOT 0.6   ALKPHOS 87     No results for input(s): INR in the last 72 hours. No results for input(s): Sean Snowball in the last 72 hours. Urinalysis:      Lab Results   Component Value Date    NITRU Negative 08/09/2021    WBCUA 0 11/26/2020    BACTERIA 3+ 04/01/2014    RBCUA 3 11/26/2020    BLOODU Negative 08/09/2021    SPECGRAV >1.030 08/09/2021    GLUCOSEU Negative 08/09/2021       Radiology:  CT ABDOMEN PELVIS W IV CONTRAST Additional Contrast? None   Final Result   1. 8 mm pancreatic duct stone with resultant pancreatic duct dilatation and   increase atrophy. 2. There appears to be a 3 mm calculus at the ampulla Vater level, however   the common duct is not dilated. This may be soft tissue calcification. ERCP   or MRCP correlation recommended. 3. Findings suggestive of acute cholecystitis. 4. Punctate, nonobstructing calculi left kidney. 5.  No findings to suggest acute appendicitis; no ureter calculus or   hydronephrosis. XR FOOT LEFT (2 VIEWS)   Final Result   CHEST:      Stable chronic cardiomegaly, without acute airspace consolidation or CHF. RIGHT FOOT:      No acute osseous abnormality of the right foot. Focal soft tissue wound   along the medial aspect of the forefoot, without radiographic evidence of   osteomyelitis. LEFT FOOT:      No acute osseous abnormality of the left foot. Focal soft tissue wound along   the plantar aspect of the midfoot, without radiographic evidence of   osteomyelitis. XR FOOT RIGHT (MIN 3 VIEWS)   Final Result   CHEST:      Stable chronic cardiomegaly, without acute airspace consolidation or CHF. RIGHT FOOT:      No acute osseous abnormality of the right foot. Focal soft tissue wound   along the medial aspect of the forefoot, without radiographic evidence of   osteomyelitis. LEFT FOOT:      No acute osseous abnormality of the left foot. Focal soft tissue wound along   the plantar aspect of the midfoot, without radiographic evidence of   osteomyelitis. XR CHEST (2 VW)   Final Result   CHEST:      Stable chronic cardiomegaly, without acute airspace consolidation or CHF. RIGHT FOOT:      No acute osseous abnormality of the right foot. Focal soft tissue wound   along the medial aspect of the forefoot, without radiographic evidence of   osteomyelitis. LEFT FOOT:      No acute osseous abnormality of the left foot. Focal soft tissue wound along   the plantar aspect of the midfoot, without radiographic evidence of   osteomyelitis. Assessment/Plan:    Active Hospital Problems    Diagnosis     Acute calculous cholecystitis [K80.00]      1. Pancreatic duct stone, improved pain, lipase within normal limits, GI consulted patient had ERCP in the past, discussed with GI, for OR today. 2.  Calculus cholecystitis, patient on IV Rocephin and Flagyl, will continue for now general surgery consulted, to OR at noon. 3.  Bilateral feet ulcers, podiatry consulted. 4.  Diabetes mellitus, sliding scale  5. Essential hypertension, continue p.o. medication  6.   Chronic anemia, no obvious bleeding at this time      Diet: Diet NPO  Code Status: Full Code        Manjit Sharma MD

## 2021-08-10 NOTE — ANESTHESIA POSTPROCEDURE EVALUATION
[facility] Department of Anesthesiology  Post-Anesthesia Note       Name:  Pavithra Moe                                  Age:  77 y.o. MRN:  [id number]     Last Vitals & Oxygen Saturation: BP (!) 164/73   Pulse 124   Temp 97.1 °F (36.2 °C) (Temporal)   Resp 17   Ht 6' 2\" (1.88 m)   Wt 260 lb 9.3 oz (118.2 kg)   SpO2 90%   BMI 33.46 kg/m²   Patient Vitals for the past 4 hrs:   BP Temp Temp src Pulse Resp SpO2   08/10/21 1515 -- 97.1 °F (36.2 °C) Temporal -- -- --   08/10/21 1510 (!) 164/73 -- -- 124 17 90 %   08/10/21 1500 -- -- -- -- -- 98 %   08/10/21 1450 (!) 168/77 -- -- -- -- --   08/10/21 1445 -- -- -- 78 16 --   08/10/21 1440 -- -- -- 79 14 100 %   08/10/21 1435 -- -- -- 62 16 95 %   08/10/21 1430 (!) 155/64 -- -- 59 16 94 %   08/10/21 1425 -- -- -- 64 16 97 %   08/10/21 1420 -- -- -- 71 14 98 %   08/10/21 1415 129/72 -- -- 71 20 99 %   08/10/21 1413 -- 97.4 °F (36.3 °C) Temporal -- -- --   08/10/21 1410 134/66 -- -- 62 15 95 %   08/10/21 1405 (!) 143/65 -- -- 62 18 96 %   08/10/21 1400 (!) 146/71 -- -- 67 14 96 %   08/10/21 1355 (!) 149/79 97 °F (36.1 °C) Temporal 71 23 95 %       Level of consciousness:  Awake, alert    Respiratory: Respirations easy, no distress. Stable. Cardiovascular: Hemodynamically stable. Hydration: Adequate. PONV: Adequately managed. Post-op pain: Adequately controlled. Post-op assessment: Tolerated anesthetic well without complication. Complications:  None.     Ezequiel Rahman MD  August 10, 2021   3:25 PM

## 2021-08-10 NOTE — CARE COORDINATION
Pt has scheduled surgery with Dr. Janice Jaquez today at [phone]. Will not place vac x2 today due to OR time. Will plan to place wound vac tomorrow.    Electronically signed by Micheline Willis RN on 8/10/2021 at 8:59 AM

## 2021-08-10 NOTE — BRIEF OP NOTE
Brief Postoperative Note      Patient: Yesenia Guadarrama  YOB: 1955  MRN: [id number]    Date of Procedure: 8/10/2021    Pre-Op Diagnosis: CHOLECYSTITIS    Post-Op Diagnosis: Same       Procedure(s):  LAPAROSCOPIC CHOLECYSTECTOMY WITH CHOLANGIOGRAM    Surgeon(s):  Eliza Vaca MD    Assistant:  Surgical Assistant: Kamala Miller    Anesthesia: General    Estimated Blood Loss (mL): less than 50     Complications: None    Specimens:   ID Type Source Tests Collected by Time Destination   A : A   GALLBLADDER AND CONTENTS Tissue Gallbladder SURGICAL PATHOLOGY Eliza Vaca MD 8/10/2021 1214        Implants:  * No implants in log *      Drains:   NG/OG/NJ/NE Tube Orogastric Center mouth (Active)       Findings: acute and chronic inflammatory changes to gallbladder, normal cholangiogram    Electronically signed by Mary King MD on 8/10/2021 at 1:44 PM

## 2021-08-10 NOTE — PLAN OF CARE
Problem: Pain:  Goal: Pain level will decrease  Description: Pain level will decrease  8/10/2021 0021 by Jacoby Guardado RN  Outcome: Ongoing  8/9/2021 1112 by Ko Lopez RN  Outcome: Ongoing  8/9/2021 1112 by oK Lopez RN  Outcome: Ongoing  8/9/2021 1111 by Ko Lopez RN  Outcome: Ongoing   Pain/discomfort being managed with PRN analgesics per MD orders. Pt able to express presence and absence of pain and rate pain appropriately using numerical scale. Problem: Skin Integrity:  Goal: Will show no infection signs and symptoms  Description: Will show no infection signs and symptoms  8/10/2021 0021 by Jacoby Guardado RN  Outcome: Ongoing  8/9/2021 1112 by Ko Lopez RN  Outcome: Ongoing  8/9/2021 1112 by Ko Lopez RN  Outcome: Ongoing  8/9/2021 1111 by Ko Lopez RN  Outcome: Ongoing     Problem: Skin Integrity:  Goal: Absence of new skin breakdown  Description: Absence of new skin breakdown  8/10/2021 0021 by Jacoby Guardado RN  Outcome: Ongoing  8/9/2021 1112 by Ko Lopez RN  Outcome: Ongoing  8/9/2021 1112 by Ko Lopez RN  Outcome: Ongoing  8/9/2021 1111 by Ko Lopez RN  Outcome: Ongoing   Skin care protocal in place. Keep foot dressings clean and dry. Wound care consulted. Antibiotics as ordered.   Problem: Falls - Risk of:  Goal: Will remain free from falls  Description: Will remain free from falls  8/10/2021 0021 by Jacoby Guardado RN  Outcome: Ongoing  8/9/2021 1112 by Ko Lopez RN  Outcome: Ongoing  8/9/2021 1112 by Ko Lopez RN  Outcome: Ongoing  8/9/2021 1111 by Ko Lopez RN  Outcome: Ongoing     Problem: Falls - Risk of:  Goal: Absence of physical injury  Description: Absence of physical injury  8/10/2021 0021 by Jacoby Guardado RN  Outcome: Ongoing  8/9/2021 1112 by Ko Lopez RN  Outcome: Ongoing  8/9/2021 1112 by Ko Lopez RN  Outcome: Ongoing  8/9/2021 1111 by Ko Lopez RN  Outcome: Ongoing   Patient uses call light appropriately to express needs. Bed to lowest position with door open and call light in reach. All fall precautions implemented at this time. Bed alarm on for safety. Siderails up x2. Non skid footwear in place. Patient has had no falls this shift. Will continue to monitor.     Problem: Nutrition  Goal: Optimal nutrition therapy  8/10/2021 0021 by Ruth Gold RN  Outcome: Ongoing  8/9/2021 1322 by Silvio Stratton RD, LD  Outcome: Ongoing     Problem: Discharge Planning:  Goal: Discharged to appropriate level of care  Description: Discharged to appropriate level of care  Outcome: Ongoing

## 2021-08-10 NOTE — H&P
Preoperative History and Physical Update    H&P from 8/9/2021 was reviewed    No changes noted today    PE  Alert and oriented  Non tender abdomen    A/P  Acute cholecystitis  For Laparoscopic Cholecystectomy with Cholangiogram    The risks, benefits and alternatives to the planned procedure were discussed. Patient expressed an understanding and is willing to proceed.     Electronically signed by Abdulaziz Guadarrama MD on 8/10/2021 at 12:36 PM

## 2021-08-10 NOTE — PROGRESS NOTES
Pt arrived back to room from PACU, sleepy but awakens easily to voice. IV pulled out en route to room, pt cleaned up and new PIV applied to RFA. IVF infusing per order. No c/o pain. Dressings to abd with old drainage. Call light in reach. Bed alarm on.  Electronically signed by Charmayne Irani, RN on 8/10/2021 at 4:05 PM

## 2021-08-10 NOTE — PLAN OF CARE
Problem: Pain:  Goal: Pain level will decrease  Description: Pain level will decrease  8/10/2021 0738 by Kee Belcher RN  Outcome: Ongoing     Problem: Pain:  Goal: Control of acute pain  Description: Control of acute pain  Outcome: Ongoing     Problem: Pain:  Goal: Control of chronic pain  Description: Control of chronic pain  Outcome: Ongoing     Problem: Skin Integrity:  Goal: Will show no infection signs and symptoms  Description: Will show no infection signs and symptoms  8/10/2021 0738 by Kee Belcher RN  Outcome: Ongoing     Problem: Skin Integrity:  Goal: Absence of new skin breakdown  Description: Absence of new skin breakdown  8/10/2021 0738 by Kee Belcher RN  Outcome: Ongoing     Problem: Falls - Risk of:  Goal: Will remain free from falls  Description: Will remain free from falls  8/10/2021 0738 by Kee Belcher RN  Outcome: Ongoing     Problem: Falls - Risk of:  Goal: Absence of physical injury  Description: Absence of physical injury  8/10/2021 0738 by Kee Belcher RN  Outcome: Ongoing     Problem: Nutrition  Goal: Optimal nutrition therapy  8/10/2021 0738 by Kee Belcher RN  Outcome: Ongoing     Problem: Discharge Planning:  Goal: Discharged to appropriate level of care  Description: Discharged to appropriate level of care  8/10/2021 0738 by Kee Belcher RN  Outcome: Ongoing

## 2021-08-10 NOTE — ANESTHESIA PRE PROCEDURE
[facility] Department of Anesthesiology  Pre-Anesthesia Evaluation/Consultation       Name:  Michael Mckee  : 1955  Age:  77 y.o. MRN:  [id number]  Date: 8/10/2021           Surgeon: Surgeon(s):  Ambrocio Snider MD    Procedure: Procedure(s):  LAPAROSCOPIC CHOLECYSTECTOMY WITH CHOLANGIOGRAM, POSSIBLE OPEN     No Known Allergies  Patient Active Problem List   Diagnosis    Abdominal pain, acute    BPH (benign prostatic hyperplasia)    RLQ abdominal pain    Abnormal ECG    HTN (hypertension)    Diabetes mellitus type II, uncontrolled ([address]    Diabetic foot infection ([address]    Fever    Leukocytosis    Charcot foot due to diabetes mellitus ([address]    Foot ulceration, left, with unspecified severity ([address]    Demand ischemia ([address]    Controlled type 2 diabetes mellitus with hyperglycemia, with long-term current use of insulin (HCC)    Sepsis ([address]    Diarrhea    Chronic osteoarthritis    Chest pain    Chronic calcific pancreatitis (HCC)    Atrioventricular block, Mobitz type 1, Wenckebach    Vertigo    Microcytic anemia    Acute calculous cholecystitis     Past Medical History:   Diagnosis Date    Diabetes mellitus ([address]     Gallstones     Lymphedema     Neuropathy     Pancreatitis     PVD (peripheral vascular disease) (HCC)     Ulcers of both lower legs (HCC)     bilateral feet     Past Surgical History:   Procedure Laterality Date    ERCP  14    LITHOTRYPSY & PANCREATIC STENT PLACEMENT    FOOT SURGERY Left      Social History     Tobacco Use    Smoking status: Never Smoker    Smokeless tobacco: Never Used   Vaping Use    Vaping Use: Never used   Substance Use Topics    Alcohol use: No    Drug use: No     Medications  No current facility-administered medications on file prior to encounter.      Current Outpatient Medications on File Prior to Encounter   Medication Sig Dispense Refill    insulin glargine (LANTUS SOLOSTAR) 100 UNIT/ML injection pen Inject 20 Units into the skin nightly      Lactobacillus TABS Take 1 tablet by mouth daily      Cyanocobalamin (VITAMIN B-12) 50 MCG LOZG Take 50 mcg by mouth daily      CALCIUM-MAGNESIUM-ZINC PO Take 1 tablet by mouth daily      lisinopril (PRINIVIL;ZESTRIL) 10 MG tablet Take 10 mg by mouth daily      acetaminophen (TYLENOL) 500 MG tablet Take 1 tablet by mouth 4 times daily as needed for Pain 120 tablet 0    atorvastatin (LIPITOR) 20 MG tablet Take 20 mg by mouth nightly       tamsulosin (FLOMAX) 0.4 MG capsule Take 0.4 mg by mouth every evening       aspirin 81 MG EC tablet Take 1 tablet by mouth daily 30 tablet 0    spironolactone (ALDACTONE) 25 MG tablet Take 25 mg by mouth daily      metFORMIN (GLUCOPHAGE) 500 MG tablet Take 500 mg by mouth 2 times daily (with meals)      Insulin Pen Needle 32G X 4 MM MISC 1 each by Does not apply route daily. 100 each 3    Lancets MISC Once daily 100 each 3    glucose blood VI test strips (ASCENSIA AUTODISC VI;ONE TOUCH ULTRA TEST VI) strip 1 each by In Vitro route daily. As needed. 100 each 3    glucose monitoring kit (FREESTYLE) monitoring kit 1 kit by Does not apply route daily as needed.  1 kit 0     Current Facility-Administered Medications   Medication Dose Route Frequency Provider Last Rate Last Admin    aspirin EC tablet 81 mg  81 mg Oral Daily Abdelrahman Sepulveda MD   81 mg at 08/09/21 0923    atorvastatin (LIPITOR) tablet 20 mg  20 mg Oral Nightly Abdelrahman Sepulveda MD   20 mg at 08/09/21 2105    insulin glargine (LANTUS) injection vial 16 Units  16 Units Subcutaneous QAM AC Abdelrahman Sepulveda MD        sodium chloride flush 0.9 % injection 5-40 mL  5-40 mL Intravenous 2 times per day Nik Lozano MD   10 mL at 08/10/21 0813    sodium chloride flush 0.9 % injection 5-40 mL  5-40 mL Intravenous PRN Abdelrahman Sepulveda MD        0.9 % sodium chloride infusion  25 mL Intravenous PRN Abdelrahman LEBLANC MD Derick 100 mL/hr at 08/10/21 0813 25 mL at 08/10/21 0813    ondansetron (ZOFRAN-ODT) disintegrating tablet 4 mg  4 mg Oral Q8H PRN Abdelrahman Sepulveda MD        Or    ondansetron (ZOFRAN) injection 4 mg  4 mg Intravenous Q6H PRN Abdelrahman Sepulveda MD        enoxaparin (LOVENOX) injection 40 mg  40 mg Subcutaneous Daily Abdelrahman Sepulveda MD   40 mg at 08/09/21 5612    lactated ringers infusion   Intravenous Continuous Belkis Sepulveda  mL/hr at 08/10/21 0954 New Bag at 08/10/21 0954    potassium chloride 10 mEq/100 mL IVPB (Peripheral Line)  10 mEq Intravenous PRN Abdelrahman Sepulveda MD        magnesium sulfate 2000 mg in 50 mL IVPB premix  2,000 mg Intravenous PRN Abdelrahman Sepulveda MD        HYDROmorphone (DILAUDID) injection 0.5 mg  0.5 mg Intravenous Q3H PRN Abdelrahman Sepulveda MD        cefTRIAXone (ROCEPHIN) 1000 mg IVPB in 50 mL D5W minibag  1,000 mg Intravenous Daily Pita Hodler MD   Stopped at 08/10/21 0843    metronidazole (FLAGYL) 500 mg in NaCl 100 mL IVPB premix  500 mg Intravenous Q8H Pita Holder MD   Stopped at 08/10/21 0645    famotidine (PEPCID) injection 20 mg  20 mg Intravenous BID Abdelrahman Sepulveda MD   20 mg at 08/10/21 0810    glucose (GLUTOSE) 40 % oral gel 15 g  15 g Oral PRN Abdelrahman Sepulveda MD        dextrose 50 % IV solution  12.5 g Intravenous PRN Abdelrahman Sepulveda MD        glucagon (rDNA) injection 1 mg  1 mg Intramuscular PRN Abdelrahman Sepulveda MD        dextrose 5 % solution  100 mL/hr Intravenous PRN Abdelrahman Sepulveda MD        sodium hypochlorite (DAKINS) 0.125 % external solution 473 mL  473 mL Irrigation Continuous PRN Erskine Sandifer, DPM         Vital Signs (Current)   Vitals:    08/10/21 0501 08/10/21 0521 08/10/21 0906 08/10/21 1044   BP: (!) 147/78   (!) 170/81   Pulse: 52   50   Resp: 16   16   Temp: 98 °F (36.7 °C)   97.3 °F (36.3 °C)   TempSrc: Oral   Temporal   SpO2: 98%  98% 99%   Weight:  260 lb 9.3 oz (118.2 kg)     Height:                                                Vital Signs Statistics (for past 48 hrs)     Temp  Av.8 °F (37.1 °C)  Min: 97.3 °F (36.3 °C)   Min taken time: 08/10/21 1044  Max: 101.4 °F (38.6 °C)   Max taken time: 21 172  Pulse  Av.2  Min: 40   Min taken time: 21 0355  Max: 84   Max taken time: 21 172  Resp  Av.6  Min: 15   Min taken time: 21  Max: 22   Max taken time: 21 1847  BP  Min: 80/61   Min taken time: 21 0030  Max: 170/81   Max taken time: 08/10/21 1044  MAP (mmHg)  Av.1  Min: 65   Min taken time: 21  Max: 101   Max taken time: 08/10/21 0501  SpO2  Av.7 %  Min: 94 %   Min taken time: 21 172  Max: 99 %   Max taken time: 08/10/21 1044  BP Readings from Last 3 Encounters:   08/10/21 (!) 170/81   21 (!) 142/79   20 130/81       BMI  Body mass index is 33.46 kg/m². Estimated body mass index is 33.46 kg/m² as calculated from the following:    Height as of this encounter: 6' 2\" (1.88 m). Weight as of this encounter: 260 lb 9.3 oz (118.2 kg).     CBC   Lab Results   Component Value Date    WBC 9.9 2021    RBC 4.22 2021    HGB 10.8 2021    HCT 32.5 2021    MCV 76.8 2021    RDW 16.9 2021     2021     CMP    Lab Results   Component Value Date     2021    K 4.2 2021    K 4.7 2021     2021    CO2 22 2021    BUN 20 2021    CREATININE 0.9 2021    GFRAA >60 2021    AGRATIO 1.0 2021    LABGLOM >60 2021    GLUCOSE 157 2021    PROT 7.3 2021    CALCIUM 9.0 2021    BILITOT 0.6 2021    ALKPHOS 87 2021    AST 7 2021    ALT <5 2021     BMP    Lab Results   Component Value Date     2021    K 4.2 2021    K 4.7 2021     2021    CO2 22 2021    BUN 20 2021    CREATININE 0.9 2021    CALCIUM 9.0 08/08/2021    GFRAA >60 08/08/2021    LABGLOM >60 08/08/2021    GLUCOSE 157 08/08/2021     POCGlucose  Recent Labs     08/08/21  1845   GLUCOSE 157*      Coags    Lab Results   Component Value Date    PROTIME 13.0 04/01/2014    INR 1.17 99/14/1173     HCG (If Applicable) No results found for: PREGTESTUR, PREGSERUM, HCG, HCGQUANT   ABGs No results found for: PHART, PO2ART, KAU1HTT, VND5HTQ, BEART, U2SRSWSQ   Type & Screen (If Applicable)  No results found for: LABABO, LABRH                         BMI: Wt Readings from Last 3 Encounters:       NPO Status:   Date of last liquid consumption: 08/10/21   Time of last liquid consumption: 0800   Date of last solid food consumption: 08/08/21      Time of last solid consumption:  (unsure while pt at home)       Anesthesia Evaluation  Patient summary reviewed no history of anesthetic complications:   Airway: Mallampati: II  TM distance: >3 FB   Neck ROM: full  Mouth opening: > = 3 FB Dental:      Comment: No loose teeth    Pulmonary: breath sounds clear to auscultation      (-) COPD, asthma, sleep apnea and not a current smoker                           Cardiovascular:  Exercise tolerance: good (>4 METS),   (+) hypertension:,     (-) past MI, CABG/stent,  angina and no hyperlipidemia        Rate: normal                    Neuro/Psych:      (-) seizures, psychiatric history and depression/anxiety            GI/Hepatic/Renal:        (-) PUD, liver disease and no renal disease       Endo/Other:    (+) DiabetesType II DM, , .    (-) hypothyroidism               Abdominal:             Vascular:     - DVT and PE. Other Findings:           Anesthesia Plan      general     ASA 3       Induction: intravenous. MIPS: Postoperative opioids intended and Prophylactic antiemetics administered. Anesthetic plan and risks discussed with patient. Plan discussed with CRNA.                   This pre-anesthesia assessment may be used as a history and physical.    DOS STAFF ADDENDUM:    Pt seen and examined, chart reviewed (including anesthesia, drug and allergy history). No interval changes to history and physical examination. Anesthetic plan, risks, benefits, alternatives, and personnel involved discussed with patient. Patient verbalized an understanding and agrees to proceed.       Ludwig Hatchet, MD  August 10, 2021  10:57 AM

## 2021-08-11 LAB
A/G RATIO: 0.8 (ref 1.1–2.2)
ALBUMIN SERPL-MCNC: 3.2 G/DL (ref 3.4–5)
ALP BLD-CCNC: 80 U/L (ref 40–129)
ALT SERPL-CCNC: 20 U/L (ref 10–40)
ANION GAP SERPL CALCULATED.3IONS-SCNC: 13 MMOL/L (ref 3–16)
AST SERPL-CCNC: 25 U/L (ref 15–37)
BASOPHILS ABSOLUTE: 0 K/UL (ref 0–0.2)
BASOPHILS RELATIVE PERCENT: 0.3 %
BILIRUB SERPL-MCNC: 0.4 MG/DL (ref 0–1)
BUN BLDV-MCNC: 13 MG/DL (ref 7–20)
CALCIUM SERPL-MCNC: 8.9 MG/DL (ref 8.3–10.6)
CHLORIDE BLD-SCNC: 102 MMOL/L (ref 99–110)
CO2: 21 MMOL/L (ref 21–32)
CREAT SERPL-MCNC: 0.9 MG/DL (ref 0.8–1.3)
EOSINOPHILS ABSOLUTE: 0 K/UL (ref 0–0.6)
EOSINOPHILS RELATIVE PERCENT: 0 %
GFR AFRICAN AMERICAN: >60
GFR NON-AFRICAN AMERICAN: >60
GLOBULIN: 4.2 G/DL
GLUCOSE BLD-MCNC: 215 MG/DL (ref 70–99)
GLUCOSE BLD-MCNC: 216 MG/DL (ref 70–99)
GLUCOSE BLD-MCNC: 236 MG/DL (ref 70–99)
GLUCOSE BLD-MCNC: 257 MG/DL (ref 70–99)
GLUCOSE BLD-MCNC: 293 MG/DL (ref 70–99)
GLUCOSE BLD-MCNC: 294 MG/DL (ref 70–99)
HCT VFR BLD CALC: 31.9 % (ref 40.5–52.5)
HEMOGLOBIN: 10.5 G/DL (ref 13.5–17.5)
LYMPHOCYTES ABSOLUTE: 0.7 K/UL (ref 1–5.1)
LYMPHOCYTES RELATIVE PERCENT: 8 %
MCH RBC QN AUTO: 25.1 PG (ref 26–34)
MCHC RBC AUTO-ENTMCNC: 32.9 G/DL (ref 31–36)
MCV RBC AUTO: 76.5 FL (ref 80–100)
MONOCYTES ABSOLUTE: 0.5 K/UL (ref 0–1.3)
MONOCYTES RELATIVE PERCENT: 5.5 %
NEUTROPHILS ABSOLUTE: 7.4 K/UL (ref 1.7–7.7)
NEUTROPHILS RELATIVE PERCENT: 86.2 %
PDW BLD-RTO: 17.3 % (ref 12.4–15.4)
PERFORMED ON: ABNORMAL
PLATELET # BLD: 190 K/UL (ref 135–450)
PMV BLD AUTO: 8 FL (ref 5–10.5)
POTASSIUM SERPL-SCNC: 4.2 MMOL/L (ref 3.5–5.1)
RBC # BLD: 4.17 M/UL (ref 4.2–5.9)
SODIUM BLD-SCNC: 136 MMOL/L (ref 136–145)
TOTAL PROTEIN: 7.4 G/DL (ref 6.4–8.2)
WBC # BLD: 8.6 K/UL (ref 4–11)

## 2021-08-11 PROCEDURE — 36415 COLL VENOUS BLD VENIPUNCTURE: CPT

## 2021-08-11 PROCEDURE — 2580000003 HC RX 258: Performed by: SURGERY

## 2021-08-11 PROCEDURE — 6370000000 HC RX 637 (ALT 250 FOR IP): Performed by: PHYSICIAN ASSISTANT

## 2021-08-11 PROCEDURE — 6360000002 HC RX W HCPCS: Performed by: SURGERY

## 2021-08-11 PROCEDURE — 85025 COMPLETE CBC W/AUTO DIFF WBC: CPT

## 2021-08-11 PROCEDURE — 99024 POSTOP FOLLOW-UP VISIT: CPT | Performed by: SURGERY

## 2021-08-11 PROCEDURE — 1200000000 HC SEMI PRIVATE

## 2021-08-11 PROCEDURE — 80053 COMPREHEN METABOLIC PANEL: CPT

## 2021-08-11 PROCEDURE — APPNB30 APP NON BILLABLE TIME 0-30 MINS: Performed by: PHYSICIAN ASSISTANT

## 2021-08-11 PROCEDURE — 6370000000 HC RX 637 (ALT 250 FOR IP): Performed by: SURGERY

## 2021-08-11 PROCEDURE — 99024 POSTOP FOLLOW-UP VISIT: CPT | Performed by: PHYSICIAN ASSISTANT

## 2021-08-11 PROCEDURE — 2500000003 HC RX 250 WO HCPCS: Performed by: SURGERY

## 2021-08-11 PROCEDURE — APPSS15 APP SPLIT SHARED TIME 0-15 MINUTES: Performed by: PHYSICIAN ASSISTANT

## 2021-08-11 PROCEDURE — 94761 N-INVAS EAR/PLS OXIMETRY MLT: CPT

## 2021-08-11 RX ORDER — ACETAMINOPHEN 325 MG/1
650 TABLET ORAL EVERY 4 HOURS PRN
Status: DISCONTINUED | OUTPATIENT
Start: 2021-08-11 | End: 2021-08-13 | Stop reason: HOSPADM

## 2021-08-11 RX ADMIN — CEFTRIAXONE 1000 MG: 1 INJECTION, POWDER, FOR SOLUTION INTRAMUSCULAR; INTRAVENOUS at 08:55

## 2021-08-11 RX ADMIN — SODIUM CHLORIDE, POTASSIUM CHLORIDE, SODIUM LACTATE AND CALCIUM CHLORIDE: 600; 310; 30; 20 INJECTION, SOLUTION INTRAVENOUS at 17:33

## 2021-08-11 RX ADMIN — FAMOTIDINE 20 MG: 10 INJECTION, SOLUTION INTRAVENOUS at 08:45

## 2021-08-11 RX ADMIN — Medication 10 ML: at 08:49

## 2021-08-11 RX ADMIN — INSULIN GLARGINE 16 UNITS: 100 INJECTION, SOLUTION SUBCUTANEOUS at 08:46

## 2021-08-11 RX ADMIN — ATORVASTATIN CALCIUM 20 MG: 20 TABLET, FILM COATED ORAL at 21:08

## 2021-08-11 RX ADMIN — FAMOTIDINE 20 MG: 10 INJECTION, SOLUTION INTRAVENOUS at 21:08

## 2021-08-11 RX ADMIN — SODIUM CHLORIDE 25 ML: 9 INJECTION, SOLUTION INTRAVENOUS at 08:54

## 2021-08-11 RX ADMIN — METRONIDAZOLE 500 MG: 500 INJECTION, SOLUTION INTRAVENOUS at 05:41

## 2021-08-11 RX ADMIN — SODIUM CHLORIDE, POTASSIUM CHLORIDE, SODIUM LACTATE AND CALCIUM CHLORIDE: 600; 310; 30; 20 INJECTION, SOLUTION INTRAVENOUS at 10:49

## 2021-08-11 RX ADMIN — SODIUM CHLORIDE, POTASSIUM CHLORIDE, SODIUM LACTATE AND CALCIUM CHLORIDE: 600; 310; 30; 20 INJECTION, SOLUTION INTRAVENOUS at 05:41

## 2021-08-11 RX ADMIN — ASPIRIN 81 MG: 81 TABLET, FILM COATED ORAL at 08:45

## 2021-08-11 RX ADMIN — METRONIDAZOLE 500 MG: 500 INJECTION, SOLUTION INTRAVENOUS at 14:22

## 2021-08-11 RX ADMIN — ENOXAPARIN SODIUM 40 MG: 40 INJECTION SUBCUTANEOUS at 08:45

## 2021-08-11 RX ADMIN — ACETAMINOPHEN 650 MG: 325 TABLET ORAL at 10:45

## 2021-08-11 RX ADMIN — METRONIDAZOLE 500 MG: 500 INJECTION, SOLUTION INTRAVENOUS at 21:10

## 2021-08-11 ASSESSMENT — PAIN - FUNCTIONAL ASSESSMENT: PAIN_FUNCTIONAL_ASSESSMENT: ACTIVITIES ARE NOT PREVENTED

## 2021-08-11 ASSESSMENT — PAIN SCALES - GENERAL
PAINLEVEL_OUTOF10: 0
PAINLEVEL_OUTOF10: 0
PAINLEVEL_OUTOF10: 3
PAINLEVEL_OUTOF10: 2

## 2021-08-11 ASSESSMENT — PAIN DESCRIPTION - ONSET: ONSET: ON-GOING

## 2021-08-11 ASSESSMENT — PAIN DESCRIPTION - FREQUENCY: FREQUENCY: CONTINUOUS

## 2021-08-11 ASSESSMENT — PAIN DESCRIPTION - ORIENTATION: ORIENTATION: MID

## 2021-08-11 ASSESSMENT — PAIN DESCRIPTION - PROGRESSION
CLINICAL_PROGRESSION: GRADUALLY IMPROVING
CLINICAL_PROGRESSION: GRADUALLY IMPROVING

## 2021-08-11 ASSESSMENT — PAIN DESCRIPTION - LOCATION: LOCATION: ABDOMEN

## 2021-08-11 ASSESSMENT — PAIN DESCRIPTION - DESCRIPTORS: DESCRIPTORS: ACHING

## 2021-08-11 ASSESSMENT — PAIN DESCRIPTION - PAIN TYPE: TYPE: SURGICAL PAIN

## 2021-08-11 NOTE — CARE COORDINATION
Pt refusing vac placement. Stated, \"I had a vac for 8 months and it didn't do anything. \" Pt states he has a podiatrist outpatient, Dr. Amber Ron, that manages his care. Wound care familiar with patient. Pt states he is discharging today. Instructed RN to re-dress bilateral foot wounds with:  Aquacel AG, abd, kerlix, ace wrap. Home care and outpatient to manage foot wounds. Pt has hx of being non compliant.    Electronically signed by Houston Hall RN on 8/11/2021 at 10:26 AM

## 2021-08-11 NOTE — PROGRESS NOTES
General and Vascular Surgery                                                           Daily Progress Note                                                             Faizan Winkler PA-C     Pt Name: Epifanio Jackson Record Number: [id number]  Date of Birth 1955   Today's Date: 8/11/2021    ASSESSMENT/PLAN  POD#1 (8/10/21): Laparoscopic cholecystectomy with cholangiogram  1. Pain controlled  2. Incision sites with dressings intact. No excessive drainage  3. Denies any nausea or emesis  4. Tolerating diet  5. +flatulence and BM's  6. OK to D/C from a general surgery standpoint when medically ok  7. F/U with Dr. Ludwin Portillo in 1-2 weeks  EDUCATION  Patient educated about their illness/diagnosis, stated above, and all questions answered. We discussed the importance of nutrition, medications they are taking, and healthy lifestyle. Joyce Lara has improved from yesterday. Pain is well controlled. He has no nausea and no vomiting. He has passed flatus and has had a bowel movement. OBJECTIVE  VITALS:  height is 6' 2\" (1.88 m) and weight is 260 lb 9.3 oz (118.2 kg). His oral temperature is 98.4 °F (36.9 °C). His blood pressure is 128/78 and his pulse is 52. His respiration is 16 and oxygen saturation is 97%. VITALS:  /78   Pulse 52   Temp 98.4 °F (36.9 °C) (Oral)   Resp 16   Ht 6' 2\" (1.88 m)   Wt 260 lb 9.3 oz (118.2 kg)   SpO2 97%   BMI 33.46 kg/m²   GENERAL: alert, no distress  ABDOMEN: tenderness present- incisional,  without rebound and guarding, distention present, Passing flatulence and having BM's  I/O last 3 completed shifts:   In: 2814.2 [P.O.:360; I.V.:2158.7; IV Piggyback:295.5]  Out: 2490 [Urine:2895; Blood:10]  I/O this shift:  In: 240 [P.O.:240]  Out: 225 [Urine:225]    LABS  Recent Labs     08/08/21  1845 08/09/21  1202 08/11/21  0446   WBC   < >  --  8.6   HGB   < >  --  10.5*   HCT   < >  --  31.9*   PLT < >  --  190   NA   < >  --  136   K   < >  --  4.2   CL   < >  --  102   CO2   < >  --  21   BUN   < >  --  13   CREATININE   < >  --  0.9   CALCIUM   < >  --  8.9   AST   < >  --  25   ALT   < >  --  20   BILITOT   < >  --  0.4   NITRU  --  Negative  --    COLORU  --  YELLOW  --     < > = values in this interval not displayed.      CBC:   Lab Results   Component Value Date    WBC 8.6 08/11/2021    RBC 4.17 08/11/2021    HGB 10.5 08/11/2021    HCT 31.9 08/11/2021    MCV 76.5 08/11/2021    MCH 25.1 08/11/2021    MCHC 32.9 08/11/2021    RDW 17.3 08/11/2021     08/11/2021    MPV 8.0 08/11/2021     CMP:    Lab Results   Component Value Date     08/11/2021    K 4.2 08/11/2021    K 4.7 06/24/2021     08/11/2021    CO2 21 08/11/2021    BUN 13 08/11/2021    CREATININE 0.9 08/11/2021    GFRAA >60 08/11/2021    AGRATIO 0.8 08/11/2021    LABGLOM >60 08/11/2021    GLUCOSE 216 08/11/2021    PROT 7.4 08/11/2021    LABALBU 3.2 08/11/2021    CALCIUM 8.9 08/11/2021    BILITOT 0.4 08/11/2021    ALKPHOS 80 08/11/2021    AST 25 08/11/2021    ALT 20 08/11/2021         Myrna Acuna PA-C  Electronically signed 8/11/2021 at 10:34 AM    As above  Stable POD 1 from Laparoscopic Cholecystectomy with [address] [place] [address] Dr guerrero    Electronically signed by Cielo Moore MD on 8/11/2021 at 3:30 PM

## 2021-08-11 NOTE — PROGRESS NOTES
Department of Podiatry Progress Note  [facility]  8/11/2021        Reason for Consult:  Ulclerations of both feet secondary to deformity      HISTORY OF PRESENT ILLNESS:                The patient is a 77 y.o. male with significant past medical history of Diabetes with peripheral neuropathy, deformity of both feet with venous insufficiency and  Lymphedema who is consulted for Diabetic wounds to the mid arch of both feet due to deformity and pressure. Patient had a wound vac for 8 months and had little to no improvement. He is very non-compliant and has never followed up outside the hospital. Apparently, he has been getting silver alginate on these wounds at home 3 times a week by a home RN. The patient has no intention of changing his activity level and notes that eventually he won't be able to walk and he will get off of them then.      PHYSICAL EXAM:      Vitals:    /78   Pulse 52   Temp 98.4 °F (36.9 °C) (Oral)   Resp 16   Ht 6' 2\" (1.88 m)   Wt 260 lb 9.3 oz (118.2 kg)   SpO2 97%   BMI 33.46 kg/m²     LABS:   Recent Labs     08/08/21  1845 08/11/21  0446   WBC 9.9 8.6   HGB 10.8* 10.5*   HCT 32.5* 31.9*    190     Recent Labs     08/11/21  0446      K 4.2      CO2 21   BUN 13   CREATININE 0.9     Recent Labs     08/08/21  1845 08/11/21  0446   PROT 7.3 7.4       LOWER EXTREMITY EXAMINATION   SKIN:    RIGHT foot MUGS 3 ulceration with ragged and fibrotic deep muscle and capsule of 4.3 x 4.1 cm with depth of 0.8 cm with light periwound erythema of 0.5 cm     LEFT foot in area of talonavicular joint similarly with MUGS 3 ulcer of 5.3 cm x 4.6 cm with depth of 0.7 cm with ragged and fibrotic base of muscle and fascia and surrounding erythema of less than 1 cm    NEUROLOGIC:    Pain sensation isdecreased Bilateral.  Light touch is decreasedBilateral. positive history of paresthesia Bilateral. negativehistory of burning Bilateral. Deep tendon reflexes are 1 to include patellar and achilles Bilateral. Van--Jose 5.07 monofilament sensitivity is abnormal 5 to 6 spots tested Bilateral.    VASCULAR:    bilaterally Dorsalis pedis is 2. bilaterally Posterior tibial pulse is 1. 1+ edema bilaterally. moderate Varicosities bilaterally. MUSCULOSKELETAL:  Hugo Hernandez is untested as patient has open wounds to plantar feet. Muscle strength is 3/5 to all groups tested to include dorsiflexion, plantarflexion, inversion, eversion, and digital Bilateral . The ranges of motion of all joints tested from ankle distal is decreased there is no crepitus noted Bilateral.    Radiology of 8/8/2021  RIGHT FOOT:       No acute osseous abnormality of the right foot.  Focal soft tissue wound   along the medial aspect of the forefoot, without radiographic evidence of   osteomyelitis.       LEFT FOOT:       No acute osseous abnormality of the left foot.  Focal soft tissue wound along   the plantar aspect of the midfoot, without radiographic evidence of   osteomyelitis. Assessment:  Diabetes with peripheral neuropathy and ulcerations  Chronic foot deformity with presumed chronic osteomyelitis underlying  Non-compliance with recommended treatments. Plan:   Evaluation and Management x 15 minutes with greater than 50% of the time spent with the patient discussing the etiology and treatment options of the chief complaint. Bilateral foot ulcerations  Clean areas with Dakins or Vashe  Apply silver alginate dressings every other day to the wounds  Pad with ABD and gauze and secure with rolled gauze    Disposition: As above. Patient is unwilling to change his situation or his activity level to allow for healing. No debridement or surgical intervention is warranted. Patient has chronic stable wounds that have been so for several years. Any attempt at limb salvage surgery would likely make this situation worse. Ok to d/c when medically stable. Wound VAC not warranted in this case.     Thank you for the opportunity to take part in the patient's care.     Luis Linn DPM  [facility]  Pager: [phone]  Office: [phone]  Fax: [phone]

## 2021-08-11 NOTE — OP NOTE
[address] Kranthi Lujan                                 OPERATIVE REPORT    PATIENT NAME: Beata Ariza                :        1955  MED REC NO:   [id number]                          ROOM:       4104  ACCOUNT NO:   [de-identified]                           ADMIT DATE: 2021  PROVIDER:     Red Dance, MD      DATE OF PROCEDURE:  08/10/2021    PREOPERATIVE DIAGNOSIS:  Acute cholecystitis. POSTOPERATIVE DIAGNOSIS:  Acute cholecystitis. PROCEDURE PERFORMED:  Laparoscopic cholecystectomy with cholangiogram.    SURGEON:  Red Dance, MD    SPECIMEN:  Gallbladder. ESTIMATED BLOOD LOSS:  Less than 50 mL. COMPLICATIONS:  None. DISPOSITION:  To recovery in stable condition. INDICATION:  The patient is a 64-year-old male who presented with acute  abdominal pain. He has a history of a pancreatic duct stone which was  treated in the past.  That was confirmed to still be present on CT as  expected and there was also some concern about a partial stone or  fragment at the ampulla. However, on review of imaging this was present  on previous scans and currently the patient has no evidence of biliary  obstruction. Given the apparent cholecystitis, cholecystectomy was  recommended. The risks, benefits, and alternatives of the procedure  were reviewed and he agreed to proceed. PROCEDURE:  The patient was brought to the operating room, placed  supine, general anesthesia and intubation were performed and the abdomen  was prepped and draped in a sterile fashion. A supraumbilical incision  was made and a trocar placed with the Meaghan technique. Insufflation  was established and three additional trocars were placed across the  right upper quadrant. The patient was now repositioned into reverse  Trendelenburg and rolled slightly to the left. There were dense omental  adhesions to the liver.   These appeared to be chronic indicating  previous issues as well. Electrocautery was used to separate the  adhesions away from the liver bed and the gallbladder. Eventually the  entire gallbladder was exposed showing acute and chronic inflammatory  changes with distention, edema, erythema, however, what appeared to be  dense adhesions of the omentum up to the gallbladder. Once the  gallbladder was exposed. Dissection was carried down the lateral  surface of the neck of the gallbladder and the cystic duct was  identified. We now carried the dissection in the medial direction. The  cystic duct and the cystic artery were cleared and confirmed to  terminate into the gallbladder. The underside of the gallbladder was  also cleared. A clip was now placed on the gallbladder side of the  cystic duct and the duct opened with the scissors. A cholangiogram  catheter was inserted and a cholangiogram performed. This demonstrated  normal biliary anatomy with free flow of contrast into the duodenum. We  did not identify any filling defects within the bile duct. The duct  also appeared normal caliber. The cholangiogram catheter was now  removed and clips placed on the downside of the cystic duct x3 and the  duct divided. The cystic artery was now clipped and divided and the  gallbladder  from the liver bed using electrocautery. The  gallbladder was placed into an Endopouch and extracted from the  umbilical trocar site. The abdomen was irrigated, suctioned clear and  hemostasis achieved along the liver bed with electrocautery. We did  place Surgicel into the liver bed given the raw appearance, however,  there was no active bleeding at the conclusion of the case. At this  time, the trocars were withdrawn. The umbilicus was closed with an 0  Vicryl in the fascia. Skin incisions were closed with 4-0 Vicryls. Dressings were applied and the patient transferred to recovery in good  condition.         [facility], MD    D: 08/10/2021 14:00:13       T: 08/10/2021 14:05:19     [id number]  Job#: [id number]     Doc#: [id number]    CC:

## 2021-08-11 NOTE — PROGRESS NOTES
Hospitalist Progress Note      PCP: Grisel Vee NP, APRN - NP    Date of Admission: 8/8/2021        Subjective: Tolerating p.o. intake after his surgery, minimal abdominal pain in his right upper quadrant, no fever or chills denies any nausea or vomiting. Medications:  Reviewed    Infusion Medications    sodium chloride 25 mL (08/10/21 0813)    lactated ringers 125 mL/hr at 08/11/21 0541    dextrose      sodium hypochlorite       Scheduled Medications    aspirin  81 mg Oral Daily    atorvastatin  20 mg Oral Nightly    insulin glargine  16 Units Subcutaneous QAM AC    sodium chloride flush  5-40 mL Intravenous 2 times per day    enoxaparin  40 mg Subcutaneous Daily    cefTRIAXone (ROCEPHIN) IV  1,000 mg Intravenous Daily    metroNIDAZOLE  500 mg Intravenous Q8H    famotidine (PEPCID) injection  20 mg Intravenous BID     PRN Meds: oxyCODONE **OR** oxyCODONE, sodium chloride flush, sodium chloride, ondansetron **OR** ondansetron, potassium chloride, magnesium sulfate, HYDROmorphone, glucose, dextrose, glucagon (rDNA), dextrose, sodium hypochlorite      Intake/Output Summary (Last 24 hours) at 8/11/2021 0801  Last data filed at 8/11/2021 0543  Gross per 24 hour   Intake 2814.22 ml   Output 2630 ml   Net 184.22 ml       Physical Exam Performed:    /78   Pulse 52   Temp 98.4 °F (36.9 °C) (Oral)   Resp 16   Ht 6' 2\" (1.88 m)   Wt 260 lb 9.3 oz (118.2 kg)   SpO2 97%   BMI 33.46 kg/m²     General appearance: No apparent distress  Neck: Supple  Respiratory:  Normal respiratory effort. Clear to auscultation, bilaterally without Rales/Wheezes/Rhonchi. Cardiovascular: Regular rate and rhythm with normal S1/S2 without murmurs, rubs or gallops. Abdomen: Soft, non-tender, non-distended with normal bowel sounds.   Musculoskeletal: Both feet  Skin: As above  Neurologic: No drifting no muscle weakness  Psychiatric: Alert and orientedt  Capillary Refill: Brisk,3 seconds, normal   Peripheral Pulses: +2 palpable, equal bilaterally       Labs:   Recent Labs     08/08/21  1845 08/11/21  0446   WBC 9.9 8.6   HGB 10.8* 10.5*   HCT 32.5* 31.9*    190     Recent Labs     08/08/21  1845 08/11/21  0446   * 136   K 4.2 4.2    102   CO2 22 21   BUN 20 13   CREATININE 0.9 0.9   CALCIUM 9.0 8.9     Recent Labs     08/08/21  1845 08/11/21  0446   AST 7* 25   ALT <5* 20   BILITOT 0.6 0.4   ALKPHOS 87 80     No results for input(s): INR in the last 72 hours. No results for input(s): Brenda Sanz in the last 72 hours. Urinalysis:      Lab Results   Component Value Date    NITRU Negative 08/09/2021    WBCUA 0 11/26/2020    BACTERIA 3+ 04/01/2014    RBCUA 3 11/26/2020    BLOODU Negative 08/09/2021    SPECGRAV >1.030 08/09/2021    GLUCOSEU Negative 08/09/2021       Radiology:  FL CHOLANGIOGRAM OR   Final Result   No obstructing distal common duct filling defect         CT ABDOMEN PELVIS W IV CONTRAST Additional Contrast? None   Final Result   1. 8 mm pancreatic duct stone with resultant pancreatic duct dilatation and   increase atrophy. 2. There appears to be a 3 mm calculus at the ampulla Vater level, however   the common duct is not dilated. This may be soft tissue calcification. ERCP   or MRCP correlation recommended. 3. Findings suggestive of acute cholecystitis. 4. Punctate, nonobstructing calculi left kidney. 5.  No findings to suggest acute appendicitis; no ureter calculus or   hydronephrosis. XR FOOT LEFT (2 VIEWS)   Final Result   CHEST:      Stable chronic cardiomegaly, without acute airspace consolidation or CHF. RIGHT FOOT:      No acute osseous abnormality of the right foot. Focal soft tissue wound   along the medial aspect of the forefoot, without radiographic evidence of   osteomyelitis. LEFT FOOT:      No acute osseous abnormality of the left foot.   Focal soft tissue wound along   the plantar aspect of the midfoot, without radiographic evidence of osteomyelitis. XR FOOT RIGHT (MIN 3 VIEWS)   Final Result   CHEST:      Stable chronic cardiomegaly, without acute airspace consolidation or CHF. RIGHT FOOT:      No acute osseous abnormality of the right foot. Focal soft tissue wound   along the medial aspect of the forefoot, without radiographic evidence of   osteomyelitis. LEFT FOOT:      No acute osseous abnormality of the left foot. Focal soft tissue wound along   the plantar aspect of the midfoot, without radiographic evidence of   osteomyelitis. XR CHEST (2 VW)   Final Result   CHEST:      Stable chronic cardiomegaly, without acute airspace consolidation or CHF. RIGHT FOOT:      No acute osseous abnormality of the right foot. Focal soft tissue wound   along the medial aspect of the forefoot, without radiographic evidence of   osteomyelitis. LEFT FOOT:      No acute osseous abnormality of the left foot. Focal soft tissue wound along   the plantar aspect of the midfoot, without radiographic evidence of   osteomyelitis. Assessment/Plan:    Active Hospital Problems    Diagnosis     Acute calculous cholecystitis [K80.00]      1.  Pancreatic duct stone, improved pain, lipase within normal limits, GI consulted patient had ERCP in the past, discussed with GI.  2.  Calculus cholecystitis, patient still on IV Rocephin and Flagyl, s/p cholecystectomy, post op day 1  3.  Bilateral feet ulcers, diabetic, likely plans for wound VAC, likely today podiatry consulted. 4.  Diabetes mellitus, sliding scale  5.  Essential hypertension, continue p.o. medication  6.  Chronic anemia, no obvious bleeding at this time      Diet: ADULT DIET;  Regular  Code Status: Full Code        Brennan Saavedra MD

## 2021-08-11 NOTE — PLAN OF CARE
Problem: Pain:  Goal: Pain level will decrease  Description: Pain level will decrease  8/11/2021 1136 by Deuce Holder RN  Outcome: Ongoing  8/10/2021 2319 by Luisito Burkett RN  Outcome: Ongoing  Goal: Control of acute pain  Description: Control of acute pain  8/11/2021 1136 by Deuce Holder RN  Outcome: Ongoing  8/10/2021 2319 by Luisito Burkett RN  Outcome: Ongoing  Goal: Control of chronic pain  Description: Control of chronic pain  8/11/2021 1136 by Deuce Holder RN  Outcome: Ongoing  8/10/2021 2319 by Luisito Burkett RN  Outcome: Ongoing     Problem: Skin Integrity:  Goal: Will show no infection signs and symptoms  Description: Will show no infection signs and symptoms  8/11/2021 1136 by Deuce Holder RN  Outcome: Ongoing  8/10/2021 2319 by Luisito Burkett RN  Outcome: Ongoing  Goal: Absence of new skin breakdown  Description: Absence of new skin breakdown  8/11/2021 1136 by Deuce Holder RN  Outcome: Ongoing  8/10/2021 2319 by Luisito Burkett RN  Outcome: Ongoing     Problem: Falls - Risk of:  Goal: Will remain free from falls  Description: Will remain free from falls  8/11/2021 1136 by Deuce Holder RN  Outcome: Ongoing  8/10/2021 2319 by Luisito Burkett RN  Outcome: Ongoing  Goal: Absence of physical injury  Description: Absence of physical injury  8/11/2021 1136 by Deuce Holder RN  Outcome: Ongoing  8/10/2021 2319 by Luisito Burkett RN  Outcome: Ongoing     Problem: Nutrition  Goal: Optimal nutrition therapy  8/11/2021 1136 by Deuce Holder RN  Outcome: Ongoing  8/10/2021 2319 by Luisito Burkett RN  Outcome: Ongoing     Problem: Discharge Planning:  Goal: Discharged to appropriate level of care  Description: Discharged to appropriate level of care  8/11/2021 1136 by Deuce Holder RN  Outcome: Ongoing  8/10/2021 2319 by Luisito Burkett RN  Outcome: Ongoing

## 2021-08-11 NOTE — PROGRESS NOTES
INPATIENT PROGRESS NOTE        IDENTIFYING DATA/REASON FOR CONSULTATION   PATIENT:  Brigid Tang  MRN:  [id number]  ADMIT DATE: 2021  TIME OF EVALUATION: 2021 10:33 AM  HOSPITAL STAY:   LOS: 3 days   CONSULTING PHYSICIAN: Rodolfo Schmitz MD   REASON FOR CONSULTATION: pancreatic duct stone    Subjective:    Patient seen in follow up. Underwent lap cale yesterday. IOC showed normal biliary anatomy and free flow of contrast into the duodenum. He has no complaints this morning. MEDICATIONS   SCHEDULED:  aspirin, 81 mg, Daily  atorvastatin, 20 mg, Nightly  insulin glargine, 16 Units, QAM AC  sodium chloride flush, 5-40 mL, 2 times per day  enoxaparin, 40 mg, Daily  cefTRIAXone (ROCEPHIN) IV, 1,000 mg, Daily  metroNIDAZOLE, 500 mg, Q8H  famotidine (PEPCID) injection, 20 mg, BID      FLUIDS/DRIPS:     sodium chloride 25 mL (21 0854)    lactated ringers 125 mL/hr at 21 0541    dextrose      sodium hypochlorite       PRNs: acetaminophen, 650 mg, Q4H PRN  oxyCODONE, 5 mg, Q4H PRN   Or  oxyCODONE, 10 mg, Q4H PRN  sodium chloride flush, 5-40 mL, PRN  sodium chloride, 25 mL, PRN  ondansetron, 4 mg, Q8H PRN   Or  ondansetron, 4 mg, Q6H PRN  potassium chloride, 10 mEq, PRN  magnesium sulfate, 2,000 mg, PRN  HYDROmorphone, 0.5 mg, Q3H PRN  glucose, 15 g, PRN  dextrose, 12.5 g, PRN  glucagon (rDNA), 1 mg, PRN  dextrose, 100 mL/hr, PRN  sodium hypochlorite, 473 mL, Continuous PRN      ALLERGIES:  No Known Allergies      PHYSICAL EXAM   VITALS:  /78   Pulse 52   Temp 98.4 °F (36.9 °C) (Oral)   Resp 16   Ht 6' 2\" (1.88 m)   Wt 260 lb 9.3 oz (118.2 kg)   SpO2 97%   BMI 33.46 kg/m²   TEMPERATURE:  Current - Temp: 98.4 °F (36.9 °C);  Max - Temp  Av.7 °F (36.5 °C)  Min: 84.9 °F (29.4 °C)  Max: 98.4 °F (36.9 °C)    Physical Exam:  General appearance: alert, cooperative, no distress, appears stated age  Eyes: Anicteric  Head: Normocephalic, without obvious abnormality  Lungs: clear to auscultation bilaterally, Normal Effort  Heart: regular rate and rhythm, normal S1 and S2, no murmurs or rubs  Abdomen: soft, non-distended, non-tender. Bowel sounds normal. No masses,  no organomegaly. Extremities: atraumatic, no cyanosis or edema  Skin: warm and dry, no jaundice  Neuro: Grossly intact, A&OX3    LABS AND IMAGING   Laboratory   Recent Labs     08/08/21 1845 08/11/21  0446   WBC 9.9 8.6   HGB 10.8* 10.5*   HCT 32.5* 31.9*   MCV 76.8* 76.5*    190     Recent Labs     08/08/21 1845 08/11/21  0446   * 136   K 4.2 4.2    102   CO2 22 21   BUN 20 13   CREATININE 0.9 0.9     Recent Labs     08/08/21 1845 08/11/21 0446   AST 7* 25   ALT <5* 20   BILITOT 0.6 0.4   ALKPHOS 87 80     Recent Labs     08/08/21 1845   LIPASE 11.0*     No results for input(s): PROTIME, INR in the last 72 hours. Imaging  FL CHOLANGIOGRAM OR   Final Result   No obstructing distal common duct filling defect         CT ABDOMEN PELVIS W IV CONTRAST Additional Contrast? None   Final Result   1. 8 mm pancreatic duct stone with resultant pancreatic duct dilatation and   increase atrophy. 2. There appears to be a 3 mm calculus at the ampulla Vater level, however   the common duct is not dilated. This may be soft tissue calcification. ERCP   or MRCP correlation recommended. 3. Findings suggestive of acute cholecystitis. 4. Punctate, nonobstructing calculi left kidney. 5.  No findings to suggest acute appendicitis; no ureter calculus or   hydronephrosis. XR FOOT LEFT (2 VIEWS)   Final Result   CHEST:      Stable chronic cardiomegaly, without acute airspace consolidation or CHF. RIGHT FOOT:      No acute osseous abnormality of the right foot. Focal soft tissue wound   along the medial aspect of the forefoot, without radiographic evidence of   osteomyelitis. LEFT FOOT:      No acute osseous abnormality of the left foot.   Focal soft tissue wound along   the plantar aspect of the midfoot, without radiographic evidence of   osteomyelitis. XR FOOT RIGHT (MIN 3 VIEWS)   Final Result   CHEST:      Stable chronic cardiomegaly, without acute airspace consolidation or CHF. RIGHT FOOT:      No acute osseous abnormality of the right foot. Focal soft tissue wound   along the medial aspect of the forefoot, without radiographic evidence of   osteomyelitis. LEFT FOOT:      No acute osseous abnormality of the left foot. Focal soft tissue wound along   the plantar aspect of the midfoot, without radiographic evidence of   osteomyelitis. XR CHEST (2 VW)   Final Result   CHEST:      Stable chronic cardiomegaly, without acute airspace consolidation or CHF. RIGHT FOOT:      No acute osseous abnormality of the right foot. Focal soft tissue wound   along the medial aspect of the forefoot, without radiographic evidence of   osteomyelitis. LEFT FOOT:      No acute osseous abnormality of the left foot. Focal soft tissue wound along   the plantar aspect of the midfoot, without radiographic evidence of   osteomyelitis. Endoscopy      ASSESSMENT AND RECOMMENDATIONS   Jenniffer Vera is a 77 y.o. male with PMH of DM, HTN, HLD who presented on 8/8/2021 with acute onset nausea, vomiting, abdominal pain. CT scan shows  8 mm pancreatic duct stone with pancreatic ductal dilation and increased atrophy. There was also a 3 mm calculus at the ampulla Vater however no extra or intrahepatic biliary ductal dilation. He also had evidence of acute cholecystitis with mild gallbladder wall thickening and pericholecystic inflammatory changes    1. Acute cholecystitis s/p lap cholecystectomy   2. ?CBD stone  -cholangiogram yesterday negative for obstruction. LFTs normal  3. Pancreatic duct stone  -present since 2014. Underwent ERCP x2.   The initial ERCP was unsuccessful at removing the stone while utilizing a basket, balloon and stent.  He therefore underwent a repeat ERCP with pancreatoscopy and lithotripsy.  The stone was only able to be partially fragmented. RECOMMENDATIONS:    Will have pt follow up with Dr. Celena Torres as an outpatient regarding persistent pancreatic duct stone, possibly consider re-attempt at lithotripsy vs referral to [facility] for ECSWL. Post op care and diet advancement per Surgery  From a GI standpoint he is okay for discharge      If you have any questions or need any further information, please feel free to contact us [phone]. Thank you for allowing us to participate in the care of Vincent Carvajal. The note was completed using Dragon voice recognition transcription. Every effort was made to ensure accuracy; however, inadvertent transcription errors may be present despite my best efforts to edit errors. Radha ROSALES    Attending physician addendum:      I have personally seen and examined the patient, reviewed the patient's medical record and pertinent labs and clinical imaging. I have personally staffed the case with CONNIE Elias. I agree with her consultation note, exam findings, assessment and plans  as written above. I have made appropriate modifications and edited her assessment and plan where needed to reflect my impression and plans for this patient. S/P Lap cale with negative IOC    /73   Pulse 58   Temp 98.2 °F (36.8 °C) (Oral)   Resp 14   Ht 6' 2\" (1.88 m)   Wt 260 lb 9.3 oz (118.2 kg)   SpO2 97%   BMI 33.46 kg/m²     Gen- NAD  CV_ RRR  Lungs- CTAB  Abd- mild incisional TTP    Labs reviewed. OP note reviewed    Impression:  Labs and CT reviewed     1) Acute cholecystitis- POD # 1 lap cale. 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 Patient can follow up with Dr. Celena Torres outpatient to discuss if any treatments are necessary for the 8mm PD stonet.   If symptomatic, may need consideration of re-attempt at lithotripsy vs referral to [facility] for ECSWL. Will sign off    Thank you for allowing me to participate in this patient's care. If there are any questions or concerns regarding this patient, or the plan we have set in place, please feel free to contact me at [phone].      Almaz Velasco, DO

## 2021-08-12 LAB
BLOOD CULTURE, ROUTINE: NORMAL
GLUCOSE BLD-MCNC: 121 MG/DL (ref 70–99)
GLUCOSE BLD-MCNC: 136 MG/DL (ref 70–99)
GLUCOSE BLD-MCNC: 139 MG/DL (ref 70–99)
GLUCOSE BLD-MCNC: 141 MG/DL (ref 70–99)
PERFORMED ON: ABNORMAL

## 2021-08-12 PROCEDURE — 6370000000 HC RX 637 (ALT 250 FOR IP): Performed by: SURGERY

## 2021-08-12 PROCEDURE — 97116 GAIT TRAINING THERAPY: CPT

## 2021-08-12 PROCEDURE — 2580000003 HC RX 258: Performed by: SURGERY

## 2021-08-12 PROCEDURE — 97162 PT EVAL MOD COMPLEX 30 MIN: CPT

## 2021-08-12 PROCEDURE — 6360000002 HC RX W HCPCS: Performed by: SURGERY

## 2021-08-12 PROCEDURE — APPSS15 APP SPLIT SHARED TIME 0-15 MINUTES: Performed by: NURSE PRACTITIONER

## 2021-08-12 PROCEDURE — 1200000000 HC SEMI PRIVATE

## 2021-08-12 PROCEDURE — 6370000000 HC RX 637 (ALT 250 FOR IP): Performed by: PHYSICIAN ASSISTANT

## 2021-08-12 PROCEDURE — 2500000003 HC RX 250 WO HCPCS: Performed by: SURGERY

## 2021-08-12 PROCEDURE — 6370000000 HC RX 637 (ALT 250 FOR IP): Performed by: INTERNAL MEDICINE

## 2021-08-12 PROCEDURE — APPNB15 APP NON BILLABLE TIME 0-15 MINS: Performed by: NURSE PRACTITIONER

## 2021-08-12 PROCEDURE — 97530 THERAPEUTIC ACTIVITIES: CPT

## 2021-08-12 PROCEDURE — 99024 POSTOP FOLLOW-UP VISIT: CPT | Performed by: SURGERY

## 2021-08-12 RX ADMIN — METRONIDAZOLE 500 MG: 500 INJECTION, SOLUTION INTRAVENOUS at 22:26

## 2021-08-12 RX ADMIN — SODIUM CHLORIDE, POTASSIUM CHLORIDE, SODIUM LACTATE AND CALCIUM CHLORIDE: 600; 310; 30; 20 INJECTION, SOLUTION INTRAVENOUS at 01:49

## 2021-08-12 RX ADMIN — Medication 10 ML: at 08:02

## 2021-08-12 RX ADMIN — CEFTRIAXONE 1000 MG: 1 INJECTION, POWDER, FOR SOLUTION INTRAMUSCULAR; INTRAVENOUS at 08:10

## 2021-08-12 RX ADMIN — ASPIRIN 81 MG: 81 TABLET, FILM COATED ORAL at 08:02

## 2021-08-12 RX ADMIN — Medication 10 ML: at 22:23

## 2021-08-12 RX ADMIN — ENOXAPARIN SODIUM 40 MG: 40 INJECTION SUBCUTANEOUS at 08:02

## 2021-08-12 RX ADMIN — METRONIDAZOLE 500 MG: 500 INJECTION, SOLUTION INTRAVENOUS at 06:07

## 2021-08-12 RX ADMIN — SODIUM CHLORIDE, POTASSIUM CHLORIDE, SODIUM LACTATE AND CALCIUM CHLORIDE: 600; 310; 30; 20 INJECTION, SOLUTION INTRAVENOUS at 08:42

## 2021-08-12 RX ADMIN — ACETAMINOPHEN 650 MG: 325 TABLET ORAL at 08:13

## 2021-08-12 RX ADMIN — FAMOTIDINE 20 MG: 10 INJECTION, SOLUTION INTRAVENOUS at 08:02

## 2021-08-12 RX ADMIN — METRONIDAZOLE 500 MG: 500 INJECTION, SOLUTION INTRAVENOUS at 15:51

## 2021-08-12 RX ADMIN — ATORVASTATIN CALCIUM 20 MG: 20 TABLET, FILM COATED ORAL at 22:23

## 2021-08-12 RX ADMIN — FAMOTIDINE 20 MG: 10 INJECTION, SOLUTION INTRAVENOUS at 22:23

## 2021-08-12 RX ADMIN — INSULIN LISPRO 1 UNITS: 100 INJECTION, SOLUTION INTRAVENOUS; SUBCUTANEOUS at 22:28

## 2021-08-12 RX ADMIN — INSULIN GLARGINE 16 UNITS: 100 INJECTION, SOLUTION SUBCUTANEOUS at 08:02

## 2021-08-12 ASSESSMENT — PAIN DESCRIPTION - PROGRESSION
CLINICAL_PROGRESSION: GRADUALLY IMPROVING

## 2021-08-12 ASSESSMENT — PAIN SCALES - GENERAL
PAINLEVEL_OUTOF10: 2
PAINLEVEL_OUTOF10: 4

## 2021-08-12 NOTE — PROGRESS NOTES
Hospitalist Progress Note      PCP: Maurizio Bonilla MD    Date of Admission: 8/8/2021        Subjective: FEELS OK, NO NAUSEA OR VOMITING TODAY. Medications:  Reviewed    Infusion Medications    sodium chloride 25 mL (08/11/21 0854)    dextrose      sodium hypochlorite       Scheduled Medications    insulin lispro  0-6 Units Subcutaneous TID WC    insulin lispro  0-3 Units Subcutaneous Nightly    aspirin  81 mg Oral Daily    atorvastatin  20 mg Oral Nightly    insulin glargine  16 Units Subcutaneous QAM AC    sodium chloride flush  5-40 mL Intravenous 2 times per day    enoxaparin  40 mg Subcutaneous Daily    cefTRIAXone (ROCEPHIN) IV  1,000 mg Intravenous Daily    metroNIDAZOLE  500 mg Intravenous Q8H    famotidine (PEPCID) injection  20 mg Intravenous BID     PRN Meds: acetaminophen, oxyCODONE **OR** oxyCODONE, sodium chloride flush, sodium chloride, ondansetron **OR** ondansetron, potassium chloride, magnesium sulfate, HYDROmorphone, glucose, dextrose, glucagon (rDNA), dextrose, sodium hypochlorite      Intake/Output Summary (Last 24 hours) at 8/12/2021 1328  Last data filed at 8/12/2021 1247  Gross per 24 hour   Intake 3692.36 ml   Output 1150 ml   Net 2542.36 ml       Physical Exam Performed:    BP (!) 159/80   Pulse (!) 46   Temp 98.5 °F (36.9 °C) (Oral)   Resp 16   Ht 6' 2\" (1.88 m)   Wt 260 lb 9.3 oz (118.2 kg)   SpO2 94%   BMI 33.46 kg/m²     General appearance: No apparent distress  Neck: Supple  Respiratory:  Normal respiratory effort. Clear to auscultation, bilaterally without Rales/Wheezes/Rhonchi. Cardiovascular: Regular rate and rhythm with normal S1/S2 without murmurs, rubs or gallops. Abdomen: Soft, non-tender, non-distended with normal bowel sounds. Musculoskeletal: both feet wrapped    Skin: Skin color, texture, turgor normal.  No rashes or lesions.   Neurologic:  No focal weakness   Psychiatric: Alert and oriented  Capillary Refill: Brisk,3 seconds, How Severe Are Your Spot(S)?: mild normal   Peripheral Pulses: +2 palpable, equal bilaterally       Labs:   Recent Labs     08/11/21 0446   WBC 8.6   HGB 10.5*   HCT 31.9*        Recent Labs     08/11/21 0446      K 4.2      CO2 21   BUN 13   CREATININE 0.9   CALCIUM 8.9     Recent Labs     08/11/21 0446   AST 25   ALT 20   BILITOT 0.4   ALKPHOS 80     No results for input(s): INR in the last 72 hours. No results for input(s): Alon Hug in the last 72 hours. Urinalysis:      Lab Results   Component Value Date    NITRU Negative 08/09/2021    WBCUA 0 11/26/2020    BACTERIA 3+ 04/01/2014    RBCUA 3 11/26/2020    BLOODU Negative 08/09/2021    SPECGRAV >1.030 08/09/2021    GLUCOSEU Negative 08/09/2021       Radiology:  FL CHOLANGIOGRAM OR   Final Result   No obstructing distal common duct filling defect         CT ABDOMEN PELVIS W IV CONTRAST Additional Contrast? None   Final Result   1. 8 mm pancreatic duct stone with resultant pancreatic duct dilatation and   increase atrophy. 2. There appears to be a 3 mm calculus at the ampulla Vater level, however   the common duct is not dilated. This may be soft tissue calcification. ERCP   or MRCP correlation recommended. 3. Findings suggestive of acute cholecystitis. 4. Punctate, nonobstructing calculi left kidney. 5.  No findings to suggest acute appendicitis; no ureter calculus or   hydronephrosis. XR FOOT LEFT (2 VIEWS)   Final Result   CHEST:      Stable chronic cardiomegaly, without acute airspace consolidation or CHF. RIGHT FOOT:      No acute osseous abnormality of the right foot. Focal soft tissue wound   along the medial aspect of the forefoot, without radiographic evidence of   osteomyelitis. LEFT FOOT:      No acute osseous abnormality of the left foot. Focal soft tissue wound along   the plantar aspect of the midfoot, without radiographic evidence of   osteomyelitis.          XR FOOT RIGHT (MIN 3 VIEWS)   Final Result   CHEST:      Stable What Is The Reason For Today's Visit?: Upper Body Skin Exam chronic cardiomegaly, without acute airspace consolidation or CHF. RIGHT FOOT:      No acute osseous abnormality of the right foot. Focal soft tissue wound   along the medial aspect of the forefoot, without radiographic evidence of   osteomyelitis. LEFT FOOT:      No acute osseous abnormality of the left foot. Focal soft tissue wound along   the plantar aspect of the midfoot, without radiographic evidence of   osteomyelitis. XR CHEST (2 VW)   Final Result   CHEST:      Stable chronic cardiomegaly, without acute airspace consolidation or CHF. RIGHT FOOT:      No acute osseous abnormality of the right foot. Focal soft tissue wound   along the medial aspect of the forefoot, without radiographic evidence of   osteomyelitis. LEFT FOOT:      No acute osseous abnormality of the left foot. Focal soft tissue wound along   the plantar aspect of the midfoot, without radiographic evidence of   osteomyelitis. Assessment/Plan:    Active Hospital Problems    Diagnosis     Acute calculous cholecystitis [K80.00]      1.  Pancreatic duct stone, improved pain, lipase within normal limits, GI consulted patient had ERCP in the past, discussed with GI.  2.  Calculus cholecystitis, patient still on IV Rocephin and Flagyl as inpatient, s/p cholecystectomy, post op day 2, discussed with GS, no antibiotics on discharge. 3.  Bilateral feet ulcers, diabetic, podiatry following, follow up as outpatient  4.  Diabetes mellitus, sliding scale  5.  Essential hypertension, continue p.o. medication  6.  Chronic anemia, no obvious bleeding at this time      Diet: ADULT DIET;  Regular  Code Status: Full Code      Jd Arrieta MD

## 2021-08-12 NOTE — PROGRESS NOTES
Physical Therapy    Facility/Department: [facility] 4 MED SURG  Initial Assessment  This note serves as patient discharge summary if pt discharges prior to next PT visit      NAME: Pepe Seymour  : 1955  MRN: [id number]    Date of Service: 2021    Discharge Recommendations:  Continue to assess pending progress     PT Equipment Recommendations  Equipment Needed: No  Pepe Seymour scored a 15/24 on the AM-PAC short mobility form. Assessment   Body structures, Functions, Activity limitations: Decreased functional mobility ; Decreased safe awareness;Decreased sensation;Decreased balance;Decreased cognition;Decreased ROM  Assessment: 78 yo male with to hospital 2021 with acute abdominal pain. Dx'd with acute cholecystitis. On 8- Dr. Deepa Parikh performs Laparoscopic cholecystectomy with cholangiogram. Podiatry (Dr. Jaylin Henry) also consulted in setting of patient with history of  \"Diabetes with peripheral neuropathy, deformity of both feet with venous insufficiency and  Lymphedema who is consulted for Diabetic wounds to the mid arch of both feet due to deformity and pressure. \" Per Dr. Trevor Marcelo note: Mook Munguia is unwilling to change his situation or his activity level to allow for healing. No debridement or surgical intervention is warranted. Patient has chronic stable wounds that have been so for several years. \" No specific restrictions provided (likely due to patient not historically adhering to restrictions). Prior status: lived in home alone, and reports independence in ADLs (washing up at sink), transfers, and home distance ambulation with cane as needed. Gets Meals on Wheels, and has home nursing care for feet. He has aide assist once weekly. Status 2021: Bed Mobility Min A and effortful. Transfers CGA and occ cues. Gait RW x 21', CGA, with Fair quality, but stable despite this.  Per chart review, patient with history of non-compliance (likely in part to lack of understanding of importance of doing so), and a very limited support system. Anticipate patient is approaching baseline level of function, although he did report dizziness throughout session. /79, 171/88 during session. Will continue to see and monitor, but anticipate patient to DC to home as prior. He has a RW at home. Treatment Diagnosis: IMpaired activity tolerance  Prognosis: Good  Decision Making: Medium Complexity  History: as noted  Clinical Presentation: Evolving  PT Education: PT Role;Goals;Plan of Care;Transfer Training;Gait Training;General Safety  Patient Education: Patient distractible during session, requiring cues to remain on task. Chatty. REQUIRES PT FOLLOW UP: Yes  Activity Tolerance  Activity Tolerance: Patient reports dizzyness throughout session. 155/79; 171/88. RN informed. Patient Diagnosis(es): The primary encounter diagnosis was Acute cholecystitis. Diagnoses of Pancreatic stones, Diabetic foot infection ([address] and Cholecystitis were also pertinent to this visit. has a past medical history of Diabetes mellitus ([address] Gallstones, Lymphedema, Neuropathy, Pancreatitis, PVD (peripheral vascular disease) ([address] and Ulcers of both lower legs ([address] has a past surgical history that includes Foot surgery (Left, 1967); ERCP (4/8/14); and Cholecystectomy, laparoscopic (N/A, 8/10/2021). Restrictions  Restrictions/Precautions  Restrictions/Precautions: Fall Risk  Position Activity Restriction  Other position/activity restrictions: B LE neuropathy, B feet deformity, and chronic wounds B feet. Vision/Hearing  Vision: Within Functional Limits  Hearing: Within functional limits       Subjective  General  Chart Reviewed: Yes  Patient assessed for rehabilitation services?: Yes  Additional Pertinent Hx: 76 yo male with to hospital 8-8-2021 with acute abdominal pain. Dx'd with acute cholecystitis.   On 8- Dr. Devante White performs Laparoscopic cholecystectomy with cholangiogram. Podiatry (Dr. Jim Tejada) also consulted in setting of patient with history of  \"Diabetes with peripheral neuropathy, deformity of both feet with venous insufficiency and  Lymphedema who is consulted for Diabetic wounds to the mid arch of both feet due to deformity and pressure. \" Per Dr. Artis Heimlich note: Valere Severance is unwilling to change his situation or his activity level to allow for healing. No debridement or surgical intervention is warranted. Patient has chronic stable wounds that have been so for several years. \" No specific restrictions provided (likely due to patient not historically adhering to restrictions). Response To Previous Treatment: Not applicable  Family / Caregiver Present: No  Referring Practitioner: Maddison Weller MD  Referral Date : 08/11/21  Subjective  Subjective: Patient in bed. Awake. Agreeable to therapy. Denies pain, but reports dizziness with initial EOB sitting, and less so once up in BS chair.          Orientation  Orientation  Overall Orientation Status: Within Functional Limits     Social/Functional History  Social/Functional History  Lives With: Alone  Type of Home: House  Home Layout: Laundry in basement, Able to Live on Main level with bedroom/bathroom  Home Access: Stairs to enter with rails  Entrance Stairs - Number of Steps: 5 step to enter but doesnt go out of the home  Bathroom Shower/Tub: Tub/Shower unit (pt does sponge baths)  Bathroom Toilet: Handicap height  Bathroom Equipment: 3-in-1 commode  Home Equipment: Standard walker, Rolling walker, Quad cane  Receives Help From: Other (comment) (pt recieves help from [facility] x1 for grocery shopping, can recieve services if he needs (per pt account))  ADL Assistance: Independent (Pt states he is able to gets dressed if he needs to but he typically does change his clothes)  Homemaking Assistance: Independent  Homemaking Responsibilities: No  Ambulation Assistance: Independent (occassionally uses a cane in the home)  Transfer Assistance: Independent  Active : No  Occupation: Retired  Additional Comments: Reports sleeping on couch. States he hand washes clothes as needed. Gets Meals on Wheels. Pam care aide once weekly. Cognition   Cognition  Overall Cognitive Status: Exceptions  Arousal/Alertness: Appropriate responses to stimuli  Following Commands: Follows one step commands consistently  Attention Span: Attends with cues to redirect  Safety Judgement: Decreased awareness of need for safety;Decreased awareness of need for assistance  Problem Solving: Assistance required to implement solutions;Assistance required to identify errors made;Assistance required to generate solutions;Assistance required to correct errors made  Insights: Decreased awareness of deficits  Initiation: Requires cues for some  Sequencing: Requires cues for some  Cognition Comment: Patient mood variable during session. Generally agreeable, but periods of anger as therapists ask prior status questions. Objective  Observation/Palpation  Observation: B feet deformity, and wrapped with ace wraps. AROM RLE (degrees)  RLE AROM: WFL  RLE General AROM: Except ankle fixed at ~ -5 degrees DF  AROM LLE (degrees)  LLE AROM : WFL  LLE General AROM: Except knee flexion to ~100. Ankle fixed at ~ -5 degrees DF  Strength RLE  Comment: Hip and knee 4>5/5  Strength LLE  Comment: Hip and knee 4>5/5. Patient notes functional weakness at ankle. Not noted with gait. Sensation  Overall Sensation Status: WFL (Impaired at B lower legs and feet per podiatry. Does report intact to deep touch.)  Bed mobility  Supine to Sit: Minimal assistance (HOB slightly up, 1 rail. Effortful.  Uses momentum to complete)  Sit to Supine: Unable to assess (In BS chair at end of session)  Transfers  Sit to Stand: Contact guard assistance  Stand to sit: Contact guard assistance;Stand by assistance (Good positioning to chair prior to sitting)  Ambulation  Ambulation?: Yes  Ambulation 1  Surface: level

## 2021-08-12 NOTE — PLAN OF CARE
Problem: Pain:  Goal: Pain level will decrease  Description: Pain level will decrease  8/12/2021 0010 by Luisito Burkett RN  Outcome: Ongoing  8/11/2021 1136 by Deuce Holder RN  Outcome: Ongoing  Goal: Control of acute pain  Description: Control of acute pain  8/12/2021 0010 by Luisito Burkett RN  Outcome: Ongoing  8/11/2021 1136 by Deuce Holder RN  Outcome: Ongoing  Goal: Control of chronic pain  Description: Control of chronic pain  8/12/2021 0010 by Luisito Burkett RN  Outcome: Ongoing  8/11/2021 1136 by Deuce Holder RN  Outcome: Ongoing     Problem: Skin Integrity:  Goal: Will show no infection signs and symptoms  Description: Will show no infection signs and symptoms  8/12/2021 0010 by Luisito Burkett RN  Outcome: Ongoing  8/11/2021 1136 by Deuce Holder RN  Outcome: Ongoing  Goal: Absence of new skin breakdown  Description: Absence of new skin breakdown  8/12/2021 0010 by Luisito Burkett RN  Outcome: Ongoing  8/11/2021 1136 by Deuce Holder RN  Outcome: Ongoing     Problem: Falls - Risk of:  Goal: Will remain free from falls  Description: Will remain free from falls  8/12/2021 0010 by Luisito Burkett RN  Outcome: Ongoing  8/11/2021 1136 by Deuce Holder RN  Outcome: Ongoing  Goal: Absence of physical injury  Description: Absence of physical injury  8/12/2021 0010 by Luisito Burkett RN  Outcome: Ongoing  8/11/2021 1136 by Deuce Holder RN  Outcome: Ongoing     Problem: Nutrition  Goal: Optimal nutrition therapy  8/12/2021 0010 by Luisito Burkett RN  Outcome: Ongoing  8/11/2021 1136 by Deuce Holder RN  Outcome: Ongoing     Problem: Discharge Planning:  Goal: Discharged to appropriate level of care  Description: Discharged to appropriate level of care  8/12/2021 0010 by Luisito Burkett RN  Outcome: Ongoing  8/11/2021 1136 by Deuce Holder RN  Outcome: Ongoing

## 2021-08-12 NOTE — PROGRESS NOTES
Department of Podiatry Progress Note  Salma Pereira  8/12/2021        HISTORY OF PRESENT ILLNESS:                The patient is a 77 y.o. male with bilateral foot ulcerations. Patient notes he was walking around the room today and got very dizzy. This is AGAINST MEDICAL ADVICE with the large ulcerations on both feet; however, patient has demonstrated willing and complete noncompliance with this instruction. No plans to stop to protect his feet at this time. Patient also has no plans to follow-up as an outpatient. We will continue to follow for acute needs but is very unlikely this patient will ever heal these wounds with this mindset and attitude towards his lower extremities.     PHYSICAL EXAM:      Vitals:    BP (!) 183/88   Pulse 57   Temp 98.1 °F (36.7 °C) (Oral)   Resp 18   Ht 6' 2\" (1.88 m)   Wt 260 lb 9.3 oz (118.2 kg)   SpO2 96%   BMI 33.46 kg/m²     LABS:   Recent Labs     08/11/21  0446   WBC 8.6   HGB 10.5*   HCT 31.9*        Recent Labs     08/11/21  0446      K 4.2      CO2 21   BUN 13   CREATININE 0.9     Recent Labs     08/11/21  0446   PROT 7.4       LOWER EXTREMITY EXAMINATION   SKIN:    RIGHT foot MUGS 3 ulceration with ragged and fibrotic deep muscle and capsule of 4.3 x 4.1 cm with depth of 0.8 cm with light periwound erythema of 0.5 cm     LEFT foot in area of talonavicular joint similarly with MUGS 3 ulcer of 5.3 cm x 4.6 cm with depth of 0.7 cm with ragged and fibrotic base of muscle and fascia and surrounding erythema of less than 1 cm    NEUROLOGIC:    Pain sensation isdecreased Bilateral.  Light touch is decreasedBilateral. positive history of paresthesia Bilateral. negativehistory of burning Bilateral. Deep tendon reflexes are 1 to include patellar and achilles Bilateral. Nash--Jose 5.07 monofilament sensitivity is abnormal 5 to 6 spots tested Bilateral.    VASCULAR:    bilaterally Dorsalis pedis is 2. bilaterally Posterior tibial pulse is 1. 1+ edema bilaterally. moderate Varicosities bilaterally. MUSCULOSKELETAL:  Demetrius Pacheco is untested as patient has open wounds to plantar feet. Muscle strength is 3/5 to all groups tested to include dorsiflexion, plantarflexion, inversion, eversion, and digital Bilateral . The ranges of motion of all joints tested from ankle distal is decreased there is no crepitus noted Bilateral.    Radiology of 8/8/2021  RIGHT FOOT:       No acute osseous abnormality of the right foot.  Focal soft tissue wound   along the medial aspect of the forefoot, without radiographic evidence of   osteomyelitis.       LEFT FOOT:       No acute osseous abnormality of the left foot.  Focal soft tissue wound along   the plantar aspect of the midfoot, without radiographic evidence of   osteomyelitis. Assessment:  Diabetes with peripheral neuropathy and ulcerations  Chronic foot deformity with presumed chronic osteomyelitis underlying  Non-compliance with recommended treatments. Plan:   Evaluation and Management x 15 minutes with greater than 50% of the time spent with the patient discussing the etiology and treatment options of the chief complaint. Bilateral foot ulcerations  Clean areas with Dakins or Vashe  Apply silver alginate dressings every other day to the wounds  Pad with ABD and gauze and secure with rolled gauze    Disposition: As above. Thank you for the opportunity to take part in the patient's care.     Barry Car DPM  [facility]  Pager: [phone]  Office: [phone]  Fax: [phone]

## 2021-08-12 NOTE — PROGRESS NOTES
General and Vascular Surgery                                                           Daily Progress Note                                                               Pt Name: Mary Jenkins Record Number: [id number]  Date of Birth 1955   Today's Date: 8/12/2021    ASSESSMENT/PLAN  POD#2 (8/10/21): Laparoscopic cholecystectomy with cholangiogram  1. Pain controlled  2. Incision sites with dressings intact. No excessive drainage  3. Denies any nausea or emesis  4. Tolerating diet  5. +flatulence and BM's  6. OK to D/C from a general surgery standpoint when medically ok  7. F/U with Dr. Elicia Becker in 1-2 weeks  EDUCATION  Patient educated about their illness/diagnosis, stated above, and all questions answered. We discussed the importance of nutrition, medications they are taking, and healthy lifestyle. Karla Rubio has improved from yesterday. Pain is well controlled. He has no nausea and no vomiting. He has passed flatus and has had a bowel movement. OBJECTIVE  VITALS:  height is 6' 2\" (1.88 m) and weight is 260 lb 9.3 oz (118.2 kg). His oral temperature is 98.5 °F (36.9 °C). His blood pressure is 159/80 (abnormal) and his pulse is 46 (abnormal). His respiration is 16 and oxygen saturation is 94%. VITALS:  BP (!) 159/80   Pulse (!) 46   Temp 98.5 °F (36.9 °C) (Oral)   Resp 16   Ht 6' 2\" (1.88 m)   Wt 260 lb 9.3 oz (118.2 kg)   SpO2 94%   BMI 33.46 kg/m²   GENERAL: alert, no distress  ABDOMEN: tenderness present- incisional,  without rebound and guarding, distention present, Passing flatulence and having BM's  I/O last 3 completed shifts: In: 3392.4 [P.O.:720; I.V.:2342.3; IV Piggyback:330]  Out: 725 [Urine:725]  I/O this shift:   In: 540 [P.O.:540]  Out: 650 [Urine:650]    LABS  Recent Labs     08/11/21  0446   WBC 8.6   HGB 10.5*   HCT 31.9*         K 4.2      CO2 21   BUN 13   CREATININE 0.9 CALCIUM 8.9   AST 25   ALT 20   BILITOT 0.4     CBC:   Lab Results   Component Value Date    WBC 8.6 08/11/2021    RBC 4.17 08/11/2021    HGB 10.5 08/11/2021    HCT 31.9 08/11/2021    MCV 76.5 08/11/2021    MCH 25.1 08/11/2021    MCHC 32.9 08/11/2021    RDW 17.3 08/11/2021     08/11/2021    MPV 8.0 08/11/2021     CMP:    Lab Results   Component Value Date     08/11/2021    K 4.2 08/11/2021    K 4.7 06/24/2021     08/11/2021    CO2 21 08/11/2021    BUN 13 08/11/2021    CREATININE 0.9 08/11/2021    GFRAA >60 08/11/2021    AGRATIO 0.8 08/11/2021    LABGLOM >60 08/11/2021    GLUCOSE 216 08/11/2021    PROT 7.4 08/11/2021    LABALBU 3.2 08/11/2021    CALCIUM 8.9 08/11/2021    BILITOT 0.4 08/11/2021    ALKPHOS 80 08/11/2021    AST 25 08/11/2021    ALT 20 08/11/2021         KENNY Valles CNP  Electronically signed 8/12/2021 at 1:25 PM    Attending  As above  Stable from Laparoscopic Cholecystectomy with [address] [place] [address] when medically stable    Electronically signed by Mahendra Trotter MD on 8/12/2021 at 2:03 PM

## 2021-08-12 NOTE — CARE COORDINATION
INITIAL CASE MANAGEMENT ASSESSMENT (follow up)      DME: Prior to medical admission patient had a cane, walker, raised toilet seat, bedside commode and a manual wheelchair. He is currently on IVAB at bedside today and we will continue to monitor for needs.      PT/OT Recs: Not ordered. SW sent a message to MD regarding the possible need for therapies for safety prior to discharge.      Active Services: [facility] and receives meals on wheels and home health aide services of 1.5 hours per week. COA ALLISON (MOW, Aide 1.5 hours weekly), confirmed active with home care through [facility]     Transportation: patient will need stretcher transport home     Medications: RX delivered, no issues      PCP: Valencia Sellers MD. They visit with patient in the home setting.     PLAN/COMMENTS:   1) Monitor for IVAB and possible therapy needs. 2) Resumption of care orders for [facility].      Respectfully submitted,    IMANI Gore  [facility]   [phone]    Electronically signed by IMANI Polanco on 8/12/2021 at 9:11 AM

## 2021-08-12 NOTE — PLAN OF CARE
Problem: Pain:  Goal: Pain level will decrease  Description: Pain level will decrease  8/12/2021 0740 by Marisela Salvador RN  Outcome: Ongoing  8/12/2021 0010 by Ryan Allison RN  Outcome: Ongoing  Goal: Control of acute pain  Description: Control of acute pain  8/12/2021 0740 by Marisela Salvador RN  Outcome: Ongoing  8/12/2021 0010 by Ryan Allison RN  Outcome: Ongoing  Goal: Control of chronic pain  Description: Control of chronic pain  8/12/2021 0740 by Marsiela Salvador RN  Outcome: Ongoing  8/12/2021 0010 by Ryan Allison RN  Outcome: Ongoing     Problem: Skin Integrity:  Goal: Will show no infection signs and symptoms  Description: Will show no infection signs and symptoms  8/12/2021 0740 by Marisela Salvador RN  Outcome: Ongoing  8/12/2021 0010 by Ryan Allison RN  Outcome: Ongoing  Goal: Absence of new skin breakdown  Description: Absence of new skin breakdown  8/12/2021 0740 by Marisela Salvador RN  Outcome: Ongoing  8/12/2021 0010 by Ryan Allison RN  Outcome: Ongoing     Problem: Falls - Risk of:  Goal: Will remain free from falls  Description: Will remain free from falls  8/12/2021 0740 by Marisela Salvador RN  Outcome: Ongoing  8/12/2021 0010 by Ryan Allison RN  Outcome: Ongoing  Goal: Absence of physical injury  Description: Absence of physical injury  8/12/2021 0740 by Marisela Salvador RN  Outcome: Ongoing  8/12/2021 0010 by Ryan Allison RN  Outcome: Ongoing     Problem: Discharge Planning:  Goal: Discharged to appropriate level of care  Description: Discharged to appropriate level of care  8/12/2021 0740 by Marisela Salvador RN  Outcome: Ongoing  8/12/2021 0010 by Ryan Allison RN  Outcome: Ongoing     Problem: Nutrition  Goal: Optimal nutrition therapy  8/12/2021 0740 by Marisela Salvador RN  Outcome: Ongoing  8/12/2021 0010 by Ryan Allison RN  Outcome: Ongoing

## 2021-08-13 VITALS
HEART RATE: 42 BPM | RESPIRATION RATE: 16 BRPM | BODY MASS INDEX: 33.92 KG/M2 | DIASTOLIC BLOOD PRESSURE: 94 MMHG | OXYGEN SATURATION: 97 % | TEMPERATURE: 98 F | WEIGHT: 264.33 LBS | HEIGHT: 74 IN | SYSTOLIC BLOOD PRESSURE: 176 MMHG

## 2021-08-13 LAB
GLUCOSE BLD-MCNC: 119 MG/DL (ref 70–99)
GLUCOSE BLD-MCNC: 157 MG/DL (ref 70–99)
PERFORMED ON: ABNORMAL
PERFORMED ON: ABNORMAL

## 2021-08-13 PROCEDURE — 94760 N-INVAS EAR/PLS OXIMETRY 1: CPT

## 2021-08-13 PROCEDURE — 2500000003 HC RX 250 WO HCPCS: Performed by: SURGERY

## 2021-08-13 PROCEDURE — 6370000000 HC RX 637 (ALT 250 FOR IP): Performed by: SURGERY

## 2021-08-13 PROCEDURE — 6360000002 HC RX W HCPCS: Performed by: SURGERY

## 2021-08-13 PROCEDURE — APPNB15 APP NON BILLABLE TIME 0-15 MINS: Performed by: NURSE PRACTITIONER

## 2021-08-13 PROCEDURE — 2580000003 HC RX 258: Performed by: SURGERY

## 2021-08-13 PROCEDURE — 6370000000 HC RX 637 (ALT 250 FOR IP): Performed by: INTERNAL MEDICINE

## 2021-08-13 PROCEDURE — APPSS15 APP SPLIT SHARED TIME 0-15 MINUTES: Performed by: NURSE PRACTITIONER

## 2021-08-13 RX ORDER — DOXYCYCLINE HYCLATE 100 MG
100 TABLET ORAL EVERY 12 HOURS SCHEDULED
Status: DISCONTINUED | OUTPATIENT
Start: 2021-08-13 | End: 2021-08-13 | Stop reason: HOSPADM

## 2021-08-13 RX ORDER — DOXYCYCLINE HYCLATE 100 MG
100 TABLET ORAL EVERY 12 HOURS SCHEDULED
Qty: 14 TABLET | Refills: 0 | Status: SHIPPED | OUTPATIENT
Start: 2021-08-13 | End: 2021-08-20

## 2021-08-13 RX ADMIN — CEFTRIAXONE 1000 MG: 1 INJECTION, POWDER, FOR SOLUTION INTRAMUSCULAR; INTRAVENOUS at 09:01

## 2021-08-13 RX ADMIN — INSULIN LISPRO 1 UNITS: 100 INJECTION, SOLUTION INTRAVENOUS; SUBCUTANEOUS at 12:11

## 2021-08-13 RX ADMIN — ASPIRIN 81 MG: 81 TABLET, FILM COATED ORAL at 08:54

## 2021-08-13 RX ADMIN — ENOXAPARIN SODIUM 40 MG: 40 INJECTION SUBCUTANEOUS at 08:54

## 2021-08-13 RX ADMIN — INSULIN GLARGINE 16 UNITS: 100 INJECTION, SOLUTION SUBCUTANEOUS at 08:54

## 2021-08-13 RX ADMIN — METRONIDAZOLE 500 MG: 500 INJECTION, SOLUTION INTRAVENOUS at 05:51

## 2021-08-13 RX ADMIN — FAMOTIDINE 20 MG: 10 INJECTION, SOLUTION INTRAVENOUS at 08:54

## 2021-08-13 RX ADMIN — Medication 10 ML: at 09:01

## 2021-08-13 RX ADMIN — DOXYCYCLINE HYCLATE 100 MG: 100 TABLET, COATED ORAL at 12:11

## 2021-08-13 ASSESSMENT — PAIN SCALES - GENERAL: PAINLEVEL_OUTOF10: 0

## 2021-08-13 NOTE — PROGRESS NOTES
Patient discharged to home. Discharge summary reviewed with patient. All questions answered. IV removed and tolerated well.  Electronically signed by Maryam Lopes RN on 8/13/2021 at 4:58 PM

## 2021-08-13 NOTE — PROGRESS NOTES
General and Vascular Surgery                                                           Daily Progress Note                                                               Pt Name: Irina John Record Number: [id number]  Date of Birth 1955   Today's Date: 8/13/2021    ASSESSMENT/PLAN  POD#3 (8/10/21): Laparoscopic cholecystectomy with cholangiogram  1. Pain controlled  2. Incision sites WNL, dressings removed  3. Denies any nausea or emesis  4. Tolerating diet  5. +flatulence and BM's  6. OK to D/C from a general surgery standpoint when medically ok  7. F/U with Dr. Kurtis Perkins in 1-2 weeks  EDUCATION  Patient educated about their illness/diagnosis, stated above, and all questions answered. We discussed the importance of nutrition, medications they are taking, and healthy lifestyle. Arturo Garcia has improved from yesterday. Pain is well controlled. He has no nausea and no vomiting. He has passed flatus and has had a bowel movement. OBJECTIVE  VITALS:  height is 6' 2\" (1.88 m) and weight is 264 lb 5.3 oz (119.9 kg). His oral temperature is 98 °F (36.7 °C). His blood pressure is 176/94 (abnormal) and his pulse is 42 (abnormal). His respiration is 16 and oxygen saturation is 97%. VITALS:  BP (!) 176/94   Pulse (!) 42   Temp 98 °F (36.7 °C) (Oral)   Resp 16   Ht 6' 2\" (1.88 m)   Wt 264 lb 5.3 oz (119.9 kg)   SpO2 97%   BMI 33.94 kg/m²   GENERAL: alert, no distress  ABDOMEN: tenderness present- incisional,  without rebound and guarding, distention present, Passing flatulence and having BM's  I/O last 3 completed shifts:   In: 1313.8 [P.O.:1020; IV Piggyback:293.8]  Out: 2250 [Urine:2250]  I/O this shift:  In: 600 [P.O.:600]  Out: 1400 [Urine:1400]    LABS  Recent Labs     08/11/21  0446   WBC 8.6   HGB 10.5*   HCT 31.9*         K 4.2      CO2 21   BUN 13   CREATININE 0.9   CALCIUM 8.9   AST 25   ALT 20 BILITOT 0.4     CBC:   Lab Results   Component Value Date    WBC 8.6 08/11/2021    RBC 4.17 08/11/2021    HGB 10.5 08/11/2021    HCT 31.9 08/11/2021    MCV 76.5 08/11/2021    MCH 25.1 08/11/2021    MCHC 32.9 08/11/2021    RDW 17.3 08/11/2021     08/11/2021    MPV 8.0 08/11/2021     CMP:    Lab Results   Component Value Date     08/11/2021    K 4.2 08/11/2021    K 4.7 06/24/2021     08/11/2021    CO2 21 08/11/2021    BUN 13 08/11/2021    CREATININE 0.9 08/11/2021    GFRAA >60 08/11/2021    AGRATIO 0.8 08/11/2021    LABGLOM >60 08/11/2021    GLUCOSE 216 08/11/2021    PROT 7.4 08/11/2021    LABALBU 3.2 08/11/2021    CALCIUM 8.9 08/11/2021    BILITOT 0.4 08/11/2021    ALKPHOS 80 08/11/2021    AST 25 08/11/2021    ALT 20 08/11/2021         KENNY Sierra CNP  Electronically signed 8/13/2021 at 11:56 AM     Attending    As above  Home soon  Follow up in two weeks    Electronically signed by Asuncion Luz MD on 8/13/2021 at 2:01 PM

## 2021-08-13 NOTE — PROGRESS NOTES
Comprehensive Nutrition Assessment    Type and Reason for Visit:  Reassess    Nutrition Recommendations/Plan:   - Continue regular diet  - Monitor intakes     Nutrition Assessment:  Follow-up. Pt is tolerating regular diet with variable intakes. Pt denied need for supplementation upon last assessment. Will continue to monitor as pt has increased protein needs for wound healing. Malnutrition Assessment:  Malnutrition Status:  No malnutrition    Context:  Acute Illness       Estimated Daily Nutrient Needs:  Energy (kcal):  2906-7200 (15-18kcal/120kg); Weight Used for Energy Requirements:  Current     Protein (g):  103-172 (1.2-2 x IBW 86 kg); Weight Used for Protein Requirements:  Ideal        Fluid (ml/day):  1 ml/kcal      Nutrition Related Findings:  +4 liters. +1 BLE edema. Wounds:  Diabetic Ulcer       Current Nutrition Therapies:    ADULT DIET; Regular    Anthropometric Measures:  · Height: 6' 2\" (188 cm)  · Current Body Weight: 264 lb (119.7 kg)   · Admission Body Weight: 259 lb (117.5 kg)    · Usual Body Weight:  (250s per pt)     · Ideal Body Weight: 190 lbs; % Ideal Body Weight 138.9 %   · BMI: 33.9  · BMI Categories: Obese Class 1 (BMI 30.0-34. 9)       Nutrition Diagnosis:   · Increased nutrient needs related to increase demand for energy/nutrients as evidenced by wounds    Nutrition Interventions:  Food and/or Nutrient Delivery:  Continue Current Diet  Nutrition Education/Counseling:  No recommendation at this time   Coordination of Nutrition Care:  Continue to monitor while inpatient    Goals:  tolerate most appropriate form of nutrition       Nutrition Monitoring and Evaluation:   Behavioral-Environmental Outcomes:  None Identified   Food/Nutrient Intake Outcomes:  Food and Nutrient Intake  Physical Signs/Symptoms Outcomes:  GI Status, Weight, Skin     Discharge Planning:     Too soon to determine     Electronically signed by Jhonatan Nichols RD, LD on 8/13/21 at 9:35 AM EDT    Contact: [phone]

## 2021-08-13 NOTE — PROGRESS NOTES
CLINICAL PHARMACY NOTE: MEDS TO BEDS    Total # of Prescriptions Filled: 1   The following medications were delivered to the patient:  Current Discharge Medication List      START taking these medications    Details   doxycycline hyclate (VIBRA-TABS) 100 MG tablet Take 1 tablet by mouth every 12 hours for 7 days  Qty: 14 tablet, Refills: 0         ·   ·     Additional Documentation:

## 2021-08-13 NOTE — CARE COORDINATION
MARK faxed MAIKLO/AVS and [facility] care [facility] to [facility]. Transport paperwork is on his soft chart. No further needs. Respectfully submitted,    Aruna Manley3IMANI  [facility]   [phone]    Electronically signed by IMANI Toscano on 8/13/2021 at 2:58 PM

## 2021-08-13 NOTE — PROGRESS NOTES
Hospital Medicine Discharge Summary    Patient ID: Kizzy Crowder      Patient's PCP: Dony Turner MD    Admit Date: 8/8/2021     Discharge Date:   08/13/2021    Admitting Physician: Lawrence Vera MD     Discharge Physician: Peace Roberson MD     Discharge Diagnoses: Active Hospital Problems    Diagnosis     Acute calculous cholecystitis [K80.00]        The patient was seen and examined on day of discharge and this discharge summary is in conjunction with any daily progress note from day of discharge. Hospital Course:     From HPI:\"The patient is a 64-year-old   male, presenting into the hospital with chief complaints of one-day  history of increasing nausea, vomiting, abdominal pain, and fatigue,  that actually started two days ago, but was worse in the last day with  some fevers and chills, but without any other constitutional symptoms. The patient also described this fatigue and cough-like feeling and right  upper quadrant abdominal pain. Apparently, it was similar to what he  had in 2014.\"    1.  Pancreatic duct stone, improved pain, lipase within normal limits, GI consulted patient had ERCP in the past, Follow up with GI as outpatient. 2.  Calculus cholecystitis, patient still on IV Rocephin and Flagyl as inpatient, s/p cholecystectomy, post op day 3, discussed with GS, no antibiotics on discharge. 3.  Bilateral feet ulcers, diabetic, podiatry following, follow up as outpatient, wound care as outpatient, discussed with Dr Doni Johnson, recommended 1 week of po doxy for now. 4.  Diabetes mellitus, resume home regimen. 5.  Essential hypertension, continue p.o. medication  6.  Chronic anemia, no obvious bleeding at this time  7.  Weakness, PT, OT recommended SNF, discussed with patient and I recommended SNF, patient declined, verbalized understanding of potential risks of falls, worsening infection and other potential complications, but insisted to go home ,will respect that, added home health. Physical Exam Performed:     BP (!) 176/94   Pulse (!) 42   Temp 98 °F (36.7 °C) (Oral)   Resp 16   Ht 6' 2\" (1.88 m)   Wt 264 lb 5.3 oz (119.9 kg)   SpO2 97%   BMI 33.94 kg/m²       General appearance:  No apparent distress  HEENT:  Normal cephalic  Neck: Supple  Respiratory:  Normal respiratory effort. Clear to auscultation, bilaterally without Rales/Wheezes/Rhonchi. Cardiovascular:  Regular rate and rhythm with normal S1/S2 without murmurs, rubs or gallops. Abdomen: Soft, non-tender, non-distended with normal bowel sounds. Musculoskeletal:  No clubbing  Skin: chronic feet ulcers   Neurologic:  No focal weakness   Psychiatric:  Alert and oriented  Capillary Refill: Brisk,< 3 seconds   Peripheral Pulses: +2 palpable, equal bilaterally       Labs: For convenience and continuity at follow-up the following most recent labs are provided:      CBC:    Lab Results   Component Value Date    WBC 8.6 08/11/2021    HGB 10.5 08/11/2021    HCT 31.9 08/11/2021     08/11/2021       Renal:    Lab Results   Component Value Date     08/11/2021    K 4.2 08/11/2021    K 4.7 06/24/2021     08/11/2021    CO2 21 08/11/2021    BUN 13 08/11/2021    CREATININE 0.9 08/11/2021    CALCIUM 8.9 08/11/2021    PHOS 3.3 06/12/2020         Significant Diagnostic Studies    Radiology:   FL CHOLANGIOGRAM OR   Final Result   No obstructing distal common duct filling defect         CT ABDOMEN PELVIS W IV CONTRAST Additional Contrast? None   Final Result   1. 8 mm pancreatic duct stone with resultant pancreatic duct dilatation and   increase atrophy. 2. There appears to be a 3 mm calculus at the ampulla Vater level, however   the common duct is not dilated. This may be soft tissue calcification. ERCP   or MRCP correlation recommended. 3. Findings suggestive of acute cholecystitis. 4. Punctate, nonobstructing calculi left kidney.    5.  No findings to suggest acute appendicitis; no ureter calculus or   hydronephrosis. XR FOOT LEFT (2 VIEWS)   Final Result   CHEST:      Stable chronic cardiomegaly, without acute airspace consolidation or CHF. RIGHT FOOT:      No acute osseous abnormality of the right foot. Focal soft tissue wound   along the medial aspect of the forefoot, without radiographic evidence of   osteomyelitis. LEFT FOOT:      No acute osseous abnormality of the left foot. Focal soft tissue wound along   the plantar aspect of the midfoot, without radiographic evidence of   osteomyelitis. XR FOOT RIGHT (MIN 3 VIEWS)   Final Result   CHEST:      Stable chronic cardiomegaly, without acute airspace consolidation or CHF. RIGHT FOOT:      No acute osseous abnormality of the right foot. Focal soft tissue wound   along the medial aspect of the forefoot, without radiographic evidence of   osteomyelitis. LEFT FOOT:      No acute osseous abnormality of the left foot. Focal soft tissue wound along   the plantar aspect of the midfoot, without radiographic evidence of   osteomyelitis. XR CHEST (2 VW)   Final Result   CHEST:      Stable chronic cardiomegaly, without acute airspace consolidation or CHF. RIGHT FOOT:      No acute osseous abnormality of the right foot. Focal soft tissue wound   along the medial aspect of the forefoot, without radiographic evidence of   osteomyelitis. LEFT FOOT:      No acute osseous abnormality of the left foot. Focal soft tissue wound along   the plantar aspect of the midfoot, without radiographic evidence of   osteomyelitis.                 Consults:     IP CONSULT TO GENERAL SURGERY  IP CONSULT TO HOSPITALIST  IP CONSULT TO GI  IP CONSULT TO PODIATRY  IP CONSULT TO SOCIAL WORK  IP CONSULT TO HOME CARE NEEDS    Disposition:  Home      Condition at Discharge: Stable    Discharge Instructions/Follow-up:  PCP, GS, GI, Podiatry    Code Status:  Full Code     Activity: activity as tolerated    Diet: diabetic diet      Discharge Medications:     Current Discharge Medication List           Details   doxycycline hyclate (VIBRA-TABS) 100 MG tablet Take 1 tablet by mouth every 12 hours for 7 days  Qty: 14 tablet, Refills: 0              Details   insulin glargine (LANTUS SOLOSTAR) 100 UNIT/ML injection pen Inject 20 Units into the skin nightly      Lactobacillus TABS Take 1 tablet by mouth daily      Cyanocobalamin (VITAMIN B-12) 50 MCG LOZG Take 50 mcg by mouth daily      CALCIUM-MAGNESIUM-ZINC PO Take 1 tablet by mouth daily      lisinopril (PRINIVIL;ZESTRIL) 10 MG tablet Take 10 mg by mouth daily      acetaminophen (TYLENOL) 500 MG tablet Take 1 tablet by mouth 4 times daily as needed for Pain  Qty: 120 tablet, Refills: 0      atorvastatin (LIPITOR) 20 MG tablet Take 20 mg by mouth nightly       tamsulosin (FLOMAX) 0.4 MG capsule Take 0.4 mg by mouth every evening       aspirin 81 MG EC tablet Take 1 tablet by mouth daily  Qty: 30 tablet, Refills: 0      spironolactone (ALDACTONE) 25 MG tablet Take 25 mg by mouth daily      metFORMIN (GLUCOPHAGE) 500 MG tablet Take 500 mg by mouth 2 times daily (with meals)      Insulin Pen Needle 32G X 4 MM MISC 1 each by Does not apply route daily. Qty: 100 each, Refills: 3      Lancets MISC Once daily  Qty: 100 each, Refills: 3      glucose blood VI test strips (ASCENSIA AUTODISC VI;ONE TOUCH ULTRA TEST VI) strip 1 each by In Vitro route daily. As needed. Qty: 100 each, Refills: 3      glucose monitoring kit (FREESTYLE) monitoring kit 1 kit by Does not apply route daily as needed. Qty: 1 kit, Refills: 0             Time Spent on discharge is more than 45 minutes in the examination, evaluation, counseling and review of medications and discharge plan. Signed:    Cherry Sierra MD   8/13/2021      Thank you Domoinque Souza MD for the opportunity to be involved in this patient's care.  If you have any questions or concerns please feel free to contact me at [phone].

## 2021-08-13 NOTE — PLAN OF CARE
Problem: Pain:  Goal: Pain level will decrease  Description: Pain level will decrease  Outcome: Ongoing  Goal: Control of acute pain  Description: Control of acute pain  Outcome: Ongoing  Goal: Control of chronic pain  Description: Control of chronic pain  Outcome: Ongoing     Problem: Skin Integrity:  Goal: Will show no infection signs and symptoms  Description: Will show no infection signs and symptoms  Outcome: Ongoing  Goal: Absence of new skin breakdown  Description: Absence of new skin breakdown  Outcome: Ongoing     Problem: Falls - Risk of:  Goal: Will remain free from falls  Description: Will remain free from falls  Outcome: Ongoing  Goal: Absence of physical injury  Description: Absence of physical injury  Outcome: Ongoing     Problem: Nutrition  Goal: Optimal nutrition therapy  Outcome: Ongoing     Problem: Discharge Planning:  Goal: Discharged to appropriate level of care  Description: Discharged to appropriate level of care  Outcome: Ongoing

## 2021-08-13 NOTE — PROGRESS NOTES
Department of Podiatry Progress Note  Yesenia Guadarrama  8/13/2021        HISTORY OF PRESENT ILLNESS:                The patient is a 77 y.o. male with bilateral foot ulcerations. No new complaints. Plan is to d/c home today. PHYSICAL EXAM:      Vitals:    BP (!) 176/94   Pulse (!) 42   Temp 98 °F (36.7 °C) (Oral)   Resp 16   Ht 6' 2\" (1.88 m)   Wt 264 lb 5.3 oz (119.9 kg)   SpO2 97%   BMI 33.94 kg/m²     LABS:   Recent Labs     08/11/21  0446   WBC 8.6   HGB 10.5*   HCT 31.9*        Recent Labs     08/11/21  0446      K 4.2      CO2 21   BUN 13   CREATININE 0.9     Recent Labs     08/11/21  0446   PROT 7.4       LOWER EXTREMITY EXAMINATION   SKIN:    RIGHT foot MUGS 3 ulceration with ragged and fibrotic deep muscle and capsule of 4.3 x 4.1 cm with depth of 0.8 cm with light periwound erythema of 0.5 cm     LEFT foot in area of talonavicular joint similarly with MUGS 3 ulcer of 5.3 cm x 4.6 cm with depth of 0.7 cm with ragged and fibrotic base of muscle and fascia and surrounding erythema of less than 1 cm    NEUROLOGIC:    Pain sensation isdecreased Bilateral.  Light touch is decreasedBilateral. positive history of paresthesia Bilateral. negativehistory of burning Bilateral. Deep tendon reflexes are 1 to include patellar and achilles Bilateral. Nerinx--Jose 5.07 monofilament sensitivity is abnormal 5 to 6 spots tested Bilateral.    VASCULAR:    bilaterally Dorsalis pedis is 2. bilaterally Posterior tibial pulse is 1. 1+ edema bilaterally. moderate Varicosities bilaterally. MUSCULOSKELETAL:  Aretta Mcardle is untested as patient has open wounds to plantar feet.  Muscle strength is 3/5 to all groups tested to include dorsiflexion, plantarflexion, inversion, eversion, and digital Bilateral . The ranges of motion of all joints tested from ankle distal is decreased there is no crepitus noted Bilateral.    Radiology of 8/8/2021  RIGHT FOOT:       No acute osseous abnormality of the right home care/

## 2021-08-13 NOTE — CARE COORDINATION
DISCHARGE SUMMARY     DATE OF DISCHARGE: 08/13/2021    DISCHARGE DESTINATION:     Discharging to Facility/ Agency   · Name: Rudi Wolfe  · Address:  [facility] [address], [address] [facility]   · Phone:  [phone]  · Fax:  [phone]    TRANSPORTATION: [address] [place] [address] Name:  [facility] transport  Harjinder 28 up Time: 3:00pm  Phone Number: [phone]    COMMENTS: SW attempted to reach patient via telephone to review the discharge plan but there was no answer. He was already working with [facility] in the home. We will follow up with patient at bedside prior to discharge from [address] [place] [address],[address].     Respectfully submitted,    IMANI Gore  [facility]   [phone]    Electronically signed by IMANI Ronquillo on 8/13/2021 at 12:09 PM

## 2021-08-13 NOTE — PLAN OF CARE
Problem: Pain:  Goal: Pain level will decrease  Description: Pain level will decrease  8/13/2021 1048 by Kevin Juárez RN  Outcome: Ongoing  8/13/2021 0029 by Deo Marroquin RN  Outcome: Ongoing  Goal: Control of acute pain  Description: Control of acute pain  8/13/2021 1048 by Kevin Juárez RN  Outcome: Ongoing  8/13/2021 0029 by Deo Marroquin RN  Outcome: Ongoing  Goal: Control of chronic pain  Description: Control of chronic pain  8/13/2021 1048 by Kevin Juárez RN  Outcome: Ongoing  8/13/2021 0029 by Deo Marroquin RN  Outcome: Ongoing     Problem: Skin Integrity:  Goal: Will show no infection signs and symptoms  Description: Will show no infection signs and symptoms  8/13/2021 1048 by Kevin Juárez RN  Outcome: Ongoing  8/13/2021 0029 by Deo Marroquin RN  Outcome: Ongoing  Goal: Absence of new skin breakdown  Description: Absence of new skin breakdown  8/13/2021 1048 by Kevin Juárez RN  Outcome: Ongoing  8/13/2021 0029 by Deo Marroquin RN  Outcome: Ongoing     Problem: Falls - Risk of:  Goal: Will remain free from falls  Description: Will remain free from falls  8/13/2021 1048 by Kevin Juárez RN  Outcome: Ongoing  8/13/2021 0029 by Deo Marroquin RN  Outcome: Ongoing  Goal: Absence of physical injury  Description: Absence of physical injury  8/13/2021 1048 by Kevin Juárez RN  Outcome: Ongoing  8/13/2021 0029 by Deo Marroquin RN  Outcome: Ongoing     Problem: Nutrition  Goal: Optimal nutrition therapy  8/13/2021 1048 by Kevin Juárez RN  Outcome: Ongoing  8/13/2021 0943 by Nedra Napier RD, LD  Outcome: Ongoing  8/13/2021 0029 by Deo Marroquin RN  Outcome: Ongoing     Problem: Discharge Planning:  Goal: Discharged to appropriate level of care  Description: Discharged to appropriate level of care  8/13/2021 1048 by Kevin Juárez RN  Outcome: Ongoing  8/13/2021 0029 by Deo Marroquin RN  Outcome: Ongoing

## 2021-08-14 NOTE — DISCHARGE SUMMARY
Hospital Medicine Discharge Summary     Patient ID: Yogi Mail                 Patient's PCP: Clarence Hall MD     Admit Date: 8/8/2021      Discharge Date:   08/13/2021     Admitting Physician: Toribio Melchor MD     Discharge Physician: Paul Quintana MD      Discharge Diagnoses:        Active Hospital Problems     Diagnosis      Acute calculous cholecystitis [K80.00]           The patient was seen and examined on day of discharge and this discharge summary is in conjunction with any daily progress note from day of discharge.     Hospital Course:      From HPI:\"The patient is a 78-year-old   male, presenting into the hospital with chief complaints of one-day  history of increasing nausea, vomiting, abdominal pain, and fatigue,  that actually started two days ago, but was worse in the last day with  some fevers and chills, but without any other constitutional symptoms. The patient also described this fatigue and cough-like feeling and right  upper quadrant abdominal pain.  Apparently, it was similar to what he  had in 2014. \"     1.  Pancreatic duct stone, improved pain, lipase within normal limits, GI consulted patient had ERCP in the past, Follow up with GI as outpatient. 2.  Calculus cholecystitis, patient still on IV Rocephin and Flagyl as inpatient, s/p cholecystectomy, post op day 3, discussed with GS, no antibiotics on discharge.   3.  Bilateral feet ulcers, diabetic, podiatry following, follow up as outpatient, wound care as outpatient, discussed with Dr Pito Aquino, recommended 1 week of po doxy for now. 4.  Diabetes mellitus, resume home regimen. 5.  Essential hypertension, continue p.o. medication  6.  Chronic anemia, no obvious bleeding at this time  7.  Weakness, PT, OT recommended SNF, discussed with patient and I recommended SNF, patient declined, verbalized understanding of potential risks of falls, worsening infection and other potential complications, but insisted to go home ,will respect that, added home health.            Physical Exam Performed:      BP (!) 176/94   Pulse (!) 42   Temp 98 °F (36.7 °C) (Oral)   Resp 16   Ht 6' 2\" (1.88 m)   Wt 264 lb 5.3 oz (119.9 kg)   SpO2 97%   BMI 33.94 kg/m²         General appearance:  No apparent distress  HEENT:  Normal cephalic  Neck: Supple  Respiratory:  Normal respiratory effort. Clear to auscultation, bilaterally without Rales/Wheezes/Rhonchi. Cardiovascular:  Regular rate and rhythm with normal S1/S2 without murmurs, rubs or gallops. Abdomen: Soft, non-tender, non-distended with normal bowel sounds. Musculoskeletal:  No clubbing  Skin: chronic feet ulcers   Neurologic:  No focal weakness   Psychiatric:  Alert and oriented  Capillary Refill: Brisk,< 3 seconds   Peripheral Pulses: +2 palpable, equal bilaterally         Labs: For convenience and continuity at follow-up the following most recent labs are provided:        CBC:          Lab Results   Component Value Date     WBC 8.6 08/11/2021     HGB 10.5 08/11/2021     HCT 31.9 08/11/2021      08/11/2021         Renal:          Lab Results   Component Value Date      08/11/2021     K 4.2 08/11/2021     K 4.7 06/24/2021      08/11/2021     CO2 21 08/11/2021     BUN 13 08/11/2021     CREATININE 0.9 08/11/2021     CALCIUM 8.9 08/11/2021     PHOS 3.3 06/12/2020            Significant Diagnostic Studies     Radiology:   FL CHOLANGIOGRAM OR   Final Result   No obstructing distal common duct filling defect           CT ABDOMEN PELVIS W IV CONTRAST Additional Contrast? None   Final Result   1. 8 mm pancreatic duct stone with resultant pancreatic duct dilatation and   increase atrophy. 2. There appears to be a 3 mm calculus at the ampulla Vater level, however   the common duct is not dilated. This may be soft tissue calcification. ERCP   or MRCP correlation recommended. 3. Findings suggestive of acute cholecystitis.    4. Punctate, Instructions/Follow-up:  PCP, GS, GI, Podiatry     Code Status:  Full Code      Activity: activity as tolerated     Diet: diabetic diet        Discharge Medications:      Discharge Medications        Current Discharge Medication List                 Details   doxycycline hyclate (VIBRA-TABS) 100 MG tablet Take 1 tablet by mouth every 12 hours for 7 days  Qty: 14 tablet, Refills: 0                     Details   insulin glargine (LANTUS SOLOSTAR) 100 UNIT/ML injection pen Inject 20 Units into the skin nightly       Lactobacillus TABS Take 1 tablet by mouth daily       Cyanocobalamin (VITAMIN B-12) 50 MCG LOZG Take 50 mcg by mouth daily       CALCIUM-MAGNESIUM-ZINC PO Take 1 tablet by mouth daily       lisinopril (PRINIVIL;ZESTRIL) 10 MG tablet Take 10 mg by mouth daily       acetaminophen (TYLENOL) 500 MG tablet Take 1 tablet by mouth 4 times daily as needed for Pain  Qty: 120 tablet, Refills: 0       atorvastatin (LIPITOR) 20 MG tablet Take 20 mg by mouth nightly        tamsulosin (FLOMAX) 0.4 MG capsule Take 0.4 mg by mouth every evening        aspirin 81 MG EC tablet Take 1 tablet by mouth daily  Qty: 30 tablet, Refills: 0       spironolactone (ALDACTONE) 25 MG tablet Take 25 mg by mouth daily       metFORMIN (GLUCOPHAGE) 500 MG tablet Take 500 mg by mouth 2 times daily (with meals)       Insulin Pen Needle 32G X 4 MM MISC 1 each by Does not apply route daily. Qty: 100 each, Refills: 3       Lancets MISC Once daily  Qty: 100 each, Refills: 3       glucose blood VI test strips (ASCENSIA AUTODISC VI;ONE TOUCH ULTRA TEST VI) strip 1 each by In Vitro route daily. As needed. Qty: 100 each, Refills: 3       glucose monitoring kit (FREESTYLE) monitoring kit 1 kit by Does not apply route daily as needed.   Qty: 1 kit, Refills: 0                    Time Spent on discharge is more than 45 minutes in the examination, evaluation, counseling and review of medications and discharge plan.        Signed:  Erika Harden MD 8/13/2021        Thank you Raymundo Walsh MD for the opportunity to be involved in this patient's care. If you have any questions or concerns please feel free to contact me at [phone].

## 2022-07-20 NOTE — PLAN OF CARE
Problem: Pain:  Goal: Pain level will decrease  Description: Pain level will decrease  8/13/2021 1504 by Daniel Malone RN  Outcome: Completed  8/13/2021 1048 by Daniel Malone RN  Outcome: Ongoing  Goal: Control of acute pain  Description: Control of acute pain  8/13/2021 1504 by Daniel Malone RN  Outcome: Completed  8/13/2021 1048 by Daniel Malone RN  Outcome: Ongoing  Goal: Control of chronic pain  Description: Control of chronic pain  8/13/2021 1504 by Daniel Malone RN  Outcome: Completed  8/13/2021 1048 by Daniel Malone RN  Outcome: Ongoing     Problem: Skin Integrity:  Goal: Will show no infection signs and symptoms  Description: Will show no infection signs and symptoms  8/13/2021 1504 by Daniel Malone RN  Outcome: Completed  8/13/2021 1048 by Daniel Malone RN  Outcome: Ongoing  Goal: Absence of new skin breakdown  Description: Absence of new skin breakdown  8/13/2021 1504 by Daniel Malone RN  Outcome: Completed  8/13/2021 1048 by Daniel Malone RN  Outcome: Ongoing     Problem: Falls - Risk of:  Goal: Will remain free from falls  Description: Will remain free from falls  8/13/2021 1504 by Daniel Malone RN  Outcome: Completed  8/13/2021 1048 by Daniel Malone RN  Outcome: Ongoing  Goal: Absence of physical injury  Description: Absence of physical injury  8/13/2021 1504 by Daniel Malone RN  Outcome: Completed  8/13/2021 1048 by Daniel Malone RN  Outcome: Ongoing     Problem: Nutrition  Goal: Optimal nutrition therapy  8/13/2021 1504 by Daniel Malone RN  Outcome: Completed  8/13/2021 1048 by Daniel Malone RN  Outcome: Ongoing  8/13/2021 0943 by April Agosto RD, LD  Outcome: Ongoing     Problem: Discharge Planning:  Goal: Discharged to appropriate level of care  Description: Discharged to appropriate level of care  8/13/2021 1504 by Daniel Malone RN  Outcome: Completed  8/13/2021 1048 by Daniel Malone RN  Outcome: Ongoing 20-Mar-2021

## 2022-08-15 ENCOUNTER — APPOINTMENT (OUTPATIENT)
Dept: GENERAL RADIOLOGY | Age: 67
DRG: 871 | End: 2022-08-15
Payer: MEDICARE

## 2022-08-15 ENCOUNTER — HOSPITAL ENCOUNTER (INPATIENT)
Age: 67
LOS: 7 days | Discharge: HOME HEALTH | DRG: 871 | End: 2022-08-22
Attending: STUDENT IN AN ORGANIZED HEALTH CARE EDUCATION/TRAINING PROGRAM | Admitting: STUDENT IN AN ORGANIZED HEALTH CARE EDUCATION/TRAINING PROGRAM
Payer: MEDICARE

## 2022-08-15 ENCOUNTER — APPOINTMENT (OUTPATIENT)
Dept: CT IMAGING | Age: 67
DRG: 871 | End: 2022-08-15
Payer: MEDICARE

## 2022-08-15 DIAGNOSIS — A41.9 SEPSIS, DUE TO UNSPECIFIED ORGANISM, UNSPECIFIED WHETHER ACUTE ORGAN DYSFUNCTION PRESENT (HCC): Primary | ICD-10-CM
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
## 2022-08-15 LAB
A/G RATIO: 1.1 (ref 1.1–2.2)
ALBUMIN SERPL-MCNC: 3.8 G/DL (ref 3.4–5)
ALP BLD-CCNC: 81 U/L (ref 40–129)
ALT SERPL-CCNC: <5 U/L (ref 10–40)
ANION GAP SERPL CALCULATED.3IONS-SCNC: 10 MMOL/L (ref 3–16)
AST SERPL-CCNC: 8 U/L (ref 15–37)
BACTERIA: NORMAL /HPF
BASOPHILS ABSOLUTE: 0.1 K/UL (ref 0–0.2)
BASOPHILS RELATIVE PERCENT: 0.4 %
BILIRUB SERPL-MCNC: 0.5 MG/DL (ref 0–1)
BILIRUBIN URINE: NEGATIVE
BLOOD, URINE: ABNORMAL
BUN BLDV-MCNC: 22 MG/DL (ref 7–20)
CALCIUM SERPL-MCNC: 9.3 MG/DL (ref 8.3–10.6)
CHLORIDE BLD-SCNC: 104 MMOL/L (ref 99–110)
CLARITY: CLEAR
CO2: 22 MMOL/L (ref 21–32)
COLOR: YELLOW
CREAT SERPL-MCNC: 0.9 MG/DL (ref 0.8–1.3)
EKG ATRIAL RATE: 82 BPM
EKG DIAGNOSIS: NORMAL
EKG P AXIS: 50 DEGREES
EKG P-R INTERVAL: 86 MS
EKG Q-T INTERVAL: 396 MS
EKG QRS DURATION: 140 MS
EKG QTC CALCULATION (BAZETT): 462 MS
EKG R AXIS: -61 DEGREES
EKG T AXIS: 69 DEGREES
EKG VENTRICULAR RATE: 82 BPM
EOSINOPHILS ABSOLUTE: 0 K/UL (ref 0–0.6)
EOSINOPHILS RELATIVE PERCENT: 0.1 %
EPITHELIAL CELLS, UA: 1 /HPF (ref 0–5)
GFR AFRICAN AMERICAN: >60
GFR NON-AFRICAN AMERICAN: >60
GLUCOSE BLD-MCNC: 112 MG/DL (ref 70–99)
GLUCOSE BLD-MCNC: 127 MG/DL (ref 70–99)
GLUCOSE BLD-MCNC: 136 MG/DL (ref 70–99)
GLUCOSE URINE: NEGATIVE MG/DL
HCT VFR BLD CALC: 27 % (ref 40.5–52.5)
HEMOGLOBIN: 8.5 G/DL (ref 13.5–17.5)
HYALINE CASTS: 0 /LPF (ref 0–8)
KETONES, URINE: NEGATIVE MG/DL
LACTIC ACID: 1.9 MMOL/L (ref 0.4–2)
LEUKOCYTE ESTERASE, URINE: NEGATIVE
LIPASE: 9 U/L (ref 13–60)
LYMPHOCYTES ABSOLUTE: 0.3 K/UL (ref 1–5.1)
LYMPHOCYTES RELATIVE PERCENT: 2.1 %
MCH RBC QN AUTO: 23.4 PG (ref 26–34)
MCHC RBC AUTO-ENTMCNC: 31.6 G/DL (ref 31–36)
MCV RBC AUTO: 74.1 FL (ref 80–100)
MICROSCOPIC EXAMINATION: YES
MONOCYTES ABSOLUTE: 0.7 K/UL (ref 0–1.3)
MONOCYTES RELATIVE PERCENT: 5.3 %
NEUTROPHILS ABSOLUTE: 12.9 K/UL (ref 1.7–7.7)
NEUTROPHILS RELATIVE PERCENT: 92.1 %
NITRITE, URINE: NEGATIVE
OCCULT BLOOD DIAGNOSTIC: NORMAL
PDW BLD-RTO: 18 % (ref 12.4–15.4)
PERFORMED ON: ABNORMAL
PERFORMED ON: ABNORMAL
PH UA: 6.5 (ref 5–8)
PLATELET # BLD: 249 K/UL (ref 135–450)
PMV BLD AUTO: 7.1 FL (ref 5–10.5)
POTASSIUM REFLEX MAGNESIUM: 5 MMOL/L (ref 3.5–5.1)
PROCALCITONIN: 0.14 NG/ML (ref 0–0.15)
PROTEIN UA: NEGATIVE MG/DL
RBC # BLD: 3.65 M/UL (ref 4.2–5.9)
RBC UA: 2 /HPF (ref 0–4)
SARS-COV-2, NAAT: NOT DETECTED
SODIUM BLD-SCNC: 136 MMOL/L (ref 136–145)
SPECIFIC GRAVITY UA: 1.03 (ref 1–1.03)
TOTAL PROTEIN: 7.2 G/DL (ref 6.4–8.2)
URINE REFLEX TO CULTURE: ABNORMAL
URINE TYPE: ABNORMAL
UROBILINOGEN, URINE: 1 E.U./DL
WBC # BLD: 14 K/UL (ref 4–11)
WBC UA: 1 /HPF (ref 0–5)

## 2022-08-15 PROCEDURE — 94760 N-INVAS EAR/PLS OXIMETRY 1: CPT

## 2022-08-15 PROCEDURE — 36415 COLL VENOUS BLD VENIPUNCTURE: CPT

## 2022-08-15 PROCEDURE — 96375 TX/PRO/DX INJ NEW DRUG ADDON: CPT

## 2022-08-15 PROCEDURE — 1200000000 HC SEMI PRIVATE

## 2022-08-15 PROCEDURE — 82270 OCCULT BLOOD FECES: CPT

## 2022-08-15 PROCEDURE — 93010 ELECTROCARDIOGRAM REPORT: CPT | Performed by: INTERNAL MEDICINE

## 2022-08-15 PROCEDURE — 71045 X-RAY EXAM CHEST 1 VIEW: CPT

## 2022-08-15 PROCEDURE — 80053 COMPREHEN METABOLIC PANEL: CPT

## 2022-08-15 PROCEDURE — 73630 X-RAY EXAM OF FOOT: CPT

## 2022-08-15 PROCEDURE — 6360000002 HC RX W HCPCS: Performed by: PHYSICIAN ASSISTANT

## 2022-08-15 PROCEDURE — 73560 X-RAY EXAM OF KNEE 1 OR 2: CPT

## 2022-08-15 PROCEDURE — 96365 THER/PROPH/DIAG IV INF INIT: CPT

## 2022-08-15 PROCEDURE — 85025 COMPLETE CBC W/AUTO DIFF WBC: CPT

## 2022-08-15 PROCEDURE — 83690 ASSAY OF LIPASE: CPT

## 2022-08-15 PROCEDURE — 84145 PROCALCITONIN (PCT): CPT

## 2022-08-15 PROCEDURE — 83605 ASSAY OF LACTIC ACID: CPT

## 2022-08-15 PROCEDURE — 93005 ELECTROCARDIOGRAM TRACING: CPT | Performed by: PHYSICIAN ASSISTANT

## 2022-08-15 PROCEDURE — 87635 SARS-COV-2 COVID-19 AMP PRB: CPT

## 2022-08-15 PROCEDURE — 99285 EMERGENCY DEPT VISIT HI MDM: CPT

## 2022-08-15 PROCEDURE — 6370000000 HC RX 637 (ALT 250 FOR IP): Performed by: PHYSICIAN ASSISTANT

## 2022-08-15 PROCEDURE — 2580000003 HC RX 258: Performed by: PHYSICIAN ASSISTANT

## 2022-08-15 PROCEDURE — 6370000000 HC RX 637 (ALT 250 FOR IP): Performed by: STUDENT IN AN ORGANIZED HEALTH CARE EDUCATION/TRAINING PROGRAM

## 2022-08-15 PROCEDURE — 2580000003 HC RX 258: Performed by: STUDENT IN AN ORGANIZED HEALTH CARE EDUCATION/TRAINING PROGRAM

## 2022-08-15 PROCEDURE — 6360000002 HC RX W HCPCS: Performed by: STUDENT IN AN ORGANIZED HEALTH CARE EDUCATION/TRAINING PROGRAM

## 2022-08-15 PROCEDURE — 83036 HEMOGLOBIN GLYCOSYLATED A1C: CPT

## 2022-08-15 PROCEDURE — 71260 CT THORAX DX C+: CPT

## 2022-08-15 PROCEDURE — 6360000004 HC RX CONTRAST MEDICATION: Performed by: PHYSICIAN ASSISTANT

## 2022-08-15 PROCEDURE — 81001 URINALYSIS AUTO W/SCOPE: CPT

## 2022-08-15 RX ORDER — ACETAMINOPHEN 325 MG/1
650 TABLET ORAL ONCE
Status: COMPLETED | OUTPATIENT
Start: 2022-08-15 | End: 2022-08-15

## 2022-08-15 RX ORDER — LISINOPRIL 20 MG/1
10 TABLET ORAL DAILY
Status: ON HOLD | COMMUNITY
End: 2022-09-13 | Stop reason: HOSPADM

## 2022-08-15 RX ORDER — TAMSULOSIN HYDROCHLORIDE 0.4 MG/1
0.4 CAPSULE ORAL NIGHTLY
Status: DISCONTINUED | OUTPATIENT
Start: 2022-08-15 | End: 2022-08-22 | Stop reason: HOSPADM

## 2022-08-15 RX ORDER — SODIUM CHLORIDE 9 MG/ML
INJECTION, SOLUTION INTRAVENOUS PRN
Status: DISCONTINUED | OUTPATIENT
Start: 2022-08-15 | End: 2022-08-22 | Stop reason: HOSPADM

## 2022-08-15 RX ORDER — ONDANSETRON 2 MG/ML
4 INJECTION INTRAMUSCULAR; INTRAVENOUS EVERY 6 HOURS PRN
Status: DISCONTINUED | OUTPATIENT
Start: 2022-08-15 | End: 2022-08-22 | Stop reason: HOSPADM

## 2022-08-15 RX ORDER — INSULIN LISPRO 100 [IU]/ML
0-4 INJECTION, SOLUTION INTRAVENOUS; SUBCUTANEOUS NIGHTLY
Status: DISCONTINUED | OUTPATIENT
Start: 2022-08-15 | End: 2022-08-22 | Stop reason: HOSPADM

## 2022-08-15 RX ORDER — SODIUM CHLORIDE 0.9 % (FLUSH) 0.9 %
5-40 SYRINGE (ML) INJECTION EVERY 12 HOURS SCHEDULED
Status: DISCONTINUED | OUTPATIENT
Start: 2022-08-15 | End: 2022-08-22 | Stop reason: HOSPADM

## 2022-08-15 RX ORDER — OYSTER SHELL CALCIUM WITH VITAMIN D 500; 200 MG/1; [IU]/1
1 TABLET, FILM COATED ORAL DAILY
COMMUNITY

## 2022-08-15 RX ORDER — INSULIN GLARGINE 100 [IU]/ML
10 INJECTION, SOLUTION SUBCUTANEOUS NIGHTLY
Status: DISCONTINUED | OUTPATIENT
Start: 2022-08-15 | End: 2022-08-22 | Stop reason: HOSPADM

## 2022-08-15 RX ORDER — ONDANSETRON 2 MG/ML
4 INJECTION INTRAMUSCULAR; INTRAVENOUS ONCE
Status: COMPLETED | OUTPATIENT
Start: 2022-08-15 | End: 2022-08-15

## 2022-08-15 RX ORDER — ATORVASTATIN CALCIUM 20 MG/1
20 TABLET, FILM COATED ORAL NIGHTLY
Status: DISCONTINUED | OUTPATIENT
Start: 2022-08-15 | End: 2022-08-22 | Stop reason: HOSPADM

## 2022-08-15 RX ORDER — DEXTROSE MONOHYDRATE 100 MG/ML
INJECTION, SOLUTION INTRAVENOUS CONTINUOUS PRN
Status: DISCONTINUED | OUTPATIENT
Start: 2022-08-15 | End: 2022-08-22 | Stop reason: HOSPADM

## 2022-08-15 RX ORDER — FUROSEMIDE 40 MG/1
40 TABLET ORAL DAILY
COMMUNITY
End: 2022-08-15

## 2022-08-15 RX ORDER — DOCUSATE SODIUM 100 MG/1
100 CAPSULE, LIQUID FILLED ORAL 2 TIMES DAILY
COMMUNITY

## 2022-08-15 RX ORDER — 0.9 % SODIUM CHLORIDE 0.9 %
500 INTRAVENOUS SOLUTION INTRAVENOUS ONCE
Status: COMPLETED | OUTPATIENT
Start: 2022-08-15 | End: 2022-08-15

## 2022-08-15 RX ORDER — INSULIN LISPRO 100 [IU]/ML
0-8 INJECTION, SOLUTION INTRAVENOUS; SUBCUTANEOUS
Status: DISCONTINUED | OUTPATIENT
Start: 2022-08-15 | End: 2022-08-22 | Stop reason: HOSPADM

## 2022-08-15 RX ORDER — ACETAMINOPHEN 325 MG/1
650 TABLET ORAL EVERY 6 HOURS PRN
Status: DISCONTINUED | OUTPATIENT
Start: 2022-08-15 | End: 2022-08-22 | Stop reason: HOSPADM

## 2022-08-15 RX ORDER — ONDANSETRON 4 MG/1
4 TABLET, ORALLY DISINTEGRATING ORAL EVERY 8 HOURS PRN
Status: DISCONTINUED | OUTPATIENT
Start: 2022-08-15 | End: 2022-08-22 | Stop reason: HOSPADM

## 2022-08-15 RX ORDER — ACETAMINOPHEN 650 MG/1
650 SUPPOSITORY RECTAL EVERY 6 HOURS PRN
Status: DISCONTINUED | OUTPATIENT
Start: 2022-08-15 | End: 2022-08-22 | Stop reason: HOSPADM

## 2022-08-15 RX ORDER — LISINOPRIL 20 MG/1
20 TABLET ORAL DAILY
Status: DISCONTINUED | OUTPATIENT
Start: 2022-08-15 | End: 2022-08-22 | Stop reason: HOSPADM

## 2022-08-15 RX ORDER — SODIUM CHLORIDE 0.9 % (FLUSH) 0.9 %
5-40 SYRINGE (ML) INJECTION PRN
Status: DISCONTINUED | OUTPATIENT
Start: 2022-08-15 | End: 2022-08-22 | Stop reason: HOSPADM

## 2022-08-15 RX ORDER — SPIRONOLACTONE 25 MG/1
25 TABLET ORAL DAILY
Status: DISCONTINUED | OUTPATIENT
Start: 2022-08-15 | End: 2022-08-22 | Stop reason: HOSPADM

## 2022-08-15 RX ORDER — LISINOPRIL 10 MG/1
10 TABLET ORAL DAILY
Status: CANCELLED | OUTPATIENT
Start: 2022-08-15

## 2022-08-15 RX ORDER — ENOXAPARIN SODIUM 100 MG/ML
30 INJECTION SUBCUTANEOUS 2 TIMES DAILY
Status: DISCONTINUED | OUTPATIENT
Start: 2022-08-15 | End: 2022-08-22 | Stop reason: HOSPADM

## 2022-08-15 RX ORDER — POLYETHYLENE GLYCOL 3350 17 G/17G
17 POWDER, FOR SOLUTION ORAL DAILY PRN
Status: DISCONTINUED | OUTPATIENT
Start: 2022-08-15 | End: 2022-08-22 | Stop reason: HOSPADM

## 2022-08-15 RX ORDER — ASPIRIN 81 MG/1
81 TABLET ORAL DAILY
Status: DISCONTINUED | OUTPATIENT
Start: 2022-08-15 | End: 2022-08-22 | Stop reason: HOSPADM

## 2022-08-15 RX ADMIN — IOPAMIDOL 75 ML: 755 INJECTION, SOLUTION INTRAVENOUS at 10:45

## 2022-08-15 RX ADMIN — INSULIN GLARGINE 10 UNITS: 100 INJECTION, SOLUTION SUBCUTANEOUS at 21:49

## 2022-08-15 RX ADMIN — AZITHROMYCIN MONOHYDRATE 500 MG: 500 INJECTION, POWDER, LYOPHILIZED, FOR SOLUTION INTRAVENOUS at 18:05

## 2022-08-15 RX ADMIN — PIPERACILLIN AND TAZOBACTAM 3375 MG: 3; .375 INJECTION, POWDER, FOR SOLUTION INTRAVENOUS at 21:45

## 2022-08-15 RX ADMIN — ASPIRIN 81 MG: 81 TABLET, COATED ORAL at 16:23

## 2022-08-15 RX ADMIN — SODIUM CHLORIDE 500 ML: 9 INJECTION, SOLUTION INTRAVENOUS at 09:00

## 2022-08-15 RX ADMIN — SODIUM CHLORIDE: 9 INJECTION, SOLUTION INTRAVENOUS at 18:04

## 2022-08-15 RX ADMIN — SPIRONOLACTONE 25 MG: 25 TABLET ORAL at 16:23

## 2022-08-15 RX ADMIN — TAMSULOSIN HYDROCHLORIDE 0.4 MG: 0.4 CAPSULE ORAL at 21:46

## 2022-08-15 RX ADMIN — ENOXAPARIN SODIUM 30 MG: 100 INJECTION SUBCUTANEOUS at 21:46

## 2022-08-15 RX ADMIN — ONDANSETRON 4 MG: 2 INJECTION INTRAMUSCULAR; INTRAVENOUS at 08:15

## 2022-08-15 RX ADMIN — ATORVASTATIN CALCIUM 20 MG: 20 TABLET, FILM COATED ORAL at 21:46

## 2022-08-15 RX ADMIN — ACETAMINOPHEN 650 MG: 325 TABLET ORAL at 08:15

## 2022-08-15 RX ADMIN — PIPERACILLIN AND TAZOBACTAM 4500 MG: 4; .5 INJECTION, POWDER, LYOPHILIZED, FOR SOLUTION INTRAVENOUS at 10:58

## 2022-08-15 RX ADMIN — ACETAMINOPHEN 650 MG: 325 TABLET, FILM COATED ORAL at 16:22

## 2022-08-15 RX ADMIN — LISINOPRIL 20 MG: 20 TABLET ORAL at 16:23

## 2022-08-15 ASSESSMENT — ENCOUNTER SYMPTOMS
EYES NEGATIVE: 1
DIARRHEA: 1
SHORTNESS OF BREATH: 0
COUGH: 1
CHEST TIGHTNESS: 0
NAUSEA: 1
VOMITING: 1
ABDOMINAL PAIN: 1

## 2022-08-15 ASSESSMENT — PAIN DESCRIPTION - FREQUENCY: FREQUENCY: CONTINUOUS

## 2022-08-15 ASSESSMENT — PAIN SCALES - GENERAL
PAINLEVEL_OUTOF10: 0

## 2022-08-15 ASSESSMENT — PAIN DESCRIPTION - LOCATION: LOCATION: ELBOW;HIP

## 2022-08-15 ASSESSMENT — PAIN DESCRIPTION - ORIENTATION: ORIENTATION: RIGHT

## 2022-08-15 ASSESSMENT — PAIN SCALES - WONG BAKER
WONGBAKER_NUMERICALRESPONSE: 4
WONGBAKER_NUMERICALRESPONSE: 0
WONGBAKER_NUMERICALRESPONSE: 0

## 2022-08-15 ASSESSMENT — PAIN - FUNCTIONAL ASSESSMENT: PAIN_FUNCTIONAL_ASSESSMENT: WONG-BAKER FACES

## 2022-08-15 ASSESSMENT — PAIN DESCRIPTION - DESCRIPTORS: DESCRIPTORS: ACHING

## 2022-08-15 ASSESSMENT — PAIN DESCRIPTION - PAIN TYPE: TYPE: CHRONIC PAIN

## 2022-08-15 NOTE — PROGRESS NOTES
Pt admitted to room 4117. Pt alert and oriented x4 . Pt unable to ambulate to bed from stretcher pulled over  from stretcher to hospital bed. Pt oriented to room and to call light. Pt reports chills. Pt reports no pain .  4 eyes assessment completed with Aziza Whelan RN     Electronically signed by Petra Liz RN on 8/15/2022

## 2022-08-15 NOTE — ED NOTES
Tia SAMAYOA unable to take report, extension to call back provided.      Bill Godoy RN  08/15/22 1310

## 2022-08-15 NOTE — ED NOTES
Pt in route to IP bed, no distress noted at time of transfer from ER.      Delroy Cano, DANITA  08/15/22 1851

## 2022-08-15 NOTE — PROGRESS NOTES
Medication Reconciliation    List of medications patient is currently taking is complete. Source of information: 1. Conversation with patient at bedside                                      2. EPIC records      Allergies  Patient has no known allergies. Notes regarding home medications:   1. Patient did not receive any of her home medications prior to arrival to the emergency department today. 2. Lantus dose is currently 20 units SQ QHS. 3. Patient no longer takes Lasix.     Isai [facility], PharmD, BCPS  8/15/2022 2:00 PM

## 2022-08-15 NOTE — ED PROVIDER NOTES
[address]  [address] [address]  Dept: [phone]  Loc: [phone]  eMERGENCYdEPARTMENT eNCOUnter      Pt Name: Frandy Hou  MRN: [id number]  Enoch 1955  Date of evaluation: 8/15/2022  Provider:Wanda Farris PA-C    CHIEF COMPLAINT       Chief Complaint   Patient presents with    Emesis     Patient reports nausea, emesis, diarrhea and fever since 0300, reports \"feeling weak\" while ambulating and \"collapsed on walker\". Pt reports right hip and elbow pain due to unknown etiology. CRITICAL CARE TIME   Total Critical Care time was 32 minutes, excluding separately reportable procedures. There was a high probability of clinically significant/life threatening deterioration in the patient's condition which required my urgentintervention. HISTORY OF PRESENT ILLNESS  (Location/Symptom, Timing/Onset, Context/Setting, Quality, Duration,Modifying Factors, Severity.)   Frandy Hou is a 74 y.o. male who presents to the emergency department by EMS. Patient states around 3 AM this morning he woke up with chills, fever, nausea, vomiting and diarrhea. He states he had 4 episodes of nonbloody emesis and diarrhea since. States has a mild increased dry cough, denies any other respiratory complaints. States he has mild pain to his mid abdomen. No recent antibiotics or hospitalization. Denies any chest pain, shortness of breath. He has chronic wounds to his feet and is followed by podiatry. States dressings were changed last Friday, denies any increased pain or changes to wounds. No known sick contacts. He felt extremely lethargic and lives at home alone prompting him to call 911. Patient states upon standing to answer his door he became lightheaded and fell forward onto his knees. He has pain to his knees bilaterally.   He was using his walker, fell against walker, denies hitting his head, loss of conscious. No other reported injuries from fall. Nursing Notes were reviewedand agreed with or any disagreements were addressed in the HPI. REVIEW OF SYSTEMS    (2-9 systems for level 4, 10 or more for level 5)     Review of Systems   Constitutional:  Positive for chills, fatigue and fever. HENT: Negative. Eyes: Negative. Respiratory:  Positive for cough. Negative for chest tightness and shortness of breath. Cardiovascular:  Negative for chest pain. Gastrointestinal:  Positive for abdominal pain, diarrhea, nausea and vomiting. Genitourinary: Negative. Musculoskeletal:  Negative for arthralgias and myalgias. Skin: Negative. Neurological:  Positive for light-headedness. Negative for headaches. Psychiatric/Behavioral:  Negative for behavioral problems and confusion. Except as noted above the remainder of the review of systems was reviewed and negative. PAST MEDICAL HISTORY         Diagnosis Date    Diabetes mellitus ([address]     Gallstones     Lymphedema     Neuropathy     Pancreatitis     PVD (peripheral vascular disease) ([address]     Ulcers of both lower legs ([address]     bilateral feet       SURGICAL HISTORY           Procedure Laterality Date    CHOLECYSTECTOMY, LAPAROSCOPIC N/A 8/10/2021    LAPAROSCOPIC CHOLECYSTECTOMY WITH CHOLANGIOGRAM performed by Acosta Nichols MD at [place] [address]    ERCP  4/8/14    LITHOTRYPSY & PANCREATIC STENT PLACEMENT    FOOT SURGERY Left 1967       CURRENT MEDICATIONS     [unfilled]    ALLERGIES     Patient has no known allergies. FAMILY HISTORY           Problem Relation Age of Onset    Colon Cancer Mother         PVD s/p toe amputation by Dr Gómez Lynne Father      Family Status   Relation Name Status    Mother  (Not Specified)    Father  (Not Specified)        SOCIAL HISTORY      reports that he has never smoked. He has never used smokeless tobacco. He reports that he does not drink alcohol and does not use drugs.     PHYSICAL EXAM (up to 7 for level 4, 8 or more for level 5)     ED Triage Vitals [08/15/22 0714]   Enc Vitals Group      /63      Heart Rate 85      Resp 17      Temp (!) 101.3 °F (38.5 °C)      Temp Source Oral      SpO2 95 %      Weight 264 lb 8.8 oz (120 kg)      Height 6' 2\" (1.88 m)      Head Circumference       Peak Flow       Pain Score       Pain Loc       Pain Edu? Excl. in [address]? Physical Exam  Vitals reviewed. Constitutional:       Appearance: He is ill-appearing. HENT:      Head: Normocephalic and atraumatic. Cardiovascular:      Rate and Rhythm: Normal rate and regular rhythm. Pulmonary:      Effort: Pulmonary effort is normal. No respiratory distress. Breath sounds: Normal breath sounds. Abdominal:      Palpations: Abdomen is soft. Tenderness: There is no abdominal tenderness. There is no guarding or rebound. Musculoskeletal:      Cervical back: Normal range of motion and neck supple. Comments: BLE: Tenderness to palpation to anterior knees bilaterally, able to flex however with pain, no obvious deformity, no significant effusion, no open wounds, no other focal bony tenderness throughout extremities   Skin:     General: Skin is warm. Comments: Ulcers to plantar aspect of feet bilaterally, no significant drainage, minimal surrounding erythema present   Neurological:      General: No focal deficit present. Mental Status: He is alert and oriented to person, place, and time.    Psychiatric:         Mood and Affect: Mood normal.         Behavior: Behavior normal.         DIAGNOSTIC RESULTS     EKG: All EKG's are interpreted by the Emergency Department Physician who either signs or Co-signs this chart in the absence of a cardiologist.    RADIOLOGY:   Non-plain film images such as CT, Ultrasound and MRI are read by the radiologist. Plain radiographic images are visualized and preliminarilyinterpreted by the emergency physician with the below findings:    Interpretation per the Radiologist below,if available at the time of this note:    CT ABDOMEN W WO CONTRAST Additional Contrast? None   Final Result   1. In general the hepatic lesions seen on the recent MRI and CT are not well   visualized. A single 11 mm ill-defined subtle low-density lesion is seen in   the liver dome. The lesions remain nonspecific with metastatic disease not   excluded. 2. Chronic stone either within the pancreatic duct in the pancreatic head or   adjacent pancreatic parenchyma. Chronic dilatation of the main pancreatic   duct and signs of chronic pancreatitis. 3. Trace bilateral pleural effusions and mild interstitial edema. MRI ABDOMEN W WO CONTRAST MRCP   Final Result   Poorly defined enhancing nodules in the liver suspicious for metastasis in   the absence of clinical signs of infection. .  There is fatty infiltration of   the liver      Suspected stone in the pancreatic duct with pancreatic ductal dilatation. Scattered side-branch IPMNs are suspected within the pancreas. CT CHEST ABDOMEN PELVIS W CONTRAST   Final Result   1. No acute pulmonary process. 2. Multiple indeterminate hepatic lesions concerning for metastatic disease   of unknown primary. 3. Chronic stable choledocholithiasis. 4. Nonobstructing left nephrolithiasis. 5. Chronic pancreatitis. XR CHEST PORTABLE   Final Result   Diffuse bilateral pulmonary opacity can reflect pulmonary edema or in the   appropriate clinical setting, atypical infection. Small right pleural   effusion.          XR KNEE LEFT (1-2 VIEWS)   Final Result   No acute osseous abnormality right or left knee      Degenerative change, greatest medially and in the patellofemoral joints         XR KNEE RIGHT (1-2 VIEWS)   Final Result   No acute osseous abnormality right or left knee      Degenerative change, greatest medially and in the patellofemoral joints         XR FOOT LEFT (MIN 3 VIEWS)   Final Result   Soft tissue swelling and plantar soft tissue ulceration. Nonspecific cortical thickening is seen of the distal fibula and tibia, for   which infection is among differential considerations. Dedicated tibia fibula   radiograph could be performed for further assessment. XR FOOT RIGHT (MIN 3 VIEWS)   Final Result   Limited evaluation of the digits secondary to flexion. Soft tissue swelling   is otherwise noted without focal erosion. If there is strong clinical   concern for osteomyelitis, MR could be considered.                LABS:  Labs Reviewed   CBC WITH AUTO DIFFERENTIAL - Abnormal; Notable for the following components:       Result Value    WBC 14.0 (*)     RBC 3.65 (*)     Hemoglobin 8.5 (*)     Hematocrit 27.0 (*)     MCV 74.1 (*)     MCH 23.4 (*)     RDW 18.0 (*)     Neutrophils Absolute 12.9 (*)     Lymphocytes Absolute 0.3 (*)     All other components within normal limits   COMPREHENSIVE METABOLIC PANEL W/ REFLEX TO MG FOR LOW K - Abnormal; Notable for the following components:    Glucose 127 (*)     BUN 22 (*)     ALT <5 (*)     AST 8 (*)     All other components within normal limits   LIPASE - Abnormal; Notable for the following components:    Lipase 9.0 (*)     All other components within normal limits   URINALYSIS WITH REFLEX TO CULTURE - Abnormal; Notable for the following components:    Blood, Urine TRACE (*)     All other components within normal limits   CBC WITH AUTO DIFFERENTIAL - Abnormal; Notable for the following components:    RBC 3.40 (*)     Hemoglobin 8.1 (*)     Hematocrit 24.8 (*)     MCV 73.0 (*)     MCH 23.9 (*)     RDW 18.3 (*)     Lymphocytes Absolute 0.7 (*)     All other components within normal limits    Narrative:     Collection has been rescheduled by DEANNE at 08/16/2022 05:44 Reason:   Patient care   BASIC METABOLIC PANEL - Abnormal; Notable for the following components:    Glucose 152 (*)     All other components within normal limits    Narrative:     Collection has been rescheduled by DEANNE at 08/16/2022 05:44 Reason:   Patient care   CBC WITH AUTO DIFFERENTIAL - Abnormal; Notable for the following components:    RBC 3.40 (*)     Hemoglobin 8.1 (*)     Hematocrit 24.6 (*)     MCV 72.3 (*)     MCH 23.9 (*)     RDW 17.8 (*)     All other components within normal limits   BASIC METABOLIC PANEL - Abnormal; Notable for the following components:    Glucose 108 (*)     All other components within normal limits   CBC WITH AUTO DIFFERENTIAL - Abnormal; Notable for the following components:    RBC 3.44 (*)     Hemoglobin 8.0 (*)     Hematocrit 25.3 (*)     MCV 73.6 (*)     MCH 23.3 (*)     RDW 17.7 (*)     All other components within normal limits   BASIC METABOLIC PANEL - Abnormal; Notable for the following components:    Sodium 134 (*)     Glucose 102 (*)     All other components within normal limits   CBC WITH AUTO DIFFERENTIAL - Abnormal; Notable for the following components:    RBC 3.50 (*)     Hemoglobin 8.2 (*)     Hematocrit 25.6 (*)     MCV 73.1 (*)     MCH 23.5 (*)     RDW 17.9 (*)     All other components within normal limits   BASIC METABOLIC PANEL - Abnormal; Notable for the following components:    Glucose 143 (*)     All other components within normal limits   CBC WITH AUTO DIFFERENTIAL - Abnormal; Notable for the following components:    RBC 3.62 (*)     Hemoglobin 8.5 (*)     Hematocrit 26.4 (*)     MCV 72.8 (*)     MCH 23.5 (*)     RDW 17.6 (*)     All other components within normal limits   BASIC METABOLIC PANEL - Abnormal; Notable for the following components:    Glucose 126 (*)     All other components within normal limits   CBC WITH AUTO DIFFERENTIAL - Abnormal; Notable for the following components:    RBC 3.84 (*)     Hemoglobin 9.0 (*)     Hematocrit 28.0 (*)     MCV 72.8 (*)     MCH 23.4 (*)     RDW 17.4 (*)     All other components within normal limits   BASIC METABOLIC PANEL - Abnormal; Notable for the following components:    Glucose 160 (*)     All other components within normal limits   CBC WITH AUTO DIFFERENTIAL - Abnormal; Notable for the following components:    RBC 4.01 (*)     Hemoglobin 9.4 (*)     Hematocrit 29.4 (*)     MCV 73.3 (*)     MCH 23.5 (*)     RDW 17.9 (*)     All other components within normal limits    Narrative:     Collection has been rescheduled by PRI at 08/22/2022 05:54 Reason: Pt   care   BASIC METABOLIC PANEL - Abnormal; Notable for the following components:    Glucose 122 (*)     All other components within normal limits    Narrative:     Collection has been rescheduled by PRIST at 08/22/2022 05:54 Reason: Pt   care   POCT GLUCOSE - Abnormal; Notable for the following components:    POC Glucose 112 (*)     All other components within normal limits   POCT GLUCOSE - Abnormal; Notable for the following components:    POC Glucose 136 (*)     All other components within normal limits   POCT GLUCOSE - Abnormal; Notable for the following components:    POC Glucose 155 (*)     All other components within normal limits   POCT GLUCOSE - Abnormal; Notable for the following components:    POC Glucose 139 (*)     All other components within normal limits   POCT GLUCOSE - Abnormal; Notable for the following components:    POC Glucose 165 (*)     All other components within normal limits   POCT GLUCOSE - Abnormal; Notable for the following components:    POC Glucose 125 (*)     All other components within normal limits   POCT GLUCOSE - Abnormal; Notable for the following components:    POC Glucose 110 (*)     All other components within normal limits   POCT GLUCOSE - Abnormal; Notable for the following components:    POC Glucose 102 (*)     All other components within normal limits   POCT GLUCOSE - Abnormal; Notable for the following components:    POC Glucose 128 (*)     All other components within normal limits   POCT GLUCOSE - Abnormal; Notable for the following components:    POC Glucose 186 (*)     All other components within normal limits   POCT GLUCOSE - Abnormal; Notable for the following components:    POC Glucose 151 (*)     All other components within normal limits   POCT GLUCOSE - Abnormal; Notable for the following components:    POC Glucose 191 (*)     All other components within normal limits   POCT GLUCOSE - Abnormal; Notable for the following components:    POC Glucose 153 (*)     All other components within normal limits   POCT GLUCOSE - Abnormal; Notable for the following components:    POC Glucose 164 (*)     All other components within normal limits   POCT GLUCOSE - Abnormal; Notable for the following components:    POC Glucose 143 (*)     All other components within normal limits   POCT GLUCOSE - Abnormal; Notable for the following components:    POC Glucose 179 (*)     All other components within normal limits   POCT GLUCOSE - Abnormal; Notable for the following components:    POC Glucose 140 (*)     All other components within normal limits   POCT GLUCOSE - Abnormal; Notable for the following components:    POC Glucose 201 (*)     All other components within normal limits   POCT GLUCOSE - Abnormal; Notable for the following components:    POC Glucose 125 (*)     All other components within normal limits   POCT GLUCOSE - Abnormal; Notable for the following components:    POC Glucose 186 (*)     All other components within normal limits   POCT GLUCOSE - Abnormal; Notable for the following components:    POC Glucose 149 (*)     All other components within normal limits   POCT GLUCOSE - Abnormal; Notable for the following components:    POC Glucose 127 (*)     All other components within normal limits   POCT GLUCOSE - Abnormal; Notable for the following components:    POC Glucose 164 (*)     All other components within normal limits   POCT GLUCOSE - Abnormal; Notable for the following components:    POC Glucose 166 (*)     All other components within normal limits   POCT GLUCOSE - Abnormal; Notable for the following components:    POC Glucose 132 (*) All other components within normal limits   POCT GLUCOSE - Abnormal; Notable for the following components:    POC Glucose 133 (*)     All other components within normal limits   POCT GLUCOSE - Abnormal; Notable for the following components:    POC Glucose 112 (*)     All other components within normal limits   POCT GLUCOSE - Abnormal; Notable for the following components:    POC Glucose 116 (*)     All other components within normal limits   COVID-19, RAPID   CULTURE, BLOOD 1    Narrative:     ORDER#: [id number]                          ORDERED BY: Lizzy Peck  SOURCE: Blood                              COLLECTED:  08/16/22 10:41  ANTIBIOTICS AT [facility].:                      RECEIVED :  08/16/22 11:04  If child <=2 yrs old please draw pediatric bottle. ~Blood Culture 1   LACTIC ACID   BLOOD OCCULT STOOL DIAGNOSTIC    Narrative:     ORDER#: [id number]                          ORDERED BY: ELOISA [address] [place] [address] Avenue  SOURCE: Stool                              COLLECTED:  08/15/22 11:16  ANTIBIOTICS AT [facility].:                      RECEIVED :  08/15/22 12:12   MICROSCOPIC URINALYSIS   HEMOGLOBIN A1C    Narrative:     Collection has been rescheduled by Ye Ramos at 08/15/2022 16:01 Reason: Add   on   PROCALCITONIN   CEA    Narrative:     Collection has been rescheduled by Ye Ramos at 08/17/2022 14:08 Reason:   Respiratory tech wants me to call me back later    AFP TUMOR MARKER    Narrative:     Collection has been rescheduled by Ye Ramos at 08/17/2022 14:08 Reason:   Respiratory tech wants me to call me back later    CANCER ANTIGEN 19-9    Narrative:     Collection has been rescheduled by Ye Ramos at 08/17/2022 14:08 Reason:   Respiratory tech wants me to call me back later    POCT GLUCOSE   POCT GLUCOSE   POCT GLUCOSE   POCT GLUCOSE   POCT GLUCOSE   POCT GLUCOSE   POCT GLUCOSE   POCT GLUCOSE   POCT GLUCOSE   POCT GLUCOSE   POCT GLUCOSE   POCT GLUCOSE   POCT GLUCOSE   POCT GLUCOSE   POCT GLUCOSE   POCT GLUCOSE   POCT GLUCOSE   POCT GLUCOSE POCT GLUCOSE   POCT GLUCOSE   POCT GLUCOSE   POCT GLUCOSE   POCT GLUCOSE   POCT GLUCOSE   POCT GLUCOSE   POCT GLUCOSE   POCT GLUCOSE   POCT GLUCOSE       All other labs were within normal range or not returned as of this dictation. EMERGENCY DEPARTMENT COURSE and DIFFERENTIAL DIAGNOSIS/MDM:   Vitals:    Vitals:    08/22/22 0544 08/22/22 0600 08/22/22 0819 08/22/22 0847   BP: 131/76  130/71    Pulse: 71  58    Resp: 18  18 18   Temp: 98.5 °F (36.9 °C)  97.6 °F (36.4 °C)    TempSrc: Oral  Oral    SpO2: 98%  96% 98%   Weight:  247 lb 9.2 oz (112.3 kg)     Height:           MDM  Patient presents ED with HPI noted above. Patient is COVID-negative. Chest x-ray showed pulmonary edema or atypical pneumonia. However CT chest and pelvis with contrast showed no acute process. Patient has wounds to his feet bilaterally, no overwhelming signs of infection to wounds at this time. He does meet SIRS criteria with leukocytosis and temperature. Patient covered broad-spectrum antibiotics and will be admitted for further workup and evaluation. Consultation to hospitalist regarding admission. Patient seen independently with my attending available for consultation as needed. Is this patient to be included in the SEP-1 Core Measure due to severe sepsis or septic shock? Yes   SEP-1 CORE MEASURE DATA      Sepsis Criteria   Severe Sepsis Criteria   Septic Shock Criteria     Must be confirmed or suspected to move forward with diagnosis of sepsis. Must meet 2:    [x] Temperature > 100.9 F (38.3 C)        or < 96.8 F (36 C)  [] HR > 90  [] RR > 20  [x] WBC > 12 or < 4 or 10% bands      AND:      [x] Infection Confirmed or        Suspected.      Must meet 1:    [] Lactate > 2       or   [] Signs of Organ Dysfunction:    - SBP < 90 or MAP < 65  - Altered mental status  - Creatinine > 2 or increased from      baseline  - Urine Output < 0.5 ml/kg/hr  - Bilirubin > 2  - INR > 1.5 (not anticoagulated)  - Platelets < 446,838  - Acute Respiratory Failure as     evidenced by new need for NIPPV     or mechanical ventilation      [x] No criteria met for Severe Sepsis. Must meet 1:    [] Lactate > 4        or   [] SBP < 90 or MAP < 65 for at        least two readings in the first        hour after fluid bolus        administration      [] Vasopressors initiated (if hypotension persists after fluid resuscitation)        [] No criteria met for Septic Shock. No data found. Recent Labs     08/22/22  0600   WBC 8.4   CREATININE 0.8            Time  sepsis  Identified: [id number]    Fluid Resuscitation Rational:  not indicated as not septic shock      Repeat lactate level: not indicated due to initial lactate < 2    Reassessment Exam:   Not applicable. Patient does not have septic shock. CONSULTS:  IP CONSULT TO GI  IP CONSULT TO HOME CARE NEEDS    PROCEDURES:  Procedures    FINAL IMPRESSION      1. Sepsis, due to unspecified organism, unspecified whether acute organ dysfunction present ([facility] [address]    2. Fall from bed, initial encounter          DISPOSITION/PLAN   [unfilled]    PATIENT REFERRED TO:  [address] [facility]  [address] [facility] [address]  [facility], [address] [place] [address]. [address] [place]    Call in 2 week(s)  call the office in 2 weeks for a tele appointment.     DISCHARGE MEDICATIONS:  Discharge Medication List as of 8/22/2022 11:00 AM          (Please note that portions of this note were completed with a voice recognition program.  Efforts were made to edit the dictations but occasionally words are mis-transcribed.)    [facility]           [place], [place]  08/15/22 [address] [facility], [place]  08/15/22 [address] [place] [address]  08/24/22 2296

## 2022-08-15 NOTE — H&P
Hospital Medicine History & Physical      PCP: Ariana Khan MD    Date of Admission: 8/15/2022    Date of Service: Pt seen/examined on 08/15/22   and Admitted to Inpatient. Chief Complaint: Generalized weakness, chills      History Of Present Illness: The patient is a 74 y.o. male who presents to [facility] with generalized weakness and chills. Patient with a past medical history of diabetes mellitus on insulin, history of chronic pancreatitis, peripheral vascular disease, chronic neuropathy. Patient stated that his symptom has started early in the morning of admission when he had awoken with severe nonproductive cough, nonbilious vomiting as well as chills, patient also reported difficulty urinating that is new. Symptoms have been present for less than 24 hours, no change in bowel habits, patient denies any diarrhea or constipation, no chest pain, reported some shortness of breath on questioning. Patient lives alone however has home care services for bilateral chronic lower limb ulcerations    Past Medical History:        Diagnosis Date    Diabetes mellitus ([address]     Gallstones     Lymphedema     Neuropathy     Pancreatitis     PVD (peripheral vascular disease) ([address]     Ulcers of both lower legs ([address]     bilateral feet       Past Surgical History:        Procedure Laterality Date    CHOLECYSTECTOMY, LAPAROSCOPIC N/A 8/10/2021    LAPAROSCOPIC CHOLECYSTECTOMY WITH CHOLANGIOGRAM performed by Mariana Goldberg MD at [place] [address]    ERCP  4/8/14    LITHOTRYPSY & PANCREATIC STENT PLACEMENT    FOOT SURGERY Left 1967       Medications Prior to Admission:    Prior to Admission medications    Medication Sig Start Date End Date Taking?  Authorizing Provider   insulin glargine (LANTUS SOLOSTAR) 100 UNIT/ML injection pen Inject 20 Units into the skin nightly    Historical Provider, MD   Lactobacillus TABS Take 1 tablet by mouth daily    Historical Provider, MD   Cyanocobalamin (VITAMIN B-12) 50 MCG LOZG Take 50 mcg by mouth daily    Historical Provider, MD   CALCIUM-MAGNESIUM-ZINC PO Take 1 tablet by mouth daily    Historical Provider, MD   lisinopril (PRINIVIL;ZESTRIL) 10 MG tablet Take 10 mg by mouth daily    Historical Provider, MD   acetaminophen (TYLENOL) 500 MG tablet Take 1 tablet by mouth 4 times daily as needed for Pain 6/24/21   Michael Maier MD   atorvastatin (LIPITOR) 20 MG tablet Take 20 mg by mouth nightly  4/20/21   Historical Provider, MD   tamsulosin (FLOMAX) 0.4 MG capsule Take 0.4 mg by mouth every evening     Historical Provider, MD   aspirin 81 MG EC tablet Take 1 tablet by mouth daily 6/15/20   Uriel Horn APRN - CNP   spironolactone (ALDACTONE) 25 MG tablet Take 25 mg by mouth daily    Historical Provider, MD   metFORMIN (GLUCOPHAGE) 500 MG tablet Take 500 mg by mouth 2 times daily (with meals)    Historical Provider, MD       Allergies:  Patient has no known allergies. Social History:  The patient currently lives alone ( active with home care (.    TOBACCO:   reports that he has never smoked. He has never used smokeless tobacco.  ETOH:   reports no history of alcohol use. Family History:  Reviewed in detail and negative for DM, Early CAD, Cancer, CVA. Positive as follows:        Problem Relation Age of Onset    Colon Cancer Mother         PVD s/p toe amputation by Dr Vaishali House Father        REVIEW OF SYSTEMS:   Positive for nausea, vomiting, cough and as noted in the HPI. All other systems reviewed and negative. PHYSICAL EXAM:    /63   Pulse 78   Temp (!) 100.9 °F (38.3 °C) (Oral)   Resp 17   Ht 6' 2\" (1.88 m)   Wt 264 lb 8.8 oz (120 kg)   SpO2 98%   BMI 33.97 kg/m²     General appearance: obese male patient. HEENT Normal cephalic, atraumatic without obvious deformity.   Pupils equal, round, and reactive to light. Extra ocular muscles intact. Conjunctivae/corneas clear. Neck: Supple, No jugular venous distention/bruits. Trachea midline without thyromegaly or adenopathy with full range of motion. Lungs: Clear to auscultation, bilaterally without Rales/Wheezes/Rhonchi with good respiratory effort. Heart: Regular rate and rhythm with Normal S1/S2 without murmurs, rubs or gallops, point of maximum impulse non-displaced  Abdomen: Soft, non-tender or non-distended without rigidity or guarding and positive bowel sounds all four quadrants. Extremities: Bilateral lower limb ulcers noted with bilateral edema (looks chronic)   skin: Skin color, texture, turgor normal.  No rashes or lesions. Neurologic: Alert and oriented X 3, neurovascularly intact with sensory/motor intact upper extremities/lower extremities, bilaterally. Cranial nerves: II-XII intact, grossly non-focal.  Mental status: Alert, oriented, thought content appropriate. Capillary Refill: Acceptable  < 3 seconds  Peripheral Pulses: +3 Easily felt, not easily obliterated with pressure        CBC   Recent Labs     08/15/22  0756   WBC 14.0*   HGB 8.5*   HCT 27.0*         RENAL  Recent Labs     08/15/22  0756      K 5.0      CO2 22   BUN 22*   CREATININE 0.9     LFT'S  Recent Labs     08/15/22  0756   AST 8*   ALT <5*   BILITOT 0.5   ALKPHOS 81     COAG  No results for input(s): INR in the last 72 hours. CARDIAC ENZYMES  No results for input(s): CKTOTAL, CKMB, CKMBINDEX, TROPONINI in the last 72 hours.     U/A:    Lab Results   Component Value Date/Time    COLORU Yellow 08/15/2022 11:16 AM    WBCUA 0 11/26/2020 08:36 PM    RBCUA 3 11/26/2020 08:36 PM    BACTERIA 3+ 04/01/2014 01:55 PM    CLARITYU Clear 08/15/2022 11:16 AM    SPECGRAV 1.035 08/15/2022 11:16 AM    LEUKOCYTESUR Negative 08/15/2022 11:16 AM    BLOODU TRACE 08/15/2022 11:16 AM    GLUCOSEU Negative 08/15/2022 11:16 AM       ABG  No results found for: Jennifer Awan, L1EZYAMH, PHART, THGBART, LOC1IQN, PO2ART, TPB7TVU        There are no active hospital problems to display for this patient. PHYSICIANS CERTIFICATION:    I certify that Rupal Muniz is expected to be hospitalized for more than 2 midnights based on the following assessment and plan:      ASSESSMENT/PLAN:    Sepsis. Community-acquired pneumonia  Patient presented to emergency with a short onset symptoms of nausea vomiting and cough. Upon presentation patient was found to be febrile up to 38.2, blood work significant of leukocytosis up to 14 (neutrophilic). Urinalysis unremarkable. X-ray with bilateral opacity and a small pleural effusion. Plan  -Treat with Zosyn and azithromycin  -Follow blood culture  -Send sputum culture  -Send procalcitonin    Diarrhea  Patient reported diarrhea to emergency provider however denied at to me at the time of presentation. Will send C. difficile if diarrhea is persistent. CT finding of multiple hepatic lesions. Incidentally found multiple indeterminate hepatic lesions concerning for metastatic disease. Will consider further inpatient investigation pending clinical improvement versus outpatient investigation. Diabetes mellitus. Change glargine to 10 units from 20 at home  Continue sliding scale    Hypertension. Continue home medications    BPH  Continue tamsulosin. Chronic venous insufficiency ulcers. Consult wound care    DVT Prophylaxis: lovenox  Diet: No diet orders on file  Code Status: Prior  PT/OT Eval Status: ordered. Dispo - pending clinical improvement         Edgard Rivas MD    Thank you Zoey Larios MD for the opportunity to be involved in this patient's care. If you have any questions or concerns please feel free to contact me at [phone].

## 2022-08-15 NOTE — CARE COORDINATION
Wound care consulted for foot wounds. Pt follows outpatient with Dr. Brian Cabrera. Last seen on 8/13. Please see below for orders:  Bilateral foot ulcerations  Clean areas with Dakins or Vashe  Apply silver alginate dressings every other day to the wounds  Pad with ABD and gauze and secure with rolled gauze  Will follow once inpatient.    Electronically signed by Rodri Warren RN [email or web address] on 8/15/2022 at 12:02 PM

## 2022-08-16 LAB
ANION GAP SERPL CALCULATED.3IONS-SCNC: 13 MMOL/L (ref 3–16)
BASOPHILS ABSOLUTE: 0 K/UL (ref 0–0.2)
BASOPHILS RELATIVE PERCENT: 0.4 %
BUN BLDV-MCNC: 16 MG/DL (ref 7–20)
CALCIUM SERPL-MCNC: 8.5 MG/DL (ref 8.3–10.6)
CHLORIDE BLD-SCNC: 104 MMOL/L (ref 99–110)
CO2: 21 MMOL/L (ref 21–32)
CREAT SERPL-MCNC: 1.1 MG/DL (ref 0.8–1.3)
EOSINOPHILS ABSOLUTE: 0 K/UL (ref 0–0.6)
EOSINOPHILS RELATIVE PERCENT: 0.6 %
ESTIMATED AVERAGE GLUCOSE: 145.6 MG/DL
GFR AFRICAN AMERICAN: >60
GFR NON-AFRICAN AMERICAN: >60
GLUCOSE BLD-MCNC: 125 MG/DL (ref 70–99)
GLUCOSE BLD-MCNC: 139 MG/DL (ref 70–99)
GLUCOSE BLD-MCNC: 152 MG/DL (ref 70–99)
GLUCOSE BLD-MCNC: 155 MG/DL (ref 70–99)
GLUCOSE BLD-MCNC: 165 MG/DL (ref 70–99)
HBA1C MFR BLD: 6.7 %
HCT VFR BLD CALC: 24.8 % (ref 40.5–52.5)
HEMOGLOBIN: 8.1 G/DL (ref 13.5–17.5)
LYMPHOCYTES ABSOLUTE: 0.7 K/UL (ref 1–5.1)
LYMPHOCYTES RELATIVE PERCENT: 9.3 %
MCH RBC QN AUTO: 23.9 PG (ref 26–34)
MCHC RBC AUTO-ENTMCNC: 32.8 G/DL (ref 31–36)
MCV RBC AUTO: 73 FL (ref 80–100)
MONOCYTES ABSOLUTE: 0.4 K/UL (ref 0–1.3)
MONOCYTES RELATIVE PERCENT: 5.3 %
NEUTROPHILS ABSOLUTE: 6.1 K/UL (ref 1.7–7.7)
NEUTROPHILS RELATIVE PERCENT: 84.4 %
PDW BLD-RTO: 18.3 % (ref 12.4–15.4)
PERFORMED ON: ABNORMAL
PLATELET # BLD: 205 K/UL (ref 135–450)
PMV BLD AUTO: 7.4 FL (ref 5–10.5)
POTASSIUM SERPL-SCNC: 4.2 MMOL/L (ref 3.5–5.1)
RBC # BLD: 3.4 M/UL (ref 4.2–5.9)
SODIUM BLD-SCNC: 138 MMOL/L (ref 136–145)
WBC # BLD: 7.2 K/UL (ref 4–11)

## 2022-08-16 PROCEDURE — 80048 BASIC METABOLIC PNL TOTAL CA: CPT

## 2022-08-16 PROCEDURE — 85025 COMPLETE CBC W/AUTO DIFF WBC: CPT

## 2022-08-16 PROCEDURE — 97116 GAIT TRAINING THERAPY: CPT

## 2022-08-16 PROCEDURE — 97530 THERAPEUTIC ACTIVITIES: CPT

## 2022-08-16 PROCEDURE — 97535 SELF CARE MNGMENT TRAINING: CPT

## 2022-08-16 PROCEDURE — 97162 PT EVAL MOD COMPLEX 30 MIN: CPT

## 2022-08-16 PROCEDURE — 94760 N-INVAS EAR/PLS OXIMETRY 1: CPT

## 2022-08-16 PROCEDURE — 6360000002 HC RX W HCPCS: Performed by: STUDENT IN AN ORGANIZED HEALTH CARE EDUCATION/TRAINING PROGRAM

## 2022-08-16 PROCEDURE — 36415 COLL VENOUS BLD VENIPUNCTURE: CPT

## 2022-08-16 PROCEDURE — 6370000000 HC RX 637 (ALT 250 FOR IP): Performed by: STUDENT IN AN ORGANIZED HEALTH CARE EDUCATION/TRAINING PROGRAM

## 2022-08-16 PROCEDURE — 97166 OT EVAL MOD COMPLEX 45 MIN: CPT

## 2022-08-16 PROCEDURE — 87040 BLOOD CULTURE FOR BACTERIA: CPT

## 2022-08-16 PROCEDURE — 1200000000 HC SEMI PRIVATE

## 2022-08-16 PROCEDURE — 2580000003 HC RX 258: Performed by: STUDENT IN AN ORGANIZED HEALTH CARE EDUCATION/TRAINING PROGRAM

## 2022-08-16 RX ADMIN — ACETAMINOPHEN 650 MG: 325 TABLET, FILM COATED ORAL at 03:54

## 2022-08-16 RX ADMIN — SPIRONOLACTONE 25 MG: 25 TABLET ORAL at 09:42

## 2022-08-16 RX ADMIN — ATORVASTATIN CALCIUM 20 MG: 20 TABLET, FILM COATED ORAL at 22:00

## 2022-08-16 RX ADMIN — AZITHROMYCIN MONOHYDRATE 500 MG: 500 INJECTION, POWDER, LYOPHILIZED, FOR SOLUTION INTRAVENOUS at 17:29

## 2022-08-16 RX ADMIN — Medication 10 ML: at 09:45

## 2022-08-16 RX ADMIN — PIPERACILLIN AND TAZOBACTAM 3375 MG: 3; .375 INJECTION, POWDER, FOR SOLUTION INTRAVENOUS at 10:57

## 2022-08-16 RX ADMIN — ASPIRIN 81 MG: 81 TABLET, COATED ORAL at 09:42

## 2022-08-16 RX ADMIN — ENOXAPARIN SODIUM 30 MG: 100 INJECTION SUBCUTANEOUS at 09:42

## 2022-08-16 RX ADMIN — LISINOPRIL 20 MG: 20 TABLET ORAL at 09:42

## 2022-08-16 RX ADMIN — INSULIN GLARGINE 10 UNITS: 100 INJECTION, SOLUTION SUBCUTANEOUS at 22:00

## 2022-08-16 RX ADMIN — PIPERACILLIN AND TAZOBACTAM 3375 MG: 3; .375 INJECTION, POWDER, FOR SOLUTION INTRAVENOUS at 22:08

## 2022-08-16 RX ADMIN — TAMSULOSIN HYDROCHLORIDE 0.4 MG: 0.4 CAPSULE ORAL at 22:00

## 2022-08-16 RX ADMIN — PIPERACILLIN AND TAZOBACTAM 3375 MG: 3; .375 INJECTION, POWDER, FOR SOLUTION INTRAVENOUS at 03:54

## 2022-08-16 RX ADMIN — ENOXAPARIN SODIUM 30 MG: 100 INJECTION SUBCUTANEOUS at 22:00

## 2022-08-16 ASSESSMENT — PAIN SCALES - GENERAL: PAINLEVEL_OUTOF10: 0

## 2022-08-16 NOTE — DISCHARGE INSTR - COC
Continuity of Care Form    Patient Name: Luis Eduardo Gutiérrez   :  1955  MRN:  [id number]    Admit date:  8/15/2022  Discharge date:  22      Code Status Order: DNR-CCA   Advance Directives:     Admitting Physician:  Ivett Alfonso MD  PCP: Sara Roberts MD    Discharging Nurse: WOO NUNEZ [facility] Unit/Room#: A7G-3228/4917-59  Discharging Unit Phone Number: [phone]    Emergency Contact:   Extended Emergency Contact Information  Primary Emergency Contact: eneida finch  Home Phone: [phone]  Mobile Phone: [phone]  Relation: Other  Preferred language: English    Past Surgical History:  Past Surgical History:   Procedure Laterality Date    CHOLECYSTECTOMY, LAPAROSCOPIC N/A 8/10/2021    LAPAROSCOPIC CHOLECYSTECTOMY WITH CHOLANGIOGRAM performed by Gus Maher MD at [place] [address]    ERCP  14    LITHOTRYPSY & PANCREATIC STENT PLACEMENT    FOOT SURGERY Left        Immunization History:   Immunization History   Administered Date(s) Administered    COVID-19, J&J, (age 18y+), IM, 0.5 mL 2021       Active Problems:  Patient Active Problem List   Diagnosis Code    Abdominal pain, acute R10.9    BPH (benign prostatic hyperplasia) N40.0    RLQ abdominal pain R10.31    Abnormal ECG R94.31    HTN (hypertension) I10    Diabetes mellitus type II, uncontrolled ([address] E11.65    Diabetic foot infection ([address] E11.628, L08.9    Fever R50.9    Leukocytosis D72.829    Charcot foot due to diabetes mellitus ([address] E11.610    Foot ulceration, left, with unspecified severity ([address] L97.529    Demand ischemia (HCC) I24.8    Controlled type 2 diabetes mellitus with hyperglycemia, with long-term current use of insulin (HCC) E11.65, Z79.4    Sepsis ([address] A41.9    Diarrhea R19.7    Chronic osteoarthritis M19.90    Chest pain R07.9    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    Atrioventricular block, Mobitz type 1, Wenckebach I44.1    Vertigo R42    Microcytic anemia D50.9    Acute calculous cholecystitis K80.00       Isolation/Infection:   Isolation            No Isolation          Patient Infection Status       Infection Onset Added Last Indicated Last Indicated By Review Planned Expiration Resolved Resolved By    C-diff Rule Out 08/15/22 08/15/22 08/15/22 Gastrointestinal Panel by DNA (Ordered) 08/22/22 08/25/22      Resolved    COVID-19 (Rule Out) 08/15/22 08/15/22 08/15/22 COVID-19, Rapid (Ordered)   08/15/22 Rule-Out Test Resulted    COVID-19 (Rule Out) 08/08/21 08/08/21 08/08/21 COVID-19, Rapid (Ordered)   08/08/21 Rule-Out Test Resulted    COVID-19 (Rule Out) 11/26/20 11/26/20 11/26/20 COVID-19 (Ordered)   11/26/20 Rule-Out Test Resulted    C-diff Rule Out 07/31/20 07/31/20 07/31/20 Clostridium Difficile Toxin/Antigen (Ordered)   07/31/20 Rule-Out Test Resulted    C-diff Rule Out 07/31/20 07/31/20 07/31/20 Clostridium Difficile Toxin/Antigen (Ordered)   07/31/20 Rule-Out Test Resulted    COVID-19 (Rule Out) 06/06/20 06/06/20 06/06/20 COVID-19 (Ordered)   06/06/20 Rule-Out Test Resulted            Nurse Assessment:  Last Vital Signs: /63   Pulse 66   Temp 99.1 °F (37.3 °C) (Oral)   Resp 16   Ht 6' 2\" (1.88 m)   Wt 259 lb 4.2 oz (117.6 kg)   SpO2 97%   BMI 33.29 kg/m²     Last documented pain score (0-10 scale): Pain Level: 0  Last Weight:   Wt Readings from Last 1 Encounters:   08/16/22 259 lb 4.2 oz (117.6 kg)     Mental Status:  oriented and alert    IV Access:  - None    Nursing Mobility/ADLs:  Walking   Assisted  Transfer  Assisted  Bathing  Assisted  Dressing  Assisted  Toileting  Assisted  Feeding  Assisted  Med Admin  Assisted  Med Delivery   whole    Wound Care Documentation and Therapy:  Incision 04/04/14 Toe (Comment  which one) Right (Active)   Number of days: 3056       Wound 08/09/21 Foot Left; Inner (Active)   Number of days: 372       Wound 07/31/20 Heel Left (Active)   Number of days: 745       Wound 11/27/20 Foot Left;Plantar (Active)   Number of days: 195 Wound 08/09/21 Foot Right (Active)   Number of days: 372       Wound 06/23/21 Foot Left; Inner (Active)   Number of days: 419       Wound 08/15/22 Foot Anterior;Right thin serosanguinous drainage small amount (Active)   Dressing Status New dressing applied;Clean;Dry; Intact; Old drainage noted 08/16/22 1004   Wound Cleansed Cleansed with saline 08/16/22 1004   Dressing/Treatment Moist to dry;Moisten with saline 08/16/22 1004   Wound Length (cm) 5 cm 08/16/22 1004   Wound Width (cm) 3 cm 08/16/22 1004   Wound Depth (cm) 0.4 cm 08/16/22 1004   Wound Surface Area (cm^2) 15 cm^2 08/16/22 1004   Change in Wound Size % (l*w) 0 08/16/22 1004   Wound Volume (cm^3) 6 cm^3 08/16/22 1004   Wound Healing % 0 08/16/22 1004   Wound Assessment Eschar moist 08/16/22 1004   Number of days: 1       Wound 08/15/22 Foot Left; Lower;Medial serosanguinous drainage (Active)   Dressing Status New dressing applied;Clean;Dry; Intact 08/16/22 1004   Dressing/Treatment Moist to dry 08/16/22 1004   Wound Length (cm) 3 cm 08/16/22 1004   Wound Width (cm) 2 cm 08/16/22 1004   Wound Depth (cm) 0.3 cm 08/16/22 1004   Wound Surface Area (cm^2) 6 cm^2 08/16/22 1004   Change in Wound Size % (l*w) 0 08/16/22 1004   Wound Volume (cm^3) 1.8 cm^3 08/16/22 1004   Wound Healing % 0 08/16/22 1004   Number of days: 1       Incision 08/10/21 Abdomen Medial (Active)   Number of days: 371        Elimination:  Continence: Bowel: Yes  Bladder: Yes  Urinary Catheter: None   Colostomy/Ileostomy/Ileal Conduit: No       Date of Last BM: 8/19/2022    Intake/Output Summary (Last 24 hours) at 8/16/2022 1458  Last data filed at 8/16/2022 1413  Gross per 24 hour   Intake 840 ml   Output --   Net 840 ml     I/O last 3 completed shifts:   In: 1050 [IV Piggyback:1050]  Out: -     Safety Concerns:     None, At Risk for Falls, and Aspiration Risk    Impairments/Disabilities:      None    Nutrition Therapy:  Current Nutrition Therapy:   - Oral Diet:  General    Routes of Feeding: Oral  Liquids: No Restrictions  Daily Fluid Restriction: no  Last Modified Barium Swallow with Video (Video Swallowing Test): not done    Treatments at the Time of Hospital Discharge:   Respiratory Treatments: N/A  Oxygen Therapy:  is not on home oxygen therapy. Ventilator:    - No ventilator support    Rehab Therapies: Physical Therapy and Occupational Therapy  Weight Bearing Status/Restrictions: No weight bearing restrictions  Other Medical Equipment (for information only, NOT a DME order):  wheelchair  Other Treatments: Wound Care    Patient's personal belongings (please select all that are sent with patient):  None    RN SIGNATURE:  Electronically signed by Rita Tapia RN on 8/22/22 at 10:37 AM EDT    CASE MANAGEMENT/SOCIAL WORK SECTION    Inpatient Status Date: 8/15/22    Readmission Risk Assessment Score:  Readmission Risk              Risk of Unplanned Readmission:  12           Discharging to Facility/ Agency   Name: Discharging to Facility/ Agency   Name: [facility]  Address:  [address] [place] [address]   Phone:  [phone]  Fax:  [phone]       Dialysis Facility (if applicable)   Name:  Address:  Dialysis Schedule:  Phone:  Fax:    / signature: Electronically signed by Vin Cesar RN on 8/22/2022 at 9:59 AM      PHYSICIAN SECTION    Prognosis: Fair    Condition at Discharge: Stable    Rehab Potential (if transferring to Rehab): Fair    Recommended Labs or Other Treatments After Discharge:   - home pt and ot     Physician Certification: I certify the above information and transfer of Mukesh Tellez  is necessary for the continuing treatment of the diagnosis listed and that he requires [address] for greater 30 days.      Update Admission H&P: No change in H&P    PHYSICIAN SIGNATURE:  Electronically signed by Sam Lamas MD on 8/22/22 at 10:41 AM EDT

## 2022-08-16 NOTE — PROGRESS NOTES
Occupational Therapy  Facility/Department: [facility]  Occupational Therapy Initial Assessment    Name: Willis Jon  : 1955  MRN: [id number]  Date of Service: 2022    Discharge Recommendations:  3-5 sessions per week, Patient would benefit from continued therapy after discharge      Willis Jon scored a 16/24 on the AM-PAC ADL Inpatient form. Current research shows that an AM-PAC score of 17 or less is typically not associated with a discharge to the patient's home setting. Based on the patient's AM-PAC score and their current ADL deficits, it is recommended that the patient have 3-5 sessions per week of Occupational Therapy at d/c to increase the patient's independence. Please see assessment section for further patient specific details. If patient discharges prior to next session this note will serve as a discharge summary. Please see below for the latest assessment towards goals. Patient Diagnosis(es): There were no encounter diagnoses. Past Medical History:  has a past medical history of Diabetes mellitus ([address] Gallstones, Lymphedema, Neuropathy, Pancreatitis, PVD (peripheral vascular disease) ([address] and Ulcers of both lower legs ([address] Past Surgical History:  has a past surgical history that includes Foot surgery (Left, ); ERCP (14); and Cholecystectomy, laparoscopic (N/A, 8/10/2021). Assessment   Performance deficits / Impairments: Decreased functional mobility ; Decreased ADL status; Decreased strength;Decreased endurance;Decreased balance;Decreased high-level IADLs;Decreased sensation  Assessment: Pt is a 74 y.o. male admitted with Sepsis, Community-acquired pneumonia, multiple indeterminate hepatic lesions concerning for metastatic disease. PTA, pt living alone and reports independent ADLs and fxl mobility using cane, HHA 1x/week to assist with grocery shopping. Pt appears disheveled/unkept, question the quality of pt's self-care at baseline. Pt currently functioning below baseline d/t the above deficits, today requiring variable assist from mod A progressing to CGA for fxl transfers, CGA fxl mobility with RW, total A toileting hygiene and LB dressing, and anticipate pt would require at least mod A for ADLs. As pt presents today, AM-PAC score indicates need for ongoing skilled OT at d/c to maximize pt's safety and independence. However, discussed d/c plans with pt who reports he plans to return home. Will need to ensure pt makes adequate progress. Prognosis: Fair  Decision Making: Medium Complexity  History: see above  REQUIRES OT FOLLOW-UP: Yes  Activity Tolerance  Activity Tolerance: Patient limited by fatigue;Patient Tolerated treatment well        Plan   Plan  Times per Week: 3-5  Current Treatment Recommendations: Strengthening, Balance training, Functional mobility training, Endurance training, Safety education & training, Self-Care / ADL, Equipment evaluation, education, & procurement     Restrictions  Restrictions/Precautions  Restrictions/Precautions: Fall Risk    Subjective   General  Chart Reviewed: Yes  Additional Pertinent Hx: Per Eladio Oliver MD's H&P 8/15/2022: \"The patient is a 74 y.o. male who presents to [facility] with generalized weakness and chills. Patient with a past medical history of diabetes mellitus on insulin, history of chronic pancreatitis, peripheral vascular disease, chronic neuropathy. Patient stated that his symptom has started early in the morning of admission when he had awoken with severe nonproductive cough, nonbilious vomiting as well as chills, patient also reported difficulty urinating that is new. Symptoms have been present for less than 24 hours, no change in bowel habits, patient denies any diarrhea or constipation, no chest pain, reported some shortness of breath on questioning.  Patient lives alone however has home care services for bilateral chronic lower limb ulcerations\"  Family / Caregiver Present: No  Referring Practitioner: Ethelene Bamberger, MD  Diagnosis: Sepsis, Community-acquired pneumonia, multiple indeterminate hepatic lesions concerning for metastatic disease  Subjective  Subjective: Pt met b/s for OT eval/tx. Pt in bed on arrival, agreeable to participate in therapy. Pt reports \"I can't stand. \" Pt denies pain. Social/Functional History  Social/Functional History  Lives With: Alone  Type of Home: House  Home Layout: Laundry in basement, Able to Live on Main level with bedroom/bathroom, Two level, Performs ADL's on one level  Home Access: Stairs to enter with rails  Entrance Stairs - Number of Steps: 5+6 step to enter (1st set with one rail and the 2nd set with B rails); but he doesn't go out of the home  Bathroom Shower/Tub: Tub/Shower unit (sponge bathes)  Bathroom Toilet: Handicap height  Bathroom Equipment: 3-in-1 commode  Bathroom Accessibility: Not accessible  Home Equipment: Cane, quad, Walker, rolling, Walker, standard  Has the patient had two or more falls in the past year or any fall with injury in the past year?: Yes  Receives Help From: Home health (Laurian Drain comes 3x/week for dressing changes; HHA 1x/week for groceries)  ADL Assistance: Independent  Homemaking Assistance:  (HHA gets groceries; the pt does his wash by hand; uses microwave for meals and gets MOW; limited cleaning)  Ambulation Assistance: Independent (uses cane in home and occassionally the walker when feeling weak)  Transfer Assistance: Independent  Active : No  Patient's  Info: the pt doesn't leave the house; gets visting physicians  Occupation: Retired  Type of Occupation: clerical work  Leisure & Hobbies: reading, TV, sleeping  IADL Comments: sleeps on the couch mostly       Objective     Observation/Palpation  Observation: dressings on B feet    Safety Devices  Type of Devices: Call light within reach; Chair alarm in place;Gait belt;Left in chair    Balance  Sitting: Intact  Standing: With support (CGA at [facility])  Gait  Overall Level of Assistance: Contact-guard assistance Nishi keys used for initial bed>chair. Pt then ambulated ~35 ft, ~40 ft with RW and CGA. Pt ambulates outside frame of walker requiring VC's to correct)  Assistive Device: Walker, rolling    AROM: Within functional limits  PROM: Within functional limits  Strength: Within functional limits  Coordination: Within functional limits  Sensation: Impaired (neuropathy B LE's, denies N/T in monique hands)    ADL  UE Dressing Skilled Clinical Factors: assist to change hospital gown  LE Dressing: Dependent/Total  LE Dressing Skilled Clinical Factors: to don socks and change depends  Toileting: Dependent/Total  Toileting Skilled Clinical Factors: external purewick catheter, depends heavily saturated with urine. Assist to change depends rolling in bed  Additional Comments: Anticipate pt is independent feeding, mod A bathing and dressing based on ROM/strength, balance, endurance.     Activity Tolerance  Activity Tolerance: Patient limited by fatigue;Patient limited by endurance    Bed mobility  Rolling to Left: Minimal assistance  Rolling to Right: Minimal assistance  Supine to Sit: Stand by assistance (use of rail, increased time/effort)  Sit to Supine: Unable to assess  Scooting: Stand by assistance    Transfers  Sit to stand: Minimal assistance (EOB>gem[employer] stedy CGA, recliner>RW with min A x2 initially progressing to CGA with increased time/effort and multiple attempts)  Stand to sit: Contact guard assistance (VC's for hand placement and backing up fully to chair with RW)    Vision  Vision: Impaired  Vision Exceptions:  (has glasses but rarely wears them; sometimes to watch TV)  Hearing  Hearing: Within functional limits    Cognition  Overall Cognitive Status: Exceptions  Safety Judgement: Decreased awareness of need for safety  Problem Solving: Decreased awareness of errors  Insights: Decreased awareness of deficits  Orientation  Overall Orientation Status: Within Functional Limits  Orientation Level: Oriented to person;Oriented to time;Oriented to place;Oriented to situation    Education Given To: Patient  Education Provided: Role of Therapy;Plan of Care;Transfer Training;ADL Adaptive Strategies  Barriers to Learning: Other (Comment) (decreased insight and safety judgement)  Education Outcome: Continued education needed                          AM-PAC Score        AM-PAC Inpatient Daily Activity Raw Score: 16 (08/16/22 1423)  AM-PAC Inpatient ADL T-Scale Score : 35.96 (08/16/22 1423)  ADL Inpatient CMS 0-100% Score: 53.32 (08/16/22 1423)  ADL Inpatient CMS G-Code Modifier : CK (08/16/22 1423)           Goals  Short Term Goals  Time Frame for Short term goals: Prior to d/c:  Short Term Goal 1: Pt will bathe with supervision using A/E prn. Short Term Goal 2: Pt will dress wtih supervision using A/E. Short Term Goal 3: Pt will toilet with supervision. Short Term Goal 4: Pt will complete fxl mobility and fxl transfers to/from ADL surfaces with supervision using LRAD. Short Term Goal 5: Pt will increase activity tolerance to achieve the above goals. Long Term Goals  Time Frame for Long term goals : STGs=LTGs  Patient Goals   Patient goals : to return home.        Therapy Time   Individual Concurrent Group Co-treatment   Time In 1310         Time Out 1410         Minutes 60         Timed Code Treatment Minutes: Marial[address], OTR/L 5050

## 2022-08-16 NOTE — ACP (ADVANCE CARE PLANNING)
Advance Care Planning     Advance Care Planning Activator (Inpatient)  Conversation Note      Date of ACP Conversation: 8/16/2022     Conversation Conducted with: Patient with Decision Making Capacity    ACP Activator: Abiodun Kilpatrick RN    Health Care Decision Maker:     Current Designated Health Care Decision Maker:     Primary Decision Maker: stefeneida - [facility] - [phone]    Care Preferences    Ventilation: \"If you were in your present state of health and suddenly became very ill and were unable to breathe on your own, what would your preference be about the use of a ventilator (breathing machine) if it were available to you? \"      Would the patient desire the use of ventilator (breathing machine)?: no    \"If your health worsens and it becomes clear that your chance of recovery is unlikely, what would your preference be about the use of a ventilator (breathing machine) if it were available to you? \"     Would the patient desire the use of ventilator (breathing machine)?: No      Resuscitation  \"CPR works best to restart the heart when there is a sudden event, like a heart attack, in someone who is otherwise healthy. Unfortunately, CPR does not typically restart the heart for people who have serious health conditions or who are very sick. \"    \"In the event your heart stopped as a result of an underlying serious health condition, would you want attempts to be made to restart your heart (answer \"yes\" for attempt to resuscitate) or would you prefer a natural death (answer \"no\" for do not attempt to resuscitate)? \" no       [] Yes   [x] No   Educated Patient / Carlos Balderas regarding differences between Advance Directives and portable DNR orders.     Length of ACP Conversation in minutes:  5    Conversation Outcomes:  [x] ACP discussion completed  [] Existing advance directive reviewed with patient; no changes to patient's previously recorded wishes  [] New Advance Directive completed  [] Portable Do Not Rescitate prepared for Provider review and signature  [] POLST/POST/MOLST/MOST prepared for Provider review and signature      Follow-up plan:    [] Schedule follow-up conversation to continue planning  [] Referred individual to Provider for additional questions/concerns   [] Advised patient/agent/surrogate to review completed ACP document and update if needed with changes in condition, patient preferences or care setting    [x] This note routed to one or more involved healthcare providers

## 2022-08-16 NOTE — PLAN OF CARE
Problem: Discharge Planning  Goal: Discharge to home or other facility with appropriate resources  Outcome: Progressing  Flowsheets (Taken 8/15/2022 2008)  Discharge to home or other facility with appropriate resources: Identify barriers to discharge with patient and caregiver     Problem: Pain  Goal: Verbalizes/displays adequate comfort level or baseline comfort level  Outcome: Progressing     Problem: Skin/Tissue Integrity  Goal: Absence of new skin breakdown  Description: 1. Monitor for areas of redness and/or skin breakdown  2. Assess vascular access sites hourly  3. Every 4-6 hours minimum:  Change oxygen saturation probe site  4. Every 4-6 hours:  If on nasal continuous positive airway pressure, respiratory therapy assess nares and determine need for appliance change or resting period.   Outcome: Progressing

## 2022-08-16 NOTE — PROGRESS NOTES
Hospitalist Progress Note      PCP: Jacoby Hodges MD    Date of Admission: 8/15/2022    Chief Complaint:  Generalized weakness, chills    Hospital Course:    74 y.o. male who presents to [facility] with generalized weakness and chills. Patient with a past medical history of diabetes mellitus on insulin, history of chronic pancreatitis, peripheral vascular disease, chronic neuropathy. With generalized weakness and chills, also reported some urinary changes. Subjective:   Reported some improvement of symptoms, no spikes of fever today  No bowel motions    Medications:  Reviewed    Infusion Medications    sodium chloride 10 mL/hr at 08/15/22 1804    dextrose       Scheduled Medications    aspirin  81 mg Oral Daily    atorvastatin  20 mg Oral Nightly    insulin glargine  10 Units SubCUTAneous Nightly    spironolactone  25 mg Oral Daily    tamsulosin  0.4 mg Oral Nightly    sodium chloride flush  5-40 mL IntraVENous 2 times per day    enoxaparin  30 mg SubCUTAneous BID    insulin lispro  0-8 Units SubCUTAneous TID WC    insulin lispro  0-4 Units SubCUTAneous Nightly    piperacillin-tazobactam  3,375 mg IntraVENous Q8H    lisinopril  20 mg Oral Daily    azithromycin  500 mg IntraVENous Q24H     PRN Meds: sodium chloride flush, sodium chloride, ondansetron **OR** ondansetron, polyethylene glycol, acetaminophen **OR** acetaminophen, glucose, dextrose bolus **OR** dextrose bolus, glucagon (rDNA), dextrose      Intake/Output Summary (Last 24 hours) at 8/16/2022 1409  Last data filed at 8/16/2022 0942  Gross per 24 hour   Intake 240 ml   Output --   Net 240 ml       Physical Exam Performed:    /63   Pulse 66   Temp 99.1 °F (37.3 °C) (Oral)   Resp 16   Ht 6' 2\" (1.88 m)   Wt 259 lb 4.2 oz (117.6 kg)   SpO2 97%   BMI 33.29 kg/m²     General appearance: No apparent distress, appears stated age and cooperative. HEENT: Pupils equal, round, and reactive to light.  Conjunctivae/corneas clear.  Neck: Supple, with full range of motion. No jugular venous distention. Trachea midline. Respiratory:  Normal respiratory effort. Clear to auscultation, bilaterally without Rales/Wheezes/Rhonchi. Cardiovascular: Regular rate and rhythm with normal S1/S2 without murmurs, rubs or gallops. Abdomen: Soft, non-tender, non-distended with normal bowel sounds. Musculoskeletal: No clubbing, cyanosis or edema bilaterally. Full range of motion without deformity. Skin: Skin color, texture, turgor normal.  No rashes or lesions. Neurologic:  Neurovascularly intact without any focal sensory/motor deficits. Cranial nerves: II-XII intact, grossly non-focal.  Psychiatric: Alert and oriented, thought content appropriate, normal insight  Capillary Refill: Brisk,3 seconds, normal   Peripheral Pulses: +2 palpable, equal bilaterally       Labs:   Recent Labs     08/15/22  0756 08/16/22  0844   WBC 14.0* 7.2   HGB 8.5* 8.1*   HCT 27.0* 24.8*    205     Recent Labs     08/15/22  0756 08/16/22  0844    138   K 5.0 4.2    104   CO2 22 21   BUN 22* 16   CREATININE 0.9 1.1   CALCIUM 9.3 8.5     Recent Labs     08/15/22  0756   AST 8*   ALT <5*   BILITOT 0.5   ALKPHOS 81     No results for input(s): INR in the last 72 hours. No results for input(s): Jessica Mould in the last 72 hours. Urinalysis:      Lab Results   Component Value Date/Time    NITRU Negative 08/15/2022 11:16 AM    WBCUA 1 08/15/2022 11:16 AM    BACTERIA None Seen 08/15/2022 11:16 AM    RBCUA 2 08/15/2022 11:16 AM    BLOODU TRACE 08/15/2022 11:16 AM    SPECGRAV 1.035 08/15/2022 11:16 AM    GLUCOSEU Negative 08/15/2022 11:16 AM       Radiology:  CT CHEST ABDOMEN PELVIS W CONTRAST   Final Result   1. No acute pulmonary process. 2. Multiple indeterminate hepatic lesions concerning for metastatic disease   of unknown primary. 3. Chronic stable choledocholithiasis. 4. Nonobstructing left nephrolithiasis. 5. Chronic pancreatitis. XR CHEST PORTABLE   Final Result   Diffuse bilateral pulmonary opacity can reflect pulmonary edema or in the   appropriate clinical setting, atypical infection. Small right pleural   effusion. XR KNEE LEFT (1-2 VIEWS)   Final Result   No acute osseous abnormality right or left knee      Degenerative change, greatest medially and in the patellofemoral joints         XR KNEE RIGHT (1-2 VIEWS)   Final Result   No acute osseous abnormality right or left knee      Degenerative change, greatest medially and in the patellofemoral joints         XR FOOT LEFT (MIN 3 VIEWS)   Final Result   Soft tissue swelling and plantar soft tissue ulceration. Nonspecific cortical thickening is seen of the distal fibula and tibia, for   which infection is among differential considerations. Dedicated tibia fibula   radiograph could be performed for further assessment. XR FOOT RIGHT (MIN 3 VIEWS)   Final Result   Limited evaluation of the digits secondary to flexion. Soft tissue swelling   is otherwise noted without focal erosion. If there is strong clinical   concern for osteomyelitis, MR could be considered. MRI ABDOMEN W WO CONTRAST MRCP    (Results Pending)           Assessment/Plan:    Active Hospital Problems    Diagnosis     Sepsis ([facility] [address] [A41.9]      Sepsis. Community-acquired pneumonia  Patient presented to emergency with a short onset symptoms of nausea vomiting and cough. Upon presentation patient was found to be febrile up to 38.2, blood work significant of leukocytosis up to 14 (neutrophilic). Urinalysis unremarkable. X-ray with bilateral opacity and a small pleural effusion. Procalcitonin 0.14  Plan  -continue  Zosyn and azithromycin  -Follow blood culture  -Send sputum culture        CT finding of multiple hepatic lesions. Incidentally found multiple indeterminate hepatic lesions concerning for metastatic disease.    Initial plan was follow-up as outpatient however patient stated that he does not have a PCP and has difficulty leaving his house. Will order MRCP to further delineate lung lesion     Diabetes mellitus. Change glargine to 10 units from 20 at home  Continue sliding scale  Blood sugar well controlled     Hypertension. Continue home medications     BPH  Continue tamsulosin. Chronic venous insufficiency ulcers. Consult wound care    DVT Prophylaxis: lovenox  Diet: ADULT DIET; Regular  Code Status: DNR-CCA    PT/OT Eval Status: following.      Dispo - pending clinical improvement      Robin Coates MD

## 2022-08-16 NOTE — CARE COORDINATION
INITIAL CASE MANAGEMENT ASSESSMENT    Reviewed chart, met with patient to assess possible discharge needs. Explained Case Management role/services. Living Situation: Patient lives alone in a house with 11 steps to enter. ADLs: Independent /sponge bathes     DME: Reji Ramirez, Walker, rolling, Walker, standard3-in-1 commode,     PT/OT Recs: Date of Service: 8/16/2022     Discharge Recommendations:  Patient would benefit from continued therapy after discharge (1st choice: 3-5 but if pt declines then home with home PT and HHA)    Jeny Nikolas Pushpa Nova scored a 16/24 on the AM-PAC short mobility form. Current research shows that an AM-PAC score of 17 or less is typically not associated with a discharge to the patient's home setting. PT Equipment Recommendations  Equipment Needed: No  Anticipate that the pt will decline this option and will insist on going home; IF home, the pt would benefit from increased home care including a HHA for bathing and home PT. Active Services: [facility], [facility]HA 1x/week to get him groceries; he gets MOW. Active with [facility] comes 3x/week for dressing changes     Transportation: Non-/Will require ambulance transport to home 2/2 driveway is on a hill and there are 11 steps to enter. Medications: Delivery/No barriers    PCP: Izzy Villalobos MD. They visit with patient in the home setting. HD/PD: N/A    PLAN/COMMENTS: Patient plans to return to home and resume [facility] with [facility]. Refuses SNF. SW/CM provided contact information for patient or family to call with any questions. SW/CM will follow and assist as needed.    Electronically signed by Valery Sesay RN on 8/16/2022 at 2:49 PM

## 2022-08-16 NOTE — PROGRESS NOTES
Physical Therapy  Facility/Department: [address] MED SURG  Physical Therapy Initial Assessment  If patient discharges prior to next session this note will serve as a discharge summary. Please see below for the latest assessment towards goals. Name: Candida Balderas  : 1955  MRN: [id number]  Date of Service: 2022    Discharge Recommendations:  Patient would benefit from continued therapy after discharge (1st choice: 3-5 but if pt declines then home with home PT and HHA)   Sheryl Boyer Mukesh Araya scored a 16/24 on the AM-PAC short mobility form. Current research shows that an AM-PAC score of 17 or less is typically not associated with a discharge to the patient's home setting. Based on the patient's AM-PAC score and their current functional mobility deficits, it is recommended that the patient have 3-5 sessions per week of Physical Therapy at d/c to increase the patient's independence. Please see assessment section for further patient specific details. PT Equipment Recommendations  Equipment Needed: No  Other: reports he has multiple canes and walkers      Patient Diagnosis(es): There were no encounter diagnoses. Past Medical History:  has a past medical history of Diabetes mellitus ([address] Gallstones, Lymphedema, Neuropathy, Pancreatitis, PVD (peripheral vascular disease) ([address] and Ulcers of both lower legs ([address] Past Surgical History:  has a past surgical history that includes Foot surgery (Left, ); ERCP (14); and Cholecystectomy, laparoscopic (N/A, 8/10/2021). Assessment   Assessment: The pt is a 75 yo male who presented to the ED with generalized weakness, chills, nonproductive cough, vomiting and difficluty urinating. XR: bilateral opacity and a small pleural effusion. CT scan shows multiple indeterminate hepatic lesions concerning for metastatic disease. The pt lives alone on the main floor of a 2 story house.  The pt reports he never leaves the house and has a HHA 1x/week to get him groceries; he gets MOW, does his laundry by hand and takes only a sponge bath on his own. The pt has a wh walker but normally uses a QC for walking. PMHx: DM, lymphedema, neuropathy, pancreatitis, PVD, L foot sx, choly    Today, the pt demonstrated that he is functioning below his baseline and per am-pac score of 16/24 he would benefit from con't skilled PT at d/c prior to going home. Anticipate that the pt will decline this option and will insist on going home; IF home, the pt would benefit from increased home care including a HHA for bathing and home PT. Today, the pt is limited by his weakness and did need the walker today for safe ambulation. The pt was educated that IF home he would need to use his waler instead of the cane. Will con't to follow. Therapy Prognosis: Fair  Decision Making: Medium Complexity  Barriers to Learning: insight  Requires PT Follow-Up: Yes  Activity Tolerance  Activity Tolerance: Patient limited by fatigue;Patient limited by endurance     Plan   Plan  Plan: 3-5 times per week  Current Treatment Recommendations: Strengthening, ROM, Balance training, Functional mobility training, Transfer training, Gait training, Therapeutic activities, Patient/Caregiver education & training, Safety education & training, Equipment evaluation, education, & procurement  Safety Devices  Type of Devices: Call light within reach, Chair alarm in place, Gait belt, Left in chair     Restrictions  Restrictions/Precautions  Restrictions/Precautions: Fall Risk     Subjective   General  Chart Reviewed: Yes  Additional Pertinent Hx: Per Jeanne Nuñez MD H&P on 8-: The pt is a 73 yo male who presented to the ED with generalized weakness, chills, nonproductive cough, vomiting and difficluty urinating. XR: bilateral opacity and a small pleural effusion. CT scan shows multiple indeterminate hepatic lesions concerning for metastatic disease.       PMHx: DM, lymphedema, neuropathy, pancreatitis, PVD, L foot sx, choly  Response To Previous Treatment: Not applicable  Family / Caregiver Present: No  Referring Practitioner: Ivett Alfonso MD  Referral Date : 08/15/22  Diagnosis: sepsis, CAP, incidental finding of multiple hepatic lesions  Subjective  Subjective: the pt was found to be in the bed upon arrival to the room; the pt denied pain and needed minimal encouragement to participate; reporting \"I can't stand\"         Social/Functional History  Social/Functional History  Lives With: Alone  Type of Home: House  Home Layout: Laundry in basement, Able to Live on Main level with bedroom/bathroom, Two level, Performs ADL's on one level  Home Access: Stairs to enter with rails  Entrance Stairs - Number of Steps: 5+6 step to enter (1st set with one rail and the 2nd set with B rails); but he doesn't go out of the home  Bathroom Shower/Tub: Tub/Shower unit (sponge bathes)  Bathroom Toilet: Handicap height  Bathroom Equipment: 3-in-1 commode  Bathroom Accessibility: Not accessible  Home Equipment: Cane, quad, Walker, rolling, Walker, standard  Has the patient had two or more falls in the past year or any fall with injury in the past year?: Yes  Receives Help From: Home health (Daniaalexus Hameed comes 3x/week for dressing changes; HHA 1x/week for groceries)  ADL Assistance: Independent  Homemaking Assistance:  (HHA gets groceries; the pt does his wash by hand; uses microwave for meals and gets MOW; limited cleaning)  Ambulation Assistance: Independent (uses cane in home and occassionally the walker when feeling weak)  Transfer Assistance: Independent  Active : No  Patient's  Info: the pt doesn't leave the house; gets visting physicians  Occupation: Retired  Type of Occupation: clerical work  Leisure & Hobbies: reading, TV, sleeping  IADL Comments: sleeps on the couch mostly  [address]: Impaired  Vision Exceptions:  (has glasses but rarely wears them; sometimes to watch TV)  Hearing  Hearing: Within functional limits    Cognition   Orientation  Overall Orientation Status: Within Functional Limits  Orientation Level: Oriented to person;Oriented to time;Oriented to place;Oriented to situation     Objective        Observation/Palpation  Observation: dressings on B feet  Gross Assessment  AROM: Generally decreased, functional  Strength: Generally decreased, functional  Sensation: Impaired (reports neuropathy in B feet)       Bed mobility  Rolling to Left: Minimal assistance  Rolling to Right: Minimal assistance  Supine to Sit: Stand by assistance (use of rail, increased time/effort)  Sit to Supine: Unable to assess  Scooting: Stand by assistance  Transfers  Sit to Stand: Minimal Assistance;2 Person Assistance;Stand by assistance  Stand to sit: Stand by assistance;Minimal Assistance  Comment: initially the pt required min A of 2 for the 1st sit > stand from the recliner then the pt progressed to close SBA with increased time and effort; the pt mostly SBA for stand > sit except one episode when the pt did not scoot back enough and needed at least min A for safety  Ambulation  Surface: level tile  Device: Rolling Walker  Assistance: Contact guard assistance  Quality of Gait: slowed pace, wide ASTRID, shortened steps B with limited step clearance; no LOB  Distance: 30 feet x 1, 40 feet x 1  More Ambulation?: No  Stairs/Curb  Stairs?: No     Balance  Sitting - Static: Good  Sitting - Dynamic: Good;-  Standing - Static: Good;- (at the walker)  Standing - Dynamic: Good; - (with walker)  Comments: the pt had no LOB when standing to the walker           AM-PAC Score  AM-PAC Inpatient Mobility Raw Score : 16 (08/16/22 1418)  AM-PAC Inpatient T-Scale Score : 40.78 (08/16/22 1418)  Mobility Inpatient CMS 0-100% Score: 54.16 (08/16/22 1418)  Mobility Inpatient CMS G-Code Modifier : CK (08/16/22 1418)       Goals  Short Term Goals  Time Frame for Short term goals: upon d/c  Short term goal 1: Transfers sit <> stand with SBA/Sup consistently. Short term goal 2: Ambulate with wh walker 75 feet with SBA.   Patient Goals   Patient goals : to go home       Education  Patient Education  Education Given To: Patient  Education Provided: Role of Therapy;Plan of Care;Equipment;Transfer Training  Education Method: Demonstration;Verbal  Barriers to Learning: Cognition  Education Outcome: Verbalized understanding;Continued education needed      Therapy Time   Individual Concurrent Group Co-treatment   Time In 1310         Time Out 1410         Minutes 60         Timed Code Treatment Minutes: 45 Minutes     Electronically signed by Magnolia Lopez, PT [id number] on 8/16/2022 at 2:28 PM

## 2022-08-17 ENCOUNTER — APPOINTMENT (OUTPATIENT)
Dept: MRI IMAGING | Age: 67
DRG: 871 | End: 2022-08-17
Payer: MEDICARE

## 2022-08-17 LAB
ANION GAP SERPL CALCULATED.3IONS-SCNC: 9 MMOL/L (ref 3–16)
BASOPHILS ABSOLUTE: 0 K/UL (ref 0–0.2)
BASOPHILS RELATIVE PERCENT: 0.5 %
BUN BLDV-MCNC: 15 MG/DL (ref 7–20)
CALCIUM SERPL-MCNC: 8.4 MG/DL (ref 8.3–10.6)
CEA: 3 NG/ML (ref 0–5)
CHLORIDE BLD-SCNC: 107 MMOL/L (ref 99–110)
CO2: 22 MMOL/L (ref 21–32)
CREAT SERPL-MCNC: 0.9 MG/DL (ref 0.8–1.3)
EOSINOPHILS ABSOLUTE: 0.1 K/UL (ref 0–0.6)
EOSINOPHILS RELATIVE PERCENT: 1.9 %
GFR AFRICAN AMERICAN: >60
GFR NON-AFRICAN AMERICAN: >60
GLUCOSE BLD-MCNC: 102 MG/DL (ref 70–99)
GLUCOSE BLD-MCNC: 108 MG/DL (ref 70–99)
GLUCOSE BLD-MCNC: 110 MG/DL (ref 70–99)
GLUCOSE BLD-MCNC: 128 MG/DL (ref 70–99)
GLUCOSE BLD-MCNC: 186 MG/DL (ref 70–99)
HCT VFR BLD CALC: 24.6 % (ref 40.5–52.5)
HEMOGLOBIN: 8.1 G/DL (ref 13.5–17.5)
LYMPHOCYTES ABSOLUTE: 1.2 K/UL (ref 1–5.1)
LYMPHOCYTES RELATIVE PERCENT: 19.3 %
MCH RBC QN AUTO: 23.9 PG (ref 26–34)
MCHC RBC AUTO-ENTMCNC: 33.1 G/DL (ref 31–36)
MCV RBC AUTO: 72.3 FL (ref 80–100)
MONOCYTES ABSOLUTE: 0.6 K/UL (ref 0–1.3)
MONOCYTES RELATIVE PERCENT: 9.4 %
NEUTROPHILS ABSOLUTE: 4.3 K/UL (ref 1.7–7.7)
NEUTROPHILS RELATIVE PERCENT: 68.9 %
PDW BLD-RTO: 17.8 % (ref 12.4–15.4)
PERFORMED ON: ABNORMAL
PLATELET # BLD: 185 K/UL (ref 135–450)
PMV BLD AUTO: 7.2 FL (ref 5–10.5)
POTASSIUM SERPL-SCNC: 4.2 MMOL/L (ref 3.5–5.1)
RBC # BLD: 3.4 M/UL (ref 4.2–5.9)
SODIUM BLD-SCNC: 138 MMOL/L (ref 136–145)
WBC # BLD: 6.3 K/UL (ref 4–11)

## 2022-08-17 PROCEDURE — 1200000000 HC SEMI PRIVATE

## 2022-08-17 PROCEDURE — 6360000004 HC RX CONTRAST MEDICATION: Performed by: STUDENT IN AN ORGANIZED HEALTH CARE EDUCATION/TRAINING PROGRAM

## 2022-08-17 PROCEDURE — 6360000002 HC RX W HCPCS: Performed by: STUDENT IN AN ORGANIZED HEALTH CARE EDUCATION/TRAINING PROGRAM

## 2022-08-17 PROCEDURE — 86301 IMMUNOASSAY TUMOR CA 19-9: CPT

## 2022-08-17 PROCEDURE — 80048 BASIC METABOLIC PNL TOTAL CA: CPT

## 2022-08-17 PROCEDURE — 82378 CARCINOEMBRYONIC ANTIGEN: CPT

## 2022-08-17 PROCEDURE — 94760 N-INVAS EAR/PLS OXIMETRY 1: CPT

## 2022-08-17 PROCEDURE — 36415 COLL VENOUS BLD VENIPUNCTURE: CPT

## 2022-08-17 PROCEDURE — 2580000003 HC RX 258: Performed by: STUDENT IN AN ORGANIZED HEALTH CARE EDUCATION/TRAINING PROGRAM

## 2022-08-17 PROCEDURE — 82105 ALPHA-FETOPROTEIN SERUM: CPT

## 2022-08-17 PROCEDURE — 85025 COMPLETE CBC W/AUTO DIFF WBC: CPT

## 2022-08-17 PROCEDURE — 6370000000 HC RX 637 (ALT 250 FOR IP): Performed by: STUDENT IN AN ORGANIZED HEALTH CARE EDUCATION/TRAINING PROGRAM

## 2022-08-17 PROCEDURE — A9577 INJ MULTIHANCE: HCPCS | Performed by: STUDENT IN AN ORGANIZED HEALTH CARE EDUCATION/TRAINING PROGRAM

## 2022-08-17 PROCEDURE — 97116 GAIT TRAINING THERAPY: CPT

## 2022-08-17 PROCEDURE — 74183 MRI ABD W/O CNTR FLWD CNTR: CPT

## 2022-08-17 PROCEDURE — 97530 THERAPEUTIC ACTIVITIES: CPT

## 2022-08-17 RX ORDER — SODIUM HYPOCHLORITE 5 MG/ML
SOLUTION TOPICAL DAILY
Status: DISCONTINUED | OUTPATIENT
Start: 2022-08-17 | End: 2022-08-22 | Stop reason: HOSPADM

## 2022-08-17 RX ADMIN — PIPERACILLIN AND TAZOBACTAM 3375 MG: 3; .375 INJECTION, POWDER, FOR SOLUTION INTRAVENOUS at 21:35

## 2022-08-17 RX ADMIN — PIPERACILLIN AND TAZOBACTAM 3375 MG: 3; .375 INJECTION, POWDER, FOR SOLUTION INTRAVENOUS at 06:43

## 2022-08-17 RX ADMIN — Medication 10 ML: at 10:08

## 2022-08-17 RX ADMIN — TAMSULOSIN HYDROCHLORIDE 0.4 MG: 0.4 CAPSULE ORAL at 21:33

## 2022-08-17 RX ADMIN — ENOXAPARIN SODIUM 30 MG: 100 INJECTION SUBCUTANEOUS at 10:08

## 2022-08-17 RX ADMIN — ATORVASTATIN CALCIUM 20 MG: 20 TABLET, FILM COATED ORAL at 21:33

## 2022-08-17 RX ADMIN — AZITHROMYCIN MONOHYDRATE 500 MG: 500 INJECTION, POWDER, LYOPHILIZED, FOR SOLUTION INTRAVENOUS at 17:26

## 2022-08-17 RX ADMIN — ASPIRIN 81 MG: 81 TABLET, COATED ORAL at 10:08

## 2022-08-17 RX ADMIN — SPIRONOLACTONE 25 MG: 25 TABLET ORAL at 10:08

## 2022-08-17 RX ADMIN — GADOBENATE DIMEGLUMINE 20 ML: 529 INJECTION, SOLUTION INTRAVENOUS at 08:28

## 2022-08-17 RX ADMIN — INSULIN GLARGINE 10 UNITS: 100 INJECTION, SOLUTION SUBCUTANEOUS at 21:35

## 2022-08-17 RX ADMIN — ENOXAPARIN SODIUM 30 MG: 100 INJECTION SUBCUTANEOUS at 21:33

## 2022-08-17 RX ADMIN — PIPERACILLIN AND TAZOBACTAM 3375 MG: 3; .375 INJECTION, POWDER, FOR SOLUTION INTRAVENOUS at 13:33

## 2022-08-17 RX ADMIN — LISINOPRIL 20 MG: 20 TABLET ORAL at 10:07

## 2022-08-17 NOTE — CONSULTS
repeat ERCP with lithotripsy vs referral to  for ECSWL vs observation. Pt was asymptomatic and elected to observe. At presented time pt has no complaints. He denies abdominal pain, nausea, vomiting. He is tolerating food. He denies chronic diarrhea. He has not had a BM since admission but states it is normal for him to go a few days without a BM. He denies feeling constipated. He denies any recent melena or hematochezia. He denies any unexplained weight loss. His mom was dx with colon cancer in her 80s. He denies any known family history of liver or pancreatic cancer. He has never had a colonoscopy. Prior Endoscopic Evaluations:  EGD with pancreatic stent removal 2/5/15 with Dr. Glenn Pereira  1) LA class C reflux esophagitis    2) Small sliding hiatal hernia. 3) Moderate gastritis noted. No ulcers were seen. Biopsies were obtained from the antrum and body of the stomach to rule out active gastritis and H.pylori gastritis. Some retained food noted in the body of the stomach. 4) Normal duodenal bulb. 5) Retained pancreatic duct stent noted. This was retrieved with a rat tooth forceps and withdrawn through the mouth. Stomach, biopsy:      - Inactive chronic gastritis, mild. - No evidence of Helicobacter pylori identified on H&E or        immunohistochemical-stained sections. ERCP 4/8/2014 with Dr. Katarina Fall  1. ERCP with pancreatoscopy and electrohydraulic lithotripsy with partial stone fragmentation but could not break up the entire stone. Stent placed with proximal tip in the stone. ERCP 4/2/14 with Dr. Katarina Fall  1. Pancreatic duct stone s/p pancreatic sphincterotomy. I could not remove the stone as it was so lodged in the duct that I could not get any tools above the stone. 2.  Reflux esophagitis. 3.  Pyloric narrowing s/p balloon dilation. 4.  Duodenal erosions.           PAST MEDICAL, SURGICAL, FAMILY, and SOCIAL HISTORY     Past Medical History:   Diagnosis Date    Diabetes mellitus ([facility]     Gallstones     Lymphedema     Neuropathy     Pancreatitis     PVD (peripheral vascular disease) ([facility]     Ulcers of both lower legs ([facility]     bilateral feet     Past Surgical History:   Procedure Laterality Date    CHOLECYSTECTOMY, LAPAROSCOPIC N/A 8/10/2021    LAPAROSCOPIC CHOLECYSTECTOMY WITH CHOLANGIOGRAM performed by Raul Leach MD at [facility] [address]    ERCP  4/8/14    LITHOTRYPSY & PANCREATIC STENT PLACEMENT    FOOT SURGERY Left 1967     Family History   Problem Relation Age of Onset    Colon Cancer Mother         PVD s/p toe amputation by Dr Deya Oshea Father      Social History     Socioeconomic History    Marital status: Single   Tobacco Use    Smoking status: Never    Smokeless tobacco: Never   Vaping Use    Vaping Use: Never used   Substance and Sexual Activity    Alcohol use: No    Drug use: No    Sexual activity: Not Currently       MEDICATIONS   SCHEDULED:  sodium hypochlorite, , Daily  aspirin, 81 mg, Daily  atorvastatin, 20 mg, Nightly  insulin glargine, 10 Units, Nightly  spironolactone, 25 mg, Daily  tamsulosin, 0.4 mg, Nightly  sodium chloride flush, 5-40 mL, 2 times per day  enoxaparin, 30 mg, BID  insulin lispro, 0-8 Units, TID WC  insulin lispro, 0-4 Units, Nightly  piperacillin-tazobactam, 3,375 mg, Q8H  lisinopril, 20 mg, Daily  azithromycin, 500 mg, Q24H      FLUIDS/DRIPS:     sodium chloride 10 mL/hr at 08/15/22 1804    dextrose       PRNs: sodium chloride flush, 5-40 mL, PRN  sodium chloride, , PRN  ondansetron, 4 mg, Q8H PRN   Or  ondansetron, 4 mg, Q6H PRN  polyethylene glycol, 17 g, Daily PRN  acetaminophen, 650 mg, Q6H PRN   Or  acetaminophen, 650 mg, Q6H PRN  glucose, 4 tablet, PRN  dextrose bolus, 125 mL, PRN   Or  dextrose bolus, 250 mL, PRN  glucagon (rDNA), 1 mg, PRN  dextrose, , Continuous PRN      ALLERGIES:  He No Known Allergies    REVIEW OF SYSTEMS   Pertinent ROS noted in HPI    PHYSICAL EXAM     Vitals:    08/17/22 0417 08/17/22 0516 08/17/22 0736 08/17/22 0739   BP: 119/65  127/63    Pulse: 58  59    Resp: 14  16    Temp: 98.6 °F (37 °C)   99 °F (37.2 °C)   TempSrc: Oral  Oral    SpO2: 98%  99%    Weight:  249 lb 1.9 oz (113 kg)     Height:           I/O last 3 completed shifts: In: 840 [P.O.:840]  Out: 1200 [Urine:1200]      Physical Exam:  General appearance: alert, cooperative, no distress, appears stated age  Eyes: Anicteric  Head: Normocephalic, without obvious abnormality  Lungs: clear to auscultation bilaterally, Normal Effort  Heart: regular rate and rhythm, normal S1 and S2, no murmurs or rubs  Abdomen: soft, non-distended, non-tender. Bowel sounds normal.   Extremities: BLE ulcers  Skin: warm and dry, no jaundice  Neuro: Grossly intact, A&OX3      LABS AND IMAGING   Laboratory   Recent Labs     08/15/22  0756 08/16/22  0844 08/17/22  0613   WBC 14.0* 7.2 6.3   HGB 8.5* 8.1* 8.1*   HCT 27.0* 24.8* 24.6*   MCV 74.1* 73.0* 72.3*    205 185     Recent Labs     08/15/22  0756 08/16/22  0844 08/17/22  0613    138 138   K 5.0 4.2 4.2    104 107   CO2 22 21 22   BUN 22* 16 15   CREATININE 0.9 1.1 0.9     Recent Labs     08/15/22  0756   AST 8*   ALT <5*   BILITOT 0.5   ALKPHOS 81     Recent Labs     08/15/22  0756   LIPASE 9.0*     No results for input(s): PROTIME, INR in the last 72 hours. Imaging  MRI ABDOMEN W WO CONTRAST MRCP   Final Result   Poorly defined enhancing nodules in the liver suspicious for metastasis in   the absence of clinical signs of infection. .  There is fatty infiltration of   the liver      Suspected stone in the pancreatic duct with pancreatic ductal dilatation. Scattered side-branch IPMNs are suspected within the pancreas. CT CHEST ABDOMEN PELVIS W CONTRAST   Final Result   1. No acute pulmonary process. 2. Multiple indeterminate hepatic lesions concerning for metastatic disease   of unknown primary. 3. Chronic stable choledocholithiasis.    4. Nonobstructing left IPMNs.  There was no obvious CBD stone or biliary ductal dilation. IMPRESSION:  Liver lesions. Poorly defined on MRI. Concern for metastatic diease. Recommend triphasic CT liver to further characterize. Will also send cancer markers (AFP, Ca 19-9, CEA)  Chronic pancreatic duct stone. Asymptomatic. Present since 2014. Underwent ERCP x2. The initial ERCP was unsuccessful at removing the stone while utilizing a basket, balloon and stent. Repeat ERCP with pancreatoscopy and lithotripsy partially fragmented the stone. Chronic idiopathic pancreatitis. Asymptomatic. No diarrhea to suggest pancreatic insufficiency. Side Branch IPMNs. RECOMMENDATIONS:    Will order Triphasic CT Liver  Check AFP, CEA, CA 19-9      If you have any questions or need any further information, please feel free to contact our consult team.  Thank you for allowing us to participate in the care of Domonique Orozco. The note was completed using Dragon voice recognition transcription. Every effort was made to ensure accuracy; however, inadvertent transcription errors may be present despite my best efforts to edit errors.       Artemio Poon PA-C

## 2022-08-17 NOTE — PROGRESS NOTES
Hospitalist Progress Note      PCP: Andrew Narayanan MD    Date of Admission: 8/15/2022    Chief Complaint:  Generalized weakness, chills    Hospital Course:    74 y.o. male who presents to [facility] with generalized weakness and chills. Patient with a past medical history of diabetes mellitus on insulin, history of chronic pancreatitis, peripheral vascular disease, chronic neuropathy. With generalized weakness and chills, also reported some urinary changes. -Was found to have multiple liver lesions, GI consulted. Subjective:   No new complaints.   No further spikes of fever in the last 24 hours    Medications:  Reviewed    Infusion Medications    sodium chloride 10 mL/hr at 08/15/22 1804    dextrose       Scheduled Medications    sodium hypochlorite   Irrigation Daily    aspirin  81 mg Oral Daily    atorvastatin  20 mg Oral Nightly    insulin glargine  10 Units SubCUTAneous Nightly    spironolactone  25 mg Oral Daily    tamsulosin  0.4 mg Oral Nightly    sodium chloride flush  5-40 mL IntraVENous 2 times per day    enoxaparin  30 mg SubCUTAneous BID    insulin lispro  0-8 Units SubCUTAneous TID WC    insulin lispro  0-4 Units SubCUTAneous Nightly    piperacillin-tazobactam  3,375 mg IntraVENous Q8H    lisinopril  20 mg Oral Daily    azithromycin  500 mg IntraVENous Q24H     PRN Meds: sodium chloride flush, sodium chloride, ondansetron **OR** ondansetron, polyethylene glycol, acetaminophen **OR** acetaminophen, glucose, dextrose bolus **OR** dextrose bolus, glucagon (rDNA), dextrose      Intake/Output Summary (Last 24 hours) at 8/17/2022 1420  Last data filed at 8/17/2022 0457  Gross per 24 hour   Intake --   Output 1200 ml   Net -1200 ml         Physical Exam Performed:    /63   Pulse 59   Temp 99 °F (37.2 °C)   Resp 16   Ht 6' 2\" (1.88 m)   Wt 249 lb 1.9 oz (113 kg)   SpO2 99%   BMI 31.99 kg/m²     General appearance: No apparent distress, appears stated age and cooperative. HEENT: Pupils equal, round, and reactive to light. Conjunctivae/corneas clear. Neck: Supple, with full range of motion. No jugular venous distention. Trachea midline. Respiratory:  Normal respiratory effort. Clear to auscultation, bilaterally without Rales/Wheezes/Rhonchi. Cardiovascular: Regular rate and rhythm with normal S1/S2 without murmurs, rubs or gallops. Abdomen: Soft, non-tender, non-distended with normal bowel sounds. Musculoskeletal: No clubbing, cyanosis or edema bilaterally. Full range of motion without deformity. Skin: Skin color, texture, turgor normal.  No rashes or lesions. Neurologic:  Neurovascularly intact without any focal sensory/motor deficits. Cranial nerves: II-XII intact, grossly non-focal.  Psychiatric: Alert and oriented, thought content appropriate, normal insight  Capillary Refill: Brisk,3 seconds, normal   Peripheral Pulses: +2 palpable, equal bilaterally       Labs:   Recent Labs     08/15/22  0756 08/16/22  0844 08/17/22  0613   WBC 14.0* 7.2 6.3   HGB 8.5* 8.1* 8.1*   HCT 27.0* 24.8* 24.6*    205 185       Recent Labs     08/15/22  0756 08/16/22  0844 08/17/22  0613    138 138   K 5.0 4.2 4.2    104 107   CO2 22 21 22   BUN 22* 16 15   CREATININE 0.9 1.1 0.9   CALCIUM 9.3 8.5 8.4       Recent Labs     08/15/22  0756   AST 8*   ALT <5*   BILITOT 0.5   ALKPHOS 81       No results for input(s): INR in the last 72 hours. No results for input(s): Jenny Dross in the last 72 hours.     Urinalysis:      Lab Results   Component Value Date/Time    NITRU Negative 08/15/2022 11:16 AM    WBCUA 1 08/15/2022 11:16 AM    BACTERIA None Seen 08/15/2022 11:16 AM    RBCUA 2 08/15/2022 11:16 AM    BLOODU TRACE 08/15/2022 11:16 AM    SPECGRAV 1.035 08/15/2022 11:16 AM    GLUCOSEU Negative 08/15/2022 11:16 AM       Radiology:  MRI ABDOMEN W WO CONTRAST MRCP   Final Result   Poorly defined enhancing nodules in the liver suspicious for metastasis in   the vomiting and cough. Upon presentation patient was found to be febrile up to 38.2, blood work significant of leukocytosis up to 14 (neutrophilic). Urinalysis unremarkable. X-ray with bilateral opacity and a small pleural effusion. Procalcitonin 0.14  Plan  -continue  Zosyn and azithromycin  -Follow blood culture  -Send sputum culture ( NTD)         CT finding of multiple hepatic lesions. Incidentally found multiple indeterminate hepatic lesions concerning for metastatic disease. MRCP with a pancreatic duct (normal lipase bilirubin and liver function). Will consult GI for guidance over further investigation     Diabetes mellitus. Change glargine to 10 units from 20 at home  Continue sliding scale  Blood sugar well controlled     Hypertension. Continue home medications     BPH  Continue tamsulosin. Chronic venous insufficiency ulcers. Consult wound care    DVT Prophylaxis: lovenox  Diet: ADULT DIET; Regular  Code Status: DNR-CCA    PT/OT Eval Status: following.      Dispo - pending clinical improvement      Georgette Engle MD

## 2022-08-17 NOTE — PROGRESS NOTES
Pt not feeling his best over night. Pt states that he felt very fatigue and groggy. Blood glucose and vitals obtained and stable. Oral temperature read 99.9. Last recorded temperature read 98. 6. Pt is resting in bed, at the lowest level. Bed alarm activated and call light in reach.   Donte Garay RN

## 2022-08-17 NOTE — CARE COORDINATION
08/17/22 1232   Wound Length (cm) 3 cm 08/17/22 1232   Wound Width (cm) 2 cm 08/17/22 1232   Wound Depth (cm) 0.3 cm 08/17/22 1232   Wound Surface Area (cm^2) 6 cm^2 08/17/22 1232   Change in Wound Size % (l*w) 0 08/17/22 1232   Wound Volume (cm^3) 1.8 cm^3 08/17/22 1232   Wound Healing % 0 08/17/22 1232   Wound Assessment Pink/red 08/17/22 1232   Drainage Amount Scant 08/17/22 1232   Drainage Description Serosanguinous 08/17/22 1232   Odor Mild 08/17/22 1232   Tiffanie-wound Assessment Intact;Dry/flaky 08/17/22 1232   Margins Defined edges 08/17/22 1232   Number of days: 2     L FOOT    R FOOT  Pt awake and alert in bed. Pt has chronic non healing foot wounds to R and L plantar foot with charcot foot. L plantar foot wound with slightly mild odor, wound cleansed. Aquacel ag (dampened), placed to R and L plantar foot wounds, with guaze, abd and kerlix. Dakins for cleansing. Pt R foot wound with calloused edges. Pt states his podiatrist from home is slowly debriding this. Did not want to see podiatrist inpatient. Periwound skin dry and flaky. Plan:   Plan of Care:   R and L foot wounds:  Bilateral foot ulcerations  Clean areas with Dakins or Vashe  Apply silver alginate dressings every other day to the wounds  Pad with ABD and gauze and secure with rolled gauze  Will not continue to follow. Specialty Bed Required : No   [] Low Air Loss   [] Pressure Redistribution  [] Fluid Immersion  [] Bariatric  [] Total Pressure Relief  [] Other:     Current Diet: ADULT DIET;  Regular  Dietician consult:  No    Discharge Plan:  Placement for patient upon discharge: home with support   Patient appropriate for Outpatient [address]: No    Referrals:  []   [] [address] [place] [employer] [facility]  [] Supplies  [] Other    Patient/Caregiver Teaching:  Level of patient/caregiver understanding able to:   [] Indicates understanding       [] Needs reinforcement  [] Unsuccessful      [x] Verbal Understanding  [] Demonstrated understanding       [] No evidence of learning  [] Refused teaching         [] N/A       Electronically signed by Pati Ross RN, CWOCN on 8/17/2022 at 12:36 PM

## 2022-08-17 NOTE — PROGRESS NOTES
pt reports he never leaves the house and has a HHA 1x/week to get him groceries; he gets MOW, does his laundry by hand and takes only a sponge bath on his own. The pt has a wh walker but normally uses a QC for walking. PMHx: DM, lymphedema, neuropathy, pancreatitis, PVD, L foot sx, choly    Today, the pt required CGA-Nichole for transfers but able to ambulate with RW and SBA. However, pt would benefit from continued skilled inpatient PT at a moderate intensity upon d/c prior to returning home. Anticipate that the pt will decline this option and will insist on going home; IF home, the pt would benefit from increased home care including a HHA for bathing and home PT. Today, the pt is limited by his weakness and did need the walker today for safe ambulation. The pt was educated that IF home he would need to use his waler instead of the cane. Will con' t to follow. Treatment Diagnosis: difficulty walking  Therapy Prognosis: Fair  Requires PT Follow-Up: Yes  Activity Tolerance  Activity Tolerance: Patient limited by fatigue;Patient limited by endurance     Plan   Plan  Plan: 3-5 times per week  Current Treatment Recommendations: Strengthening, ROM, Balance training, Functional mobility training, Transfer training, Gait training, Therapeutic activities, Patient/Caregiver education & training, Safety education & training, Equipment evaluation, education, & procurement  Safety Devices  Type of Devices: Call light within reach, Gait belt, Bed alarm in place, Left in bed  Restraints  Restraints Initially in Place: No     Restrictions  Restrictions/Precautions  Restrictions/Precautions: Fall Risk     Subjective   General  Chart Reviewed: Yes  Patient assessed for rehabilitation services?: Yes  Additional Pertinent Hx: Edgar Lamas MD H&P on 8-: The pt is a 73 yo male who presented to the ED with generalized weakness, chills, nonproductive cough, vomiting and difficluty urinating.  XR: bilateral opacity and a small pleural effusion. CT scan shows multiple indeterminate hepatic lesions concerning for metastatic disease. PMHx: DM, lymphedema, neuropathy, pancreatitis, PVD, L foot sx, choly  Response To Previous Treatment: Patient with no complaints from previous session. Family / Caregiver Present: No  Referring Practitioner: Deysi Rausch MD  Referral Date : 08/15/22  Diagnosis: sepsis, CAP, incidental finding of multiple hepatic lesions  Follows Commands: Within Functional Limits  Subjective  Subjective: Pt agreeable to PT treatment with encouragement.   In bed upon arrival.         Social/Functional History  Social/Functional History  Lives With: Alone  Type of Home: House  Home Layout: Laundry in basement, Able to Live on Main level with bedroom/bathroom, Two level, Performs ADL's on one level  Home Access: Stairs to enter with rails  Entrance Stairs - Number of Steps: 5+6 step to enter (1st set with one rail and the 2nd set with B rails); but he doesn't go out of the home  Bathroom Shower/Tub: Tub/Shower unit (sponge bathes)  Bathroom Toilet: Handicap height  Bathroom Equipment: 3-in-1 commode  Bathroom Accessibility: Not accessible  Home Equipment: Cane, quad, Walker, rolling, Walker, standard  Has the patient had two or more falls in the past year or any fall with injury in the past year?: Yes  Receives Help From: Home health (Kat Caldera comes 3x/week for dressing changes; HHA 1x/week for groceries)  ADL Assistance: Independent  Homemaking Assistance:  (HHA gets groceries; the pt does his wash by hand; uses microwave for meals and gets MOW; limited cleaning)  Ambulation Assistance: Independent (uses cane in home and occassionally the walker when feeling weak)  Transfer Assistance: Independent  Active : No  Patient's  Info: the pt doesn't leave the house; gets visting physicians  Occupation: Retired  Type of Occupation: clerical work  Leisure & Hobbies: reading, TV, sleeping  IADL Comments: sleeps on the couch mostly             Objective         Bed mobility  Supine to Sit: Minimal assistance (pt pulled up on PT's arm; HOB elevated)  Sit to Supine: Stand by assistance (HOB flat)  Scooting: Stand by assistance (with bed rail, scooting up in bed)    Transfers  Sit to Stand: Contact guard assistance;Minimal Assistance  Stand to sit: Contact guard assistance (decreased eccentric control)  Comment: cues for hand placement    Ambulation  Surface: level tile  Device: Rolling Walker  Assistance: Stand by assistance  Quality of Gait: slowed pace, wide ASTRID, shortened steps B with limited step clearance; no LOB; frequent short standing rest breaks; forward flexed posture  Distance: approx 90' with multiple turns  More Ambulation?: No  Stairs/Curb  Stairs?: No     Balance  Posture: Fair  Sitting - Static: Good  Sitting - Dynamic: Good;-  Standing - Static: Fair  Standing - Dynamic: Fair  Exercise Treatment: seated exercise: x10 LAQs monique with decreased knee ext monique                                                      AM-PAC Score  AM-PAC Inpatient Mobility Raw Score : 17 (08/17/22 1439)  AM-PAC Inpatient T-Scale Score : 42.13 (08/17/22 1439)  Mobility Inpatient CMS 0-100% Score: 50.57 (08/17/22 1439)  Mobility Inpatient CMS G-Code Modifier : CK (08/17/22 1439)             Goals  Short Term Goals  Time Frame for Short term goals: upon d/c (goals ongoing as of 8/17)  Short term goal 1: Transfers sit <> stand with SBA/Sup consistently. Short term goal 2: Ambulate with wh walker 75 feet with SBA.   Patient Goals   Patient goals : to go home       Education  Patient Education  Education Given To: Patient  Education Provided: Role of Therapy;Plan of Care;Equipment;Transfer Training  Education Method: Demonstration;Verbal  Barriers to Learning: Cognition  Education Outcome: Verbalized understanding;Continued education needed      Therapy Time   Individual Concurrent Group Co-treatment   Time In 1358         Time Out 1440 Minutes 42         Timed Code Treatment Minutes: 42 Minutes       Electronically signed by Pedro Ordonez, PT [id number] on 8/17/2022 at 2:42 PM

## 2022-08-18 ENCOUNTER — APPOINTMENT (OUTPATIENT)
Dept: CT IMAGING | Age: 67
DRG: 871 | End: 2022-08-18
Payer: MEDICARE

## 2022-08-18 LAB
ANION GAP SERPL CALCULATED.3IONS-SCNC: 7 MMOL/L (ref 3–16)
BASOPHILS ABSOLUTE: 0 K/UL (ref 0–0.2)
BASOPHILS RELATIVE PERCENT: 0.4 %
BUN BLDV-MCNC: 14 MG/DL (ref 7–20)
CALCIUM SERPL-MCNC: 8.3 MG/DL (ref 8.3–10.6)
CHLORIDE BLD-SCNC: 105 MMOL/L (ref 99–110)
CO2: 22 MMOL/L (ref 21–32)
CREAT SERPL-MCNC: 0.9 MG/DL (ref 0.8–1.3)
EOSINOPHILS ABSOLUTE: 0.1 K/UL (ref 0–0.6)
EOSINOPHILS RELATIVE PERCENT: 2.1 %
GFR AFRICAN AMERICAN: >60
GFR NON-AFRICAN AMERICAN: >60
GLUCOSE BLD-MCNC: 102 MG/DL (ref 70–99)
GLUCOSE BLD-MCNC: 151 MG/DL (ref 70–99)
GLUCOSE BLD-MCNC: 153 MG/DL (ref 70–99)
GLUCOSE BLD-MCNC: 164 MG/DL (ref 70–99)
GLUCOSE BLD-MCNC: 191 MG/DL (ref 70–99)
HCT VFR BLD CALC: 25.3 % (ref 40.5–52.5)
HEMOGLOBIN: 8 G/DL (ref 13.5–17.5)
LYMPHOCYTES ABSOLUTE: 1.2 K/UL (ref 1–5.1)
LYMPHOCYTES RELATIVE PERCENT: 22.1 %
MCH RBC QN AUTO: 23.3 PG (ref 26–34)
MCHC RBC AUTO-ENTMCNC: 31.7 G/DL (ref 31–36)
MCV RBC AUTO: 73.6 FL (ref 80–100)
MONOCYTES ABSOLUTE: 0.6 K/UL (ref 0–1.3)
MONOCYTES RELATIVE PERCENT: 10.2 %
NEUTROPHILS ABSOLUTE: 3.6 K/UL (ref 1.7–7.7)
NEUTROPHILS RELATIVE PERCENT: 65.2 %
PDW BLD-RTO: 17.7 % (ref 12.4–15.4)
PERFORMED ON: ABNORMAL
PLATELET # BLD: 208 K/UL (ref 135–450)
PMV BLD AUTO: 7.4 FL (ref 5–10.5)
POTASSIUM SERPL-SCNC: 4.1 MMOL/L (ref 3.5–5.1)
RBC # BLD: 3.44 M/UL (ref 4.2–5.9)
SODIUM BLD-SCNC: 134 MMOL/L (ref 136–145)
WBC # BLD: 5.6 K/UL (ref 4–11)

## 2022-08-18 PROCEDURE — 97110 THERAPEUTIC EXERCISES: CPT

## 2022-08-18 PROCEDURE — 97530 THERAPEUTIC ACTIVITIES: CPT

## 2022-08-18 PROCEDURE — 94760 N-INVAS EAR/PLS OXIMETRY 1: CPT

## 2022-08-18 PROCEDURE — 6360000004 HC RX CONTRAST MEDICATION: Performed by: INTERNAL MEDICINE

## 2022-08-18 PROCEDURE — 6360000002 HC RX W HCPCS: Performed by: STUDENT IN AN ORGANIZED HEALTH CARE EDUCATION/TRAINING PROGRAM

## 2022-08-18 PROCEDURE — 80048 BASIC METABOLIC PNL TOTAL CA: CPT

## 2022-08-18 PROCEDURE — 36415 COLL VENOUS BLD VENIPUNCTURE: CPT

## 2022-08-18 PROCEDURE — 6370000000 HC RX 637 (ALT 250 FOR IP): Performed by: STUDENT IN AN ORGANIZED HEALTH CARE EDUCATION/TRAINING PROGRAM

## 2022-08-18 PROCEDURE — 85025 COMPLETE CBC W/AUTO DIFF WBC: CPT

## 2022-08-18 PROCEDURE — 1200000000 HC SEMI PRIVATE

## 2022-08-18 PROCEDURE — 74170 CT ABD WO CNTRST FLWD CNTRST: CPT

## 2022-08-18 PROCEDURE — 2580000003 HC RX 258: Performed by: STUDENT IN AN ORGANIZED HEALTH CARE EDUCATION/TRAINING PROGRAM

## 2022-08-18 RX ADMIN — IOPAMIDOL 75 ML: 755 INJECTION, SOLUTION INTRAVENOUS at 12:12

## 2022-08-18 RX ADMIN — ENOXAPARIN SODIUM 30 MG: 100 INJECTION SUBCUTANEOUS at 21:39

## 2022-08-18 RX ADMIN — PIPERACILLIN AND TAZOBACTAM 3375 MG: 3; .375 INJECTION, POWDER, FOR SOLUTION INTRAVENOUS at 06:53

## 2022-08-18 RX ADMIN — SPIRONOLACTONE 25 MG: 25 TABLET ORAL at 09:40

## 2022-08-18 RX ADMIN — ENOXAPARIN SODIUM 30 MG: 100 INJECTION SUBCUTANEOUS at 09:42

## 2022-08-18 RX ADMIN — TAMSULOSIN HYDROCHLORIDE 0.4 MG: 0.4 CAPSULE ORAL at 21:38

## 2022-08-18 RX ADMIN — LISINOPRIL 20 MG: 20 TABLET ORAL at 09:40

## 2022-08-18 RX ADMIN — ASPIRIN 81 MG: 81 TABLET, COATED ORAL at 09:40

## 2022-08-18 RX ADMIN — INSULIN GLARGINE 10 UNITS: 100 INJECTION, SOLUTION SUBCUTANEOUS at 21:38

## 2022-08-18 RX ADMIN — PIPERACILLIN AND TAZOBACTAM 3375 MG: 3; .375 INJECTION, POWDER, FOR SOLUTION INTRAVENOUS at 17:47

## 2022-08-18 RX ADMIN — ATORVASTATIN CALCIUM 20 MG: 20 TABLET, FILM COATED ORAL at 21:38

## 2022-08-18 RX ADMIN — Medication 10 ML: at 21:39

## 2022-08-18 ASSESSMENT — PAIN SCALES - GENERAL
PAINLEVEL_OUTOF10: 0
PAINLEVEL_OUTOF10: 0

## 2022-08-18 ASSESSMENT — PAIN SCALES - WONG BAKER: WONGBAKER_NUMERICALRESPONSE: 0

## 2022-08-18 NOTE — PROGRESS NOTES
with full range of motion. No jugular venous distention. Trachea midline. Respiratory:  Normal respiratory effort. Clear to auscultation, bilaterally without Rales/Wheezes/Rhonchi. Cardiovascular: Regular rate and rhythm with normal S1/S2 without murmurs, rubs or gallops. Abdomen: Soft, non-tender, non-distended with normal bowel sounds. Musculoskeletal: No clubbing, cyanosis or edema bilaterally. Full range of motion without deformity. Skin: Skin color, texture, turgor normal.  No rashes or lesions. Neurologic:  Neurovascularly intact without any focal sensory/motor deficits. Cranial nerves: II-XII intact, grossly non-focal.  Psychiatric: Alert and oriented, thought content appropriate, normal insight  Capillary Refill: Brisk,3 seconds, normal   Peripheral Pulses: +2 palpable, equal bilaterally       Labs:   Recent Labs     08/16/22  0844 08/17/22  0613 08/18/22  0552   WBC 7.2 6.3 5.6   HGB 8.1* 8.1* 8.0*   HCT 24.8* 24.6* 25.3*    185 208       Recent Labs     08/16/22  0844 08/17/22  0613 08/18/22  0552    138 134*   K 4.2 4.2 4.1    107 105   CO2 21 22 22   BUN 16 15 14   CREATININE 1.1 0.9 0.9   CALCIUM 8.5 8.4 8.3       No results for input(s): AST, ALT, BILIDIR, BILITOT, ALKPHOS in the last 72 hours. No results for input(s): INR in the last 72 hours. No results for input(s): Diane Kras in the last 72 hours. Urinalysis:      Lab Results   Component Value Date/Time    NITRU Negative 08/15/2022 11:16 AM    WBCUA 1 08/15/2022 11:16 AM    BACTERIA None Seen 08/15/2022 11:16 AM    RBCUA 2 08/15/2022 11:16 AM    BLOODU TRACE 08/15/2022 11:16 AM    SPECGRAV 1.035 08/15/2022 11:16 AM    GLUCOSEU Negative 08/15/2022 11:16 AM       Radiology:  MRI ABDOMEN W WO CONTRAST MRCP   Final Result   Poorly defined enhancing nodules in the liver suspicious for metastasis in   the absence of clinical signs of infection. .  There is fatty infiltration of   the liver      Suspected stone in the pancreatic duct with pancreatic ductal dilatation. Scattered side-branch IPMNs are suspected within the pancreas. CT CHEST ABDOMEN PELVIS W CONTRAST   Final Result   1. No acute pulmonary process. 2. Multiple indeterminate hepatic lesions concerning for metastatic disease   of unknown primary. 3. Chronic stable choledocholithiasis. 4. Nonobstructing left nephrolithiasis. 5. Chronic pancreatitis. XR CHEST PORTABLE   Final Result   Diffuse bilateral pulmonary opacity can reflect pulmonary edema or in the   appropriate clinical setting, atypical infection. Small right pleural   effusion. XR KNEE LEFT (1-2 VIEWS)   Final Result   No acute osseous abnormality right or left knee      Degenerative change, greatest medially and in the patellofemoral joints         XR KNEE RIGHT (1-2 VIEWS)   Final Result   No acute osseous abnormality right or left knee      Degenerative change, greatest medially and in the patellofemoral joints         XR FOOT LEFT (MIN 3 VIEWS)   Final Result   Soft tissue swelling and plantar soft tissue ulceration. Nonspecific cortical thickening is seen of the distal fibula and tibia, for   which infection is among differential considerations. Dedicated tibia fibula   radiograph could be performed for further assessment. XR FOOT RIGHT (MIN 3 VIEWS)   Final Result   Limited evaluation of the digits secondary to flexion. Soft tissue swelling   is otherwise noted without focal erosion. If there is strong clinical   concern for osteomyelitis, MR could be considered. CT ABDOMEN W WO CONTRAST Additional Contrast? None    (Results Pending)           Assessment/Plan:    Active Hospital Problems    Diagnosis     Sepsis ([facility] [address] [A41.9]      Sepsis. Community-acquired pneumonia  Patient presented to emergency with a short onset symptoms of nausea vomiting and cough.   Upon presentation patient was found to be febrile up to 38.2, blood work significant of leukocytosis up to 14 (neutrophilic). Urinalysis unremarkable. X-ray with bilateral opacity and a small pleural effusion. Procalcitonin 0.14  Plan  -continue  Zosyn and azithromycin  -Follow blood culture ( NTD)   -Send sputum culture ( NTD)         CT finding of multiple hepatic lesions. Incidentally found multiple indeterminate hepatic lesions concerning for metastatic disease. MRCP with a pancreatic duct (normal lipase bilirubin and liver function). Appreciate GI input, triphasic CT pending  Tumor marker sent  Patient may need EGD/colonoscopy if triphasic CT is concerning for possible malignancy     Diabetes mellitus. Change glargine to 10 units from 20 at home  Continue sliding scale  Blood sugar well controlled     Hypertension. Continue home medications     BPH  Continue tamsulosin. Chronic venous insufficiency ulcers. Consult wound care    DVT Prophylaxis: lovenox  Diet: ADULT DIET; Regular  Code Status: DNR-CCA    PT/OT Eval Status: following.      Dispo - pending clinical improvement      Isidoro Roberts MD

## 2022-08-18 NOTE — PROGRESS NOTES
INPATIENT PROGRESS NOTE        IDENTIFYING DATA/REASON FOR CONSULTATION   PATIENT:  Aidan Thomas  MRN:  [id number]  ADMIT DATE: 8/15/2022  TIME OF EVALUATION: 2022 8:26 AM  HOSPITAL STAY:   LOS: 3 days   CONSULTING PHYSICIAN: Fide Flower MD   REASON FOR CONSULTATION: liver lesions, pancreatic duct stone    Subjective:    Patient seen in follow up. He has no complaints this morning    MEDICATIONS   SCHEDULED:  sodium hypochlorite, , Daily  aspirin, 81 mg, Daily  atorvastatin, 20 mg, Nightly  insulin glargine, 10 Units, Nightly  spironolactone, 25 mg, Daily  tamsulosin, 0.4 mg, Nightly  sodium chloride flush, 5-40 mL, 2 times per day  enoxaparin, 30 mg, BID  insulin lispro, 0-8 Units, TID WC  insulin lispro, 0-4 Units, Nightly  piperacillin-tazobactam, 3,375 mg, Q8H  lisinopril, 20 mg, Daily      FLUIDS/DRIPS:     sodium chloride 10 mL/hr at 08/15/22 1804    dextrose       PRNs: sodium chloride flush, 5-40 mL, PRN  sodium chloride, , PRN  ondansetron, 4 mg, Q8H PRN   Or  ondansetron, 4 mg, Q6H PRN  polyethylene glycol, 17 g, Daily PRN  acetaminophen, 650 mg, Q6H PRN   Or  acetaminophen, 650 mg, Q6H PRN  glucose, 4 tablet, PRN  dextrose bolus, 125 mL, PRN   Or  dextrose bolus, 250 mL, PRN  glucagon (rDNA), 1 mg, PRN  dextrose, , Continuous PRN      ALLERGIES:  No Known Allergies      PHYSICAL EXAM   VITALS:  /72   Pulse 56   Temp 98.3 °F (36.8 °C) (Oral)   Resp 18   Ht 6' 2\" (1.88 m)   Wt 249 lb 12.5 oz (113.3 kg)   SpO2 97%   BMI 32.07 kg/m²   TEMPERATURE:  Current - Temp: 98.3 °F (36.8 °C);  Max - Temp  Av.1 °F (37.3 °C)  Min: 98.3 °F (36.8 °C)  Max: 99.6 °F (37.6 °C)    Physical Exam:  General appearance: alert, cooperative, no distress, appears stated age  Eyes: Anicteric  Head: Normocephalic, without obvious abnormality  Lungs: clear to auscultation bilaterally, Normal Effort  Heart: regular rate and rhythm, normal S1 and S2, no murmurs or rubs  Abdomen: soft, non-distended, non-tender. Bowel sounds normal.   Extremities: atraumatic, no cyanosis or edema  Skin: warm and dry, no jaundice  Neuro: Grossly intact, A&OX3    LABS AND IMAGING   Laboratory   Recent Labs     08/16/22  0844 08/17/22  0613 08/18/22  0552   WBC 7.2 6.3 5.6   HGB 8.1* 8.1* 8.0*   HCT 24.8* 24.6* 25.3*   MCV 73.0* 72.3* 73.6*    185 208     Recent Labs     08/16/22  0844 08/17/22  0613 08/18/22  0552    138 134*   K 4.2 4.2 4.1    107 105   CO2 21 22 22   BUN 16 15 14   CREATININE 1.1 0.9 0.9     No results for input(s): AST, ALT, ALB, BILIDIR, BILITOT, ALKPHOS in the last 72 hours. No results for input(s): LIPASE, AMYLASE in the last 72 hours. No results for input(s): PROTIME, INR in the last 72 hours. Imaging  MRI ABDOMEN W WO CONTRAST MRCP   Final Result   Poorly defined enhancing nodules in the liver suspicious for metastasis in   the absence of clinical signs of infection. .  There is fatty infiltration of   the liver      Suspected stone in the pancreatic duct with pancreatic ductal dilatation. Scattered side-branch IPMNs are suspected within the pancreas. CT CHEST ABDOMEN PELVIS W CONTRAST   Final Result   1. No acute pulmonary process. 2. Multiple indeterminate hepatic lesions concerning for metastatic disease   of unknown primary. 3. Chronic stable choledocholithiasis. 4. Nonobstructing left nephrolithiasis. 5. Chronic pancreatitis. XR CHEST PORTABLE   Final Result   Diffuse bilateral pulmonary opacity can reflect pulmonary edema or in the   appropriate clinical setting, atypical infection. Small right pleural   effusion.          XR KNEE LEFT (1-2 VIEWS)   Final Result   No acute osseous abnormality right or left knee      Degenerative change, greatest medially and in the patellofemoral joints         XR KNEE RIGHT (1-2 VIEWS)   Final Result   No acute osseous abnormality right or left knee      Degenerative change, greatest medially and in the patellofemoral joints         XR FOOT LEFT (MIN 3 VIEWS)   Final Result   Soft tissue swelling and plantar soft tissue ulceration. Nonspecific cortical thickening is seen of the distal fibula and tibia, for   which infection is among differential considerations. Dedicated tibia fibula   radiograph could be performed for further assessment. XR FOOT RIGHT (MIN 3 VIEWS)   Final Result   Limited evaluation of the digits secondary to flexion. Soft tissue swelling   is otherwise noted without focal erosion. If there is strong clinical   concern for osteomyelitis, MR could be considered. CT ABDOMEN W CONTRAST Additional Contrast? None    (Results Pending)       Endoscopy      ASSESSMENT AND RECOMMENDATIONS   Helena Laughlin is a 74 y.o. male with PMH of DM, diabetic neuropathy, HTN, HLD, PVD, bilateral chronic lower limb ulcerations, idiopathic pancreatitis with a known history of pancreatic duct stone who presented on 8/15/2022 with generalized weakness and fever. He was admitted for suspected pneumonia based on CXR. He had a CT chest that showed multiple indeterminate hepatic lesion concerning for metastatic diease, a ?8mm stone within the distal common duct and evidence of chronic pancreatitis. He then had a follow up MRI/MRCP that showed poorly defined liver nodules again concerning for mets, stone in the pancreatic duct with ductal dilatation, and pancreatic atrophy with suspected scattered side-branch IPMNs. There was no obvious CBD stone or biliary ductal dilation. Liver lesions. Poorly defined on MRI. Concern for metastatic diease. Triphasic CT liver ordered to further characterize. If this is metastatic disease, primary unknown. He has never had a colonoscopy. CEA is normal.  CA 19-9 and AFP pending  Chronic pancreatic duct stone. Asymptomatic. Present since 2014. Underwent ERCP x2.   The initial ERCP was unsuccessful at removing the stone while utilizing a basket, balloon and stent. Repeat ERCP with pancreatoscopy and lithotripsy partially fragmented the stone. Chronic idiopathic pancreatitis. Asymptomatic. No diarrhea to suggest pancreatic insufficiency. Side Branch IPMNs. RECOMMENDATIONS:    Follow up on triphasic CT  Follow up on Ca 19-9 and AFP  Diet as tolerated    If you have any questions or need any further information, please feel free to contact us [phone]. Thank you for allowing us to participate in the care of Levon Hernandez. The note was completed using Dragon voice recognition transcription. Every effort was made to ensure accuracy; however, inadvertent transcription errors may be present despite my best efforts to edit errors.     Rox ROSALES

## 2022-08-18 NOTE — PLAN OF CARE
Problem: Discharge Planning  Goal: Discharge to home or other facility with appropriate resources  8/18/2022 1352 by Carlos Nielsen RN  Outcome: Progressing  8/18/2022 0313 by Augie Chester RN  Outcome: Progressing     Problem: Pain  Goal: Verbalizes/displays adequate comfort level or baseline comfort level  8/18/2022 1352 by Carlos Nielsen RN  Outcome: Progressing  8/18/2022 0313 by Augie Chester RN  Outcome: Progressing     Problem: Skin/Tissue Integrity  Goal: Absence of new skin breakdown  Description: 1. Monitor for areas of redness and/or skin breakdown  2. Assess vascular access sites hourly  3. Every 4-6 hours minimum:  Change oxygen saturation probe site  4. Every 4-6 hours:  If on nasal continuous positive airway pressure, respiratory therapy assess nares and determine need for appliance change or resting period.   8/18/2022 1352 by Carlos Nielsen RN  Outcome: Progressing  8/18/2022 0313 by Augie Chester RN  Outcome: Progressing     Problem: Safety - Adult  Goal: Free from fall injury  8/18/2022 1352 by Carlos Nielsen RN  Outcome: Progressing  8/18/2022 0313 by Augie Chester RN  Outcome: Progressing     Problem: ABCDS Injury Assessment  Goal: Absence of physical injury  8/18/2022 1352 by Carlos Nielsen RN  Outcome: Progressing  8/18/2022 0313 by Augie Chester RN  Outcome: Progressing     Problem: Chronic Conditions and Co-morbidities  Goal: Patient's chronic conditions and co-morbidity symptoms are monitored and maintained or improved  8/18/2022 1352 by Carlos Nielsen RN  Outcome: Progressing  8/18/2022 0313 by Augie Chester RN  Outcome: Progressing

## 2022-08-18 NOTE — PROGRESS NOTES
Occupational Therapy  Facility/Department: [facility]  Occupational Therapy Daily Treatment Note    Name: Domonique Orozco  : 1955  MRN: [id number]  Date of Service: 2022    Discharge Recommendations:  Home with nursing aide, Home with Home health OT (pt with AM-PAC score of 17, but refusing SNF placement. Recommend increased HHA services and home OT.)          Patient Diagnosis(es): There were no encounter diagnoses. Past Medical History:  has a past medical history of Diabetes mellitus ([address] Gallstones, Lymphedema, Neuropathy, Pancreatitis, PVD (peripheral vascular disease) ([facility] [address] and Ulcers of both lower legs ([facility] [address] Past Surgical History:  has a past surgical history that includes Foot surgery (Left, ); ERCP (14); and Cholecystectomy, laparoscopic (N/A, 8/10/2021). Assessment   Performance deficits / Impairments: Decreased functional mobility ; Decreased ADL status; Decreased strength;Decreased endurance;Decreased balance;Decreased high-level IADLs;Decreased sensation  Assessment: Pt tolerated treatment well, but needs encouragement to increase activity. Pt relying on use of purewick for toileting and educated on need to complete fxl mobility into BR for toileting as he would at home, but will need reinforcement of education. Pt declined to complete ADLs despite encouragement, appears to have decreased motivation for self-care. Pt completed fxl transfers CGA, fxl mobility with RW and SBA, and participated in therex to improve strength/endurance for ADLs and fxl mobility. Pt refusing SNF placement despite AM-PAC score of 17. Recommend home OT and increased HHA services.   History: see above  REQUIRES OT FOLLOW-UP: Yes  Activity Tolerance  Activity Tolerance: Patient Tolerated treatment well        Plan   Plan  Times per Week: 3-5  Current Treatment Recommendations: Strengthening, Balance training, Functional mobility training, Endurance training, Safety education & training, Self-Care / ADL, Equipment evaluation, education, & procurement     Restrictions  Restrictions/Precautions  Restrictions/Precautions: Fall Risk    Subjective   General  Chart Reviewed: Yes  Patient assessed for rehabilitation services?: Yes  Additional Pertinent Hx: Per Thu Sarmiento MD's H&P 8/15/2022: \"The patient is a 74 y.o. male who presents to [facility] with generalized weakness and chills. Patient with a past medical history of diabetes mellitus on insulin, history of chronic pancreatitis, peripheral vascular disease, chronic neuropathy. Patient stated that his symptom has started early in the morning of admission when he had awoken with severe nonproductive cough, nonbilious vomiting as well as chills, patient also reported difficulty urinating that is new. Symptoms have been present for less than 24 hours, no change in bowel habits, patient denies any diarrhea or constipation, no chest pain, reported some shortness of breath on questioning. Patient lives alone however has home care services for bilateral chronic lower limb ulcerations\"  Family / Caregiver Present: No  Referring Practitioner: Thu Sarmiento MD  Diagnosis: Sepsis, Community-acquired pneumonia, multiple indeterminate hepatic lesions concerning for metastatic disease  Subjective  Subjective: Pt met b/s for OT tx. Pt in bed on arrival, agreeable to participate in therapy with verbal encouragement. Pt with no complaints.      Social/Functional History  Social/Functional History  Lives With: Alone  Type of Home: House  Home Layout: Laundry in basement, Able to Live on Main level with bedroom/bathroom, Two level, Performs ADL's on one level  Home Access: Stairs to enter with rails  Entrance Stairs - Number of Steps: 5+6 step to enter (1st set with one rail and the 2nd set with B rails); but he doesn't go out of the home  Bathroom Shower/Tub: Tub/Shower unit (sponge bathes)  Bathroom Toilet: Handicap height  Bathroom Equipment: 3-in-1 commode  Bathroom Accessibility: Not accessible  Home Equipment: Cane, quad, Walker, rolling, Walker, standard  Has the patient had two or more falls in the past year or any fall with injury in the past year?: Yes  Receives Help From: Home health (Garnette Less comes 3x/week for dressing changes; HHA 1x/week for groceries)  ADL Assistance: Independent  Homemaking Assistance:  (HHA gets groceries; the pt does his wash by hand; uses microwave for meals and gets MOW; limited cleaning)  Ambulation Assistance: Independent (uses cane in home and occassionally the walker when feeling weak)  Transfer Assistance: Independent  Active : No  Patient's  Info: the pt doesn't leave the house; gets visting physicians  Occupation: Retired  Type of Occupation: clerical work  Leisure & Hobbies: reading, TV, sleeping  IADL Comments: sleeps on the couch mostly       Objective     Safety Devices  Type of Devices: Call light within reach;Gait belt;Bed alarm in place; Left in bed  Restraints  Restraints Initially in Place: No    Balance  Sitting: Intact  Standing: With support (SBA at RW)  Gait  Overall Level of Assistance: Stand-by assistance (Pt completed fxl mobility ~40 ft x2 with RW and SBA.)    ADL  Toileting Skilled Clinical Factors: external purewick catheter removed for OOB. Encouraged pt to attempt to sit on commode to void, and pt refused stating he needs a RTS, and prefers to use purewick. When asked why, pt states \"because I'm lazy. \" Pt educated on need to increase activity and OOB to BR for toileting as pt does not have purewick at home. 3-in-1 commode placed over toilet at end of session and pt educated to call for assist to [facility] for toileting. Pt verbalized understanding, but anticipate pt will will need reinforcement. Additional Comments: pt declined to complete ADLs despite encouragement. Anticipate decreased personal hygiene at baseline.     Bed mobility  Supine to Sit: Stand by assistance  Sit to Supine: Stand by assistance  Scooting: Stand by assistance    Transfers  Sit to stand: Contact guard assistance (EOB and recliner >RW, VC's for hand placement)  Stand to sit: Contact guard assistance (decreased eccentric control as pt tends to sit with uncontrolled descent, reports he does this at home)  Transfer Comments: to/from RW, VC's for hand placement    Cognition  Overall Cognitive Status: Exceptions  Safety Judgement: Decreased awareness of need for safety  Problem Solving: Decreased awareness of errors  Insights: Decreased awareness of deficits  Orientation  Overall Orientation Status: Within Functional Limits  Orientation Level: Oriented to person;Oriented to time;Oriented to place;Oriented to situation    Exercise Treatment: seated in recliner pt completed B UE/LE therex to improve strength for ADLs and fxl transfers: chair push-up x10, chest press x10, shoulder abd x10, shoulder protraction/retraction x10, arm circles fowards/backwards x10, modified sit-up x10, leg kicks x10, marching x10    Education Given To: Patient  Education Provided: Role of Therapy;Plan of Care;Transfer Training;ADL Adaptive Strategies  Education Provided Comments: need to increase activity  Barriers to Learning: Other (Comment) (decreased insight and safety judgement)  Education Outcome: Continued education needed                                   AM-PAC Score        AM-[facility] Inpatient Daily Activity Raw Score: 17 (08/18/22 1159)  AM-PAC Inpatient ADL T-Scale Score : 37.26 (08/18/22 1159)  ADL Inpatient CMS 0-100% Score: 50.11 (08/18/22 1159)  ADL Inpatient CMS G-Code Modifier : CK (08/18/22 1159)           Goals  Short Term Goals  Time Frame for Short term goals: Prior to d/c: status goals 8/18: all goals ongoing  Short Term Goal 1: Pt will bathe with supervision using A/E prn. Short Term Goal 2: Pt will dress with supervision using A/E. Short Term Goal 3: Pt will toilet with supervision.   Short Term Goal 4: Pt will complete fxl mobility and fxl transfers to/from ADL surfaces with supervision using LRAD. Short Term Goal 5: Pt will increase activity tolerance to achieve the above goals. Long Term Goals  Time Frame for Long term goals : STGs=LTGs  Patient Goals   Patient goals : to return home.        Therapy Time   Individual Concurrent Group Co-treatment   [facility] [address] [facility] [address] [facility] [address] [facility] [address]         Time Out [address]         [place] [id number]

## 2022-08-19 LAB
AFP: 0.6 UG/L
ANION GAP SERPL CALCULATED.3IONS-SCNC: 13 MMOL/L (ref 3–16)
BASOPHILS ABSOLUTE: 0 K/UL (ref 0–0.2)
BASOPHILS RELATIVE PERCENT: 0.8 %
BUN BLDV-MCNC: 14 MG/DL (ref 7–20)
CA 19-9: 3 U/ML (ref 0–35)
CALCIUM SERPL-MCNC: 9.3 MG/DL (ref 8.3–10.6)
CHLORIDE BLD-SCNC: 105 MMOL/L (ref 99–110)
CO2: 22 MMOL/L (ref 21–32)
CREAT SERPL-MCNC: 1 MG/DL (ref 0.8–1.3)
EOSINOPHILS ABSOLUTE: 0.2 K/UL (ref 0–0.6)
EOSINOPHILS RELATIVE PERCENT: 2.9 %
GFR AFRICAN AMERICAN: >60
GFR NON-AFRICAN AMERICAN: >60
GLUCOSE BLD-MCNC: 140 MG/DL (ref 70–99)
GLUCOSE BLD-MCNC: 143 MG/DL (ref 70–99)
GLUCOSE BLD-MCNC: 143 MG/DL (ref 70–99)
GLUCOSE BLD-MCNC: 179 MG/DL (ref 70–99)
GLUCOSE BLD-MCNC: 201 MG/DL (ref 70–99)
HCT VFR BLD CALC: 25.6 % (ref 40.5–52.5)
HEMOGLOBIN: 8.2 G/DL (ref 13.5–17.5)
LYMPHOCYTES ABSOLUTE: 1.3 K/UL (ref 1–5.1)
LYMPHOCYTES RELATIVE PERCENT: 24.6 %
MCH RBC QN AUTO: 23.5 PG (ref 26–34)
MCHC RBC AUTO-ENTMCNC: 32.1 G/DL (ref 31–36)
MCV RBC AUTO: 73.1 FL (ref 80–100)
MONOCYTES ABSOLUTE: 0.4 K/UL (ref 0–1.3)
MONOCYTES RELATIVE PERCENT: 8.4 %
NEUTROPHILS ABSOLUTE: 3.3 K/UL (ref 1.7–7.7)
NEUTROPHILS RELATIVE PERCENT: 63.3 %
PDW BLD-RTO: 17.9 % (ref 12.4–15.4)
PERFORMED ON: ABNORMAL
PLATELET # BLD: 224 K/UL (ref 135–450)
PMV BLD AUTO: 7.3 FL (ref 5–10.5)
POTASSIUM SERPL-SCNC: 4.4 MMOL/L (ref 3.5–5.1)
RBC # BLD: 3.5 M/UL (ref 4.2–5.9)
SODIUM BLD-SCNC: 140 MMOL/L (ref 136–145)
WBC # BLD: 5.2 K/UL (ref 4–11)

## 2022-08-19 PROCEDURE — 97116 GAIT TRAINING THERAPY: CPT

## 2022-08-19 PROCEDURE — 97530 THERAPEUTIC ACTIVITIES: CPT

## 2022-08-19 PROCEDURE — 6360000002 HC RX W HCPCS: Performed by: STUDENT IN AN ORGANIZED HEALTH CARE EDUCATION/TRAINING PROGRAM

## 2022-08-19 PROCEDURE — 80048 BASIC METABOLIC PNL TOTAL CA: CPT

## 2022-08-19 PROCEDURE — 85025 COMPLETE CBC W/AUTO DIFF WBC: CPT

## 2022-08-19 PROCEDURE — 6370000000 HC RX 637 (ALT 250 FOR IP): Performed by: STUDENT IN AN ORGANIZED HEALTH CARE EDUCATION/TRAINING PROGRAM

## 2022-08-19 PROCEDURE — 2580000003 HC RX 258: Performed by: STUDENT IN AN ORGANIZED HEALTH CARE EDUCATION/TRAINING PROGRAM

## 2022-08-19 PROCEDURE — 1200000000 HC SEMI PRIVATE

## 2022-08-19 PROCEDURE — 94760 N-INVAS EAR/PLS OXIMETRY 1: CPT

## 2022-08-19 PROCEDURE — 36415 COLL VENOUS BLD VENIPUNCTURE: CPT

## 2022-08-19 RX ADMIN — TAMSULOSIN HYDROCHLORIDE 0.4 MG: 0.4 CAPSULE ORAL at 20:35

## 2022-08-19 RX ADMIN — Medication 10 ML: at 20:36

## 2022-08-19 RX ADMIN — INSULIN GLARGINE 10 UNITS: 100 INJECTION, SOLUTION SUBCUTANEOUS at 20:36

## 2022-08-19 RX ADMIN — Medication: at 10:30

## 2022-08-19 RX ADMIN — LISINOPRIL 20 MG: 20 TABLET ORAL at 08:50

## 2022-08-19 RX ADMIN — ATORVASTATIN CALCIUM 20 MG: 20 TABLET, FILM COATED ORAL at 20:36

## 2022-08-19 RX ADMIN — ENOXAPARIN SODIUM 30 MG: 100 INJECTION SUBCUTANEOUS at 20:36

## 2022-08-19 RX ADMIN — ASPIRIN 81 MG: 81 TABLET, COATED ORAL at 08:50

## 2022-08-19 RX ADMIN — PIPERACILLIN AND TAZOBACTAM 3375 MG: 3; .375 INJECTION, POWDER, FOR SOLUTION INTRAVENOUS at 10:29

## 2022-08-19 RX ADMIN — Medication 10 ML: at 10:30

## 2022-08-19 RX ADMIN — ENOXAPARIN SODIUM 30 MG: 100 INJECTION SUBCUTANEOUS at 08:50

## 2022-08-19 RX ADMIN — SPIRONOLACTONE 25 MG: 25 TABLET ORAL at 08:50

## 2022-08-19 RX ADMIN — PIPERACILLIN AND TAZOBACTAM 3375 MG: 3; .375 INJECTION, POWDER, FOR SOLUTION INTRAVENOUS at 02:56

## 2022-08-19 ASSESSMENT — PAIN SCALES - GENERAL
PAINLEVEL_OUTOF10: 0

## 2022-08-19 ASSESSMENT — PAIN SCALES - WONG BAKER
WONGBAKER_NUMERICALRESPONSE: 0
WONGBAKER_NUMERICALRESPONSE: 0

## 2022-08-19 NOTE — PROGRESS NOTES
Hospitalist Progress Note      PCP: Libia Arcos MD    Date of Admission: 8/15/2022    Chief Complaint:  Generalized weakness, chills    Hospital Course:    74 y.o. male who presents to [facility] with generalized weakness and chills. Patient with a past medical history of diabetes mellitus on insulin, history of chronic pancreatitis, peripheral vascular disease, chronic neuropathy. With generalized weakness and chills, also reported some urinary changes. -Was found to have multiple liver lesions, GI consulted. Subjective:   No new complaints. Continues to report improvement. Medications:  Reviewed    Infusion Medications    sodium chloride 10 mL/hr at 08/15/22 1804    dextrose       Scheduled Medications    sodium hypochlorite   Irrigation Daily    aspirin  81 mg Oral Daily    atorvastatin  20 mg Oral Nightly    insulin glargine  10 Units SubCUTAneous Nightly    spironolactone  25 mg Oral Daily    tamsulosin  0.4 mg Oral Nightly    sodium chloride flush  5-40 mL IntraVENous 2 times per day    enoxaparin  30 mg SubCUTAneous BID    insulin lispro  0-8 Units SubCUTAneous TID WC    insulin lispro  0-4 Units SubCUTAneous Nightly    piperacillin-tazobactam  3,375 mg IntraVENous Q8H    lisinopril  20 mg Oral Daily     PRN Meds: sodium chloride flush, sodium chloride, ondansetron **OR** ondansetron, polyethylene glycol, acetaminophen **OR** acetaminophen, glucose, dextrose bolus **OR** dextrose bolus, glucagon (rDNA), dextrose      Intake/Output Summary (Last 24 hours) at 8/19/2022 1626  Last data filed at 8/19/2022 0855  Gross per 24 hour   Intake 240 ml   Output 525 ml   Net -285 ml         Physical Exam Performed:    BP (!) 91/59   Pulse 60   Temp 98.4 °F (36.9 °C) (Oral)   Resp 16   Ht 6' 2\" (1.88 m)   Wt 251 lb 15.8 oz (114.3 kg)   SpO2 99%   BMI 32.35 kg/m²     General appearance: No apparent distress, appears stated age and cooperative.   HEENT: Pupils equal, round, and reactive to light. Conjunctivae/corneas clear. Neck: Supple, with full range of motion. No jugular venous distention. Trachea midline. Respiratory:  Normal respiratory effort. Clear to auscultation, bilaterally without Rales/Wheezes/Rhonchi. Cardiovascular: Regular rate and rhythm with normal S1/S2 without murmurs, rubs or gallops. Abdomen: Soft, non-tender, non-distended with normal bowel sounds. Musculoskeletal: No clubbing, cyanosis or edema bilaterally. Full range of motion without deformity. Skin: Skin color, texture, turgor normal.  No rashes or lesions. Neurologic:  Neurovascularly intact without any focal sensory/motor deficits. Cranial nerves: II-XII intact, grossly non-focal.  Psychiatric: Alert and oriented, thought content appropriate, normal insight  Capillary Refill: Brisk,3 seconds, normal   Peripheral Pulses: +2 palpable, equal bilaterally       Labs:   Recent Labs     08/17/22  0613 08/18/22  0552 08/19/22  0456   WBC 6.3 5.6 5.2   HGB 8.1* 8.0* 8.2*   HCT 24.6* 25.3* 25.6*    208 224       Recent Labs     08/17/22  0613 08/18/22  0552 08/19/22  0456    134* 140   K 4.2 4.1 4.4    105 105   CO2 22 22 22   BUN 15 14 14   CREATININE 0.9 0.9 1.0   CALCIUM 8.4 8.3 9.3       No results for input(s): AST, ALT, BILIDIR, BILITOT, ALKPHOS in the last 72 hours. No results for input(s): INR in the last 72 hours. No results for input(s): Diane Kras in the last 72 hours. Urinalysis:      Lab Results   Component Value Date/Time    NITRU Negative 08/15/2022 11:16 AM    WBCUA 1 08/15/2022 11:16 AM    BACTERIA None Seen 08/15/2022 11:16 AM    RBCUA 2 08/15/2022 11:16 AM    BLOODU TRACE 08/15/2022 11:16 AM    SPECGRAV 1.035 08/15/2022 11:16 AM    GLUCOSEU Negative 08/15/2022 11:16 AM       Radiology:  CT ABDOMEN W WO CONTRAST Additional Contrast? None   Final Result   1. In general the hepatic lesions seen on the recent MRI and CT are not well   visualized.   A single 11 mm ill-defined subtle low-density lesion is seen in   the liver dome. The lesions remain nonspecific with metastatic disease not   excluded. 2. Chronic stone either within the pancreatic duct in the pancreatic head or   adjacent pancreatic parenchyma. Chronic dilatation of the main pancreatic   duct and signs of chronic pancreatitis. 3. Trace bilateral pleural effusions and mild interstitial edema. MRI ABDOMEN W WO CONTRAST MRCP   Final Result   Poorly defined enhancing nodules in the liver suspicious for metastasis in   the absence of clinical signs of infection. .  There is fatty infiltration of   the liver      Suspected stone in the pancreatic duct with pancreatic ductal dilatation. Scattered side-branch IPMNs are suspected within the pancreas. CT CHEST ABDOMEN PELVIS W CONTRAST   Final Result   1. No acute pulmonary process. 2. Multiple indeterminate hepatic lesions concerning for metastatic disease   of unknown primary. 3. Chronic stable choledocholithiasis. 4. Nonobstructing left nephrolithiasis. 5. Chronic pancreatitis. XR CHEST PORTABLE   Final Result   Diffuse bilateral pulmonary opacity can reflect pulmonary edema or in the   appropriate clinical setting, atypical infection. Small right pleural   effusion. XR KNEE LEFT (1-2 VIEWS)   Final Result   No acute osseous abnormality right or left knee      Degenerative change, greatest medially and in the patellofemoral joints         XR KNEE RIGHT (1-2 VIEWS)   Final Result   No acute osseous abnormality right or left knee      Degenerative change, greatest medially and in the patellofemoral joints         XR FOOT LEFT (MIN 3 VIEWS)   Final Result   Soft tissue swelling and plantar soft tissue ulceration. Nonspecific cortical thickening is seen of the distal fibula and tibia, for   which infection is among differential considerations.   Dedicated tibia fibula   radiograph could be performed for further assessment. XR FOOT RIGHT (MIN 3 VIEWS)   Final Result   Limited evaluation of the digits secondary to flexion. Soft tissue swelling   is otherwise noted without focal erosion. If there is strong clinical   concern for osteomyelitis, MR could be considered. Assessment/Plan:    Active Hospital Problems    Diagnosis     Sepsis ([facility] [address] [A41.9]      Sepsis. Community-acquired pneumonia  Patient presented to emergency with a short onset symptoms of nausea vomiting and cough. Upon presentation patient was found to be febrile up to 38.2, blood work significant of leukocytosis up to 14 (neutrophilic). Urinalysis unremarkable. X-ray with bilateral opacity and a small pleural effusion. Procalcitonin 0.14. Patient completed 3 days of azithromycin  Plan  -continue  Zosyn day 5/7  -Follow blood culture ( NTD)   -Send sputum culture ( NTD)         CT finding of multiple hepatic lesions. Incidentally found multiple indeterminate hepatic lesions concerning for metastatic disease. MRCP with a pancreatic duct (normal lipase bilirubin and liver function). Appreciate GI input, triphasic CT pending  Tumor marker (CEA, CA 19-9 unremarkable )  Patient may need EGD/colonoscopy if triphasic CT is concerning for possible malignancy     Diabetes mellitus. Change glargine to 10 units from 20 at home  Continue sliding scale  Blood sugar well controlled     Hypertension. Continue home medications     BPH  Continue tamsulosin. Chronic venous insufficiency ulcers. Consult wound care    DVT Prophylaxis: lovenox  Diet: ADULT DIET; Regular  Code Status: DNR-CCA    PT/OT Eval Status: following.      Dispo - pending clinical improvement      Rocael Daley MD

## 2022-08-19 NOTE — PROGRESS NOTES
Physical Therapy  Facility/Department: [facility]  Physical Therapy treatment note    Name: Ryan Isabel  : 1955  MRN: [id number]  Date of Service: 2022    Discharge Recommendations:  Patient would benefit from continued therapy after discharge (3-5x/wk; if pt refuses recommend home health PT)   PT Equipment Recommendations  Equipment Needed: No  Other: reports he has multiple canes and walkers    Ryan Isabel scored a 17/24 on the AM-PAC short mobility form. Current research shows that an AM-PAC score of 17 or less is typically not associated with a discharge to the patient's home setting. Based on the patient's AM-PAC score and their current functional mobility deficits, it is recommended that the patient have 3-5 sessions per week of Physical Therapy at d/c to increase the patient's independence. Please see assessment section for further patient specific details. If patient discharges prior to next session this note will serve as a discharge summary. Please see below for the latest assessment towards goals. Patient Diagnosis(es): There were no encounter diagnoses. Past Medical History:  has a past medical history of Diabetes mellitus ([address] Gallstones, Lymphedema, Neuropathy, Pancreatitis, PVD (peripheral vascular disease) ([address] and Ulcers of both lower legs ([address] Past Surgical History:  has a past surgical history that includes Foot surgery (Left, ); ERCP (14); and Cholecystectomy, laparoscopic (N/A, 8/10/2021). Assessment   Body Structures, Functions, Activity Limitations Requiring Skilled Therapeutic Intervention: Decreased functional mobility   Assessment: The pt is a 73 yo male who presented to the ED with generalized weakness, chills, nonproductive cough, vomiting and difficluty urinating. XR: bilateral opacity and a small pleural effusion. CT scan shows multiple indeterminate hepatic lesions concerning for metastatic disease.  The pt lives alone on the main floor of a 2 story house. The pt reports he never leaves the house and has a HHA 1x/week to get him groceries; he gets MOW, does his laundry by hand and takes only a sponge bath on his own. The pt has a wh walker but normally uses a QC for walking. PMHx: DM, lymphedema, neuropathy, pancreatitis, PVD, L foot sx, choly    Today, the pt required CGA for transfers and CGA-SBA to ambulate with RW with extra time for all mobility. Pt is refusing SNF placement so recommend home health PT upon d/c. Pt educated on home health PT but is also refusing this service. Will continue to follow while hospitalized. Treatment Diagnosis: difficulty walking  Therapy Prognosis: Fair  Requires PT Follow-Up: Yes  Activity Tolerance  Activity Tolerance: Patient limited by fatigue;Patient limited by endurance     Plan   Plan  Plan: 3-5 times per week  Current Treatment Recommendations: Strengthening, ROM, Balance training, Functional mobility training, Transfer training, Gait training, Therapeutic activities, Patient/Caregiver education & training, Safety education & training, Equipment evaluation, education, & procurement  Safety Devices  Type of Devices: Call light within reach, Gait belt, Bed alarm in place, Left in bed  Restraints  Restraints Initially in Place: No     Restrictions  Restrictions/Precautions  Restrictions/Precautions: Fall Risk  Position Activity Restriction  Other position/activity restrictions: IV     Subjective   General  Chart Reviewed: Yes  Patient assessed for rehabilitation services?: Yes  Additional Pertinent Hx: Per Deysi Rausch MD H&P on 8-: The pt is a 73 yo male who presented to the ED with generalized weakness, chills, nonproductive cough, vomiting and difficluty urinating. XR: bilateral opacity and a small pleural effusion. CT scan shows multiple indeterminate hepatic lesions concerning for metastatic disease.       PMHx: DM, lymphedema, neuropathy, pancreatitis, PVD, L foot sx, choly  Response To Previous Treatment: Patient with no complaints from previous session. Family / Caregiver Present: No  Referring Practitioner: Manolo Fontana MD  Referral Date : 08/15/22  Diagnosis: sepsis, CAP, incidental finding of multiple hepatic lesions  Follows Commands: Within Functional Limits  Subjective  Subjective: Pt agreeable to PT eval and treat. Denies pain.   In bed upon arrival.         Social/Functional History  Social/Functional History  Lives With: Alone  Type of Home: House  Home Layout: Laundry in basement, Able to Live on Main level with bedroom/bathroom, Two level, Performs ADL's on one level  Home Access: Stairs to enter with rails  Entrance Stairs - Number of Steps: 5+6 step to enter (1st set with one rail and the 2nd set with B rails); but he doesn't go out of the home  Bathroom Shower/Tub: Tub/Shower unit (sponge bathes)  Bathroom Toilet: Handicap height  Bathroom Equipment: 3-in-1 commode  Bathroom Accessibility: Not accessible  Home Equipment: Cane, quad, Walker, rolling, Walker, standard  Has the patient had two or more falls in the past year or any fall with injury in the past year?: Yes  Receives Help From: Home health (Darroll Large comes 3x/week for dressing changes; HHA 1x/week for groceries)  ADL Assistance: Independent  Homemaking Assistance:  (HHA gets groceries; the pt does his wash by hand; uses microwave for meals and gets MOW; limited cleaning)  Ambulation Assistance: Independent (uses cane in home and occassionally the walker when feeling weak)  Transfer Assistance: Independent  Active : No  Patient's  Info: the pt doesn't leave the house; gets visting physicians  Occupation: Retired  Type of Occupation: clerical work  Leisure & Hobbies: reading, TV, sleeping  IADL Comments: sleeps on the couch mostly             Objective         Bed mobility  Supine to Sit: Supervision (HOB elevated)  Sit to Supine: Supervision  Scooting: Supervision (used rail)    Transfers  Sit to Stand: Contact guard assistance  Stand to sit: Contact guard assistance (decreased eccentric control)  Comment: extra time    Ambulation  Surface: level tile  Device: Rolling Walker  Assistance: Stand by assistance;Contact guard assistance  Quality of Gait: slowed pace, wide ASTRID, shortened steps B with limited step clearance; no LOB; frequent standing rest breaks; forward flexed posture  Distance: approx 120' with multiple turns  More Ambulation?: No  Stairs/Curb  Stairs?: No     Balance  Posture: Fair  Sitting - Static: Good  Sitting - Dynamic: Good;-  Standing - Static: Fair  Standing - Dynamic: Fair  Exercise Treatment: x5 sit to stand with cues for eccentric control        AM-PAC Score  AM-PAC Inpatient Mobility Raw Score : 17 (08/19/22 1145)  AM-PAC Inpatient T-Scale Score : 42.13 (08/19/22 1145)  Mobility Inpatient CMS 0-100% Score: 50.57 (08/19/22 1145)  Mobility Inpatient CMS G-Code Modifier : CK (08/19/22 1145)             Goals  Short Term Goals  Time Frame for Short term goals: upon d/c (goals ongoing as of 8/19)  Short term goal 1: Transfers sit <> stand with SBA/Sup consistently. Short term goal 2: Ambulate with wh walker 75 feet with SBA.   Patient Goals   Patient goals : to go home       Education  Patient Education  Education Given To: Patient  Education Provided: Role of Therapy;Plan of Care;Equipment;Transfer Training  Education Provided Comments: d/c rec of home health PT  Education Method: Demonstration;Verbal  Barriers to Learning: Cognition  Education Outcome: Verbalized understanding;Continued education needed      Therapy Time   Individual Concurrent Group Co-treatment   Time In 1119         Time Out 1148         Minutes 29         Timed Code Treatment Minutes: 29 Minutes       Electronically signed by Shaista Bustamante on 8/19/2022 at 11:49 AM

## 2022-08-19 NOTE — PROGRESS NOTES
INPATIENT PROGRESS NOTE        IDENTIFYING DATA/REASON FOR CONSULTATION   PATIENT:  Levon Hernandez  MRN:  [id number]  ADMIT DATE: 8/15/2022  TIME OF EVALUATION: 2022 7:55 AM  HOSPITAL STAY:   LOS: 4 days   CONSULTING PHYSICIAN: Rocael Daley MD   REASON FOR CONSULTATION: liver lesions, pancreatic duct stone    Subjective:    Patient seen in follow up. No acute events overnight   Underwent CT liver,     MEDICATIONS   SCHEDULED:  sodium hypochlorite, , Daily  aspirin, 81 mg, Daily  atorvastatin, 20 mg, Nightly  insulin glargine, 10 Units, Nightly  spironolactone, 25 mg, Daily  tamsulosin, 0.4 mg, Nightly  sodium chloride flush, 5-40 mL, 2 times per day  enoxaparin, 30 mg, BID  insulin lispro, 0-8 Units, TID WC  insulin lispro, 0-4 Units, Nightly  piperacillin-tazobactam, 3,375 mg, Q8H  lisinopril, 20 mg, Daily    FLUIDS/DRIPS:     sodium chloride 10 mL/hr at 08/15/22 1804    dextrose       PRNs: sodium chloride flush, 5-40 mL, PRN  sodium chloride, , PRN  ondansetron, 4 mg, Q8H PRN   Or  ondansetron, 4 mg, Q6H PRN  polyethylene glycol, 17 g, Daily PRN  acetaminophen, 650 mg, Q6H PRN   Or  acetaminophen, 650 mg, Q6H PRN  glucose, 4 tablet, PRN  dextrose bolus, 125 mL, PRN   Or  dextrose bolus, 250 mL, PRN  glucagon (rDNA), 1 mg, PRN  dextrose, , Continuous PRN    ALLERGIES:  No Known Allergies      PHYSICAL EXAM   VITALS:  BP (!) 159/70   Pulse 50   Temp 98.2 °F (36.8 °C) (Oral)   Resp 16   Ht 6' 2\" (1.88 m)   Wt 251 lb 15.8 oz (114.3 kg)   SpO2 100%   BMI 32.35 kg/m²   TEMPERATURE:  Current - Temp: 98.2 °F (36.8 °C);  Max - Temp  Av.4 °F (36.9 °C)  Min: 98 °F (36.7 °C)  Max: 98.9 °F (37.2 °C)    Physical Exam:  General appearance: alert, cooperative, no distress, appears stated age  Eyes: Anicteric  Head: Normocephalic, without obvious abnormality  Lungs: clear to auscultation bilaterally, Normal Effort  Heart: regular rate and rhythm, normal S1 and S2, no murmurs or rubs  Abdomen: soft, non-distended, non-tender. Extremities: atraumatic, no cyanosis or edema  Skin: warm and dry, no jaundice  Neuro: Grossly intact, A&OX3    LABS AND IMAGING   Laboratory   Recent Labs     08/17/22 0613 08/18/22  0552 08/19/22  0456   WBC 6.3 5.6 5.2   HGB 8.1* 8.0* 8.2*   HCT 24.6* 25.3* 25.6*   MCV 72.3* 73.6* 73.1*    208 224     Recent Labs     08/17/22  0613 08/18/22  0552 08/19/22  0456    134* 140   K 4.2 4.1 4.4    105 105   CO2 22 22 22   BUN 15 14 14   CREATININE 0.9 0.9 1.0     No results for input(s): AST, ALT, ALB, BILIDIR, BILITOT, ALKPHOS in the last 72 hours. No results for input(s): LIPASE, AMYLASE in the last 72 hours. No results for input(s): PROTIME, INR in the last 72 hours. Imaging  CT ABDOMEN W WO CONTRAST Additional Contrast? None   Final Result   1. In general the hepatic lesions seen on the recent MRI and CT are not well   visualized. A single 11 mm ill-defined subtle low-density lesion is seen in   the liver dome. The lesions remain nonspecific with metastatic disease not   excluded. 2. Chronic stone either within the pancreatic duct in the pancreatic head or   adjacent pancreatic parenchyma. Chronic dilatation of the main pancreatic   duct and signs of chronic pancreatitis. 3. Trace bilateral pleural effusions and mild interstitial edema. MRI ABDOMEN W WO CONTRAST MRCP   Final Result   Poorly defined enhancing nodules in the liver suspicious for metastasis in   the absence of clinical signs of infection. .  There is fatty infiltration of   the liver      Suspected stone in the pancreatic duct with pancreatic ductal dilatation. Scattered side-branch IPMNs are suspected within the pancreas. CT CHEST ABDOMEN PELVIS W CONTRAST   Final Result   1. No acute pulmonary process. 2. Multiple indeterminate hepatic lesions concerning for metastatic disease   of unknown primary. 3. Chronic stable choledocholithiasis.    4. Nonobstructing left nephrolithiasis. 5. Chronic pancreatitis. XR CHEST PORTABLE   Final Result   Diffuse bilateral pulmonary opacity can reflect pulmonary edema or in the   appropriate clinical setting, atypical infection. Small right pleural   effusion. XR KNEE LEFT (1-2 VIEWS)   Final Result   No acute osseous abnormality right or left knee      Degenerative change, greatest medially and in the patellofemoral joints         XR KNEE RIGHT (1-2 VIEWS)   Final Result   No acute osseous abnormality right or left knee      Degenerative change, greatest medially and in the patellofemoral joints         XR FOOT LEFT (MIN 3 VIEWS)   Final Result   Soft tissue swelling and plantar soft tissue ulceration. Nonspecific cortical thickening is seen of the distal fibula and tibia, for   which infection is among differential considerations. Dedicated tibia fibula   radiograph could be performed for further assessment. XR FOOT RIGHT (MIN 3 VIEWS)   Final Result   Limited evaluation of the digits secondary to flexion. Soft tissue swelling   is otherwise noted without focal erosion. If there is strong clinical   concern for osteomyelitis, MR could be considered. Endoscopy      ASSESSMENT AND RECOMMENDATIONS   Caremn Cline is a 74 y.o. male with PMH of DM, diabetic neuropathy, HTN, HLD, PVD, bilateral chronic lower limb ulcerations, idiopathic pancreatitis with a known history of pancreatic duct stone who presented on 8/15/2022 with generalized weakness and fever. He was admitted for suspected pneumonia based on CXR. He had a CT chest that showed multiple indeterminate hepatic lesion concerning for metastatic diease, a ?8mm stone within the distal common duct and evidence of chronic pancreatitis.     He then had a follow up MRI/MRCP that showed poorly defined liver nodules again concerning for mets, stone in the pancreatic duct with ductal dilatation, and pancreatic atrophy with suspected scattered side-branch IPMNs. There was no obvious CBD stone or biliary ductal dilation. Liver lesions. Poorly defined on MRI. Concern for metastatic diease. Triphasic CT liver ordered to further characterize but it still remains inconclusive. If this is metastatic disease, primary unknown. He has never had a colonoscopy. CEA is normal.  CA 19-9 and AFP pending. If these are metastatic, we do not yet have a known primary. Last EGD done 2015 with DR ALLYSSA arango, but no prior colonoscopy. Last office visit with Dr. Evans Last he had complained of a gnawing feeling in the stomach, which he did not want to further evaluate with EGD. Chronic pancreatic duct stone. Asymptomatic. Present since 2014. Underwent ERCP x2. The initial ERCP was unsuccessful at removing the stone while utilizing a basket, balloon and stent. Repeat ERCP with pancreatoscopy and lithotripsy partially fragmented the stone. Was suggested to f/u with [facility] for ECSWL and had been referred to urology for the same but did not follow up. As he felt asymptomatic at that time, did not follow up. PD stone seems to be a chronic issue and is not currently causing acute symptoms or requiring therapy. Chronic idiopathic pancreatitis. Asymptomatic. Side Branch IPMNs. RECOMMENDATIONS:    Multi phase CT of liver ha also been inconclusive. No features to suggest [address] on CT liver. Follow up on Ca 19-9 and AFP  The patient does not want further aggressive workup at this time, and would like to wait on tumor markers to return. Diet as tolerated    If you have any questions or need any further information, please feel free to contact us [phone]. Thank you for allowing us to participate in the care of Candida Balderas. The note was completed using Dragon voice recognition transcription. Every effort was made to ensure accuracy; however, inadvertent transcription errors may be present despite my best efforts to edit errors.     Jared May MD

## 2022-08-19 NOTE — CARE COORDINATION
8/19 From home alone. Active with [facility]. Refuses SNF. Plan: Home with Bailey Weiner. [place]  [facility] has transported patient in the past. Electronically signed by Tressa Roberts RN on 8/19/2022 at 11:41 AM

## 2022-08-19 NOTE — PLAN OF CARE
infant if caregiver noted to have fall risk factors  8/18/2022 1352 by Federico Qiu RN  Outcome: Progressing     Problem: ABCDS Injury Assessment  Goal: Absence of physical injury  8/19/2022 0120 by Edgar Griffiths RN  Outcome: Progressing  Flowsheets (Taken 8/19/2022 0118)  Absence of Physical Injury: Implement safety measures based on patient assessment  8/18/2022 1352 by Federico Qiu RN  Outcome: Progressing     Problem: Chronic Conditions and Co-morbidities  Goal: Patient's chronic conditions and co-morbidity symptoms are monitored and maintained or improved  8/19/2022 0120 by Edgar Griffiths RN  Outcome: Progressing  Flowsheets (Taken 8/18/2022 2024)  Care Plan - Patient's Chronic Conditions and Co-Morbidity Symptoms are Monitored and Maintained or Improved: Monitor and assess patient's chronic conditions and comorbid symptoms for stability, deterioration, or improvement  8/18/2022 1352 by Federico Qiu RN  Outcome: Progressing  Flowsheets (Taken 8/18/2022 0905)  Care Plan - Patient's Chronic Conditions and Co-Morbidity Symptoms are Monitored and Maintained or Improved: Monitor and assess patient's chronic conditions and comorbid symptoms for stability, deterioration, or improvement

## 2022-08-19 NOTE — PLAN OF CARE
Problem: Discharge Planning  Goal: Discharge to home or other facility with appropriate resources  8/19/2022 0936 by Tulio Anton RN  Outcome: Progressing  8/19/2022 0120 by Yodit Esparza RN  Outcome: Progressing  Flowsheets (Taken 8/18/2022 2024)  Discharge to home or other facility with appropriate resources:   Identify barriers to discharge with patient and caregiver   Arrange for needed discharge resources and transportation as appropriate   Identify discharge learning needs (meds, wound care, etc)     Problem: Pain  Goal: Verbalizes/displays adequate comfort level or baseline comfort level  8/19/2022 0936 by Tulio Anton RN  Outcome: Progressing  8/19/2022 0120 by Yodit Esparza RN  Outcome: Progressing  Flowsheets (Taken 8/18/2022 2024)  Verbalizes/displays adequate comfort level or baseline comfort level:   Encourage patient to monitor pain and request assistance   Assess pain using appropriate pain scale     Problem: Skin/Tissue Integrity  Goal: Absence of new skin breakdown  Description: 1. Monitor for areas of redness and/or skin breakdown  2. Assess vascular access sites hourly  3. Every 4-6 hours minimum:  Change oxygen saturation probe site  4. Every 4-6 hours:  If on nasal continuous positive airway pressure, respiratory therapy assess nares and determine need for appliance change or resting period.   8/19/2022 0936 by Tulio Anton RN  Outcome: Progressing  8/19/2022 0120 by Yodit Esparza RN  Outcome: Progressing     Problem: Safety - Adult  Goal: Free from fall injury  8/19/2022 0936 by Tulio Anton RN  Outcome: Progressing  8/19/2022 0120 by Yodit Esparza RN  Outcome: Progressing  Flowsheets (Taken 8/19/2022 0118)  Free From Fall Injury:   Instruct family/caregiver on patient safety   Based on caregiver fall risk screen, instruct family/caregiver to ask for assistance with transferring infant if caregiver noted to have fall risk factors     Problem: ABCDS Injury Assessment  Goal: Absence of physical injury  8/19/2022 0936 by Colette Velasco RN  Outcome: Progressing  8/19/2022 0120 by Nidia Capone RN  Outcome: Progressing  Flowsheets (Taken 8/19/2022 0118)  Absence of Physical Injury: Implement safety measures based on patient assessment     Problem: Chronic Conditions and Co-morbidities  Goal: Patient's chronic conditions and co-morbidity symptoms are monitored and maintained or improved  8/19/2022 0936 by Colette Velasco RN  Outcome: Progressing  8/19/2022 0120 by Nidia Capone RN  Outcome: Progressing  Flowsheets (Taken 8/18/2022 2024)  Care Plan - Patient's Chronic Conditions and Co-Morbidity Symptoms are Monitored and Maintained or Improved: Monitor and assess patient's chronic conditions and comorbid symptoms for stability, deterioration, or improvement

## 2022-08-20 LAB
ANION GAP SERPL CALCULATED.3IONS-SCNC: 9 MMOL/L (ref 3–16)
BASOPHILS ABSOLUTE: 0 K/UL (ref 0–0.2)
BASOPHILS RELATIVE PERCENT: 0.7 %
BLOOD CULTURE, ROUTINE: NORMAL
BUN BLDV-MCNC: 12 MG/DL (ref 7–20)
CALCIUM SERPL-MCNC: 8.8 MG/DL (ref 8.3–10.6)
CHLORIDE BLD-SCNC: 106 MMOL/L (ref 99–110)
CO2: 24 MMOL/L (ref 21–32)
CREAT SERPL-MCNC: 0.9 MG/DL (ref 0.8–1.3)
EOSINOPHILS ABSOLUTE: 0.2 K/UL (ref 0–0.6)
EOSINOPHILS RELATIVE PERCENT: 3.1 %
GFR AFRICAN AMERICAN: >60
GFR NON-AFRICAN AMERICAN: >60
GLUCOSE BLD-MCNC: 125 MG/DL (ref 70–99)
GLUCOSE BLD-MCNC: 126 MG/DL (ref 70–99)
GLUCOSE BLD-MCNC: 127 MG/DL (ref 70–99)
GLUCOSE BLD-MCNC: 149 MG/DL (ref 70–99)
GLUCOSE BLD-MCNC: 186 MG/DL (ref 70–99)
HCT VFR BLD CALC: 26.4 % (ref 40.5–52.5)
HEMOGLOBIN: 8.5 G/DL (ref 13.5–17.5)
LYMPHOCYTES ABSOLUTE: 1.6 K/UL (ref 1–5.1)
LYMPHOCYTES RELATIVE PERCENT: 26.5 %
MCH RBC QN AUTO: 23.5 PG (ref 26–34)
MCHC RBC AUTO-ENTMCNC: 32.3 G/DL (ref 31–36)
MCV RBC AUTO: 72.8 FL (ref 80–100)
MONOCYTES ABSOLUTE: 0.5 K/UL (ref 0–1.3)
MONOCYTES RELATIVE PERCENT: 8.2 %
NEUTROPHILS ABSOLUTE: 3.7 K/UL (ref 1.7–7.7)
NEUTROPHILS RELATIVE PERCENT: 61.5 %
PDW BLD-RTO: 17.6 % (ref 12.4–15.4)
PERFORMED ON: ABNORMAL
PLATELET # BLD: 250 K/UL (ref 135–450)
PMV BLD AUTO: 6.9 FL (ref 5–10.5)
POTASSIUM SERPL-SCNC: 4.3 MMOL/L (ref 3.5–5.1)
RBC # BLD: 3.62 M/UL (ref 4.2–5.9)
SODIUM BLD-SCNC: 139 MMOL/L (ref 136–145)
WBC # BLD: 6.1 K/UL (ref 4–11)

## 2022-08-20 PROCEDURE — 6360000002 HC RX W HCPCS: Performed by: STUDENT IN AN ORGANIZED HEALTH CARE EDUCATION/TRAINING PROGRAM

## 2022-08-20 PROCEDURE — 94760 N-INVAS EAR/PLS OXIMETRY 1: CPT

## 2022-08-20 PROCEDURE — 2580000003 HC RX 258: Performed by: STUDENT IN AN ORGANIZED HEALTH CARE EDUCATION/TRAINING PROGRAM

## 2022-08-20 PROCEDURE — 80048 BASIC METABOLIC PNL TOTAL CA: CPT

## 2022-08-20 PROCEDURE — 6370000000 HC RX 637 (ALT 250 FOR IP): Performed by: STUDENT IN AN ORGANIZED HEALTH CARE EDUCATION/TRAINING PROGRAM

## 2022-08-20 PROCEDURE — 1200000000 HC SEMI PRIVATE

## 2022-08-20 PROCEDURE — 85025 COMPLETE CBC W/AUTO DIFF WBC: CPT

## 2022-08-20 PROCEDURE — 36415 COLL VENOUS BLD VENIPUNCTURE: CPT

## 2022-08-20 RX ADMIN — INSULIN GLARGINE 10 UNITS: 100 INJECTION, SOLUTION SUBCUTANEOUS at 21:44

## 2022-08-20 RX ADMIN — ENOXAPARIN SODIUM 30 MG: 100 INJECTION SUBCUTANEOUS at 09:50

## 2022-08-20 RX ADMIN — ENOXAPARIN SODIUM 30 MG: 100 INJECTION SUBCUTANEOUS at 21:42

## 2022-08-20 RX ADMIN — Medication 10 ML: at 09:50

## 2022-08-20 RX ADMIN — LISINOPRIL 20 MG: 20 TABLET ORAL at 09:50

## 2022-08-20 RX ADMIN — ONDANSETRON 4 MG: 2 INJECTION INTRAMUSCULAR; INTRAVENOUS at 22:08

## 2022-08-20 RX ADMIN — ASPIRIN 81 MG: 81 TABLET, COATED ORAL at 09:50

## 2022-08-20 RX ADMIN — PIPERACILLIN AND TAZOBACTAM 3375 MG: 3; .375 INJECTION, POWDER, FOR SOLUTION INTRAVENOUS at 00:58

## 2022-08-20 RX ADMIN — PIPERACILLIN AND TAZOBACTAM 3375 MG: 3; .375 INJECTION, POWDER, FOR SOLUTION INTRAVENOUS at 09:49

## 2022-08-20 RX ADMIN — TAMSULOSIN HYDROCHLORIDE 0.4 MG: 0.4 CAPSULE ORAL at 21:42

## 2022-08-20 RX ADMIN — PIPERACILLIN AND TAZOBACTAM 3375 MG: 3; .375 INJECTION, POWDER, FOR SOLUTION INTRAVENOUS at 18:55

## 2022-08-20 RX ADMIN — SPIRONOLACTONE 25 MG: 25 TABLET ORAL at 09:50

## 2022-08-20 RX ADMIN — ATORVASTATIN CALCIUM 20 MG: 20 TABLET, FILM COATED ORAL at 21:42

## 2022-08-20 ASSESSMENT — PAIN SCALES - WONG BAKER
WONGBAKER_NUMERICALRESPONSE: 0

## 2022-08-20 ASSESSMENT — PAIN SCALES - GENERAL
PAINLEVEL_OUTOF10: 0

## 2022-08-20 NOTE — PLAN OF CARE
Problem: Discharge Planning  Goal: Discharge to home or other facility with appropriate resources  8/19/2022 2219 by Jhon Miller RN  Outcome: Progressing  Flowsheets (Taken 8/19/2022 1945)  Discharge to home or other facility with appropriate resources:   Identify barriers to discharge with patient and caregiver   Arrange for needed discharge resources and transportation as appropriate   Identify discharge learning needs (meds, wound care, etc)  8/19/2022 0936 by Daniella Adams RN  Outcome: Progressing  Flowsheets (Taken 8/19/2022 8148)  Discharge to home or other facility with appropriate resources: Identify barriers to discharge with patient and caregiver     Problem: Pain  Goal: Verbalizes/displays adequate comfort level or baseline comfort level  8/19/2022 2219 by Jhon Miller RN  Outcome: Progressing  8/19/2022 0936 by Daniella Adams RN  Outcome: Progressing     Problem: Skin/Tissue Integrity  Goal: Absence of new skin breakdown  Description: 1. Monitor for areas of redness and/or skin breakdown  2. Assess vascular access sites hourly  3. Every 4-6 hours minimum:  Change oxygen saturation probe site  4. Every 4-6 hours:  If on nasal continuous positive airway pressure, respiratory therapy assess nares and determine need for appliance change or resting period.   8/19/2022 2219 by Jhon Miller RN  Outcome: Progressing  8/19/2022 0936 by Daniella Adams RN  Outcome: Progressing     Problem: Safety - Adult  Goal: Free from fall injury  8/19/2022 2219 by Jhon Miller RN  Outcome: Progressing  Flowsheets (Taken 8/19/2022 2218)  Free From Fall Injury:   Instruct family/caregiver on patient safety   Based on caregiver fall risk screen, instruct family/caregiver to ask for assistance with transferring infant if caregiver noted to have fall risk factors  8/19/2022 0936 by Daniella Adams RN  Outcome: Progressing     Problem: ABCDS Injury Assessment  Goal: Absence of physical injury  8/19/2022 2219 by Anaya Zambrano Sherman Vogt RN  Outcome: Progressing  Flowsheets (Taken 8/19/2022 2218)  Absence of Physical Injury: Implement safety measures based on patient assessment  8/19/2022 0936 by Carlos Nielsen RN  Outcome: Progressing     Problem: Chronic Conditions and Co-morbidities  Goal: Patient's chronic conditions and co-morbidity symptoms are monitored and maintained or improved  8/19/2022 2219 by Live Moncada RN  Outcome: Progressing  Flowsheets (Taken 8/19/2022 1945)  Care Plan - Patient's Chronic Conditions and Co-Morbidity Symptoms are Monitored and Maintained or Improved: Monitor and assess patient's chronic conditions and comorbid symptoms for stability, deterioration, or improvement  8/19/2022 0936 by Carlos Nielsen RN  Outcome: Progressing  Flowsheets (Taken 8/19/2022 0743)  Care Plan - Patient's Chronic Conditions and Co-Morbidity Symptoms are Monitored and Maintained or Improved: Monitor and assess patient's chronic conditions and comorbid symptoms for stability, deterioration, or improvement

## 2022-08-20 NOTE — PLAN OF CARE
Implement safety measures based on patient assessment     Problem: Chronic Conditions and Co-morbidities  Goal: Patient's chronic conditions and co-morbidity symptoms are monitored and maintained or improved  8/20/2022 0754 by Aniceto Winkler RN  Outcome: Progressing  8/19/2022 2219 by Dimas Orozco RN  Outcome: Progressing  Flowsheets (Taken 8/19/2022 1945)  Care Plan - Patient's Chronic Conditions and Co-Morbidity Symptoms are Monitored and Maintained or Improved: Monitor and assess patient's chronic conditions and comorbid symptoms for stability, deterioration, or improvement

## 2022-08-20 NOTE — PROGRESS NOTES
Hospitalist Progress Note      PCP: Ryan Jain MD    Date of Admission: 8/15/2022    Chief Complaint:  Generalized weakness, chills    Hospital Course:    74 y.o. male who presents to [facility] with generalized weakness and chills. Patient with a past medical history of diabetes mellitus on insulin, history of chronic pancreatitis, peripheral vascular disease, chronic neuropathy. With generalized weakness and chills, also reported some urinary changes. -Was found to have multiple liver lesions, GI consulted. Subjective:   Had a coughing spell yesterday.    Denies any new complaints, currently asymptomatic, continues to be afebrile    Medications:  Reviewed    Infusion Medications    sodium chloride 10 mL/hr at 08/15/22 1804    dextrose       Scheduled Medications    sodium hypochlorite   Irrigation Daily    aspirin  81 mg Oral Daily    atorvastatin  20 mg Oral Nightly    insulin glargine  10 Units SubCUTAneous Nightly    spironolactone  25 mg Oral Daily    tamsulosin  0.4 mg Oral Nightly    sodium chloride flush  5-40 mL IntraVENous 2 times per day    enoxaparin  30 mg SubCUTAneous BID    insulin lispro  0-8 Units SubCUTAneous TID WC    insulin lispro  0-4 Units SubCUTAneous Nightly    piperacillin-tazobactam  3,375 mg IntraVENous Q8H    lisinopril  20 mg Oral Daily     PRN Meds: sodium chloride flush, sodium chloride, ondansetron **OR** ondansetron, polyethylene glycol, acetaminophen **OR** acetaminophen, glucose, dextrose bolus **OR** dextrose bolus, glucagon (rDNA), dextrose      Intake/Output Summary (Last 24 hours) at 8/20/2022 1104  Last data filed at 8/20/2022 0852  Gross per 24 hour   Intake 360 ml   Output 1200 ml   Net -840 ml         Physical Exam Performed:    /69   Pulse 63   Temp 98.2 °F (36.8 °C) (Oral)   Resp 18   Ht 6' 2\" (1.88 m)   Wt 252 lb 6.8 oz (114.5 kg)   SpO2 97%   BMI 32.41 kg/m²     General appearance: No apparent distress, appears stated age and cooperative. HEENT: Pupils equal, round, and reactive to light. Conjunctivae/corneas clear. Neck: Supple, with full range of motion. No jugular venous distention. Trachea midline. Respiratory:  Normal respiratory effort. Clear to auscultation, bilaterally without Rales/Wheezes/Rhonchi. Cardiovascular: Regular rate and rhythm with normal S1/S2 without murmurs, rubs or gallops. Abdomen: Soft, non-tender, non-distended with normal bowel sounds. Musculoskeletal: No clubbing, cyanosis or edema bilaterally. Full range of motion without deformity. Skin: Skin color, texture, turgor normal.  No rashes or lesions. Neurologic:  Neurovascularly intact without any focal sensory/motor deficits. Cranial nerves: II-XII intact, grossly non-focal.  Psychiatric: Alert and oriented, thought content appropriate, normal insight  Capillary Refill: Brisk,3 seconds, normal   Peripheral Pulses: +2 palpable, equal bilaterally       Labs:   Recent Labs     08/18/22  0552 08/19/22  0456 08/20/22  0609   WBC 5.6 5.2 6.1   HGB 8.0* 8.2* 8.5*   HCT 25.3* 25.6* 26.4*    224 250       Recent Labs     08/18/22  0552 08/19/22  0456 08/20/22  0609   * 140 139   K 4.1 4.4 4.3    105 106   CO2 22 22 24   BUN 14 14 12   CREATININE 0.9 1.0 0.9   CALCIUM 8.3 9.3 8.8       No results for input(s): AST, ALT, BILIDIR, BILITOT, ALKPHOS in the last 72 hours. No results for input(s): INR in the last 72 hours. No results for input(s): Logan Pears in the last 72 hours. Urinalysis:      Lab Results   Component Value Date/Time    NITRU Negative 08/15/2022 11:16 AM    WBCUA 1 08/15/2022 11:16 AM    BACTERIA None Seen 08/15/2022 11:16 AM    RBCUA 2 08/15/2022 11:16 AM    BLOODU TRACE 08/15/2022 11:16 AM    SPECGRAV 1.035 08/15/2022 11:16 AM    GLUCOSEU Negative 08/15/2022 11:16 AM       Radiology:  CT ABDOMEN W WO CONTRAST Additional Contrast? None   Final Result   1.  In general the hepatic lesions seen on the recent MRI and CT are not well   visualized. A single 11 mm ill-defined subtle low-density lesion is seen in   the liver dome. The lesions remain nonspecific with metastatic disease not   excluded. 2. Chronic stone either within the pancreatic duct in the pancreatic head or   adjacent pancreatic parenchyma. Chronic dilatation of the main pancreatic   duct and signs of chronic pancreatitis. 3. Trace bilateral pleural effusions and mild interstitial edema. MRI ABDOMEN W WO CONTRAST MRCP   Final Result   Poorly defined enhancing nodules in the liver suspicious for metastasis in   the absence of clinical signs of infection. .  There is fatty infiltration of   the liver      Suspected stone in the pancreatic duct with pancreatic ductal dilatation. Scattered side-branch IPMNs are suspected within the pancreas. CT CHEST ABDOMEN PELVIS W CONTRAST   Final Result   1. No acute pulmonary process. 2. Multiple indeterminate hepatic lesions concerning for metastatic disease   of unknown primary. 3. Chronic stable choledocholithiasis. 4. Nonobstructing left nephrolithiasis. 5. Chronic pancreatitis. XR CHEST PORTABLE   Final Result   Diffuse bilateral pulmonary opacity can reflect pulmonary edema or in the   appropriate clinical setting, atypical infection. Small right pleural   effusion. XR KNEE LEFT (1-2 VIEWS)   Final Result   No acute osseous abnormality right or left knee      Degenerative change, greatest medially and in the patellofemoral joints         XR KNEE RIGHT (1-2 VIEWS)   Final Result   No acute osseous abnormality right or left knee      Degenerative change, greatest medially and in the patellofemoral joints         XR FOOT LEFT (MIN 3 VIEWS)   Final Result   Soft tissue swelling and plantar soft tissue ulceration. Nonspecific cortical thickening is seen of the distal fibula and tibia, for   which infection is among differential considerations.   Dedicated tibia fibula radiograph could be performed for further assessment. XR FOOT RIGHT (MIN 3 VIEWS)   Final Result   Limited evaluation of the digits secondary to flexion. Soft tissue swelling   is otherwise noted without focal erosion. If there is strong clinical   concern for osteomyelitis, MR could be considered. Assessment/Plan:    Active Hospital Problems    Diagnosis     Sepsis ([facility] [address] [A41.9]      Sepsis. Community-acquired pneumonia  Patient presented to emergency with a short onset symptoms of nausea vomiting and cough. Upon presentation patient was found to be febrile up to 38.2, blood work significant of leukocytosis up to 14 (neutrophilic). Urinalysis unremarkable. X-ray with bilateral opacity and a small pleural effusion. Procalcitonin 0.14. Patient completed 3 days of azithromycin  Plan  -continue  Zosyn day 6/7  -Follow blood culture ( NTD)   -Send sputum culture ( NTD)         CT finding of multiple hepatic lesions. Incidentally found multiple indeterminate hepatic lesions concerning for metastatic disease. MRCP with a pancreatic duct (normal lipase bilirubin and liver function). Appreciate GI input, triphasic CT pending  Tumor marker (CEA, CA 19-9 unremarkable )  Patient may need EGD/colonoscopy if triphasic CT is concerning for possible malignancy     Diabetes mellitus. Change glargine to 10 units from 20 at home  Continue sliding scale  Blood sugar well controlled     Hypertension. Continue home medications     BPH  Continue tamsulosin. Chronic venous insufficiency ulcers. Consult wound care    DVT Prophylaxis: lovenox  Diet: ADULT DIET; Regular  Code Status: DNR-CCA    PT/OT Eval Status: following.      Dispo -possible discharge tomorrow    Thu Sarmiento MD

## 2022-08-20 NOTE — PROGRESS NOTES
INPATIENT PROGRESS NOTE        IDENTIFYING DATA/REASON FOR CONSULTATION   PATIENT:  Jordi Holcomb  MRN:  [id number]  ADMIT DATE: 8/15/2022  TIME OF EVALUATION: 2022 9:16 AM  HOSPITAL STAY:   LOS: 5 days   CONSULTING PHYSICIAN: Chloe Garcia MD   REASON FOR CONSULTATION: liver lesions, pancreatic duct stone    Subjective:    Patient seen in follow up. No acute events overnight   Tolerating PO intake     MEDICATIONS   SCHEDULED:  sodium hypochlorite, , Daily  aspirin, 81 mg, Daily  atorvastatin, 20 mg, Nightly  insulin glargine, 10 Units, Nightly  spironolactone, 25 mg, Daily  tamsulosin, 0.4 mg, Nightly  sodium chloride flush, 5-40 mL, 2 times per day  enoxaparin, 30 mg, BID  insulin lispro, 0-8 Units, TID WC  insulin lispro, 0-4 Units, Nightly  piperacillin-tazobactam, 3,375 mg, Q8H  lisinopril, 20 mg, Daily    FLUIDS/DRIPS:     sodium chloride 10 mL/hr at 08/15/22 1804    dextrose       PRNs: sodium chloride flush, 5-40 mL, PRN  sodium chloride, , PRN  ondansetron, 4 mg, Q8H PRN   Or  ondansetron, 4 mg, Q6H PRN  polyethylene glycol, 17 g, Daily PRN  acetaminophen, 650 mg, Q6H PRN   Or  acetaminophen, 650 mg, Q6H PRN  glucose, 4 tablet, PRN  dextrose bolus, 125 mL, PRN   Or  dextrose bolus, 250 mL, PRN  glucagon (rDNA), 1 mg, PRN  dextrose, , Continuous PRN    ALLERGIES:  No Known Allergies      PHYSICAL EXAM   VITALS:  /69   Pulse 63   Temp 98.2 °F (36.8 °C) (Oral)   Resp 18   Ht 6' 2\" (1.88 m)   Wt 252 lb 6.8 oz (114.5 kg)   SpO2 97%   BMI 32.41 kg/m²   TEMPERATURE:  Current - Temp: 98.2 °F (36.8 °C);  Max - Temp  Av.5 °F (36.9 °C)  Min: 98.2 °F (36.8 °C)  Max: 98.7 °F (37.1 °C)    Physical Exam:  General appearance: alert, cooperative, no distress, appears stated age  Eyes: Anicteric  Head: Normocephalic, without obvious abnormality  Lungs: clear to auscultation bilaterally, Normal Effort  Heart: regular rate and rhythm, normal S1 and S2, no murmurs or rubs  Abdomen: soft, non-distended, non-tender. Extremities: atraumatic, no cyanosis or edema  Skin: warm and dry, no jaundice  Neuro: Grossly intact, A&OX3    LABS AND IMAGING   Laboratory   Recent Labs     08/18/22  0552 08/19/22  0456 08/20/22  0609   WBC 5.6 5.2 6.1   HGB 8.0* 8.2* 8.5*   HCT 25.3* 25.6* 26.4*   MCV 73.6* 73.1* 72.8*    224 250     Recent Labs     08/18/22  0552 08/19/22  0456 08/20/22  0609   * 140 139   K 4.1 4.4 4.3    105 106   CO2 22 22 24   BUN 14 14 12   CREATININE 0.9 1.0 0.9     No results for input(s): AST, ALT, ALB, BILIDIR, BILITOT, ALKPHOS in the last 72 hours. No results for input(s): LIPASE, AMYLASE in the last 72 hours. No results for input(s): PROTIME, INR in the last 72 hours. Imaging  CT ABDOMEN W WO CONTRAST Additional Contrast? None   Final Result   1. In general the hepatic lesions seen on the recent MRI and CT are not well   visualized. A single 11 mm ill-defined subtle low-density lesion is seen in   the liver dome. The lesions remain nonspecific with metastatic disease not   excluded. 2. Chronic stone either within the pancreatic duct in the pancreatic head or   adjacent pancreatic parenchyma. Chronic dilatation of the main pancreatic   duct and signs of chronic pancreatitis. 3. Trace bilateral pleural effusions and mild interstitial edema. MRI ABDOMEN W WO CONTRAST MRCP   Final Result   Poorly defined enhancing nodules in the liver suspicious for metastasis in   the absence of clinical signs of infection. .  There is fatty infiltration of   the liver      Suspected stone in the pancreatic duct with pancreatic ductal dilatation. Scattered side-branch IPMNs are suspected within the pancreas. CT CHEST ABDOMEN PELVIS W CONTRAST   Final Result   1. No acute pulmonary process. 2. Multiple indeterminate hepatic lesions concerning for metastatic disease   of unknown primary. 3. Chronic stable choledocholithiasis.    4. Nonobstructing left nephrolithiasis. 5. Chronic pancreatitis. XR CHEST PORTABLE   Final Result   Diffuse bilateral pulmonary opacity can reflect pulmonary edema or in the   appropriate clinical setting, atypical infection. Small right pleural   effusion. XR KNEE LEFT (1-2 VIEWS)   Final Result   No acute osseous abnormality right or left knee      Degenerative change, greatest medially and in the patellofemoral joints         XR KNEE RIGHT (1-2 VIEWS)   Final Result   No acute osseous abnormality right or left knee      Degenerative change, greatest medially and in the patellofemoral joints         XR FOOT LEFT (MIN 3 VIEWS)   Final Result   Soft tissue swelling and plantar soft tissue ulceration. Nonspecific cortical thickening is seen of the distal fibula and tibia, for   which infection is among differential considerations. Dedicated tibia fibula   radiograph could be performed for further assessment. XR FOOT RIGHT (MIN 3 VIEWS)   Final Result   Limited evaluation of the digits secondary to flexion. Soft tissue swelling   is otherwise noted without focal erosion. If there is strong clinical   concern for osteomyelitis, MR could be considered. Endoscopy      ASSESSMENT AND RECOMMENDATIONS   Lang Goldberg is a 74 y.o. male with PMH of DM, diabetic neuropathy, HTN, HLD, PVD, bilateral chronic lower limb ulcerations, idiopathic pancreatitis with a known history of pancreatic duct stone who presented on 8/15/2022 with generalized weakness and fever. He was admitted for suspected pneumonia based on CXR. He had a CT chest that showed multiple indeterminate hepatic lesion concerning for metastatic diease, a ?8mm stone within the distal common duct and evidence of chronic pancreatitis.     He then had a follow up MRI/MRCP that showed poorly defined liver nodules again concerning for mets, stone in the pancreatic duct with ductal dilatation, and pancreatic atrophy with suspected scattered transcription. Every effort was made to ensure accuracy; however, inadvertent transcription errors may be present despite my best efforts to edit errors.     Ca Eid MD

## 2022-08-21 LAB
ANION GAP SERPL CALCULATED.3IONS-SCNC: 11 MMOL/L (ref 3–16)
BASOPHILS ABSOLUTE: 0.1 K/UL (ref 0–0.2)
BASOPHILS RELATIVE PERCENT: 0.6 %
BUN BLDV-MCNC: 14 MG/DL (ref 7–20)
CALCIUM SERPL-MCNC: 9.1 MG/DL (ref 8.3–10.6)
CHLORIDE BLD-SCNC: 105 MMOL/L (ref 99–110)
CO2: 22 MMOL/L (ref 21–32)
CREAT SERPL-MCNC: 0.9 MG/DL (ref 0.8–1.3)
EOSINOPHILS ABSOLUTE: 0.2 K/UL (ref 0–0.6)
EOSINOPHILS RELATIVE PERCENT: 1.6 %
GFR AFRICAN AMERICAN: >60
GFR NON-AFRICAN AMERICAN: >60
GLUCOSE BLD-MCNC: 132 MG/DL (ref 70–99)
GLUCOSE BLD-MCNC: 133 MG/DL (ref 70–99)
GLUCOSE BLD-MCNC: 160 MG/DL (ref 70–99)
GLUCOSE BLD-MCNC: 164 MG/DL (ref 70–99)
GLUCOSE BLD-MCNC: 166 MG/DL (ref 70–99)
HCT VFR BLD CALC: 28 % (ref 40.5–52.5)
HEMOGLOBIN: 9 G/DL (ref 13.5–17.5)
LYMPHOCYTES ABSOLUTE: 1.7 K/UL (ref 1–5.1)
LYMPHOCYTES RELATIVE PERCENT: 17 %
MCH RBC QN AUTO: 23.4 PG (ref 26–34)
MCHC RBC AUTO-ENTMCNC: 32.2 G/DL (ref 31–36)
MCV RBC AUTO: 72.8 FL (ref 80–100)
MONOCYTES ABSOLUTE: 0.5 K/UL (ref 0–1.3)
MONOCYTES RELATIVE PERCENT: 5.5 %
NEUTROPHILS ABSOLUTE: 7.5 K/UL (ref 1.7–7.7)
NEUTROPHILS RELATIVE PERCENT: 75.3 %
PDW BLD-RTO: 17.4 % (ref 12.4–15.4)
PERFORMED ON: ABNORMAL
PLATELET # BLD: 279 K/UL (ref 135–450)
PMV BLD AUTO: 7.2 FL (ref 5–10.5)
POTASSIUM SERPL-SCNC: 4.4 MMOL/L (ref 3.5–5.1)
RBC # BLD: 3.84 M/UL (ref 4.2–5.9)
SODIUM BLD-SCNC: 138 MMOL/L (ref 136–145)
WBC # BLD: 9.9 K/UL (ref 4–11)

## 2022-08-21 PROCEDURE — 85025 COMPLETE CBC W/AUTO DIFF WBC: CPT

## 2022-08-21 PROCEDURE — 2580000003 HC RX 258: Performed by: STUDENT IN AN ORGANIZED HEALTH CARE EDUCATION/TRAINING PROGRAM

## 2022-08-21 PROCEDURE — 1200000000 HC SEMI PRIVATE

## 2022-08-21 PROCEDURE — 6360000002 HC RX W HCPCS: Performed by: STUDENT IN AN ORGANIZED HEALTH CARE EDUCATION/TRAINING PROGRAM

## 2022-08-21 PROCEDURE — 6370000000 HC RX 637 (ALT 250 FOR IP): Performed by: STUDENT IN AN ORGANIZED HEALTH CARE EDUCATION/TRAINING PROGRAM

## 2022-08-21 PROCEDURE — 80048 BASIC METABOLIC PNL TOTAL CA: CPT

## 2022-08-21 PROCEDURE — 94760 N-INVAS EAR/PLS OXIMETRY 1: CPT

## 2022-08-21 PROCEDURE — 36415 COLL VENOUS BLD VENIPUNCTURE: CPT

## 2022-08-21 RX ADMIN — ENOXAPARIN SODIUM 30 MG: 100 INJECTION SUBCUTANEOUS at 09:16

## 2022-08-21 RX ADMIN — PIPERACILLIN AND TAZOBACTAM 3375 MG: 3; .375 INJECTION, POWDER, FOR SOLUTION INTRAVENOUS at 02:10

## 2022-08-21 RX ADMIN — ATORVASTATIN CALCIUM 20 MG: 20 TABLET, FILM COATED ORAL at 21:08

## 2022-08-21 RX ADMIN — ENOXAPARIN SODIUM 30 MG: 100 INJECTION SUBCUTANEOUS at 21:08

## 2022-08-21 RX ADMIN — PIPERACILLIN AND TAZOBACTAM 3375 MG: 3; .375 INJECTION, POWDER, FOR SOLUTION INTRAVENOUS at 09:17

## 2022-08-21 RX ADMIN — ONDANSETRON 4 MG: 2 INJECTION INTRAMUSCULAR; INTRAVENOUS at 09:16

## 2022-08-21 RX ADMIN — ASPIRIN 81 MG: 81 TABLET, COATED ORAL at 09:16

## 2022-08-21 RX ADMIN — INSULIN GLARGINE 10 UNITS: 100 INJECTION, SOLUTION SUBCUTANEOUS at 21:08

## 2022-08-21 RX ADMIN — SPIRONOLACTONE 25 MG: 25 TABLET ORAL at 09:16

## 2022-08-21 RX ADMIN — ACETAMINOPHEN 650 MG: 325 TABLET, FILM COATED ORAL at 09:15

## 2022-08-21 RX ADMIN — Medication: at 09:22

## 2022-08-21 RX ADMIN — LISINOPRIL 20 MG: 20 TABLET ORAL at 09:16

## 2022-08-21 RX ADMIN — TAMSULOSIN HYDROCHLORIDE 0.4 MG: 0.4 CAPSULE ORAL at 21:08

## 2022-08-21 ASSESSMENT — PAIN DESCRIPTION - ORIENTATION: ORIENTATION: MID;RIGHT

## 2022-08-21 ASSESSMENT — PAIN DESCRIPTION - DESCRIPTORS: DESCRIPTORS: ACHING

## 2022-08-21 ASSESSMENT — PAIN SCALES - GENERAL: PAINLEVEL_OUTOF10: 3

## 2022-08-21 ASSESSMENT — PAIN - FUNCTIONAL ASSESSMENT: PAIN_FUNCTIONAL_ASSESSMENT: ACTIVITIES ARE NOT PREVENTED

## 2022-08-21 ASSESSMENT — PAIN DESCRIPTION - LOCATION: LOCATION: ABDOMEN

## 2022-08-21 NOTE — PLAN OF CARE
Problem: Discharge Planning  Goal: Discharge to home or other facility with appropriate resources  8/21/2022 1018 by Kris Barnes RN  Outcome: Progressing  8/21/2022 0812 by Halley Inman RN  Outcome: Progressing     Problem: Pain  Goal: Verbalizes/displays adequate comfort level or baseline comfort level  8/21/2022 1018 by Kris Barnes RN  Outcome: Progressing  8/21/2022 0812 by Halley Inman RN  Outcome: Progressing     Problem: Skin/Tissue Integrity  Goal: Absence of new skin breakdown  Description: 1. Monitor for areas of redness and/or skin breakdown  2. Assess vascular access sites hourly  3. Every 4-6 hours minimum:  Change oxygen saturation probe site  4. Every 4-6 hours:  If on nasal continuous positive airway pressure, respiratory therapy assess nares and determine need for appliance change or resting period.   8/21/2022 1018 by Kris Barnes RN  Outcome: Progressing  8/21/2022 0812 by Halley Inman RN  Outcome: Progressing     Problem: Safety - Adult  Goal: Free from fall injury  8/21/2022 1018 by Kris Barnes RN  Outcome: Progressing  8/21/2022 0812 by Halley Inman RN  Outcome: Progressing     Problem: ABCDS Injury Assessment  Goal: Absence of physical injury  8/21/2022 1018 by Kris Barnes RN  Outcome: Progressing  8/21/2022 0812 by Halley Inman RN  Outcome: Progressing     Problem: Chronic Conditions and Co-morbidities  Goal: Patient's chronic conditions and co-morbidity symptoms are monitored and maintained or improved  8/21/2022 1018 by Kris Barnes RN  Outcome: Progressing  8/21/2022 0812 by Halley Inman RN  Outcome: Progressing

## 2022-08-21 NOTE — PLAN OF CARE
Problem: Discharge Planning  Goal: Discharge to home or other facility with appropriate resources  Outcome: Progressing     Problem: Pain  Goal: Verbalizes/displays adequate comfort level or baseline comfort level  Outcome: Progressing     Problem: Skin/Tissue Integrity  Goal: Absence of new skin breakdown  Description: 1. Monitor for areas of redness and/or skin breakdown  2. Assess vascular access sites hourly  3. Every 4-6 hours minimum:  Change oxygen saturation probe site  4. Every 4-6 hours:  If on nasal continuous positive airway pressure, respiratory therapy assess nares and determine need for appliance change or resting period.   Outcome: Progressing     Problem: Safety - Adult  Goal: Free from fall injury  Outcome: Progressing     Problem: Chronic Conditions and Co-morbidities  Goal: Patient's chronic conditions and co-morbidity symptoms are monitored and maintained or improved  Outcome: Progressing

## 2022-08-21 NOTE — PROGRESS NOTES
Hospitalist Progress Note      PCP: Camacho Rolon MD    Date of Admission: 8/15/2022    Chief Complaint:  Generalized weakness, chills    Hospital Course:    74 y.o. male who presents to [facility] with generalized weakness and chills. Patient with a past medical history of diabetes mellitus on insulin, history of chronic pancreatitis, peripheral vascular disease, chronic neuropathy. With generalized weakness and chills, also reported some urinary changes. -Was found to have multiple liver lesions, GI consulted. Subjective:   Reports new abdominal pain and nausea. Medications:  Reviewed    Infusion Medications    sodium chloride 10 mL/hr at 08/15/22 1804    dextrose       Scheduled Medications    sodium hypochlorite   Irrigation Daily    aspirin  81 mg Oral Daily    atorvastatin  20 mg Oral Nightly    insulin glargine  10 Units SubCUTAneous Nightly    spironolactone  25 mg Oral Daily    tamsulosin  0.4 mg Oral Nightly    sodium chloride flush  5-40 mL IntraVENous 2 times per day    enoxaparin  30 mg SubCUTAneous BID    insulin lispro  0-8 Units SubCUTAneous TID WC    insulin lispro  0-4 Units SubCUTAneous Nightly    lisinopril  20 mg Oral Daily     PRN Meds: sodium chloride flush, sodium chloride, ondansetron **OR** ondansetron, polyethylene glycol, acetaminophen **OR** acetaminophen, glucose, dextrose bolus **OR** dextrose bolus, glucagon (rDNA), dextrose      Intake/Output Summary (Last 24 hours) at 8/21/2022 1219  Last data filed at 8/21/2022 0915  Gross per 24 hour   Intake 600 ml   Output 1500 ml   Net -900 ml         Physical Exam Performed:    /73   Pulse 67   Temp 99 °F (37.2 °C) (Oral)   Resp 16   Ht 6' 2\" (1.88 m)   Wt 251 lb 5.2 oz (114 kg)   SpO2 96%   BMI 32.27 kg/m²     General appearance: No apparent distress, appears stated age and cooperative. HEENT: Pupils equal, round, and reactive to light. Conjunctivae/corneas clear.   Neck: Supple, with full range of motion. No jugular venous distention. Trachea midline. Respiratory:  Normal respiratory effort. Clear to auscultation, bilaterally without Rales/Wheezes/Rhonchi. Cardiovascular: Regular rate and rhythm with normal S1/S2 without murmurs, rubs or gallops. Abdomen: Soft, non-tender, non-distended with normal bowel sounds. Musculoskeletal: No clubbing, cyanosis or edema bilaterally. Full range of motion without deformity. Skin: Skin color, texture, turgor normal.  No rashes or lesions. Neurologic:  Neurovascularly intact without any focal sensory/motor deficits. Cranial nerves: II-XII intact, grossly non-focal.  Psychiatric: Alert and oriented, thought content appropriate, normal insight  Capillary Refill: Brisk,3 seconds, normal   Peripheral Pulses: +2 palpable, equal bilaterally       Labs:   Recent Labs     08/19/22  0456 08/20/22  0609 08/21/22  0544   WBC 5.2 6.1 9.9   HGB 8.2* 8.5* 9.0*   HCT 25.6* 26.4* 28.0*    250 279       Recent Labs     08/19/22  0456 08/20/22  0609 08/21/22  0544    139 138   K 4.4 4.3 4.4    106 105   CO2 22 24 22   BUN 14 12 14   CREATININE 1.0 0.9 0.9   CALCIUM 9.3 8.8 9.1       No results for input(s): AST, ALT, BILIDIR, BILITOT, ALKPHOS in the last 72 hours. No results for input(s): INR in the last 72 hours. No results for input(s): Radha Horn in the last 72 hours. Urinalysis:      Lab Results   Component Value Date/Time    NITRU Negative 08/15/2022 11:16 AM    WBCUA 1 08/15/2022 11:16 AM    BACTERIA None Seen 08/15/2022 11:16 AM    RBCUA 2 08/15/2022 11:16 AM    BLOODU TRACE 08/15/2022 11:16 AM    SPECGRAV 1.035 08/15/2022 11:16 AM    GLUCOSEU Negative 08/15/2022 11:16 AM       Radiology:  CT ABDOMEN W WO CONTRAST Additional Contrast? None   Final Result   1. In general the hepatic lesions seen on the recent MRI and CT are not well   visualized. A single 11 mm ill-defined subtle low-density lesion is seen in   the liver dome.   The lesions remain nonspecific with metastatic disease not   excluded. 2. Chronic stone either within the pancreatic duct in the pancreatic head or   adjacent pancreatic parenchyma. Chronic dilatation of the main pancreatic   duct and signs of chronic pancreatitis. 3. Trace bilateral pleural effusions and mild interstitial edema. MRI ABDOMEN W WO CONTRAST MRCP   Final Result   Poorly defined enhancing nodules in the liver suspicious for metastasis in   the absence of clinical signs of infection. .  There is fatty infiltration of   the liver      Suspected stone in the pancreatic duct with pancreatic ductal dilatation. Scattered side-branch IPMNs are suspected within the pancreas. CT CHEST ABDOMEN PELVIS W CONTRAST   Final Result   1. No acute pulmonary process. 2. Multiple indeterminate hepatic lesions concerning for metastatic disease   of unknown primary. 3. Chronic stable choledocholithiasis. 4. Nonobstructing left nephrolithiasis. 5. Chronic pancreatitis. XR CHEST PORTABLE   Final Result   Diffuse bilateral pulmonary opacity can reflect pulmonary edema or in the   appropriate clinical setting, atypical infection. Small right pleural   effusion. XR KNEE LEFT (1-2 VIEWS)   Final Result   No acute osseous abnormality right or left knee      Degenerative change, greatest medially and in the patellofemoral joints         XR KNEE RIGHT (1-2 VIEWS)   Final Result   No acute osseous abnormality right or left knee      Degenerative change, greatest medially and in the patellofemoral joints         XR FOOT LEFT (MIN 3 VIEWS)   Final Result   Soft tissue swelling and plantar soft tissue ulceration. Nonspecific cortical thickening is seen of the distal fibula and tibia, for   which infection is among differential considerations. Dedicated tibia fibula   radiograph could be performed for further assessment.          XR FOOT RIGHT (MIN 3 VIEWS)   Final Result   Limited evaluation of the digits secondary to flexion. Soft tissue swelling   is otherwise noted without focal erosion. If there is strong clinical   concern for osteomyelitis, MR could be considered. Assessment/Plan:    Active Hospital Problems    Diagnosis     [facility] [A41.9]      Sepsis. Community-acquired pneumonia  Patient presented to emergency with a short onset symptoms of nausea vomiting and cough. Upon presentation patient was found to be febrile up to 38.2, blood work significant of leukocytosis up to 14 (neutrophilic). Urinalysis unremarkable. X-ray with bilateral opacity and a small pleural effusion. Procalcitonin 0.14. Patient completed 3 days of azithromycin  Plan  -Completed 7 days of Zosyn, will DC antibiotics today  -Follow blood culture ( NTD)   -Send sputum culture ( NTD)         CT finding of multiple hepatic lesions. Incidentally found multiple indeterminate hepatic lesions concerning for metastatic disease. MRCP with a pancreatic duct (normal lipase bilirubin and liver function). Appreciate GI input, triphasic CT pending  Tumor marker (CEA, CA 19-9 unremarkable )  Patient may need EGD/colonoscopy if triphasic CT is concerning for possible malignancy     Diabetes mellitus. Change glargine to 10 units from 20 at home  Continue sliding scale  Blood sugar well controlled     Hypertension. Continue home medications     BPH  Continue tamsulosin. Chronic venous insufficiency ulcers. Consult wound care    DVT Prophylaxis: lovenox  Diet: ADULT DIET; Regular  Code Status: DNR-CCA    PT/OT Eval Status: following. Dispo -discharge tomorrow (patient not comfortable discharge today due to nausea).      Isabela Holland MD

## 2022-08-21 NOTE — PROGRESS NOTES
Bandages on bilateral feet changed per wound care order. Wounds cleansed with Dakins solution, covered with silver alginate, covered with ABD, secured with rolled gauze.     Electronically signed by Bethany Garcia RN on 8/21/22 at 6:19 PM EDT

## 2022-08-22 VITALS
BODY MASS INDEX: 31.77 KG/M2 | OXYGEN SATURATION: 98 % | HEIGHT: 74 IN | WEIGHT: 247.58 LBS | RESPIRATION RATE: 18 BRPM | HEART RATE: 58 BPM | TEMPERATURE: 97.6 F | SYSTOLIC BLOOD PRESSURE: 130 MMHG | DIASTOLIC BLOOD PRESSURE: 71 MMHG

## 2022-08-22 LAB
ANION GAP SERPL CALCULATED.3IONS-SCNC: 11 MMOL/L (ref 3–16)
BASOPHILS ABSOLUTE: 0 K/UL (ref 0–0.2)
BASOPHILS RELATIVE PERCENT: 0.5 %
BUN BLDV-MCNC: 18 MG/DL (ref 7–20)
CALCIUM SERPL-MCNC: 9.2 MG/DL (ref 8.3–10.6)
CHLORIDE BLD-SCNC: 102 MMOL/L (ref 99–110)
CO2: 23 MMOL/L (ref 21–32)
CREAT SERPL-MCNC: 0.8 MG/DL (ref 0.8–1.3)
EOSINOPHILS ABSOLUTE: 0.3 K/UL (ref 0–0.6)
EOSINOPHILS RELATIVE PERCENT: 3.1 %
GFR AFRICAN AMERICAN: >60
GFR NON-AFRICAN AMERICAN: >60
GLUCOSE BLD-MCNC: 112 MG/DL (ref 70–99)
GLUCOSE BLD-MCNC: 116 MG/DL (ref 70–99)
GLUCOSE BLD-MCNC: 122 MG/DL (ref 70–99)
HCT VFR BLD CALC: 29.4 % (ref 40.5–52.5)
HEMOGLOBIN: 9.4 G/DL (ref 13.5–17.5)
LYMPHOCYTES ABSOLUTE: 2.1 K/UL (ref 1–5.1)
LYMPHOCYTES RELATIVE PERCENT: 25.6 %
MCH RBC QN AUTO: 23.5 PG (ref 26–34)
MCHC RBC AUTO-ENTMCNC: 32 G/DL (ref 31–36)
MCV RBC AUTO: 73.3 FL (ref 80–100)
MONOCYTES ABSOLUTE: 0.5 K/UL (ref 0–1.3)
MONOCYTES RELATIVE PERCENT: 5.9 %
NEUTROPHILS ABSOLUTE: 5.4 K/UL (ref 1.7–7.7)
NEUTROPHILS RELATIVE PERCENT: 64.9 %
PDW BLD-RTO: 17.9 % (ref 12.4–15.4)
PERFORMED ON: ABNORMAL
PERFORMED ON: ABNORMAL
PLATELET # BLD: 301 K/UL (ref 135–450)
PMV BLD AUTO: 7.1 FL (ref 5–10.5)
POTASSIUM SERPL-SCNC: 4.6 MMOL/L (ref 3.5–5.1)
RBC # BLD: 4.01 M/UL (ref 4.2–5.9)
SODIUM BLD-SCNC: 136 MMOL/L (ref 136–145)
WBC # BLD: 8.4 K/UL (ref 4–11)

## 2022-08-22 PROCEDURE — 97530 THERAPEUTIC ACTIVITIES: CPT

## 2022-08-22 PROCEDURE — 85025 COMPLETE CBC W/AUTO DIFF WBC: CPT

## 2022-08-22 PROCEDURE — 36415 COLL VENOUS BLD VENIPUNCTURE: CPT

## 2022-08-22 PROCEDURE — 80048 BASIC METABOLIC PNL TOTAL CA: CPT

## 2022-08-22 PROCEDURE — 97116 GAIT TRAINING THERAPY: CPT

## 2022-08-22 PROCEDURE — 6370000000 HC RX 637 (ALT 250 FOR IP): Performed by: STUDENT IN AN ORGANIZED HEALTH CARE EDUCATION/TRAINING PROGRAM

## 2022-08-22 PROCEDURE — 94760 N-INVAS EAR/PLS OXIMETRY 1: CPT

## 2022-08-22 PROCEDURE — 2580000003 HC RX 258: Performed by: STUDENT IN AN ORGANIZED HEALTH CARE EDUCATION/TRAINING PROGRAM

## 2022-08-22 PROCEDURE — 6360000002 HC RX W HCPCS: Performed by: STUDENT IN AN ORGANIZED HEALTH CARE EDUCATION/TRAINING PROGRAM

## 2022-08-22 RX ADMIN — ENOXAPARIN SODIUM 30 MG: 100 INJECTION SUBCUTANEOUS at 08:09

## 2022-08-22 RX ADMIN — Medication 10 ML: at 08:10

## 2022-08-22 RX ADMIN — SPIRONOLACTONE 25 MG: 25 TABLET ORAL at 08:10

## 2022-08-22 RX ADMIN — LISINOPRIL 20 MG: 20 TABLET ORAL at 08:10

## 2022-08-22 RX ADMIN — ASPIRIN 81 MG: 81 TABLET, COATED ORAL at 08:10

## 2022-08-22 NOTE — PROGRESS NOTES
Pt without complaints of nausea overnight. Pt states it is the first time he has been able to sleep throughout the night.     Electronically signed by Jade Victoria RN on 8/22/22 at 7:21 AM EDT

## 2022-08-22 NOTE — PLAN OF CARE
Problem: Discharge Planning  Goal: Discharge to home or other facility with appropriate resources  8/22/2022 1056 by Milton Cain RN  Outcome: Completed  8/22/2022 0724 by Milton Cain RN  Outcome: Adequate for Discharge     Problem: Pain  Goal: Verbalizes/displays adequate comfort level or baseline comfort level  8/22/2022 1056 by Milton Cain RN  Outcome: Completed  8/22/2022 0724 by Milton Cain RN  Outcome: Adequate for Discharge     Problem: Skin/Tissue Integrity  Goal: Absence of new skin breakdown  Description: 1. Monitor for areas of redness and/or skin breakdown  2. Assess vascular access sites hourly  3. Every 4-6 hours minimum:  Change oxygen saturation probe site  4. Every 4-6 hours:  If on nasal continuous positive airway pressure, respiratory therapy assess nares and determine need for appliance change or resting period.   8/22/2022 1056 by Milton Cain RN  Outcome: Completed  8/22/2022 0724 by Milton Cain RN  Outcome: Adequate for Discharge     Problem: Safety - Adult  Goal: Free from fall injury  8/22/2022 1056 by Milton Cain RN  Outcome: Completed  8/22/2022 0724 by Milton Cain RN  Outcome: Adequate for Discharge     Problem: ABCDS Injury Assessment  Goal: Absence of physical injury  8/22/2022 1056 by Milton Cain RN  Outcome: Completed  8/22/2022 0724 by Milton Cain RN  Outcome: Adequate for Discharge     Problem: Chronic Conditions and Co-morbidities  Goal: Patient's chronic conditions and co-morbidity symptoms are monitored and maintained or improved  8/22/2022 1056 by Milton Cain RN  Outcome: Completed  8/22/2022 0724 by Milton Cain RN  Outcome: Adequate for Discharge

## 2022-08-22 NOTE — DISCHARGE INSTRUCTIONS
Pneumonia: Care Instructions  Overview     Pneumonia is an infection of the lungs. Most cases are caused by infectionsfrom bacteria or viruses. Pneumonia may be mild or very severe. If it is caused by bacteria, you will be treated with antibiotics. It may take a few weeks to a few months to recover fully from pneumonia, depending on how sick you were and whether your overallhealth is good. Follow-up care is a key part of your treatment and safety. Be sure to make and go to all appointments, and call your doctor if you are having problems. It's also a good idea to know your test results and keep alist of the medicines you take. How can you care for yourself at home? Take your antibiotics exactly as directed. Do not stop taking the medicine just because you are feeling better. You need to take the full course of antibiotics. Take your medicines exactly as prescribed. Call your doctor if you think you are having a problem with your medicine. Get plenty of rest and sleep. You may feel weak and tired for a while, but your energy level will improve with time. To prevent dehydration, drink plenty of fluids. Choose water and other clear liquids. If you have kidney, heart, or liver disease and have to limit fluids, talk with your doctor before you increase the amount of fluids you drink. Take care of your cough so you can rest. A cough that brings up mucus from your lungs is common with pneumonia. It is one way your body gets rid of the infection. But if coughing keeps you from resting or causes severe fatigue and chest-wall pain, talk to your doctor. Your doctor may suggest that you take a medicine to reduce the cough. Use a vaporizer or humidifier to add moisture to your bedroom. Follow the directions for cleaning the machine. Do not smoke or allow others to smoke around you. Smoke will make your cough last longer. If you need help quitting, talk to your doctor about stop-smoking programs and medicines. These can increase your chances of quitting for good. Take an over-the-counter pain medicine, such as acetaminophen (Tylenol), ibuprofen (Advil, Motrin), or naproxen (Aleve). Read and follow all instructions on the label. Do not take two or more pain medicines at the same time unless the doctor told you to. Many pain medicines have acetaminophen, which is Tylenol. Too much acetaminophen (Tylenol) can be harmful. If you were given a spirometer to measure how well your lungs are working, use it as instructed. This can help your doctor tell how your recovery is going. To prevent pneumonia in the future, talk to your doctor about getting a yearly flu vaccine and a pneumococcal vaccine. When should you call for help? Call 911 anytime you think you may need emergency care. For example, call if:    You have severe trouble breathing. Call your doctor now or seek immediate medical care if:    You cough up dark brown or bloody mucus (sputum). You have new or worse trouble breathing. You are dizzy or lightheaded, or you feel like you may faint. Watch closely for changes in your health, and be sure to contact your doctor if:    You have a new or higher fever. You are coughing more deeply or more often. You are not getting better after 2 days (48 hours). You do not get better as expected. Where can you learn more? Go to https://[employer].net. org and sign in to your [employer] account. Enter D336 in the [facility] box to learn more about \"Pneumonia: Care Instructions. \"     If you do not have an account, please click on the \"Sign Up Now\" link. Current as of: March 9, 2022               Content Version: 13.3  © 4363-5381 [employer]. Care instructions adapted under license by [facility] ([facility]).  If you have questions about a medical condition or this instruction, always ask your healthcare professional. [employer] disclaims any warranty or liability for your use of this information.

## 2022-08-22 NOTE — PROGRESS NOTES
Physical Therapy  Facility/Department: [address] MED SURG  Daily Treatment Note    This note serves as patient discharge summary if pt discharges prior to next PT visit      Name: Danyell Andrea  : 1955  MRN: [id number]  Date of Service: 2022    Discharge Recommendations:  Patient would benefit from continued therapy after discharge, Home with Home health PT   Danyell Andrea scored a 18/24 on the AM-PAC short mobility form. Current research shows that an AM-PAC score of 18 or greater is typically associated with a discharge to the patient's home setting. Based on the patient's AM-PAC score and their current functional mobility deficits, it is recommended that the patient have 2-3 sessions per week of Physical Therapy at d/c to increase the patient's independence. At this time, this patient demonstrates the endurance and safety to discharge home with home therapy services and a follow up treatment frequency of 2-3x/wk. Please see assessment section for further patient specific details. PT Equipment Recommendations  Equipment Needed: No  Other: reports he has multiple canes and walkers      Patient Diagnosis(es): There were no encounter diagnoses. Past Medical History:  has a past medical history of Diabetes mellitus ([address] Gallstones, Lymphedema, Neuropathy, Pancreatitis, PVD (peripheral vascular disease) ([address] and Ulcers of both lower legs ([address] Past Surgical History:  has a past surgical history that includes Foot surgery (Left, ); ERCP (14); and Cholecystectomy, laparoscopic (N/A, 8/10/2021). Assessment   Body Structures, Functions, Activity Limitations Requiring Skilled Therapeutic Intervention: Decreased functional mobility   Assessment: The pt is a 75 yo male who presented to the ED with generalized weakness, chills, nonproductive cough, vomiting and difficluty urinating. XR: bilateral opacity and a small pleural effusion.  CT scan shows multiple indeterminate hepatic lesions concerning for metastatic disease. The pt lives alone on the main floor of a 2 story house. The pt reports he never leaves the house and has a HHA 1x/week to get him groceries; he gets MOW, does his laundry by hand and takes only a sponge bath on his own. The pt has a wh walker but normally uses a QC for walking. PMHx: DM, lymphedema, neuropathy, pancreatitis, PVD, L foot sx, choly      Today, the pt required SBA for transfers and RW x 80'. He could potentially benefit from additional services in the home (for ADL assist), but patient likely not open to this option. Pt educated on home health PT but is also refusing this service. AmPac score currently indicates capability to return home. Will continue to follow while hospitalized. Treatment Diagnosis: difficulty walking  Therapy Prognosis: Fair  History: as noted  Barriers to Learning: insight  Requires PT Follow-Up: Yes  Activity Tolerance  Activity Tolerance: Patient tolerated treatment well     Plan   Plan  Plan: 3-5 times per week  Current Treatment Recommendations: Strengthening, ROM, Balance training, Functional mobility training, Transfer training, Gait training, Therapeutic activities, Patient/Caregiver education & training, Safety education & training, Equipment evaluation, education, & procurement  Safety Devices  Type of Devices: Call light within reach, Gait belt, Bed alarm in place, Left in bed  Restraints  Restraints Initially in Place: No     Restrictions  Restrictions/Precautions  Restrictions/Precautions: Fall Risk  Position Activity Restriction  Other position/activity restrictions: IV     Subjective   General  Chart Reviewed: Yes  Patient assessed for rehabilitation services?: Yes  Additional Pertinent Hx: Edgar Holland MD H&P on 8-: The pt is a 75 yo male who presented to the ED with generalized weakness, chills, nonproductive cough, vomiting and difficluty urinating.  XR: bilateral opacity and a small pleural effusion. CT scan shows multiple indeterminate hepatic lesions concerning for metastatic disease. PMHx: DM, lymphedema, neuropathy, pancreatitis, PVD, L foot sx, choly  Response To Previous Treatment: Patient with no complaints from previous session. Family / Caregiver Present: No  Referring Practitioner: Glenn Hernandez MD  Referral Date : 08/15/22  Diagnosis: sepsis, CAP, incidental finding of multiple hepatic lesions  Follows Commands: Within Functional Limits  Subjective  Subjective: Pt agreeable to PT eval and treat. In bed upon arrival, and requesting back to bed as he states BS chair is not comfortable.  Reports feeling overall stronger than when he came into the hospital.         Social/Functional History  Social/Functional History  Lives With: Alone  Type of Home: House  Home Layout: Laundry in basement, Able to Live on Main level with bedroom/bathroom, Two level, Performs ADL's on one level  Home Access: Stairs to enter with rails  Entrance Stairs - Number of Steps: 5+6 step to enter (1st set with one rail and the 2nd set with B rails); but he doesn't go out of the home  Bathroom Shower/Tub: Tub/Shower unit (sponge bathes)  Bathroom Toilet: Handicap height  Bathroom Equipment: 3-in-1 commode  Bathroom Accessibility: Not accessible  Home Equipment: Lugenia Bill, quad, Walker, rolling, Walker, standard  Has the patient had two or more falls in the past year or any fall with injury in the past year?: Yes  Receives Help From: Home health (Tyrone Sincere comes 3x/week for dressing changes; HHA 1x/week for groceries)  ADL Assistance: Independent  Homemaking Assistance:  (HHA gets groceries; the pt does his wash by hand; uses microwave for meals and gets MOW; limited cleaning)  Ambulation Assistance: Independent (uses cane in home and occassionally the walker when feeling weak)  Transfer Assistance: Independent  Active : No  Patient's  Info: the pt doesn't leave the house; gets visting physicians  Occupation: Retired  Type of Occupation: clerical work  Leisure & Hobbies: reading, TV, sleeping  IADL Comments: sleeps on the couch mostly       Cognition   Orientation  Orientation Level: Oriented to person;Oriented to time;Oriented to place;Oriented to situation  Cognition  Overall Cognitive Status: Exceptions  Safety Judgement: Decreased awareness of need for safety  Insights: Decreased awareness of deficits     Objective   Bed mobility  Supine to Sit: Supervision (HOB up, With some momentum)  Sit to Supine: Supervision (bed flat)  Transfers  Sit to Stand: Stand by assistance;Supervision (EOB.)  Stand to sit: Stand by assistance;Supervision (EOB. Good technique)  Ambulation  Surface: level tile  Device: Rolling Walker  Assistance: Stand by assistance  Quality of Gait: slowed pace, wide ASTRID, shortened steps B with limited step clearance; no LOB; trace forward flexed posture. LEs externally rotated and B feet with no arch, rotated outward, with more pressure to instep of feet B. Patient reports this as normal plsturing. Distance: approx [de-identified]'' with multiple turns  More Ambulation?: No  Stairs/Curb  Stairs?: No  Balance  Posture: Fair  Sitting - Static: Good  Sitting - Dynamic: Good  Standing - Static: Good (at RW)  Standing - Dynamic: Good (at RW)     AM-PAC Score  AM-PAC Inpatient Mobility Raw Score : 18 (08/22/22 1338)  AM-PAC Inpatient T-Scale Score : 43.63 (08/22/22 1338)  Mobility Inpatient CMS 0-100% Score: 46.58 (08/22/22 1338)  Mobility Inpatient CMS G-Code Modifier : CK (08/22/22 1338)    Goals  Short Term Goals  Time Frame for Short term goals: upon d/c 8-22-22: goals met  Short term goal 1: Transfers sit <> stand with SBA/Sup consistently. Short term goal 2: Ambulate with wh walker 75 feet with SBA.   Patient Goals   Patient goals : to go home       Education  Patient Education  Education Given To: Patient  Education Provided: Role of Therapy;Plan of Care;Equipment;Transfer Training  Education Provided Comments: d/c rec of home health PT  Education Method: Demonstration;Verbal  Barriers to Learning: Cognition  Education Outcome: Verbalized understanding;Continued education needed      Therapy Time   Individual Concurrent Group Co-treatment   Time In 1046         Time Out 1112         Minutes 26            Gt 15; RADHA Borges, PT  Electronically signed by Kimi Davidson, [address] [facility] [address] (#[phone])  on 8/22/2022 at 5:31 PM

## 2022-08-22 NOTE — DISCHARGE SUMMARY
Hospital Medicine Discharge Summary    Patient ID: Brandon Abel      Patient's PCP: Shawna Helton MD    Admit Date: 8/15/2022     Discharge Date:   08/22/22      Admitting Provider: Isabela Holland MD     Discharge Provider: Isabela Holland MD     Discharge Diagnoses: Active Hospital Problems    Diagnosis     Sepsis ([facility] [address] [A41.9]        The patient was seen and examined on day of discharge and this discharge summary is in conjunction with any daily progress note from day of discharge. Hospital Course:    74 y.o. male who presents to [facility] with generalized weakness and chills. Patient with a past medical history of diabetes mellitus on insulin, history of chronic pancreatitis, peripheral vascular disease, chronic neuropathy. With generalized weakness and chills, also reported some urinary changes. -Was found to have multiple liver lesions, GI consulted. Sepsis. Community-acquired pneumonia  Patient presented to emergency with a short onset symptoms of nausea vomiting and cough. Upon presentation patient was found to be febrile up to 38.2, blood work significant of leukocytosis up to 14 (neutrophilic). Urinalysis unremarkable. X-ray with bilateral opacity and a small pleural effusion. Procalcitonin 0.14. Patient completed 3 days of azithromycin  Plan  -Completed 7 days of Zosyn, will DC antibiotics today  -cultures negative. CT finding of multiple hepatic lesions. Incidentally found multiple indeterminate hepatic lesions concerning for metastatic disease. MRCP with a pancreatic duct (normal lipase bilirubin and liver function). Appreciate GI input, triphasic CT pending  Tumor marker (CEA, CA 19-9 unremarkable )  Triphasic CT with similar findings. Patient was offered EGD and colonoscopy as inpatient however declined. Deconditioning. Declined SNF, home care ordered/      Diabetes mellitus.   Change glargine to 10 units from 20 at home  Continue sliding scale  Blood sugar well controlled     Hypertension. Continue home medications     BPH  Continue tamsulosin. Chronic venous insufficiency ulcers. Consult wound care      Physical Exam Performed:     /71   Pulse 58   Temp 97.6 °F (36.4 °C) (Oral)   Resp 18   Ht 6' 2\" (1.88 m)   Wt 247 lb 9.2 oz (112.3 kg)   SpO2 98%   BMI 31.79 kg/m²       General appearance:  No apparent distress, appears stated age and cooperative. HEENT:  Normal cephalic, atraumatic without obvious deformity. Pupils equal, round, and reactive to light. Extra ocular muscles intact. Conjunctivae/corneas clear. Neck: Supple, with full range of motion. No jugular venous distention. Trachea midline. Respiratory:  Normal respiratory effort. Clear to auscultation, bilaterally without Rales/Wheezes/Rhonchi. Cardiovascular:  Regular rate and rhythm with normal S1/S2 without murmurs, rubs or gallops. Abdomen: Soft, non-tender, non-distended with normal bowel sounds. Musculoskeletal:  No clubbing, cyanosis or edema bilaterally. Full range of motion without deformity. Skin: Skin color, texture, turgor normal.  No rashes or lesions. Neurologic:  Neurovascularly intact without any focal sensory/motor deficits. Cranial nerves: II-XII intact, grossly non-focal.  Psychiatric:  Alert and oriented, thought content appropriate, normal insight  Capillary Refill: Brisk,< 3 seconds   Peripheral Pulses: +2 palpable, equal bilaterally       Labs:  For convenience and continuity at follow-up the following most recent labs are provided:      CBC:    Lab Results   Component Value Date/Time    WBC 8.4 08/22/2022 06:00 AM    HGB 9.4 08/22/2022 06:00 AM    HCT 29.4 08/22/2022 06:00 AM     08/22/2022 06:00 AM       Renal:    Lab Results   Component Value Date/Time     08/22/2022 06:00 AM    K 4.6 08/22/2022 06:00 AM    K 5.0 08/15/2022 07:56 AM     08/22/2022 06:00 AM    CO2 23 08/22/2022 06:00 AM BUN 18 08/22/2022 06:00 AM    CREATININE 0.8 08/22/2022 06:00 AM    CALCIUM 9.2 08/22/2022 06:00 AM    PHOS 3.3 06/12/2020 05:08 AM         Significant Diagnostic Studies    Radiology:   CT ABDOMEN W WO CONTRAST Additional Contrast? None   Final Result   1. In general the hepatic lesions seen on the recent MRI and CT are not well   visualized. A single 11 mm ill-defined subtle low-density lesion is seen in   the liver dome. The lesions remain nonspecific with metastatic disease not   excluded. 2. Chronic stone either within the pancreatic duct in the pancreatic head or   adjacent pancreatic parenchyma. Chronic dilatation of the main pancreatic   duct and signs of chronic pancreatitis. 3. Trace bilateral pleural effusions and mild interstitial edema. MRI ABDOMEN W WO CONTRAST MRCP   Final Result   Poorly defined enhancing nodules in the liver suspicious for metastasis in   the absence of clinical signs of infection. .  There is fatty infiltration of   the liver      Suspected stone in the pancreatic duct with pancreatic ductal dilatation. Scattered side-branch IPMNs are suspected within the pancreas. CT CHEST ABDOMEN PELVIS W CONTRAST   Final Result   1. No acute pulmonary process. 2. Multiple indeterminate hepatic lesions concerning for metastatic disease   of unknown primary. 3. Chronic stable choledocholithiasis. 4. Nonobstructing left nephrolithiasis. 5. Chronic pancreatitis. XR CHEST PORTABLE   Final Result   Diffuse bilateral pulmonary opacity can reflect pulmonary edema or in the   appropriate clinical setting, atypical infection. Small right pleural   effusion.          XR KNEE LEFT (1-2 VIEWS)   Final Result   No acute osseous abnormality right or left knee      Degenerative change, greatest medially and in the patellofemoral joints         XR KNEE RIGHT (1-2 VIEWS)   Final Result   No acute osseous abnormality right or left knee      Degenerative change, greatest medially and in the patellofemoral joints         XR FOOT LEFT (MIN 3 VIEWS)   Final Result   Soft tissue swelling and plantar soft tissue ulceration. Nonspecific cortical thickening is seen of the distal fibula and tibia, for   which infection is among differential considerations. Dedicated tibia fibula   radiograph could be performed for further assessment. XR FOOT RIGHT (MIN 3 VIEWS)   Final Result   Limited evaluation of the digits secondary to flexion. Soft tissue swelling   is otherwise noted without focal erosion. If there is strong clinical   concern for osteomyelitis, MR could be considered. Consults:     IP CONSULT TO GI  IP CONSULT TO HOME CARE NEEDS    Disposition:  home      Condition at Discharge: Stable    Discharge Instructions/Follow-up:    - follow up with Gi     Code Status:  DNR-CCA     Activity: activity as tolerated    Diet: regular diet      Discharge Medications:     Current Discharge Medication List             Details   lisinopril (PRINIVIL;ZESTRIL) 20 MG tablet Take 20 mg by mouth in the morning. docusate sodium (COLACE) 100 MG capsule Take 100 mg by mouth in the morning and 100 mg before bedtime. calcium-vitamin D (OSCAL-500) 500-200 MG-UNIT per tablet Take 1 tablet by mouth in the morning.       insulin glargine (LANTUS SOLOSTAR) 100 UNIT/ML injection pen Inject 20 Units into the skin nightly      Lactobacillus TABS Take 1 tablet by mouth daily      Cyanocobalamin (VITAMIN B-12) 50 MCG LOZG Take 50 mcg by mouth daily      acetaminophen (TYLENOL) 500 MG tablet Take 1 tablet by mouth 4 times daily as needed for Pain  Qty: 120 tablet, Refills: 0      atorvastatin (LIPITOR) 20 MG tablet Take 20 mg by mouth nightly       tamsulosin (FLOMAX) 0.4 MG capsule Take 0.4 mg by mouth every evening       aspirin 81 MG EC tablet Take 1 tablet by mouth daily  Qty: 30 tablet, Refills: 0      spironolactone (ALDACTONE) 25 MG tablet Take 25 mg by mouth daily metFORMIN (GLUCOPHAGE) 500 MG tablet Take 500 mg by mouth 2 times daily (with meals)             Time Spent on discharge is more than 30 minutes in the examination, evaluation, counseling and review of medications and discharge plan. Signed:    Sophia Gresham MD   8/22/2022      Thank you Carey Ortez MD for the opportunity to be involved in this patient's care. If you have any questions or concerns, please feel free to contact me at [phone].

## 2022-08-22 NOTE — PROGRESS NOTES
Pt educated on discharge instructions and follow-up appointments. Pt states no further questions at this time. Pt IV removed with no complications. Pt transported home by [facility].     Electronically signed by Bethany Garcia RN on 8/22/22 at 3:50 PM EDT

## 2022-08-22 NOTE — CARE COORDINATION
DISCHARGE SUMMARY     DATE OF DISCHARGE: 8/22/22    DISCHARGE DESTINATION: Home with [address]: Yes    Agency Name: [facility]    Discharging to Facility/ Agency   Name: Rudi AvYvette Wolfe  Address:  [address] [place] [address]   Phone:  [phone]  Fax:  [phone]     HEMODIALYSIS: No    NEW DME ORDERED: NO    TRANSPORTATION: [facility] Name:  [facility]     Time: 3:45  PM    Phone Number: [phone]  Electronically signed by Joan Marroquin RN on 8/22/2022 at 12:41 PM

## 2022-08-22 NOTE — CARE COORDINATION
8/22 Tentative Ambulance transport set up for 3:45 PM w/CMT. Active with [facility]. Need HHC orders and MAIKOL. Refuses SNF.  Electronically signed by Maris Schmidt RN on 8/22/2022 at 9:58 AM

## 2022-08-22 NOTE — PROGRESS NOTES
Bandages changed prior to discharge to align with home schedule of Monday, Wednesday, Friday. Bandages on bilateral feet changed per wound care order. Wounds cleansed with Dakins solution, covered with silver alginate, covered with ABD, secured with rolled gauze.     Electronically signed by Mignon Barron RN on 8/22/22 at 3:49 PM EDT

## 2022-09-06 ENCOUNTER — APPOINTMENT (OUTPATIENT)
Dept: GENERAL RADIOLOGY | Age: 67
DRG: 854 | End: 2022-09-06
Payer: MEDICARE

## 2022-09-06 ENCOUNTER — HOSPITAL ENCOUNTER (INPATIENT)
Age: 67
LOS: 7 days | Discharge: HOME | DRG: 854 | End: 2022-09-13
Attending: EMERGENCY MEDICINE | Admitting: STUDENT IN AN ORGANIZED HEALTH CARE EDUCATION/TRAINING PROGRAM
Payer: MEDICARE

## 2022-09-06 ENCOUNTER — APPOINTMENT (OUTPATIENT)
Dept: CT IMAGING | Age: 67
DRG: 854 | End: 2022-09-06
Payer: MEDICARE

## 2022-09-06 DIAGNOSIS — A41.9 SEPTICEMIA (HCC): Primary | ICD-10-CM

## 2022-09-06 DIAGNOSIS — S81.809A MULTIPLE OPENS WOUND OF LOWER EXTREMITY, UNSPECIFIED LATERALITY, INITIAL ENCOUNTER: ICD-10-CM

## 2022-09-06 DIAGNOSIS — R42 VERTIGO: ICD-10-CM

## 2022-09-06 DIAGNOSIS — R05.9 COUGH: ICD-10-CM

## 2022-09-06 DIAGNOSIS — R06.02 SHORTNESS OF BREATH: ICD-10-CM

## 2022-09-06 PROBLEM — R53.1 GENERALIZED WEAKNESS: Status: ACTIVE | Noted: 2022-09-06

## 2022-09-06 LAB
A/G RATIO: 1.1 (ref 1.1–2.2)
ALBUMIN SERPL-MCNC: 3.9 G/DL (ref 3.4–5)
ALP BLD-CCNC: 80 U/L (ref 40–129)
ALT SERPL-CCNC: 6 U/L (ref 10–40)
ANION GAP SERPL CALCULATED.3IONS-SCNC: 11 MMOL/L (ref 3–16)
AST SERPL-CCNC: 9 U/L (ref 15–37)
BASOPHILS ABSOLUTE: 0 K/UL (ref 0–0.2)
BASOPHILS RELATIVE PERCENT: 0.4 %
BILIRUB SERPL-MCNC: 0.6 MG/DL (ref 0–1)
BILIRUBIN URINE: NEGATIVE
BLOOD, URINE: NEGATIVE
BUN BLDV-MCNC: 19 MG/DL (ref 7–20)
CALCIUM SERPL-MCNC: 9.4 MG/DL (ref 8.3–10.6)
CHLORIDE BLD-SCNC: 102 MMOL/L (ref 99–110)
CLARITY: CLEAR
CO2: 21 MMOL/L (ref 21–32)
COLOR: YELLOW
CREAT SERPL-MCNC: 1 MG/DL (ref 0.8–1.3)
EOSINOPHILS ABSOLUTE: 0.1 K/UL (ref 0–0.6)
EOSINOPHILS RELATIVE PERCENT: 0.8 %
FERRITIN: 552.3 NG/ML (ref 30–400)
GFR AFRICAN AMERICAN: >60
GFR NON-AFRICAN AMERICAN: >60
GLUCOSE BLD-MCNC: 125 MG/DL (ref 70–99)
GLUCOSE BLD-MCNC: 152 MG/DL (ref 70–99)
GLUCOSE URINE: NEGATIVE MG/DL
HCT VFR BLD CALC: 26.2 % (ref 40.5–52.5)
HCT VFR BLD CALC: 27.3 % (ref 40.5–52.5)
HEMOGLOBIN: 8.5 G/DL (ref 13.5–17.5)
IMMATURE RETIC FRACT: 0.44 (ref 0.21–0.37)
IRON SATURATION: 10 % (ref 20–50)
IRON: 24 UG/DL (ref 59–158)
KETONES, URINE: NEGATIVE MG/DL
LACTIC ACID, SEPSIS: 1.2 MMOL/L (ref 0.4–1.9)
LACTIC ACID, SEPSIS: 2 MMOL/L (ref 0.4–1.9)
LEUKOCYTE ESTERASE, URINE: NEGATIVE
LYMPHOCYTES ABSOLUTE: 0.5 K/UL (ref 1–5.1)
LYMPHOCYTES RELATIVE PERCENT: 5.8 %
MCH RBC QN AUTO: 23.9 PG (ref 26–34)
MCHC RBC AUTO-ENTMCNC: 32.4 G/DL (ref 31–36)
MCV RBC AUTO: 73.7 FL (ref 80–100)
MICROSCOPIC EXAMINATION: NORMAL
MONOCYTES ABSOLUTE: 0.3 K/UL (ref 0–1.3)
MONOCYTES RELATIVE PERCENT: 4.5 %
NEUTROPHILS ABSOLUTE: 6.8 K/UL (ref 1.7–7.7)
NEUTROPHILS RELATIVE PERCENT: 88.5 %
NITRITE, URINE: NEGATIVE
PDW BLD-RTO: 18.9 % (ref 12.4–15.4)
PERFORMED ON: ABNORMAL
PH UA: 5.5 (ref 5–8)
PLATELET # BLD: 241 K/UL (ref 135–450)
PMV BLD AUTO: 7.4 FL (ref 5–10.5)
POTASSIUM REFLEX MAGNESIUM: 4.5 MMOL/L (ref 3.5–5.1)
PROTEIN UA: NEGATIVE MG/DL
RAPID INFLUENZA  B AGN: NEGATIVE
RAPID INFLUENZA A AGN: NEGATIVE
RBC # BLD: 3.55 M/UL (ref 4.2–5.9)
REPORT: NORMAL
RESPIRATORY PANEL PCR: NORMAL
RETICULOCYTE ABSOLUTE COUNT: 0.07 M/UL
RETICULOCYTE COUNT PCT: 1.91 % (ref 0.5–2.18)
SARS-COV-2, NAAT: NOT DETECTED
SODIUM BLD-SCNC: 134 MMOL/L (ref 136–145)
SPECIFIC GRAVITY UA: 1.02 (ref 1–1.03)
TOTAL IRON BINDING CAPACITY: 242 UG/DL (ref 260–445)
TOTAL PROTEIN: 7.3 G/DL (ref 6.4–8.2)
URINE REFLEX TO CULTURE: NORMAL
URINE TYPE: NORMAL
UROBILINOGEN, URINE: 1 E.U./DL
WBC # BLD: 7.7 K/UL (ref 4–11)

## 2022-09-06 PROCEDURE — 2580000003 HC RX 258: Performed by: PHYSICIAN ASSISTANT

## 2022-09-06 PROCEDURE — 82728 ASSAY OF FERRITIN: CPT

## 2022-09-06 PROCEDURE — 6360000002 HC RX W HCPCS: Performed by: PHYSICIAN ASSISTANT

## 2022-09-06 PROCEDURE — 85025 COMPLETE CBC W/AUTO DIFF WBC: CPT

## 2022-09-06 PROCEDURE — 96365 THER/PROPH/DIAG IV INF INIT: CPT

## 2022-09-06 PROCEDURE — 82746 ASSAY OF FOLIC ACID SERUM: CPT

## 2022-09-06 PROCEDURE — 87186 SC STD MICRODIL/AGAR DIL: CPT

## 2022-09-06 PROCEDURE — 36415 COLL VENOUS BLD VENIPUNCTURE: CPT

## 2022-09-06 PROCEDURE — 80053 COMPREHEN METABOLIC PANEL: CPT

## 2022-09-06 PROCEDURE — 83540 ASSAY OF IRON: CPT

## 2022-09-06 PROCEDURE — 0202U NFCT DS 22 TRGT SARS-COV-2: CPT

## 2022-09-06 PROCEDURE — 87635 SARS-COV-2 COVID-19 AMP PRB: CPT

## 2022-09-06 PROCEDURE — 87150 DNA/RNA AMPLIFIED PROBE: CPT

## 2022-09-06 PROCEDURE — 82607 VITAMIN B-12: CPT

## 2022-09-06 PROCEDURE — 70450 CT HEAD/BRAIN W/O DYE: CPT

## 2022-09-06 PROCEDURE — 81003 URINALYSIS AUTO W/O SCOPE: CPT

## 2022-09-06 PROCEDURE — 87077 CULTURE AEROBIC IDENTIFY: CPT

## 2022-09-06 PROCEDURE — 87040 BLOOD CULTURE FOR BACTERIA: CPT

## 2022-09-06 PROCEDURE — 83550 IRON BINDING TEST: CPT

## 2022-09-06 PROCEDURE — 96375 TX/PRO/DX INJ NEW DRUG ADDON: CPT

## 2022-09-06 PROCEDURE — 6360000004 HC RX CONTRAST MEDICATION: Performed by: EMERGENCY MEDICINE

## 2022-09-06 PROCEDURE — 6370000000 HC RX 637 (ALT 250 FOR IP): Performed by: STUDENT IN AN ORGANIZED HEALTH CARE EDUCATION/TRAINING PROGRAM

## 2022-09-06 PROCEDURE — 2580000003 HC RX 258: Performed by: STUDENT IN AN ORGANIZED HEALTH CARE EDUCATION/TRAINING PROGRAM

## 2022-09-06 PROCEDURE — 6370000000 HC RX 637 (ALT 250 FOR IP): Performed by: PHYSICIAN ASSISTANT

## 2022-09-06 PROCEDURE — 1200000000 HC SEMI PRIVATE

## 2022-09-06 PROCEDURE — 87804 INFLUENZA ASSAY W/OPTIC: CPT

## 2022-09-06 PROCEDURE — 83605 ASSAY OF LACTIC ACID: CPT

## 2022-09-06 PROCEDURE — 85045 AUTOMATED RETICULOCYTE COUNT: CPT

## 2022-09-06 PROCEDURE — 71260 CT THORAX DX C+: CPT | Performed by: STUDENT IN AN ORGANIZED HEALTH CARE EDUCATION/TRAINING PROGRAM

## 2022-09-06 PROCEDURE — 71045 X-RAY EXAM CHEST 1 VIEW: CPT

## 2022-09-06 PROCEDURE — 99285 EMERGENCY DEPT VISIT HI MDM: CPT

## 2022-09-06 PROCEDURE — 93005 ELECTROCARDIOGRAM TRACING: CPT | Performed by: PHYSICIAN ASSISTANT

## 2022-09-06 RX ORDER — DOCUSATE SODIUM 100 MG/1
100 CAPSULE, LIQUID FILLED ORAL 2 TIMES DAILY
Status: DISCONTINUED | OUTPATIENT
Start: 2022-09-06 | End: 2022-09-13 | Stop reason: HOSPADM

## 2022-09-06 RX ORDER — ASPIRIN 81 MG/1
81 TABLET ORAL DAILY
Status: DISCONTINUED | OUTPATIENT
Start: 2022-09-07 | End: 2022-09-13 | Stop reason: HOSPADM

## 2022-09-06 RX ORDER — ONDANSETRON 2 MG/ML
4 INJECTION INTRAMUSCULAR; INTRAVENOUS EVERY 6 HOURS PRN
Status: DISCONTINUED | OUTPATIENT
Start: 2022-09-06 | End: 2022-09-13 | Stop reason: HOSPADM

## 2022-09-06 RX ORDER — ASPIRIN 81 MG/1
81 TABLET ORAL DAILY
COMMUNITY

## 2022-09-06 RX ORDER — INSULIN LISPRO 100 [IU]/ML
0-4 INJECTION, SOLUTION INTRAVENOUS; SUBCUTANEOUS NIGHTLY
Status: DISCONTINUED | OUTPATIENT
Start: 2022-09-06 | End: 2022-09-13 | Stop reason: HOSPADM

## 2022-09-06 RX ORDER — POLYETHYLENE GLYCOL 3350 17 G/17G
17 POWDER, FOR SOLUTION ORAL DAILY PRN
Status: DISCONTINUED | OUTPATIENT
Start: 2022-09-06 | End: 2022-09-13 | Stop reason: HOSPADM

## 2022-09-06 RX ORDER — SODIUM CHLORIDE 0.9 % (FLUSH) 0.9 %
5-40 SYRINGE (ML) INJECTION PRN
Status: DISCONTINUED | OUTPATIENT
Start: 2022-09-06 | End: 2022-09-13 | Stop reason: HOSPADM

## 2022-09-06 RX ORDER — DEXTROSE MONOHYDRATE 100 MG/ML
INJECTION, SOLUTION INTRAVENOUS CONTINUOUS PRN
Status: DISCONTINUED | OUTPATIENT
Start: 2022-09-06 | End: 2022-09-13 | Stop reason: HOSPADM

## 2022-09-06 RX ORDER — ACETAMINOPHEN 650 MG/1
650 SUPPOSITORY RECTAL EVERY 6 HOURS PRN
Status: DISCONTINUED | OUTPATIENT
Start: 2022-09-06 | End: 2022-09-13 | Stop reason: HOSPADM

## 2022-09-06 RX ORDER — ACETAMINOPHEN 325 MG/1
650 TABLET ORAL ONCE
Status: COMPLETED | OUTPATIENT
Start: 2022-09-06 | End: 2022-09-06

## 2022-09-06 RX ORDER — SODIUM CHLORIDE 9 MG/ML
INJECTION, SOLUTION INTRAVENOUS PRN
Status: DISCONTINUED | OUTPATIENT
Start: 2022-09-06 | End: 2022-09-13 | Stop reason: HOSPADM

## 2022-09-06 RX ORDER — ACETAMINOPHEN 500 MG
500 TABLET ORAL EVERY 6 HOURS PRN
COMMUNITY

## 2022-09-06 RX ORDER — ACETAMINOPHEN 325 MG/1
650 TABLET ORAL EVERY 6 HOURS PRN
Status: DISCONTINUED | OUTPATIENT
Start: 2022-09-06 | End: 2022-09-13 | Stop reason: HOSPADM

## 2022-09-06 RX ORDER — INSULIN LISPRO 100 [IU]/ML
0-4 INJECTION, SOLUTION INTRAVENOUS; SUBCUTANEOUS
Status: DISCONTINUED | OUTPATIENT
Start: 2022-09-07 | End: 2022-09-13 | Stop reason: HOSPADM

## 2022-09-06 RX ORDER — LACTOBACILLUS RHAMNOSUS GG 10B CELL
1 CAPSULE ORAL DAILY
COMMUNITY

## 2022-09-06 RX ORDER — SODIUM CHLORIDE 0.9 % (FLUSH) 0.9 %
5-40 SYRINGE (ML) INJECTION EVERY 12 HOURS SCHEDULED
Status: DISCONTINUED | OUTPATIENT
Start: 2022-09-06 | End: 2022-09-13 | Stop reason: HOSPADM

## 2022-09-06 RX ORDER — 0.9 % SODIUM CHLORIDE 0.9 %
1000 INTRAVENOUS SOLUTION INTRAVENOUS ONCE
Status: COMPLETED | OUTPATIENT
Start: 2022-09-06 | End: 2022-09-06

## 2022-09-06 RX ORDER — SODIUM CHLORIDE 9 MG/ML
INJECTION, SOLUTION INTRAVENOUS CONTINUOUS
Status: ACTIVE | OUTPATIENT
Start: 2022-09-06 | End: 2022-09-07

## 2022-09-06 RX ORDER — INSULIN GLARGINE 100 [IU]/ML
10 INJECTION, SOLUTION SUBCUTANEOUS NIGHTLY
Status: DISCONTINUED | OUTPATIENT
Start: 2022-09-06 | End: 2022-09-13 | Stop reason: HOSPADM

## 2022-09-06 RX ORDER — ENOXAPARIN SODIUM 100 MG/ML
30 INJECTION SUBCUTANEOUS 2 TIMES DAILY
Status: DISCONTINUED | OUTPATIENT
Start: 2022-09-07 | End: 2022-09-13 | Stop reason: HOSPADM

## 2022-09-06 RX ORDER — TAMSULOSIN HYDROCHLORIDE 0.4 MG/1
0.4 CAPSULE ORAL EVERY EVENING
Status: DISCONTINUED | OUTPATIENT
Start: 2022-09-06 | End: 2022-09-13 | Stop reason: HOSPADM

## 2022-09-06 RX ORDER — ATORVASTATIN CALCIUM 20 MG/1
20 TABLET, FILM COATED ORAL NIGHTLY
Status: DISCONTINUED | OUTPATIENT
Start: 2022-09-06 | End: 2022-09-13 | Stop reason: HOSPADM

## 2022-09-06 RX ORDER — LISINOPRIL 5 MG/1
5 TABLET ORAL DAILY
Status: DISCONTINUED | OUTPATIENT
Start: 2022-09-07 | End: 2022-09-13 | Stop reason: HOSPADM

## 2022-09-06 RX ADMIN — TAMSULOSIN HYDROCHLORIDE 0.4 MG: 0.4 CAPSULE ORAL at 23:49

## 2022-09-06 RX ADMIN — INSULIN GLARGINE 10 UNITS: 100 INJECTION, SOLUTION SUBCUTANEOUS at 23:49

## 2022-09-06 RX ADMIN — ATORVASTATIN CALCIUM 20 MG: 20 TABLET, FILM COATED ORAL at 23:49

## 2022-09-06 RX ADMIN — VANCOMYCIN HYDROCHLORIDE 1500 MG: 10 INJECTION, POWDER, LYOPHILIZED, FOR SOLUTION INTRAVENOUS at 19:40

## 2022-09-06 RX ADMIN — ACETAMINOPHEN 650 MG: 325 TABLET ORAL at 16:40

## 2022-09-06 RX ADMIN — CEFEPIME 2000 MG: 2 INJECTION, POWDER, FOR SOLUTION INTRAVENOUS at 18:39

## 2022-09-06 RX ADMIN — IOPAMIDOL 75 ML: 755 INJECTION, SOLUTION INTRAVENOUS at 20:08

## 2022-09-06 RX ADMIN — SODIUM CHLORIDE 1000 ML: 9 INJECTION, SOLUTION INTRAVENOUS at 18:39

## 2022-09-06 RX ADMIN — DOCUSATE SODIUM 100 MG: 100 CAPSULE, LIQUID FILLED ORAL at 23:49

## 2022-09-06 RX ADMIN — SODIUM CHLORIDE: 9 INJECTION, SOLUTION INTRAVENOUS at 20:39

## 2022-09-06 ASSESSMENT — ENCOUNTER SYMPTOMS
EYES NEGATIVE: 1
CHEST TIGHTNESS: 0
SHORTNESS OF BREATH: 1
NAUSEA: 1
VOMITING: 1
COUGH: 1
ABDOMINAL PAIN: 0

## 2022-09-06 ASSESSMENT — PAIN SCALES - GENERAL: PAINLEVEL_OUTOF10: 0

## 2022-09-06 NOTE — ED PROVIDER NOTES
[facility]      Pt Name: Dae Sheldon  MRN: [id number]  [place] 1955  Date of evaluation: 9/6/2022  Provider: Jen Muhammad, [address] [place] [address]  Chief Complaint   Patient presents with    Pneumonia     Pt in via EMS from home with weakness and shortness of breath that \"has worsened the last week\". Pt states that he was diagnosed with pneumonia a \"month ago\". Pt alert and oriented at this time with no signs of distress noted. Pt denies any pain. Pt also reports \"fever that started today\". I have fully participated in the care of Dae Sheldon and have had a face-to-face evaluation. I have reviewed and agree with all pertinent clinical information, and midlevel provider's history, and physical exam. I have also reviewed the labs, EKG, and imaging studies and treatment plan. I have also reviewed and agree with the medications, allergies and past medical history section for this Dae Sheldon. I agree with the diagnosis, and I concur. I wore personal protective equipment when I was in the room the entire time. This includes gloves, N95 mask, face shield, and a glove over my stethoscope for protection. Past Medical History:   Diagnosis Date    Diabetes mellitus ([address]     Gallstones     Lymphedema     Neuropathy     Pancreatitis     PVD (peripheral vascular disease) ([address]     Ulcers of both lower legs ([address]     bilateral feet       MDM:  Dae Sheldon is a 74 y.o. male who presents with cough, vertigo, nausea, vomiting. He has a history of vertigo. He has been worked up multiple times for this. He is having another episode of it. He was just discharged to the hospital on 8/22/2022 for pneumonia. He states his fever has come back he has a temperature one 1.5 and has been having increased cough and shortness of breath. He states he has had vertigo since 1 year ago at this time. It comes and goes intermittently.   Physical exam reveals no cause for his symptoms. Patient is morbidly obese and in poor health. He is speaking full sentences. Lungs are clear to auscultation equal bilaterally but breath sounds are decreased due to body habitus. Abdomen soft and nontender. Extremities have 2-3+ edema and have chronic changes associated with them. No evidence of acute cellulitis is noted at this time. Laboratory work was returned with a sodium of 134 and a glucose of 152. His hemoglobin is 8.5. White count was normal.  His EKG was nonacute. CT did not show any evidence of pulmonary embolus. Patient does meet sepsis criteria. Therefore, patient was admitted to the hospital for further evaluation and treatment. Hospitalist was notified by the physician assistant.     Vitals:    09/06/22 2242   BP: (!) 117/54   Pulse: 75   Resp: 16   Temp: 98.5 °F (36.9 °C)   SpO2: 97%       Lab results  Labs Reviewed   CBC WITH AUTO DIFFERENTIAL - Abnormal; Notable for the following components:       Result Value    RBC 3.55 (*)     Hemoglobin 8.5 (*)     Hematocrit 26.2 (*)     MCV 73.7 (*)     MCH 23.9 (*)     RDW 18.9 (*)     Lymphocytes Absolute 0.5 (*)     All other components within normal limits   COMPREHENSIVE METABOLIC PANEL W/ REFLEX TO MG FOR LOW K - Abnormal; Notable for the following components:    Sodium 134 (*)     Glucose 152 (*)     ALT 6 (*)     AST 9 (*)     All other components within normal limits   LACTATE, SEPSIS - Abnormal; Notable for the following components:    Lactic Acid, Sepsis 2.0 (*)     All other components within normal limits   IRON AND TIBC - Abnormal; Notable for the following components:    Iron 24 (*)     TIBC 242 (*)     Iron Saturation 10 (*)     All other components within normal limits   FERRITIN - Abnormal; Notable for the following components:    Ferritin 552.3 (*)     All other components within normal limits   RETICULOCYTES - Abnormal; Notable for the following components:    Immature Retic Fract 0.44 (*) Hematocrit 27.3 (*)     All other components within normal limits   COVID-19, RAPID   RAPID INFLUENZA A/B ANTIGENS   RESPIRATORY PANEL, MOLECULAR, WITH COVID-19    Narrative:     ORDER#: [id number]                          ORDERED BY: Via NI Baron  SOURCE: Nasopharyngeal                     COLLECTED:  09/06/22 20:31  ANTIBIOTICS AT [facility].:                      RECEIVED :  09/06/22 20:38   RESPIRATORY PANEL FILM ARRAY REPORT   CULTURE, BLOOD 1   CULTURE, BLOOD 2   URINALYSIS WITH REFLEX TO CULTURE   LACTATE, SEPSIS   BLOOD OCCULT STOOL SCREEN #1   VITAMIN B12 & FOLATE   PROCALCITONIN   CBC WITH AUTO DIFFERENTIAL   COMPREHENSIVE METABOLIC PANEL   MAGNESIUM   POCT GLUCOSE   POCT GLUCOSE       EKG Interpretation    Interpreted by emergency department physician  Time performed: 3716  Time read: 3540    Rhythm: Sinus  Ventricular Rate: 90  QRS Axis: -58  Ectopy: None  Conduction: Sinus rhythm with first-degree AV block and right bundle branch block with a left anterior fascicular block  ST Segments: Consistent with right bundle branch block  T Waves: Consistent with right bundle branch block  Q Waves: None noted    Other findings: Motion artifact but EKG is readable    Compared to EKG on: 8/15/2022 and is different because he had a short SC interval on 8/15/2022. Otherwise EKG is unchanged. 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 There is motion artifact but EKG is readable. This is compared to an EKG on 8/15/2022 and is changed because he had a short SC interval on 8/15/2022. The EKG is otherwise unchanged. Josephine 149, DO        Wilian MitchelltonDO  09/06/22 1608      Radiology results  CT CHEST PULMONARY EMBOLISM W CONTRAST   Final Result   1. No CT evidence of a pulmonary embolism. 2. No acute intrapulmonary findings.          XR CHEST PORTABLE   Final Result   No radiographic evidence of acute pulmonary disease. CT HEAD WO CONTRAST   Final Result   No acute intracranial abnormality.          VL Extremity Venous Bilateral    (Results Pending)         Medications   0.9 % sodium chloride infusion ( IntraVENous New Bag 9/6/22 2039)   aspirin EC tablet 81 mg (has no administration in time range)   atorvastatin (LIPITOR) tablet 20 mg (has no administration in time range)   insulin glargine (LANTUS) injection vial 10 Units (has no administration in time range)   docusate sodium (COLACE) capsule 100 mg (has no administration in time range)   lisinopril (PRINIVIL;ZESTRIL) tablet 5 mg (has no administration in time range)   tamsulosin (FLOMAX) capsule 0.4 mg (has no administration in time range)   sodium chloride flush 0.9 % injection 5-40 mL (has no administration in time range)   sodium chloride flush 0.9 % injection 5-40 mL (has no administration in time range)   0.9 % sodium chloride infusion (has no administration in time range)   enoxaparin Sodium (LOVENOX) injection 30 mg (has no administration in time range)   polyethylene glycol (GLYCOLAX) packet 17 g (has no administration in time range)   acetaminophen (TYLENOL) tablet 650 mg (has no administration in time range)     Or   acetaminophen (TYLENOL) suppository 650 mg (has no administration in time range)   insulin lispro (HUMALOG) injection vial 0-4 Units (has no administration in time range)   insulin lispro (HUMALOG) injection vial 0-4 Units (has no administration in time range)   glucose chewable tablet 16 g (has no administration in time range)   dextrose bolus 10% 125 mL (has no administration in time range)     Or   dextrose bolus 10% 250 mL (has no administration in time range)   glucagon (rDNA) injection 1 mg (has no administration in time range)   dextrose 10 % infusion (has no administration in time range)   perflutren lipid microspheres (DEFINITY) injection 1.65 mg (has no administration in time range)   ondansetron (ZOFRAN) injection 4 mg (has no administration in time range)   cefepime (MAXIPIME) 2000 mg IVPB minibag (has no administration in time range)   acetaminophen (TYLENOL) tablet 650 mg (650 mg Oral Given 9/6/22 1640)   0.9 % sodium chloride bolus (0 mLs IntraVENous Stopped 9/6/22 1941)   vancomycin (VANCOCIN) 1500 mg in dextrose 5 % 250 mL IVPB (0 mg IntraVENous Stopped 9/6/22 2110)   iopamidol (ISOVUE-370) 76 % injection 75 mL (75 mLs IntraVENous Given 9/6/22 2008)       Current Discharge Medication List          The patient's blood pressure was found to be elevated according to CMS/Medicare and the Affordable Care Act/[facility] criteria. Elevated blood pressure could occur because of pain or anxiety or other reasons and does not mean that they need to have their blood pressure treated or medications otherwise adjusted. However, this could also be a sign that they will need to have their blood pressure treated or medications changed. The patient was instructed to follow up closely with their personal physician to have their blood pressure rechecked. The patient was instructed to take a list of recent blood pressure readings to their next visit with their personal physician. IMPRESSIONS:  1. Septicemia (HCC)    2. Vertigo    3. Cough    4.  Shortness of breath    5. Multiple opens wound of lower extremity, unspecified laterality, initial encounter               Jero Mata DO  09/06/22 9964

## 2022-09-06 NOTE — ED PROVIDER NOTES
[address]  [address] [address]  Dept: [phone]  Loc: [phone]  eMERGENCYdEPARTMENT eNCOUnter      Pt Name: Lang Goldberg  MRN: [id number]  [place] 1955  Date of evaluation: 9/6/2022  Provider:Wanda Farris PA-C    CHIEF COMPLAINT       Chief Complaint   Patient presents with    Pneumonia     Pt in via EMS from home with weakness and shortness of breath that \"has worsened the last week\". Pt states that he was diagnosed with pneumonia a \"month ago\". Pt alert and oriented at this time with no signs of distress noted. Pt denies any pain. Pt also reports \"fever that started today\". CRITICAL CARE TIME   Total Critical Care time was 20 minutes, excluding separately reportable procedures. There was a high probability of clinically significant/life threatening deterioration in the patient's condition which required my urgentintervention. HISTORY OF PRESENT ILLNESS  (Location/Symptom, Timing/Onset, Context/Setting, Quality, Duration,Modifying Factors, Severity.)   Lang Goldberg is a 74 y.o. male who presents to the emergency department by EMS. Patient was admitted to [facility] AT [place] from a 8/15-8/22 for sepsis secondary to bacterial pneumonia. He finished course antibiotics while hospitalized. He states since going home he has felt relatively well. He has had increased dizziness since being discharged home. He states he struggled with vertigo symptoms intermittently for the past year and chronic lightheadedness. He states has been more frequent over the past week. He is afraid he can stand up and walk given severity of symptoms. This is intermittent. Today he began feeling nauseous, short of breath and ill. He went to the bathroom and had 1 episode of nonbloody emesis. After vomiting began feeling lightheaded. He took a Tylenol for chills and contacted on-call nurse.   He was directed to the ED for further evaluation as they were concerned for recurrence of pneumonia. Nursing Notes were reviewedand agreed with or any disagreements were addressed in the HPI. REVIEW OF SYSTEMS    (2-9 systems for level 4, 10 or more for level 5)     Review of Systems   Constitutional:  Positive for chills and fatigue. HENT: Negative. Eyes: Negative. Respiratory:  Positive for cough and shortness of breath. Negative for chest tightness. Cardiovascular:  Negative for chest pain. Gastrointestinal:  Positive for nausea and vomiting. Negative for abdominal pain. Genitourinary: Negative. Musculoskeletal:  Negative for arthralgias and myalgias. Skin:  Positive for wound. Neurological:  Positive for dizziness. Psychiatric/Behavioral:  Negative for behavioral problems and confusion. Except as noted above the remainder of the review of systems was reviewed and negative. PAST MEDICAL HISTORY         Diagnosis Date    Diabetes mellitus ([address]     Gallstones     Lymphedema     Neuropathy     Pancreatitis     PVD (peripheral vascular disease) ([address]     Ulcers of both lower legs ([address]     bilateral feet       SURGICAL HISTORY           Procedure Laterality Date    CHOLECYSTECTOMY, LAPAROSCOPIC N/A 8/10/2021    LAPAROSCOPIC CHOLECYSTECTOMY WITH CHOLANGIOGRAM performed by Jose J Bloom MD at [place] [address]    ERCP  4/8/14    LITHOTRYPSY & PANCREATIC STENT PLACEMENT    FOOT SURGERY Left 1967       CURRENT MEDICATIONS     [unfilled]    ALLERGIES     Patient has no known allergies. FAMILY HISTORY           Problem Relation Age of Onset    Colon Cancer Mother         PVD s/p toe amputation by Dr Eunice Smith Father      Family Status   Relation Name Status    Mother  (Not Specified)    Father  (Not Specified)        SOCIAL HISTORY      reports that he has never smoked.  He has never used smokeless tobacco. He reports that he does not drink alcohol and does not use drugs.    PHYSICAL EXAM    (up to 7 for level 4, 8 or more for level 5)     ED Triage Vitals [09/06/22 1515]   Enc Vitals Group      /74      Heart Rate 95      Resp 22      Temp (!) 102.4 °F (39.1 °C)      Temp Source Oral      SpO2 98 %      Weight       Height       Head Circumference       Peak Flow       Pain Score       Pain Loc       Pain Edu? Excl. in [address]? Physical Exam  HENT:      Head: Normocephalic and atraumatic. Cardiovascular:      Rate and Rhythm: Normal rate. Pulmonary:      Effort: Pulmonary effort is normal. No respiratory distress. Breath sounds: Normal breath sounds. No stridor. No wheezing or rales. Abdominal:      Palpations: Abdomen is soft. Tenderness: There is no abdominal tenderness. Musculoskeletal:         General: Normal range of motion. Cervical back: Normal range of motion and neck supple. Skin:     General: Skin is warm. Comments: Wounds and lower extremity chronic skin changes as below   Neurological:      General: No focal deficit present. Mental Status: He is alert. Comments: Negative test of skew   Psychiatric:         Mood and Affect: Mood normal.         Behavior: Behavior normal.               DIAGNOSTIC RESULTS     EKG: All EKG's are interpreted by the Emergency Department Physician who either signs or Co-signs this chart in the absence of a cardiologist.    RADIOLOGY:   Non-plain film images such as CT, Ultrasound and MRI are read by the radiologist. Plain radiographic images are visualized and preliminarilyinterpreted by the emergency physician with the below findings:    Interpretation per the Radiologist below,if available at the time of this note:    XR CHEST PORTABLE   Final Result   No radiographic evidence of acute pulmonary disease. CT HEAD WO CONTRAST   Final Result   No acute intracranial abnormality.          CT CHEST PULMONARY EMBOLISM W CONTRAST    (Results Pending)         LABS:  Labs Reviewed CBC WITH AUTO DIFFERENTIAL - Abnormal; Notable for the following components:       Result Value    RBC 3.55 (*)     Hemoglobin 8.5 (*)     Hematocrit 26.2 (*)     MCV 73.7 (*)     MCH 23.9 (*)     RDW 18.9 (*)     Lymphocytes Absolute 0.5 (*)     All other components within normal limits   COMPREHENSIVE METABOLIC PANEL W/ REFLEX TO MG FOR LOW K - Abnormal; Notable for the following components:    Sodium 134 (*)     Glucose 152 (*)     ALT 6 (*)     AST 9 (*)     All other components within normal limits   LACTATE, SEPSIS - Abnormal; Notable for the following components:    Lactic Acid, Sepsis 2.0 (*)     All other components within normal limits   RETICULOCYTES - Abnormal; Notable for the following components:    Immature Retic Fract 0.44 (*)     Hematocrit 27.3 (*)     All other components within normal limits   COVID-19, RAPID   CULTURE, BLOOD 1   CULTURE, BLOOD 2   RAPID INFLUENZA A/B ANTIGENS   RESPIRATORY PANEL, MOLECULAR, WITH COVID-19   URINALYSIS WITH REFLEX TO CULTURE   LACTATE, SEPSIS   IRON AND TIBC   FERRITIN   BLOOD OCCULT STOOL SCREEN #1   VITAMIN B12 & FOLATE   PROCALCITONIN       All other labs were within normal range or not returned as of this dictation. EMERGENCY DEPARTMENT COURSE and DIFFERENTIAL DIAGNOSIS/MDM:   Vitals:    Vitals:    09/06/22 1900 09/06/22 1915 09/06/22 1927 09/06/22 2030   BP: (!) 103/55 (!) 104/50  120/62   Pulse: 79 80  75   Resp: 18 22  20   Temp:   98.9 °F (37.2 °C)    TempSrc:   Oral    SpO2: 96% 95%  98%   Weight:           MDM    Patient presents ED with HPI noted above. Patient met SIRS criteria with fever and respiratory rate on arrival.  He has no leukocytosis. Chest x-ray clear. Urine showed no evidence of infection. Lactic acid 2.0. Repeat lactic acid 1.2. Once COVID resulted negative did cover him empirically with cefepime and vancomycin. He was recently admitted for pneumonia.   He also has slight erythema to right lower extremity proximal to wound, see image above. This should cover both pending further workup/evaluation. Patient has also been experiencing what appears to be vertigo symptoms. This has been ongoing for the past year. He has had increased frequency of this over the past week and a half. He has actually fallen and is scared to walk given severity symptoms when he stands up. CT head without contrast unremarkable. Will discuss with hospitalist at time of admission. Patient seen and evaluated in ED with Dr. Marques Mayer. Is this patient to be included in the SEP-1 Core Measure due to severe sepsis or septic shock? Yes   SEP-1 CORE MEASURE DATA      Sepsis Criteria   Severe Sepsis Criteria   Septic Shock Criteria     Must be confirmed or suspected to move forward with diagnosis of sepsis. Must meet 2:    [x] Temperature > 100.9 F (38.3 C)        or < 96.8 F (36 C)  [] HR > 90  [x] RR > 20  [] WBC > 12 or < 4 or 10% bands      AND:      [x] Infection Confirmed or        Suspected. Must meet 1:    [] Lactate > 2       or   [] Signs of Organ Dysfunction:    - SBP < 90 or MAP < 65  - Altered mental status  - Creatinine > 2 or increased from      baseline  - Urine Output < 0.5 ml/kg/hr  - Bilirubin > 2  - INR > 1.5 (not anticoagulated)  - Platelets < 840,809  - Acute Respiratory Failure as     evidenced by new need for NIPPV     or mechanical ventilation      [x] No criteria met for Severe Sepsis. Must meet 1:    [] Lactate > 4        or   [] SBP < 90 or MAP < 65 for at        least two readings in the first        hour after fluid bolus        administration      [] Vasopressors initiated (if hypotension persists after fluid resuscitation)        [] No criteria met for Septic Shock. Patient Vitals for the past 6 hrs:   BP Temp Pulse Resp SpO2 Weight Weight Method Percent Weight Change   09/06/22 1515 138/74 (!) 102.4 °F (39.1 °C) 95 22 98 % 252 lb (114.3 kg) Stated; Actual 0   09/06/22 1645 (!) 149/64 -- 88 22 96 % -- -- -- 09/06/22 1715 (!) 137/58 -- 84 20 94 % -- -- --   09/06/22 1830 107/60 -- 81 22 97 % -- -- --   09/06/22 1845 (!) 111/57 -- 79 22 95 % -- -- --   09/06/22 1900 (!) 103/55 -- 79 18 96 % -- -- --   09/06/22 1915 (!) 104/50 -- 80 22 95 % -- -- --   09/06/22 1927 -- 98.9 °F (37.2 °C) -- -- -- -- -- --   09/06/22 2030 120/62 -- 75 20 98 % -- -- --      Recent Labs     09/06/22  1541   WBC 7.7   CREATININE 1.0   BILITOT 0.6            Time  sepsis  Identified: 1720    Fluid Resuscitation Rational: less than 30mL/kg because not indicated      Repeat lactate level: improving    Reassessment Exam:   Not applicable. Patient does not have septic shock. CONSULTS:  PHARMACY TO DOSE VANCOMYCIN  IP CONSULT TO PHARMACY    PROCEDURES:  Procedures    FINAL IMPRESSION      1. Septicemia (HCC)    2. Vertigo    3. Cough    4. Shortness of breath    5. Multiple opens wound of lower extremity, unspecified laterality, initial encounter          DISPOSITION/PLAN   [unfilled]    PATIENT REFERRED TO:  No follow-up provider specified.     DISCHARGE MEDICATIONS:  New Prescriptions    No medications on file       (Please note that portions of this note were completed with a voice recognition program.  Efforts were made to edit the dictations but occasionally words are mis-transcribed.)    [address] [facility]           [address] [place] [address]  09/06/22 2040

## 2022-09-06 NOTE — H&P
Hospital Medicine History & Physical      PCP: Jc Blackburn MD    Date of Admission: 9/6/2022    Date of Service: Pt seen/examined on 9/6/2022 and admitted to inpatient    Chief Complaint: Fever, shortness of breath, generalized weakness, headedness versus vertigo      History Of Present Illness: The patient is a 74 y.o. male with past medical history as below and had been discharged recently about a few weeks ago for recent pneumonia infection but was also found to have hepatic lesions suggestive of some component of malignancy or metastasis but patient did not get further work-up while inpatient at the time who presents to [facility] with main concern over the last 2 to 3 days that he has been increasingly more lightheaded, not so much dizzy, while ambulating at home and has had increasing levels of fatigue but mainly today what brought him to the ED was that he was having fever at home and he noted about a 100.5 temperature and was increasingly more fatigued today. He did mention he was having decreased appetite the last few days and has had some component of intermittent shortness of breath on exertion but currently is not requiring oxygen. He was not exposed to anyone ill at home at the time. He overall notes that he did recover from his illness on the previous admission when he was given antibiotics for pneumonia at the time. Apart from the fatigue and weakness and lightheadedness as well as the fever and shortness of breath, he denies other recent symptoms of syncope, chest pain, dysuria, blood in urine/stool/sputum, vomiting or diarrhea. He does note bilateral leg swelling but he notes that this has been present before.   He denies any chest pain of note although remarks that shortness of breath seems to get worse at times when he takes a deep breath    Past Medical History:        Diagnosis Date    Diabetes mellitus ([facility]     Gallstones     Lymphedema     Neuropathy     Pancreatitis     PVD (peripheral vascular disease) ([facility]     Ulcers of both lower legs ([facility]     bilateral feet       Past Surgical History:        Procedure Laterality Date    CHOLECYSTECTOMY, LAPAROSCOPIC N/A 8/10/2021    LAPAROSCOPIC CHOLECYSTECTOMY WITH CHOLANGIOGRAM performed by Acosta Nichols MD at [place] [address]    ERCP  4/8/14    LITHOTRYPSY & PANCREATIC STENT PLACEMENT    FOOT SURGERY Left 1967       Medications Prior to Admission:    Prior to Admission medications    Medication Sig Start Date End Date Taking? Authorizing Provider   acetaminophen (TYLENOL) 500 MG tablet Take 500 mg by mouth every 6 hours as needed for Pain   Yes Historical Provider, MD   aspirin 81 MG EC tablet Take 81 mg by mouth daily   Yes Historical Provider, MD   lactobacillus (CULTURELLE) CAPS capsule Take 1 capsule by mouth daily   Yes Historical Provider, MD   lisinopril (PRINIVIL;ZESTRIL) 20 MG tablet Take 10 mg by mouth daily    Historical Provider, MD   docusate sodium (COLACE) 100 MG capsule Take 100 mg by mouth in the morning and 100 mg before bedtime. Historical Provider, MD   calcium-vitamin D (OSCAL-500) 500-200 MG-UNIT per tablet Take 1 tablet by mouth in the morning.     Historical Provider, MD   insulin glargine (LANTUS SOLOSTAR) 100 UNIT/ML injection pen Inject 10 Units into the skin nightly    Historical Provider, MD   Cyanocobalamin (VITAMIN B-12) 50 MCG LOZG Take 50 mcg by mouth daily    Historical Provider, MD   atorvastatin (LIPITOR) 20 MG tablet Take 20 mg by mouth nightly  4/20/21   Historical Provider, MD   tamsulosin (FLOMAX) 0.4 MG capsule Take 0.4 mg by mouth every evening     Historical Provider, MD   spironolactone (ALDACTONE) 25 MG tablet Take 25 mg by mouth daily    Historical Provider, MD   metFORMIN (GLUCOPHAGE) 500 MG tablet Take 500 mg by mouth 2 times daily (with meals) Historical Provider, MD       Allergies:  Patient has no known allergies. Social History:  The patient currently lives home    TOBACCO:   reports that he has never smoked. He has never used smokeless tobacco.  ETOH:   reports no history of alcohol use. Family History:  Reviewed in detail and negative for DM, Early CAD, Cancer, CVA. Positive as follows:        Problem Relation Age of Onset    Colon Cancer Mother         PVD s/p toe amputation by Dr Philippe Heart Father        REVIEW OF SYSTEMS:    and as noted in the HPI. All other systems reviewed and negative. PHYSICAL EXAM:    /64   Pulse 52   Temp 98.3 °F (36.8 °C) (Axillary)   Resp 16   Ht 6' 2\" (1.88 m)   Wt 252 lb (114.3 kg)   SpO2 95%   BMI 32.35 kg/m²     General appearance: Fatigued appearing, chronically ill-appearing, seems alert and oriented at this time, no respiratory distress at this time  HEENT Normal cephalic, atraumatic without obvious deformity. Pupils equal, round, and reactive to light. Extra ocular muscles intact. Dry mucous membranes  Neck: Supple, no JVD  Lungs: Diminished breath sounds but overall clear  Heart: Regular rate and rhythm, at times bradycardic, no murmurs noted  Abdomen: Soft, nontender, nondistended, active bowel sounds  Extremities: 1+ bilateral lower extremity pitting edema symmetrically  Skin: No rashes noted  Neurologic: Grossly intact neurologically  Mental status: Alert, oriented, thought content appropriate. Capillary Refill: Acceptable  < 3 seconds  Peripheral Pulses: +3 Easily felt, not easily obliterated with pressure      09/06/22 2321  CT CHEST PULMONARY EMBOLISM W CONTRAST   Performed: 09/06/22 2009  Final        Impression: 1. No CT evidence of a pulmonary embolism. 2. No acute intrapulmonary findings. 09/06/22 1615  CT HEAD WO CONTRAST   Performed: 09/06/22 1544  Final        Impression: No acute intracranial abnormality.        09/06/22 1608  XR CHEST PORTABLE Performed: 09/06/22 1555  Final        Impression: No radiographic evidence of acute pulmonary disease. CBC   Recent Labs     09/06/22  1541   WBC 7.7   HGB 8.5*   HCT 27.3*  26.2*         RENAL  Recent Labs     09/06/22  1541   *   K 4.5      CO2 21   BUN 19   CREATININE 1.0     LFT'S  Recent Labs     09/06/22  1541   AST 9*   ALT 6*   BILITOT 0.6   ALKPHOS 80     COAG  No results for input(s): INR in the last 72 hours. CARDIAC ENZYMES  No results for input(s): CKTOTAL, CKMB, CKMBINDEX, TROPONINI in the last 72 hours.     U/A:    Lab Results   Component Value Date/Time    COLORU Yellow 09/06/2022 03:41 PM    WBCUA 1 08/15/2022 11:16 AM    RBCUA 2 08/15/2022 11:16 AM    BACTERIA None Seen 08/15/2022 11:16 AM    CLARITYU Clear 09/06/2022 03:41 PM    SPECGRAV 1.020 09/06/2022 03:41 PM    LEUKOCYTESUR Negative 09/06/2022 03:41 PM    BLOODU Negative 09/06/2022 03:41 PM    GLUCOSEU Negative 09/06/2022 03:41 PM       ABG  No results found for: LVL3SWC, BEART, Y4XHFIHX, PHART, THGBART, EML3LPG, PO2ART, JJD0JLL        Active Hospital Problems    Diagnosis Date Noted    Febrile illness [R50.9] 09/07/2022     Priority: Medium    Generalized weakness [R53.1] 09/06/2022     Priority: Medium    SOB (shortness of breath) [R06.02] 09/06/2022     Priority: Medium    Vertigo [R42] 11/27/2020    Diabetes mellitus type II, uncontrolled ([facility] [E11.65] 02/05/2015    HTN (hypertension) [I10] 02/05/2015         PHYSICIANS CERTIFICATION:    [facility] for greater than 2 midnights based on the following assessment and plan:      ASSESSMENT/PLAN:  Febrile illness  Vertigo  Generalized weakness  Shortness of breath  Type 2 diabetes  Hypertension    Plan:  CT imaging demonstrates no findings suggestive of pneumonia, uncertain what might be causing patient's fevers and possibly needs further investigation as to a source of infectious process and possible related vertigo  Continue patient on vancomycin and cefepime for suspected infectious process, can consider de-escalating if nothing else is found  Obtaining transthoracic echo and bilateral venous duplex in the morning to further evaluate leg swelling and possible infectious process  Blood cultures pending  Orthostatic vitals every shift, PT and OT consult, neurochecks every 4  Respiratory viral panel pending, rapid COVID and flu negative  Repeat labs daily  Restart patient's other home medications  Anemia noted, uncertain as to significance, working up with FIQQ/GJQL/SEHGGXIQ/N52 and folic acid/reticulocyte count    DVT Prophylaxis: Lovenox  Diet: ADULT DIET; Regular; 4 carb choices (60 gm/meal); Low Sodium (2 gm)  Code Status: Full Code  PT/OT Eval Status: Ambulatory    Dispo -pending clinical course       Bassem Zheng DO    Thank you Alfonso Shahid MD for the opportunity to be involved in this patient's care. If you have any questions or concerns please feel free to contact me at [phone].

## 2022-09-07 PROBLEM — R00.1 SYMPTOMATIC BRADYCARDIA: Status: ACTIVE | Noted: 2022-09-07

## 2022-09-07 PROBLEM — R50.9 FEBRILE ILLNESS: Status: ACTIVE | Noted: 2022-09-07

## 2022-09-07 LAB
A/G RATIO: 1 (ref 1.1–2.2)
ALBUMIN SERPL-MCNC: 3.2 G/DL (ref 3.4–5)
ALP BLD-CCNC: 67 U/L (ref 40–129)
ALT SERPL-CCNC: <5 U/L (ref 10–40)
ANION GAP SERPL CALCULATED.3IONS-SCNC: 12 MMOL/L (ref 3–16)
ANION GAP SERPL CALCULATED.3IONS-SCNC: 8 MMOL/L (ref 3–16)
AST SERPL-CCNC: 8 U/L (ref 15–37)
BASOPHILS ABSOLUTE: 0 K/UL (ref 0–0.2)
BASOPHILS ABSOLUTE: 0 K/UL (ref 0–0.2)
BASOPHILS RELATIVE PERCENT: 0.5 %
BASOPHILS RELATIVE PERCENT: 0.6 %
BILIRUB SERPL-MCNC: 0.7 MG/DL (ref 0–1)
BUN BLDV-MCNC: 14 MG/DL (ref 7–20)
BUN BLDV-MCNC: 15 MG/DL (ref 7–20)
CALCIUM SERPL-MCNC: 8.4 MG/DL (ref 8.3–10.6)
CALCIUM SERPL-MCNC: 8.5 MG/DL (ref 8.3–10.6)
CHLORIDE BLD-SCNC: 100 MMOL/L (ref 99–110)
CHLORIDE BLD-SCNC: 103 MMOL/L (ref 99–110)
CO2: 22 MMOL/L (ref 21–32)
CO2: 22 MMOL/L (ref 21–32)
CREAT SERPL-MCNC: 0.9 MG/DL (ref 0.8–1.3)
CREAT SERPL-MCNC: 0.9 MG/DL (ref 0.8–1.3)
EOSINOPHILS ABSOLUTE: 0 K/UL (ref 0–0.6)
EOSINOPHILS ABSOLUTE: 0 K/UL (ref 0–0.6)
EOSINOPHILS RELATIVE PERCENT: 0.3 %
EOSINOPHILS RELATIVE PERCENT: 0.3 %
FOLATE: >20 NG/ML (ref 4.78–24.2)
GFR AFRICAN AMERICAN: >60
GFR AFRICAN AMERICAN: >60
GFR NON-AFRICAN AMERICAN: >60
GFR NON-AFRICAN AMERICAN: >60
GLUCOSE BLD-MCNC: 100 MG/DL (ref 70–99)
GLUCOSE BLD-MCNC: 100 MG/DL (ref 70–99)
GLUCOSE BLD-MCNC: 106 MG/DL (ref 70–99)
GLUCOSE BLD-MCNC: 116 MG/DL (ref 70–99)
GLUCOSE BLD-MCNC: 136 MG/DL (ref 70–99)
GLUCOSE BLD-MCNC: 160 MG/DL (ref 70–99)
HCT VFR BLD CALC: 24.3 % (ref 40.5–52.5)
HCT VFR BLD CALC: 24.7 % (ref 40.5–52.5)
HEMOGLOBIN: 7.8 G/DL (ref 13.5–17.5)
HEMOGLOBIN: 7.9 G/DL (ref 13.5–17.5)
LV EF: 55 %
LVEF MODALITY: NORMAL
LYMPHOCYTES ABSOLUTE: 0.5 K/UL (ref 1–5.1)
LYMPHOCYTES ABSOLUTE: 0.6 K/UL (ref 1–5.1)
LYMPHOCYTES RELATIVE PERCENT: 8.3 %
LYMPHOCYTES RELATIVE PERCENT: 9.1 %
MAGNESIUM: 1.7 MG/DL (ref 1.8–2.4)
MAGNESIUM: 1.7 MG/DL (ref 1.8–2.4)
MCH RBC QN AUTO: 23.7 PG (ref 26–34)
MCH RBC QN AUTO: 23.9 PG (ref 26–34)
MCHC RBC AUTO-ENTMCNC: 32.1 G/DL (ref 31–36)
MCHC RBC AUTO-ENTMCNC: 32.2 G/DL (ref 31–36)
MCV RBC AUTO: 74 FL (ref 80–100)
MCV RBC AUTO: 74.2 FL (ref 80–100)
MONOCYTES ABSOLUTE: 0.4 K/UL (ref 0–1.3)
MONOCYTES ABSOLUTE: 0.4 K/UL (ref 0–1.3)
MONOCYTES RELATIVE PERCENT: 5.6 %
MONOCYTES RELATIVE PERCENT: 6.6 %
MRSA SCREEN RT-PCR: NORMAL
NEUTROPHILS ABSOLUTE: 5.3 K/UL (ref 1.7–7.7)
NEUTROPHILS ABSOLUTE: 5.6 K/UL (ref 1.7–7.7)
NEUTROPHILS RELATIVE PERCENT: 84.2 %
NEUTROPHILS RELATIVE PERCENT: 84.5 %
PDW BLD-RTO: 18.7 % (ref 12.4–15.4)
PDW BLD-RTO: 18.9 % (ref 12.4–15.4)
PERFORMED ON: ABNORMAL
PLATELET # BLD: 182 K/UL (ref 135–450)
PLATELET # BLD: 195 K/UL (ref 135–450)
PMV BLD AUTO: 7.1 FL (ref 5–10.5)
PMV BLD AUTO: 7.3 FL (ref 5–10.5)
POTASSIUM REFLEX MAGNESIUM: 4.4 MMOL/L (ref 3.5–5.1)
POTASSIUM SERPL-SCNC: 4.5 MMOL/L (ref 3.5–5.1)
PROCALCITONIN: 0.38 NG/ML (ref 0–0.15)
RBC # BLD: 3.28 M/UL (ref 4.2–5.9)
RBC # BLD: 3.34 M/UL (ref 4.2–5.9)
REPORT: NORMAL
SODIUM BLD-SCNC: 133 MMOL/L (ref 136–145)
SODIUM BLD-SCNC: 134 MMOL/L (ref 136–145)
TOTAL PROTEIN: 6.4 G/DL (ref 6.4–8.2)
TROPONIN: 0.03 NG/ML
TROPONIN: 0.03 NG/ML
TSH REFLEX FT4: 1.83 UIU/ML (ref 0.27–4.2)
VITAMIN B-12: 1247 PG/ML (ref 211–911)
WBC # BLD: 6.2 K/UL (ref 4–11)
WBC # BLD: 6.7 K/UL (ref 4–11)

## 2022-09-07 PROCEDURE — 97535 SELF CARE MNGMENT TRAINING: CPT

## 2022-09-07 PROCEDURE — 87641 MR-STAPH DNA AMP PROBE: CPT

## 2022-09-07 PROCEDURE — 93005 ELECTROCARDIOGRAM TRACING: CPT | Performed by: INTERNAL MEDICINE

## 2022-09-07 PROCEDURE — 87205 SMEAR GRAM STAIN: CPT

## 2022-09-07 PROCEDURE — 85025 COMPLETE CBC W/AUTO DIFF WBC: CPT

## 2022-09-07 PROCEDURE — 6360000002 HC RX W HCPCS: Performed by: STUDENT IN AN ORGANIZED HEALTH CARE EDUCATION/TRAINING PROGRAM

## 2022-09-07 PROCEDURE — 84484 ASSAY OF TROPONIN QUANT: CPT

## 2022-09-07 PROCEDURE — 97166 OT EVAL MOD COMPLEX 45 MIN: CPT

## 2022-09-07 PROCEDURE — 94760 N-INVAS EAR/PLS OXIMETRY 1: CPT

## 2022-09-07 PROCEDURE — 1200000000 HC SEMI PRIVATE

## 2022-09-07 PROCEDURE — 6370000000 HC RX 637 (ALT 250 FOR IP): Performed by: STUDENT IN AN ORGANIZED HEALTH CARE EDUCATION/TRAINING PROGRAM

## 2022-09-07 PROCEDURE — 84443 ASSAY THYROID STIM HORMONE: CPT

## 2022-09-07 PROCEDURE — 2580000003 HC RX 258: Performed by: STUDENT IN AN ORGANIZED HEALTH CARE EDUCATION/TRAINING PROGRAM

## 2022-09-07 PROCEDURE — 87070 CULTURE OTHR SPECIMN AEROBIC: CPT

## 2022-09-07 PROCEDURE — 6360000002 HC RX W HCPCS: Performed by: INTERNAL MEDICINE

## 2022-09-07 PROCEDURE — 80053 COMPREHEN METABOLIC PANEL: CPT

## 2022-09-07 PROCEDURE — 83735 ASSAY OF MAGNESIUM: CPT

## 2022-09-07 PROCEDURE — 87077 CULTURE AEROBIC IDENTIFY: CPT

## 2022-09-07 PROCEDURE — 84145 PROCALCITONIN (PCT): CPT

## 2022-09-07 PROCEDURE — 93970 EXTREMITY STUDY: CPT

## 2022-09-07 PROCEDURE — 36415 COLL VENOUS BLD VENIPUNCTURE: CPT

## 2022-09-07 PROCEDURE — 9990000010 HC NO CHARGE VISIT

## 2022-09-07 PROCEDURE — 99223 1ST HOSP IP/OBS HIGH 75: CPT | Performed by: NURSE PRACTITIONER

## 2022-09-07 PROCEDURE — 93306 TTE W/DOPPLER COMPLETE: CPT

## 2022-09-07 RX ORDER — SODIUM CHLORIDE 0.9 % (FLUSH) 0.9 %
5-40 SYRINGE (ML) INJECTION EVERY 12 HOURS SCHEDULED
Status: DISCONTINUED | OUTPATIENT
Start: 2022-09-07 | End: 2022-09-07 | Stop reason: SDUPTHER

## 2022-09-07 RX ORDER — SODIUM CHLORIDE 0.9 % (FLUSH) 0.9 %
5-40 SYRINGE (ML) INJECTION PRN
Status: DISCONTINUED | OUTPATIENT
Start: 2022-09-07 | End: 2022-09-07 | Stop reason: SDUPTHER

## 2022-09-07 RX ORDER — SODIUM CHLORIDE 9 MG/ML
INJECTION, SOLUTION INTRAVENOUS PRN
Status: DISCONTINUED | OUTPATIENT
Start: 2022-09-07 | End: 2022-09-07 | Stop reason: SDUPTHER

## 2022-09-07 RX ORDER — MAGNESIUM SULFATE 1 G/100ML
1000 INJECTION INTRAVENOUS ONCE
Status: COMPLETED | OUTPATIENT
Start: 2022-09-07 | End: 2022-09-07

## 2022-09-07 RX ADMIN — VANCOMYCIN HYDROCHLORIDE 1000 MG: 1 INJECTION, POWDER, LYOPHILIZED, FOR SOLUTION INTRAVENOUS at 10:03

## 2022-09-07 RX ADMIN — ENOXAPARIN SODIUM 30 MG: 100 INJECTION SUBCUTANEOUS at 10:06

## 2022-09-07 RX ADMIN — INSULIN GLARGINE 10 UNITS: 100 INJECTION, SOLUTION SUBCUTANEOUS at 21:30

## 2022-09-07 RX ADMIN — TAMSULOSIN HYDROCHLORIDE 0.4 MG: 0.4 CAPSULE ORAL at 18:03

## 2022-09-07 RX ADMIN — MAGNESIUM SULFATE HEPTAHYDRATE 1000 MG: 10 INJECTION, SOLUTION INTRAVENOUS at 16:32

## 2022-09-07 RX ADMIN — CEFEPIME 2000 MG: 2 INJECTION, POWDER, FOR SOLUTION INTRAVENOUS at 11:59

## 2022-09-07 RX ADMIN — DOCUSATE SODIUM 100 MG: 100 CAPSULE, LIQUID FILLED ORAL at 10:07

## 2022-09-07 RX ADMIN — DOCUSATE SODIUM 100 MG: 100 CAPSULE, LIQUID FILLED ORAL at 21:29

## 2022-09-07 RX ADMIN — ASPIRIN 81 MG: 81 TABLET, COATED ORAL at 10:07

## 2022-09-07 RX ADMIN — CEFEPIME 2000 MG: 2 INJECTION, POWDER, FOR SOLUTION INTRAVENOUS at 05:04

## 2022-09-07 RX ADMIN — ENOXAPARIN SODIUM 30 MG: 100 INJECTION SUBCUTANEOUS at 21:29

## 2022-09-07 RX ADMIN — CEFEPIME 2000 MG: 2 INJECTION, POWDER, FOR SOLUTION INTRAVENOUS at 21:29

## 2022-09-07 RX ADMIN — ATORVASTATIN CALCIUM 20 MG: 20 TABLET, FILM COATED ORAL at 21:29

## 2022-09-07 RX ADMIN — ONDANSETRON 4 MG: 2 INJECTION INTRAMUSCULAR; INTRAVENOUS at 00:37

## 2022-09-07 ASSESSMENT — PAIN SCALES - GENERAL: PAINLEVEL_OUTOF10: 0

## 2022-09-07 NOTE — PROGRESS NOTES
Physical Therapy  Attempt Note  Carmen Cline  [id number]    Patient had rapid response called and was transferred to PCU. Will await new orders to resume therapy.      Randy Alfonso, [id number]

## 2022-09-07 NOTE — SIGNIFICANT EVENT
Rapid response call due to bradycardia  Patient's VS were stable. He apparently was symptomatic earlier. His EKG showed afib with slow VR; HR 51; occasional P waves without conduction concerning for high degree AV block.     Plan :     Move to telemetry floor  Consult cardiology  Check TSH and free T4

## 2022-09-07 NOTE — CARE COORDINATION
[facility] Continence Nurse  Consult Note       NAME:  Mukund Cannon RECORD NUMBER:  [id number]  AGE: 74 y.o. GENDER: male  : 1955  TODAY'S DATE:  2022    Subjective   Reason for WOCN Evaluation and Assessment: Chronic diabetic foot wounds      Kisha Fontana is a 74 y.o. male referred by:   [] Physician  [x] Nursing  [] Other:     Wound Identification:  Wound Type: diabetic  Contributing Factors: diabetes, poor glucose control, poor hygiene, and non-adherence    Wound History: Wound care familiar with patient. Pt states he has podiatrist, Dr. Raffy Boyer who comes to his house. Non-compliant with non-weight bearing. Chronic non healing diabetic foot wounds pt states since  with charcot foot. Pt states he also has home care who changes his dressings 3x a week that stated his wounds \"look better. \"  Current Wound Care Treatment:  aquacel ag    Patient Goal of Care:  [x] Wound Healing  [] Odor Control  [] Palliative Care  [] Pain Control   [] Other:         PAST MEDICAL HISTORY        Diagnosis Date    Diabetes mellitus ([address]     Gallstones     Lymphedema     Neuropathy     Pancreatitis     PVD (peripheral vascular disease) ([address]     Ulcers of both lower legs ([address]     bilateral feet       PAST SURGICAL HISTORY    Past Surgical History:   Procedure Laterality Date    CHOLECYSTECTOMY, LAPAROSCOPIC N/A 8/10/2021    LAPAROSCOPIC CHOLECYSTECTOMY WITH CHOLANGIOGRAM performed by Nanda Ibrahim MD at [place] [address]    ERCP  14    LITHOTRYPSY & PANCREATIC STENT PLACEMENT    FOOT SURGERY Left 1967       FAMILY HISTORY    Family History   Problem Relation Age of Onset    Colon Cancer Mother         PVD s/p toe amputation by Dr Jules Rg Father        SOCIAL HISTORY    Social History     Tobacco Use    Smoking status: Never    Smokeless tobacco: Never   Vaping Use    Vaping Use: Never used   Substance Use Topics    Alcohol use: No    Drug use:  No Code    Abdominal pain, acute R10.9    BPH (benign prostatic hyperplasia) N40.0    RLQ abdominal pain R10.31    Abnormal ECG R94.31    HTN (hypertension) I10    Diabetes mellitus type II, uncontrolled ([facility]) E11.65    Diabetic foot infection ([facility]) E11.628, L08.9    Fever R50.9    Leukocytosis D72.829    Charcot foot due to diabetes mellitus ([facility] E11.610    Foot ulceration, left, with unspecified severity ([facility] L97.529    Demand ischemia ([facility]) I24.8    Controlled type 2 diabetes mellitus with hyperglycemia, with long-term current use of insulin ([facility]) E11.65, Z79.4    Sepsis ([facility] A41.9    Diarrhea R19.7    Chronic osteoarthritis M19.90    Chest pain R07.9    Chronic calcific pancreatitis ([facility]) 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    Atrioventricular block, Mobitz type 1, Wenckebach I44.1    Vertigo R42    Microcytic anemia D50.9    Acute calculous cholecystitis K80.00    Generalized weakness R53.1    SOB (shortness of breath) R06.02    Febrile illness R50.9    Symptomatic bradycardia R00.1       Measurements:  Incision 04/04/14 Toe (Comment  which one) Right (Active)   Number of days: 3078       Wound 07/31/20 Heel Left (Active)   Number of days: 767       Wound 11/27/20 Foot Left;Plantar (Active)   Number of days: 649       Wound 08/09/21 Foot Right (Active)   Number of days: 394       Wound 08/15/22 Foot Right;Plantar (Active)   Wound Image   09/07/22 1456   Wound Etiology Diabetic 09/07/22 1456   Dressing Status New dressing applied 09/07/22 1456   Wound Cleansed Cleansed with saline 09/07/22 1456   Dressing/Treatment Hydrofiber Ag;Silicone border 96/57/48 1456   Dressing Change Due 08/23/22 08/22/22 0810   Wound Length (cm) 2 cm 08/17/22 1232   Wound Width (cm) 1 cm 08/17/22 1232   Wound Depth (cm) 0.1 cm 08/17/22 1232   Wound Surface Area (cm^2) 2 cm^2 08/17/22 1232   Change in Wound Size % (l*w) 86.67 08/17/22 1232   Wound Volume (cm^3) 0.2 cm^3 08/17/22 1232   Wound Healing % 97 08/17/22 1232   Wound Assessment Dry;Pink/red 08/18/22 1192 ORAL NUTRITION SUPPLEMENT; Lunch, Dinner; Diabetic Oral Supplement  Dietician consult:  Yes    Discharge Plan:  Placement for patient upon discharge: home with support    Patient appropriate for Outpatient [address]: No    Referrals:  []   [] [facility]  [] Supplies  [] Other    Patient/Caregiver Teaching:  Level of patient/caregiver understanding able to:   [] Indicates understanding       [] Needs reinforcement  [] Unsuccessful      [x] Verbal Understanding  [] Demonstrated understanding       [] No evidence of learning  [] Refused teaching         [] N/A       Electronically signed by Nelson Burkitt, RN, CWOCN on 9/7/2022 at 2:58 PM

## 2022-09-07 NOTE — PROGRESS NOTES
Hospitalist Progress Note      PCP: Richard Angela MD    Date of Admission: 9/6/2022    Chief Complaint:   Chief Complaint   Patient presents with    Pneumonia     Pt in via EMS from home with weakness and shortness of breath that \"has worsened the last week\". Pt states that he was diagnosed with pneumonia a \"month ago\". Pt alert and oriented at this time with no signs of distress noted. Pt denies any pain. Pt also reports \"fever that started today\". Hospital Course: 73 yo M admitted with weakness, found to have symptomatic bradycardia      9/7  Rapid response call due to bradycardia  Patient's VS were stable. He apparently was symptomatic earlier. His EKG showed afib with slow VR; HR 51; occasional P waves without conduction concerning for high degree AV block.       Subjective: as above      Medications:  Reviewed    Infusion Medications    sodium chloride      dextrose       Scheduled Medications    vancomycin  1,000 mg IntraVENous Q12H    magnesium sulfate  1,000 mg IntraVENous Once    aspirin  81 mg Oral Daily    atorvastatin  20 mg Oral Nightly    insulin glargine  10 Units SubCUTAneous Nightly    docusate sodium  100 mg Oral BID    lisinopril  5 mg Oral Daily    tamsulosin  0.4 mg Oral QPM    sodium chloride flush  5-40 mL IntraVENous 2 times per day    enoxaparin  30 mg SubCUTAneous BID    insulin lispro  0-4 Units SubCUTAneous TID WC    insulin lispro  0-4 Units SubCUTAneous Nightly    cefepime  2,000 mg IntraVENous q8h     PRN Meds: sodium chloride flush, sodium chloride, polyethylene glycol, acetaminophen **OR** acetaminophen, glucose, dextrose bolus **OR** dextrose bolus, glucagon (rDNA), dextrose, perflutren lipid microspheres, ondansetron      Intake/Output Summary (Last 24 hours) at 9/7/2022 1305  Last data filed at 9/7/2022 0044  Gross per 24 hour   Intake 360 ml   Output 550 ml   Net -190 ml       Physical Exam Performed:    BP (!) 104/57   Pulse 53   Temp 99.4 °F (37.4 °C) (Oral)   Resp 18   Ht 6' 2\" (1.88 m)   Wt 252 lb (114.3 kg)   SpO2 95%   BMI 32.35 kg/m²     General appearance: No apparent distress, appears stated age and cooperative. HEENT: Pupils equal, round, and reactive to light. Conjunctivae/corneas clear. Neck: Supple, with full range of motion. No jugular venous distention. Trachea midline. Respiratory:  Normal respiratory effort. Clear to auscultation, bilaterally without Rales/Wheezes/Rhonchi. Cardiovascular: bradycardia; S1/S2 without murmurs, rubs or gallops. Abdomen: Soft, non-tender, non-distended with normal bowel sounds. Musculoskeletal: No clubbing, cyanosis or edema bilaterally. Full range of motion without deformity. Skin: Skin color, texture, turgor normal.  No rashes or lesions. Neurologic:  Neurovascularly intact without any focal sensory/motor deficits. Cranial nerves: II-XII intact, grossly non-focal.  Psychiatric: Alert and oriented, thought content appropriate, normal insight  Capillary Refill: Brisk, 3 seconds, normal   Peripheral Pulses: +2 palpable, equal bilaterally       Labs:   Recent Labs     09/06/22  1541 09/07/22  1020   WBC 7.7 6.7   HGB 8.5* 7.9*   HCT 27.3*  26.2* 24.7*    195     Recent Labs     09/06/22  1541 09/07/22  1020   * 133*   K 4.5 4.5    103   CO2 21 22   BUN 19 14   CREATININE 1.0 0.9   CALCIUM 9.4 8.5     Recent Labs     09/06/22  1541 09/07/22  1020   AST 9* 8*   ALT 6* <5*   BILITOT 0.6 0.7   ALKPHOS 80 67     No results for input(s): INR in the last 72 hours. No results for input(s): Adonica Hoose in the last 72 hours.     Urinalysis:      Lab Results   Component Value Date/Time    NITRU Negative 09/06/2022 03:41 PM    WBCUA 1 08/15/2022 11:16 AM    BACTERIA None Seen 08/15/2022 11:16 AM    RBCUA 2 08/15/2022 11:16 AM    BLOODU Negative 09/06/2022 03:41 PM    SPECGRAV 1.020 09/06/2022 03:41 PM    GLUCOSEU Negative 09/06/2022 03:41 PM       Radiology:  VL Extremity Venous Bilateral CT CHEST PULMONARY EMBOLISM W CONTRAST   Final Result   1. No CT evidence of a pulmonary embolism. 2. No acute intrapulmonary findings. XR CHEST PORTABLE   Final Result   No radiographic evidence of acute pulmonary disease. CT HEAD WO CONTRAST   Final Result   No acute intracranial abnormality. Assessment/Plan:    Active Hospital Problems    Diagnosis     Febrile illness [R50.9]      Priority: Medium    Generalized weakness [R53.1]      Priority: Medium    SOB (shortness of breath) [R06.02]      Priority: Medium    Vertigo [R42]     Diabetes mellitus type II, uncontrolled (HCC) [E11.65]     HTN (hypertension) [I10]        Symptomatic bradycardia  Atrial fibrillation vs high degree AV block  Transfer to telemetry floor  Check TSH and eschocardiogram  Consult cardiology     Essential Hypertension  Controlled   Resume home medications  Monitor BP q4 hrs  BP goal <140/90  Adjust medications accordingly     Type 2 diabetes  Euglycemic  Hold p.o. medicines  Monitor with Accu-Cheks ACHS  Sliding scale as needed  Continue Lantus    Fever  Unclear source, CXR ans UA WNL. Recently treated for PNA  Elevated procalcitonin, of unclear signofocance  COVID and influenza negative  Continue cefepime for now, until bacteriemia is rule out       DVT Prophylaxis: lovenox  Diet: ADULT DIET; Regular; 4 carb choices (60 gm/meal); Low Sodium (2 gm)  ADULT ORAL NUTRITION SUPPLEMENT; Lunch, Dinner; Diabetic Oral Supplement  Code Status: Full Code  PT/OT Eval Status: requested    Dispo - ?   Brenda Huerta MD

## 2022-09-07 NOTE — CARE COORDINATION
09/07/22 1537   Readmission Assessment   Number of Days since last admission? 8-30 days   Previous Disposition Home with Home Health   Who is being Bridgett Lobato   (chart review)   What was the patient's/caregiver's perception as to why they think they needed to return back to the hospital? Other (Comment)  (shortness of breath)   Did you visit your Primary Care Physician after you left the hospital, before you returned this time? No   Why weren't you able to visit your PCP? Did not have an appointment   Did you see a specialist, such as Cardiac, Pulmonary, Orthopedic Physician, etc. after you left the hospital? No   Who advised the patient to return to the hospital? Self-referral   Does the patient report anything that got in the way of taking their medications? No   In our efforts to provide the best possible care to you and others like you, can you think of anything that we could have done to help you after you left the hospital the first time, so that you might not have needed to return so soon?  Other (Comment)  (resume home care)

## 2022-09-07 NOTE — PROGRESS NOTES
Occupational Therapy  Facility/Department: [facility] PROGRESSIVE CARE  Occupational Therapy Initial Assessment    Name: Frandy Hou  : 1955  MRN: [id number]  Date of Service: 2022    Discharge Recommendations:  Home with Home health OT, Home with nursing aide, Home with assist PRN, S Level 1  OT Equipment Recommendations  Other: Pt already has private duty aide and wound care nursing that visits him at home several times a week   Frandy Hou scored a 19/24 on the AM-PAC ADL Inpatient form. Current research shows that an AM-PAC score of 18 or greater is typically associated with a discharge to the patient's home setting. Based on the patient's AM-PAC score, and their current ADL deficits, it is recommended that the patient have 2-3 sessions per week of Occupational Therapy at d/c to increase the patient's independence. At this time, this patient demonstrates the endurance and safety to discharge home with home OT services and a follow up treatment frequency of 2-3x/wk. Please see assessment section for further patient specific details. If patient discharges prior to next session this note will serve as a discharge summary. Please see below for the latest assessment towards goals. Patient Diagnosis(es): The primary encounter diagnosis was Septicemia ([facility] [address] Diagnoses of Vertigo, Cough, Shortness of breath, and Multiple opens wound of lower extremity, unspecified laterality, initial encounter were also pertinent to this visit. Past Medical History:  has a past medical history of Diabetes mellitus ([address] Gallstones, Lymphedema, Neuropathy, Pancreatitis, PVD (peripheral vascular disease) ([address] and Ulcers of both lower legs ([address] Past Surgical History:  has a past surgical history that includes Foot surgery (Left, ); ERCP (14); and Cholecystectomy, laparoscopic (N/A, 8/10/2021).     Treatment Diagnosis: s/p pneumonia      Assessment   Performance deficits / Impairments: Decreased functional mobility ; Decreased ADL status; Decreased endurance;Decreased balance  Assessment: Gabriela Fry is a 74 y.o. male who presented to the ED with high fever and dizziness. He has recent history of pneunomia and had concern for recurrent infection. He lives alone and was IND with ADL's prior to admission (sponge bathed) and ambulated in his home using a cane however he was unable to navigate the steps to enter and had several recent falls. He is currently functioning below his baseline and anticipate he would require up to max A during ADL's due to decreased endurance and episodes of diziness due to low BP and HR. He is expected to have pacemaker implanted likely tomorrow. Recommend Pt continue to receive OT services while inpatient and once discharged home recommend [facility] and home health care. Recommend Pt use RW for ambulation to reduce fall risk.   Treatment Diagnosis: s/p pneumonia  Decision Making: Medium Complexity  REQUIRES OT FOLLOW-UP: Yes  Activity Tolerance  Activity Tolerance: Patient limited by fatigue;Treatment limited secondary to medical complications (free text)  Activity Tolerance Comments: Unable to get out of bed due to fatigue and low HR/BP, due to have pacemaker implanted likely tomorrow        Plan   Plan  Times per Week: 2-3  Plan Weeks: Pt plans to return home with home care once medically stable  Specific Instructions for Next Treatment: Toilet transfer, LB dressing, sponge bathing  Current Treatment Recommendations: Strengthening, Balance training, Endurance training, Self-Care / ADL, Equipment evaluation, education, & procurement, Safety education & training     Restrictions  Position Activity Restriction  Other position/activity restrictions: on Vanco, CCx4 low sodium diet, neuro & orthostatics checks, IV, tele    Subjective   General  Chart Reviewed: Yes  Patient assessed for rehabilitation services?: Yes  Additional Pertinent Hx: Kisha Fontana is a 74 y.o. year old male with past medical history significant for DM, neuropathy, PVD, chronic leg wounds and lymphedema who presented to the ED with fevers, lightheadedness and SOB. Does have chronic wounds on his legs/feet and has wound care at home. He admits to a \"fall\" about a week ago where he was standing and got dizzy and fell.   Family / Caregiver Present: No  Referring Practitioner: Dr Stan Oliver  Diagnosis: AMS, sepsis d/t UTI  Subjective  Subjective: Met in Pt's room he is agreeable to OT evaluation but requests to stay in bed     Social/Functional History  Social/Functional History  Lives With: Alone  Type of Home: House  Home Layout: Able to Live on Main level with bedroom/bathroom, Multi-level (multi level however Pt only stays on main level)  Home Access: Stairs to enter with rails  Entrance Stairs - Number of Steps: 11- on a hill, has 2 rails however unable to reach both at same time, he is unable to walk up/down the steps  Entrance Stairs - Rails: Both  Bathroom Shower/Tub: Shower chair with back, Tub/Shower unit (has been sponge bathing, unable to get into shower, TTB won't fit)  Bathroom Toilet: Bedside commode  Bathroom Equipment: Toilet raiser, Shower chair, Grab bars in shower (shower chair with back and arms)  Bathroom Accessibility: Not accessible  Home Equipment: Najma Ceballos, Walker, rolling (sleeps on couch (last 4 years), struggles to get off last few months, uses arm rests)  Has the patient had two or more falls in the past year or any fall with injury in the past year?: Yes  Receives Help From: Personal care attendant (aide comes once a week to do errands and IADL tasks (no cleaning or in home tasks), wound care nurse comes 3x a week, visiting nurse and doctor once a month)  Homemaking Responsibilities: No  Ambulation Assistance: Needs assistance (cane in the home)  Active : No  Patient's  Info: never learned to drive, Pt will take a cab or have a [facility]  Mode of Transportation: Friends, Cab  Occupation: Unemployed  Type of Occupation: Never worked a job but does not consider himself disabled       Objective   Heart Rate: 48  Heart Rate Source: Brachial  BP: (!) 107/47  BP Location: Left upper arm  Patient Position: Supine  MAP (Calculated): 67  Resp: 18  SpO2: 97 %  O2 Device: None (Room air)  Comment: semi reclined in bed          Observation/Palpation  Posture: Fair  Observation: Supine in bed, semi elevated HOB, wounds on B feet  Safety Devices  Type of Devices: Call light within reach; Left in bed;Nurse notified (bed alarm not working)  Bed Mobility Training  Bed Mobility Training: No  Balance  Sitting:  (unable to assess, Pt supine in bed and not feeling well enough to move EOB, declined due to low BP and HR)  Transfer Training  Transfer Training: No     AROM: Generally decreased, functional  PROM: Generally decreased, functional  Strength: Within functional limits  Coordination: Within functional limits  Tone: Normal  Sensation: Intact  ADL  Feeding: Independent  Grooming: Setup  UE Bathing Skilled Clinical Factors: when sponge bathing anticipate min/SBA  LE Bathing Skilled Clinical Factors: when sponge bathing anticipate min/SBA  Additional Comments: Anticipate Pt would require max A for LB dressing and mod-max A for toileting due to decreased endurance and episodes of dizziness due to low HR and BP, Pt is unable to bend over to reach his feet        Bed mobility  Bed Mobility Comments: Pt supine in bed, low HR and BP, declined to move to EOB     Vision - Basic Assessment  Prior Vision: Wears glasses only for reading  Visual History: No significant visual history  Patient Visual Report: No visual complaint reported.   Vision  Vision: Impaired  Vision Exceptions: Wears glasses for reading  Hearing  Hearing: Within functional limits (slightly hard of hearing)  Cognition  Overall Cognitive Status: [facility]  Orientation  Orientation Level: Oriented X4  Perception  Overall Perceptual Status: WFL               Education Given To: Patient  Education Provided: Role of Therapy;Plan of Care  Education Provided Comments: Educated on role of OT services and plan of care while he is still in the hospital  Education Method: Verbal  Barriers to Learning: None  Education Outcome: Verbalized understanding  LUE AROM (degrees)  LUE AROM : WFL  LUE General AROM: Able to complete shoulder flex, elbow flex/ext, wrist flex/ext, no difficulty or problems reported  RUE AROM (degrees)  RUE AROM : WFL  RUE General AROM: Able to complete shoulder flex, elbow flex/ext, wrist flex/ext, no difficulty or problems reported     L Fingers  Left Finger Strength Comment: able to oppose and make a fist, 5/5 strength  R Fingers  Right Finger Strength Comment: able to oppose and make a fist, 5/5 strength  Hand Dominance  Hand Dominance: Right                AM-PAC Score        AM-[facility] Inpatient Daily Activity Raw Score: 19 (09/07/22 1612)  AM-PAC Inpatient ADL T-Scale Score : 40.22 (09/07/22 1612)  ADL Inpatient CMS 0-100% Score: 42.8 (09/07/22 1612)  ADL Inpatient CMS G-Code Modifier : CK (09/07/22 1612)         Goals  Short Term Goals  Time Frame for Short term goals: by 1-3 sessions Pt will complete  Short Term Goal 1: Toileting with SBA  Short Term Goal 2: Household transfers with SBA and LRAD  Short Term Goal 3: Dressing with SBA (explore need for AE)  Short Term Goal 4: Sponge bathing with setup  Long Term Goals  Time Frame for Long term goals : Same as [facility]'s  Patient Goals   Patient goals :  To get home       Therapy Time   Individual Concurrent Group Co-treatment   Time In 1520         Time Out 1555         Minutes 35         Timed Code Treatment Minutes: [address] S/OT Isreal Pi, OTR/L #[id number] provided direct supervision to student and concur with PN

## 2022-09-07 NOTE — PROGRESS NOTES
Comprehensive Nutrition Assessment    Type and Reason for Visit:  Initial, Wound    Nutrition Recommendations/Plan:   Continue CCC (4) / 2 gm Na diet  Add chocolate Glucerna bid to start     Malnutrition Assessment:  Malnutrition Status: At risk for malnutrition (Comment) (09/07/22 1126)    Context:  Acute Illness     Findings of the 6 clinical characteristics of malnutrition:  Energy Intake:  Unable to assess  Weight Loss:  No significant weight loss     Body Fat Loss:  No significant body fat loss     Muscle Mass Loss:  No significant muscle mass loss    Fluid Accumulation:  Mild Extremities   Strength:  Not Performed    Nutrition Assessment:    Pt triggered a consult r/t altered skin integrity. PMH includes; DM, PVD, Lymphedema, Pancreatitis. Pt adm with complaints of weakness, fatigue, sob, nausea and vomiting. Found to have Septicemia. Diet adv to [place] [address] (4) / 2 gm Na. Pt reported that intake is so-so, r/t appetite and continued nausea, but that he is trying. Agreed to adding chocolate Glucerna bid until po consistently > 50%. Pt with no questions regarding diet ordered. Noted chronic venous areas on BLE. Will continue to monitor progress. Nutrition Related Findings:    Noted non-pitting edema to BLE. Labs reviewed. Wound Type: Venous Stasis       Current Nutrition Intake & Therapies:    Average Meal Intake: 26-50%     ADULT DIET; Regular; 4 carb choices (60 gm/meal); Low Sodium (2 gm)  ADULT ORAL NUTRITION SUPPLEMENT; Lunch, Dinner; Diabetic Oral Supplement    Anthropometric Measures:  Height: 6' 2\" (188 cm)  Ideal Body Weight (IBW): 190 lbs (86 kg)    Admission Body Weight: 252 lb (114.3 kg)  Current Body Weight: 252 lb (114.3 kg), 132.6 % IBW. Weight Source: Bed Scale  Current BMI (kg/m2): 32.3  Usual Body Weight:  (250s per record)                       BMI Categories: Obese Class 1 (BMI 30.0-34. 9)    Estimated Daily Nutrient Needs:        Energy (kcal/day): 2933-9017 (15-18 x  kg)     Protein (g/day): 103-146 (1.2-1.7 x IBW 86 kg)  Method Used for Fluid Requirements: 1 ml/kcal  Fluid (ml/day):      Nutrition Diagnosis:   Inadequate oral intake related to inadequate protein-energy intake as evidenced by intake 26-50%    Nutrition Interventions:   Food and/or Nutrient Delivery: Continue Current Diet, Start Oral Nutrition Supplement  Nutrition Education/Counseling: Education declined  Coordination of Nutrition Care: Continue to monitor while inpatient       Goals:     Goals: PO intake 50% or greater       Nutrition Monitoring and Evaluation:   Behavioral-Environmental Outcomes: None Identified  Food/Nutrient Intake Outcomes: Food and Nutrient Intake, Supplement Intake  Physical Signs/Symptoms Outcomes: Biochemical Data, Constipation, Diarrhea, Skin, Weight, Fluid Status or Edema, GI Status, Nausea or Vomiting    Discharge Planning:     Too soon to determine     Yvon Rosen, [address] [place] [address],   Contact: [phone]

## 2022-09-07 NOTE — CONSULTS
Clinical Pharmacy Note  Vancomycin Consult    Carmen Cline is a 74 y.o. male ordered Vancomycin for pneumonia (CAP); consult received from Dr. Deras [id number] to manage therapy. Also receiving cefepime. Allergies:  Patient has no known allergies. Temp max:  Temp (24hrs), Av.9 °F (37.7 °C), Min:98.5 °F (36.9 °C), Max:102.4 °F (39.1 °C)      Recent Labs     22  1541   WBC 7.7       Recent Labs     22  1541   BUN 19   CREATININE 1.0         Intake/Output Summary (Last 24 hours) at 2022 0351  Last data filed at 2022 0044  Gross per 24 hour   Intake 360 ml   Output 550 ml   Net -190 ml       Culture Results:  Blood cultures: in progress  MRSA nasal probe: in progress    Ht Readings from Last 1 Encounters:   22 6' 2\" (1.88 m)        Wt Readings from Last 1 Encounters:   22 252 lb (114.3 kg)         Estimated Creatinine Clearance: 95 mL/min (based on SCr of 1 mg/dL). Assessment  -75 y/o M presented to ED with weakness, fever, and SOB. Recently hospitalized at [facility] 8/15- with pneumonia. Also has multiple wounds to both lower extremities/feet. -Met SIRS critiera on arrival with temp 102.4F and RR >20. Plan:  -Vancomycin 1500mg x 1 dose received in ER at 1940.  -Vancomycin 1000 mg IV every 12 hours ordered.    -Regimen projects a trough level of 15.1 mg/L and .    -Level ordered for 0600 on  (after 3 total doses). Thank you for the consult.    Cecil Segundo, PharmD  2022 4:06 AM

## 2022-09-07 NOTE — PROGRESS NOTES
Micro reports that patient has positive blood cultures. See eMAR. Notified Dr. Nayely Arthur via secure message.

## 2022-09-07 NOTE — ED NOTES
Handoff report given to Porfirio Lees RN to assume care. Denies further questions.      Dre Dejesus RN  09/06/22 9136

## 2022-09-07 NOTE — CONSULTS
Cardiac Electrophysiology Consultation     Date: 9/7/2022  Admit Date:  9/6/2022  Admission Diagnosis: Shortness of breath [R06.02]  Cough [R05.9]  Vertigo [R42]  Septicemia ([address] [A41.9]  Generalized weakness [R53.1]  Multiple opens wound of lower extremity, unspecified laterality, initial encounter [X16.134T]     Reason for Consultation: high grade AV block  Consult Requesting Physician: Sharon Bagley, *       History of Present Illness  Helena Laughlin is a 74y.o. year old male with past medical history significant for DM, neuropathy, PVD, chronic leg wounds and lymphedema who presented to the ED with fevers, lightheadedness and SOB. Does have chronic wounds on his legs/feet and has wound care at home. He admits to a \"fall\" about a week ago where he was standing and got dizzy and fell. Denies syncope. He also admits to getting lightheaded on occasion, even while sitting and without any provoking movements. He will feel poorly and put a blanket over his head and try to go to sleep for it to resolve. Previously told he has vertigo. Denies any palpitations or chest pain. Around 1200 today, he was noted to be in sinus bradycardia with rates in the 30s while on the monitor. He was sleeping some during this time and when checked on by his nurse, was initially feeling fine. There was then concern for possible junctional rhythm and high grade AV block. The pt was then feeling lightheaded and poorly so a rapid response was called.        Past Medical History:   Diagnosis Date    Diabetes mellitus ([address]     Gallstones     Lymphedema     Neuropathy     Pancreatitis     PVD (peripheral vascular disease) ([address]     Ulcers of both lower legs ([address]     bilateral feet        Past Surgical History:   Procedure Laterality Date    CHOLECYSTECTOMY, LAPAROSCOPIC N/A 8/10/2021    LAPAROSCOPIC CHOLECYSTECTOMY WITH CHOLANGIOGRAM performed by Kathi Nichols MD at [place] [address]    ERCP  4/8/14    LITHOTRYPSY & PANCREATIC STENT PLACEMENT    FOOT SURGERY Left 1967       Current Outpatient Medications   Medication Instructions    acetaminophen (TYLENOL) 500 mg, Oral, EVERY 6 HOURS PRN    aspirin 81 mg, Oral, DAILY    atorvastatin (LIPITOR) 20 mg, Oral, NIGHTLY    calcium-vitamin D (OSCAL-500) 500-200 MG-UNIT per tablet 1 tablet, Oral, DAILY    docusate sodium (COLACE) 100 mg, Oral, 2 TIMES DAILY    lactobacillus (CULTURELLE) CAPS capsule 1 capsule, Oral, DAILY    Lantus SoloStar 10 Units, SubCUTAneous, NIGHTLY    lisinopril (PRINIVIL;ZESTRIL) 10 mg, Oral, DAILY    metFORMIN (GLUCOPHAGE) 500 mg, Oral, 2 TIMES DAILY WITH MEALS    spironolactone (ALDACTONE) 25 mg, Oral, DAILY    tamsulosin (FLOMAX) 0.4 mg, Oral, EVERY EVENING    Vitamin B-12 50 mcg, Oral, DAILY        No Known Allergies    Social History:   reports that he has never smoked. He has never used smokeless tobacco. He reports that he does not drink alcohol and does not use drugs. Family History:  family history includes Colon Cancer in his mother; Lung Cancer in his father. Review of Systems:  General: negative for fever, chills   Ophthalmic ROS: negative for eye pain or loss of vision  ENT ROS: negative for headaches, sore throat, nasal drainage  Respiratory: negative for cough, sputum, SOB  Cardiovascular: negative for chest pain, palpitations.   Gastrointestinal: negative for abdominal pain, diarrhea, N/V  Hematology: negative for bleeding, blood clots, bruising or jaundice  Genito-Urinary:  negative for dysuria or incontinence  Musculoskeletal: negative for joint swelling, muscle pain  Neurological: negative for confusion, dizziness, headaches   Psychiatric: negative anxiety, depression  Dermatological: negative for rash    Medications:  Scheduled Meds:   magnesium sulfate  1,000 mg IntraVENous Once    aspirin  81 mg Oral Daily    atorvastatin  20 mg Oral Nightly    insulin glargine  10 Units SubCUTAneous Nightly    docusate sodium  100 mg Oral BID    lisinopril  5 mg Oral Daily    tamsulosin  0.4 mg Oral QPM    sodium chloride flush  5-40 mL IntraVENous 2 times per day    enoxaparin  30 mg SubCUTAneous BID    insulin lispro  0-4 Units SubCUTAneous TID WC    insulin lispro  0-4 Units SubCUTAneous Nightly    cefepime  2,000 mg IntraVENous q8h      Continuous Infusions:   sodium chloride      dextrose       PRN Meds:.sodium chloride flush, sodium chloride, polyethylene glycol, acetaminophen **OR** acetaminophen, glucose, dextrose bolus **OR** dextrose bolus, glucagon (rDNA), dextrose, perflutren lipid microspheres, ondansetron     Physical Examination:  Vitals:    22 1259   BP: (!) 104/57   Pulse: 53   Resp: 18   Temp: 99.4 °F (37.4 °C)   SpO2: 95%        Intake/Output Summary (Last 24 hours) at 2022 1344  Last data filed at 2022 0044  Gross per 24 hour   Intake 360 ml   Output 550 ml   Net -190 ml     In: 360 [P.O.:360]  Out: 550    Wt Readings from Last 3 Encounters:   22 252 lb (114.3 kg)   22 247 lb 9.2 oz (112.3 kg)   21 264 lb 5.3 oz (119.9 kg)     Temp  Av.5 °F (37.5 °C)  Min: 98.3 °F (36.8 °C)  Max: 102.4 °F (39.1 °C)  Pulse  Av.5  Min: 52  Max: 95  BP  Min: 98/72  Max: 149/64  SpO2  Av.9 %  Min: 94 %  Max: 98 %    Telemetry: Sinus rhythm in the 60s. Constitutional: Alert, in no acute distress. Appears stated age. Head: Normocephalic and atraumatic. Eyes: Conjunctivae normal. EOM are normal.   Neck: Neck supple. No lymphadenopathy. No rigidity. No JVD present. Cardiovascular: Normal rate, regular rhythm. No murmurs, rubs or gallops. No S3 or S4.  Pulmonary/Chest: Clear breath sounds bilaterally. No crackles, wheezes or rhonchi. No respiratory accessory muscle use. Abdominal: Soft. Normal bowel sounds present. No distension, No tenderness. Musculoskeletal: No tenderness. Wraps to BLE. Lymphadenopathy: Has no cervical adenopathy. Neurological: Alert and oriented. No gross deficits. Skin: Skin is warm and dry. No rash, lesions, ulcerations noted. Psychiatric: No anxiety nor agitation. Labs:  Reviewed. Recent Labs     22  1541 22  1020   * 133*   K 4.5 4.5    103   CO2 21 22   BUN 19 14   CREATININE 1.0 0.9     Recent Labs     22  1541 22  1020 22  1234   WBC 7.7 6.7 6.2   HGB 8.5* 7.9* 7.8*   HCT 27.3*  26.2* 24.7* 24.3*   MCV 73.7* 74.0* 74.2*    195 182     Lab Results   Component Value Date/Time    TROPONINI <0.01 2020 07:24 PM     No results found for: BNP  Lab Results   Component Value Date/Time    PROTIME 13.0 2014 06:47 PM    INR 1.17 2014 06:47 PM     Lab Results   Component Value Date/Time    CHOL 79 2020 05:59 AM    HDL 21 2020 05:59 AM    TRIG 105 2020 05:59 AM       Diagnostic and imaging results reviewed. EC22  Sinus rhythm with high grade AV block at 51 BPM. RBBB. LAFB. Echo: 22   Normal left ventricle size, wall thickness and systolic function with an   estimated ejection fraction of 55%. No regional wall motion abnormalities   are seen. Grade III diastolic dysfunction with elevated LV filling pressures. Mild calcification of the leaflets of the mitral valve. Mild mitral regurgitation is present. The left atrium is moderately dilated. Aortic valve leaflets appear thickened. The right ventricle is mildly enlarged with normal function   Mild to moderate tricuspid regurgitation. Stress: 20   Normal myocardial perfusion study. Normal LV size and systolic function. Overall findings represent a low risk study.        Assessment & Plan:    High grade AV block   - noted on EKG on 22, intermittent   - associated with lightheadedness, not feeling well    - on no AV [place] blocking agents   - Mg slightly low - getting replaced, K WNL, troponin slightly elevated, would trend   - EKG with RBBB and LAFB as well   - if no reversible cause found, would meet Class I indications for PPM, given infection issues and recent fever, would recommend Micra implant which was discussed with pt and he is agreeable, planning for tomorrow morning with Dr. Narda Valle    Fever   - unclear etiology, recent treatment for pneumonia but CXR without any infiltrate   - ?chronic leg wounds a source    Essential HTN   - BP controlled    Discussed with Dr. Narda Valle.     KENNY Alfonso  [facility], [address], [address] [facility]  Phone: [phone]  Fax: [phone]    Electronically signed by KENNY Tavares - CNP on 9/7/2022 at 1:44 PM

## 2022-09-07 NOTE — PROGRESS NOTES
Occupational Therapy  PT and OT attempted eval, patient off unit for vascular study. OT will try back as schedule permits.  Bob Andrade, OTR/L #[id number]

## 2022-09-07 NOTE — PLAN OF CARE
Problem: Discharge Planning  Goal: Discharge to home or other facility with appropriate resources  9/7/2022 0933 by Anastasiya Nelson RN  Outcome: Progressing  Flowsheets  Taken 9/7/2022 0933  Discharge to home or other facility with appropriate resources:   Identify barriers to discharge with patient and caregiver   Arrange for needed discharge resources and transportation as appropriate  Taken 9/7/2022 0722  Discharge to home or other facility with appropriate resources:   Identify barriers to discharge with patient and caregiver   Arrange for needed discharge resources and transportation as appropriate   Identify discharge learning needs (meds, wound care, etc)  Note: May need rehab for self care needs. 9/7/2022 0229 by Sanjana Carroll RN  Outcome: Progressing  Flowsheets (Taken 9/7/2022 7216)  Discharge to home or other facility with appropriate resources:   Identify barriers to discharge with patient and caregiver   Identify discharge learning needs (meds, wound care, etc)     Problem: Skin/Tissue Integrity  Goal: Absence of new skin breakdown  Description: 1. Monitor for areas of redness and/or skin breakdown  2. Assess vascular access sites hourly  3. Every 4-6 hours minimum:  Change oxygen saturation probe site  4. Every 4-6 hours:  If on nasal continuous positive airway pressure, respiratory therapy assess nares and determine need for appliance change or resting period. 9/7/2022 0933 by Anastasiya Nelson RN  Outcome: Progressing  Note: Poor self care of skin issue, wet and not aware. 9/7/2022 0229 by Sanjana Carroll RN  Outcome: Progressing     Problem: Safety - Adult  Goal: Free from fall injury  9/7/2022 0933 by Anastasiya Nelson RN  Outcome: Progressing  Flowsheets (Taken 9/7/2022 0850)  Free From Fall Injury: Instruct family/caregiver on patient safety  Note: Reviewed call light and safety issues.   9/7/2022 0229 by Sanjana Carroll RN  Outcome: Progressing     Problem: ABCDS Injury Assessment  Goal: Absence of physical injury  9/7/2022 0933 by Anastasiya Nelson RN  Outcome: Progressing  Flowsheets (Taken 9/7/2022 0850)  Absence of Physical Injury: Implement safety measures based on patient assessment  Note: monitor  9/7/2022 0229 by Sanjana Carroll RN  Outcome: Progressing     Problem: Chronic Conditions and Co-morbidities  Goal: Patient's chronic conditions and co-morbidity symptoms are monitored and maintained or improved  Outcome: Progressing  Flowsheets  Taken 9/7/2022 0933  Care Plan - Patient's Chronic Conditions and Co-Morbidity Symptoms are Monitored and Maintained or Improved:   Monitor and assess patient's chronic conditions and comorbid symptoms for stability, deterioration, or improvement   Collaborate with multidisciplinary team to address chronic and comorbid conditions and prevent exacerbation or deterioration  Taken 9/7/2022 0722  Care Plan - Patient's Chronic Conditions and Co-Morbidity Symptoms are Monitored and Maintained or Improved:   Monitor and assess patient's chronic conditions and comorbid symptoms for stability, deterioration, or improvement   Collaborate with multidisciplinary team to address chronic and comorbid conditions and prevent exacerbation or deterioration  Note: Poor historian of current self care.

## 2022-09-07 NOTE — PLAN OF CARE
Nutrition Problem #1: Inadequate oral intake  Intervention: Food and/or Nutrient Delivery: Continue Current Diet, Start Oral Nutrition Supplement clear

## 2022-09-07 NOTE — PROGRESS NOTES
Patient was admitted to room 3263 at about 2200. Patient was transferred by a stretcher from the ED to the unit. Patient oriented to room and taught how to use call light. Patient is resting comfortably in bed. Will continue to monitor.

## 2022-09-07 NOTE — ED NOTES
Report called to [facility] on 5568 via  [facility] Card.       Yohan Judd RN  09/06/22 6526

## 2022-09-07 NOTE — PROGRESS NOTES
Refuses meal, denies nausea, \"not hungry\", request to have [address] in, very talkative.

## 2022-09-07 NOTE — PROGRESS NOTES
4 Eyes Skin Assessment     NAME:  Dae Sheldon  YOB: 1955  MEDICAL RECORD NUMBER:  [id number]    The patient is being assess for  Admission    I agree that 2 RN's have performed a thorough Head to Toe Skin Assessment on the patient. ALL assessment sites listed below have been assessed. Areas assessed by both nurses:    Head, Face, Ears, Shoulders, Back, Chest, Arms, Elbows, Hands, Sacrum. Buttock, Coccyx, Ischium, and Legs. Feet and Heels        Does the Patient have a Wound? Yes wound(s) were present on assessment.  LDA wound assessment was Initiated and completed        Jorje Prevention initiated:  Yes   Wound Care Orders initiated:  Yes    Pressure Injury (Stage 3,4, Unstageable, DTI, NWPT, and Complex wounds) if present place referral/consult order under [de-identified] Yes    New and Established Ostomies if present place consult order under : No      Nurse 1 eSignature: Electronically signed by Ottoniel Sparks RN on 9/7/22 at 2:27 AM EDT    **SHARE this note so that the co-signing nurse is able to place an eSignature**    Nurse 2 eSignature: Electronically signed by Sheldon Taveras RN on 9/7/22 at 3:00 AM EDT

## 2022-09-08 PROBLEM — E87.20 LACTIC ACIDOSIS: Status: ACTIVE | Noted: 2022-09-08

## 2022-09-08 PROBLEM — A49.8 BACTERIAL INFECTION DUE TO PROTEUS MIRABILIS: Status: ACTIVE | Noted: 2022-09-08

## 2022-09-08 PROBLEM — L97.509 FOOT ULCER DUE TO SECONDARY DM (HCC): Status: ACTIVE | Noted: 2022-09-08

## 2022-09-08 PROBLEM — I48.91 ATRIAL FIBRILLATION WITH SLOW VENTRICULAR RESPONSE (HCC): Status: ACTIVE | Noted: 2022-09-08

## 2022-09-08 PROBLEM — E13.621 FOOT ULCER DUE TO SECONDARY DM (HCC): Status: ACTIVE | Noted: 2022-09-08

## 2022-09-08 PROBLEM — S81.809A MULTIPLE AND OPEN WOUND OF LOWER LIMB: Status: ACTIVE | Noted: 2022-09-08

## 2022-09-08 LAB
A/G RATIO: 1.1 (ref 1.1–2.2)
ALBUMIN SERPL-MCNC: 3.2 G/DL (ref 3.4–5)
ALP BLD-CCNC: 62 U/L (ref 40–129)
ALT SERPL-CCNC: <5 U/L (ref 10–40)
ANION GAP SERPL CALCULATED.3IONS-SCNC: 9 MMOL/L (ref 3–16)
AST SERPL-CCNC: 8 U/L (ref 15–37)
BASOPHILS ABSOLUTE: 0 K/UL (ref 0–0.2)
BASOPHILS RELATIVE PERCENT: 0.6 %
BILIRUB SERPL-MCNC: 0.5 MG/DL (ref 0–1)
BUN BLDV-MCNC: 16 MG/DL (ref 7–20)
CALCIUM SERPL-MCNC: 8.1 MG/DL (ref 8.3–10.6)
CHLORIDE BLD-SCNC: 105 MMOL/L (ref 99–110)
CO2: 21 MMOL/L (ref 21–32)
CREAT SERPL-MCNC: 1.1 MG/DL (ref 0.8–1.3)
EKG ATRIAL RATE: 58 BPM
EKG ATRIAL RATE: 90 BPM
EKG DIAGNOSIS: NORMAL
EKG DIAGNOSIS: NORMAL
EKG P AXIS: 28 DEGREES
EKG P-R INTERVAL: 224 MS
EKG Q-T INTERVAL: 394 MS
EKG Q-T INTERVAL: 480 MS
EKG QRS DURATION: 138 MS
EKG QRS DURATION: 140 MS
EKG QTC CALCULATION (BAZETT): 442 MS
EKG QTC CALCULATION (BAZETT): 481 MS
EKG R AXIS: -49 DEGREES
EKG R AXIS: -58 DEGREES
EKG T AXIS: 33 DEGREES
EKG T AXIS: 42 DEGREES
EKG VENTRICULAR RATE: 51 BPM
EKG VENTRICULAR RATE: 90 BPM
EOSINOPHILS ABSOLUTE: 0.1 K/UL (ref 0–0.6)
EOSINOPHILS RELATIVE PERCENT: 2.1 %
GFR AFRICAN AMERICAN: >60
GFR NON-AFRICAN AMERICAN: >60
GLUCOSE BLD-MCNC: 109 MG/DL (ref 70–99)
GLUCOSE BLD-MCNC: 122 MG/DL (ref 70–99)
GLUCOSE BLD-MCNC: 123 MG/DL (ref 70–99)
GLUCOSE BLD-MCNC: 135 MG/DL (ref 70–99)
GLUCOSE BLD-MCNC: 149 MG/DL (ref 70–99)
HCT VFR BLD CALC: 24.5 % (ref 40.5–52.5)
HEMOGLOBIN: 7.8 G/DL (ref 13.5–17.5)
LYMPHOCYTES ABSOLUTE: 1 K/UL (ref 1–5.1)
LYMPHOCYTES RELATIVE PERCENT: 21.3 %
MAGNESIUM: 2 MG/DL (ref 1.8–2.4)
MCH RBC QN AUTO: 23.5 PG (ref 26–34)
MCHC RBC AUTO-ENTMCNC: 31.9 G/DL (ref 31–36)
MCV RBC AUTO: 73.7 FL (ref 80–100)
MONOCYTES ABSOLUTE: 0.6 K/UL (ref 0–1.3)
MONOCYTES RELATIVE PERCENT: 11.4 %
NEUTROPHILS ABSOLUTE: 3.1 K/UL (ref 1.7–7.7)
NEUTROPHILS RELATIVE PERCENT: 64.6 %
PDW BLD-RTO: 19.2 % (ref 12.4–15.4)
PERFORMED ON: ABNORMAL
PLATELET # BLD: 171 K/UL (ref 135–450)
PMV BLD AUTO: 7.2 FL (ref 5–10.5)
POTASSIUM SERPL-SCNC: 4.6 MMOL/L (ref 3.5–5.1)
RBC # BLD: 3.32 M/UL (ref 4.2–5.9)
SODIUM BLD-SCNC: 135 MMOL/L (ref 136–145)
TOTAL PROTEIN: 6 G/DL (ref 6.4–8.2)
WBC # BLD: 4.8 K/UL (ref 4–11)

## 2022-09-08 PROCEDURE — 83735 ASSAY OF MAGNESIUM: CPT

## 2022-09-08 PROCEDURE — 9990000010 HC NO CHARGE VISIT

## 2022-09-08 PROCEDURE — 80053 COMPREHEN METABOLIC PANEL: CPT

## 2022-09-08 PROCEDURE — 99233 SBSQ HOSP IP/OBS HIGH 50: CPT | Performed by: NURSE PRACTITIONER

## 2022-09-08 PROCEDURE — 2580000003 HC RX 258: Performed by: STUDENT IN AN ORGANIZED HEALTH CARE EDUCATION/TRAINING PROGRAM

## 2022-09-08 PROCEDURE — 1200000000 HC SEMI PRIVATE

## 2022-09-08 PROCEDURE — 6370000000 HC RX 637 (ALT 250 FOR IP): Performed by: STUDENT IN AN ORGANIZED HEALTH CARE EDUCATION/TRAINING PROGRAM

## 2022-09-08 PROCEDURE — 93010 ELECTROCARDIOGRAM REPORT: CPT | Performed by: INTERNAL MEDICINE

## 2022-09-08 PROCEDURE — 36415 COLL VENOUS BLD VENIPUNCTURE: CPT

## 2022-09-08 PROCEDURE — 94760 N-INVAS EAR/PLS OXIMETRY 1: CPT

## 2022-09-08 PROCEDURE — 85025 COMPLETE CBC W/AUTO DIFF WBC: CPT

## 2022-09-08 PROCEDURE — 2500000003 HC RX 250 WO HCPCS: Performed by: INTERNAL MEDICINE

## 2022-09-08 PROCEDURE — 99223 1ST HOSP IP/OBS HIGH 75: CPT | Performed by: INTERNAL MEDICINE

## 2022-09-08 PROCEDURE — 87040 BLOOD CULTURE FOR BACTERIA: CPT

## 2022-09-08 PROCEDURE — 6360000002 HC RX W HCPCS: Performed by: STUDENT IN AN ORGANIZED HEALTH CARE EDUCATION/TRAINING PROGRAM

## 2022-09-08 RX ORDER — METRONIDAZOLE 500 MG/100ML
500 INJECTION, SOLUTION INTRAVENOUS EVERY 8 HOURS
Status: DISCONTINUED | OUTPATIENT
Start: 2022-09-08 | End: 2022-09-13

## 2022-09-08 RX ADMIN — CEFEPIME 2000 MG: 2 INJECTION, POWDER, FOR SOLUTION INTRAVENOUS at 04:59

## 2022-09-08 RX ADMIN — Medication 10 ML: at 21:56

## 2022-09-08 RX ADMIN — DOCUSATE SODIUM 100 MG: 100 CAPSULE, LIQUID FILLED ORAL at 07:40

## 2022-09-08 RX ADMIN — TAMSULOSIN HYDROCHLORIDE 0.4 MG: 0.4 CAPSULE ORAL at 16:48

## 2022-09-08 RX ADMIN — CEFEPIME 2000 MG: 2 INJECTION, POWDER, FOR SOLUTION INTRAVENOUS at 22:03

## 2022-09-08 RX ADMIN — ENOXAPARIN SODIUM 30 MG: 100 INJECTION SUBCUTANEOUS at 07:33

## 2022-09-08 RX ADMIN — DOCUSATE SODIUM 100 MG: 100 CAPSULE, LIQUID FILLED ORAL at 21:55

## 2022-09-08 RX ADMIN — CEFEPIME 2000 MG: 2 INJECTION, POWDER, FOR SOLUTION INTRAVENOUS at 12:47

## 2022-09-08 RX ADMIN — ENOXAPARIN SODIUM 30 MG: 100 INJECTION SUBCUTANEOUS at 21:55

## 2022-09-08 RX ADMIN — METRONIDAZOLE 500 MG: 500 INJECTION, SOLUTION INTRAVENOUS at 22:03

## 2022-09-08 RX ADMIN — ASPIRIN 81 MG: 81 TABLET, COATED ORAL at 07:40

## 2022-09-08 RX ADMIN — INSULIN GLARGINE 10 UNITS: 100 INJECTION, SOLUTION SUBCUTANEOUS at 21:55

## 2022-09-08 RX ADMIN — ATORVASTATIN CALCIUM 20 MG: 20 TABLET, FILM COATED ORAL at 21:55

## 2022-09-08 ASSESSMENT — PAIN SCALES - WONG BAKER
WONGBAKER_NUMERICALRESPONSE: 0
WONGBAKER_NUMERICALRESPONSE: 0

## 2022-09-08 ASSESSMENT — PAIN SCALES - GENERAL
PAINLEVEL_OUTOF10: 0

## 2022-09-08 NOTE — PROGRESS NOTES
9/8/2022  Valentino Modena Elnita Misty  [id number]  [id number]    Attempt Note      Therapist to room to see patient. Patient in bed, with B Lower legs wrapped. Repeatedly declines therapy, despite consistent therapist encouragement, including declining sitting to edge of bed, stating he wants podiatry to see his feet first.  Prior status information updated. Will see 9-9-22 as patient able / willing to participate. RN informed, and communication board in room with note to request podiatry write LE weight bearing status in orders. Patient left in bed as found with needs in reach. Bed alarm malfunctioning, and RN states awareness. Per chart and patient, Pacemaker placement pending  9-12-22.   Electronically signed by Sesar Lopes, [address] [place] [address] (#[phone])  on 9/8/2022 at 3:46 PM

## 2022-09-08 NOTE — PLAN OF CARE
Problem: Discharge Planning  Goal: Discharge to home or other facility with appropriate resources  9/8/2022 0835 by Bereket Shaffer RN  Outcome: Progressing     Problem: Skin/Tissue Integrity  Goal: Absence of new skin breakdown  Description: 1. Monitor for areas of redness and/or skin breakdown  2. Assess vascular access sites hourly  3. Every 4-6 hours minimum:  Change oxygen saturation probe site  4. Every 4-6 hours:  If on nasal continuous positive airway pressure, respiratory therapy assess nares and determine need for appliance change or resting period.   9/8/2022 0835 by Bereket Shaffer RN  Outcome: Progressing     Problem: Safety - Adult  Goal: Free from fall injury  9/8/2022 0835 by Bereket Shaffer RN  Outcome: Progressing     Problem: ABCDS Injury Assessment  Goal: Absence of physical injury  9/8/2022 0835 by Bereket Shaffer RN  Outcome: Progressing     Problem: Chronic Conditions and Co-morbidities  Goal: Patient's chronic conditions and co-morbidity symptoms are monitored and maintained or improved  9/8/2022 0835 by Bereket Shaffer RN  Outcome: Progressing     Problem: Nutrition Deficit:  Goal: Optimize nutritional status  9/8/2022 0835 by Bereket Shaffer RN  Outcome: Progressing

## 2022-09-08 NOTE — ACP (ADVANCE CARE PLANNING)
Advance Care Planning     Advance Care Planning Activator (Inpatient)  Conversation Note      Date of ACP Conversation: 9/8/2022     Conversation Conducted with: Patient with Decision Making Capacity    ACP Activator: Nasima Masters RN    Health Care Decision Maker:     Current Designated Health Care Decision Maker:     Primary Decision Maker: stefeneida - Other - [phone] [facility]    Care Preferences    Ventilation: \"If you were in your present state of health and suddenly became very ill and were unable to breathe on your own, what would your preference be about the use of a ventilator (breathing machine) if it were available to you? \"      Would the patient desire the use of ventilator (breathing machine)?: Unsure    \"If your health worsens and it becomes clear that your chance of recovery is unlikely, what would your preference be about the use of a ventilator (breathing machine) if it were available to you? \"     Would the patient desire the use of ventilator (breathing machine)?: No      Resuscitation  \"CPR works best to restart the heart when there is a sudden event, like a heart attack, in someone who is otherwise healthy. Unfortunately, CPR does not typically restart the heart for people who have serious health conditions or who are very sick. \"    \"In the event your heart stopped as a result of an underlying serious health condition, would you want attempts to be made to restart your heart (answer \"yes\" for attempt to resuscitate) or would you prefer a natural death (answer \"no\" for do not attempt to resuscitate)? \" Unsure       [] Yes   [x] No   Educated Patient / Elfredia Latin regarding differences between Advance Directives and portable DNR orders. Length of ACP Conversation in minutes:      Conversation Outcomes:  [x] ACP discussion completed.   [] Existing advance directive reviewed with patient; no changes to patient's previously recorded wishes  [] New Advance Directive completed  [] Portable Do Not Rescitate prepared for Provider review and signature  [] POLST/POST/MOLST/MOST prepared for Provider review and signature      Follow-up plan:    [x] Schedule follow-up conversation to continue planning  [] Referred individual to Provider for additional questions/concerns   [] Advised patient/agent/surrogate to review completed ACP document and update if needed with changes in condition, patient preferences or care setting    [] This note routed to one or more involved healthcare providers  Electronically signed by Casper Hamman, RN Case Management [phone] on 9/8/2022 at 4:22 PM

## 2022-09-08 NOTE — PROGRESS NOTES
Cardiac Electrophysiology Progress Note     Admit Date: 2022     Reason for follow up: high grade AV block    HPI and Interval History:   Mukesh Tellez is a 74y.o. year old male with past medical history significant for DM, neuropathy, PVD, chronic leg wounds and lymphedema who presented to the ED with fevers, lightheadedness and SOB. Does have chronic wounds on his legs/feet and has wound care at home. He admits to a \"fall\" about a week ago where he was standing and got dizzy and fell. Denies syncope. He also admits to getting lightheaded on occasion, even while sitting and without any provoking movements. He will feel poorly and put a blanket over his head and try to go to sleep for it to resolve. Previously told he has vertigo. Denies any palpitations or chest pain. Around 1200 today, he was noted to be in sinus bradycardia with rates in the 30s while on the monitor. He was sleeping some during this time and when checked on by his nurse, was initially feeling fine. There was then concern for possible junctional rhythm and high grade AV block. The pt was then feeling lightheaded and poorly so a rapid response was called. Continues to have periods where rhythm looks like accelerated junctional monitor, also with periods with visible P wave that are either non-conducted or very long GA interval. Admits to feeling \"dizzy\" around 0730 this morning, did have a brief period of this around that time. No other cardiac complaints.     Physical Examination:  Vitals:    22 1057   BP: 104/69   Pulse: 61   Resp: 16   Temp: 97.5 °F (36.4 °C)   SpO2: 92%        Intake/Output Summary (Last 24 hours) at 2022 1230  Last data filed at 2022 1019  Gross per 24 hour   Intake 240 ml   Output 1600 ml   Net -1360 ml     In: 240 [P.O.:240]  Out: 1600    Wt Readings from Last 3 Encounters:   22 252 lb (114.3 kg)   22 247 lb 9.2 oz (112.3 kg)   21 264 lb 5.3 oz (119.9 kg)     Temp  Av.3 °F (36.8 °C)  Min: 97.5 °F (36.4 °C)  Max: 99.4 °F (37.4 °C)  Pulse  Av.8  Min: 33  Max: 61  BP  Min: 94/48  Max: 128/60  SpO2  Av.7 %  Min: 92 %  Max: 97 %    Telemetry: Sinus rhythm in the 60s. Constitutional: Alert, in no acute distress. Appears stated age. Head: Normocephalic and atraumatic. Eyes: Conjunctivae normal. EOM are normal.   Neck: Neck supple. No lymphadenopathy. No rigidity. No JVD present. Cardiovascular: Normal rate, regular rhythm. No murmurs, rubs or gallops. No S3 or S4.  Pulmonary/Chest: Clear breath sounds bilaterally. No crackles, wheezes or rhonchi. No respiratory accessory muscle use. Abdominal: Soft. Normal bowel sounds present. No distension, No tenderness. Musculoskeletal: No tenderness. Wraps to BLE. Lymphadenopathy: Has no cervical adenopathy. Neurological: Alert and oriented. No gross deficits. Skin: Skin is warm and dry. No rash, lesions, ulcerations noted. Psychiatric: No anxiety or agitation. Labs, diagnostic and imaging results reviewed. Reviewed. Recent Labs     22  1020 22  1234 22  0515   * 134* 135*   K 4.5 4.4 4.6    100 105   CO2  22 21   BUN 14 15 16   CREATININE 0.9 0.9 1.1     Recent Labs     22  1020 22  1234 22  0515   WBC 6.7 6.2 4.8   HGB 7.9* 7.8* 7.8*   HCT 24.7* 24.3* 24.5*   MCV 74.0* 74.2* 73.7*    182 171     Lab Results   Component Value Date/Time    TROPONINI 0.03 2022 06:41 PM     Estimated Creatinine Clearance: 86 mL/min (based on SCr of 1.1 mg/dL).    No results found for: BNP  Lab Results   Component Value Date/Time    PROTIME 13.0 2014 06:47 PM    INR 1.17 2014 06:47 PM     Lab Results   Component Value Date/Time    CHOL 79 2020 05:59 AM    HDL 21 2020 05:59 AM    TRIG 105 2020 05:59 AM       Scheduled Meds:   aspirin  81 mg Oral Daily    atorvastatin  20 mg Oral Nightly    insulin glargine  10 Units SubCUTAneous Nightly    docusate sodium  100 mg Oral BID    lisinopril  5 mg Oral Daily    tamsulosin  0.4 mg Oral QPM    sodium chloride flush  5-40 mL IntraVENous 2 times per day    enoxaparin  30 mg SubCUTAneous BID    insulin lispro  0-4 Units SubCUTAneous TID WC    insulin lispro  0-4 Units SubCUTAneous Nightly    cefepime  2,000 mg IntraVENous q8h     Continuous Infusions:   sodium chloride      dextrose       PRN Meds:sodium chloride flush, sodium chloride, polyethylene glycol, acetaminophen **OR** acetaminophen, glucose, dextrose bolus **OR** dextrose bolus, glucagon (rDNA), dextrose, perflutren lipid microspheres, ondansetron     EC22  Sinus rhythm with high grade AV block at 51 BPM. RBBB. LAFB. Echo: 22   Normal left ventricle size, wall thickness and systolic function with an   estimated ejection fraction of 55%. No regional wall motion abnormalities   are seen. Grade III diastolic dysfunction with elevated LV filling pressures. Mild calcification of the leaflets of the mitral valve. Mild mitral regurgitation is present. The left atrium is moderately dilated. Aortic valve leaflets appear thickened. The right ventricle is mildly enlarged with normal function   Mild to moderate tricuspid regurgitation. Stress: 20   Normal myocardial perfusion study. Normal LV size and systolic function. Overall findings represent a low risk study.        Assessment and Plan:     High grade AV block              - noted on EKG on 22, intermittent              - associated with lightheadedness, not feeling well               - on no AV [place] blocking agents              - Mg slightly low - getting replaced, K WNL, troponin flat              - EKG with RBBB and LAFB as well              - meets Class I indications for PPM, given infection issues and recent fever, would recommend Micra implant which was discussed with pt and he is agreeable, will ask ID for their opinion on positive blood culture and timing for Micra   - if has prolonged periods of high grade AV block where he is symptomatic, may need TVP until Micra can be done     Bacteremia   - 1/2 bottles growing a Protesu species   - ID consult given plans for leadless PPM, ? timing - we are considering possibly Monday if pt is ready     Essential HTN              - BP controlled      KENNY Richmond Ace  [address], [address] [facility], [address] [facility]  Phone: [phone]  Fax: [phone]    Electronically signed by KENNY Diaz - CNP on 9/8/2022 at 12:30 PM

## 2022-09-08 NOTE — PROGRESS NOTES
Hospitalist Progress Note      PCP: Hugo Alvarez MD    Date of Admission: 9/6/2022    Chief Complaint:   Chief Complaint   Patient presents with    Pneumonia     Pt in via EMS from home with weakness and shortness of breath that \"has worsened the last week\". Pt states that he was diagnosed with pneumonia a \"month ago\". Pt alert and oriented at this time with no signs of distress noted. Pt denies any pain. Pt also reports \"fever that started today\". Hospital Course: 75 yo M admitted with weakness, found to have symptomatic bradycardia      9/7  Rapid response call due to bradycardia  Patient's VS were stable. He apparently was symptomatic earlier. His EKG showed afib with slow VR; HR 51; occasional P waves without conduction concerning for high degree AV block. 9/8  No acute events overnight. Patient was found to have high degree AV block.  Cardiology recommending PM implantation; however, patient had positive blood cultures       Subjective: as above      Medications:  Reviewed    Infusion Medications    sodium chloride      dextrose       Scheduled Medications    aspirin  81 mg Oral Daily    atorvastatin  20 mg Oral Nightly    insulin glargine  10 Units SubCUTAneous Nightly    docusate sodium  100 mg Oral BID    lisinopril  5 mg Oral Daily    tamsulosin  0.4 mg Oral QPM    sodium chloride flush  5-40 mL IntraVENous 2 times per day    enoxaparin  30 mg SubCUTAneous BID    insulin lispro  0-4 Units SubCUTAneous TID WC    insulin lispro  0-4 Units SubCUTAneous Nightly    cefepime  2,000 mg IntraVENous q8h     PRN Meds: sodium chloride flush, sodium chloride, polyethylene glycol, acetaminophen **OR** acetaminophen, glucose, dextrose bolus **OR** dextrose bolus, glucagon (rDNA), dextrose, perflutren lipid microspheres, ondansetron      Intake/Output Summary (Last 24 hours) at 9/8/2022 0938  Last data filed at 9/7/2022 1511  Gross per 24 hour   Intake 240 ml   Output --   Net 240 ml Physical Exam Performed:    /65   Pulse 59   Temp 97.5 °F (36.4 °C) (Oral)   Resp 16   Ht 6' 2\" (1.88 m)   Wt 252 lb (114.3 kg)   SpO2 96%   BMI 32.35 kg/m²     General appearance: No apparent distress, appears stated age and cooperative. HEENT: Pupils equal, round, and reactive to light. Conjunctivae/corneas clear. Neck: Supple, with full range of motion. No jugular venous distention. Trachea midline. Respiratory:  Normal respiratory effort. Clear to auscultation, bilaterally without Rales/Wheezes/Rhonchi. Cardiovascular: bradycardia; S1/S2 without murmurs, rubs or gallops. Abdomen: Soft, non-tender, non-distended with normal bowel sounds. Musculoskeletal: No clubbing, cyanosis or edema bilaterally. Full range of motion without deformity. Skin: Skin color, texture, turgor normal.  No rashes or lesions. Neurologic:  Neurovascularly intact without any focal sensory/motor deficits. Cranial nerves: II-XII intact, grossly non-focal.  Psychiatric: Alert and oriented, thought content appropriate, normal insight  Capillary Refill: Brisk, 3 seconds, normal   Peripheral Pulses: +2 palpable, equal bilaterally       Labs:   Recent Labs     09/07/22  1020 09/07/22  1234 09/08/22  0515   WBC 6.7 6.2 4.8   HGB 7.9* 7.8* 7.8*   HCT 24.7* 24.3* 24.5*    182 171       Recent Labs     09/07/22  1020 09/07/22  1234 09/08/22  0515   * 134* 135*   K 4.5 4.4 4.6    100 105   CO2 22 22 21   BUN 14 15 16   CREATININE 0.9 0.9 1.1   CALCIUM 8.5 8.4 8.1*       Recent Labs     09/06/22  1541 09/07/22  1020 09/08/22  0515   AST 9* 8* 8*   ALT 6* <5* <5*   BILITOT 0.6 0.7 0.5   ALKPHOS 80 67 62       No results for input(s): INR in the last 72 hours.   Recent Labs     09/07/22  1234 09/07/22  1841   TROPONINI 0.03* 0.03*       Urinalysis:      Lab Results   Component Value Date/Time    NITRU Negative 09/06/2022 03:41 PM    WBCUA 1 08/15/2022 11:16 AM    BACTERIA None Seen 08/15/2022 11:16 AM RBCUA 2 08/15/2022 11:16 AM    BLOODU Negative 09/06/2022 03:41 PM    SPECGRAV 1.020 09/06/2022 03:41 PM    GLUCOSEU Negative 09/06/2022 03:41 PM       Radiology:  VL Extremity Venous Bilateral   Final Result      CT CHEST PULMONARY EMBOLISM W CONTRAST   Final Result   1. No CT evidence of a pulmonary embolism. 2. No acute intrapulmonary findings. XR CHEST PORTABLE   Final Result   No radiographic evidence of acute pulmonary disease. CT HEAD WO CONTRAST   Final Result   No acute intracranial abnormality. Assessment/Plan:    Active Hospital Problems    Diagnosis     Febrile illness [R50.9]      Priority: Medium    Symptomatic bradycardia [R00.1]      Priority: Medium    Generalized weakness [R53.1]      Priority: Medium    SOB (shortness of breath) [R06.02]      Priority: Medium    Diabetes mellitus type II, uncontrolled (HCC) [E11.65]     HTN (hypertension) [I10]      Polymicrobial bacteremia    Blood cx obtained on admission 9/6; coag negative staff + proteus   Repeat blood culture today 9/8  Continue cefepime for now  Consult ID      Symptomatic bradycardia  Patient with  high degree AV block  Transfer to telemetry floor  Check TSH and eschocardiogram  PM implantation recommended; however, bacteremia is delaying it     Essential Hypertension  Controlled   Resume home medications  Monitor BP q4 hrs  BP goal <140/90  Adjust medications accordingly     Type 2 diabetes  Euglycemic  Hold p.o. medicines  Monitor with Accu-Cheks ACHS  Sliding scale as needed  Continue Lantus    Fever  Unclear source, CXR ans UA WNL. Recently treated for PNA. He had positive blood cultures   Patient does have LE wounds   Elevated procalcitonin, of unclear signofocance  COVID and influenza negative  Continue cefepime for now, until bacteriemia is rule out       DVT Prophylaxis: lovenox  Diet: ADULT DIET;  Regular; 4 carb choices (60 gm/meal)  Code Status: Full Code  PT/OT Eval Status: requested    Dispo - ?  Ghislaine Carrillo MD

## 2022-09-08 NOTE — CARE COORDINATION
INITIAL CASE MANAGEMENT ASSESSMENT    Reviewed chart, met with patient to assess possible discharge needs. Explained Case Management role/services. Living Situation: Confirmed address, lives alone in a 2 level house, 11 steps into home. ADLs: Independent, patient says he doesn't leave house. Meals on wheels delivered. Sponge bathes. DME: Wheeled walker, cane, shower chair, bedside commode, raised toilet seat    PT/OT Recs: Await eval & recs post podiatry eval     Active Services: [facility] for wound care, meals on wheels, [facility] 1x/week     Transportation: Arranged for patient PRN. Medications: Uses mail order, no issues    PCP: Anthony Cardenas MD      HD/PD: n/a    PLAN/COMMENTS: Patient only wants to return home & resume [facility]. Watch podiatry and ID recs. Will need stretcher home. SW/CM provided contact information for patient or family to call with any questions. SW/CM will follow and assist as needed.     Electronically signed by Yogi Ron RN Case Management [phone] on 9/8/2022 at 4:28 PM

## 2022-09-08 NOTE — CONSULTS
Infectious Diseases Inpatient Consult Note      Reason for Consult:  Sepsis, fevers, Bacteremia , Rt diabetic foot infection     Requesting Physician:  Dillon COX    Primary Care Physician: Emelyn Blake MD    History Obtained From:  Epic AND Patient     CHIEF COMPLAINT:     Chief Complaint   Patient presents with    Pneumonia     Pt in via EMS from home with weakness and shortness of breath that \"has worsened the last week\". Pt states that he was diagnosed with pneumonia a \"month ago\". Pt alert and oriented at this time with no signs of distress noted. Pt denies any pain. Pt also reports \"fever that started today\". HISTORY OF PRESENT ILLNESS:  74 y.o. man with Recurrent Diabetic foot infections, Charcot foot, Medial foot ulcer on both feet with on going drainage, cellulitis admitted with fevers, chills and dizziness. Blood cx from admit with Proteus isolated and Foot wound cx with Proteus Group B strep - Tmax  102.6, Lactic acid at 2 - noted to be in A fib with low HR and xiao cardia seen by Cardiology EP for possible need for PPM placement. WBC   7.7, HB AT 7.8, and we are consulted for recommendations. He has visiting RN and home care Nurse to help with dressings per patient.         Past Medical History:    Past Medical History:   Diagnosis Date    Diabetes mellitus ([address]     Gallstones     Lymphedema     Neuropathy     Pancreatitis     PVD (peripheral vascular disease) ([address]     Ulcers of both lower legs ([address]     bilateral feet       Past Surgical History:    Past Surgical History:   Procedure Laterality Date    CHOLECYSTECTOMY, LAPAROSCOPIC N/A 8/10/2021    LAPAROSCOPIC CHOLECYSTECTOMY WITH CHOLANGIOGRAM performed by Kourtney Vanessa MD at [place] [id number]    ERCP  4/8/14    LITHOTRYPSY & PANCREATIC STENT PLACEMENT    FOOT SURGERY Left 1967       Current Medications:    Outpatient Medications Marked as Taking for the 9/6/22 encounter [facility] Encounter)   Medication Sig Dispense Refill    acetaminophen (TYLENOL) 500 MG tablet Take 500 mg by mouth every 6 hours as needed for Pain      aspirin 81 MG EC tablet Take 81 mg by mouth daily      lactobacillus (CULTURELLE) CAPS capsule Take 1 capsule by mouth daily         Allergies:  Patient has no known allergies. Immunizations :   Immunization History   Administered Date(s) Administered    COVID-19, J&J, (age 18y+), IM, 0.5 mL 03/19/2021         Social History:    Social History     Tobacco Use    Smoking status: Never    Smokeless tobacco: Never   Vaping Use    Vaping Use: Never used   Substance Use Topics    Alcohol use: No    Drug use: No     Social History     Tobacco Use   Smoking Status Never   Smokeless Tobacco Never      Family History   Problem Relation Age of Onset    Colon Cancer Mother         PVD s/p toe amputation by Dr Michela Jacobs Father          REVIEW OF SYSTEMS:     Constitutional:   fevers+ , chills,+  night sweats  Eyes:  negative for blurred vision, eye discharge, visual disturbance   HEENT:  negative for hearing loss, ear drainage,nasal congestion  Respiratory:  negative for cough, shortness of breath or hemoptysis   Cardiovascular:  negative for chest pain, palpitations, syncope  Gastrointestinal:  negative for nausea, vomiting, diarrhea, constipation, abdominal pain  Genitourinary:  negative for frequency, dysuria, urinary incontinence, hematuria  Hematologic/Lymphatic:  negative for easy bruising, bleeding and lymphadenopathy  Allergic/Immunologic:  negative for recurrent infections, angioedema, anaphylaxis   Endocrine:  negative for weight changes, polyuria, polydipsia and polyphagia  Musculoskeletal: Foot ulcer + skin changes+   pain, swelling, decreased range of motion  Integumentary: No rashes, skin lesions  Neurological:  negative for headaches, slurred speech, unilateral weakness  Psychiatric: negative for hallucinations,confusion,agitation.      PHYSICAL EXAM:      Vitals:  T max  102.4   /69 Pulse 61   Temp 97.5 °F (36.4 °C) (Oral)   Resp 16   Ht 6' 2\" (1.88 m)   Wt 252 lb (114.3 kg)   SpO2 92%   BMI 32.35 kg/m²     General Appearance: alert,in no acute distress, ++  pallor, no icterus   Skin: warm and dry, no rash or erythema  Head: normocephalic and atraumatic  Eyes: pupils equal, round, and reactive to light, conjunctivae normal  ENT: tympanic membrane, external ear and ear canal normal bilaterally, nose without deformity, nasal mucosa and turbinates normal without polyps  Neck: supple and non-tender without mass, no thyromegaly  no cervical lymphadenopathy  Pulmonary/Chest: clear to auscultation bilaterally- no wheezes, rales or rhonchi, normal air movement, no respiratory distress  Cardiovascular: normal rate, regular rhythm, normal S1 and S2, no murmurs, rubs, clicks, or gallops, no carotid bruits  Abdomen: soft, non-tender, non-distended, normal bowel sounds, no masses or organomegaly  Extremities: no cyanosis, clubbing or edema  Musculoskeletal: normal range of motion, no joint swelling, deformity or tenderness  Integumentary: No rashes, no abnormal skin lesions, no petechiae  Neurologic: reflexes normal and symmetric, no cranial nerve deficit  Psych:  Orientation, sensorium, mood normal   Lines: IV  Rt leg cellulitis,   Bi lateral foot ulcer medial with drainage+ charcots deformity +         DATA:    CBC:   Lab Results   Component Value Date    WBC 4.8 09/08/2022    HGB 7.8 (L) 09/08/2022    HCT 24.5 (L) 09/08/2022    MCV 73.7 (L) 09/08/2022     09/08/2022     RENAL:   Lab Results   Component Value Date    CREATININE 1.1 09/08/2022    BUN 16 09/08/2022     (L) 09/08/2022    K 4.6 09/08/2022     09/08/2022    CO2 21 09/08/2022     SED RATE:   Lab Results   Component Value Date/Time    SEDRATE 21 08/04/2020 07:20 AM     CK: No results found for: CKTOTAL  CRP:   Lab Results   Component Value Date/Time    CRP 6.6 08/03/2020 07:44 AM     Hepatic Function Panel:   Lab Results Component Value Date/Time    ALKPHOS 62 09/08/2022 05:15 AM    ALT <5 09/08/2022 05:15 AM    AST 8 09/08/2022 05:15 AM    PROT 6.0 09/08/2022 05:15 AM    BILITOT 0.5 09/08/2022 05:15 AM    LABALBU 3.2 09/08/2022 05:15 AM     UA:  Lab Results   Component Value Date/Time    COLORU Yellow 09/06/2022 03:41 PM    CLARITYU Clear 09/06/2022 03:41 PM    GLUCOSEU Negative 09/06/2022 03:41 PM    BILIRUBINUR Negative 09/06/2022 03:41 PM    KETUA Negative 09/06/2022 03:41 PM    SPECGRAV 1.020 09/06/2022 03:41 PM    BLOODU Negative 09/06/2022 03:41 PM    PHUR 5.5 09/06/2022 03:41 PM    PROTEINU Negative 09/06/2022 03:41 PM    UROBILINOGEN 1.0 09/06/2022 03:41 PM    NITRU Negative 09/06/2022 03:41 PM    LEUKOCYTESUR Negative 09/06/2022 03:41 PM    LABMICR Not Indicated 09/06/2022 03:41 PM    [place] NotGiven 09/06/2022 03:41 PM      Urine Microscopic:   Lab Results   Component Value Date/Time    BACTERIA None Seen 08/15/2022 11:16 AM    HYALCAST 0 08/15/2022 11:16 AM    WBCUA 1 08/15/2022 11:16 AM    RBCUA 2 08/15/2022 11:16 AM    EPIU 1 08/15/2022 11:16 AM     Urine Reflex to Culture:   Lab Results   Component Value Date/Time    URRFLXCULT Not Indicated 09/06/2022 03:41 PM     Procal  0.38    Hb  8.5       MICRO: cultures reviewed and updated by me        Micro results (current encounter only)    Procedure Component Value Units Date/Time   Culture, Blood 2 [[id number]] Collected: 09/06/22 1828   Order Status: Completed Specimen: Blood Updated: 09/07/22 2015    Culture, Blood 2 No Growth to date. Any change in status will be called. Narrative:     ORDER#: [id number]                          ORDERED BY: Jeronimo Hayes   SOURCE: Blood                              COLLECTED:  09/06/22 18:28   ANTIBIOTICS AT [facility].:                      RECEIVED :  09/06/22 18:34   If child <=2 yrs old please draw pediatric bottle. ~Blood Culture #2   MRSA DNA Probe, Nasal [[id number]] Collected: 09/07/22 0600   Order Status: Completed Specimen: Nares Updated: 09/07/22 1712    MRSA SCREEN RT-PCR --    Negative  MRSA DNA not detected. Normal Range: Not detected    Narrative:     ORDER#: [id number]                          ORDERED BY: Via NI Baron   SOURCE: Nares                              COLLECTED:  09/07/22 06:00   ANTIBIOTICS AT [facility].:                      RECEIVED :  09/07/22 06:14   Culture, Blood, PCR ID Panel [[id number]] Collected: 09/06/22 1828   Order Status: Completed Updated: 09/07/22 1554    Report SEE IMAGE   Narrative:     Mariya Lawson  [id number] tel. [phone],   Microbiology results called to and read back by Corrina Taylor in pharmacy,   09/07/2022 15:32, by [facility]   Microbiology results called to and read back by DANITA Baker, 09/07/2022   15:31, by [facility]   Culture, Blood 1 [[id number]] (Abnormal) Collected: 09/06/22 1828   Order Status: Completed Specimen: Blood Updated: 09/07/22 1543    Blood Culture, Routine -- Abnormal     Gram stain Anaerobic bottle:   Gram negative rods   Information to follow   Gram stain Aerobic bottle:   Gram positive cocci in clusters   resembling Staphylococcus   Information to follow    Abnormal     Organism Staphylococcus coagulase negative DNA Detected Abnormal     Blood Culture, Routine See additional report for complete BCID panel. Organism Proteus species DNA Detected Abnormal     Blood Culture, Routine See additional report for complete BCID panel. Narrative:     ORDER#: [id number]                          ORDERED BY: Tyesha AMIN 35: Blood                              COLLECTED:  09/06/22 18:28   ANTIBIOTICS AT [facility].:                      RECEIVED :  09/06/22 18:33   CALL  Suarez  [facility] tel. [phone],   Microbiology results called to and read back by Corrina Taylor in pharmacy,   09/07/2022 15:32, by [facility]   Microbiology results called to and read back by DANITA Baker, 09/07/2022   15:31, by [facility]   If child <=2 yrs old please draw pediatric bottle. ~Blood Culture 1   Culture, Wound Aerobic Results   Component Value Date/Time    [facility]  09/06/2022 06:28 PM     Gram stain Anaerobic bottle:  Gram negative rods  Information to follow  Gram stain Aerobic bottle:  Gram positive cocci in clusters  resembling Staphylococcus  Information to follow      [facility] See additional report for complete BCID panel. 09/06/2022 06:28 PM    BC See additional report for complete BCID panel. 09/06/2022 06:28 PM    BLOODCULT2  09/06/2022 06:28 PM     No Growth to date. Any change in status will be called. Viral Culture:    Lab Results   Component Value Date/Time    COVID19 Not Detected 09/06/2022 04:58 PM     Urine Culture: No results for input(s): Marissa Rojas in the last 72 hours. Scheduled Meds:   aspirin  81 mg Oral Daily    atorvastatin  20 mg Oral Nightly    insulin glargine  10 Units SubCUTAneous Nightly    docusate sodium  100 mg Oral BID    lisinopril  5 mg Oral Daily    tamsulosin  0.4 mg Oral QPM    sodium chloride flush  5-40 mL IntraVENous 2 times per day    enoxaparin  30 mg SubCUTAneous BID    insulin lispro  0-4 Units SubCUTAneous TID WC    insulin lispro  0-4 Units SubCUTAneous Nightly    cefepime  2,000 mg IntraVENous q8h       Continuous Infusions:   sodium chloride      dextrose         PRN Meds:  sodium chloride flush, sodium chloride, polyethylene glycol, acetaminophen **OR** acetaminophen, glucose, dextrose bolus **OR** dextrose bolus, glucagon (rDNA), dextrose, perflutren lipid microspheres, ondansetron    Imaging:   VL Extremity Venous Bilateral   Final Result      CT CHEST PULMONARY EMBOLISM W CONTRAST   Final Result   1. No CT evidence of a pulmonary embolism. 2. No acute intrapulmonary findings. XR CHEST PORTABLE   Final Result   No radiographic evidence of acute pulmonary disease. CT HEAD WO CONTRAST   Final Result   No acute intracranial abnormality.            MRI Abd:  8/17/22      Impression   Poorly defined enhancing nodules in the liver suspicious for metastasis in   the absence of clinical signs of infection. .  There is fatty infiltration of   the liver       Suspected stone in the pancreatic duct with pancreatic ductal dilatation. Scattered side-branch IPMNs are suspected within the pancreas. All pertinent images and reports for the current Hospitalization were reviewed by me. IMPRESSION:    Patient Active Problem List   Diagnosis    Abdominal pain, acute    BPH (benign prostatic hyperplasia)    RLQ abdominal pain    Abnormal ECG    HTN (hypertension)    Diabetes mellitus type II, uncontrolled (HCC)    Diabetic foot infection ([address]    Fever    Leukocytosis    Charcot foot due to diabetes mellitus (HCC)    Foot ulceration, left, with unspecified severity ([address]    Demand ischemia ([address]    Controlled type 2 diabetes mellitus with hyperglycemia, with long-term current use of insulin (HCC)    Sepsis (HCC)    Diarrhea    Chronic osteoarthritis    Chest pain    Chronic calcific pancreatitis (HCC)    Atrioventricular block, Mobitz type 1, Wenckebach    Vertigo    Microcytic anemia    Acute calculous cholecystitis    Generalized weakness    SOB (shortness of breath)    Febrile illness    Symptomatic bradycardia     Fevers chills  Lactic acidosis  Bi lateral diabetic foot infection  With Medial foot ulcers with drainage+  Sepsis  Anemia from chronic disease  Symptomatic Bradycardia -A  fib  Proteus Bacteremia  Foot wound cx with Proteus and Group B strep  TAD7G at  6.7  Complicated diabetic foot infections    Suspect infected foot with ulcer and drainage as the source for sepsis and will repeat Blood cx to document clearance    Would like to see clinical improvement and bacteremia resolve before PPM placement - will follow  clinical course and clear once better he may be ready by Monday ? Labs, Microbiology, Radiology and pertinent results from current hospitalization and care every where were reviewed by me as a part of the consultation.     PLAN :  1.Cont IV Cefepime  x 2 gm q  8 HRS  2. Add IV Flagyl for anaerobes  3. ESR, CRP  4. Needs Podiatry evaluation for possible debridement and foot care  5. Blood cx repeat   6. Once clinically better and Blood cx negative may be able to clear him for PPM     Discussed with patient/Family and Nursing   Risk of Complications/Morbidity: High      Illness(es)/ Infection present that pose threat to bodily function. There is potential for severe exacerbation of infection/side effects of treatment. Therapy requires intensive monitoring for antimicrobial agent toxicity. Thanks for allowing me to participate in your patient's care please call me with any questions or concerns.     Dr. Kelsie Brian MD  [address] [place] [address] Physician  Phone: [phone]   Fax : [phone]

## 2022-09-08 NOTE — PROGRESS NOTES
Physician Progress Note      PATIENT:               Kaila Alvarez  CSN #:                  [id number]  :                       1955  ADMIT DATE:       2022 3:09 PM  [address] [place] DATE:  Adriana Welsh  PROVIDER #:        Chasidy Naylor MD          QUERY TEXT:    Patient admitted with febrile illness. Documentation reflects Sepsis in ED   Provider note(s) dated 22. If possible, please document in the progress   notes and discharge summary if Sepsis was: The medical record reflects the following:  Risk Factors: discharged recently about a few weeks ago for recent pneumonia  Clinical Indicators:  HR 95, RR  22 ,  Temperature  102.4 , Procalcitonin 0.38   , Lactic acid 2.0--1.2, per ED Provider \"Time  sepsis  Identified:  \"  Treatment: In ED 1L NS Bolus  , IV Cefepime and IV Vanco  Options provided:  -- Sepsis, POA unknown source  confirmed after study  -- Sepsis, POA treated and resolved, please specify source treated and   resolved. -- Sepsis ruled out after study  -- Other - I will add my own diagnosis  -- Disagree - Not applicable / Not valid  -- Disagree - Clinically unable to determine / Unknown  -- Refer to Clinical Documentation Reviewer    PROVIDER RESPONSE TEXT:    Sepsis ruled out after study.     Query created by: Becki Price on 2022 2:06 PM      Electronically signed by:  Chasidy Naylor MD 2022 2:13 PM

## 2022-09-09 LAB
A/G RATIO: 1.3 (ref 1.1–2.2)
ALBUMIN SERPL-MCNC: 3.4 G/DL (ref 3.4–5)
ALP BLD-CCNC: 65 U/L (ref 40–129)
ALT SERPL-CCNC: 6 U/L (ref 10–40)
ANION GAP SERPL CALCULATED.3IONS-SCNC: 10 MMOL/L (ref 3–16)
AST SERPL-CCNC: 9 U/L (ref 15–37)
BASOPHILS ABSOLUTE: 0 K/UL (ref 0–0.2)
BASOPHILS RELATIVE PERCENT: 0.7 %
BILIRUB SERPL-MCNC: 0.4 MG/DL (ref 0–1)
BLOOD CULTURE, ROUTINE: ABNORMAL
BUN BLDV-MCNC: 15 MG/DL (ref 7–20)
C-REACTIVE PROTEIN: 48.8 MG/L (ref 0–5.1)
CALCIUM SERPL-MCNC: 8.2 MG/DL (ref 8.3–10.6)
CHLORIDE BLD-SCNC: 105 MMOL/L (ref 99–110)
CO2: 22 MMOL/L (ref 21–32)
CREAT SERPL-MCNC: 1 MG/DL (ref 0.8–1.3)
EOSINOPHILS ABSOLUTE: 0.2 K/UL (ref 0–0.6)
EOSINOPHILS RELATIVE PERCENT: 3.5 %
GFR AFRICAN AMERICAN: >60
GFR NON-AFRICAN AMERICAN: >60
GLUCOSE BLD-MCNC: 112 MG/DL (ref 70–99)
GLUCOSE BLD-MCNC: 118 MG/DL (ref 70–99)
GLUCOSE BLD-MCNC: 127 MG/DL (ref 70–99)
GLUCOSE BLD-MCNC: 151 MG/DL (ref 70–99)
GLUCOSE BLD-MCNC: 158 MG/DL (ref 70–99)
GRAM STAIN RESULT: ABNORMAL
HCT VFR BLD CALC: 26.2 % (ref 40.5–52.5)
HEMOGLOBIN: 8.4 G/DL (ref 13.5–17.5)
LYMPHOCYTES ABSOLUTE: 1.4 K/UL (ref 1–5.1)
LYMPHOCYTES RELATIVE PERCENT: 26.8 %
MAGNESIUM: 2 MG/DL (ref 1.8–2.4)
MCH RBC QN AUTO: 23.5 PG (ref 26–34)
MCHC RBC AUTO-ENTMCNC: 31.9 G/DL (ref 31–36)
MCV RBC AUTO: 73.6 FL (ref 80–100)
MONOCYTES ABSOLUTE: 0.5 K/UL (ref 0–1.3)
MONOCYTES RELATIVE PERCENT: 10.6 %
NEUTROPHILS ABSOLUTE: 3 K/UL (ref 1.7–7.7)
NEUTROPHILS RELATIVE PERCENT: 58.4 %
ORGANISM: ABNORMAL
PDW BLD-RTO: 18.9 % (ref 12.4–15.4)
PERFORMED ON: ABNORMAL
PLATELET # BLD: 179 K/UL (ref 135–450)
PMV BLD AUTO: 7.4 FL (ref 5–10.5)
POTASSIUM SERPL-SCNC: 4.3 MMOL/L (ref 3.5–5.1)
RBC # BLD: 3.56 M/UL (ref 4.2–5.9)
SEDIMENTATION RATE, ERYTHROCYTE: 26 MM/HR (ref 0–20)
SODIUM BLD-SCNC: 137 MMOL/L (ref 136–145)
TOTAL PROTEIN: 6.1 G/DL (ref 6.4–8.2)
WBC # BLD: 5.1 K/UL (ref 4–11)
WOUND/ABSCESS: ABNORMAL

## 2022-09-09 PROCEDURE — 94760 N-INVAS EAR/PLS OXIMETRY 1: CPT

## 2022-09-09 PROCEDURE — 85652 RBC SED RATE AUTOMATED: CPT

## 2022-09-09 PROCEDURE — 6360000002 HC RX W HCPCS: Performed by: STUDENT IN AN ORGANIZED HEALTH CARE EDUCATION/TRAINING PROGRAM

## 2022-09-09 PROCEDURE — 2580000003 HC RX 258: Performed by: STUDENT IN AN ORGANIZED HEALTH CARE EDUCATION/TRAINING PROGRAM

## 2022-09-09 PROCEDURE — 80053 COMPREHEN METABOLIC PANEL: CPT

## 2022-09-09 PROCEDURE — 97162 PT EVAL MOD COMPLEX 30 MIN: CPT

## 2022-09-09 PROCEDURE — 86140 C-REACTIVE PROTEIN: CPT

## 2022-09-09 PROCEDURE — 36415 COLL VENOUS BLD VENIPUNCTURE: CPT

## 2022-09-09 PROCEDURE — 2500000003 HC RX 250 WO HCPCS: Performed by: INTERNAL MEDICINE

## 2022-09-09 PROCEDURE — 6370000000 HC RX 637 (ALT 250 FOR IP): Performed by: STUDENT IN AN ORGANIZED HEALTH CARE EDUCATION/TRAINING PROGRAM

## 2022-09-09 PROCEDURE — 99232 SBSQ HOSP IP/OBS MODERATE 35: CPT | Performed by: NURSE PRACTITIONER

## 2022-09-09 PROCEDURE — 99233 SBSQ HOSP IP/OBS HIGH 50: CPT | Performed by: INTERNAL MEDICINE

## 2022-09-09 PROCEDURE — 83735 ASSAY OF MAGNESIUM: CPT

## 2022-09-09 PROCEDURE — 97110 THERAPEUTIC EXERCISES: CPT

## 2022-09-09 PROCEDURE — 2060000000 HC ICU INTERMEDIATE R&B

## 2022-09-09 PROCEDURE — 85025 COMPLETE CBC W/AUTO DIFF WBC: CPT

## 2022-09-09 PROCEDURE — 97530 THERAPEUTIC ACTIVITIES: CPT

## 2022-09-09 RX ADMIN — INSULIN GLARGINE 10 UNITS: 100 INJECTION, SOLUTION SUBCUTANEOUS at 21:39

## 2022-09-09 RX ADMIN — DOCUSATE SODIUM 100 MG: 100 CAPSULE, LIQUID FILLED ORAL at 08:25

## 2022-09-09 RX ADMIN — TAMSULOSIN HYDROCHLORIDE 0.4 MG: 0.4 CAPSULE ORAL at 16:12

## 2022-09-09 RX ADMIN — CEFEPIME 2000 MG: 2 INJECTION, POWDER, FOR SOLUTION INTRAVENOUS at 06:07

## 2022-09-09 RX ADMIN — SODIUM CHLORIDE 25 ML: 9 INJECTION, SOLUTION INTRAVENOUS at 21:55

## 2022-09-09 RX ADMIN — ENOXAPARIN SODIUM 30 MG: 100 INJECTION SUBCUTANEOUS at 08:26

## 2022-09-09 RX ADMIN — CEFEPIME 2000 MG: 2 INJECTION, POWDER, FOR SOLUTION INTRAVENOUS at 12:41

## 2022-09-09 RX ADMIN — METRONIDAZOLE 500 MG: 500 INJECTION, SOLUTION INTRAVENOUS at 13:23

## 2022-09-09 RX ADMIN — Medication 10 ML: at 08:26

## 2022-09-09 RX ADMIN — DOCUSATE SODIUM 100 MG: 100 CAPSULE, LIQUID FILLED ORAL at 21:39

## 2022-09-09 RX ADMIN — METRONIDAZOLE 500 MG: 500 INJECTION, SOLUTION INTRAVENOUS at 06:07

## 2022-09-09 RX ADMIN — ATORVASTATIN CALCIUM 20 MG: 20 TABLET, FILM COATED ORAL at 21:39

## 2022-09-09 RX ADMIN — METRONIDAZOLE 500 MG: 500 INJECTION, SOLUTION INTRAVENOUS at 21:56

## 2022-09-09 RX ADMIN — ENOXAPARIN SODIUM 30 MG: 100 INJECTION SUBCUTANEOUS at 21:39

## 2022-09-09 RX ADMIN — CEFEPIME 2000 MG: 2 INJECTION, POWDER, FOR SOLUTION INTRAVENOUS at 21:53

## 2022-09-09 RX ADMIN — LISINOPRIL 5 MG: 5 TABLET ORAL at 08:25

## 2022-09-09 RX ADMIN — ASPIRIN 81 MG: 81 TABLET, COATED ORAL at 08:26

## 2022-09-09 RX ADMIN — Medication 10 ML: at 21:40

## 2022-09-09 RX ADMIN — SODIUM CHLORIDE 25 ML: 9 INJECTION, SOLUTION INTRAVENOUS at 21:49

## 2022-09-09 ASSESSMENT — PAIN SCALES - GENERAL
PAINLEVEL_OUTOF10: 0

## 2022-09-09 ASSESSMENT — PAIN SCALES - WONG BAKER
WONGBAKER_NUMERICALRESPONSE: 0
WONGBAKER_NUMERICALRESPONSE: 0

## 2022-09-09 NOTE — PROGRESS NOTES
Infectious Disease Follow up Notes  Admit Date: 9/6/2022  Hospital Day: 4    Antibiotics :   IV Cefepime  IV Flagyl     CHIEF COMPLAINT:     Sepsis  Proteus bacteremia  Diabetic foot infection  A FIB  Foot ulcers     Subjective interval History :  74 y. o.man with Recurrent Diabetic foot infections, Charcot foot, Medial foot ulcer on both feet with on going drainage, cellulitis admitted with fevers, chills and dizziness. Blood cx from admit with Proteus isolated and Foot wound cx with Proteus Group B strep - Tmax  102.6, Lactic acid at 2 - noted to be in A fib with low HR and xiao cardia seen by Cardiology EP for possible need for PPM placement. WBC   7.7, HB AT 7.8, and we are consulted for recommendations. He has visiting RN and home care Nurse to help with dressings per patient.          Interval History : c/o dizziness and drainage from both foot ulcer and Blood cx repeat in process       Past Medical History:    Past Medical History:   Diagnosis Date    Diabetes mellitus ([address]     Gallstones     Lymphedema     Neuropathy     Pancreatitis     PVD (peripheral vascular disease) ([address]     Ulcers of both lower legs ([address]     bilateral feet       Past Surgical History:    Past Surgical History:   Procedure Laterality Date    CHOLECYSTECTOMY, LAPAROSCOPIC N/A 8/10/2021    LAPAROSCOPIC CHOLECYSTECTOMY WITH CHOLANGIOGRAM performed by Sahra Perdomo MD at [place] [address]    ERCP  4/8/14    LITHOTRYPSY & PANCREATIC STENT PLACEMENT    FOOT SURGERY Left 1967       Current Medications:    Outpatient Medications Marked as Taking for the 9/6/22 encounter [facility] HOSPITAL Encounter)   Medication Sig Dispense Refill    acetaminophen (TYLENOL) 500 MG tablet Take 500 mg by mouth every 6 hours as needed for Pain      aspirin 81 MG EC tablet Take 81 mg by mouth daily      lactobacillus (CULTURELLE) CAPS capsule Take 1 capsule by mouth daily         Allergies: Patient has no known allergies. Immunizations :   Immunization History   Administered Date(s) Administered    COVID-19, J&J, (age 18y+), IM, 0.5 mL 03/19/2021       Social History:     Social History     Tobacco Use    Smoking status: Never    Smokeless tobacco: Never   Vaping Use    Vaping Use: Never used   Substance Use Topics    Alcohol use: No    Drug use: No     Social History     Tobacco Use   Smoking Status Never   Smokeless Tobacco Never      Family History   Problem Relation Age of Onset    Colon Cancer Mother         PVD s/p toe amputation by Dr Vaishali House Father           REVIEW OF SYSTEMS:      Constitutional:    fevers+ , chills, night sweats  Eyes:  negative for blurred vision, eye discharge, visual disturbance   HEENT:  negative for hearing loss, ear drainage,nasal congestion  Respiratory:  negative for cough, shortness of breath or hemoptysis   Cardiovascular:  negative for chest pain, palpitations, syncope  Gastrointestinal:  negative for nausea, vomiting, diarrhea, constipation, abdominal pain  Genitourinary:  negative for frequency, dysuria, urinary incontinence, hematuria  Hematologic/Lymphatic:  negative for easy bruising, bleeding and lymphadenopathy  Allergic/Immunologic:  negative for recurrent infections, angioedema, anaphylaxis   Endocrine:  negative for weight changes, polyuria, polydipsia and polyphagia  Musculoskeletal:  bi lateral foot ulcers+ cellulitis+ , swelling, decreased range of motion  Integumentary: No rashes, skin lesions  Neurological:  negative for headaches, slurred speech, unilateral weakness  Psychiatric: negative for hallucinations,confusion,agitation.                 PHYSICAL EXAM:      Vitals:    /74   Pulse (!) 40   Temp 98.3 °F (36.8 °C) (Oral)   Resp 16   Ht 6' 2\" (1.88 m)   Wt 242 lb 8.1 oz (110 kg)   SpO2 98%   BMI 31.14 kg/m²   General Appearance: alert,in no acute distress, ++  pallor, no icterus   Skin: warm and dry, no rash or erythema  Head: normocephalic and atraumatic  Eyes: pupils equal, round, and reactive to light, conjunctivae normal  ENT: tympanic membrane, external ear and ear canal normal bilaterally, nose without deformity, nasal mucosa and turbinates normal without polyps  Neck: supple and non-tender without mass, no thyromegaly  no cervical lymphadenopathy  Pulmonary/Chest: clear to auscultation bilaterally- no wheezes, rales or rhonchi, normal air movement, no respiratory distress  Cardiovascular: normal rate, regular rhythm, normal S1 and S2, no murmurs, rubs, clicks, or gallops, no carotid bruits  Abdomen: soft, non-tender, non-distended, normal bowel sounds, no masses or organomegaly  Extremities: no cyanosis, clubbing or edema  Musculoskeletal: normal range of motion, no joint swelling, deformity or tenderness  Integumentary: No rashes, no abnormal skin lesions, no petechiae  Neurologic: reflexes normal and symmetric, no cranial nerve deficit  Psych:  Orientation, sensorium, mood normal            Lines: IV  Rt leg cellulitis,   Bi lateral foot ulcer medial with drainage+ charcots deformity +     Data Review:    CBC:   Lab Results   Component Value Date    WBC 5.1 09/09/2022    HGB 8.4 (L) 09/09/2022    HCT 26.2 (L) 09/09/2022    MCV 73.6 (L) 09/09/2022     09/09/2022     RENAL:   Lab Results   Component Value Date    CREATININE 1.0 09/09/2022    BUN 15 09/09/2022     09/09/2022    K 4.3 09/09/2022     09/09/2022    CO2 22 09/09/2022     SED RATE:   Lab Results   Component Value Date/Time    SEDRATE 26 09/09/2022 03:45 AM     CK: No results found for: CKTOTAL  CRP:   Lab Results   Component Value Date/Time    CRP 48.8 09/09/2022 03:45 AM     Hepatic Function Panel:   Lab Results   Component Value Date/Time    ALKPHOS 65 09/09/2022 03:45 AM    ALT 6 09/09/2022 03:45 AM    AST 9 09/09/2022 03:45 AM    PROT 6.1 09/09/2022 03:45 AM    BILITOT 0.4 09/09/2022 03:45 AM    LABALBU 3.4 09/09/2022 03:45 AM UA:  Lab Results   Component Value Date/Time    COLORU Yellow 09/06/2022 03:41 PM    CLARITYU Clear 09/06/2022 03:41 PM    GLUCOSEU Negative 09/06/2022 03:41 PM    BILIRUBINUR Negative 09/06/2022 03:41 PM    KETUA Negative 09/06/2022 03:41 PM    SPECGRAV 1.020 09/06/2022 03:41 PM    BLOODU Negative 09/06/2022 03:41 PM    PHUR 5.5 09/06/2022 03:41 PM    PROTEINU Negative 09/06/2022 03:41 PM    UROBILINOGEN 1.0 09/06/2022 03:41 PM    NITRU Negative 09/06/2022 03:41 PM    LEUKOCYTESUR Negative 09/06/2022 03:41 PM    LABMICR Not Indicated 09/06/2022 03:41 PM    Shola Serge NotGiven 09/06/2022 03:41 PM      Urine Microscopic:   Lab Results   Component Value Date/Time    BACTERIA None Seen 08/15/2022 11:16 AM    HYALCAST 0 08/15/2022 11:16 AM    WBCUA 1 08/15/2022 11:16 AM    RBCUA 2 08/15/2022 11:16 AM    EPIU 1 08/15/2022 11:16 AM     Urine Reflex to Culture:   Lab Results   Component Value Date/Time    URRFLXCULT Not Indicated 09/06/2022 03:41 PM         MICRO: cultures reviewed and updated by me   Blood Culture:   Lab Results   Component Value Date/Time    [facility]  09/06/2022 06:28 PM     Gram stain Anaerobic bottle:  Gram negative rods  Information to follow  Gram stain Aerobic bottle:  Gram positive cocci in clusters  resembling Staphylococcus  Information to follow      [facility] See additional report for complete BCID panel. 09/06/2022 06:28 PM    BC See additional report for complete BCID panel. 09/06/2022 06:28 PM    BC  09/06/2022 06:28 PM     POSITIVE for  Sensitivity to follow  Isolated one of two sets      [facility]  09/06/2022 06:28 PM     POSITIVE for  This organism was isolated in one set. Susceptibility testing is not routinely done as this  organism frequently represents skin contamination. Additional testing can be ordered by calling the  Microbiology Department. Crissie Soulier  09/06/2022 06:28 PM     No Growth to date. Any change in status will be called.        Respiratory Culture:  Lab Results   Component Value Date/Time    LABGRAM  09/07/2022 03:00 PM     1+ Epithelial Cells  1+ WBC's (Polymorphonuclear)  3+ Gram positive cocci  in clusters-resembling Staph       AFB:No results found for: AFBSMEAR  Viral Culture:  Lab Results   Component Value Date/Time    COVID19 Not Detected 09/06/2022 04:58 PM     Urine Culture: No results for input(s): LABURIN in the last 72 hours. Procedure Component Value Units Date/Time   Culture, Wound Aerobic Only [[id number]] (Abnormal) Collected: 09/07/22 1500   Order Status: Completed Specimen: Wound from Foot Updated: 09/09/22 0927    Gram Stain Result 1+ Epithelial Cells   1+ WBC's (Polymorphonuclear)   3+ Gram positive cocci  in clusters-resembling Staph    Abnormal     Organism Strep agalactiae (Beta Strep Group B) Abnormal     WOUND/ABSCESS --    Moderate growth   Susceptibility testing of penicillin and other beta lactams is   not necessary for beta hemolytic Streptococci since resistant   strains have not been identified. (CLSI M100)     Organism Corynebacterium striatum Abnormal     WOUND/ABSCESS --    Heavy growth   No further workup     Organism Proteus mirabilis Abnormal     WOUND/ABSCESS --    Light growth   No further workup    Narrative:     ORDER#: [id number]                          ORDERED BY: BOLIVAR Romero   SOURCE: Foot                               COLLECTED:  09/07/22 15:00   ANTIBIOTICS AT [facility].:                      RECEIVED :  09/07/22 15:08   Culture, Blood 1 [[id number]] (Abnormal) Collected: 09/06/22 1828   Order Status: Completed Specimen: Blood Updated: 09/08/22 1505    Blood Culture, Routine -- Abnormal     Gram stain Anaerobic bottle:   Gram negative rods   Information to follow   Gram stain Aerobic bottle:   Gram positive cocci in clusters   resembling Staphylococcus   Information to follow    Abnormal     Organism Staphylococcus coagulase negative DNA Detected Abnormal     Blood Culture, Routine See additional report for complete BCID panel. Organism Proteus species DNA Detected Abnormal     Blood Culture, Routine See additional report for complete BCID panel. Organism Proteus mirabilis Abnormal     Blood Culture, Routine --    POSITIVE for   Sensitivity to follow   Isolated one of two sets     Organism Staphylococcus coagulase-negative Abnormal     Blood Culture, Routine --    POSITIVE for   This organism was isolated in one set. Susceptibility testing is not routinely done as this   organism frequently represents skin contamination. Additional testing can be ordered by calling the   Microbiology Department. Narrative:     ORDER#: [id number]                          ORDERED BY: Tyesha AMIN 35: Blood                              COLLECTED:  09/06/22 18:28   ANTIBIOTICS AT [facility].:                      RECEIVED :  09/06/22 18:33   CALL  [facility]  [facility] tel. [phone],   Microbiology results called to and read back by Cherylene Leys in pharmacy,   09/07/2022 15:32, by [facility]   Microbiology results called to and read back by DANITA Crabtree, 09/07/2022   15:31, by [facility]   If child <=2 yrs old please draw pediatric bottle. ~Blood Culture 1   Culture, Blood 1 [[id number]] Collected: 09/08/22 1228   Order Status: Sent Specimen: Blood Updated: 09/08/22 1242   Culture, Blood 2 [[id number]] Collected: 09/08/22 1232   Order Status: Sent Specimen: Blood Updated: 09/08/22 1242   Culture, Blood 2 [[id number]] Collected: 09/06/22 1828   Order Status: Completed Specimen: Blood Updated: 09/07/22 2015    Culture, Blood 2 No Growth to date. Any change in status will be called. Narrative:     ORDER#: [id number]                          ORDERED BY: Jarvis Moreira   SOURCE: Blood                              COLLECTED:  09/06/22 18:28   ANTIBIOTICS AT [facility].:                      RECEIVED :  09/06/22 18:34   If child <=2 yrs old please draw pediatric bottle. ~Blood Culture #2   MRSA DNA Probe, Nasal [[id number]] Collected: 09/07/22 0600   Order Status: Completed Specimen: Nares Updated: 09/07/22 1712    MRSA SCREEN RT-PCR --    Negative  MRSA DNA not detected.    Normal Range: Not detected    Narrative:     ORDER#: [id number]                          ORDERED BY: Via NI Baron   SOURCE: Nares                              COLLECTED:  09/07/22 06:00   ANTIBIOTICS AT [facility].:                      RECEIVED :  09/07/22 06:14   Culture, Blood, PCR ID Panel [[id number]] Collected: 09/06/22 1828   Order Status: Completed Updated: 09/07/22 1554    Report SEE IMAGE   Narrative:     Wesley Mcallister  [id number] tel. [phone],   Microbiology results called to and read back by Carol Blackwell in pharmacy,   09/07/2022 15:32, by [facility]   Microbiology results called to and read back by DANITA Crews, 09/07/2022   15:31, by [facility]   Respiratory Panel Film Array Report [[id number]] Collected: 09/06/22 2031   Order Status: Completed Updated: 09/06/22 2146    Report SEE IMAGE   Respiratory Panel, Molecular, with COVID-19 (Restricted: peds pts or suitable admitted adults) [[id number]] Collected: 09/06/22 2031   Order Status: Completed Specimen: Nasopharyngeal Updated: 09/06/22 2145    Respiratory Panel PCR --    Respiratory Pathogens Panel PCR Result: Not Detected   See additional report for complete Respiratory Pathogens Panel    Narrative:     ORDER#: [id number]                          ORDERED BY: Phyllis Mckenzie   SOURCE: Nasopharyngeal                     COLLECTED:  09/06/22 20:31   ANTIBIOTICS AT [facility].:                      RECEIVED :  09/06/22 20:38   Rapid Influenza A/B Antigens [[id number]] Collected: 09/06/22 2031   Order Status: Completed Specimen: Nasopharyngeal Updated: 09/06/22 2106    Rapid Influenza A Ag Negative    Rapid Influenza B Ag Negative   COVID-19, Rapid [[id number]] Collected: 09/06/22 1658   Order Status: Completed Specimen: Nasopharyngeal Swab Updated: 09/06/22 1719    SARS-CoV-2, NAAT Not Detected    Comment: Rapid NAAT:   Negative results should be treated as presumptive and, if inconsistent with clinical signs and symptoms or necessary for   patient management, should be tested with an alternative molecular   assay. Negative results do not preclude SARS-CoV-2 infection and   should not be used as the sole basis for patient management decisions. This test has been authorized by the FDA under an Emergency Use   Authorization (EUA) for use by authorized laboratories. Fact sheet for Healthcare Providers:   Carloz.aly   Fact sheet for Patients: Carloz.aly     METHODOLOGY: Isothermal Nucleic Acid Amplification         IMAGING:    VL Extremity Venous Bilateral   Final Result      CT CHEST PULMONARY EMBOLISM W CONTRAST   Final Result   1. No CT evidence of a pulmonary embolism. 2. No acute intrapulmonary findings. XR CHEST PORTABLE   Final Result   No radiographic evidence of acute pulmonary disease. CT HEAD WO CONTRAST   Final Result   No acute intracranial abnormality.                All the pertinent images and reports for the current Hospitalization were reviewed by me     Scheduled Meds:   metroNIDAZOLE  500 mg IntraVENous Q8H    aspirin  81 mg Oral Daily    atorvastatin  20 mg Oral Nightly    insulin glargine  10 Units SubCUTAneous Nightly    docusate sodium  100 mg Oral BID    lisinopril  5 mg Oral Daily    tamsulosin  0.4 mg Oral QPM    sodium chloride flush  5-40 mL IntraVENous 2 times per day    enoxaparin  30 mg SubCUTAneous BID    insulin lispro  0-4 Units SubCUTAneous TID WC    insulin lispro  0-4 Units SubCUTAneous Nightly    cefepime  2,000 mg IntraVENous q8h       Continuous Infusions:   sodium chloride      dextrose         PRN Meds:  sodium chloride flush, sodium chloride, polyethylene glycol, acetaminophen **OR** acetaminophen, glucose, dextrose bolus **OR** dextrose bolus, glucagon (rDNA), dextrose, perflutren lipid microspheres, ondansetron        MRI Abd:  8/17/22       Impression course and clear once better he may be ready by Monday ? Blood cx repeat from  9/8 in process and Podiatry consulted  for Debridement and wound care        Labs, Microbiology, Radiology and all the pertinent results from current hospitalization and  care every where were reviewed  by me as a part of the evaluation   Plan: 1. Cont IV Cefepime  x 2 gm q  8 HRS  2. Cont  IV Flagyl for anaerobes  3. ESR, CRP  4. Podiatry evaluation in process  5. Blood cx repeat sent  6. Once clinically better and Blood cx negative may be able to clear him for PPM      Discussed with patient/Family and Nursing   Risk of Complications/Morbidity: High      Illness(es)/ Infection present that pose threat to bodily function. There is potential for severe exacerbation of infection/side effects of treatment. Therapy requires intensive monitoring for antimicrobial agent toxicity. Discussed with patient/Family and Nursing staff     Thanks for allowing me to participate in your patient's care and please call me with any questions or concerns.     Katlin Hamilton MD  Infectious Disease  [facility]) Physician  Phone: [phone]   Fax : [phone]

## 2022-09-09 NOTE — PROGRESS NOTES
Patient refuses to be turned. Educated on importance of turning in order to maintain skin integrity. Specialty bed ordered.

## 2022-09-09 NOTE — PROGRESS NOTES
Cardiac Electrophysiology Progress Note     Admit Date: 9/6/2022     Reason for follow up: high grade AV block    HPI and Interval History:   Danyell Andrea is a 74y.o. year old male with past medical history significant for DM, neuropathy, PVD, chronic leg wounds and lymphedema who presented to the ED with fevers, lightheadedness and SOB. Does have chronic wounds on his legs/feet and has wound care at home. He admits to a \"fall\" about a week ago where he was standing and got dizzy and fell. Denies syncope. He also admits to getting lightheaded on occasion, even while sitting and without any provoking movements. He will feel poorly and put a blanket over his head and try to go to sleep for it to resolve. Previously told he has vertigo. Denies any palpitations or chest pain. Around 1200 today, he was noted to be in sinus bradycardia with rates in the 30s while on the monitor. He was sleeping some during this time and when checked on by his nurse, was initially feeling fine. There was then concern for possible junctional rhythm and high grade AV block. The pt was then feeling lightheaded and poorly so a rapid response was called    Continues to have periods where rhythm looks like accelerated junctional monitor, also with periods with visible P wave that are either non-conducted or very long [place] interval. Admits to feeling \"dizzy\" around 0730 this morning, did have a brief period of this around that time. No other cardiac complaints. Patient continues to have periods of accelerated junction rhythm and AV block 2nd degree Mobitz type I overnight. Recalls sleeping during some of these as he was woke up by his nurse. The patient did mention that he has been having more constant lightheadedness for the past month which he did not mention prior. He does admit to some lightheadedness when woken up overnight but says that has been constant.     Physical Examination:  Vitals:    09/09/22 0835   BP:    Pulse: 67 Resp:    Temp:    SpO2: 98%        Intake/Output Summary (Last 24 hours) at 2022 1018  Last data filed at 2022 0848  Gross per 24 hour   Intake 780 ml   Output 4850 ml   Net -4070 ml     In: 1020 [P.O.:1020]  Out: 4850    Wt Readings from Last 3 Encounters:   22 242 lb 8.1 oz (110 kg)   22 247 lb 9.2 oz (112.3 kg)   21 264 lb 5.3 oz (119.9 kg)     Temp  Av °F (36.7 °C)  Min: 97.4 °F (36.3 °C)  Max: 99 °F (37.2 °C)  Pulse  Av.9  Min: 51  Max: 67  BP  Min: 104/69  Max: 167/70  SpO2  Av.9 %  Min: 78 %  Max: 100 %    Telemetry: Sinus rhythm in the 60s, overnight accelerated junctional rhythm  Constitutional: Alert, in no acute distress. Appears stated age. Head: Normocephalic and atraumatic. Eyes: Conjunctivae normal. EOM are normal.   Neck: Neck supple. No lymphadenopathy. No rigidity. No JVD present. Cardiovascular: Normal rate, regular rhythm. No murmurs, rubs or gallops. No S3 or S4.  Pulmonary/Chest: Clear breath sounds bilaterally. No crackles, wheezes or rhonchi. No respiratory accessory muscle use. Abdominal: Soft. Normal bowel sounds present. No distension, No tenderness. Musculoskeletal: No tenderness. Wraps to BLE  Lymphadenopathy: Has no cervical adenopathy. Neurological: Alert and oriented. No gross deficits. Skin: Skin is warm and dry. No rash, lesions, ulcerations noted. Psychiatric: No anxiety or agitation. Labs, diagnostic and imaging results reviewed. Reviewed.    Recent Labs     22  1234 22  0515 22  0345   * 135* 137   K 4.4 4.6 4.3    105 105   CO2    BUN 15 16 15   CREATININE 0.9 1.1 1.0     Recent Labs     22  1234 22  0515 22  0345   WBC 6.2 4.8 5.1   HGB 7.8* 7.8* 8.4*   HCT 24.3* 24.5* 26.2*   MCV 74.2* 73.7* 73.6*    171 179     Lab Results   Component Value Date/Time    TROPONINI 0.03 2022 06:41 PM     Estimated Creatinine Clearance: 93 mL/min (based on SCr of 1 mg/dL). No results found for: BNP  Lab Results   Component Value Date/Time    PROTIME 13.0 2014 06:47 PM    INR 1.17 2014 06:47 PM     Lab Results   Component Value Date/Time    CHOL 79 2020 05:59 AM    HDL 21 2020 05:59 AM    TRIG 105 2020 05:59 AM       Scheduled Meds:   metroNIDAZOLE  500 mg IntraVENous Q8H    aspirin  81 mg Oral Daily    atorvastatin  20 mg Oral Nightly    insulin glargine  10 Units SubCUTAneous Nightly    docusate sodium  100 mg Oral BID    lisinopril  5 mg Oral Daily    tamsulosin  0.4 mg Oral QPM    sodium chloride flush  5-40 mL IntraVENous 2 times per day    enoxaparin  30 mg SubCUTAneous BID    insulin lispro  0-4 Units SubCUTAneous TID WC    insulin lispro  0-4 Units SubCUTAneous Nightly    cefepime  2,000 mg IntraVENous q8h     Continuous Infusions:   sodium chloride      dextrose       PRN Meds:sodium chloride flush, sodium chloride, polyethylene glycol, acetaminophen **OR** acetaminophen, glucose, dextrose bolus **OR** dextrose bolus, glucagon (rDNA), dextrose, perflutren lipid microspheres, ondansetron     EC22  Sinus rhythm with high grade AV block at 51 BPM. RBBB. LAFB. Echo:22   Normal left ventricle size, wall thickness and systolic function with an   estimated ejection fraction of 55%. No regional wall motion abnormalities   are seen. Grade III diastolic dysfunction with elevated LV filling pressures. Mild calcification of the leaflets of the mitral valve. Mild mitral regurgitation is present. The left atrium is moderately dilated. Aortic valve leaflets appear thickened. The right ventricle is mildly enlarged with normal function   Mild to moderate tricuspid regurgitation. Stress/LHC: 20   Normal myocardial perfusion study. Normal LV size and systolic function. Overall findings represent a low risk study.         Assessment and Plan:     High grade AV block              - noted on EKG on 22, intermittent              - associated with lightheadedness, not feeling well               - on no AV [place] blocking agents              - Mg slightly low - getting replaced, K WNL, troponin flat              - EKG with RBBB and LAFB as well              - meets Class I indications for PPM, given infection issues and recent fever, would recommend Micra implant which was discussed with pt and he is agreeable, will ask ID for their opinion on positive blood culture and timing for Micra              - if has prolonged periods of high grade AV block where he is symptomatic with hypotension, may need TVP until Micra can be done     Bacteremia              - 1/2 bottles growing a Protesu species              - ID consult noted, ?timing for Mirca - we are considering possibly Monday if pt is ready     Essential HTN              - BP controlled      KENNY Mccarty  [facility], [address] [place] [address], [address] [place] [address]  Phone: [phone]  Fax: [phone]    Electronically signed by Brooke Huggins on 9/9/2022 at 10:18 AM

## 2022-09-09 NOTE — PLAN OF CARE
Problem: Discharge Planning  Goal: Discharge to home or other facility with appropriate resources  Outcome: Progressing     Problem: Skin/Tissue Integrity  Goal: Absence of new skin breakdown  Description: 1. Monitor for areas of redness and/or skin breakdown  2. Assess vascular access sites hourly  3. Every 4-6 hours minimum:  Change oxygen saturation probe site  4. Every 4-6 hours:  If on nasal continuous positive airway pressure, respiratory therapy assess nares and determine need for appliance change or resting period.   Outcome: Progressing     Problem: Safety - Adult  Goal: Free from fall injury  Outcome: Progressing     Problem: ABCDS Injury Assessment  Goal: Absence of physical injury  Outcome: Progressing     Problem: Chronic Conditions and Co-morbidities  Goal: Patient's chronic conditions and co-morbidity symptoms are monitored and maintained or improved  Outcome: Progressing     Problem: Nutrition Deficit:  Goal: Optimize nutritional status  Outcome: Progressing

## 2022-09-09 NOTE — PLAN OF CARE
Consult noted. I will see patient tomorrow early afternoon. He is well known to our group for his non-compliance and refusal to show up for any type of follow up. I have reviewed the chart and the images. His chronic wounds are relatively stable. He has refused any preventative care, bracing or shoes every time we have tried to arrange this care for him. If he can't get the care in the hospital it will not get done as he refuses to leave his house.          Liz Ray DPM  [facility]  Pager: [phone]  Office: [phone]  Fax: [phone]

## 2022-09-09 NOTE — PROGRESS NOTES
Hospitalist Progress Note      PCP: Katina Knapp MD    Date of Admission: 9/6/2022    Chief Complaint:   Chief Complaint   Patient presents with    Pneumonia     Pt in via EMS from home with weakness and shortness of breath that \"has worsened the last week\". Pt states that he was diagnosed with pneumonia a \"month ago\". Pt alert and oriented at this time with no signs of distress noted. Pt denies any pain. Pt also reports \"fever that started today\". Hospital Course: 73 yo M admitted with weakness, found to have symptomatic bradycardia      9/7  Rapid response call due to bradycardia  Patient's VS were stable. He apparently was symptomatic earlier. His EKG showed afib with slow VR; HR 51; occasional P waves without conduction concerning for high degree AV block. 9/8  No acute events overnight. Patient was found to have high degree AV block.  Cardiology recommending PM implantation; however, patient had positive blood cultures       Subjective: as above      Medications:  Reviewed    Infusion Medications    sodium chloride      dextrose       Scheduled Medications    metroNIDAZOLE  500 mg IntraVENous Q8H    aspirin  81 mg Oral Daily    atorvastatin  20 mg Oral Nightly    insulin glargine  10 Units SubCUTAneous Nightly    docusate sodium  100 mg Oral BID    lisinopril  5 mg Oral Daily    tamsulosin  0.4 mg Oral QPM    sodium chloride flush  5-40 mL IntraVENous 2 times per day    enoxaparin  30 mg SubCUTAneous BID    insulin lispro  0-4 Units SubCUTAneous TID WC    insulin lispro  0-4 Units SubCUTAneous Nightly    cefepime  2,000 mg IntraVENous q8h     PRN Meds: sodium chloride flush, sodium chloride, polyethylene glycol, acetaminophen **OR** acetaminophen, glucose, dextrose bolus **OR** dextrose bolus, glucagon (rDNA), dextrose, perflutren lipid microspheres, ondansetron      Intake/Output Summary (Last 24 hours) at 9/9/2022 2609  Last data filed at 9/9/2022 0848  Gross per 24 hour   Intake 780 ml   Output 3250 ml   Net -2470 ml         Physical Exam Performed:    /74   Pulse (!) 40   Temp 98.3 °F (36.8 °C) (Oral)   Resp 16   Ht 6' 2\" (1.88 m)   Wt 242 lb 8.1 oz (110 kg)   SpO2 98%   BMI 31.14 kg/m²     General appearance: No apparent distress, appears stated age and cooperative. HEENT: Pupils equal, round, and reactive to light. Conjunctivae/corneas clear. Neck: Supple, with full range of motion. No jugular venous distention. Trachea midline. Respiratory:  Normal respiratory effort. Clear to auscultation, bilaterally without Rales/Wheezes/Rhonchi. Cardiovascular: bradycardia; S1/S2 without murmurs, rubs or gallops. Abdomen: Soft, non-tender, non-distended with normal bowel sounds. Musculoskeletal: No clubbing, cyanosis or edema bilaterally. Full range of motion without deformity. Skin: Skin color, texture, turgor normal.  No rashes or lesions. Neurologic:  Neurovascularly intact without any focal sensory/motor deficits. Cranial nerves: II-XII intact, grossly non-focal.  Psychiatric: Alert and oriented, thought content appropriate, normal insight  Capillary Refill: Brisk, 3 seconds, normal   Peripheral Pulses: +2 palpable, equal bilaterally       Labs:   Recent Labs     09/07/22  1234 09/08/22  0515 09/09/22  0345   WBC 6.2 4.8 5.1   HGB 7.8* 7.8* 8.4*   HCT 24.3* 24.5* 26.2*    171 179       Recent Labs     09/07/22  1234 09/08/22  0515 09/09/22  0345   * 135* 137   K 4.4 4.6 4.3    105 105   CO2 22 21 22   BUN 15 16 15   CREATININE 0.9 1.1 1.0   CALCIUM 8.4 8.1* 8.2*       Recent Labs     09/07/22  1020 09/08/22  0515 09/09/22  0345   AST 8* 8* 9*   ALT <5* <5* 6*   BILITOT 0.7 0.5 0.4   ALKPHOS 67 62 65       No results for input(s): INR in the last 72 hours.   Recent Labs     09/07/22  1234 09/07/22  1841   TROPONINI 0.03* 0.03*         Urinalysis:      Lab Results   Component Value Date/Time    NITRU Negative 09/06/2022 03:41 PM    WBCUA 1 08/15/2022 11:16 AM    BACTERIA None Seen 08/15/2022 11:16 AM    RBCUA 2 08/15/2022 11:16 AM    BLOODU Negative 09/06/2022 03:41 PM    SPECGRAV 1.020 09/06/2022 03:41 PM    GLUCOSEU Negative 09/06/2022 03:41 PM       Radiology:  VL Extremity Venous Bilateral   Final Result      CT CHEST PULMONARY EMBOLISM W CONTRAST   Final Result   1. No CT evidence of a pulmonary embolism. 2. No acute intrapulmonary findings. XR CHEST PORTABLE   Final Result   No radiographic evidence of acute pulmonary disease. CT HEAD WO CONTRAST   Final Result   No acute intracranial abnormality. Assessment/Plan:    Active Hospital Problems    Diagnosis     Multiple and open wound of lower limb [S81.809A]      Priority: Medium    Bacterial infection due to Proteus mirabilis [A49.8]      Priority: Medium    Foot ulcer due to secondary DM ([facility] [address] [O04.373, L97.509]      Priority: Medium    Lactic acidosis [E87.2]      Priority: Medium    Atrial fibrillation with slow ventricular response (HCC) [I48.91]      Priority: Medium    Febrile illness [R50.9]      Priority: Medium    Symptomatic bradycardia [R00.1]      Priority: Medium    Generalized weakness [R53.1]      Priority: Medium    SOB (shortness of breath) [R06.02]      Priority: Medium    Septicemia (HCC) [A41.9]     Fever and chills [R50.9]     Diabetes mellitus type II, uncontrolled (HCC) [E11.65]     HTN (hypertension) [I10]      Polymicrobial bacteremia    Blood cx obtained on admission 9/6; coag negative staff + proteus   Repeat blood culture today 9/8  Continue cefepime + flagyl per ID recommendations     Charcot foot  Right foot plantar ulcer  Podiatry consult   Wound care   Cont IV abx per ID     Symptomatic bradycardia  Patient with  high degree AV block  Transfer to telemetry floor  TSH WNL.  ECHO with normal EF grade III diastolic dysfunction  PM implantation recommended; however, bacteremia is delaying it     Essential Hypertension  Controlled   Resume home medications  Monitor BP q4 hrs  BP goal <140/90  Adjust medications accordingly     Type 2 diabetes  Euglycemic  Hold p.o. medicines  Monitor with Accu-Cheks ACHS  Sliding scale as needed  Continue Lantus    Fever  Due to bacteremia. He had positive blood cultures   Patient does have LE wounds   Elevated procalcitonin, of unclear signofocance  COVID and influenza negative  Continue cefepime for now, until bacteriemia is rule out       DVT Prophylaxis: lovenox  Diet: ADULT DIET; Regular; 4 carb choices (60 gm/meal)  Code Status: Full Code  PT/OT Eval Status: requested    Dispo - ?   Ariel Hills MD

## 2022-09-09 NOTE — PROGRESS NOTES
Physical Therapy  Facility/Department: [facility] PROGRESSIVE CARE  Physical Therapy Initial Assessment    Name: Domonique Orozco  : 1955  MRN: [id number]  Date of Service: 2022    Discharge Recommendations:  Continue to assess pending progress (Pt declines home PT needs although may be beneficial.)   PT Equipment Recommendations  Other: Will monitor for potential equipt needs. Current Am-Pac . Assessment   Body Structures, Functions, Activity Limitations Requiring Skilled Therapeutic Intervention: Decreased functional mobility ; Decreased strength;Decreased endurance;Decreased balance  Assessment: 73 y/o male admit A-Fib, Symptomatic Bradycardia, Septicemia. CT Head negative. Cardio following for High Grade AV Block. PMH as noted including PVD, Charcot Foot, Chronic Foot Ulcers, DM, Lymphedema, OA. PTA pt living alone in own home; resides on main level (does not leave home) with visiting nurse and HHA (1x/wk to obtain groceries). Pt will not adhere to wgt bear restrictions given by podiatry and declines any other Home Care recommendations. Will cont to monitor. Therapy Prognosis: Fair;Good  Decision Making: Medium Complexity  History: 73 y/o male admit A-Fib, Symptomatic Bradycardia, Septicemia. CT Head negative. Cardio following for High Grade AV Block. PMH as noted including PVD, Charcot Foot, Chronic Foot Ulcers, DM, Lymphedema, OA. Exam: See above. Clinical Presentation: See above. Barriers to Learning: Limited compliance. Requires PT Follow-Up: Yes  Activity Tolerance  Activity Tolerance: Patient limited by endurance  Activity Tolerance Comments: Pt lois brief eob/stand with walker. Reports dizziness at eob and then again with brief stand. BP : Supine : 131/75 (93). EOB : 114/71 (85); HR 66.  Stand : 105/57 (72); HR 79. Declined oob to chair. Note : h/o chronic B foot ulcers; followed by Podiatry although noncompliant with prior wgt bear restrictions.   Did talk with nursing to obtain restrictions when podiatry to bedside to see pt. Plan   Plan  Plan: 3-5 times per week  Current Treatment Recommendations: Strengthening, Functional mobility training, Transfer training, Gait training, Safety education & training, Patient/Caregiver education & training  Safety Devices  Type of Devices: Bed alarm in place, Call light within reach, Left in bed, Nurse notified     Restrictions  Restrictions/Precautions  Restrictions/Precautions: Fall Risk  Position Activity Restriction  Other position/activity restrictions: Orthostatics : Supine : 131/75 (93). EOB : 114/71/(85). Stand : 105/57 (79). H/O Chronic B Diabetic Foot Ulcers, request any wgt bear restrictions although ongoing noncompliance prior admit. Subjective   General  Chart Reviewed: Yes  Patient assessed for rehabilitation services?: Yes  Additional Pertinent Hx: 75 y/o male admit A-Fib, Symptomatic Bradycardia, Septicemia. CT Head negative. Cardio following for High Grade AV Block. PMH as noted including PVD, Charcot Foot, Chronic Foot Ulcers, DM, Lymphedema, OA. Family / Caregiver Present: No  Referring Practitioner: Dr. Basia Lozano: Within Functional Limits  Subjective  Subjective: Pt agreeable to eob/brief oob with PT although request return to bed. Social/Functional History  Social/Functional History  Lives With: Alone  Type of Home: House  Home Layout: Multi-level, Able to Live on Main level with bedroom/bathroom, Laundry in basement (Washes clothes in sink; doesn't go to lower level.)  Home Access: Stairs to enter with rails (Reports 10-11 steps with handrail although pt never leaves home (only with EMS if to hospital). )  Entrance Stairs - Number of Steps: 6- on a hill, has 2 rails however unable to reach both at same time, he is unable to walk up/down the steps  Entrance Stairs - Rails:  (far spaced)  Bathroom Shower/Tub: Shower chair with back, Tub/Shower unit (Sponge bathes only.)  Bathroom Toilet: Bedside commode  Bathroom Equipment: Toilet raiser, Shower chair, Grab bars in shower (Sponge bathes only.)  Bathroom Accessibility: Accessible  Home Equipment: Francisco Osorio, laurence, Saratha Goldy, 4 wheeled, [place] Evelyn, quad  Has the patient had two or more falls in the past year or any fall with injury in the past year?: Yes  Receives Help From: Personal care attendant (Wound care nurse 3x/wk. Visiting MD/Nurse 1x/month. HHA 1x/wk (get groceries,etc). )  ADL Assistance: Independent (Sponge bathes.)  Homemaking Assistance:  (No assist; does what he can. Washes clothes in sink.)  Homemaking Responsibilities: No  Ambulation Assistance: Independent (With Cane in home; doesn't go out.)  Transfer Assistance: Independent  Active : No (HHA obtains groceries.)  Patient's  Info: never learned to drive, Does not leave house. States he hasn't been out of house since 2012 (???)  Mode of Transportation: Friends, Cab  Occupation: Unemployed  Type of Occupation: Never worked a job but does not consider himself disabled. IADL Comments: Sleeps on recliner or couch. Additional Comments: Visiting Nurse 3x/wk. HHA 1x/wk for grocery shopping. Vision/Hearing  Vision  Vision: Impaired  Vision Exceptions: Wears glasses for reading  Hearing  Hearing: Within functional limits    Cognition   Orientation  Overall Orientation Status: Within Functional Limits  Cognition  Overall Cognitive Status: WFL     Objective        Observation/Palpation  Posture: Fair  Observation: Dressings B Feet; poor foot/nail care. Gross Assessment  AROM: Generally decreased, functional (Ankle DF neutral.)  Strength: Generally decreased, functional                    Bed mobility  Supine to Sit: Stand by assistance  Sit to Supine: Stand by assistance  Transfers  Sit to Stand: Contact guard assistance (With Saratha Greenwood. Cues for safe hand placement.)  Stand to sit: Contact guard assistance (With Saratha Greenwood.   Cues for safe hand placement.)  Comment: Pt able to maintain brief stand with Walker CGA to obtain orthostatics. C/O dizziness; declined oob to chair (\"not comfortable\"). Balance  Sitting - Static: Good  Sitting - Dynamic: Good;- (C/O Mild dizziness.)  Standing - Static: Fair (With Walker.)  A/AROM Exercises: Pt instruct Glut Sets, Quad Sets, Ankle Pumps. AM-PAC Score  AM-PAC Inpatient Mobility Raw Score : 16 (09/09/22 1015)  AM-PAC Inpatient T-Scale Score : 40.78 (09/09/22 1015)  Mobility Inpatient CMS 0-100% Score: 54.16 (09/09/22 1015)  Mobility Inpatient CMS G-Code Modifier : CK (09/09/22 1015)            Goals  Short Term Goals  Time Frame for Short term goals: Upon d/c acute care setting. Short term goal 1: Bed Mob Independent. Short term goal 2: Transfers with assist device Supervision. Short term goal 3: Amb with assist device 25-50' SBA. Patient Goals   Patient goals : Go home.        Education  Patient Education  Education Given To: Patient  Education Provided Comments: Role of PT, POC, Need to call for assist.  Education Method: Verbal  Education Outcome: Continued education needed      Therapy Time   Individual Concurrent Group Co-treatment   Time In 0630         Time Out 0710         Minutes 1200 [address] Nathalia Latham, PT

## 2022-09-09 NOTE — CONSULTS
Department of Podiatry Consult Note  Francois Rogers DPM Attending       Reason for Consult:  Chronically open wounds of both feet  Requesting Physician:  Live Rowley MD    CHIEF COMPLAINT:  Wounds to plantar feet    HISTORY OF PRESENT ILLNESS:                The patient is a 74 y.o. male with significant past medical history of acute on chronic wounds to plantar surface of both feet for which he relates home care with Podiatrist from [facility] who visits monthly, who is consulted for  plantar LEFT and RIGHT arch of foot wounds, as well as dystrophic nail debridement    Past Medical History:        Diagnosis Date    Diabetes mellitus ([address]     Gallstones     Lymphedema     Neuropathy     Pancreatitis     PVD (peripheral vascular disease) ([facility] [address]     Ulcers of both lower legs ([address]     bilateral feet     Past Surgical History:        Procedure Laterality Date    CHOLECYSTECTOMY, LAPAROSCOPIC N/A 8/10/2021    LAPAROSCOPIC CHOLECYSTECTOMY WITH CHOLANGIOGRAM performed by Mariana Goldberg MD at [place] [address]    ERCP  4/8/14    LITHOTRYPSY & PANCREATIC STENT PLACEMENT    FOOT SURGERY Left 1967     Current Medications:    Current Facility-Administered Medications: metronidazole (FLAGYL) 500 mg in 0.9% NaCl 100 mL IVPB premix, 500 mg, IntraVENous, Q8H  aspirin EC tablet 81 mg, 81 mg, Oral, Daily  atorvastatin (LIPITOR) tablet 20 mg, 20 mg, Oral, Nightly  insulin glargine (LANTUS) injection vial 10 Units, 10 Units, SubCUTAneous, Nightly  docusate sodium (COLACE) capsule 100 mg, 100 mg, Oral, BID  lisinopril (PRINIVIL;ZESTRIL) tablet 5 mg, 5 mg, Oral, Daily  tamsulosin (FLOMAX) capsule 0.4 mg, 0.4 mg, Oral, QPM  sodium chloride flush 0.9 % injection 5-40 mL, 5-40 mL, IntraVENous, 2 times per day  sodium chloride flush 0.9 % injection 5-40 mL, 5-40 mL, IntraVENous, PRN  0.9 % sodium chloride infusion, , IntraVENous, PRN  enoxaparin Sodium (LOVENOX) injection 30 mg, 30 mg, SubCUTAneous, BID  polyethylene glycol (GLYCOLAX) packet 17 g, 17 g, Oral, Daily PRN  acetaminophen (TYLENOL) tablet 650 mg, 650 mg, Oral, Q6H PRN **OR** acetaminophen (TYLENOL) suppository 650 mg, 650 mg, Rectal, Q6H PRN  insulin lispro (HUMALOG) injection vial 0-4 Units, 0-4 Units, SubCUTAneous, TID WC  insulin lispro (HUMALOG) injection vial 0-4 Units, 0-4 Units, SubCUTAneous, Nightly  glucose chewable tablet 16 g, 4 tablet, Oral, PRN  dextrose bolus 10% 125 mL, 125 mL, IntraVENous, PRN **OR** dextrose bolus 10% 250 mL, 250 mL, IntraVENous, PRN  glucagon (rDNA) injection 1 mg, 1 mg, SubCUTAneous, PRN  dextrose 10 % infusion, , IntraVENous, Continuous PRN  perflutren lipid microspheres (DEFINITY) injection 1.65 mg, 1.5 mL, IntraVENous, ONCE PRN  ondansetron (ZOFRAN) injection 4 mg, 4 mg, IntraVENous, Q6H PRN  cefepime (MAXIPIME) 2000 mg IVPB minibag, 2,000 mg, IntraVENous, q8h  Allergies:   Patient has no known allergies.   Social History:    TOBACCO:  Never used tobacco  ETOH:  Never drank alcohol  Patient currently lives alone  Family History:       Problem Relation Age of Onset    Colon Cancer Mother         PVD s/p toe amputation by Dr Matt Mann Father      REVIEW OF SYSTEMS:    CONSTITUTIONAL:  positive for  malaise  INTEGUMENT/BREAST:  positive for skin lesion(s), dryness, skin color change, changes in lesion, changes in hair, and changes in nails  ENDOCRINE:  positive for diabetic symptoms including neither foot ulcerations nor skin dryness nor neuropathy  MUSCULOSKELETAL:  positive for  joint swelling, stiff joints, and decreased range of motion  NEUROLOGICAL:  positive for gait problems, weakness, and numbness  PHYSICAL EXAM:      Vitals:    /70   Pulse 59   Temp 97.3 °F (36.3 °C) (Oral)   Resp 16   Ht 6' 2\" (1.88 m)   Wt 242 lb 8.1 oz (110 kg)   SpO2 96%   BMI 31.14 kg/m²     LABS:   Recent Labs     09/08/22  0515 09/09/22  0345   WBC 4.8 5.1   HGB 7.8* 8.4*   HCT 24.5* 26.2*    179     Recent Labs     09/09/22  0345   NA 137   K 4.3      CO2 22   BUN 15   CREATININE 1.0     Recent Labs     09/08/22  0515 09/09/22  0345   PROT 6.0* 6.1*       LOWER EXTREMITY EXAMINATION   SKIN:    RIGHT plantar foot ulcer MUGS 3 of 3.1 x 1.9 cm with exposed non-viable sub Q of 0.35 cm without periwound erythema or calor    LEFT plantar foot ulcer MUGS 3 with 2.4 x 2.2 cm with depth of green slough sub Q with stale odor, but no ascending erythema    Nails RIGHT 1,2,3,4,5 and LEFT 1,2,3,4,5 are grossly elongated, dystrophic with heavy subungual debris    NEUROLOGIC:    Pain sensation isdecreased Bilateral.  Light touch is decreasedBilateral. positive history of paresthesia Bilateral. negativehistory of burning Bilateral. Deep tendon reflexes are 1 to include patellar and achilles Bilateral. [place]--Jose 5.07 monofilament sensitivity is abnormal 4 to 5 spots tested Bilateral.    VASCULAR:    bilaterally Dorsalis pedis is 1. bilaterally Posterior tibial pulse is 1. 2+ edema bilaterally. mild Varicosities bilaterally. MUSCULOSKELETAL:  [place] is  untested due to bilateral plantar mehnaz tulcers . Muscle strength is 4/5 to all groups tested to include dorsiflexion, plantarflexion, inversion, eversion, and digital Bilateral . The ranges of motion of all joints tested from ankle distal is decreased there is no crepitus noted Bilateral.    Upon verbal consent EXCISIONAL Debridement performed to RIGHT plantar foot ulcer with removal of necrotic sub Q in 6 cm2 wound using scissors, pick ups and sharp #22 blade, with application of sterile antibacterial dressing after pressure used to cease bleeding      Nails RIGHT 1,2,3,4,5 and LEFT 1,2,3,4,5 were debrided in both thickness and length      IMPRESSION/RECOMMENDATIONS    1. Chronically open wounds / ulcers to plantar feet secondary to Charcot arthropathy of Diabetes, with neuropathy    Debridement performed as above    2. Diabetes with peripheral neuropathy    3.  Onychomycosis of pedal nails for which

## 2022-09-09 NOTE — CARE COORDINATION
[place] on [address] [place] [address]  ph:  [phone] [facility]  Administrative Assist, Case Management  [phone]  Electronically signed by Michaela Barrera on 9/9/2022 at 8:13 AM

## 2022-09-10 PROBLEM — I44.39 HIGH-GRADE ATRIOVENTRICULAR BLOCK: Status: ACTIVE | Noted: 2022-09-10

## 2022-09-10 LAB
A/G RATIO: 1.1 (ref 1.1–2.2)
ALBUMIN SERPL-MCNC: 3.4 G/DL (ref 3.4–5)
ALP BLD-CCNC: 70 U/L (ref 40–129)
ALT SERPL-CCNC: 6 U/L (ref 10–40)
ANION GAP SERPL CALCULATED.3IONS-SCNC: 10 MMOL/L (ref 3–16)
AST SERPL-CCNC: 8 U/L (ref 15–37)
BASOPHILS ABSOLUTE: 0 K/UL (ref 0–0.2)
BASOPHILS RELATIVE PERCENT: 0.5 %
BILIRUB SERPL-MCNC: 0.4 MG/DL (ref 0–1)
BUN BLDV-MCNC: 14 MG/DL (ref 7–20)
CALCIUM SERPL-MCNC: 8.5 MG/DL (ref 8.3–10.6)
CHLORIDE BLD-SCNC: 106 MMOL/L (ref 99–110)
CO2: 21 MMOL/L (ref 21–32)
CREAT SERPL-MCNC: 0.9 MG/DL (ref 0.8–1.3)
CULTURE, BLOOD 2: NORMAL
EOSINOPHILS ABSOLUTE: 0.2 K/UL (ref 0–0.6)
EOSINOPHILS RELATIVE PERCENT: 3.9 %
GFR AFRICAN AMERICAN: >60
GFR NON-AFRICAN AMERICAN: >60
GLUCOSE BLD-MCNC: 122 MG/DL (ref 70–99)
GLUCOSE BLD-MCNC: 130 MG/DL (ref 70–99)
GLUCOSE BLD-MCNC: 146 MG/DL (ref 70–99)
GLUCOSE BLD-MCNC: 153 MG/DL (ref 70–99)
GLUCOSE BLD-MCNC: 163 MG/DL (ref 70–99)
HCT VFR BLD CALC: 27.1 % (ref 40.5–52.5)
HEMOGLOBIN: 8.7 G/DL (ref 13.5–17.5)
LYMPHOCYTES ABSOLUTE: 1.4 K/UL (ref 1–5.1)
LYMPHOCYTES RELATIVE PERCENT: 26.5 %
MAGNESIUM: 1.9 MG/DL (ref 1.8–2.4)
MCH RBC QN AUTO: 23.6 PG (ref 26–34)
MCHC RBC AUTO-ENTMCNC: 32 G/DL (ref 31–36)
MCV RBC AUTO: 73.8 FL (ref 80–100)
MONOCYTES ABSOLUTE: 0.5 K/UL (ref 0–1.3)
MONOCYTES RELATIVE PERCENT: 9.4 %
NEUTROPHILS ABSOLUTE: 3.2 K/UL (ref 1.7–7.7)
NEUTROPHILS RELATIVE PERCENT: 59.7 %
PDW BLD-RTO: 18.7 % (ref 12.4–15.4)
PERFORMED ON: ABNORMAL
PLATELET # BLD: 199 K/UL (ref 135–450)
PMV BLD AUTO: 7.4 FL (ref 5–10.5)
POTASSIUM SERPL-SCNC: 4.3 MMOL/L (ref 3.5–5.1)
RBC # BLD: 3.67 M/UL (ref 4.2–5.9)
SODIUM BLD-SCNC: 137 MMOL/L (ref 136–145)
TOTAL PROTEIN: 6.4 G/DL (ref 6.4–8.2)
WBC # BLD: 5.4 K/UL (ref 4–11)

## 2022-09-10 PROCEDURE — 2060000000 HC ICU INTERMEDIATE R&B

## 2022-09-10 PROCEDURE — 36415 COLL VENOUS BLD VENIPUNCTURE: CPT

## 2022-09-10 PROCEDURE — 2500000003 HC RX 250 WO HCPCS: Performed by: INTERNAL MEDICINE

## 2022-09-10 PROCEDURE — 99232 SBSQ HOSP IP/OBS MODERATE 35: CPT | Performed by: NURSE PRACTITIONER

## 2022-09-10 PROCEDURE — 2580000003 HC RX 258: Performed by: STUDENT IN AN ORGANIZED HEALTH CARE EDUCATION/TRAINING PROGRAM

## 2022-09-10 PROCEDURE — 80053 COMPREHEN METABOLIC PANEL: CPT

## 2022-09-10 PROCEDURE — 85025 COMPLETE CBC W/AUTO DIFF WBC: CPT

## 2022-09-10 PROCEDURE — 6370000000 HC RX 637 (ALT 250 FOR IP): Performed by: STUDENT IN AN ORGANIZED HEALTH CARE EDUCATION/TRAINING PROGRAM

## 2022-09-10 PROCEDURE — 83735 ASSAY OF MAGNESIUM: CPT

## 2022-09-10 PROCEDURE — 94760 N-INVAS EAR/PLS OXIMETRY 1: CPT

## 2022-09-10 PROCEDURE — 6360000002 HC RX W HCPCS: Performed by: STUDENT IN AN ORGANIZED HEALTH CARE EDUCATION/TRAINING PROGRAM

## 2022-09-10 RX ADMIN — ENOXAPARIN SODIUM 30 MG: 100 INJECTION SUBCUTANEOUS at 20:45

## 2022-09-10 RX ADMIN — CEFEPIME 2000 MG: 2 INJECTION, POWDER, FOR SOLUTION INTRAVENOUS at 05:32

## 2022-09-10 RX ADMIN — SODIUM CHLORIDE 25 ML: 9 INJECTION, SOLUTION INTRAVENOUS at 05:32

## 2022-09-10 RX ADMIN — DOCUSATE SODIUM 100 MG: 100 CAPSULE, LIQUID FILLED ORAL at 08:28

## 2022-09-10 RX ADMIN — ENOXAPARIN SODIUM 30 MG: 100 INJECTION SUBCUTANEOUS at 08:28

## 2022-09-10 RX ADMIN — CEFEPIME 2000 MG: 2 INJECTION, POWDER, FOR SOLUTION INTRAVENOUS at 20:48

## 2022-09-10 RX ADMIN — CEFEPIME 2000 MG: 2 INJECTION, POWDER, FOR SOLUTION INTRAVENOUS at 12:30

## 2022-09-10 RX ADMIN — DOCUSATE SODIUM 100 MG: 100 CAPSULE, LIQUID FILLED ORAL at 20:45

## 2022-09-10 RX ADMIN — TAMSULOSIN HYDROCHLORIDE 0.4 MG: 0.4 CAPSULE ORAL at 17:27

## 2022-09-10 RX ADMIN — METRONIDAZOLE 500 MG: 500 INJECTION, SOLUTION INTRAVENOUS at 13:49

## 2022-09-10 RX ADMIN — ASPIRIN 81 MG: 81 TABLET, COATED ORAL at 08:28

## 2022-09-10 RX ADMIN — LISINOPRIL 5 MG: 5 TABLET ORAL at 08:28

## 2022-09-10 RX ADMIN — METRONIDAZOLE 500 MG: 500 INJECTION, SOLUTION INTRAVENOUS at 05:34

## 2022-09-10 RX ADMIN — INSULIN GLARGINE 10 UNITS: 100 INJECTION, SOLUTION SUBCUTANEOUS at 20:50

## 2022-09-10 RX ADMIN — SODIUM CHLORIDE 25 ML: 9 INJECTION, SOLUTION INTRAVENOUS at 05:30

## 2022-09-10 RX ADMIN — ATORVASTATIN CALCIUM 20 MG: 20 TABLET, FILM COATED ORAL at 20:45

## 2022-09-10 RX ADMIN — METRONIDAZOLE 500 MG: 500 INJECTION, SOLUTION INTRAVENOUS at 22:47

## 2022-09-10 NOTE — PLAN OF CARE
Problem: Discharge Planning  Goal: Discharge to home or other facility with appropriate resources  9/10/2022 1016 by Renan Garduno RN  Outcome: Progressing  9/10/2022 0639 by Michele Miramontes RN  Outcome: Progressing     Problem: Skin/Tissue Integrity  Goal: Absence of new skin breakdown  Description: 1. Monitor for areas of redness and/or skin breakdown  2. Assess vascular access sites hourly  3. Every 4-6 hours minimum:  Change oxygen saturation probe site  4. Every 4-6 hours:  If on nasal continuous positive airway pressure, respiratory therapy assess nares and determine need for appliance change or resting period. 9/10/2022 1016 by Renan Garduno RN  Outcome: Progressing  9/10/2022 0639 by Michele Miramontes RN  Outcome: Progressing     Problem: Safety - Adult  Goal: Free from fall injury  9/10/2022 1016 by Renan Garduno RN  Outcome: Progressing  Note: Fall precautions in place. Bed locked and in lowest  position. Alarm on. Call light within reach.    9/10/2022 4103 by Michele Miramontes RN  Outcome: Progressing     Problem: ABCDS Injury Assessment  Goal: Absence of physical injury  9/10/2022 1016 by Renan Garduno RN  Outcome: Progressing  9/10/2022 0639 by Michele Miramontes RN  Outcome: Progressing     Problem: Chronic Conditions and Co-morbidities  Goal: Patient's chronic conditions and co-morbidity symptoms are monitored and maintained or improved  9/10/2022 1016 by Renan Garduno RN  Outcome: Progressing  9/10/2022 0639 by Michele Miramontes RN  Outcome: Progressing     Problem: Nutrition Deficit:  Goal: Optimize nutritional status  9/10/2022 1016 by Renan Garduno RN  Outcome: Progressing  9/10/2022 0639 by Michele Miramontes RN  Outcome: Progressing     Problem: Cardiovascular - Adult  Goal: Maintains optimal cardiac output and hemodynamic stability  9/10/2022 1016 by Renan Garduno RN  Outcome: Progressing  9/10/2022 0639 by Michele Miramontes RN  Outcome: Progressing  Goal: Absence of cardiac dysrhythmias or at baseline  9/10/2022 1016 by Dorcas Kathy Rey  Outcome: Progressing  9/10/2022 0639 by Martín Zamora RN  Outcome: Progressing

## 2022-09-10 NOTE — PROGRESS NOTES
Pt continues to c/o dizziness. Stedy x2 used to transfer pt to bathroom. HR noted to have periods of drops into the 30s. Per MD- pt is now to be on bedrest.   Per cardiology- notify if pt is sx or has a drop in blood pressure with decreased HR for a possible temporary pacer. Pt had been c/o constipation- Pt had a med BM this shift.    Electronically signed by Lata Laughlin RN on 9/10/22 at 4:31 PM EDT

## 2022-09-10 NOTE — PROGRESS NOTES
Cardiac Electrophysiology Progress Note     Admit Date: 9/6/2022     Reason for follow up: high grade AV block    HPI and Interval History:   Willis Jon is a 74y.o. year old male with past medical history significant for DM, neuropathy, PVD, chronic leg wounds and lymphedema who presented to the ED with fevers, lightheadedness and SOB. Does have chronic wounds on his legs/feet and has wound care at home. He admits to a \"fall\" about a week ago where he was standing and got dizzy and fell. Denies syncope. He also admits to getting lightheaded on occasion, even while sitting and without any provoking movements. He will feel poorly and put a blanket over his head and try to go to sleep for it to resolve. Previously told he has vertigo. Denies any palpitations or chest pain. Around 1200 today, he was noted to be in sinus bradycardia with rates in the 30s while on the monitor. He was sleeping some during this time and when checked on by his nurse, was initially feeling fine. There was then concern for possible junctional rhythm and high grade AV block. The pt was then feeling lightheaded and poorly so a rapid response was called    Continues to have periods where rhythm looks like accelerated junctional monitor, also with periods with visible P wave that are either non-conducted or very long ID interval. Admits to feeling \"dizzy\" around 0730 this morning, did have a brief period of this around that time. No other cardiac complaints. Patient continues to have periods of bradycardia, some Wenckbach, others just long ID versus junctional rhythm - worse with sleep. Lightheadedness is okay. No syncope or near syncope. BP has been WNL. Underwent I&D of R foot wound 9/9 with podiatry.      Physical Examination:  Vitals:    09/10/22 0730   BP:    Pulse: 57   Resp:    Temp:    SpO2:         Intake/Output Summary (Last 24 hours) at 9/10/2022 0738  Last data filed at 9/10/2022 0534  Gross per 24 hour   Intake 1373.16 ml   Output 2550 ml   Net -1176.84 ml     In: 1373.2 [P.O.:480; I.V.:36.4]  Out: 2550    Wt Readings from Last 3 Encounters:   09/10/22 248 lb 0.3 oz (112.5 kg)   22 247 lb 9.2 oz (112.3 kg)   21 264 lb 5.3 oz (119.9 kg)     Temp  Av.8 °F (36.6 °C)  Min: 97.3 °F (36.3 °C)  Max: 98.3 °F (36.8 °C)  Pulse  Av.5  Min: 29  Max: 67  BP  Min: 118/57  Max: 139/74  SpO2  Av.6 %  Min: 95 %  Max: 99 %    Telemetry: Sinus rhythm in the 60s. Constitutional: Alert, in no acute distress. Appears stated age. Head: Normocephalic and atraumatic. Eyes: Conjunctivae normal. EOM are normal.   Neck: Neck supple. No lymphadenopathy. No rigidity. No JVD present. Cardiovascular: Normal rate, regular rhythm. No murmurs, rubs or gallops. No S3 or S4.  Pulmonary/Chest: Clear breath sounds bilaterally. No crackles, wheezes or rhonchi. No respiratory accessory muscle use. Abdominal: Soft. Normal bowel sounds present. No distension, No tenderness. Musculoskeletal: No tenderness. Wraps to BLE  Lymphadenopathy: Has no cervical adenopathy. Neurological: Alert and oriented. No gross deficits. Skin: Skin is warm and dry. No rash, lesions, ulcerations noted. Psychiatric: No anxiety or agitation. Labs, diagnostic and imaging results reviewed. Reviewed. Recent Labs     09/08/22  0515 09/09/22  0345 09/10/22  0415   * 137 137   K 4.6 4.3 4.3    105 106   CO2    BUN 16 15 14   CREATININE 1.1 1.0 0.9     Recent Labs     09/08/22  0515 09/09/22  0345 09/10/22  0415   WBC 4.8 5.1 5.4   HGB 7.8* 8.4* 8.7*   HCT 24.5* 26.2* 27.1*   MCV 73.7* 73.6* 73.8*    179 199     Lab Results   Component Value Date/Time    TROPONINI 0.03 2022 06:41 PM     Estimated Creatinine Clearance: 105 mL/min (based on SCr of 0.9 mg/dL).    No results found for: BNP  Lab Results   Component Value Date/Time    PROTIME 13.0 2014 06:47 PM    INR 1.17 2014 06:47 PM     Lab Results   Component Value Date/Time    CHOL 79 2020 05:59 AM    HDL 21 2020 05:59 AM    TRIG 105 2020 05:59 AM       Scheduled Meds:   metroNIDAZOLE  500 mg IntraVENous Q8H    aspirin  81 mg Oral Daily    atorvastatin  20 mg Oral Nightly    insulin glargine  10 Units SubCUTAneous Nightly    docusate sodium  100 mg Oral BID    lisinopril  5 mg Oral Daily    tamsulosin  0.4 mg Oral QPM    sodium chloride flush  5-40 mL IntraVENous 2 times per day    enoxaparin  30 mg SubCUTAneous BID    insulin lispro  0-4 Units SubCUTAneous TID     insulin lispro  0-4 Units SubCUTAneous Nightly    cefepime  2,000 mg IntraVENous q8h     Continuous Infusions:   sodium chloride Stopped (09/10/22 0533)    dextrose       PRN Meds:sodium chloride flush, sodium chloride, polyethylene glycol, acetaminophen **OR** acetaminophen, glucose, dextrose bolus **OR** dextrose bolus, glucagon (rDNA), dextrose, perflutren lipid microspheres, ondansetron     EC22  Sinus rhythm with high grade AV block at 51 BPM. RBBB. LAFB. Echo:22   Normal left ventricle size, wall thickness and systolic function with an   estimated ejection fraction of 55%. No regional wall motion abnormalities   are seen. Grade III diastolic dysfunction with elevated LV filling pressures. Mild calcification of the leaflets of the mitral valve. Mild mitral regurgitation is present. The left atrium is moderately dilated. Aortic valve leaflets appear thickened. The right ventricle is mildly enlarged with normal function   Mild to moderate tricuspid regurgitation. Stress/LHC: 20   Normal myocardial perfusion study. Normal LV size and systolic function. Overall findings represent a low risk study.         Assessment and Plan:     High grade AV block              - noted on EKG on 22, intermittent              - associated with lightheadedness, not feeling well               - on no AV [place] blocking agents              - Mg slightly low - getting replaced, K WNL, troponin flat              - EKG with RBBB and LAFB as well              - meets Class I indications for PPM, given infection issues and recent fever, would recommend Micra implant which was discussed with pt and he is agreeable, ? Monday - so far blood cultures from 9/8 with no growth              - if has prolonged periods of high grade AV block where he is symptomatic with hypotension, may need TVP until Micra can be done     Bacteremia              - secondary to R foot wound, both growing Proteus mirabilis   - care per ID    R diabetic foot infection   - care per ID and podiatry   - s/p bedside I&D on 9/9     Essential HTN              - BP controlled      [facility], KENNY  [facility] [address] [place] [address], [address] [place] [address]  Phone: [phone]  Fax: [phone]    Electronically signed by KENNY Trivedi CNP on 9/10/2022 at 7:38 AM

## 2022-09-10 NOTE — PROGRESS NOTES
Department of Podiatry Progress Note  Benja Sarabia DPM Attending       CHIEF COMPLAINT:  Wounds to plantar feet    HISTORY OF PRESENT ILLNESS:                The patient is a 74 y.o. male with significant past medical history of acute on chronic wounds to plantar surface of both feet for which he relates home care with Podiatrist from [facility] who visits monthly, who is consulted for  plantar LEFT and RIGHT arch of foot wounds, as well as dystrophic nail debridement    Episode last evening with pulse of 20, discussion of Micra implant    Past Medical History:        Diagnosis Date    Diabetes mellitus ([address]     Gallstones     Lymphedema     Neuropathy     Pancreatitis     PVD (peripheral vascular disease) ([address]     Ulcers of both lower legs ([address]     bilateral feet     Allergies:   Patient has no known allergies.       REVIEW OF SYSTEMS:    CONSTITUTIONAL:  positive for  malaise  INTEGUMENT/BREAST:  positive for skin lesion(s), dryness, skin color change, changes in lesion, changes in hair, and changes in nails  ENDOCRINE:  positive for diabetic symptoms including neither foot ulcerations nor skin dryness nor neuropathy  MUSCULOSKELETAL:  positive for  joint swelling, stiff joints, and decreased range of motion  NEUROLOGICAL:  positive for gait problems, weakness, and numbness  PHYSICAL EXAM:      Vitals:    /63   Pulse (!) 35   Temp 97.7 °F (36.5 °C) (Oral)   Resp 16   Ht 6' 2\" (1.88 m)   Wt 248 lb 0.3 oz (112.5 kg)   SpO2 100%   BMI 31.84 kg/m²     LABS:   Recent Labs     09/09/22  0345 09/10/22  0415   WBC 5.1 5.4   HGB 8.4* 8.7*   HCT 26.2* 27.1*    199     Recent Labs     09/10/22  0415      K 4.3      CO2 21   BUN 14   CREATININE 0.9     Recent Labs     09/09/22  0345 09/10/22  0415   PROT 6.1* 6.4       LOWER EXTREMITY EXAMINATION   SKIN:    RIGHT plantar foot ulcer MUGS 3 of 3.1 x 1.9 cm with exposed non-viable sub Q of 0.35 cm without periwound erythema or calor    LEFT plantar foot ulcer MUGS 3 with 2.4 x 2.2 cm with depth of green slough sub Q with stale odor, but no ascending erythema    Nails RIGHT 1,2,3,4,5 and LEFT 1,2,3,4,5 are grossly elongated, dystrophic with heavy subungual debris    NEUROLOGIC:    Pain sensation isdecreased Bilateral.  Light touch is decreasedBilateral. positive history of paresthesia Bilateral. negativehistory of burning Bilateral. Deep tendon reflexes are 1 to include patellar and achilles Bilateral. Stacy--Jose 5.07 monofilament sensitivity is abnormal 4 to 5 spots tested Bilateral.    VASCULAR:    bilaterally Dorsalis pedis is 1. bilaterally Posterior tibial pulse is 1. 2+ edema bilaterally. mild Varicosities bilaterally. MUSCULOSKELETAL:  [place] is  untested due to bilateral plantar mehnaz tulcers . Muscle strength is 4/5 to all groups tested to include dorsiflexion, plantarflexion, inversion, eversion, and digital Bilateral . The ranges of motion of all joints tested from ankle distal is decreased there is no crepitus noted Bilateral.      Wound debridement 9/9/2022    Nails debrided previously      IMPRESSION/RECOMMENDATIONS    1. Chronically open wounds / ulcers to plantar feet secondary to Charcot arthropathy of Diabetes, with neuropathy    Debridement performed as above    2. Diabetes with peripheral neuropathy    3. Onychomycosis of pedal nails for which debridement was performed    Disposition: Patient lost to follow up with non-compliant past. Will provide wound care during current admission, but not a surgical candidate    Thank you for the opportunity to take part in the patient's care.     Jean Pierre Rubin DPM  [facility]  [phone]

## 2022-09-10 NOTE — PROGRESS NOTES
Patient's heart rate/rhythm over night junctional 40s-50s or 2nd degree type 2, heart rate 28-30s. Patient asymptomatic all night. BP remained stable. Patient asked to call if symptomatic. Continue to monitor.

## 2022-09-10 NOTE — PROGRESS NOTES
Hospitalist Progress Note    Patient:  Ally Bull  Unit/Bed:E8A-9967/5254-01   YOB: 1955       MRN: [id number] Acct: [de-identified]  PCP: Kaylin Lovett MD    Date of Admission: 9/6/2022  --------------------------    Chief Complaint:     Fever, shortness of breath, generalized weakness    Hospital Course:     Ally Bull is a 74 y.o. male hospitalized on 9/6/2022         Assessment/plan:           Polymicrobial bacteremia/sepsis, POA  -Continuing IV antibiotics, awaiting clearance of bacteremia before placement of pacemaker       Lower extremity cellulitis, CharcoAid 4,   foot ulceration  -Podiatry managing    High-grade AV washing[place] blockade, symptomatic  -  bedrest   -EP team following  ? Need for temporary pacing  -Awaiting bacteremia clearance before pacemaker placement        Diabetes mellitus  Overall blood sugar reading within reasonable range, continue insulin     Dyslipidemia  Continue statin      Essential hypertension  Continue lisinopril      BPH  Continue tamsulosin    Code Status: Full Code         DVT prophylaxis: Lovenox     Disposition: Awaiting improvement of bacteremia    I discussed my thought processes at length with patient/family and patient understood. Question and concerns  Addressed      Discussed with RN       ----------------      Subjective:     Patient seen and examined  Overnight events noted  RN and ancillary staff note reviewed    Report dizziness when get up, continue to have bradycardia especially when he sleeps    Stated that he does not like diabetic food and 1 regular type      Diet: ADULT DIET; Regular    OBJECTIVE     Exam:  /63   Pulse (!) 35   Temp 97.7 °F (36.5 °C) (Oral)   Resp 16   Ht 6' 2\" (1.88 m)   Wt 248 lb 0.3 oz (112.5 kg)   SpO2 100%   BMI 31.84 kg/m²            Gen: Not in distress. Alert. Chronically ill  Head: Normocephalic. Atraumatic. Eyes: Conjunctivae/corneas clear.   ENT: Oral mucosa moist  Neck: No JVD. No obvious thyromegaly. CVS: Nml S1S2, no murmur heart rate varies between 50 and 60, dipped down to the 20s and 30s with sleep  Pulmomary: Fair air entry in both lung gastrointestinal: Soft, non tender, non distend, . Musculoskeletal: Multiple ulceration, desquamation of the lower extremities bilaterally  Neuro: No focal deficit. Moves extremity spontaneously. Psychiatry: Appropriate affect. Not agitated.         Medications:  Reviewed    Infusion Medications    sodium chloride Stopped (09/10/22 0533)    dextrose       Scheduled Medications    metroNIDAZOLE  500 mg IntraVENous Q8H    aspirin  81 mg Oral Daily    atorvastatin  20 mg Oral Nightly    insulin glargine  10 Units SubCUTAneous Nightly    docusate sodium  100 mg Oral BID    lisinopril  5 mg Oral Daily    tamsulosin  0.4 mg Oral QPM    sodium chloride flush  5-40 mL IntraVENous 2 times per day    enoxaparin  30 mg SubCUTAneous BID    insulin lispro  0-4 Units SubCUTAneous TID WC    insulin lispro  0-4 Units SubCUTAneous Nightly    cefepime  2,000 mg IntraVENous q8h     PRN Meds: sodium chloride flush, sodium chloride, polyethylene glycol, acetaminophen **OR** acetaminophen, glucose, dextrose bolus **OR** dextrose bolus, glucagon (rDNA), dextrose, perflutren lipid microspheres, ondansetron      Intake/Output Summary (Last 24 hours) at 9/10/2022 1359  Last data filed at 9/10/2022 1351  Gross per 24 hour   Intake 1073.16 ml   Output 1575 ml   Net -501.84 ml             Labs:   Recent Labs     09/08/22  0515 09/09/22  0345 09/10/22  0415   WBC 4.8 5.1 5.4   HGB 7.8* 8.4* 8.7*   HCT 24.5* 26.2* 27.1*    179 199     Recent Labs     09/08/22  0515 09/09/22  0345 09/10/22  0415   * 137 137   K 4.6 4.3 4.3    105 106   CO2 21 22 21   BUN 16 15 14   CREATININE 1.1 1.0 0.9   CALCIUM 8.1* 8.2* 8.5     Recent Labs     09/08/22  0515 09/09/22  0345 09/10/22  0415   AST 8* 9* 8*   ALT <5* 6* 6*   BILITOT 0.5 0.4 0.4   ALKPHOS 62 65 70     No results for input(s): INR in the last 72 hours. Recent Labs     09/07/22  1841   TROPONINI 0.03*       Urinalysis:      Lab Results   Component Value Date/Time    NITRU Negative 09/06/2022 03:41 PM    WBCUA 1 08/15/2022 11:16 AM    BACTERIA None Seen 08/15/2022 11:16 AM    RBCUA 2 08/15/2022 11:16 AM    BLOODU Negative 09/06/2022 03:41 PM    SPECGRAV 1.020 09/06/2022 03:41 PM    GLUCOSEU Negative 09/06/2022 03:41 PM       Radiology:  VL Extremity Venous Bilateral   Final Result      CT CHEST PULMONARY EMBOLISM W CONTRAST   Final Result   1. No CT evidence of a pulmonary embolism. 2. No acute intrapulmonary findings. XR CHEST PORTABLE   Final Result   No radiographic evidence of acute pulmonary disease. CT HEAD WO CONTRAST   Final Result   No acute intracranial abnormality.                      Electronically signed by Marisela Ware MD on 9/10/2022 at 1:59 PM

## 2022-09-11 LAB
A/G RATIO: 1 (ref 1.1–2.2)
ALBUMIN SERPL-MCNC: 3.2 G/DL (ref 3.4–5)
ALP BLD-CCNC: 71 U/L (ref 40–129)
ALT SERPL-CCNC: 7 U/L (ref 10–40)
ANION GAP SERPL CALCULATED.3IONS-SCNC: 7 MMOL/L (ref 3–16)
AST SERPL-CCNC: 13 U/L (ref 15–37)
BASOPHILS ABSOLUTE: 0 K/UL (ref 0–0.2)
BASOPHILS RELATIVE PERCENT: 0.4 %
BILIRUB SERPL-MCNC: 0.4 MG/DL (ref 0–1)
BUN BLDV-MCNC: 17 MG/DL (ref 7–20)
CALCIUM SERPL-MCNC: 8.6 MG/DL (ref 8.3–10.6)
CHLORIDE BLD-SCNC: 107 MMOL/L (ref 99–110)
CO2: 21 MMOL/L (ref 21–32)
CREAT SERPL-MCNC: 0.8 MG/DL (ref 0.8–1.3)
EOSINOPHILS ABSOLUTE: 0.2 K/UL (ref 0–0.6)
EOSINOPHILS RELATIVE PERCENT: 3.8 %
GFR AFRICAN AMERICAN: >60
GFR NON-AFRICAN AMERICAN: >60
GLUCOSE BLD-MCNC: 119 MG/DL (ref 70–99)
GLUCOSE BLD-MCNC: 120 MG/DL (ref 70–99)
GLUCOSE BLD-MCNC: 165 MG/DL (ref 70–99)
GLUCOSE BLD-MCNC: 172 MG/DL (ref 70–99)
GLUCOSE BLD-MCNC: 187 MG/DL (ref 70–99)
HCT VFR BLD CALC: 28 % (ref 40.5–52.5)
HEMOGLOBIN: 9 G/DL (ref 13.5–17.5)
LYMPHOCYTES ABSOLUTE: 1.7 K/UL (ref 1–5.1)
LYMPHOCYTES RELATIVE PERCENT: 28.5 %
MAGNESIUM: 1.9 MG/DL (ref 1.8–2.4)
MCH RBC QN AUTO: 23.7 PG (ref 26–34)
MCHC RBC AUTO-ENTMCNC: 32 G/DL (ref 31–36)
MCV RBC AUTO: 74.1 FL (ref 80–100)
MONOCYTES ABSOLUTE: 0.5 K/UL (ref 0–1.3)
MONOCYTES RELATIVE PERCENT: 8.8 %
NEUTROPHILS ABSOLUTE: 3.5 K/UL (ref 1.7–7.7)
NEUTROPHILS RELATIVE PERCENT: 58.5 %
PDW BLD-RTO: 19.2 % (ref 12.4–15.4)
PERFORMED ON: ABNORMAL
PLATELET # BLD: 205 K/UL (ref 135–450)
PMV BLD AUTO: 7.1 FL (ref 5–10.5)
POTASSIUM SERPL-SCNC: 4.9 MMOL/L (ref 3.5–5.1)
RBC # BLD: 3.78 M/UL (ref 4.2–5.9)
SODIUM BLD-SCNC: 135 MMOL/L (ref 136–145)
TOTAL PROTEIN: 6.3 G/DL (ref 6.4–8.2)
WBC # BLD: 6 K/UL (ref 4–11)

## 2022-09-11 PROCEDURE — 36415 COLL VENOUS BLD VENIPUNCTURE: CPT

## 2022-09-11 PROCEDURE — 2500000003 HC RX 250 WO HCPCS: Performed by: INTERNAL MEDICINE

## 2022-09-11 PROCEDURE — 83735 ASSAY OF MAGNESIUM: CPT

## 2022-09-11 PROCEDURE — 94760 N-INVAS EAR/PLS OXIMETRY 1: CPT

## 2022-09-11 PROCEDURE — 2580000003 HC RX 258: Performed by: STUDENT IN AN ORGANIZED HEALTH CARE EDUCATION/TRAINING PROGRAM

## 2022-09-11 PROCEDURE — 2060000000 HC ICU INTERMEDIATE R&B

## 2022-09-11 PROCEDURE — 85025 COMPLETE CBC W/AUTO DIFF WBC: CPT

## 2022-09-11 PROCEDURE — 99232 SBSQ HOSP IP/OBS MODERATE 35: CPT | Performed by: NURSE PRACTITIONER

## 2022-09-11 PROCEDURE — 6370000000 HC RX 637 (ALT 250 FOR IP): Performed by: STUDENT IN AN ORGANIZED HEALTH CARE EDUCATION/TRAINING PROGRAM

## 2022-09-11 PROCEDURE — 6360000002 HC RX W HCPCS: Performed by: STUDENT IN AN ORGANIZED HEALTH CARE EDUCATION/TRAINING PROGRAM

## 2022-09-11 PROCEDURE — 80053 COMPREHEN METABOLIC PANEL: CPT

## 2022-09-11 RX ADMIN — CEFEPIME 2000 MG: 2 INJECTION, POWDER, FOR SOLUTION INTRAVENOUS at 05:02

## 2022-09-11 RX ADMIN — METRONIDAZOLE 500 MG: 500 INJECTION, SOLUTION INTRAVENOUS at 05:47

## 2022-09-11 RX ADMIN — ASPIRIN 81 MG: 81 TABLET, COATED ORAL at 08:43

## 2022-09-11 RX ADMIN — SODIUM CHLORIDE: 9 INJECTION, SOLUTION INTRAVENOUS at 04:58

## 2022-09-11 RX ADMIN — INSULIN GLARGINE 10 UNITS: 100 INJECTION, SOLUTION SUBCUTANEOUS at 21:29

## 2022-09-11 RX ADMIN — METRONIDAZOLE 500 MG: 500 INJECTION, SOLUTION INTRAVENOUS at 21:35

## 2022-09-11 RX ADMIN — ATORVASTATIN CALCIUM 20 MG: 20 TABLET, FILM COATED ORAL at 21:27

## 2022-09-11 RX ADMIN — CEFEPIME 2000 MG: 2 INJECTION, POWDER, FOR SOLUTION INTRAVENOUS at 21:32

## 2022-09-11 RX ADMIN — METRONIDAZOLE 500 MG: 500 INJECTION, SOLUTION INTRAVENOUS at 13:11

## 2022-09-11 RX ADMIN — ENOXAPARIN SODIUM 30 MG: 100 INJECTION SUBCUTANEOUS at 08:42

## 2022-09-11 RX ADMIN — DOCUSATE SODIUM 100 MG: 100 CAPSULE, LIQUID FILLED ORAL at 21:27

## 2022-09-11 RX ADMIN — TAMSULOSIN HYDROCHLORIDE 0.4 MG: 0.4 CAPSULE ORAL at 17:33

## 2022-09-11 RX ADMIN — DOCUSATE SODIUM 100 MG: 100 CAPSULE, LIQUID FILLED ORAL at 08:43

## 2022-09-11 RX ADMIN — LISINOPRIL 5 MG: 5 TABLET ORAL at 08:43

## 2022-09-11 RX ADMIN — SODIUM CHLORIDE: 9 INJECTION, SOLUTION INTRAVENOUS at 05:45

## 2022-09-11 RX ADMIN — ENOXAPARIN SODIUM 30 MG: 100 INJECTION SUBCUTANEOUS at 21:31

## 2022-09-11 RX ADMIN — CEFEPIME 2000 MG: 2 INJECTION, POWDER, FOR SOLUTION INTRAVENOUS at 13:09

## 2022-09-11 NOTE — PROGRESS NOTES
Cardiac Electrophysiology Progress Note     Admit Date: 9/6/2022     Reason for follow up: high grade AV block    HPI and Interval History:   Ana Alejo is a 74y.o. year old male with past medical history significant for DM, neuropathy, PVD, chronic leg wounds and lymphedema who presented to the ED with fevers, lightheadedness and SOB. Does have chronic wounds on his legs/feet and has wound care at home. He admits to a \"fall\" about a week ago where he was standing and got dizzy and fell. Denies syncope. He also admits to getting lightheaded on occasion, even while sitting and without any provoking movements. He will feel poorly and put a blanket over his head and try to go to sleep for it to resolve. Previously told he has vertigo. Denies any palpitations or chest pain. Around 1200 today, he was noted to be in sinus bradycardia with rates in the 30s while on the monitor. He was sleeping some during this time and when checked on by his nurse, was initially feeling fine. There was then concern for possible junctional rhythm and high grade AV block. The pt was then feeling lightheaded and poorly so a rapid response was called    Continues to have periods where rhythm looks like accelerated junctional monitor, also with periods with visible P wave that are either non-conducted or very long IN interval. Admits to feeling \"dizzy\" around 0730 this morning, did have a brief period of this around that time. No other cardiac complaints. Underwent I&D of R foot wound 9/9 with podiatry. Continues with periods of bradycardia on monitor, he does not think his lightheadedness is any worse today than \"normal\" for him. Plans for Micra tomorrow as repeat blood cultures with [facility].     Physical Examination:  Vitals:    09/11/22 0715   BP: (!) 134/96   Pulse: (!) 31   Resp: 16   Temp: 98.6 °F (37 °C)   SpO2: 99%        Intake/Output Summary (Last 24 hours) at 9/11/2022 0804  Last data filed at 9/11/2022 0627  Gross per 24 hour   Intake 584.04 ml   Output 775 ml   Net -190.96 ml     In: 584 [P.O.:180]  Out: 775    Wt Readings from Last 3 Encounters:   22 248 lb 3.8 oz (112.6 kg)   22 247 lb 9.2 oz (112.3 kg)   21 264 lb 5.3 oz (119.9 kg)     Temp  Av.7 °F (36.5 °C)  Min: 97.3 °F (36.3 °C)  Max: 98.6 °F (37 °C)  Pulse  Av.4  Min: 31  Max: 66  BP  Min: 115/55  Max: 134/96  SpO2  Av.8 %  Min: 98 %  Max: 100 %    Telemetry: Sinus rhythm in the 60s. Constitutional: Alert, in no acute distress. Appears stated age. Head: Normocephalic and atraumatic. Eyes: Conjunctivae normal. EOM are normal.   Neck: Neck supple. No lymphadenopathy. No rigidity. No JVD present. Cardiovascular: Normal rate, regular rhythm. No murmurs, rubs or gallops. No S3 or S4.  Pulmonary/Chest: Clear breath sounds bilaterally. No crackles, wheezes or rhonchi. No respiratory accessory muscle use. Abdominal: Soft. Normal bowel sounds present. No distension, No tenderness. Musculoskeletal: No tenderness. Wraps to BLE  Lymphadenopathy: Has no cervical adenopathy. Neurological: Alert and oriented. No gross deficits. Skin: Skin is warm and dry. No rash, lesions, ulcerations noted. Psychiatric: No anxiety or agitation. Labs, diagnostic and imaging results reviewed. Reviewed. Recent Labs     09/09/22  0345 09/10/22  0415 09/11/22  0641    137 135*   K 4.3 4.3 4.9    106 107   CO2    BUN 15 14 17   CREATININE 1.0 0.9 0.8     Recent Labs     09/09/22  0345 09/10/22  0415 09/11/22  0641   WBC 5.1 5.4 6.0   HGB 8.4* 8.7* 9.0*   HCT 26.2* 27.1* 28.0*   MCV 73.6* 73.8* 74.1*    199 205     Lab Results   Component Value Date/Time    TROPONINI 0.03 2022 06:41 PM     Estimated Creatinine Clearance: 118 mL/min (based on SCr of 0.8 mg/dL).    No results found for: BNP  Lab Results   Component Value Date/Time    PROTIME 13.0 2014 06:47 PM    INR 1.17 2014 06:47 PM     Lab Results   Component Value Date/Time    CHOL 79 2020 05:59 AM    HDL 21 2020 05:59 AM    TRIG 105 2020 05:59 AM       Scheduled Meds:   metroNIDAZOLE  500 mg IntraVENous Q8H    aspirin  81 mg Oral Daily    atorvastatin  20 mg Oral Nightly    insulin glargine  10 Units SubCUTAneous Nightly    docusate sodium  100 mg Oral BID    lisinopril  5 mg Oral Daily    tamsulosin  0.4 mg Oral QPM    sodium chloride flush  5-40 mL IntraVENous 2 times per day    enoxaparin  30 mg SubCUTAneous BID    insulin lispro  0-4 Units SubCUTAneous TID WC    insulin lispro  0-4 Units SubCUTAneous Nightly    cefepime  2,000 mg IntraVENous q8h     Continuous Infusions:   sodium chloride 10 mL/hr at 22 0545    dextrose       PRN Meds:sodium chloride flush, sodium chloride, polyethylene glycol, acetaminophen **OR** acetaminophen, glucose, dextrose bolus **OR** dextrose bolus, glucagon (rDNA), dextrose, perflutren lipid microspheres, ondansetron     EC22  Sinus rhythm with high grade AV block at 51 BPM. RBBB. LAFB. Echo:22   Normal left ventricle size, wall thickness and systolic function with an   estimated ejection fraction of 55%. No regional wall motion abnormalities   are seen. Grade III diastolic dysfunction with elevated LV filling pressures. Mild calcification of the leaflets of the mitral valve. Mild mitral regurgitation is present. The left atrium is moderately dilated. Aortic valve leaflets appear thickened. The right ventricle is mildly enlarged with normal function   Mild to moderate tricuspid regurgitation. Stress/LHC: 20   Normal myocardial perfusion study. Normal LV size and systolic function. Overall findings represent a low risk study.         Assessment and Plan:     High grade AV block              - noted on EKG on 22, intermittent              - associated with lightheadedness, not feeling well               - on no AV [place] blocking agents              - Mg slightly low - getting replaced, K WNL, troponin flat              - EKG with RBBB and LAFB as well              - meets Class I indications for PPM, given infection issues and recent fever, would recommend Micra implant which was discussed with pt and he is agreeable, planning for tomorrow with Dr. Bethany Neumann - so far blood cultures from 9/8 with no growth, WBC are WNL and he has been afebrile              - if has prolonged periods of high grade AV block where he is symptomatic with hypotension, may need TVP until Micra can be done     Bacteremia              - secondary to R foot wound, both growing Proteus mirabilis   - care per ID    R diabetic foot infection   - care per ID and podiatry   - s/p bedside I&D on 9/9     Essential HTN              - BP controlled      KENNY Steele  [facility] [address]  Phone: [phone]  Fax: [phone]    Electronically signed by KENNY Bowling - CNP on 9/11/2022 at 8:04 AM

## 2022-09-11 NOTE — PROGRESS NOTES
Department of Podiatry Progress Note  Benja Jean Query DPM Attending       CHIEF COMPLAINT:  Wounds to plantar feet    HISTORY OF PRESENT ILLNESS:                The patient is a 74 y.o. male with significant past medical history of acute on chronic wounds to plantar surface of both feet for which he relates home care with Podiatrist from [facility] who visits monthly, who is consulted for  plantar LEFT and RIGHT arch of foot wounds, as well as dystrophic nail debridement    Enthusiastic for his Cardiac procedure tomorrow    Past Medical History:        Diagnosis Date    Diabetes mellitus ([address]     Gallstones     Lymphedema     Neuropathy     Pancreatitis     PVD (peripheral vascular disease) ([address]     Ulcers of both lower legs ([address]     bilateral feet     Allergies:   Patient has no known allergies.       REVIEW OF SYSTEMS:    CONSTITUTIONAL:  positive for  malaise  INTEGUMENT/BREAST:  positive for skin lesion(s), dryness, skin color change, changes in lesion, changes in hair, and changes in nails  ENDOCRINE:  positive for diabetic symptoms including neither foot ulcerations nor skin dryness nor neuropathy  MUSCULOSKELETAL:  positive for  joint swelling, stiff joints, and decreased range of motion  NEUROLOGICAL:  positive for gait problems, weakness, and numbness  PHYSICAL EXAM:      Vitals:    /79   Pulse 67   Temp 97.6 °F (36.4 °C) (Oral)   Resp 16   Ht 6' 2\" (1.88 m)   Wt 248 lb 3.8 oz (112.6 kg)   SpO2 99%   BMI 31.87 kg/m²     LABS:   Recent Labs     09/10/22  0415 09/11/22  0641   WBC 5.4 6.0   HGB 8.7* 9.0*   HCT 27.1* 28.0*    205     Recent Labs     09/11/22  0641   *   K 4.9      CO2 21   BUN 17   CREATININE 0.8     Recent Labs     09/10/22  0415 09/11/22  0641   PROT 6.4 6.3*       LOWER EXTREMITY EXAMINATION   SKIN:    RIGHT plantar foot ulcer MUGS 3 of 3.1 x 1.9 cm with exposed healthy sub Q of 0.35 cm depth without periwound erythema or calor    LEFT plantar foot ulcer MUGS

## 2022-09-11 NOTE — PROGRESS NOTES
Hospitalist Progress Note    Patient:  Levon Hernandez  Unit/Bed:S2Y-5110/5254-01   YOB: 1955       MRN: [id number] Acct: [de-identified]  PCP: Ryan Jain MD    Date of Admission: 9/6/2022  --------------------------    Chief Complaint:     Fever, shortness of breath, generalized weakness    Hospital Course:     Levon Hernandez is a 74 y.o. 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, symptomatic  -  bedrest   -Plan for pacemaker placement tomorrow    Diabetes mellitus  Continue current insulin therapy, monitor blood sugar    Dyslipidemia  Continue statin      Essential hypertension  Continue lisinopril      BPH  Continue tamsulosin    Code Status: Full Code         DVT prophylaxis: Lovenox     Disposition: Placement of pacemaker tomorrow, likely need ECF if IV antibiotics prescribed (not thrilled about home antibiotics)  Discussed with patient and RN    ----------------      Subjective:     Patient seen and examined  Overnight events noted  RN and ancillary staff note reviewed    No complaint, no major overnight events, heart rate trend noted    Diet: ADULT DIET; Regular  Diet NPO Exceptions are: Sips of Water with Meds    OBJECTIVE     Exam:  /79   Pulse 67   Temp 97.6 °F (36.4 °C) (Oral)   Resp 16   Ht 6' 2\" (1.88 m)   Wt 248 lb 3.8 oz (112.6 kg)   SpO2 99%   BMI 31.87 kg/m²        Unchanged physical finding exam from what is documented below    Gen: Not in distress. Alert. Chronically ill  Head: Normocephalic. Atraumatic. Eyes: Conjunctivae/corneas clear. ENT: Oral mucosa moist  Neck: No JVD. No obvious thyromegaly.   CVS: Nml S1S2, no murmur heart rate varies between 50 and 60, dipped the last 72 hours. Urinalysis:      Lab Results   Component Value Date/Time    NITRU Negative 09/06/2022 03:41 PM    WBCUA 1 08/15/2022 11:16 AM    BACTERIA None Seen 08/15/2022 11:16 AM    RBCUA 2 08/15/2022 11:16 AM    BLOODU Negative 09/06/2022 03:41 PM    SPECGRAV 1.020 09/06/2022 03:41 PM    GLUCOSEU Negative 09/06/2022 03:41 PM       Radiology:  VL Extremity Venous Bilateral   Final Result      CT CHEST PULMONARY EMBOLISM W CONTRAST   Final Result   1. No CT evidence of a pulmonary embolism. 2. No acute intrapulmonary findings. XR CHEST PORTABLE   Final Result   No radiographic evidence of acute pulmonary disease. CT HEAD WO CONTRAST   Final Result   No acute intracranial abnormality.                      Electronically signed by Randall Washington MD on 9/11/2022 at 12:58 PM

## 2022-09-11 NOTE — PROGRESS NOTES
Patient slept well and was easily aroused for routine checks. HR had periods where it dropped in the 30's but patient asymptomatic.

## 2022-09-12 ENCOUNTER — APPOINTMENT (OUTPATIENT)
Dept: CARDIAC CATH/INVASIVE PROCEDURES | Age: 67
DRG: 854 | End: 2022-09-12
Payer: MEDICARE

## 2022-09-12 ENCOUNTER — PROCEDURE VISIT (OUTPATIENT)
Dept: CARDIOLOGY CLINIC | Age: 67
End: 2022-09-12

## 2022-09-12 ENCOUNTER — APPOINTMENT (OUTPATIENT)
Dept: GENERAL RADIOLOGY | Age: 67
DRG: 854 | End: 2022-09-12
Payer: MEDICARE

## 2022-09-12 DIAGNOSIS — I44.1 ATRIOVENTRICULAR BLOCK, MOBITZ TYPE 1, WENCKEBACH: ICD-10-CM

## 2022-09-12 DIAGNOSIS — R00.1 SYMPTOMATIC BRADYCARDIA: ICD-10-CM

## 2022-09-12 DIAGNOSIS — I44.39 HIGH-GRADE ATRIOVENTRICULAR BLOCK: ICD-10-CM

## 2022-09-12 DIAGNOSIS — Z95.0 PRESENCE OF LEADLESS CARDIAC PACEMAKER: Primary | ICD-10-CM

## 2022-09-12 DIAGNOSIS — I48.91 ATRIAL FIBRILLATION WITH SLOW VENTRICULAR RESPONSE (HCC): ICD-10-CM

## 2022-09-12 LAB
A/G RATIO: 1.1 (ref 1.1–2.2)
ALBUMIN SERPL-MCNC: 3.4 G/DL (ref 3.4–5)
ALP BLD-CCNC: 74 U/L (ref 40–129)
ALT SERPL-CCNC: 25 U/L (ref 10–40)
ANION GAP SERPL CALCULATED.3IONS-SCNC: 13 MMOL/L (ref 3–16)
AST SERPL-CCNC: 36 U/L (ref 15–37)
BASOPHILS ABSOLUTE: 0 K/UL (ref 0–0.2)
BASOPHILS RELATIVE PERCENT: 0.6 %
BILIRUB SERPL-MCNC: 0.4 MG/DL (ref 0–1)
BLOOD CULTURE, ROUTINE: NORMAL
BUN BLDV-MCNC: 18 MG/DL (ref 7–20)
CALCIUM SERPL-MCNC: 8.6 MG/DL (ref 8.3–10.6)
CHLORIDE BLD-SCNC: 106 MMOL/L (ref 99–110)
CO2: 20 MMOL/L (ref 21–32)
CREAT SERPL-MCNC: 0.8 MG/DL (ref 0.8–1.3)
CULTURE, BLOOD 2: NORMAL
EOSINOPHILS ABSOLUTE: 0.2 K/UL (ref 0–0.6)
EOSINOPHILS RELATIVE PERCENT: 2 %
GFR AFRICAN AMERICAN: >60
GFR NON-AFRICAN AMERICAN: >60
GLUCOSE BLD-MCNC: 115 MG/DL (ref 70–99)
GLUCOSE BLD-MCNC: 131 MG/DL (ref 70–99)
GLUCOSE BLD-MCNC: 148 MG/DL (ref 70–99)
GLUCOSE BLD-MCNC: 163 MG/DL (ref 70–99)
GLUCOSE BLD-MCNC: 181 MG/DL (ref 70–99)
HCT VFR BLD CALC: 28.7 % (ref 40.5–52.5)
HEMOGLOBIN: 9.3 G/DL (ref 13.5–17.5)
LYMPHOCYTES ABSOLUTE: 1 K/UL (ref 1–5.1)
LYMPHOCYTES RELATIVE PERCENT: 13.8 %
MAGNESIUM: 2 MG/DL (ref 1.8–2.4)
MCH RBC QN AUTO: 24 PG (ref 26–34)
MCHC RBC AUTO-ENTMCNC: 32.4 G/DL (ref 31–36)
MCV RBC AUTO: 74.1 FL (ref 80–100)
MONOCYTES ABSOLUTE: 0.4 K/UL (ref 0–1.3)
MONOCYTES RELATIVE PERCENT: 5.2 %
NEUTROPHILS ABSOLUTE: 5.9 K/UL (ref 1.7–7.7)
NEUTROPHILS RELATIVE PERCENT: 78.4 %
PDW BLD-RTO: 18.9 % (ref 12.4–15.4)
PERFORMED ON: ABNORMAL
PLATELET # BLD: 199 K/UL (ref 135–450)
PMV BLD AUTO: 7.2 FL (ref 5–10.5)
POTASSIUM SERPL-SCNC: 4.2 MMOL/L (ref 3.5–5.1)
RBC # BLD: 3.87 M/UL (ref 4.2–5.9)
SODIUM BLD-SCNC: 139 MMOL/L (ref 136–145)
TOTAL PROTEIN: 6.5 G/DL (ref 6.4–8.2)
WBC # BLD: 7.5 K/UL (ref 4–11)

## 2022-09-12 PROCEDURE — 99152 MOD SED SAME PHYS/QHP 5/>YRS: CPT | Performed by: INTERNAL MEDICINE

## 2022-09-12 PROCEDURE — 6360000002 HC RX W HCPCS

## 2022-09-12 PROCEDURE — 80053 COMPREHEN METABOLIC PANEL: CPT

## 2022-09-12 PROCEDURE — 6360000002 HC RX W HCPCS: Performed by: STUDENT IN AN ORGANIZED HEALTH CARE EDUCATION/TRAINING PROGRAM

## 2022-09-12 PROCEDURE — 02HK3NZ INSERTION OF INTRACARDIAC PACEMAKER INTO RIGHT VENTRICLE, PERCUTANEOUS APPROACH: ICD-10-PCS | Performed by: INTERNAL MEDICINE

## 2022-09-12 PROCEDURE — 99233 SBSQ HOSP IP/OBS HIGH 50: CPT | Performed by: INTERNAL MEDICINE

## 2022-09-12 PROCEDURE — C1894 INTRO/SHEATH, NON-LASER: HCPCS

## 2022-09-12 PROCEDURE — 71045 X-RAY EXAM CHEST 1 VIEW: CPT

## 2022-09-12 PROCEDURE — 94760 N-INVAS EAR/PLS OXIMETRY 1: CPT

## 2022-09-12 PROCEDURE — 33999 UNLISTED PX CARDIAC SURGERY: CPT

## 2022-09-12 PROCEDURE — C1760 CLOSURE DEV, VASC: HCPCS

## 2022-09-12 PROCEDURE — 0JBQ0ZZ EXCISION OF RIGHT FOOT SUBCUTANEOUS TISSUE AND FASCIA, OPEN APPROACH: ICD-10-PCS | Performed by: INTERNAL MEDICINE

## 2022-09-12 PROCEDURE — 2060000000 HC ICU INTERMEDIATE R&B

## 2022-09-12 PROCEDURE — 6370000000 HC RX 637 (ALT 250 FOR IP): Performed by: STUDENT IN AN ORGANIZED HEALTH CARE EDUCATION/TRAINING PROGRAM

## 2022-09-12 PROCEDURE — 2500000003 HC RX 250 WO HCPCS

## 2022-09-12 PROCEDURE — C1769 GUIDE WIRE: HCPCS

## 2022-09-12 PROCEDURE — 2709999900 HC NON-CHARGEABLE SUPPLY

## 2022-09-12 PROCEDURE — 85025 COMPLETE CBC W/AUTO DIFF WBC: CPT

## 2022-09-12 PROCEDURE — 2580000003 HC RX 258: Performed by: STUDENT IN AN ORGANIZED HEALTH CARE EDUCATION/TRAINING PROGRAM

## 2022-09-12 PROCEDURE — 36415 COLL VENOUS BLD VENIPUNCTURE: CPT

## 2022-09-12 PROCEDURE — 33274 TCAT INSJ/RPL PERM LDLS PM: CPT

## 2022-09-12 PROCEDURE — 93005 ELECTROCARDIOGRAM TRACING: CPT | Performed by: INTERNAL MEDICINE

## 2022-09-12 PROCEDURE — C1786 PMKR, SINGLE, RATE-RESP: HCPCS

## 2022-09-12 PROCEDURE — 2580000003 HC RX 258: Performed by: INTERNAL MEDICINE

## 2022-09-12 PROCEDURE — 99153 MOD SED SAME PHYS/QHP EA: CPT

## 2022-09-12 PROCEDURE — 83735 ASSAY OF MAGNESIUM: CPT

## 2022-09-12 PROCEDURE — 2500000003 HC RX 250 WO HCPCS: Performed by: INTERNAL MEDICINE

## 2022-09-12 PROCEDURE — 99152 MOD SED SAME PHYS/QHP 5/>YRS: CPT

## 2022-09-12 PROCEDURE — 33274 TCAT INSJ/RPL PERM LDLS PM: CPT | Performed by: INTERNAL MEDICINE

## 2022-09-12 PROCEDURE — 6360000004 HC RX CONTRAST MEDICATION: Performed by: INTERNAL MEDICINE

## 2022-09-12 RX ORDER — SODIUM CHLORIDE 0.9 % (FLUSH) 0.9 %
5-40 SYRINGE (ML) INJECTION PRN
Status: DISCONTINUED | OUTPATIENT
Start: 2022-09-12 | End: 2022-09-13 | Stop reason: HOSPADM

## 2022-09-12 RX ORDER — SODIUM CHLORIDE 0.9 % (FLUSH) 0.9 %
5-40 SYRINGE (ML) INJECTION EVERY 12 HOURS SCHEDULED
Status: DISCONTINUED | OUTPATIENT
Start: 2022-09-12 | End: 2022-09-13 | Stop reason: HOSPADM

## 2022-09-12 RX ORDER — SODIUM CHLORIDE 9 MG/ML
INJECTION, SOLUTION INTRAVENOUS PRN
Status: DISCONTINUED | OUTPATIENT
Start: 2022-09-12 | End: 2022-09-13 | Stop reason: HOSPADM

## 2022-09-12 RX ORDER — ACETAMINOPHEN 325 MG/1
650 TABLET ORAL EVERY 4 HOURS PRN
Status: DISCONTINUED | OUTPATIENT
Start: 2022-09-12 | End: 2022-09-13 | Stop reason: HOSPADM

## 2022-09-12 RX ADMIN — DOCUSATE SODIUM 100 MG: 100 CAPSULE, LIQUID FILLED ORAL at 21:30

## 2022-09-12 RX ADMIN — ENOXAPARIN SODIUM 30 MG: 100 INJECTION SUBCUTANEOUS at 21:30

## 2022-09-12 RX ADMIN — ONDANSETRON 4 MG: 2 INJECTION INTRAMUSCULAR; INTRAVENOUS at 14:16

## 2022-09-12 RX ADMIN — METRONIDAZOLE 500 MG: 500 INJECTION, SOLUTION INTRAVENOUS at 05:27

## 2022-09-12 RX ADMIN — DOCUSATE SODIUM 100 MG: 100 CAPSULE, LIQUID FILLED ORAL at 08:26

## 2022-09-12 RX ADMIN — INSULIN GLARGINE 10 UNITS: 100 INJECTION, SOLUTION SUBCUTANEOUS at 21:30

## 2022-09-12 RX ADMIN — TAMSULOSIN HYDROCHLORIDE 0.4 MG: 0.4 CAPSULE ORAL at 18:27

## 2022-09-12 RX ADMIN — LISINOPRIL 5 MG: 5 TABLET ORAL at 08:26

## 2022-09-12 RX ADMIN — ATORVASTATIN CALCIUM 20 MG: 20 TABLET, FILM COATED ORAL at 21:30

## 2022-09-12 RX ADMIN — SODIUM CHLORIDE: 9 INJECTION, SOLUTION INTRAVENOUS at 21:37

## 2022-09-12 RX ADMIN — METRONIDAZOLE 500 MG: 500 INJECTION, SOLUTION INTRAVENOUS at 14:20

## 2022-09-12 RX ADMIN — CEFEPIME 2000 MG: 2 INJECTION, POWDER, FOR SOLUTION INTRAVENOUS at 21:36

## 2022-09-12 RX ADMIN — SODIUM CHLORIDE: 9 INJECTION, SOLUTION INTRAVENOUS at 05:27

## 2022-09-12 RX ADMIN — CEFEPIME 2000 MG: 2 INJECTION, POWDER, FOR SOLUTION INTRAVENOUS at 05:26

## 2022-09-12 RX ADMIN — IOPAMIDOL 5 ML: 755 INJECTION, SOLUTION INTRAVENOUS at 10:36

## 2022-09-12 RX ADMIN — METRONIDAZOLE 500 MG: 500 INJECTION, SOLUTION INTRAVENOUS at 21:38

## 2022-09-12 RX ADMIN — Medication 10 ML: at 08:33

## 2022-09-12 RX ADMIN — CEFEPIME 2000 MG: 2 INJECTION, POWDER, FOR SOLUTION INTRAVENOUS at 14:20

## 2022-09-12 NOTE — PROCEDURES
[facility]     Electrophysiology Procedure Note       Date of Procedure: 9/12/2022  Patient's Name: Brandon Abel  YOB: 1955   Medical Record Number: [id number]  Procedure Performed by: Mayela Aguilar MD    Procedures performed:  Insertion of MRI compatible leadless pacemaker. Contrast injection to confirm proper positioning. Electronic analysis of lead and device. Anesthesia: Local and Monitored Anesthesia Care  Level of sedation plan: Moderate sedation (conscious sedation) with intravenous Midazolam 6 mg and Fentanyl 125 mcg   Sedation start time: 9:08 am  Sedation stop time: 10:26 am  Mallampati airway assessment class: 2  ASA class: 2      Indication of the procedure:       Brandon Abel is a 74 y.o. male who is being referred for pacemaker implantation due to non-reversible bradycardia secondary to intermittent high grade block with symptoms of presyncope, dizziness. He has recurrent infection with bacteremia and will therefore get a leadless pacemaker (micra AV). Details of procedure: The patient was brought to the electrophysiology laboratory in stable condition. The patient was in a fasting and non-sedated state. The risks, benefits and alternatives of the procedure were discussed with the patient. The risks including, but not limited to, the risks of vascular injury, bleeding, infection, device malfunction, device dislodgement, radiation exposure, injury to cardiac and surrounding structures, stroke, myocardial infarction and death were discussed in detail. The patient was also presented reasonable alternatives to the proposed care, treatment, and services. The discussion I have had with the patient encompassed risks, benefits, and side effects related to the alternatives and the risks related to not receiving the proposed care, treatment and services. The patient opted to proceed with the device implantation.  Written informed consent was signed and placed in the chart. Prophylactic antibiotic using Ancef 1mg IV was given. An independent trained observer assumed the sole responsibility of administering IV sedation medication - Versed, Fentanyl - at my direction and closely monitored the patient. The patient was monitored continuously with ECG, pulse oximetry, blood pressure monitoring, and direct observation. The right and left groin were prepped and draped in a strict sterile fashion. Under ultrasound guidance the right femoral vein was accessed. Preclosure perclose devices were inserted in the vein. Overn an Amplatz guidewire, an 8 Fr sheath then a 22 Fr [place] DrySeal Flex sheath was used to [employer] to access site. Once the 27 Fr micra sheath was advanced and placed by the RA/IVC junction, 5000 heparin was given. The sheath was then connected to continuous saline with pressure bag. The Medronic micra and delivery sheath were advanced to the RV mid septum under fluoroscopy in QURESHI projection. Multiple views in addition to contrast in both Swedish and QURESHI projections were utilized to ensure that the device was positioned on the septum and that the tail of the device was free of the tricuspid valve apparatus. The 6th attempt was successful, the other positions had great sensing but poor threshold >2.5V. Tug test was utilized to show fixation of at least 2 of 4 of the tines. The delivery sheat was then removed from the body and perclose closure devices used for hemostasis    Specimen collected: none    Estimated blood loss: < 20 cc    TTE was used preprocedure and during the procedure to ensure septal position and monitor for pericardial effusion. At the end of the procedure there was no new pericardial effusion. The patient tolerated the procedure well and there were no complications. Patient was transported to the holding area in stable condition.      Device and Leads information:      Impression:    Successful implantation of Medronic Micra

## 2022-09-12 NOTE — PROGRESS NOTES
Allergies:  Patient has no known allergies. Immunizations :   Immunization History   Administered Date(s) Administered    COVID-19, J&J, (age 18y+), IM, 0.5 mL 03/19/2021       Social History:     Social History     Tobacco Use    Smoking status: Never    Smokeless tobacco: Never   Vaping Use    Vaping Use: Never used   Substance Use Topics    Alcohol use: No    Drug use: No     Social History     Tobacco Use   Smoking Status Never   Smokeless Tobacco Never      Family History   Problem Relation Age of Onset    Colon Cancer Mother         PVD s/p toe amputation by Dr Vlad Verdugo Father           REVIEW OF SYSTEMS:      Constitutional:    fevers+ , chills, night sweats  Eyes:  negative for blurred vision, eye discharge, visual disturbance   HEENT:  negative for hearing loss, ear drainage,nasal congestion  Respiratory:  negative for cough, shortness of breath or hemoptysis   Cardiovascular:  negative for chest pain, palpitations, syncope  Gastrointestinal:  negative for nausea, vomiting, diarrhea, constipation, abdominal pain  Genitourinary:  negative for frequency, dysuria, urinary incontinence, hematuria  Hematologic/Lymphatic:  negative for easy bruising, bleeding and lymphadenopathy  Allergic/Immunologic:  negative for recurrent infections, angioedema, anaphylaxis   Endocrine:  negative for weight changes, polyuria, polydipsia and polyphagia  Musculoskeletal:  bi lateral foot ulcers+ cellulitis+ , swelling, decreased range of motion  Integumentary: No rashes, skin lesions  Neurological:  negative for headaches, slurred speech, unilateral weakness  Psychiatric: negative for hallucinations,confusion,agitation.                 PHYSICAL EXAM:      Vitals:    /76   Pulse 58   Temp 97.3 °F (36.3 °C) (Axillary)   Resp 20   Ht 6' 2\" (1.88 m)   Wt 248 lb 14.4 oz (112.9 kg)   SpO2 95%   BMI 31.96 kg/m²   General Appearance: alert,in no acute distress, ++  pallor, no icterus   Skin: warm and dry, no rash or erythema  Head: normocephalic and atraumatic  Eyes: pupils equal, round, and reactive to light, conjunctivae normal  ENT: tympanic membrane, external ear and ear canal normal bilaterally, nose without deformity, nasal mucosa and turbinates normal without polyps  Neck: supple and non-tender without mass, no thyromegaly  no cervical lymphadenopathy  Pulmonary/Chest: clear to auscultation bilaterally- no wheezes, rales or rhonchi, normal air movement, no respiratory distress  Cardiovascular: normal rate, regular rhythm, normal S1 and S2, no murmurs, rubs, clicks, or gallops, no carotid bruits  Abdomen: soft, non-tender, non-distended, normal bowel sounds, no masses or organomegaly  Extremities: no cyanosis, clubbing or edema  Musculoskeletal: normal range of motion, no joint swelling, deformity or tenderness  Integumentary: No rashes, no abnormal skin lesions, no petechiae  Neurologic: reflexes normal and symmetric, no cranial nerve deficit  Psych:  Orientation, sensorium, mood normal            Lines: IV  Rt leg cellulitis,   Bi lateral foot ulcer medial with drainage+ charcots deformity +     Data Review:    CBC:   Lab Results   Component Value Date    WBC 7.5 09/12/2022    HGB 9.3 (L) 09/12/2022    HCT 28.7 (L) 09/12/2022    MCV 74.1 (L) 09/12/2022     09/12/2022     RENAL:   Lab Results   Component Value Date    CREATININE 0.8 09/12/2022    BUN 18 09/12/2022     09/12/2022    K 4.2 09/12/2022     09/12/2022    CO2 20 (L) 09/12/2022     SED RATE:   Lab Results   Component Value Date/Time    SEDRATE 26 09/09/2022 03:45 AM     CK: No results found for: CKTOTAL  CRP:   Lab Results   Component Value Date/Time    CRP 48.8 09/09/2022 03:45 AM     Hepatic Function Panel:   Lab Results   Component Value Date/Time    ALKPHOS 74 09/12/2022 12:09 PM    ALT 25 09/12/2022 12:09 PM    AST 36 09/12/2022 12:09 PM    PROT 6.5 09/12/2022 12:09 PM    BILITOT 0.4 09/12/2022 12:09 PM    LABALBU 3.4 09/12/2022 12:09 PM     UA:  Lab Results   Component Value Date/Time    COLORU Yellow 09/06/2022 03:41 PM    CLARITYU Clear 09/06/2022 03:41 PM    GLUCOSEU Negative 09/06/2022 03:41 PM    BILIRUBINUR Negative 09/06/2022 03:41 PM    KETUA Negative 09/06/2022 03:41 PM    SPECGRAV 1.020 09/06/2022 03:41 PM    BLOODU Negative 09/06/2022 03:41 PM    PHUR 5.5 09/06/2022 03:41 PM    PROTEINU Negative 09/06/2022 03:41 PM    UROBILINOGEN 1.0 09/06/2022 03:41 PM    NITRU Negative 09/06/2022 03:41 PM    LEUKOCYTESUR Negative 09/06/2022 03:41 PM    LABMICR Not Indicated 09/06/2022 03:41 PM    URINETYPE NotGiven 09/06/2022 03:41 PM      Urine Microscopic:   Lab Results   Component Value Date/Time    BACTERIA None Seen 08/15/2022 11:16 AM    HYALCAST 0 08/15/2022 11:16 AM    WBCUA 1 08/15/2022 11:16 AM    RBCUA 2 08/15/2022 11:16 AM    EPIU 1 08/15/2022 11:16 AM     Urine Reflex to Culture:   Lab Results   Component Value Date/Time    URRFLXCULT Not Indicated 09/06/2022 03:41 PM         MICRO: cultures reviewed and updated by me   Blood Culture:   Lab Results   Component Value Date/Time    [facility]  09/08/2022 12:28 PM     No Growth to date. Any change in status will be called. [address]  09/08/2022 12:32 PM     No Growth to date. Any change in status will be called. Respiratory Culture:  Lab Results   Component Value Date/Time    LABGRAM  09/07/2022 03:00 PM     1+ Epithelial Cells  1+ WBC's (Polymorphonuclear)  3+ Gram positive cocci  in clusters-resembling Staph       AFB:No results found for: [facility]  Viral Culture:  Lab Results   Component Value Date/Time    COVID19 Not Detected 09/06/2022 04:58 PM            Culture, Blood 2 [[id number]] Collected: 09/06/22 1828   Order Status: Completed Specimen: Blood Updated: 09/10/22 2015    Culture, Blood 2 No Growth after 4 days of incubation.    Narrative:     ORDER#: [id number]                          ORDERED BY: Tyesha AMIN 35: Blood COLLECTED:  09/06/22 18:28   ANTIBIOTICS AT [facility].:                      RECEIVED :  09/06/22 18:34   If child <=2 yrs old please draw pediatric bottle. ~Blood Culture #2   Culture, Blood 1 [[id number]] Collected: 09/08/22 1228   Order Status: Completed Specimen: Blood Updated: 09/09/22 1715    Blood Culture, Routine No Growth to date. Any change in status will be called. Narrative:     ORDER#: [id number]                          ORDERED BY: Vianey Frazier   SOURCE: Blood                              COLLECTED:  09/08/22 12:28   ANTIBIOTICS AT [facility].:                      RECEIVED :  09/08/22 12:41   If child <=2 yrs old please draw pediatric bottle. ~Blood Culture 1   Culture, Blood 2 [[id number]] Collected: 09/08/22 1232   Order Status: Completed Specimen: Blood Updated: 09/09/22 1715    Culture, Blood 2 No Growth to date. Any change in status will be called. Narrative:       Urine Culture: No results for input(s): Jerez Rashel in the last 72 hours. Procedure Component Value Units Date/Time   Culture, Wound Aerobic Only [[id number]] (Abnormal) Collected: 09/07/22 1500   Order Status: Completed Specimen: Wound from Foot Updated: 09/09/22 0927    Gram Stain Result 1+ Epithelial Cells   1+ WBC's (Polymorphonuclear)   3+ Gram positive cocci  in clusters-resembling Staph    Abnormal     Organism Strep agalactiae (Beta Strep Group B) Abnormal     WOUND/ABSCESS --    Moderate growth   Susceptibility testing of penicillin and other beta lactams is   not necessary for beta hemolytic Streptococci since resistant   strains have not been identified.  (CLSI M100)     Organism Corynebacterium striatum Abnormal     WOUND/ABSCESS --    Heavy growth   No further workup     Organism Proteus mirabilis Abnormal     WOUND/ABSCESS --    Light growth   No further workup    Narrative:     ORDER#: [id number]                          ORDERED BY: BOLIVAR Ba   SOURCE: Foot                               COLLECTED:  09/07/22 15:00 ANTIBIOTICS AT [facility].:                      RECEIVED :  09/07/22 15:08   Culture, Blood 1 [[id number]] (Abnormal) Collected: 09/06/22 1828   Order Status: Completed Specimen: Blood Updated: 09/08/22 1505    Blood Culture, Routine -- Abnormal     Gram stain Anaerobic bottle:   Gram negative rods   Information to follow   Gram stain Aerobic bottle:   Gram positive cocci in clusters   resembling Staphylococcus   Information to follow    Abnormal     Organism Staphylococcus coagulase negative DNA Detected Abnormal     Blood Culture, Routine See additional report for complete BCID panel. Organism Proteus species DNA Detected Abnormal     Blood Culture, Routine See additional report for complete BCID panel. Organism Proteus mirabilis Abnormal     Blood Culture, Routine --    POSITIVE for   Sensitivity to follow   Isolated one of two sets     Organism Staphylococcus coagulase-negative Abnormal     Blood Culture, Routine --    POSITIVE for   This organism was isolated in one set. Susceptibility testing is not routinely done as this   organism frequently represents skin contamination. Additional testing can be ordered by calling the   Microbiology Department. Narrative:     ORDER#: [id number]                          ORDERED BY: Tyesha AMIN 35: Blood                              COLLECTED:  09/06/22 18:28   ANTIBIOTICS AT [facility].:                      RECEIVED :  09/06/22 18:33   CALL  [facility] tel. [phone],   Microbiology results called to and read back by Chilo Woodruff in pharmacy,   09/07/2022 15:32, by [facility]   Microbiology results called to and read back by DANITA Paredes, 09/07/2022   15:31, by [facility]   If child <=2 yrs old please draw pediatric bottle. ~Blood Culture 1   Culture, Blood 1 [[id number]] Collected: 09/08/22 1228   Order Status: Sent Specimen: Blood Updated: 09/08/22 1242   Culture, Blood 2 [[id number]] Collected: 09/08/22 1232   Order Status: Sent Specimen: Blood Updated: 09/08/22 [address] [facility] [address]   Culture, Blood 2 [[id number]] Collected: 09/06/22 1828   Order Status: Completed Specimen: Blood Updated: 09/07/22 2015    Culture, Blood 2 No Growth to date. Any change in status will be called. Narrative:     ORDER#: [id number]                          ORDERED BY: Elvin Neewll   SOURCE: Blood                              COLLECTED:  09/06/22 18:28   ANTIBIOTICS AT [facility].:                      RECEIVED :  09/06/22 18:34   If child <=2 yrs old please draw pediatric bottle. ~Blood Culture #2   MRSA DNA Probe, Nasal [[id number]] Collected: 09/07/22 0600   Order Status: Completed Specimen: Nares Updated: 09/07/22 1712    MRSA SCREEN RT-PCR --    Negative  MRSA DNA not detected.    Normal Range: Not detected    Narrative:     ORDER#: [id number]                          ORDERED BY: Via Zay Vaughn, NI   SOURCE: Nares                              COLLECTED:  09/07/22 06:00   ANTIBIOTICS AT [facility].:                      RECEIVED :  09/07/22 06:14   Culture, Blood, PCR ID Panel [[id number]] Collected: 09/06/22 1828   Order Status: Completed Updated: 09/07/22 1554    Report SEE IMAGE   Narrative:     Felicia Colunga  [id number] tel. [phone],   Microbiology results called to and read back by Torrie rGiffin in pharmacy,   09/07/2022 15:32, by [facility]   Microbiology results called to and read back by DANITA Beard, 09/07/2022   15:31, by [facility]   Respiratory Panel Film Array Report [[id number]] Collected: 09/06/22 2031   Order Status: Completed Updated: 09/06/22 2146    Report SEE IMAGE   Respiratory Panel, Molecular, with COVID-19 (Restricted: peds pts or suitable admitted adults) [[id number]] Collected: 09/06/22 2031   Order Status: Completed Specimen: Nasopharyngeal Updated: 09/06/22 2145    Respiratory Panel PCR --    Respiratory Pathogens Panel PCR Result: Not Detected   See additional report for complete Respiratory Pathogens Panel    Narrative:     ORDER#: [id number]                          ORDERED BY: David Hooker   SOURCE: Nasopharyngeal                     COLLECTED:  09/06/22 20:31   ANTIBIOTICS AT [facility].:                      RECEIVED :  09/06/22 20:38   Rapid Influenza A/B Antigens [[id number]] Collected: 09/06/22 2031   Order Status: Completed Specimen: Nasopharyngeal Updated: 09/06/22 2106    Rapid Influenza A Ag Negative    Rapid Influenza B Ag Negative   COVID-19, Rapid [[id number]] Collected: 09/06/22 1658   Order Status: Completed Specimen: Nasopharyngeal Swab Updated: 09/06/22 1719    SARS-CoV-2, NAAT Not Detected    Comment: Rapid NAAT:   Negative results should be treated as presumptive and,   if inconsistent with clinical signs and symptoms or necessary for   patient management, should be tested with an alternative molecular   assay. Negative results do not preclude SARS-CoV-2 infection and   should not be used as the sole basis for patient management decisions. This test has been authorized by the FDA under an Emergency Use   Authorization (EUA) for use by authorized laboratories. Fact sheet for Healthcare Providers:   Carloz.aly   Fact sheet for Patients: Carloz.aly     METHODOLOGY: Isothermal Nucleic Acid Amplification         IMAGING:    XR CHEST 1 VIEW   Final Result   1. Pacemaker placement without evident complication. 2. Small lung volumes with no confluent airspace consolidation. 3. Borderline cardiomegaly. VL Extremity Venous Bilateral   Final Result      CT CHEST PULMONARY EMBOLISM W CONTRAST   Final Result   1. No CT evidence of a pulmonary embolism. 2. No acute intrapulmonary findings. XR CHEST PORTABLE   Final Result   No radiographic evidence of acute pulmonary disease. CT HEAD WO CONTRAST   Final Result   No acute intracranial abnormality.                All the pertinent images and reports for the current Hospitalization were reviewed by me     Scheduled Meds:   metroNIDAZOLE  500 mg IntraVENous Q8H    aspirin 81 mg Oral Daily    atorvastatin  20 mg Oral Nightly    insulin glargine  10 Units SubCUTAneous Nightly    docusate sodium  100 mg Oral BID    lisinopril  5 mg Oral Daily    tamsulosin  0.4 mg Oral QPM    sodium chloride flush  5-40 mL IntraVENous 2 times per day    enoxaparin  30 mg SubCUTAneous BID    insulin lispro  0-4 Units SubCUTAneous TID WC    insulin lispro  0-4 Units SubCUTAneous Nightly    cefepime  2,000 mg IntraVENous q8h       Continuous Infusions:   sodium chloride 10 mL/hr at 09/12/22 0527    dextrose         PRN Meds:  sodium chloride flush, sodium chloride, polyethylene glycol, acetaminophen **OR** acetaminophen, glucose, dextrose bolus **OR** dextrose bolus, glucagon (rDNA), dextrose, perflutren lipid microspheres, ondansetron        MRI Abd:  8/17/22       Impression   Poorly defined enhancing nodules in the liver suspicious for metastasis in   the absence of clinical signs of infection. .  There is fatty infiltration of   the liver       Suspected stone in the pancreatic duct with pancreatic ductal dilatation. Scattered side-branch IPMNs are suspected within the pancreas.            Assessment:     Patient Active Problem List   Diagnosis    Abdominal pain, acute    BPH (benign prostatic hyperplasia)    RLQ abdominal pain    Abnormal ECG    HTN (hypertension)    Diabetes mellitus type II, uncontrolled (HCC)    Diabetic foot infection ([address]    Fever and chills    Leukocytosis    Charcot foot due to diabetes mellitus (HCC)    Foot ulceration, left, with unspecified severity ([address]    Demand ischemia ([address]    Controlled type 2 diabetes mellitus with hyperglycemia, with long-term current use of insulin (HCC)    Sepsis without acute organ dysfunction (HCC)    Diarrhea    Chronic osteoarthritis    Chest pain    Chronic calcific pancreatitis (HCC)    Atrioventricular block, Mobitz type 1, Wenckebach    Vertigo    Microcytic anemia    Acute calculous cholecystitis    Generalized weakness    SOB (shortness of breath)    Febrile illness    Symptomatic bradycardia    Multiple and open wound of lower limb    Bacterial infection due to Proteus mirabilis    Foot ulcer due to secondary DM (HCC)    Lactic acidosis    Atrial fibrillation with slow ventricular response (HCC)    High-grade atrioventricular block        Fevers chills  resolved  Lactic acidosis resolved  Bi lateral diabetic foot infection  With Medial foot ulcers with drainage+  Sepsis  Anemia from chronic disease  Symptomatic Bradycardia -A  fib  Proteus Bacteremia  Foot wound cx with Proteus and Group B strep  ASX7P at  6.7  Complicated diabetic foot infections     Suspect infected foot with ulcer and drainage as the source for sepsis and will repeat Blood cx to document clearance     Would like to see clinical improvement and bacteremia resolve before PPM placement - will follow  clinical course and clear once better he may be ready by Monday ? Blood cx repeat from  9/8 NGTD     S/p Lead less MICRA ppm implanted    Will be able to choose oral abx  when ready   Labs, Microbiology, Radiology and all the pertinent results from current hospitalization and  care every where were reviewed  by me as a part of the evaluation   Plan: 1. Cont IV Cefepime  x 2 gm q  8 HRS as in patient    2. Change to oral Flagyl x 500 mg q  8 hrs  3. ESR, CRP check   4. Podiatry evaluation noted  5. Blood cx repeat sent 9/8 NGTD  6. S/p PPM lead less MICRA  7. Home going on oral Augmentin x 875 mg twice a day x 14 days       Discussed with patient/Family and Nursing   Risk of Complications/Morbidity: High      Illness(es)/ Infection present that pose threat to bodily function. There is potential for severe exacerbation of infection/side effects of treatment. Therapy requires intensive monitoring for antimicrobial agent toxicity.      Discussed with patient/Family and Nursing staff     Thanks for allowing me to participate in your patient's care and please call me with any questions or concerns.     Dago Drake MD  Infectious Disease  [facility] ([facility]) Physician  Phone: [phone]   Fax : [phone]

## 2022-09-12 NOTE — PROGRESS NOTES
Patient slept well and was easily aroused for routine checks. Patient had a few episodes of bradycardia but was not symptomatic. Pt. Refused turning and repositioning. I educated pt. Patient NPO for pacemaker placement today.

## 2022-09-12 NOTE — CARE COORDINATION
Patient getting pacemaker today. Podiatry & ID following for diabetic foot infections. Watch ID recs for IV antibiotics, note unclear if IVAB will be needed at discharge. Left message for Brisa Cody w/ [facility] to confirm active and notify of need for possible IV antibiotics at discharge. Patient wants to return home, declines SNF, & will need medical transport at discharge. Watch for needs.   Electronically signed by Orlin Pina RN Case Management [phone] on 9/12/2022 at 12:51 PM

## 2022-09-12 NOTE — PROGRESS NOTES
Department of Podiatry Progress Note  Benja SPAINM Attending       CHIEF COMPLAINT:  Wounds to plantar feet    HISTORY OF PRESENT ILLNESS:                The patient is a 74 y.o. male with significant past medical history of acute on chronic wounds to plantar surface of both feet for which he relates home care with Podiatrist from [facility] who visits monthly, who is consulted for  plantar LEFT and RIGHT arch of foot wounds, as well as dystrophic nail debridement    Relates he had his Cardiac procedure, but had bout of emesis    Past Medical History:        Diagnosis Date    Diabetes mellitus ([address]     Gallstones     Lymphedema     Neuropathy     Pancreatitis     PVD (peripheral vascular disease) ([address]     Ulcers of both lower legs ([address]     bilateral feet     Allergies:   Patient has no known allergies.       REVIEW OF SYSTEMS:    CONSTITUTIONAL:  positive for  malaise  INTEGUMENT/BREAST:  positive for skin lesion(s), dryness, skin color change, changes in lesion, changes in hair, and changes in nails  ENDOCRINE:  positive for diabetic symptoms including neither foot ulcerations nor skin dryness nor neuropathy  MUSCULOSKELETAL:  positive for  joint swelling, stiff joints, and decreased range of motion  NEUROLOGICAL:  positive for gait problems, weakness, and numbness  PHYSICAL EXAM:      Vitals:    /64   Pulse 62   Temp 97.4 °F (36.3 °C) (Axillary)   Resp 17   Ht 6' 2\" (1.88 m)   Wt 248 lb 14.4 oz (112.9 kg)   SpO2 98%   BMI 31.96 kg/m²     LABS:   Recent Labs     09/11/22  0641 09/12/22  1209   WBC 6.0 7.5   HGB 9.0* 9.3*   HCT 28.0* 28.7*    199     Recent Labs     09/12/22  1209      K 4.2      CO2 20*   BUN 18   CREATININE 0.8     Recent Labs     09/11/22  0641 09/12/22  1209   PROT 6.3* 6.5       LOWER EXTREMITY EXAMINATION   SKIN:    RIGHT plantar foot ulcer MUGS 3 of 3.0 x 1.9 cm with exposed healthy sub Q of 0.35 cm depth without periwound erythema or calor    LEFT plantar foot ulcer MUGS 3 with 2.4 x 2.2 cm with  Viable sub Q , light pink in color, no signs of infection    Both wounds dressed with SilverCell dressing and further observable granular tissues    Nails RIGHT 1,2,3,4,5 and LEFT 1,2,3,4,5  debrided    NEUROLOGIC:    Pain sensation isdecreased Bilateral.  Light touch is decreasedBilateral. positive history of paresthesia Bilateral. negativehistory of burning Bilateral. Deep tendon reflexes are 1 to include patellar and achilles Bilateral. Orlando--Jose 5.07 monofilament sensitivity is abnormal 4 to 5 spots tested Bilateral.    VASCULAR:    bilaterally Dorsalis pedis is 1. bilaterally Posterior tibial pulse is 1. 2+ edema bilaterally. mild Varicosities bilaterally. MUSCULOSKELETAL:  Castine is  untested due to bilateral plantar mehnaz tulcers . Muscle strength is 4/5 to all groups tested to include dorsiflexion, plantarflexion, inversion, eversion, and digital Bilateral . The ranges of motion of all joints tested from ankle distal is decreased there is no crepitus noted Bilateral.      Wound debridement 9/9/2022    Nails debrided previously      IMPRESSION/RECOMMENDATIONS    1. Chronically open wounds / ulcers to plantar feet secondary to Charcot arthropathy of Diabetes, with neuropathy    Debridement performed as above    Wounds dressed with SilverCell with observable health of granular tissues    2. Diabetes with peripheral neuropathy    3. Onychomycosis of pedal nails for which debridement was performed    Disposition: Cntinued follow up for wound care to both feet, but amenable to OUT-patient care at this point    Thank you for the opportunity to take part in the patient's care.     Chavez Romo [facility]  [facility]  [phone]

## 2022-09-12 NOTE — PROGRESS NOTES
Hospitalist Progress Note    Patient:  Ryan Isabel  Unit/Bed:H4H-0392/5254-01   YOB: 1955       MRN: [id number] Acct: [de-identified]  PCP: Trish Earl MD    Date of Admission: 9/6/2022  --------------------------    Chief Complaint:     Fever, shortness of breath, generalized weakness    Hospital Course:     Ryan Isabel is a 74 y.o. male hospitalized on 9/6/2022     for fever, shortness of breath, unsteadiness and generalized weakness, found to have high-grade AV [place] blockade, polymicrobial bacteremia    Assessment/plan:           Polymicrobial bacteremia/sepsis, POA  Continue current IV antibiotics per ID team recommendation. 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 Continue wound care    ----------------      Subjective:     P patient seen and examined  No complaint today, n.p.o. for the procedure. Diet: Diet NPO Exceptions are: Sips of Water with Meds    OBJECTIVE     Exam:  /76   Pulse 58   Temp 97.3 °F (36.3 °C) (Axillary)   Resp 20   Ht 6' 2\" (1.88 m)   Wt 248 lb 14.4 oz (112.9 kg)   SpO2 95%   BMI 31.96 kg/m²      No changes in physical exam finding as documented below. Gen: Not in distress. Alert. Chronically ill  Head: Normocephalic. Atraumatic. Eyes: Conjunctivae/corneas clear. ENT: Oral mucosa moist  Neck: No JVD. No obvious thyromegaly.   CVS: Nml S1S2, no murmur heart rate varies between 50 and 60, dipped down to the 20s and 30s with sleep  Pulmomary: [address] air entry in both lung gastrointestinal: Soft, non tender, non distend, . Musculoskeletal: Multiple ulceration, desquamation of the lower extremities bilaterally  Neuro: No focal deficit. Moves extremity spontaneously. Psychiatry: Appropriate affect. Not agitated. Medications:  Reviewed    Infusion Medications    sodium chloride 10 mL/hr at 09/12/22 0527    dextrose       Scheduled Medications    metroNIDAZOLE  500 mg IntraVENous Q8H    aspirin  81 mg Oral Daily    atorvastatin  20 mg Oral Nightly    insulin glargine  10 Units SubCUTAneous Nightly    docusate sodium  100 mg Oral BID    lisinopril  5 mg Oral Daily    tamsulosin  0.4 mg Oral QPM    sodium chloride flush  5-40 mL IntraVENous 2 times per day    enoxaparin  30 mg SubCUTAneous BID    insulin lispro  0-4 Units SubCUTAneous TID WC    insulin lispro  0-4 Units SubCUTAneous Nightly    cefepime  2,000 mg IntraVENous q8h     PRN Meds: sodium chloride flush, sodium chloride, polyethylene glycol, acetaminophen **OR** acetaminophen, glucose, dextrose bolus **OR** dextrose bolus, glucagon (rDNA), dextrose, perflutren lipid microspheres, ondansetron      Intake/Output Summary (Last 24 hours) at 9/12/2022 1502  Last data filed at 9/12/2022 3250  Gross per 24 hour   Intake 782.17 ml   Output 1550 ml   Net -767.83 ml             Labs:   Recent Labs     09/10/22  0415 09/11/22  0641 09/12/22  1209   WBC 5.4 6.0 7.5   HGB 8.7* 9.0* 9.3*   HCT 27.1* 28.0* 28.7*    205 199     Recent Labs     09/10/22  0415 09/11/22  0641 09/12/22  1209    135* 139   K 4.3 4.9 4.2    107 106   CO2 21 21 20*   BUN 14 17 18   CREATININE 0.9 0.8 0.8   CALCIUM 8.5 8.6 8.6     Recent Labs     09/10/22  0415 09/11/22  0641 09/12/22  1209   AST 8* 13* 36   ALT 6* 7* 25   BILITOT 0.4 0.4 0.4   ALKPHOS 70 71 74     No results for input(s): INR in the last 72 hours. No results for input(s): [facility] in the last 72 hours.       Urinalysis:      Lab Results   Component Value Date/Time NITRU Negative 09/06/2022 03:41 PM    WBCUA 1 08/15/2022 11:16 AM    BACTERIA None Seen 08/15/2022 11:16 AM    RBCUA 2 08/15/2022 11:16 AM    BLOODU Negative 09/06/2022 03:41 PM    SPECGRAV 1.020 09/06/2022 03:41 PM    GLUCOSEU Negative 09/06/2022 03:41 PM       Radiology:  XR CHEST 1 VIEW   Final Result   1. Pacemaker placement without evident complication. 2. Small lung volumes with no confluent airspace consolidation. 3. Borderline cardiomegaly. VL Extremity Venous Bilateral   Final Result      CT CHEST PULMONARY EMBOLISM W CONTRAST   Final Result   1. No CT evidence of a pulmonary embolism. 2. No acute intrapulmonary findings. XR CHEST PORTABLE   Final Result   No radiographic evidence of acute pulmonary disease. CT HEAD WO CONTRAST   Final Result   No acute intracranial abnormality.                      Electronically signed by Randy Harrell MD on 9/12/2022 at 3:02 PM

## 2022-09-12 NOTE — H&P
H&P Update    I have reviewed the history and physical from yesterday and examined the patient and find no relevant changes. I have reviewed with the patient and/or family the risks, benefits, and alternatives to the procedure. Pre-sedation Assessment    Patient:  Gian Schreiber   :   1955  Intended Procedure: Raulito Souza    Nurses notes reviewed and agreed. Medications reviewed  Allergies: No Known Allergies      Pre-Procedure Assessment/Plan:  ASA 3 - Patient with moderate systemic disease with functional limitations    Level of Sedation Plan: Moderate sedation    Post Procedure plan: Return to same level of care    Des Villarreal MD  Cardiac Electrophysiology  [facility]

## 2022-09-12 NOTE — PLAN OF CARE
Problem: Safety - Adult  Goal: Free from fall injury  Outcome: Progressing   Bed alarm on     Problem: Pain  Goal: Verbalizes/displays adequate comfort level or baseline comfort level  Outcome: Progressing\  No c/o pain this shift

## 2022-09-13 ENCOUNTER — HOSPITAL ENCOUNTER (INPATIENT)
Age: 67
LOS: 8 days | Discharge: ANOTHER HOSPITAL | DRG: 854 | End: 2022-09-21
Attending: EMERGENCY MEDICINE | Admitting: INTERNAL MEDICINE
Payer: MEDICARE

## 2022-09-13 ENCOUNTER — APPOINTMENT (OUTPATIENT)
Dept: CT IMAGING | Age: 67
DRG: 854 | End: 2022-09-13
Payer: MEDICARE

## 2022-09-13 ENCOUNTER — NURSE ONLY (OUTPATIENT)
Dept: CARDIOLOGY CLINIC | Age: 67
End: 2022-09-13
Payer: MEDICARE

## 2022-09-13 ENCOUNTER — APPOINTMENT (OUTPATIENT)
Dept: GENERAL RADIOLOGY | Age: 67
DRG: 854 | End: 2022-09-13
Payer: MEDICARE

## 2022-09-13 VITALS
SYSTOLIC BLOOD PRESSURE: 143 MMHG | BODY MASS INDEX: 31.6 KG/M2 | DIASTOLIC BLOOD PRESSURE: 60 MMHG | WEIGHT: 246.25 LBS | RESPIRATION RATE: 16 BRPM | HEART RATE: 63 BPM | TEMPERATURE: 97.5 F | HEIGHT: 74 IN | OXYGEN SATURATION: 100 %

## 2022-09-13 DIAGNOSIS — R00.1 SYMPTOMATIC BRADYCARDIA: ICD-10-CM

## 2022-09-13 DIAGNOSIS — I44.39 HIGH-GRADE ATRIOVENTRICULAR BLOCK: ICD-10-CM

## 2022-09-13 DIAGNOSIS — Z95.0 PRESENCE OF LEADLESS CARDIAC PACEMAKER: Primary | ICD-10-CM

## 2022-09-13 DIAGNOSIS — I48.91 ATRIAL FIBRILLATION WITH SLOW VENTRICULAR RESPONSE (HCC): ICD-10-CM

## 2022-09-13 DIAGNOSIS — I44.1 ATRIOVENTRICULAR BLOCK, MOBITZ TYPE 1, WENCKEBACH: ICD-10-CM

## 2022-09-13 DIAGNOSIS — R55 SYNCOPE AND COLLAPSE: Primary | ICD-10-CM

## 2022-09-13 DIAGNOSIS — R42 DIZZINESS: ICD-10-CM

## 2022-09-13 LAB
A/G RATIO: 1.1 (ref 1.1–2.2)
ALBUMIN SERPL-MCNC: 3.4 G/DL (ref 3.4–5)
ALBUMIN SERPL-MCNC: 3.9 G/DL (ref 3.4–5)
ALP BLD-CCNC: 73 U/L (ref 40–129)
ALP BLD-CCNC: 82 U/L (ref 40–129)
ALT SERPL-CCNC: 37 U/L (ref 10–40)
ALT SERPL-CCNC: 40 U/L (ref 10–40)
ANION GAP SERPL CALCULATED.3IONS-SCNC: 11 MMOL/L (ref 3–16)
ANION GAP SERPL CALCULATED.3IONS-SCNC: 11 MMOL/L (ref 3–16)
AST SERPL-CCNC: 48 U/L (ref 15–37)
AST SERPL-CCNC: 48 U/L (ref 15–37)
BASOPHILS ABSOLUTE: 0 K/UL (ref 0–0.2)
BASOPHILS ABSOLUTE: 0 K/UL (ref 0–0.2)
BASOPHILS RELATIVE PERCENT: 0.5 %
BASOPHILS RELATIVE PERCENT: 0.6 %
BILIRUB SERPL-MCNC: 0.4 MG/DL (ref 0–1)
BILIRUB SERPL-MCNC: 0.6 MG/DL (ref 0–1)
BILIRUBIN DIRECT: <0.2 MG/DL (ref 0–0.3)
BILIRUBIN, INDIRECT: ABNORMAL MG/DL (ref 0–1)
BUN BLDV-MCNC: 16 MG/DL (ref 7–20)
BUN BLDV-MCNC: 21 MG/DL (ref 7–20)
CALCIUM SERPL-MCNC: 8.8 MG/DL (ref 8.3–10.6)
CALCIUM SERPL-MCNC: 9.1 MG/DL (ref 8.3–10.6)
CHLORIDE BLD-SCNC: 102 MMOL/L (ref 99–110)
CHLORIDE BLD-SCNC: 105 MMOL/L (ref 99–110)
CO2: 22 MMOL/L (ref 21–32)
CO2: 24 MMOL/L (ref 21–32)
CREAT SERPL-MCNC: 0.7 MG/DL (ref 0.8–1.3)
CREAT SERPL-MCNC: 1 MG/DL (ref 0.8–1.3)
EKG ATRIAL RATE: 62 BPM
EKG DIAGNOSIS: NORMAL
EKG P-R INTERVAL: 500 MS
EKG Q-T INTERVAL: 516 MS
EKG QRS DURATION: 138 MS
EKG QTC CALCULATION (BAZETT): 528 MS
EKG R AXIS: -45 DEGREES
EKG T AXIS: 13 DEGREES
EKG VENTRICULAR RATE: 63 BPM
EOSINOPHILS ABSOLUTE: 0.2 K/UL (ref 0–0.6)
EOSINOPHILS ABSOLUTE: 0.2 K/UL (ref 0–0.6)
EOSINOPHILS RELATIVE PERCENT: 2.7 %
EOSINOPHILS RELATIVE PERCENT: 2.7 %
GFR AFRICAN AMERICAN: >60
GFR AFRICAN AMERICAN: >60
GFR NON-AFRICAN AMERICAN: >60
GFR NON-AFRICAN AMERICAN: >60
GLUCOSE BLD-MCNC: 106 MG/DL (ref 70–99)
GLUCOSE BLD-MCNC: 164 MG/DL (ref 70–99)
GLUCOSE BLD-MCNC: 182 MG/DL (ref 70–99)
GLUCOSE BLD-MCNC: 185 MG/DL (ref 70–99)
GLUCOSE BLD-MCNC: 291 MG/DL (ref 70–99)
GLUCOSE BLD-MCNC: 96 MG/DL (ref 70–99)
GLUCOSE BLD-MCNC: >600 MG/DL (ref 70–99)
HCT VFR BLD CALC: 27.4 % (ref 40.5–52.5)
HCT VFR BLD CALC: 29 % (ref 40.5–52.5)
HEMOGLOBIN: 8.8 G/DL (ref 13.5–17.5)
HEMOGLOBIN: 9.2 G/DL (ref 13.5–17.5)
LACTIC ACID: 1.7 MMOL/L (ref 0.4–2)
LIPASE: 15 U/L (ref 13–60)
LYMPHOCYTES ABSOLUTE: 1.3 K/UL (ref 1–5.1)
LYMPHOCYTES ABSOLUTE: 1.5 K/UL (ref 1–5.1)
LYMPHOCYTES RELATIVE PERCENT: 17.6 %
LYMPHOCYTES RELATIVE PERCENT: 18.8 %
MAGNESIUM: 1.9 MG/DL (ref 1.8–2.4)
MCH RBC QN AUTO: 23.4 PG (ref 26–34)
MCH RBC QN AUTO: 23.7 PG (ref 26–34)
MCHC RBC AUTO-ENTMCNC: 31.8 G/DL (ref 31–36)
MCHC RBC AUTO-ENTMCNC: 32.1 G/DL (ref 31–36)
MCV RBC AUTO: 73.8 FL (ref 80–100)
MCV RBC AUTO: 74.1 FL (ref 80–100)
MONOCYTES ABSOLUTE: 0.5 K/UL (ref 0–1.3)
MONOCYTES ABSOLUTE: 0.6 K/UL (ref 0–1.3)
MONOCYTES RELATIVE PERCENT: 6.8 %
MONOCYTES RELATIVE PERCENT: 7.4 %
NEUTROPHILS ABSOLUTE: 5.4 K/UL (ref 1.7–7.7)
NEUTROPHILS ABSOLUTE: 5.6 K/UL (ref 1.7–7.7)
NEUTROPHILS RELATIVE PERCENT: 71.1 %
NEUTROPHILS RELATIVE PERCENT: 71.8 %
PDW BLD-RTO: 18.7 % (ref 12.4–15.4)
PDW BLD-RTO: 19.4 % (ref 12.4–15.4)
PERFORMED ON: ABNORMAL
PLATELET # BLD: 216 K/UL (ref 135–450)
PLATELET # BLD: 222 K/UL (ref 135–450)
PMV BLD AUTO: 7.1 FL (ref 5–10.5)
PMV BLD AUTO: 7.3 FL (ref 5–10.5)
POTASSIUM SERPL-SCNC: 4.5 MMOL/L (ref 3.5–5.1)
POTASSIUM SERPL-SCNC: 4.6 MMOL/L (ref 3.5–5.1)
RBC # BLD: 3.7 M/UL (ref 4.2–5.9)
RBC # BLD: 3.93 M/UL (ref 4.2–5.9)
SODIUM BLD-SCNC: 137 MMOL/L (ref 136–145)
SODIUM BLD-SCNC: 138 MMOL/L (ref 136–145)
TOTAL PROTEIN: 6.4 G/DL (ref 6.4–8.2)
TOTAL PROTEIN: 7.5 G/DL (ref 6.4–8.2)
WBC # BLD: 7.5 K/UL (ref 4–11)
WBC # BLD: 7.9 K/UL (ref 4–11)

## 2022-09-13 PROCEDURE — 36415 COLL VENOUS BLD VENIPUNCTURE: CPT

## 2022-09-13 PROCEDURE — 80048 BASIC METABOLIC PNL TOTAL CA: CPT

## 2022-09-13 PROCEDURE — 2500000003 HC RX 250 WO HCPCS: Performed by: INTERNAL MEDICINE

## 2022-09-13 PROCEDURE — 83690 ASSAY OF LIPASE: CPT

## 2022-09-13 PROCEDURE — 6370000000 HC RX 637 (ALT 250 FOR IP): Performed by: STUDENT IN AN ORGANIZED HEALTH CARE EDUCATION/TRAINING PROGRAM

## 2022-09-13 PROCEDURE — 6360000002 HC RX W HCPCS: Performed by: STUDENT IN AN ORGANIZED HEALTH CARE EDUCATION/TRAINING PROGRAM

## 2022-09-13 PROCEDURE — 83605 ASSAY OF LACTIC ACID: CPT

## 2022-09-13 PROCEDURE — 85025 COMPLETE CBC W/AUTO DIFF WBC: CPT

## 2022-09-13 PROCEDURE — 83735 ASSAY OF MAGNESIUM: CPT

## 2022-09-13 PROCEDURE — 1200000000 HC SEMI PRIVATE

## 2022-09-13 PROCEDURE — 97530 THERAPEUTIC ACTIVITIES: CPT

## 2022-09-13 PROCEDURE — 2580000003 HC RX 258: Performed by: STUDENT IN AN ORGANIZED HEALTH CARE EDUCATION/TRAINING PROGRAM

## 2022-09-13 PROCEDURE — 70450 CT HEAD/BRAIN W/O DYE: CPT

## 2022-09-13 PROCEDURE — 99232 SBSQ HOSP IP/OBS MODERATE 35: CPT | Performed by: INTERNAL MEDICINE

## 2022-09-13 PROCEDURE — 6370000000 HC RX 637 (ALT 250 FOR IP): Performed by: INTERNAL MEDICINE

## 2022-09-13 PROCEDURE — 80053 COMPREHEN METABOLIC PANEL: CPT

## 2022-09-13 PROCEDURE — 2580000003 HC RX 258: Performed by: INTERNAL MEDICINE

## 2022-09-13 PROCEDURE — 93010 ELECTROCARDIOGRAM REPORT: CPT | Performed by: INTERNAL MEDICINE

## 2022-09-13 PROCEDURE — 80076 HEPATIC FUNCTION PANEL: CPT

## 2022-09-13 PROCEDURE — 99285 EMERGENCY DEPT VISIT HI MDM: CPT

## 2022-09-13 PROCEDURE — 99232 SBSQ HOSP IP/OBS MODERATE 35: CPT | Performed by: NURSE PRACTITIONER

## 2022-09-13 PROCEDURE — 71045 X-RAY EXAM CHEST 1 VIEW: CPT

## 2022-09-13 PROCEDURE — 93005 ELECTROCARDIOGRAM TRACING: CPT | Performed by: EMERGENCY MEDICINE

## 2022-09-13 PROCEDURE — 93279 PRGRMG DEV EVAL PM/LDLS PM: CPT | Performed by: INTERNAL MEDICINE

## 2022-09-13 PROCEDURE — 94760 N-INVAS EAR/PLS OXIMETRY 1: CPT

## 2022-09-13 RX ORDER — METRONIDAZOLE 500 MG/1
500 TABLET ORAL EVERY 8 HOURS SCHEDULED
Status: DISCONTINUED | OUTPATIENT
Start: 2022-09-13 | End: 2022-09-13 | Stop reason: HOSPADM

## 2022-09-13 RX ORDER — AMOXICILLIN AND CLAVULANATE POTASSIUM 875; 125 MG/1; MG/1
1 TABLET, FILM COATED ORAL 2 TIMES DAILY
Qty: 28 TABLET | Refills: 0 | Status: ON HOLD | OUTPATIENT
Start: 2022-09-13 | End: 2022-09-20 | Stop reason: HOSPADM

## 2022-09-13 RX ORDER — LISINOPRIL 5 MG/1
5 TABLET ORAL DAILY
Qty: 30 TABLET | Refills: 0 | Status: SHIPPED | OUTPATIENT
Start: 2022-09-14

## 2022-09-13 RX ADMIN — ENOXAPARIN SODIUM 30 MG: 100 INJECTION SUBCUTANEOUS at 10:21

## 2022-09-13 RX ADMIN — LISINOPRIL 5 MG: 5 TABLET ORAL at 10:21

## 2022-09-13 RX ADMIN — METRONIDAZOLE 500 MG: 500 TABLET ORAL at 10:24

## 2022-09-13 RX ADMIN — METRONIDAZOLE 500 MG: 500 INJECTION, SOLUTION INTRAVENOUS at 05:58

## 2022-09-13 RX ADMIN — SODIUM CHLORIDE: 9 INJECTION, SOLUTION INTRAVENOUS at 03:45

## 2022-09-13 RX ADMIN — CEFEPIME 2000 MG: 2 INJECTION, POWDER, FOR SOLUTION INTRAVENOUS at 05:56

## 2022-09-13 RX ADMIN — Medication 10 ML: at 10:24

## 2022-09-13 RX ADMIN — METRONIDAZOLE 500 MG: 500 TABLET ORAL at 14:47

## 2022-09-13 RX ADMIN — SODIUM CHLORIDE: 9 INJECTION, SOLUTION INTRAVENOUS at 05:58

## 2022-09-13 RX ADMIN — ASPIRIN 81 MG: 81 TABLET, COATED ORAL at 10:20

## 2022-09-13 RX ADMIN — DOCUSATE SODIUM 100 MG: 100 CAPSULE, LIQUID FILLED ORAL at 10:20

## 2022-09-13 RX ADMIN — POLYETHYLENE GLYCOL 3350 17 G: 17 POWDER, FOR SOLUTION ORAL at 10:21

## 2022-09-13 RX ADMIN — CEFEPIME 2000 MG: 2 INJECTION, POWDER, FOR SOLUTION INTRAVENOUS at 13:01

## 2022-09-13 ASSESSMENT — ENCOUNTER SYMPTOMS
EYE REDNESS: 0
ABDOMINAL PAIN: 0
SHORTNESS OF BREATH: 0
RHINORRHEA: 0
VOMITING: 0
COUGH: 0
BACK PAIN: 0
SORE THROAT: 0
NAUSEA: 0
DIARRHEA: 0
EYE PAIN: 0
EYE DISCHARGE: 0
WHEEZING: 0

## 2022-09-13 ASSESSMENT — PAIN SCALES - GENERAL
PAINLEVEL_OUTOF10: 2
PAINLEVEL_OUTOF10: 0

## 2022-09-13 ASSESSMENT — PAIN DESCRIPTION - DESCRIPTORS: DESCRIPTORS: DULL;ACHING

## 2022-09-13 ASSESSMENT — PAIN SCALES - WONG BAKER
WONGBAKER_NUMERICALRESPONSE: 0

## 2022-09-13 ASSESSMENT — PAIN DESCRIPTION - LOCATION: LOCATION: HEAD

## 2022-09-13 ASSESSMENT — PAIN - FUNCTIONAL ASSESSMENT: PAIN_FUNCTIONAL_ASSESSMENT: 0-10

## 2022-09-13 NOTE — CARE COORDINATION
CASE MANAGEMENT DISCHARGE SUMMARY:  Met with patient, agreeable to discharge home & resume [facility]. Spoke to Jessica at [facility], she is able to pull orders via Epic. RN aware of dc plan.     DISCHARGE DATE: 9/13/2022    DISCHARGED TO: Home with home care     HOME CARE AGENCY:    Name: Rudi Wolfe  Address:  [address] [place] [address]   Phone:  [phone]  Fax:  [phone]: [employer] [facility]             TIME: 5pm    DME: none     Electronically signed by Krystyna Grimm RN Case Management [phone] on 9/13/2022 at 12:05 PM

## 2022-09-13 NOTE — PROGRESS NOTES
Physical Therapy  Facility/Department: [address] PROGRESSIVE CARE  Daily Treatment Note  NAME: Frandy Hou  : 1955  MRN: [id number]    Date of Service: 2022    Discharge Recommendations:  Patient would benefit from continued therapy after discharge, [address]        Patient Diagnosis(es): The primary encounter diagnosis was Septicemia [facility]). Diagnoses of Vertigo, Cough, Shortness of breath, and Multiple opens wound of lower extremity, unspecified laterality, initial encounter were also pertinent to this visit. Assessment   Assessment: Pt agreeable to OOB activity with encouragement. He reports he disregards his foot precautions (non-compliant per MD notes) at home, and is not interested in any therapy upon D/C. He plans to return directly home. He was lightheaded with standing activity today, ambulating 10' x 2 with walker support. PT does not anticipate pt has the stamina to return home safely at this time, but he declines any additional assist.  Anticipate return home alone. He is not interested in home PT. Frandy Hou scored a 17/24 on the AM-PAC short mobility form. Current research shows that an AM-PAC score of 17 or less is typically not associated with a discharge to the patient's home setting. Based on the patient's AM-PAC score and their current functional mobility deficits, it is recommended that the patient have 3-5 sessions per week of Physical Therapy at d/c to increase the patient's independence. Please see assessment section for further patient specific details. If patient discharges prior to next session this note will serve as a discharge summary. Please see below for the latest assessment towards goals. Activity Tolerance: Patient limited by endurance     Plan    Plan  Plan: 3-5 times per week  Current Treatment Recommendations: Strengthening; Functional mobility training;Transfer training;Gait training; Safety education & training;Patient/Caregiver education & training     Restrictions  Restrictions/Precautions  Restrictions/Precautions: Fall Risk  Position Activity Restriction  Other position/activity restrictions: *Per cardiology, no AROM restrictions for LUE - pt to avoid heavy lifting s/p catheterization. Orthostatics : Supine : 131/75 (93). EOB : 114/71/(85). Stand : 105/57 (79). H/O Chronic B Diabetic Foot Ulcers, request any wgt bear restrictions although ongoing noncompliance prior admit. Subjective    Subjective  Subjective: Pt resting in bed. Agreeable to activity with encouragement. Objective     Bed Mobility Training  Bed Mobility Training: Yes  Supine to Sit: Stand-by assistance  Sit to Supine: Stand-by assistance  Scooting: Stand-by assistance    Balance  Sitting: With support  Standing: With support  Transfer Training  Transfer Training: Yes  Overall Level of Assistance: Contact-guard assistance  Sit to Stand: Contact-guard assistance  Stand to Sit: Contact-guard assistance    Gait Training  Gait Training: Yes  Gait  Overall Level of Assistance: Contact-guard assistance  Base of Support: Narrowed  Step Length: Right shortened;Left shortened  Gait Abnormalities: Shuffling gait  Distance (ft): 10 Feet (3', 10' x 2; limited d/t fatigue and lightheadedness.)  Assistive Device: Walker, rolling     Safety Devices  Type of Devices: All fall risk precautions in place;Call light within reach; Bed alarm in place;Nurse notified; Left in bed  Restraints  Restraints Initially in Place: No       Goals  Short Term Goals  Time Frame for Short term goals: Upon d/c acute care setting. Short term goal 1: Bed Mob Independent. Short term goal 2: Transfers with assist device Supervision. Short term goal 3: Amb with assist device 25-50' SBA. Patient Goals   Patient goals : Go home.     Education  Patient Education  Education Given To: Patient  Education Provided Comments: Role of PT, POC, Need to call for assist.; Benefit of

## 2022-09-13 NOTE — PROGRESS NOTES
Infectious Disease Follow up Notes  Admit Date: 9/6/2022  Hospital Day: 8    Antibiotics :   IV Cefepime  Oral  Flagyl     CHIEF COMPLAINT:     Sepsis  Proteus bacteremia  Diabetic foot infection  A FIB  Foot ulcers     Subjective interval History :  74 y. o.man with Recurrent Diabetic foot infections, Charcot foot, Medial foot ulcer on both feet with on going drainage, cellulitis admitted with fevers, chills and dizziness. Blood cx from admit with Proteus isolated and Foot wound cx with Proteus Group B strep - Tmax  102.6, Lactic acid at 2 - noted to be in A fib with low HR and xiao cardia seen by Cardiology EP for possible need for PPM placement. WBC   7.7, HB AT 7.8, and we are consulted for recommendations. He has visiting RN and home care Nurse to help with dressings per patient.          Interval History : s/p MRI compatible Lead less pacemaker and c/o foot swelling  blood cx follow up negative no chills no fevers wound cx noted      Past Medical History:    Past Medical History:   Diagnosis Date    Diabetes mellitus ([address]     Gallstones     Lymphedema     Neuropathy     Pancreatitis     PVD (peripheral vascular disease) ([address]     Ulcers of both lower legs ([address]     bilateral feet       Past Surgical History:    Past Surgical History:   Procedure Laterality Date    CHOLECYSTECTOMY, LAPAROSCOPIC N/A 8/10/2021    LAPAROSCOPIC CHOLECYSTECTOMY WITH CHOLANGIOGRAM performed by No Jordan MD at [place] [address]    ERCP  4/8/14    LITHOTRYPSY & PANCREATIC STENT PLACEMENT    FOOT SURGERY Left 1967       Current Medications:    Outpatient Medications Marked as Taking for the 9/6/22 encounter [facility] HOSPITAL Encounter)   Medication Sig Dispense Refill    [START ON 9/14/2022] lisinopril (PRINIVIL;ZESTRIL) 5 MG tablet Take 1 tablet by mouth daily 30 tablet 0    amoxicillin-clavulanate (AUGMENTIN) 875-125 MG per tablet Take 1 tablet by mouth 2 times daily for 14 days 28 tablet 0    acetaminophen (TYLENOL) 500 MG tablet Take 500 mg by mouth every 6 hours as needed for Pain      aspirin 81 MG EC tablet Take 81 mg by mouth daily      lactobacillus (CULTURELLE) CAPS capsule Take 1 capsule by mouth daily         Allergies:  Patient has no known allergies. Immunizations :   Immunization History   Administered Date(s) Administered    COVID-19, J&J, (age 18y+), IM, 0.5 mL 03/19/2021       Social History:     Social History     Tobacco Use    Smoking status: Never    Smokeless tobacco: Never   Vaping Use    Vaping Use: Never used   Substance Use Topics    Alcohol use: No    Drug use: No     Social History     Tobacco Use   Smoking Status Never   Smokeless Tobacco Never      Family History   Problem Relation Age of Onset    Colon Cancer Mother         PVD s/p toe amputation by Dr Sofie Farley Father           REVIEW OF SYSTEMS:      Constitutional:    fevers+ , chills, night sweats  Eyes:  negative for blurred vision, eye discharge, visual disturbance   HEENT:  negative for hearing loss, ear drainage,nasal congestion  Respiratory:  negative for cough, shortness of breath or hemoptysis   Cardiovascular:  negative for chest pain, palpitations, syncope  Gastrointestinal:  negative for nausea, vomiting, diarrhea, constipation, abdominal pain  Genitourinary:  negative for frequency, dysuria, urinary incontinence, hematuria  Hematologic/Lymphatic:  negative for easy bruising, bleeding and lymphadenopathy  Allergic/Immunologic:  negative for recurrent infections, angioedema, anaphylaxis   Endocrine:  negative for weight changes, polyuria, polydipsia and polyphagia  Musculoskeletal:  bi lateral foot ulcers+ cellulitis+ , swelling, decreased range of motion  Integumentary: No rashes, skin lesions  Neurological:  negative for headaches, slurred speech, unilateral weakness  Psychiatric: negative for hallucinations,confusion,agitation.                 PHYSICAL EXAM: Vitals:    /65   Pulse 61   Temp 97.5 °F (36.4 °C) (Oral)   Resp 13   Ht 6' 2\" (1.88 m)   Wt 246 lb 4.1 oz (111.7 kg)   SpO2 100%   BMI 31.62 kg/m²   General Appearance: alert,in no acute distress, ++  pallor, no icterus   Skin: warm and dry, no rash or erythema  Head: normocephalic and atraumatic  Eyes: pupils equal, round, and reactive to light, conjunctivae normal  ENT: tympanic membrane, external ear and ear canal normal bilaterally, nose without deformity, nasal mucosa and turbinates normal without polyps  Neck: supple and non-tender without mass, no thyromegaly  no cervical lymphadenopathy  Pulmonary/Chest: clear to auscultation bilaterally- no wheezes, rales or rhonchi, normal air movement, no respiratory distress  Cardiovascular: normal rate, regular rhythm, normal S1 and S2, no murmurs, rubs, clicks, or gallops, no carotid bruits  Abdomen: soft, non-tender, non-distended, normal bowel sounds, no masses or organomegaly  Extremities: no cyanosis, clubbing or edema  Musculoskeletal: normal range of motion, no joint swelling, deformity or tenderness  Integumentary: No rashes, no abnormal skin lesions, no petechiae  Neurologic: reflexes normal and symmetric, no cranial nerve deficit  Psych:  Orientation, sensorium, mood normal            Lines: IV  Rt leg cellulitis,   Bi lateral foot ulcer medial with drainage+ charcots deformity +     Data Review:    CBC:   Lab Results   Component Value Date    WBC 7.9 09/13/2022    HGB 8.8 (L) 09/13/2022    HCT 27.4 (L) 09/13/2022    MCV 74.1 (L) 09/13/2022     09/13/2022     RENAL:   Lab Results   Component Value Date    CREATININE 0.7 (L) 09/13/2022    BUN 16 09/13/2022     09/13/2022    K 4.6 09/13/2022     09/13/2022    CO2 22 09/13/2022     SED RATE:   Lab Results   Component Value Date/Time    SEDRATE 26 09/09/2022 03:45 AM     CK: No results found for: CKTOTAL  CRP:   Lab Results   Component Value Date/Time    CRP 48.8 09/09/2022 09/10/22 2015    Culture, Blood 2 No Growth after 4 days of incubation. Narrative:     ORDER#: [id number]                          ORDERED BY: Elvin Newell   SOURCE: Blood                              COLLECTED:  09/06/22 18:28   ANTIBIOTICS AT [facility].:                      RECEIVED :  09/06/22 18:34   If child <=2 yrs old please draw pediatric bottle. ~Blood Culture #2   Culture, Blood 1 [[id number]] Collected: 09/08/22 1228   Order Status: Completed Specimen: Blood Updated: 09/09/22 1715    Blood Culture, Routine No Growth to date. Any change in status will be called. Narrative:     ORDER#: [id number]                          ORDERED BY: Luc Banda   SOURCE: Blood                              COLLECTED:  09/08/22 12:28   ANTIBIOTICS AT [facility].:                      RECEIVED :  09/08/22 12:41   If child <=2 yrs old please draw pediatric bottle. ~Blood Culture 1   Culture, Blood 2 [[id number]] Collected: 09/08/22 1232   Order Status: Completed Specimen: Blood Updated: 09/09/22 1715    Culture, Blood 2 No Growth to date. Any change in status will be called. Narrative:       Urine Culture: No results for input(s): [facility] in the last 72 hours. Procedure Component Value Units Date/Time   Culture, Wound Aerobic Only [[id number]] (Abnormal) Collected: 09/07/22 1500   Order Status: Completed Specimen: Wound from Foot Updated: 09/09/22 0927    Gram Stain Result 1+ Epithelial Cells   1+ WBC's (Polymorphonuclear)   3+ Gram positive cocci  in clusters-resembling Staph    Abnormal     Organism Strep agalactiae (Beta Strep Group B) Abnormal     WOUND/ABSCESS --    Moderate growth   Susceptibility testing of penicillin and other beta lactams is   not necessary for beta hemolytic Streptococci since resistant   strains have not been identified.  (CLSI M100)     Organism Corynebacterium striatum Abnormal     WOUND/ABSCESS --    Heavy growth   No further workup     Organism Proteus mirabilis Abnormal     WOUND/ABSCESS -- Light growth   No further workup    Narrative:     ORDER#: [id number]                          ORDERED BY: BOLIVAR Ba   SOURCE: Foot                               COLLECTED:  09/07/22 15:00   ANTIBIOTICS AT [facility].:                      RECEIVED :  09/07/22 15:08   Culture, Blood 1 [[id number]] (Abnormal) Collected: 09/06/22 1828   Order Status: Completed Specimen: Blood Updated: 09/08/22 1505    Blood Culture, Routine -- Abnormal     Gram stain Anaerobic bottle:   Gram negative rods   Information to follow   Gram stain Aerobic bottle:   Gram positive cocci in clusters   resembling Staphylococcus   Information to follow    Abnormal     Organism Staphylococcus coagulase negative DNA Detected Abnormal     Blood Culture, Routine See additional report for complete BCID panel. Organism Proteus species DNA Detected Abnormal     Blood Culture, Routine See additional report for complete BCID panel. Organism Proteus mirabilis Abnormal     Blood Culture, Routine --    POSITIVE for   Sensitivity to follow   Isolated one of two sets     Organism Staphylococcus coagulase-negative Abnormal     Blood Culture, Routine --    POSITIVE for   This organism was isolated in one set. Susceptibility testing is not routinely done as this   organism frequently represents skin contamination. Additional testing can be ordered by calling the   Microbiology Department. Narrative:     ORDER#: [id number]                          ORDERED BY: Tyesha AMIN 35: Blood                              COLLECTED:  09/06/22 18:28   ANTIBIOTICS AT [facility].:                      RECEIVED :  09/06/22 18:33   CALL  [facility] tel. [phone],   Microbiology results called to and read back by Payal Sanchez in pharmacy,   09/07/2022 15:32, by [facility]   Microbiology results called to and read back by DANITA Bhatia, 09/07/2022   15:31, by [facility]   If child <=2 yrs old please draw pediatric bottle. ~Blood Culture 1   Culture, Blood 1 [[id number]] Collected: 09/08/22 1228   Order Status: Sent Specimen: Blood Updated: 09/08/22 1242   Culture, Blood 2 [[id number]] Collected: 09/08/22 1232   Order Status: Sent Specimen: Blood Updated: 09/08/22 1242   Culture, Blood 2 [[id number]] Collected: 09/06/22 1828   Order Status: Completed Specimen: Blood Updated: 09/07/22 2015    Culture, Blood 2 No Growth to date. Any change in status will be called. Narrative:     ORDER#: [id number]                          ORDERED BY: Cullen Cevallos   SOURCE: Blood                              COLLECTED:  09/06/22 18:28   ANTIBIOTICS AT [facility].:                      RECEIVED :  09/06/22 18:34   If child <=2 yrs old please draw pediatric bottle. ~Blood Culture #2   MRSA DNA Probe, Nasal [[id number]] Collected: 09/07/22 0600   Order Status: Completed Specimen: Nares Updated: 09/07/22 1712    MRSA SCREEN RT-PCR --    Negative  MRSA DNA not detected.    Normal Range: Not detected    Narrative:     ORDER#: [id number]                          ORDERED BY: Susu Berger   SOURCE: Nares                              COLLECTED:  09/07/22 06:00   ANTIBIOTICS AT [facility].:                      RECEIVED :  09/07/22 06:14   Culture, Blood, PCR ID Panel [[id number]] Collected: 09/06/22 1828   Order Status: Completed Updated: 09/07/22 1554    Report SEE IMAGE   Narrative:     Kelli Oliveros tel. [phone],   Microbiology results called to and read back by Aldo Corbin in pharmacy,   09/07/2022 15:32, by [facility]   Microbiology results called to and read back by DANITA Mcdonnell, 09/07/2022   15:31, by [facility]   Respiratory Panel Film Array Report [[id number]] Collected: 09/06/22 2031   Order Status: Completed Updated: 09/06/22 2146    Report SEE IMAGE   Respiratory Panel, Molecular, with COVID-19 (Restricted: peds pts or suitable admitted adults) [[id number]] Collected: 09/06/22 2031   Order Status: Completed Specimen: Nasopharyngeal Updated: 09/06/22 2145    Respiratory Panel PCR -- Respiratory Pathogens Panel PCR Result: Not Detected   See additional report for complete Respiratory Pathogens Panel    Narrative:     ORDER#: [id number]                          ORDERED BY: Demetrius Ward   SOURCE: Nasopharyngeal                     COLLECTED:  09/06/22 20:31   ANTIBIOTICS AT [facility].:                      RECEIVED :  09/06/22 20:38   Rapid Influenza A/B Antigens [[id number]] Collected: 09/06/22 2031   Order Status: Completed Specimen: Nasopharyngeal Updated: 09/06/22 2106    Rapid Influenza A Ag Negative    Rapid Influenza B Ag Negative   COVID-19, Rapid [[id number]] Collected: 09/06/22 1658   Order Status: Completed Specimen: Nasopharyngeal Swab Updated: 09/06/22 1719    SARS-CoV-2, NAAT Not Detected    Comment: Rapid NAAT:   Negative results should be treated as presumptive and,   if inconsistent with clinical signs and symptoms or necessary for   patient management, should be tested with an alternative molecular   assay. Negative results do not preclude SARS-CoV-2 infection and   should not be used as the sole basis for patient management decisions. This test has been authorized by the FDA under an Emergency Use   Authorization (EUA) for use by authorized laboratories. Fact sheet for Healthcare Providers:   [employer]es   Fact sheet for Patients: BuildHer.es     METHODOLOGY: Isothermal Nucleic Acid Amplification         IMAGING:    XR CHEST 1 VIEW   Final Result   1. Pacemaker placement without evident complication. 2. Small lung volumes with no confluent airspace consolidation. 3. Borderline cardiomegaly. VL Extremity Venous Bilateral   Final Result      CT CHEST PULMONARY EMBOLISM W CONTRAST   Final Result   1. No CT evidence of a pulmonary embolism. 2. No acute intrapulmonary findings. XR CHEST PORTABLE   Final Result   No radiographic evidence of acute pulmonary disease.          CT HEAD WO CONTRAST   Final Result   No acute intracranial abnormality. All the pertinent images and reports for the current Hospitalization were reviewed by me     Scheduled Meds:   metroNIDAZOLE  500 mg Oral 3 times per day    sodium chloride flush  5-40 mL IntraVENous 2 times per day    aspirin  81 mg Oral Daily    atorvastatin  20 mg Oral Nightly    insulin glargine  10 Units SubCUTAneous Nightly    docusate sodium  100 mg Oral BID    lisinopril  5 mg Oral Daily    tamsulosin  0.4 mg Oral QPM    sodium chloride flush  5-40 mL IntraVENous 2 times per day    enoxaparin  30 mg SubCUTAneous BID    insulin lispro  0-4 Units SubCUTAneous TID WC    insulin lispro  0-4 Units SubCUTAneous Nightly    cefepime  2,000 mg IntraVENous q8h       Continuous Infusions:   sodium chloride Stopped (09/13/22 0556)    sodium chloride 5 mL/hr at 09/13/22 0558    dextrose         PRN Meds:  sodium chloride flush, sodium chloride, acetaminophen, sodium chloride flush, sodium chloride, polyethylene glycol, acetaminophen **OR** acetaminophen, glucose, dextrose bolus **OR** dextrose bolus, glucagon (rDNA), dextrose, perflutren lipid microspheres, ondansetron        MRI Abd:  8/17/22       Impression   Poorly defined enhancing nodules in the liver suspicious for metastasis in   the absence of clinical signs of infection. .  There is fatty infiltration of   the liver       Suspected stone in the pancreatic duct with pancreatic ductal dilatation. Scattered side-branch IPMNs are suspected within the pancreas.            Assessment:     Patient Active Problem List   Diagnosis    Abdominal pain, acute    BPH (benign prostatic hyperplasia)    RLQ abdominal pain    Abnormal ECG    HTN (hypertension)    Diabetes mellitus type II, uncontrolled (HCC)    Diabetic foot infection ([address]    Fever and chills    Leukocytosis    Charcot foot due to diabetes mellitus (HCC)    Foot ulceration, left, with unspecified severity ([address]    Demand ischemia ([address]    Controlled type Thanks for allowing me to participate in your patient's care and please call me with any questions or concerns.     Felisa Turcios MD  Infectious Disease  [facility]) Physician  Phone: [phone]   Fax : [phone]

## 2022-09-13 NOTE — PROGRESS NOTES
Patient's lunch time blood glucose noted to be >600. Recheck of blood glucose noted to be 185. Patient A/O, asymptomatic.

## 2022-09-13 NOTE — DISCHARGE INSTR - COC
Continuity of Care Form    Patient Name: Marcelo Haas   :  1955  MRN:  [id number]    Admit date:  2022  Discharge date:  ***    Code Status Order: Full Code   Advance Directives:     Admitting Physician:  Brooke Gonzalez DO  PCP: Erlinda Cartagena MD    Discharging Nurse: [facility] Unit/Room#: Z9F-2671/1198-61  Discharging Unit Phone Number: ***    Emergency Contact:   Extended Emergency Contact Information  Primary Emergency Contact: eneida finch  Home Phone: [phone]  Mobile Phone: [phone]  Relation: Other  Preferred language: English    Past Surgical History:  Past Surgical History:   Procedure Laterality Date    CHOLECYSTECTOMY, LAPAROSCOPIC N/A 8/10/2021    LAPAROSCOPIC CHOLECYSTECTOMY WITH CHOLANGIOGRAM performed by Henrik Heller MD at [place] [address]    ERCP  14    LITHOTRYPSY & PANCREATIC STENT PLACEMENT    FOOT SURGERY Left        Immunization History:   Immunization History   Administered Date(s) Administered    COVID-19, J&J, (age 18y+), IM, 0.5 mL 2021       Active Problems:  Patient Active Problem List   Diagnosis Code    Abdominal pain, acute R10.9    BPH (benign prostatic hyperplasia) N40.0    RLQ abdominal pain R10.31    Abnormal ECG R94.31    HTN (hypertension) I10    Diabetes mellitus type II, uncontrolled ([facility] E11.65    Diabetic foot infection ([facility] E11.628, L08.9    Fever and chills R50.9    Leukocytosis D72.829    Charcot foot due to diabetes mellitus ([facility] E11.610    Foot ulceration, left, with unspecified severity ([facility] L97.529    Demand ischemia (HCC) I24.8    Controlled type 2 diabetes mellitus with hyperglycemia, with long-term current use of insulin (HCC) E11.65, Z79.4    Septicemia (HCC) A41.9    Diarrhea R19.7    Chronic osteoarthritis M19.90    Chest pain R07.9    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    Atrioventricular block, Mobitz type 1, Wenckebach I44.1    Vertigo R42    Microcytic anemia D50.9    Acute calculous cholecystitis K80.00    Generalized weakness R53.1    SOB (shortness of breath) R06.02    Febrile illness R50.9    Symptomatic bradycardia R00.1    Multiple and open wound of lower limb S81.809A    Bacterial infection due to Proteus mirabilis A49.8    Foot ulcer due to secondary DM (HCC) E13.621, L97.509    Lactic acidosis E87.2    Atrial fibrillation with slow ventricular response (HCC) I48.91    High-grade atrioventricular block I44.39    [employer] MICRA AV Z95.0       Isolation/Infection:   Isolation            No Isolation          Patient Infection Status       Infection Onset Added Last Indicated Last Indicated By Review Planned Expiration Resolved Resolved By    None active    Resolved    COVID-19 (Rule Out) 09/06/22 09/06/22 09/06/22 Respiratory Panel, Molecular, with COVID-19 (Restricted: peds pts or suitable admitted adults) (Ordered)   09/06/22 Rule-Out Test Resulted    COVID-19 (Rule Out) 09/06/22 09/06/22 09/06/22 COVID-19, Rapid (Ordered)   09/06/22 Rule-Out Test Resulted    C-diff Rule Out 08/15/22 08/15/22 08/15/22 Gastrointestinal Panel by DNA (Ordered)   08/22/22 Danny Wilson RN    COVID-19 (Rule Out) 08/15/22 08/15/22 08/15/22 COVID-19, Rapid (Ordered)   08/15/22 Rule-Out Test Resulted    COVID-19 (Rule Out) 08/08/21 08/08/21 08/08/21 COVID-19, Rapid (Ordered)   08/08/21 Rule-Out Test Resulted    COVID-19 (Rule Out) 11/26/20 11/26/20 11/26/20 COVID-19 (Ordered)   11/26/20 Rule-Out Test Resulted    C-diff Rule Out 07/31/20 07/31/20 07/31/20 Clostridium Difficile Toxin/Antigen (Ordered)   07/31/20 Rule-Out Test Resulted    C-diff Rule Out 07/31/20 07/31/20 07/31/20 Clostridium Difficile Toxin/Antigen (Ordered)   07/31/20 Rule-Out Test Resulted    COVID-19 (Rule Out) 06/06/20 06/06/20 06/06/20 COVID-19 (Ordered)   06/06/20 Rule-Out Test Resulted            Nurse Assessment:  Last Vital Signs: /65   Pulse 61   Temp 97.5 °F (36.4 °C) (Oral)   Resp 13   Ht 6' 2\" (1.88 m)   Wt 246 lb 4.1 oz (111.7 kg)   SpO2 100%   BMI 31.62 kg/m²     Last documented pain score (0-10 scale): Pain Level: 0  Last Weight:   Wt Readings from Last 1 Encounters:   22 246 lb 4.1 oz (111.7 kg)     Mental Status:  {IP PT MENTAL STATUS:}    IV Access:  { MAIKOL IV ACCESS:730225996}    Nursing Mobility/ADLs:  Walking   {CHP DME RTP}  Transfer  {CHP DME WWRR:250366913}  Bathing  {CHP DME MLEZ:117288751}  Dressing  {CHP DME CIUD:436282722}  Toileting  {CHP DME ABIT:325346062}  Feeding  {CHP DME DWRF:797408389}  Med Admin  {CHP DME DKKX:388958626}  Med Delivery   { MAIKOL MED Delivery:487349146}    Wound Care Documentation and Therapy:  Incision 14 Toe (Comment  which one) Right (Active)   Number of days: 3084       Wound 20 Heel Left (Active)   Number of days: 085       Wound 20 Foot Left;Plantar (Active)   Number of days: 640       Wound 21 Foot Right (Active)   Number of days: 400       Wound 08/15/22 Foot Right;Plantar (Active)   Wound Image   22 1456   Wound Etiology Diabetic 22 0808   Dressing Status Clean;Dry; Intact 22 0808   Wound Cleansed Cleansed with saline 22 1456   Dressing/Treatment Hydrofiber Ag;Silicone border 90/76/92 0808   Dressing Change Due 22 0810   Wound Length (cm) 2 cm 22 1232   Wound Width (cm) 1 cm 22 1232   Wound Depth (cm) 0.1 cm 22 1232   Wound Surface Area (cm^2) 2 cm^2 22 1232   Change in Wound Size % (l*w) 86.67 22 1232   Wound Volume (cm^3) 0.2 cm^3 22 1232   Wound Healing % 97 22 1232   Wound Assessment Other (Comment) 22 0808   Drainage Amount None 22 0808   Drainage Description Purulent;Serosanguinous 22 1456   Odor None 22 0808   Tiffanie-wound Assessment Hyperkeratosis (callous) 22 0808   Margins Undefined edges 22 0808   Number of days: 29       Wound 08/15/22 Foot Left;Plantar (Active)   Wound Image   22 1456   Wound Etiology Diabetic 09/13/22 0808   Dressing Status Clean;Dry; Intact 09/13/22 0808   Wound Cleansed Not Cleansed 08/22/22 0810   Dressing/Treatment Hydrofiber Ag;Silicone border 06/89/51 0808   Dressing Change Due 08/23/22 08/22/22 0810   Wound Length (cm) 3 cm 08/17/22 1232   Wound Width (cm) 2 cm 08/17/22 1232   Wound Depth (cm) 0.3 cm 08/17/22 1232   Wound Surface Area (cm^2) 6 cm^2 08/17/22 1232   Change in Wound Size % (l*w) 0 08/17/22 1232   Wound Volume (cm^3) 1.8 cm^3 08/17/22 1232   Wound Healing % 0 08/17/22 1232   Wound Assessment Other (Comment) 09/13/22 0808   Drainage Amount Scant 08/18/22 0905   Drainage Description Serosanguinous 08/18/22 0905   Odor Mild 08/18/22 0905   Tiffanie-wound Assessment Hyperkeratosis (callous) 09/13/22 0808   Margins Defined edges 09/07/22 1456   Wound Thickness Description not for Pressure Injury Full thickness 09/07/22 1456   Number of days: 29       Incision 09/12/22 Groin Anterior;Right (Active)   Dressing Status Clean;Dry; Intact 09/13/22 0808   Drainage Amount None 09/13/22 0808   Tiffanie-incision Assessment Intact; Other (Comment) 09/13/22 0665   Number of days: 1       Incision 08/10/21 Abdomen Medial (Active)   Number of days: 398        Elimination:  Continence: Bowel: {YES / WF:90549}  Bladder: {YES / LA:85027}  Urinary Catheter: {Urinary Catheter:972557156}   Colostomy/Ileostomy/Ileal Conduit: {YES / UR:55578}       Date of Last BM: ***    Intake/Output Summary (Last 24 hours) at 9/13/2022 1108  Last data filed at 9/13/2022 0740  Gross per 24 hour   Intake 430.04 ml   Output 650 ml   Net -219.96 ml     I/O last 3 completed shifts:   In: 80 [I.V.:810]  Out: 2050 [Urine:2050]    Safety Concerns:     [address] Concerns:263976860}    Impairments/Disabilities:      [address] Impairments/Disabilities:041355815}    Nutrition Therapy:  Current Nutrition Therapy:   [address] Diet List:758194562}    Routes of Feeding: {CHP DME Other Feedings:117211069}  Liquids: {Slp liquid thickness:24759}  Daily Fluid Restriction: {CHP DME Yes amt example:137509382}  Last Modified Barium Swallow with Video (Video Swallowing Test): {Done Not Done MEWB:166422618}    Treatments at the Time of Hospital Discharge:   Respiratory Treatments: ***  Oxygen Therapy:  {Therapy; copd oxygen:87068}  Ventilator:    {MH CC Vent YDN}    Rehab Therapies: {THERAPEUTIC INTERVENTION:4789008914}  Weight Bearing Status/Restrictions: { CC Weight Bearin}  Other Medical Equipment (for information only, NOT a DME order):  {EQUIPMENT:856555238}  Other Treatments: ***    Patient's personal belongings (please select all that are sent with patient):  {CHP DME Belongings:804067163}    RN SIGNATURE:  {Esignature:061445601}    CASE MANAGEMENT/SOCIAL WORK SECTION    Inpatient Status Date: ***    Readmission Risk Assessment Score:  Readmission Risk              Risk of Unplanned Readmission:  18           Discharging to Facility/ Agency   Name:   Address:  Phone:  Fax:    Dialysis Facility (if applicable)   Name:  Address:  Dialysis Schedule:  Phone:  Fax:    / signature: {Esignature:309498077}    PHYSICIAN SECTION    Prognosis: Good    Condition at Discharge: Stable    Rehab Potential (if transferring to Rehab): Good    Recommended Labs or Other Treatments After Discharge: home care    Physician Certification: I certify the above information and transfer of Carmen Cline  is necessary for the continuing treatment of the diagnosis listed and that he requires Home Care for greater 30 days.      Update Admission H&P: No change in H&P    PHYSICIAN SIGNATURE:  Electronically signed by Valentina Greer MD on 22 at 11:08 AM EDT

## 2022-09-13 NOTE — PLAN OF CARE
Problem: Discharge Planning  Goal: Discharge to home or other facility with appropriate resources  Outcome: Completed     Problem: Skin/Tissue Integrity  Goal: Absence of new skin breakdown  Description: 1. Monitor for areas of redness and/or skin breakdown  2. Assess vascular access sites hourly  3. Every 4-6 hours minimum:  Change oxygen saturation probe site  4. Every 4-6 hours:  If on nasal continuous positive airway pressure, respiratory therapy assess nares and determine need for appliance change or resting period.   Outcome: Completed     Problem: Safety - Adult  Goal: Free from fall injury  Outcome: Completed  Flowsheets (Taken 9/13/2022 2327)  Free From Fall Injury: Instruct family/caregiver on patient safety     Problem: ABCDS Injury Assessment  Goal: Absence of physical injury  Outcome: Completed  Flowsheets (Taken 9/13/2022 9127)  Absence of Physical Injury: Implement safety measures based on patient assessment     Problem: Chronic Conditions and Co-morbidities  Goal: Patient's chronic conditions and co-morbidity symptoms are monitored and maintained or improved  Outcome: Completed     Problem: Nutrition Deficit:  Goal: Optimize nutritional status  Outcome: Completed     Problem: Cardiovascular - Adult  Goal: Absence of cardiac dysrhythmias or at baseline  Outcome: Completed     Problem: Pain  Goal: Verbalizes/displays adequate comfort level or baseline comfort level  Outcome: Completed

## 2022-09-13 NOTE — PROGRESS NOTES
Discharge orders acknowledged by RN . Discharge teaching completed with pt and family. AVS reviewed and all questions answered. Medication regimen reviewed and pt understands schedule. Follow up appointments also reviewed with pt and resources given for discharge. Pt was sent electronic to be filled and understands schedule. IV removed. Bedside monitor removed from pt. 60+ minutes of education completed. Required core measures completed. Pt vitals WDL. Pt discharged with all belongings to home with EMS. Pt transported off of unit via wheelchair. No complications.

## 2022-09-13 NOTE — PROGRESS NOTES
Department of Podiatry Progress Note  [facility]  9/13/2022      CHIEF COMPLAINT:  Wounds to plantar feet    HISTORY OF PRESENT ILLNESS:                The patient is a 74 y.o. male with significant past medical history of acute on chronic wounds to plantar surface of both feet for which he relates home care with Podiatrist from [facility] who visits monthly, who is consulted for  plantar LEFT and RIGHT arch of foot wounds, as well as dystrophic nail debridement    Patient is looking forward to d/c home. No pain in the feet. No plans to stop walking on the open wounds. Has a podiatrist who comes in once a month as a part of his healthcare team.     Past Medical History:        Diagnosis Date    Diabetes mellitus ([address]     Gallstones     Lymphedema     Neuropathy     Pancreatitis     PVD (peripheral vascular disease) ([address]     Ulcers of both lower legs ([address]     bilateral feet     Allergies:   Patient has no known allergies.       REVIEW OF SYSTEMS:    CONSTITUTIONAL:  positive for  malaise  INTEGUMENT/BREAST:  positive for skin lesion(s), dryness, skin color change, changes in lesion, changes in hair, and changes in nails  ENDOCRINE:  positive for diabetic symptoms including neither foot ulcerations nor skin dryness nor neuropathy  MUSCULOSKELETAL:  positive for  joint swelling, stiff joints, and decreased range of motion  NEUROLOGICAL:  positive for gait problems, weakness, and numbness  PHYSICAL EXAM:      Vitals:    /65   Pulse 61   Temp 97.5 °F (36.4 °C) (Oral)   Resp 13   Ht 6' 2\" (1.88 m)   Wt 246 lb 4.1 oz (111.7 kg)   SpO2 100%   BMI 31.62 kg/m²     LABS:   Recent Labs     09/12/22  1209 09/13/22  0640   WBC 7.5 7.9   HGB 9.3* 8.8*   HCT 28.7* 27.4*    216     Recent Labs     09/13/22  0640      K 4.6      CO2 22   BUN 16   CREATININE 0.7*     Recent Labs     09/12/22  1209 09/13/22  0640   PROT 6.5 6.4       LOWER EXTREMITY EXAMINATION   SKIN:    RIGHT plantar foot ulcer MUGS 3 of 3.0 x 1.9 cm with exposed healthy sub Q of 0.35 cm depth without periwound erythema or calor    LEFT plantar foot ulcer MUGS 3 with 2.4 x 2.2 cm with  Viable sub Q , light pink in color, no signs of infection    Both wounds dressed with SilverCell dressing and further observable granular tissues    Nails RIGHT 1,2,3,4,5 and LEFT 1,2,3,4,5  debrided    NEUROLOGIC:    Pain sensation isdecreased Bilateral.  Light touch is decreasedBilateral. positive history of paresthesia Bilateral. negativehistory of burning Bilateral. Deep tendon reflexes are 1 to include patellar and achilles Bilateral. Crawford--Jose 5.07 monofilament sensitivity is abnormal 4 to 5 spots tested Bilateral.    VASCULAR:    bilaterally Dorsalis pedis is 1. bilaterally Posterior tibial pulse is 1. 2+ edema bilaterally. mild Varicosities bilaterally. MUSCULOSKELETAL:  [place] is  untested due to bilateral plantar mehnaz tulcers . Muscle strength is 4/5 to all groups tested to include dorsiflexion, plantarflexion, inversion, eversion, and digital Bilateral . The ranges of motion of all joints tested from ankle distal is decreased there is no crepitus noted Bilateral.      Wound debridement 9/9/2022    Nails debrided previously      IMPRESSION/RECOMMENDATIONS    1. Chronically open wounds / ulcers to plantar feet secondary to Charcot arthropathy of Diabetes, with neuropathy    Debridement performed as above    Wounds dressed with SilverCell with observable health of granular tissues    2. Diabetes with peripheral neuropathy    3. Onychomycosis of pedal nails for which debridement was performed    Disposition: Cntinued follow up for wound care to both feet, but amenable to OUT-patient care at this point    Thank you for the opportunity to take part in the patient's care.     Monika Worrell DPM  [facility]  Pager: [phone]  Office: [phone]  Fax: [phone]

## 2022-09-13 NOTE — DISCHARGE SUMMARY
Hospital Medicine Discharge Summary    Patient ID: Elfrieda Public      Patient's PCP: Erlinda Cartagena MD    Admit Date: 9/6/2022     Discharge Date:   9/13/22    Admitting Physician: Brooke Gonzalez DO     Discharge Physician: Anitra Serra MD     Discharge Diagnoses: Active Hospital Problems    Diagnosis Date Noted    High-grade atrioventricular block [I44.39] 09/10/2022     Priority: Medium    Multiple and open wound of lower limb [S81.809A] 09/08/2022     Priority: Medium    Bacterial infection due to Proteus mirabilis [A49.8] 09/08/2022     Priority: Medium    Foot ulcer due to secondary DM ([address] [I41.728, L97.509] 09/08/2022     Priority: Medium    Lactic acidosis [E87.2] 09/08/2022     Priority: Medium    Atrial fibrillation with slow ventricular response ([address] [I48.91] 09/08/2022     Priority: Medium    Febrile illness [R50.9] 09/07/2022     Priority: Medium    Symptomatic bradycardia [R00.1] 09/07/2022     Priority: Medium    Generalized weakness [R53.1] 09/06/2022     Priority: Medium    SOB (shortness of breath) [R06.02] 09/06/2022     Priority: Medium    Septicemia ([address] [A41.9]     Fever and chills [R50.9] 06/06/2020    Diabetes mellitus type II, uncontrolled ([facility] [E11.65] 02/05/2015    HTN (hypertension) [I10] 02/05/2015       The patient was seen and examined on day of discharge and this discharge summary is in conjunction with any daily progress note from day of discharge. Hospital Course: patient admitted with fever and SOB. Managed for :  High grade AV [place] block s/p micra pacemaker on 9/12/22,followed by cardiology. Lower extremity cellulitis and chronic wounds: seen by podiatry with recommendations for wound care. Proteus bacteremia, source thought to be cellulitis, seen in house by ID , started on antibiotics IV, and subsequently transitioned to po antibiotics at discharge . He is medically stable for discharge home [address] [place] [address].  Follow up with PCP and podiatry. Exam:     /65   Pulse 61   Temp 97.5 °F (36.4 °C) (Oral)   Resp 13   Ht 6' 2\" (1.88 m)   Wt 246 lb 4.1 oz (111.7 kg)   SpO2 100%   BMI 31.62 kg/m²     General appearance: No apparent distress, appears stated age and cooperative. HEENT: Pupils equal, round, and reactive to light. Conjunctivae/corneas clear. Respiratory:  Normal respiratory effort. Clear to auscultation,  Cardiovascular: Regular rate and rhythm with normal S1/S2 without murmurs, rubs or gallops. Abdomen: Soft, non-tender, non-distended with normal bowel sounds. Musculoskelatal: No edema , chronic leg wounds ,dressing in place. Neurologic:  grossly non-focal.  Psychiatric: Alert and oriented, thought content appropriate, normal insight      Consults:     PHARMACY TO DOSE VANCOMYCIN  IP CONSULT TO CARDIOLOGY  IP CONSULT TO INFECTIOUS DISEASES  IP CONSULT TO PODIATRY  IP CONSULT TO PODIATRY    Significant Diagnostic Studies:       PCP/SNF to follow up:     Disposition:  home      Discharge Instructions/Follow-up:  PCP    Code Status:  Full Code     Activity: activity as tolerated    Diet: cardiac diet    Labs:  For convenience and continuity at follow-up the following most recent labs are provided:      CBC:    Lab Results   Component Value Date/Time    WBC 7.9 09/13/2022 06:40 AM    HGB 8.8 09/13/2022 06:40 AM    HCT 27.4 09/13/2022 06:40 AM     09/13/2022 06:40 AM       Renal:    Lab Results   Component Value Date/Time     09/13/2022 06:40 AM    K 4.6 09/13/2022 06:40 AM    K 4.4 09/07/2022 12:34 PM     09/13/2022 06:40 AM    CO2 22 09/13/2022 06:40 AM    BUN 16 09/13/2022 06:40 AM    CREATININE 0.7 09/13/2022 06:40 AM    CALCIUM 8.8 09/13/2022 06:40 AM    PHOS 3.3 06/12/2020 05:08 AM       Discharge Medications:     Current Discharge Medication List             Details   amoxicillin-clavulanate (AUGMENTIN) 875-125 MG per tablet Take 1 tablet by mouth 2 times daily for 14 days  Qty: 28 tablet, Refills: 0                Details   lisinopril (PRINIVIL;ZESTRIL) 5 MG tablet Take 1 tablet by mouth daily  Qty: 30 tablet, Refills: 0                Details   acetaminophen (TYLENOL) 500 MG tablet Take 500 mg by mouth every 6 hours as needed for Pain      aspirin 81 MG EC tablet Take 81 mg by mouth daily      lactobacillus (CULTURELLE) CAPS capsule Take 1 capsule by mouth daily      docusate sodium (COLACE) 100 MG capsule Take 100 mg by mouth in the morning and 100 mg before bedtime. calcium-vitamin D (OSCAL-500) 500-200 MG-UNIT per tablet Take 1 tablet by mouth in the morning. insulin glargine (LANTUS SOLOSTAR) 100 UNIT/ML injection pen Inject 10 Units into the skin nightly      Cyanocobalamin (VITAMIN B-12) 50 MCG LOZG Take 50 mcg by mouth daily      atorvastatin (LIPITOR) 20 MG tablet Take 20 mg by mouth nightly       tamsulosin (FLOMAX) 0.4 MG capsule Take 0.4 mg by mouth every evening       spironolactone (ALDACTONE) 25 MG tablet Take 25 mg by mouth daily      metFORMIN (GLUCOPHAGE) 500 MG tablet Take 500 mg by mouth 2 times daily (with meals)             Time Spent on discharge is more than  40  in the examination, evaluation, counseling and review of medications and discharge plan. Signed:    Jami Cevallos MD   9/13/2022      Thank you Lorenzo Noble MD for the opportunity to be involved in this patient's care. If you have any questions or concerns please feel free to contact me at [phone].

## 2022-09-13 NOTE — PROGRESS NOTES
Occupational Therapy  Pt had pacemaker placed yesterday. No restrictions listed in the chart. Per nursing hold therapy until after clarification of pacemaker restrictions. Will continue to follow.   Kailyn Wright, [address]

## 2022-09-13 NOTE — PROGRESS NOTES
Cardiac Electrophysiology Progress Note     Admit Date: 9/6/2022     Reason for follow up: high grade AV block    HPI and Interval History:   Rupal Muniz is a 74y.o. year old male with past medical history significant for DM, neuropathy, PVD, chronic leg wounds and lymphedema who presented to the ED with fevers, lightheadedness and SOB. Does have chronic wounds on his legs/feet and has wound care at home. He admits to a \"fall\" about a week ago where he was standing and got dizzy and fell. Denies syncope. He also admits to getting lightheaded on occasion, even while sitting and without any provoking movements. He will feel poorly and put a blanket over his head and try to go to sleep for it to resolve. Previously told he has vertigo. Denies any palpitations or chest pain. Around 1200 today, he was noted to be in sinus bradycardia with rates in the 30s while on the monitor. He was sleeping some during this time and when checked on by his nurse, was initially feeling fine. There was then concern for possible junctional rhythm and high grade AV block. The pt was then feeling lightheaded and poorly so a rapid response was called    Continues to have periods where rhythm looks like accelerated junctional monitor, also with periods with visible P wave that are either non-conducted or very long PA interval. Admits to feeling \"dizzy\" around 0730 this morning, did have a brief period of this around that time. No other cardiac complaints. Underwent I&D of R foot wound 9/9 with podiatry. Underwent implantation of Micra PPM on 9/12/22 with Dr. Brenda Ortiz. Denies any groin issues. Still with lightheadedness that is stable. V-paced on monitor and device interrogation this morning with normal function.     Physical Examination:  Vitals:    09/13/22 0800   BP: 116/65   Pulse: 61   Resp: 13   Temp: 97.5 °F (36.4 °C)   SpO2: 100%        Intake/Output Summary (Last 24 hours) at 9/13/2022 1217  Last data filed at 9/13/2022 0740  Gross per 24 hour   Intake 430.04 ml   Output 650 ml   Net -219.96 ml     In: 440 [I.V.:440]  Out: 1500    Wt Readings from Last 3 Encounters:   22 246 lb 4.1 oz (111.7 kg)   22 247 lb 9.2 oz (112.3 kg)   21 264 lb 5.3 oz (119.9 kg)     Temp  Av.5 °F (36.4 °C)  Min: 97.4 °F (36.3 °C)  Max: 97.5 °F (36.4 °C)  Pulse  Av  Min: 55  Max: 68  BP  Min: 116/65  Max: 147/62  SpO2  Av %  Min: 96 %  Max: 100 %    Telemetry: V-paced in the 60s. Constitutional: Alert, in no acute distress. Appears stated age. Head: Normocephalic and atraumatic. Eyes: Conjunctivae normal. EOM are normal.   Neck: Neck supple. No lymphadenopathy. No rigidity. No JVD present. Cardiovascular: Normal rate, regular rhythm. No murmurs, rubs or gallops. No S3 or S4.  Pulmonary/Chest: Clear breath sounds bilaterally. No crackles, wheezes or rhonchi. No respiratory accessory muscle use. Abdominal: Soft. Normal bowel sounds present. No distension, No tenderness. Musculoskeletal: No tenderness. Wraps to BLE  Lymphadenopathy: Has no cervical adenopathy. Neurological: Alert and oriented. No gross deficits. Skin: R groin with no hematoma present. Skin is warm and dry. No rash, lesions, ulcerations noted. Psychiatric: No anxiety or agitation. Labs, diagnostic and imaging results reviewed. Reviewed. Recent Labs     22  0641 22  1209 22  0640   * 139 138   K 4.9 4.2 4.6    106 105   CO2 21 20* 22   BUN 17 18 16   CREATININE 0.8 0.8 0.7*     Recent Labs     22  0641 22  1209 22  0640   WBC 6.0 7.5 7.9   HGB 9.0* 9.3* 8.8*   HCT 28.0* 28.7* 27.4*   MCV 74.1* 74.1* 74.1*    199 216     Lab Results   Component Value Date/Time    TROPONINI 0.03 2022 06:41 PM     Estimated Creatinine Clearance: 134 mL/min (A) (based on SCr of 0.7 mg/dL (L)).    No results found for: BNP  Lab Results   Component Value Date/Time    PROTIME 13.0 2014 06:47 PM INR 1.17 2014 06:47 PM     Lab Results   Component Value Date/Time    CHOL 79 2020 05:59 AM    HDL 21 2020 05:59 AM    TRIG 105 2020 05:59 AM       Scheduled Meds:   metroNIDAZOLE  500 mg Oral 3 times per day    sodium chloride flush  5-40 mL IntraVENous 2 times per day    aspirin  81 mg Oral Daily    atorvastatin  20 mg Oral Nightly    insulin glargine  10 Units SubCUTAneous Nightly    docusate sodium  100 mg Oral BID    lisinopril  5 mg Oral Daily    tamsulosin  0.4 mg Oral QPM    sodium chloride flush  5-40 mL IntraVENous 2 times per day    enoxaparin  30 mg SubCUTAneous BID    insulin lispro  0-4 Units SubCUTAneous TID WC    insulin lispro  0-4 Units SubCUTAneous Nightly    cefepime  2,000 mg IntraVENous q8h     Continuous Infusions:   sodium chloride Stopped (22 0556)    sodium chloride 5 mL/hr at 22 0558    dextrose       PRN Meds:sodium chloride flush, sodium chloride, acetaminophen, sodium chloride flush, sodium chloride, polyethylene glycol, acetaminophen **OR** acetaminophen, glucose, dextrose bolus **OR** dextrose bolus, glucagon (rDNA), dextrose, perflutren lipid microspheres, ondansetron     EC22  Sinus rhythm with high grade AV block at 51 BPM. RBBB. LAFB. Echo:22   Normal left ventricle size, wall thickness and systolic function with an   estimated ejection fraction of 55%. No regional wall motion abnormalities   are seen. Grade III diastolic dysfunction with elevated LV filling pressures. Mild calcification of the leaflets of the mitral valve. Mild mitral regurgitation is present. The left atrium is moderately dilated. Aortic valve leaflets appear thickened. The right ventricle is mildly enlarged with normal function   Mild to moderate tricuspid regurgitation. Stress/LHC: 20   Normal myocardial perfusion study. Normal LV size and systolic function. Overall findings represent a low risk study.         Assessment and Plan: High grade AV block              - noted on EKG on 9/7/22, intermittent              - associated with lightheadedness, not feeling well               - on no AV [place] blocking agents              - Mg slightly low - getting replaced, K WNL, troponin flat              - EKG with RBBB and LAFB as well              - s/p Micra leadless PPM implanted on 9/12/22 with Dr. Cecy Thomas   - device interrogation this morning with normal function   - only activity limitations are related to venous groin stick, no submerging (baths, pools, hot tubs) for 1 week, no significant lifting of more than 20 pounds for 1 week    Bacteremia              - secondary to R foot wound, both growing Proteus mirabilis   - care per ID    R diabetic foot infection   - care per ID and podiatry   - s/p bedside I&D on 9/9     Essential HTN              - BP controlled    EP issues are stable, will sign off. Please call if concerns. Will arrange follow up.      KENNY Richmond Ace  [facility], [address] [place] [address], [address] [place] [address]  Phone: [phone]  Fax: [phone]    Electronically signed by KENNY Diaz - CNP on 9/13/2022 at 12:17 PM

## 2022-09-13 NOTE — PROGRESS NOTES
Occupational Therapy  Attempted OT follow-up. Pt going home this date with medical transportation. Pt reports he has multiple shower chairs, commode, wheelchair at home and does not have any needs at this time. Discussed home OT but pt refusing stating if he needs therapy in the future he will ask for it as he currently gets home care. Pt declined offer for transfer training or dressing tasks.     Time In: 1435  Time Out: 2680 [facility], [address] Yes

## 2022-09-14 ENCOUNTER — APPOINTMENT (OUTPATIENT)
Dept: CT IMAGING | Age: 67
DRG: 854 | End: 2022-09-14
Payer: MEDICARE

## 2022-09-14 ENCOUNTER — NURSE ONLY (OUTPATIENT)
Dept: CARDIOLOGY CLINIC | Age: 67
End: 2022-09-14

## 2022-09-14 LAB
ANION GAP SERPL CALCULATED.3IONS-SCNC: 8 MMOL/L (ref 3–16)
BASOPHILS ABSOLUTE: 0 K/UL (ref 0–0.2)
BASOPHILS RELATIVE PERCENT: 0.6 %
BUN BLDV-MCNC: 16 MG/DL (ref 7–20)
CALCIUM SERPL-MCNC: 8.5 MG/DL (ref 8.3–10.6)
CHLORIDE BLD-SCNC: 105 MMOL/L (ref 99–110)
CO2: 24 MMOL/L (ref 21–32)
CREAT SERPL-MCNC: 0.7 MG/DL (ref 0.8–1.3)
EKG ATRIAL RATE: 36 BPM
EKG DIAGNOSIS: NORMAL
EKG Q-T INTERVAL: 460 MS
EKG QRS DURATION: 168 MS
EKG QTC CALCULATION (BAZETT): 499 MS
EKG R AXIS: 96 DEGREES
EKG T AXIS: -67 DEGREES
EKG VENTRICULAR RATE: 71 BPM
EOSINOPHILS ABSOLUTE: 0.2 K/UL (ref 0–0.6)
EOSINOPHILS RELATIVE PERCENT: 3 %
GFR AFRICAN AMERICAN: >60
GFR NON-AFRICAN AMERICAN: >60
GLUCOSE BLD-MCNC: 103 MG/DL (ref 70–99)
GLUCOSE BLD-MCNC: 108 MG/DL (ref 70–99)
GLUCOSE BLD-MCNC: 117 MG/DL (ref 70–99)
GLUCOSE BLD-MCNC: 129 MG/DL (ref 70–99)
GLUCOSE BLD-MCNC: 154 MG/DL (ref 70–99)
GLUCOSE BLD-MCNC: 216 MG/DL (ref 70–99)
HCT VFR BLD CALC: 27.1 % (ref 40.5–52.5)
HEMOGLOBIN: 8.6 G/DL (ref 13.5–17.5)
LYMPHOCYTES ABSOLUTE: 1.7 K/UL (ref 1–5.1)
LYMPHOCYTES RELATIVE PERCENT: 21.1 %
MCH RBC QN AUTO: 23.3 PG (ref 26–34)
MCHC RBC AUTO-ENTMCNC: 31.6 G/DL (ref 31–36)
MCV RBC AUTO: 73.8 FL (ref 80–100)
MONOCYTES ABSOLUTE: 0.6 K/UL (ref 0–1.3)
MONOCYTES RELATIVE PERCENT: 7.4 %
NEUTROPHILS ABSOLUTE: 5.5 K/UL (ref 1.7–7.7)
NEUTROPHILS RELATIVE PERCENT: 67.9 %
PDW BLD-RTO: 19.1 % (ref 12.4–15.4)
PERFORMED ON: ABNORMAL
PLATELET # BLD: 219 K/UL (ref 135–450)
PMV BLD AUTO: 7 FL (ref 5–10.5)
POTASSIUM REFLEX MAGNESIUM: 4.5 MMOL/L (ref 3.5–5.1)
RBC # BLD: 3.67 M/UL (ref 4.2–5.9)
SODIUM BLD-SCNC: 137 MMOL/L (ref 136–145)
WBC # BLD: 8 K/UL (ref 4–11)

## 2022-09-14 PROCEDURE — 2580000003 HC RX 258: Performed by: INTERNAL MEDICINE

## 2022-09-14 PROCEDURE — 6370000000 HC RX 637 (ALT 250 FOR IP): Performed by: INTERNAL MEDICINE

## 2022-09-14 PROCEDURE — 85025 COMPLETE CBC W/AUTO DIFF WBC: CPT

## 2022-09-14 PROCEDURE — 70496 CT ANGIOGRAPHY HEAD: CPT

## 2022-09-14 PROCEDURE — 80048 BASIC METABOLIC PNL TOTAL CA: CPT

## 2022-09-14 PROCEDURE — 36415 COLL VENOUS BLD VENIPUNCTURE: CPT

## 2022-09-14 PROCEDURE — 1200000000 HC SEMI PRIVATE

## 2022-09-14 PROCEDURE — 6360000002 HC RX W HCPCS: Performed by: INTERNAL MEDICINE

## 2022-09-14 PROCEDURE — 6360000004 HC RX CONTRAST MEDICATION: Performed by: NURSE PRACTITIONER

## 2022-09-14 PROCEDURE — 9990000010 HC NO CHARGE VISIT

## 2022-09-14 PROCEDURE — 93010 ELECTROCARDIOGRAM REPORT: CPT | Performed by: INTERNAL MEDICINE

## 2022-09-14 PROCEDURE — 99223 1ST HOSP IP/OBS HIGH 75: CPT | Performed by: NURSE PRACTITIONER

## 2022-09-14 PROCEDURE — 70450 CT HEAD/BRAIN W/O DYE: CPT

## 2022-09-14 RX ORDER — ACETAMINOPHEN 650 MG/1
650 SUPPOSITORY RECTAL EVERY 4 HOURS PRN
Status: DISCONTINUED | OUTPATIENT
Start: 2022-09-14 | End: 2022-09-21 | Stop reason: HOSPADM

## 2022-09-14 RX ORDER — INSULIN LISPRO 100 [IU]/ML
0-8 INJECTION, SOLUTION INTRAVENOUS; SUBCUTANEOUS
Status: DISCONTINUED | OUTPATIENT
Start: 2022-09-14 | End: 2022-09-21 | Stop reason: HOSPADM

## 2022-09-14 RX ORDER — POLYETHYLENE GLYCOL 3350 17 G/17G
17 POWDER, FOR SOLUTION ORAL DAILY PRN
Status: DISCONTINUED | OUTPATIENT
Start: 2022-09-14 | End: 2022-09-17

## 2022-09-14 RX ORDER — SODIUM CHLORIDE 0.9 % (FLUSH) 0.9 %
10 SYRINGE (ML) INJECTION EVERY 12 HOURS SCHEDULED
Status: DISCONTINUED | OUTPATIENT
Start: 2022-09-14 | End: 2022-09-21 | Stop reason: HOSPADM

## 2022-09-14 RX ORDER — DEXTROSE MONOHYDRATE 100 MG/ML
INJECTION, SOLUTION INTRAVENOUS CONTINUOUS PRN
Status: DISCONTINUED | OUTPATIENT
Start: 2022-09-14 | End: 2022-09-21 | Stop reason: HOSPADM

## 2022-09-14 RX ORDER — AMOXICILLIN AND CLAVULANATE POTASSIUM 875; 125 MG/1; MG/1
1 TABLET, FILM COATED ORAL 2 TIMES DAILY
Status: COMPLETED | OUTPATIENT
Start: 2022-09-14 | End: 2022-09-20

## 2022-09-14 RX ORDER — TAMSULOSIN HYDROCHLORIDE 0.4 MG/1
0.4 CAPSULE ORAL EVERY EVENING
Status: DISCONTINUED | OUTPATIENT
Start: 2022-09-14 | End: 2022-09-21 | Stop reason: HOSPADM

## 2022-09-14 RX ORDER — INSULIN LISPRO 100 [IU]/ML
0-4 INJECTION, SOLUTION INTRAVENOUS; SUBCUTANEOUS NIGHTLY
Status: DISCONTINUED | OUTPATIENT
Start: 2022-09-14 | End: 2022-09-21 | Stop reason: HOSPADM

## 2022-09-14 RX ORDER — SODIUM CHLORIDE 9 MG/ML
INJECTION, SOLUTION INTRAVENOUS PRN
Status: DISCONTINUED | OUTPATIENT
Start: 2022-09-14 | End: 2022-09-21 | Stop reason: HOSPADM

## 2022-09-14 RX ORDER — LISINOPRIL 5 MG/1
5 TABLET ORAL DAILY
Status: DISCONTINUED | OUTPATIENT
Start: 2022-09-14 | End: 2022-09-21 | Stop reason: HOSPADM

## 2022-09-14 RX ORDER — SPIRONOLACTONE 25 MG/1
25 TABLET ORAL DAILY
Status: DISCONTINUED | OUTPATIENT
Start: 2022-09-14 | End: 2022-09-14

## 2022-09-14 RX ORDER — INSULIN GLARGINE 100 [IU]/ML
10 INJECTION, SOLUTION SUBCUTANEOUS NIGHTLY
Status: DISCONTINUED | OUTPATIENT
Start: 2022-09-14 | End: 2022-09-21 | Stop reason: HOSPADM

## 2022-09-14 RX ORDER — SODIUM CHLORIDE 0.9 % (FLUSH) 0.9 %
10 SYRINGE (ML) INJECTION PRN
Status: DISCONTINUED | OUTPATIENT
Start: 2022-09-14 | End: 2022-09-21 | Stop reason: HOSPADM

## 2022-09-14 RX ORDER — ASPIRIN 81 MG/1
81 TABLET ORAL DAILY
Status: DISCONTINUED | OUTPATIENT
Start: 2022-09-14 | End: 2022-09-21 | Stop reason: HOSPADM

## 2022-09-14 RX ORDER — 0.9 % SODIUM CHLORIDE 0.9 %
500 INTRAVENOUS SOLUTION INTRAVENOUS ONCE
Status: COMPLETED | OUTPATIENT
Start: 2022-09-14 | End: 2022-09-14

## 2022-09-14 RX ORDER — ENOXAPARIN SODIUM 100 MG/ML
30 INJECTION SUBCUTANEOUS 2 TIMES DAILY
Status: DISCONTINUED | OUTPATIENT
Start: 2022-09-14 | End: 2022-09-21 | Stop reason: HOSPADM

## 2022-09-14 RX ORDER — ATORVASTATIN CALCIUM 20 MG/1
20 TABLET, FILM COATED ORAL NIGHTLY
Status: DISCONTINUED | OUTPATIENT
Start: 2022-09-14 | End: 2022-09-21 | Stop reason: HOSPADM

## 2022-09-14 RX ORDER — ACETAMINOPHEN 325 MG/1
650 TABLET ORAL EVERY 4 HOURS PRN
Status: DISCONTINUED | OUTPATIENT
Start: 2022-09-14 | End: 2022-09-21 | Stop reason: HOSPADM

## 2022-09-14 RX ORDER — ONDANSETRON 2 MG/ML
4 INJECTION INTRAMUSCULAR; INTRAVENOUS EVERY 4 HOURS PRN
Status: DISCONTINUED | OUTPATIENT
Start: 2022-09-14 | End: 2022-09-21 | Stop reason: HOSPADM

## 2022-09-14 RX ADMIN — ATORVASTATIN CALCIUM 20 MG: 20 TABLET, FILM COATED ORAL at 22:04

## 2022-09-14 RX ADMIN — IOPAMIDOL 75 ML: 755 INJECTION, SOLUTION INTRAVENOUS at 13:21

## 2022-09-14 RX ADMIN — ENOXAPARIN SODIUM 30 MG: 100 INJECTION SUBCUTANEOUS at 22:07

## 2022-09-14 RX ADMIN — ASPIRIN 81 MG: 81 TABLET, COATED ORAL at 09:49

## 2022-09-14 RX ADMIN — ATORVASTATIN CALCIUM 20 MG: 20 TABLET, FILM COATED ORAL at 00:58

## 2022-09-14 RX ADMIN — AMOXICILLIN AND CLAVULANATE POTASSIUM 1 TABLET: 875; 125 TABLET, FILM COATED ORAL at 22:04

## 2022-09-14 RX ADMIN — SPIRONOLACTONE 25 MG: 25 TABLET ORAL at 09:50

## 2022-09-14 RX ADMIN — LISINOPRIL 5 MG: 5 TABLET ORAL at 09:50

## 2022-09-14 RX ADMIN — SODIUM CHLORIDE 500 ML: 9 INJECTION, SOLUTION INTRAVENOUS at 14:35

## 2022-09-14 RX ADMIN — INSULIN GLARGINE 10 UNITS: 100 INJECTION, SOLUTION SUBCUTANEOUS at 22:04

## 2022-09-14 RX ADMIN — INSULIN GLARGINE 10 UNITS: 100 INJECTION, SOLUTION SUBCUTANEOUS at 00:59

## 2022-09-14 RX ADMIN — SODIUM CHLORIDE, PRESERVATIVE FREE 10 ML: 5 INJECTION INTRAVENOUS at 11:26

## 2022-09-14 RX ADMIN — AMOXICILLIN AND CLAVULANATE POTASSIUM 1 TABLET: 875; 125 TABLET, FILM COATED ORAL at 00:57

## 2022-09-14 RX ADMIN — ENOXAPARIN SODIUM 30 MG: 100 INJECTION SUBCUTANEOUS at 09:49

## 2022-09-14 RX ADMIN — TAMSULOSIN HYDROCHLORIDE 0.4 MG: 0.4 CAPSULE ORAL at 18:01

## 2022-09-14 RX ADMIN — AMOXICILLIN AND CLAVULANATE POTASSIUM 1 TABLET: 875; 125 TABLET, FILM COATED ORAL at 09:50

## 2022-09-14 RX ADMIN — TAMSULOSIN HYDROCHLORIDE 0.4 MG: 0.4 CAPSULE ORAL at 00:58

## 2022-09-14 NOTE — CONSULTS
Neurology Consult Note  Reason for Consult: vertigo    Chief complaint: lightheadedness, spinning    Dr Steve Hoffman MD asked me to see [facility] in consultation for evaluation of vertigo    History of Present Illness:  Jhoana Loving is a 74 y.o. male who presents with spinning, lightheadedness. I obtained my information via interview w/ the patient, supplemented by chart review. The patient was hospitalized near the end of August for sepsis (bacteremia, cellulitis diabetic foot wounds, pneumonia). He was discharged on 9/13. He came back to the ED the first week in September and had a pacemaker placed for AV block and associated lightheadedness. He was just discharged yesterday. He was doing OK when he got home. He says he got up w/ his cane and everything started to spin. He ended up falling, unsure if he lost consciousness. He was close to a phone so he called 911. He tells me that he has had intermittent vertigo since the fall of last year. He usually gets episodes 1-2x per month. Often times the symptoms comes on abruptly when he is just sitting down. He can't really define a trigger. He does have associated N/V. Usually he will close his eyes and the symptoms would subside after about 10-15 minutes. He is also reported lightheadedness essentially for the past month. Despite having a pacemaker placed this continues. He felt lightheaded the entire time we were conversing and actually trigger vertigo when he sat up a bit during my exam.      Initial BP here was 89/69. CT head was w/out any acute findings.      Medical History:  Past Medical History:   Diagnosis Date    Diabetes mellitus ([address]     Gallstones     Lymphedema     Neuropathy     Pancreatitis     PVD (peripheral vascular disease) ([address]     Ulcers of both lower legs ([address]     bilateral feet     Past Surgical History:   Procedure Laterality Date    CHOLECYSTECTOMY, LAPAROSCOPIC N/A 08/10/2021 LAPAROSCOPIC CHOLECYSTECTOMY WITH CHOLANGIOGRAM performed by Kait Perez MD at [facility]    ERCP  04/08/2014    LITHOTRYPSY & PANCREATIC STENT PLACEMENT    FOOT SURGERY Left 01/01/1967    PACEMAKER INSERTION       Scheduled Meds:   tamsulosin  0.4 mg Oral QPM    spironolactone  25 mg Oral Daily    lisinopril  5 mg Oral Daily    insulin glargine  10 Units SubCUTAneous Nightly    atorvastatin  20 mg Oral Nightly    aspirin  81 mg Oral Daily    amoxicillin-clavulanate  1 tablet Oral BID    sodium chloride flush  10 mL IntraVENous 2 times per day    enoxaparin  30 mg SubCUTAneous BID    insulin lispro  0-8 Units SubCUTAneous TID     insulin lispro  0-4 Units SubCUTAneous Nightly     Medications Prior to Admission:   lisinopril (PRINIVIL;ZESTRIL) 5 MG tablet, Take 1 tablet by mouth daily  amoxicillin-clavulanate (AUGMENTIN) 875-125 MG per tablet, Take 1 tablet by mouth 2 times daily for 14 days  acetaminophen (TYLENOL) 500 MG tablet, Take 500 mg by mouth every 6 hours as needed for Pain  aspirin 81 MG EC tablet, Take 81 mg by mouth daily  lactobacillus (CULTURELLE) CAPS capsule, Take 1 capsule by mouth daily  docusate sodium (COLACE) 100 MG capsule, Take 100 mg by mouth in the morning and 100 mg before bedtime. calcium-vitamin D (OSCAL-500) 500-200 MG-UNIT per tablet, Take 1 tablet by mouth in the morning.   insulin glargine (LANTUS SOLOSTAR) 100 UNIT/ML injection pen, Inject 10 Units into the skin nightly  Cyanocobalamin (VITAMIN B-12) 50 MCG LOZG, Take 50 mcg by mouth daily  atorvastatin (LIPITOR) 20 MG tablet, Take 20 mg by mouth nightly   tamsulosin (FLOMAX) 0.4 MG capsule, Take 0.4 mg by mouth every evening   spironolactone (ALDACTONE) 25 MG tablet, Take 25 mg by mouth daily  metFORMIN (GLUCOPHAGE) 500 MG tablet, Take 500 mg by mouth 2 times daily (with meals)    No Known Allergies    Family History   Problem Relation Age of Onset    Colon Cancer Mother         PVD s/p toe amputation by Dr Brian Cabrera Lung Cancer Father      Social History     Tobacco Use   Smoking Status Never   Smokeless Tobacco Never     Social History     Substance and Sexual Activity   Drug Use No     Social History     Substance and Sexual Activity   Alcohol Use No     ROS  Constitutional- No weight loss or fevers  Eyes- No diplopia. No photophobia. Ears/nose/throat- No dysphagia. No Dysarthria  Cardiovascular- No palpitations. No chest pain  Respiratory- No dyspnea. No Cough  Gastrointestinal- No Abdominal pain. No Vomiting. Genitourinary- No incontinence. No urinary retention  Musculoskeletal- No myalgia. No arthralgia  Skin- No rash. No easy bruising. Psychiatric- No depression. No anxiety  Endocrine- No diabetes. No thyroid issues. Hematologic- No bleeding difficulty. No fatigue  Neurologic- No weakness. No Headache. Exam  Blood pressure (!) 155/77, pulse 63, temperature 98.1 °F (36.7 °C), temperature source Oral, resp. rate 18, height 6' 2\" (1.88 m), weight 243 lb 6.2 oz (110.4 kg), SpO2 95 %. Constitutional    Vital signs: BP, HR, and RR reviewed   General alert, no distress  Eyes: unable to visualize the fundi  Cardiovascular: no peripheral edema. Psychiatric: cooperative with examination, no psychotic behavior noted. Neurologic  Mental status:   orientation to person, place, time. General fund of knowledge grossly intact   Memory grossly intact   Attention intact as able to attend well to the exam     Language fluent in conversation   Comprehension intact; follows simple commands  Cranial nerves:   CN2: visual fields full   CN 3,4,6: extraocular muscles intact. No nystagmus. CN5: facial sensation symmetric   CN7: face symmetric without dysarthria  CN8: hearing grossly intact  CN9: palate elevated symmetrically  CN11: trap full strength on shoulder shrug  CN12: tongue midline with protrusion  Strength: good strength in all 4 extremities   Sensory: light touch intact in all 4 extremities.     Cerebellar/coordination: finger nose finger normal without ataxia  Tone: normal in all 4 extremities  Gait: deferred at this time for safety. Labs  Glucose 182  Na 137  K 4.5  BUN 21  Cr 1.0    ALT 40  AST 48    Lactic acid 1.7    WBC 8.0K  Hg 8.6  Platelets 967    Studies  CT head w/o 9/13/22, independently reviewed  Impression   No acute intracranial abnormality. Impression/Recommendations  Chronic, intermittent vertigo w/ associated N/V. His neurologic exam is non focal.  Probably unlikely to be related to a central neurologic cause. A brain MRI may be helpful though this is unable to be obtained at this time due to recent pacemaker placement. We can check a CTA head/neck to evaluate posterior circulation. He is also still having constant lightheadedness. He is orthostatic and would recommend addressing this. Perhaps Cardiology may need to evaluate to ensure that his pacemaker is functioning OK. If his intermittent vertigo persists perhaps vestibular therapy may be helpful.       DM  Neuropathy  PVD    Eulalio Valverde NP  [address] [place] [address] [address] Neurology    A copy of this note was provided for Dr Luz Grey MD

## 2022-09-14 NOTE — CARE COORDINATION
Verified address, lives alone in a 2 story house with 11 DONNY    Active with [employer] ([employer]-homecare assistance & [employer] x7 'therapeutic' meals/week) and Bailey Weiner with [facility]. 09/14/22 1022   Service Assessment   Patient Orientation Alert and Oriented;Person;Place;Situation;Self   Cognition Alert   History Provided By Patient   Primary Caregiver Self   Support Systems Family Members   PCP Verified by CM Yes   Prior Functional Level Independent in ADLs/IADLs   Current Functional Level Independent in ADLs/IADLs   Can patient return to prior living arrangement Yes   Ability to make needs known: Good   Family able to assist with home care needs: Yes   Financial Resources Medicaid; Medicare   Social/Functional History   Lives With Alone   Type of Home House   Home Layout Two level   Home Access Stairs to enter with rails   Entrance Stairs - Number of Steps 11 DONNY   Bathroom Equipment Shower chair;Commode; Toilet raiser   Home Equipment Walker, rolling;Cane   Receives Help From Personal care attendant  (Bailey Weiner with [facility])   ADL [address] [facility] [address]   Ambulation Assistance Independent   Transfer Assistance Independent   Active  No   Patient's  Info medical transport last hospital stay   Mode of Transportation Friends;Cab   Discharge Planning   Type of [address] [place] [address] Prior To Admission None   Potential Assistance Needed N/A   DME Ordered?  No   Potential Assistance Purchasing Medications No   Type of Home Care Services None   Patient expects to be discharged to: House   One/Two Story Residence Two story   [facility] Discharge   Services [facility] Discharge None     Emma Perkins RN, BSN,   [phone]  Electronically signed by Emma Perkins RN on 9/14/2022 at 10:32 AM

## 2022-09-14 NOTE — CONSULTS
Cardiac Electrophysiology Consultation     Date: 9/14/2022  Admit Date:  9/13/2022  Admission Diagnosis: Syncope and collapse [R55]  Dizziness [R42]  Vertigo [R42]     Reason for Consultation: dizziness  Consult Requesting Physician: Paris Aragon MD       History of Present Illness  Gian Schreiber is a 74y.o. year old male with past medical history significant for DM, neuropathy, PVD, chronic leg wounds and lymphedema who presented to the ED with fevers, lightheadedness and SOB. Does have chronic wounds on his legs/feet and has wound care at home. He admits to a \"fall\" about a week ago where he was standing and got dizzy and fell. Denies syncope. He also admits to getting lightheaded on occasion, even while sitting and without any provoking movements. He will feel poorly and put a blanket over his head and try to go to sleep for it to resolve. Previously told he has vertigo. Denies any palpitations or chest pain. Around 1200 today, he was noted to be in sinus bradycardia with rates in the 30s while on the monitor. He was sleeping some during this time and when checked on by his nurse, was initially feeling fine. There was then concern for possible junctional rhythm and high grade AV block. The pt was then feeling lightheaded and poorly so a rapid response was called. He ultimately underwent placement of a [employer]onic Micra leadless pacemaker on 9/12/22. He was discharged yesterday to home and was doing okay. He got dizzy like the \"room was spinning\" while he was up walking with his cane and fell. He's not sure if he lost consciousness but if he did, he says it was only for a second. Says he is always lightheaded and that did not change after PPM implant. Denies any chest pain, palpitations or dyspnea. I interrogated his device and it and shows normal function.        Past Medical History:   Diagnosis Date    Diabetes mellitus ([address]     Gallstones     Lymphedema     Neuropathy     Pancreatitis     PVD (peripheral vascular disease) [facility] [address]     Ulcers of both lower legs (HCC)     bilateral feet        Past Surgical History:   Procedure Laterality Date    CHOLECYSTECTOMY, LAPAROSCOPIC N/A 08/10/2021    LAPAROSCOPIC CHOLECYSTECTOMY WITH CHOLANGIOGRAM performed by Keanu Molina MD at [facility] [place] [id number]    ERCP  04/08/2014    LITHOTRYPSY & PANCREATIC STENT PLACEMENT    FOOT SURGERY Left 01/01/1967    PACEMAKER INSERTION         Current Outpatient Medications   Medication Instructions    acetaminophen (TYLENOL) 500 mg, Oral, EVERY 6 HOURS PRN    amoxicillin-clavulanate (AUGMENTIN) 875-125 MG per tablet 1 tablet, Oral, 2 TIMES DAILY    aspirin 81 mg, Oral, DAILY    atorvastatin (LIPITOR) 20 mg, Oral, NIGHTLY    calcium-vitamin D (OSCAL-500) 500-200 MG-UNIT per tablet 1 tablet, Oral, DAILY    docusate sodium (COLACE) 100 mg, Oral, 2 TIMES DAILY    lactobacillus (CULTURELLE) CAPS capsule 1 capsule, Oral, DAILY    Lantus SoloStar 10 Units, SubCUTAneous, NIGHTLY    lisinopril (PRINIVIL;ZESTRIL) 5 mg, Oral, DAILY    metFORMIN (GLUCOPHAGE) 500 mg, Oral, 2 TIMES DAILY WITH MEALS    spironolactone (ALDACTONE) 25 mg, Oral, DAILY    tamsulosin (FLOMAX) 0.4 mg, Oral, EVERY EVENING    Vitamin B-12 50 mcg, Oral, DAILY        No Known Allergies    Social History:   reports that he has never smoked. He has never used smokeless tobacco. He reports that he does not drink alcohol and does not use drugs. Family History:  family history includes Colon Cancer in his mother; Lung Cancer in his father. Review of Systems:  General: positive for fall/syncope, negative for fever, chills   Ophthalmic ROS: negative for eye pain or loss of vision  ENT ROS: negative for headaches, sore throat, nasal drainage  Respiratory: negative for cough, sputum, SOB  Cardiovascular: negative for chest pain, palpitations.   Gastrointestinal: negative for abdominal pain, diarrhea, N/V  Hematology: negative for bleeding, blood clots, bruising or jaundice  Genito-Urinary:  negative for dysuria or incontinence  Musculoskeletal: negative for joint swelling, muscle pain  Neurological: positive for dizziness, negative for confusion, headaches   Psychiatric: negative anxiety, depression  Dermatological: positive for BLE wounds, negative for rash    Medications:  Scheduled Meds:   tamsulosin  0.4 mg Oral QPM    lisinopril  5 mg Oral Daily    insulin glargine  10 Units SubCUTAneous Nightly    atorvastatin  20 mg Oral Nightly    aspirin  81 mg Oral Daily    amoxicillin-clavulanate  1 tablet Oral BID    sodium chloride flush  10 mL IntraVENous 2 times per day    enoxaparin  30 mg SubCUTAneous BID    insulin lispro  0-8 Units SubCUTAneous TID WC    insulin lispro  0-4 Units SubCUTAneous Nightly    sodium chloride  500 mL IntraVENous Once      Continuous Infusions:   dextrose      sodium chloride       PRN Meds:.glucose, dextrose bolus **OR** dextrose bolus, glucagon (rDNA), dextrose, sodium chloride flush, sodium chloride, ondansetron, polyethylene glycol, acetaminophen **OR** acetaminophen     Physical Examination:  Vitals:    22 1116   BP: (!) 148/79   Pulse: 70   Resp:    Temp: 98 °F (36.7 °C)   SpO2: 97%        Intake/Output Summary (Last 24 hours) at 2022 1333  Last data filed at 2022 0113  Gross per 24 hour   Intake 240 ml   Output 650 ml   Net -410 ml     In: 240 [P.O.:240]  Out: 650    Wt Readings from Last 3 Encounters:   22 243 lb 6.2 oz (110.4 kg)   22 246 lb 4.1 oz (111.7 kg)   22 247 lb 9.2 oz (112.3 kg)     Temp  Av.2 °F (36.8 °C)  Min: 98 °F (36.7 °C)  Max: 98.4 °F (36.9 °C)  Pulse  Av  Min: 62  Max: 75  BP  Min: 89/69  Max: 155/77  SpO2  Av %  Min: 93 %  Max: 100 %    Telemetry: V paced in the 60s. Constitutional: Alert, in no acute distress. Appears stated age. Head: Normocephalic and atraumatic. Eyes: Conjunctivae normal. EOM are normal.   Neck: Neck supple. No lymphadenopathy. No rigidity.  No JVD present. Cardiovascular: Normal rate, regular rhythm. No murmurs, rubs or gallops. No S3 or S4.  Pulmonary/Chest: Clear breath sounds bilaterally. No crackles, wheezes or rhonchi. No respiratory accessory muscle use. Abdominal: Soft. Normal bowel sounds present. No distension, No tenderness. Musculoskeletal: No tenderness. No edema    Lymphadenopathy: Has no cervical adenopathy. Neurological: Alert and oriented. No gross deficits. Skin: Skin is warm and dry. No rash, lesions, ulcerations noted. R groin without hematoma. Psychiatric: No anxiety nor agitation. Labs:  Reviewed. Recent Labs     22  0640 22  0751    137 137   K 4.6 4.5 4.5    102 105   CO2 22 24 24   BUN 16 21* 16   CREATININE 0.7* 1.0 0.7*     Recent Labs     22  0640 22  0751   WBC 7.9 7.5 8.0   HGB 8.8* 9.2* 8.6*   HCT 27.4* 29.0* 27.1*   MCV 74.1* 73.8* 73.8*    222 219     Lab Results   Component Value Date/Time    TROPONINI 0.03 2022 06:41 PM     No results found for: BNP  Lab Results   Component Value Date/Time    PROTIME 13.0 2014 06:47 PM    INR 1.17 2014 06:47 PM     Lab Results   Component Value Date/Time    CHOL 79 2020 05:59 AM    HDL 21 2020 05:59 AM    TRIG 105 2020 05:59 AM       Diagnostic and imaging results reviewed. EC22  V-paced at 70 BPM.    Echo: 22   Normal left ventricle size, wall thickness and systolic function with an   estimated ejection fraction of 55%. No regional wall motion abnormalities   are seen. Grade III diastolic dysfunction with elevated LV filling pressures. Mild calcification of the leaflets of the mitral valve. Mild mitral regurgitation is present. The left atrium is moderately dilated. Aortic valve leaflets appear thickened. The right ventricle is mildly enlarged with normal function   Mild to moderate tricuspid regurgitation.      Stress/LHC: 20 Normal myocardial perfusion study. Normal LV size and systolic function. Overall findings represent a low risk study. Assessment & Plan:    Syncope   - at least a fall, ?syncope   - pt had dizziness with \"room spinning\" prior to this   - did have BP drop into the 52H systolic with standing   - would stop lisinopril, allow BP to be a little elevated, <150/90 to avoid symptomatic drops   - device interrogated and showed normal function   - neuro also following    High grade AV block              - noted on EKG on 9/7/22, intermittent              - associated with lightheadedness, not feeling well               - EKG with RBBB and LAFB as well              - s/p Micra leadless PPM implanted on 9/12/22 with Dr. Sixto Mitchell              - removed dressing to R groin, suture was still in place so I did remove this and placed on band-aid on the area, no active bleeding     Essential HTN              - would increase BP goal to <150/90 to avoid symptomatic drops    - stop lisinopril    Discussed with Dr. Sixto Mitchell. No further EP recommendations, will sign off. Follow up arranged. Please call with concerns.     KENNY Sagastume  [facility] [address]  Phone: [phone]  Fax: [phone]    Electronically signed by KENNY Stacy - CNP on 9/14/2022 at 1:33 PM

## 2022-09-14 NOTE — PROGRESS NOTES
Per Dr. Irene Pat pt needs to wait at least 6 weeks for MRI testing because of recent pacemaker placement. Notified Dr. Hazel Aragon.  [facility] at bedside.

## 2022-09-14 NOTE — PROGRESS NOTES
[facility] transmission received 09.14.22 @ 12:45pm from [place] [id number] Lab for patient's Micra AV pacemaker. REASON FOR TRANSMISSION  Presence of cardiac device  TECHNICAL FINDINGS  No device or lead performance issue(s) observed  ADDITIONAL NOTES  DEVICE ASSESSMENT: Micra AV Available daily capture threshold, sensing, and battery measurements within range of expected. ARRHYTHMIA SUMMARY: Leadless pacemaker. No arrhythmia detection available. OBSERVATIONS: Total ventricular pacing 93.9% Current programmed mode: VDD AV synchrony cannot be confirmed with [facility] transmission. An ECG is necessary during Micra AV device follow-up to confirm the atrial mechanical Sensing signal is coordinated with the P-waves on the ECG. EP physician will review. See interrogation under cardiology tab in the [address] [place] [address] [address] field for more details.

## 2022-09-14 NOTE — PLAN OF CARE
Pt A/O, denies pain, SOB or dizziness, and VSS. Meds given per eMAR. Pt is resting with no other complaints at this time. Call light within reach. Problem: Discharge Planning  Goal: Discharge to home or other facility with appropriate resources  Outcome: Progressing  Flowsheets (Taken 9/14/2022 0038)  Discharge to home or other facility with appropriate resources: Identify barriers to discharge with patient and caregiver     Problem: Pain  Goal: Verbalizes/displays adequate comfort level or baseline comfort level  Outcome: Progressing     Problem: Skin/Tissue Integrity  Goal: Absence of new skin breakdown  Description: 1. Monitor for areas of redness and/or skin breakdown  2. Assess vascular access sites hourly  3. Every 4-6 hours minimum:  Change oxygen saturation probe site  4. Every 4-6 hours:  If on nasal continuous positive airway pressure, respiratory therapy assess nares and determine need for appliance change or resting period.   Outcome: Progressing     Problem: Safety - Adult  Goal: Free from fall injury  Outcome: Progressing     Problem: ABCDS Injury Assessment  Goal: Absence of physical injury  Outcome: Progressing  Flowsheets (Taken 9/14/2022 0028)  Absence of Physical Injury: Implement safety measures based on patient assessment

## 2022-09-14 NOTE — H&P
Hospital Medicine History & Physical      PCP: Trudy Romero MD    Date of Admission: 9/13/2022    Date of Service: Pt seen/examined on 9/14/2022 and Admitted to Inpatient. Chief Complaint:  syncope and collapse      History Of Present Illness: The patient is a 74 y.o. male with hx type 2 DM, PVD, bilateral food ulcers who presents to [facility] with syncope and collapse. Patient was just discharged yesterday. He was hospitalized for fever and shortness of breath. He was noted to have a high grade AV [place] block and had a micra pacemaker placed on 9/12 by cardiology. He was also seen with a lower extremity cellulitis, Proteus bacteremia, and chronic wounds seen by both podiatry and ID and discharged on PO Augmentin. He got home and within 15 minutes of being home raised up and had a syncopal episode. He came back to the ED for further evaluation. States that he has been getting lightheaded and dizzy both at rest and with movement. He has a room spinning sensation from movement. The lightheadedness and dizziness worsens with position change. Denies fever, chills, chest pain, shortness of breath, abdominal pain, nausea, vomiting, constipation, diarrhea, and dysuria. In the ED, labs were significant for a glucose of 103, hemoglobin of 8.6. CT Head without contrast was unremarkable. CTA Head and Neck with contrast was unremarkable.     Past Medical History:        Diagnosis Date    Diabetes mellitus ([address]     Gallstones     Lymphedema     Neuropathy     Pancreatitis     PVD (peripheral vascular disease) ([address]     Ulcers of both lower legs ([address]     bilateral feet       Past Surgical History:        Procedure Laterality Date    CHOLECYSTECTOMY, LAPAROSCOPIC N/A 08/10/2021    LAPAROSCOPIC CHOLECYSTECTOMY WITH CHOLANGIOGRAM performed by Kait Perez, MD [address]    ERCP  04/08/2014    LITHOTRYPSY & PANCREATIC STENT PLACEMENT    FOOT SURGERY Left 01/01/1967    PACEMAKER INSERTION         Medications Prior to Admission:    Prior to Admission medications    Medication Sig Start Date End Date Taking? Authorizing Provider   lisinopril (PRINIVIL;ZESTRIL) 5 MG tablet Take 1 tablet by mouth daily 9/14/22   Trish West MD   amoxicillin-clavulanate (AUGMENTIN) 875-125 MG per tablet Take 1 tablet by mouth 2 times daily for 14 days 9/13/22 9/27/22  Trish West MD   acetaminophen (TYLENOL) 500 MG tablet Take 500 mg by mouth every 6 hours as needed for Pain    Historical Provider, MD   aspirin 81 MG EC tablet Take 81 mg by mouth daily    Historical Provider, MD   lactobacillus (CULTURELLE) CAPS capsule Take 1 capsule by mouth daily    Historical Provider, MD   docusate sodium (COLACE) 100 MG capsule Take 100 mg by mouth in the morning and 100 mg before bedtime. Historical Provider, MD   calcium-vitamin D (OSCAL-500) 500-200 MG-UNIT per tablet Take 1 tablet by mouth in the morning. Historical Provider, MD   insulin glargine (LANTUS SOLOSTAR) 100 UNIT/ML injection pen Inject 10 Units into the skin nightly    Historical Provider, MD   Cyanocobalamin (VITAMIN B-12) 50 MCG LOZG Take 50 mcg by mouth daily    Historical Provider, MD   atorvastatin (LIPITOR) 20 MG tablet Take 20 mg by mouth nightly  4/20/21   Historical Provider, MD   tamsulosin (FLOMAX) 0.4 MG capsule Take 0.4 mg by mouth every evening     Historical Provider, MD   spironolactone (ALDACTONE) 25 MG tablet Take 25 mg by mouth daily    Historical Provider, MD   metFORMIN (GLUCOPHAGE) 500 MG tablet Take 500 mg by mouth 2 times daily (with meals)    Historical Provider, MD       Allergies:  Patient has no known allergies. Social History:  The patient currently lives at home. TOBACCO:   reports that he has never smoked.  He has never used smokeless tobacco.  ETOH:   reports no history of alcohol use.      Family History:  Reviewed in detail and negative for DM, Early CAD, Cancer, CVA. Positive as follows:        Problem Relation Age of Onset    Colon Cancer Mother         PVD s/p toe amputation by Dr Marlene Nelson Father        REVIEW OF SYSTEMS:   Positive for and as noted in the HPI. All other systems reviewed and negative. PHYSICAL EXAM:    BP (!) 155/77   Pulse 63   Temp 98.1 °F (36.7 °C) (Oral)   Resp 18   Ht 6' 2\" (1.88 m)   Wt 243 lb 6.2 oz (110.4 kg)   SpO2 95%   BMI 31.25 kg/m²     General appearance: No apparent distress appears stated age and cooperative. HEENT Normal cephalic, atraumatic without obvious deformity. Pupils equal, round, and reactive to light. Extra ocular muscles intact. Conjunctivae/corneas clear. Neck: Supple, No jugular venous distention/bruits. Trachea midline without thyromegaly or adenopathy with full range of motion. Lungs: Clear to auscultation, bilaterally without Rales/Wheezes/Rhonchi with good respiratory effort. Heart: Regular rate and rhythm with Normal S1/S2 without murmurs, rubs or gallops, point of maximum impulse non-displaced  Abdomen: Soft, non-tender or non-distended without rigidity or guarding and positive bowel sounds all four quadrants. Extremities: Bilateral lower extremity wounds, chronic, with bandages that do not appear to be acute infected. No clubbing, cyanosis, or edema bilaterally. Full range of motion without deformity and normal gait intact. Skin: Skin color, texture, turgor normal.  No rashes or lesions. Neurologic: Alert and oriented X 3, neurovascularly intact with sensory/motor intact upper extremities/lower extremities, bilaterally. Cranial nerves: II-XII intact, grossly non-focal.  Mental status: Alert, oriented, thought content appropriate. Capillary Refill: Acceptable  < 3 seconds  Peripheral Pulses: +3 Easily felt, not easily obliterated with pressure    CTA HEAD NECK W CONTRAST   Final Result   1.  No flow limiting stenosis of the cervical carotid/vertebral arteries. 2. No significant stenosis of the [id number]. CT HEAD WO CONTRAST   Final Result   No hemorrhage or mass identified      Mild small vessel ischemic change, similar to prior. Trace paranasal sinus disease         CT Head W/O Contrast   Final Result   No acute intracranial abnormality. XR CHEST PORTABLE   Final Result   No acute abnormality               CBC   Recent Labs     09/12/22  1209 09/13/22  0640 09/13/22 1917   WBC 7.5 7.9 7.5   HGB 9.3* 8.8* 9.2*   HCT 28.7* 27.4* 29.0*    216 222      RENAL  Recent Labs     09/12/22  1209 09/13/22  0640 09/13/22 1917    138 137   K 4.2 4.6 4.5    105 102   CO2 20* 22 24   BUN 18 16 21*   CREATININE 0.8 0.7* 1.0     LFT'S  Recent Labs     09/12/22  1209 09/13/22  0640 09/13/22 1917   AST 36 48* 48*   ALT 25 37 40   BILIDIR  --   --  <0.2   BILITOT 0.4 0.6 0.4   ALKPHOS 74 73 82     COAG  No results for input(s): INR in the last 72 hours. CARDIAC ENZYMES  No results for input(s): CKTOTAL, CKMB, CKMBINDEX, TROPONINI in the last 72 hours.     U/A:    Lab Results   Component Value Date/Time    COLORU Yellow 09/06/2022 03:41 PM    WBCUA 1 08/15/2022 11:16 AM    RBCUA 2 08/15/2022 11:16 AM    BACTERIA None Seen 08/15/2022 11:16 AM    CLARITYU Clear 09/06/2022 03:41 PM    SPECGRAV 1.020 09/06/2022 03:41 PM    LEUKOCYTESUR Negative 09/06/2022 03:41 PM    BLOODU Negative 09/06/2022 03:41 PM    GLUCOSEU Negative 09/06/2022 03:41 PM       ABG  No results found for: CDG0WWI, BEART, R3LZDLRJ, PHART, THGBART, LBK5UKH, PO2ART, RSP9XKT        Active Hospital Problems    Diagnosis Date Noted    Vertigo [R42] 11/27/2020         PHYSICIANS CERTIFICATION:    I certify that Dae Sheldon is expected to be hospitalized for more than 2 midnights based on the following assessment and plan:      ASSESSMENT/PLAN:      Syncope and collapse, chronic vertigo with associated nausea and vomiting - suspect due to central or peripheral vertigo.   -unable to get MRI due to recent pacemaker placement  -neurology consulted  -CTA Head and Neck with contrast showed no posterior circulation abnormalities  -PT/OT evaluation  -patient may benefit from vestibular rehab  -tele monitoring    Complete heart block s/p micra PPM implantation 9/12  -EP consulted, recs appreciated  -pacemaker interrogation  -tele monitoring    Orthostatic hypotension  -will give fluid bolus  -stop lisinopril  -recheck orthostatic vitals in the morning    Recent right foot infection   -continue PO Augmentin  -wound care consult    Type 2 DM  -Lantus  -SSI  -hypoglycemia protocol  -POCT glucose checks  -carb control diet    Hyperlipidemia  -continue home statin      DVT Prophylaxis: Lovenox  Diet: ADULT DIET; Regular; 5 carb choices (75 gm/meal)  Code Status: Full Code  PT/OT Eval Status: ordered    Dispo - admit med/surg tele inpatient       Vee Myers MD    Thank you Ronni Mustafa MD for the opportunity to be involved in this patient's care. If you have any questions or concerns please feel free to contact me at [phone].

## 2022-09-14 NOTE — PROGRESS NOTES
CLINICAL PHARMACY NOTE: MEDS TO BEDS    Total # of Prescriptions Filled: 2   The following medications were delivered to the patient:  Current Discharge Medication List            Additional Documentation:

## 2022-09-14 NOTE — PROGRESS NOTES
Pt reports dizziness while completing orthostatic bp and pulse at bedside while sitting and standing.

## 2022-09-14 NOTE — ED NOTES
Pt arrived to dept via [facility]. Pt c/o syncopal episode. Pt stood up, passed out, hit head. Does not take blood thinners. Had pacemaker inserted yesterday. Was just discharged from the hospital this morning. Pt awake, alert and oriented x 3. Skin warm and dry/normal color for ethnicity. Resp easy and unlabored. Pt placed in gown and on cardiac monitor. Call light in reach. Will continue to monitor.       Dae Ritter, RN  09/13/22 [id number]

## 2022-09-14 NOTE — ED PROVIDER NOTES
[facility]  eMERGENCY dEPARTMENT eNCOUnter        Pt Name: King Lobo  MRN: [id number]  Remagfad 1955  Date of evaluation: 9/13/2022  Provider: Ирина Gandara MD  PCP: Alfonso Shahid MD      [address] [place] [address]       Chief Complaint   Patient presents with    Loss of Consciousness     Stood up, passed out. Hit head. Complaining of dizziness. No thinners. Pacemaker placed yesterday. No chest pain. HISTORY OFPRESENT ILLNESS   (Location/Symptom, Timing/Onset, Context/Setting, Quality, Duration, Modifying Factors,Severity)  Note limiting factors. King Lobo is a 74 y.o. male   with a history of    has a past medical history of Diabetes mellitus ([address] Gallstones, Lymphedema, Neuropathy, Pancreatitis, PVD (peripheral vascular disease) ([address] and Ulcers of both lower legs ([address] Who presents with fall with possible syncope. Patient has been quite ill lately. Earlier this year he had sepsis and site secondary to cellulitis. That was successfully treated. He has had problems with vertigo over the last few years somewhat recurrently. Describes the room as spinning. He was admitted recently with bradycardia was found to be in third-degree block. He just got a pacemaker placed yesterday. He was just discharged today. Upon getting home just 4 hours prior to coming back to the ER he got very vertiginous fell and hit the ground. May have passed out for a second or 2 but mostly it sounds like his vertigo. Nursing Noteswere all reviewed and agreed with or any disagreements were addressed  in the HPI. REVIEW OF SYSTEMS    (2-9 systems for level 4, 10 or more for level 5)     Review of Systems   Constitutional:  Negative for chills, fatigue and fever. HENT:  Negative for ear pain, rhinorrhea and sore throat. Eyes:  Negative for pain, discharge, redness and visual disturbance.    Respiratory:  Negative for cough, shortness of breath and wheezing. Cardiovascular:  Negative for chest pain, palpitations and leg swelling. Gastrointestinal:  Negative for abdominal pain, diarrhea, nausea and vomiting. Genitourinary:  Negative for difficulty urinating and dysuria. Musculoskeletal:  Negative for arthralgias, back pain and myalgias. Skin:  Positive for wound. Negative for rash. He has chronic ulcers in both feet. Allergic/Immunologic: Negative for environmental allergies. Neurological:  Negative for dizziness, seizures, syncope and headaches. Hematological:  Negative for adenopathy. Psychiatric/Behavioral:  Negative for suicidal ideas. The patient is not nervous/anxious. PAST MEDICAL HISTORY     Past Medical History:   Diagnosis Date    Diabetes mellitus ([address]     Gallstones     Lymphedema     Neuropathy     Pancreatitis     PVD (peripheral vascular disease) ([facility]     Ulcers of both lower legs ([facility]     bilateral feet         SURGICAL HISTORY     Past Surgical History:   Procedure Laterality Date    CHOLECYSTECTOMY, LAPAROSCOPIC N/A 08/10/2021    LAPAROSCOPIC CHOLECYSTECTOMY WITH CHOLANGIOGRAM performed by Ronal Burroughs MD at [place] [address]    ERCP  04/08/2014    LITHOTRYPSY & PANCREATIC STENT PLACEMENT    FOOT SURGERY Left 01/01/1967    PACEMAKER INSERTION           CURRENTMEDICATIONS       Previous Medications    ACETAMINOPHEN (TYLENOL) 500 MG TABLET    Take 500 mg by mouth every 6 hours as needed for Pain    AMOXICILLIN-CLAVULANATE (AUGMENTIN) 875-125 MG PER TABLET    Take 1 tablet by mouth 2 times daily for 14 days    ASPIRIN 81 MG EC TABLET    Take 81 mg by mouth daily    ATORVASTATIN (LIPITOR) 20 MG TABLET    Take 20 mg by mouth nightly     CALCIUM-VITAMIN D (OSCAL-500) 500-200 MG-UNIT PER TABLET    Take 1 tablet by mouth in the morning.     CYANOCOBALAMIN (VITAMIN B-12) 50 MCG LOZG    Take 50 mcg by mouth daily    DOCUSATE SODIUM (COLACE) 100 MG CAPSULE    Take 100 mg by mouth in the morning and 100 mg before bedtime. INSULIN GLARGINE (LANTUS SOLOSTAR) 100 UNIT/ML INJECTION PEN    Inject 10 Units into the skin nightly    LACTOBACILLUS (CULTURELLE) CAPS CAPSULE    Take 1 capsule by mouth daily    LISINOPRIL (PRINIVIL;ZESTRIL) 5 MG TABLET    Take 1 tablet by mouth daily    METFORMIN (GLUCOPHAGE) 500 MG TABLET    Take 500 mg by mouth 2 times daily (with meals)    SPIRONOLACTONE (ALDACTONE) 25 MG TABLET    Take 25 mg by mouth daily    TAMSULOSIN (FLOMAX) 0.4 MG CAPSULE    Take 0.4 mg by mouth every evening        ALLERGIES     Patient has no known allergies. FAMILY HISTORY       Family History   Problem Relation Age of Onset    Colon Cancer Mother         PVD s/p toe amputation by Dr Deven Perez Father           SOCIAL HISTORY       Social History     Socioeconomic History    Marital status: Single     Spouse name: None    Number of children: None    Years of education: None    Highest education level: None   Tobacco Use    Smoking status: Never    Smokeless tobacco: Never   Vaping Use    Vaping Use: Never used   Substance and Sexual Activity    Alcohol use: No    Drug use: No    Sexual activity: Not Currently       SCREENINGS    [place] Coma Scale  Eye Opening: Spontaneous  Best Verbal Response: Oriented  Best Motor Response: Obeys commands  [place] Coma Scale Score: 15        PHYSICAL EXAM    (up to 7 for level 4, 8 or more for level 5)     ED Triage Vitals [09/13/22 1901]   BP Temp Temp Source Heart Rate Resp SpO2 Height Weight   89/69 98.3 °F (36.8 °C) Oral 75 13 100 % 6' 2\" (1.88 m) 246 lb 4.1 oz (111.7 kg)      height is 6' 2\" (1.88 m) and weight is 246 lb 4.1 oz (111.7 kg). His oral temperature is 98.3 °F (36.8 °C). His blood pressure is 120/62 and his pulse is 69. His respiration is 19 and oxygen saturation is 99%. Physical Exam  Constitutional:       Appearance: He is well-developed. He is not diaphoretic. HENT:      Head: Normocephalic.       Comments: Patient has some bruising to the lateral left periorbital structures. Right Ear: External ear normal.      Left Ear: External ear normal.   Eyes:      General: No scleral icterus. Right eye: No discharge. Left eye: No discharge. Extraocular Movements: Extraocular movements intact. Right eye: No nystagmus. Left eye: No nystagmus. Neck:      Thyroid: No thyromegaly. Vascular: No JVD. Trachea: No tracheal deviation. Cardiovascular:      Rate and Rhythm: Normal rate and regular rhythm. Heart sounds: No murmur heard. No friction rub. No gallop. Pulmonary:      Effort: Pulmonary effort is normal. No respiratory distress. Breath sounds: Normal breath sounds. No stridor. No wheezing or rales. Abdominal:      General: There is no distension. Palpations: Abdomen is soft. Tenderness: There is no abdominal tenderness. There is no guarding or rebound. Comments: He does have some bruising from where his pacemaker was placed in the right lower quadrant. Musculoskeletal:         General: No tenderness. Cervical back: Normal range of motion. Comments: Patient has dressings over both feet which were not removed at this time as it was displaced earlier today   Skin:     General: Skin is warm and dry. Findings: No rash (On exposed body surfaces). Neurological:      General: No focal deficit present. Mental Status: He is alert and oriented to person, place, and time. Cranial Nerves: Cranial nerves are intact. Sensory: Sensation is intact. Coordination: Coordination normal.      Comments: NIH of 0. HINTS negative   Psychiatric:         Behavior: Behavior normal.         Thought Content:  Thought content normal.       DIAGNOSTIC RESULTS   LABS:    Results for orders placed or performed during the hospital encounter of 09/13/22   CBC with Auto Differential   Result Value Ref Range    WBC 7.5 4.0 - 11.0 K/uL    RBC 3.93 (L) 4.20 - 5.90 M/uL    Hemoglobin 9.2 (L) 13.5 - 17.5 g/dL    Hematocrit 29.0 (L) 40.5 - 52.5 %    MCV 73.8 (L) 80.0 - 100.0 fL    MCH 23.4 (L) 26.0 - 34.0 pg    MCHC 31.8 31.0 - 36.0 g/dL    RDW 18.7 (H) 12.4 - 15.4 %    Platelets 530 127 - 863 K/uL    MPV 7.1 5.0 - 10.5 fL    Neutrophils % 71.8 %    Lymphocytes % 17.6 %    Monocytes % 7.4 %    Eosinophils % 2.7 %    Basophils % 0.5 %    Neutrophils Absolute 5.4 1.7 - 7.7 K/uL    Lymphocytes Absolute 1.3 1.0 - 5.1 K/uL    Monocytes Absolute 0.6 0.0 - 1.3 K/uL    Eosinophils Absolute 0.2 0.0 - 0.6 K/uL    Basophils Absolute 0.0 0.0 - 0.2 K/uL   Lactic Acid   Result Value Ref Range    Lactic Acid 1.7 0.4 - 2.0 mmol/L   Basic Metabolic Panel   Result Value Ref Range    Sodium 137 136 - 145 mmol/L    Potassium 4.5 3.5 - 5.1 mmol/L    Chloride 102 99 - 110 mmol/L    CO2 24 21 - 32 mmol/L    Anion Gap 11 3 - 16    Glucose 182 (H) 70 - 99 mg/dL    BUN 21 (H) 7 - 20 mg/dL    Creatinine 1.0 0.8 - 1.3 mg/dL    GFR Non-African American >60 >60    GFR African American >60 >60    Calcium 9.1 8.3 - 10.6 mg/dL   Lipase   Result Value Ref Range    Lipase 15.0 13.0 - 60.0 U/L   Hepatic Function Panel   Result Value Ref Range    Total Protein 7.5 6.4 - 8.2 g/dL    Albumin 3.9 3.4 - 5.0 g/dL    Alkaline Phosphatase 82 40 - 129 U/L    ALT 40 10 - 40 U/L    AST 48 (H) 15 - 37 U/L    Total Bilirubin 0.4 0.0 - 1.0 mg/dL    Bilirubin, Direct <0.2 0.0 - 0.3 mg/dL    Bilirubin, Indirect see below 0.0 - 1.0 mg/dL       All other labs were within normal range or not returned as of this dictation. EKG: All EKG's are interpreted by the Emergency Department Physician who either signs orCo-signs this chart in the absence of a cardiologist.    EKG shows a ventricular paced rhythm at a rate of 71. Axis is 96. Wide-complex rhythm.     RADIOLOGY:   plain film images such as CT, Ultrasound and MRI are read by the radiologist. Plain radiographic images are visualized and preliminarily interpreted by the  EDProvider with the below findings:    CT Head W/O Contrast    Result Date: 9/13/2022  EXAMINATION: CT OF THE HEAD WITHOUT CONTRAST  9/13/2022 8:11 pm TECHNIQUE: CT of the head was performed without the administration of intravenous contrast. Automated exposure control, iterative reconstruction, and/or weight based adjustment of the mA/kV was utilized to reduce the radiation dose to as low as reasonably achievable. COMPARISON: 09/06/2022 HISTORY: ORDERING SYSTEM PROVIDED HISTORY: fall/vertigo/head injury TECHNOLOGIST PROVIDED HISTORY: Reason for exam:->fall/vertigo/head injury Has a \"code stroke\" or \"stroke alert\" been called? ->No Decision Support Exception - unselect if not a suspected or confirmed emergency medical condition->Emergency Medical Condition (MA) Reason for Exam: fall/vertigo/head injury, hit back of head FINDINGS: BRAIN/VENTRICLES: There is no acute intracranial hemorrhage, mass effect or midline shift. No abnormal extra-axial fluid collection. The gray-white differentiation is maintained without evidence of an acute infarct. There is no evidence of hydrocephalus. Chronic atrophic changes are similar to the prior exam. ORBITS: The visualized portion of the orbits demonstrate no acute abnormality. SINUSES: The visualized paranasal sinuses and mastoid air cells demonstrate no acute abnormality. SOFT TISSUES/SKULL:  No acute abnormality of the visualized skull or soft tissues. No acute intracranial abnormality. XR CHEST PORTABLE    Result Date: 9/13/2022  EXAMINATION: ONE XRAY VIEW OF THE CHEST 9/13/2022 7:59 pm COMPARISON: 09/12/2022 HISTORY: ORDERING SYSTEM PROVIDED HISTORY: vertigo fall/recent bacteremia TECHNOLOGIST PROVIDED HISTORY: Reason for exam:->vertigo fall/recent bacteremia Reason for Exam: vertigo fall/recent bacteremia FINDINGS: The heart and pulmonary vascularity are within normal limits for portable exam.  There is no focal consolidation or pleural effusion. There is no evidence of pneumothorax.      No acute abnormality     XR CHEST 1 VIEW    Result Date: 9/12/2022  EXAMINATION: ONE XRAY VIEW OF THE CHEST 9/12/2022 10:18 am COMPARISON: 09/06/2022 and 08/15/2022. HISTORY: ORDERING SYSTEM PROVIDED HISTORY: Micra Pacemaker Insertion TECHNOLOGIST PROVIDED HISTORY: Reason for exam:->Micra Pacemaker Insertion Reason for Exam: Micra Pacemaker Insertion FINDINGS: Up pacemaker has been implanted in overlies the ventricles. Lung volumes are small. There is no confluent consolidation or effusion. There is borderline cardiomegaly. There is no vascular congestion or interstitial prominence. 1. Pacemaker placement without evident complication. 2. Small lung volumes with no confluent airspace consolidation. 3. Borderline cardiomegaly. PROCEDURES   Unless otherwise noted below, none     Procedures    CRITICAL CARE TIME   N/A    CONSULTS:  None    EMERGENCY DEPARTMENT COURSE and DIFFERENTIAL DIAGNOSIS/MDM:   Vitals:    Vitals:    09/13/22 1901 09/13/22 1903 09/13/22 1915   BP: 89/69 100/62 120/62   Pulse: 75  69   Resp: 13  19   Temp: 98.3 °F (36.8 °C)     TempSrc: Oral     SpO2: 100%  99%   Weight: 246 lb 4.1 oz (111.7 kg)     Height: 6' 2\" (1.88 m)         Patient was given the following medications:  Medications - No data to display    He has been stable. Suspicious that his primary problem is vertigo. He also did have atrioventricular block. He lives alone and at this point I does not think it safe for me just to discharge him home again. Otherwise all of his diagnostics are reassuring. Is this patient to be included in the SEP-1 Core Measure due to severe sepsis or septic shock? No   Exclusion criteria - the patient is NOT to be included for SEP-1 Core Measure due to: Infection is not suspected    FINAL IMPRESSION      1. Syncope and collapse    2.  Dizziness          DISPOSITION/PLAN   DISPOSITION Decision To Admit 09/13/2022 10:15:09 PM      PATIENT REFERRED TO:  No follow-up provider specified.     DISCHARGE MEDICATIONS:  New Prescriptions    No medications on file       DISCONTINUED MEDICATIONS:  Discontinued Medications    No medications on file              (Please note that portions of this note were completed with a voice recognition program.  Efforts were made to editthe dictations but occasionally words are mis-transcribed.)    Maikol Minaya MD (electronically signed)            Maikol Minaya MD  09/13/22 0724

## 2022-09-14 NOTE — CARE COORDINATION
09/14/22 1020   Readmission Assessment   Number of Days since last admission? 1-7 days   Previous Disposition Home with Home Health   Who is being Interviewed Patient   What was the patient's/caregiver's perception as to why they think they needed to return back to the hospital? Other (Comment)  (the room started spinning & i passed out again)   Did you visit your Primary Care Physician after you left the hospital, before you returned this time? No   Why weren't you able to visit your PCP? Did not have an appointment   Did you see a specialist, such as Cardiac, Pulmonary, Orthopedic Physician, etc. after you left the hospital? No   Who advised the patient to return to the hospital? Self-referral   Does the patient report anything that got in the way of taking their medications? No   In our efforts to provide the best possible care to you and others like you, can you think of anything that we could have done to help you after you left the hospital the first time, so that you might not have needed to return so soon?  Other (Comment)  (I had asked to stay an extra day last time)     Nannette Deshpande RN, BSN,   [phone]  Electronically signed by Nannette Deshpande RN on 9/14/2022 at 10:22 AM

## 2022-09-14 NOTE — PLAN OF CARE
Problem: Discharge Planning  Goal: Discharge to home or other facility with appropriate resources  9/14/2022 1022 by Wilbert Neumann RN  Outcome: Progressing  : Progressing  Flowsheets (Taken 9/14/2022 0038)  Discharge to home or other facility with appropriate resources: Identify barriers to discharge with patient and caregiver     Problem: Pain  Goal: Verbalizes/displays adequate comfort level or baseline comfort level  9/14/2022 1022 by Wilbert Neumann RN  Outcome: Progressing       Problem: Skin/Tissue Integrity  Goal: Absence of new skin breakdown  Description: 1. Monitor for areas of redness and/or skin breakdown  2. Assess vascular access sites hourly  3. Every 4-6 hours minimum:  Change oxygen saturation probe site  4. Every 4-6 hours:  If on nasal continuous positive airway pressure, respiratory therapy assess nares and determine need for appliance change or resting period.   9/14/2022 1022 by Wilbert Neumann RN  Outcome: Progressing       Problem: Safety - Adult  Goal: Free from fall injury  9/14/2022 1022 by Wilbert Neumann RN  Outcome: Progressing       Problem: ABCDS Injury Assessment  Goal: Absence of physical injury  9/14/2022 1022 by Wilbert Nemuann RN  Outcome: Progressing    Flowsheets (Taken 9/14/2022 0028)  Absence of Physical Injury: Implement safety measures based on patient assessment     Problem: Chronic Conditions and Co-morbidities  Goal: Patient's chronic conditions and co-morbidity symptoms are monitored and maintained or improved  Outcome: Progressing  Flowsheets (Taken 9/14/2022 0030 by Poppy Rapp RN)  Care Plan - Patient's Chronic Conditions and Co-Morbidity Symptoms are Monitored and Maintained or Improved: Monitor and assess patient's chronic conditions and comorbid symptoms for stability, deterioration, or improvement

## 2022-09-14 NOTE — PROGRESS NOTES
Occupational Therapy Attempt  Aidan Thomas    OT order noted. Patient with symptomatic +orthostatics. Nursing reports he is currently dizzy in supine.  Will continue to follow in PM.    Electronically signed by Nidia Mohs, OT on 9/14/22 at 11:37 AM EDT

## 2022-09-14 NOTE — PROGRESS NOTES
Physical Therapy  Initial Evaluation Attempt    22    Name: Mason Masters   : 1955    MRN: [id number]    PT order noted. Patient with symptomatic +orthostatics. Nursing reports he is currently dizzy in supine.  Will continue to follow in PM.    Electronically signed by Jak Daniels PT on 2022 at 11:36 AM

## 2022-09-14 NOTE — CARE COORDINATION
Wound care consulted for foot wounds. Pt has non-healing chronic foot wounds with an outpatient podiatrist seen once a month. Last admit saw podiatry where wounds were debrided. discharged yesterday and readmit same day. Will continue previous treatment. Will not follow. Wound care orders as follows:  Aquacel ag placed to wound bed, cover with dry dressing, kerlix. Change every other day.    Electronically signed by Cal Samano RN [address] on 9/14/2022 at 2:18 PM

## 2022-09-15 LAB
ANION GAP SERPL CALCULATED.3IONS-SCNC: 9 MMOL/L (ref 3–16)
BASOPHILS ABSOLUTE: 0 K/UL (ref 0–0.2)
BASOPHILS RELATIVE PERCENT: 0.6 %
BUN BLDV-MCNC: 13 MG/DL (ref 7–20)
CALCIUM SERPL-MCNC: 9.1 MG/DL (ref 8.3–10.6)
CHLORIDE BLD-SCNC: 105 MMOL/L (ref 99–110)
CO2: 24 MMOL/L (ref 21–32)
CREAT SERPL-MCNC: 0.6 MG/DL (ref 0.8–1.3)
EOSINOPHILS ABSOLUTE: 0.2 K/UL (ref 0–0.6)
EOSINOPHILS RELATIVE PERCENT: 3.6 %
GFR AFRICAN AMERICAN: >60
GFR NON-AFRICAN AMERICAN: >60
GLUCOSE BLD-MCNC: 117 MG/DL (ref 70–99)
GLUCOSE BLD-MCNC: 118 MG/DL (ref 70–99)
GLUCOSE BLD-MCNC: 126 MG/DL (ref 70–99)
GLUCOSE BLD-MCNC: 148 MG/DL (ref 70–99)
GLUCOSE BLD-MCNC: 230 MG/DL (ref 70–99)
HCT VFR BLD CALC: 28.1 % (ref 40.5–52.5)
HEMOGLOBIN: 9 G/DL (ref 13.5–17.5)
LYMPHOCYTES ABSOLUTE: 1.7 K/UL (ref 1–5.1)
LYMPHOCYTES RELATIVE PERCENT: 25.7 %
MCH RBC QN AUTO: 23.7 PG (ref 26–34)
MCHC RBC AUTO-ENTMCNC: 32 G/DL (ref 31–36)
MCV RBC AUTO: 73.8 FL (ref 80–100)
MONOCYTES ABSOLUTE: 0.5 K/UL (ref 0–1.3)
MONOCYTES RELATIVE PERCENT: 7.7 %
NEUTROPHILS ABSOLUTE: 4 K/UL (ref 1.7–7.7)
NEUTROPHILS RELATIVE PERCENT: 62.4 %
PDW BLD-RTO: 19 % (ref 12.4–15.4)
PERFORMED ON: ABNORMAL
PLATELET # BLD: 233 K/UL (ref 135–450)
PMV BLD AUTO: 7.2 FL (ref 5–10.5)
POTASSIUM REFLEX MAGNESIUM: 4.5 MMOL/L (ref 3.5–5.1)
RBC # BLD: 3.81 M/UL (ref 4.2–5.9)
SODIUM BLD-SCNC: 138 MMOL/L (ref 136–145)
WBC # BLD: 6.4 K/UL (ref 4–11)

## 2022-09-15 PROCEDURE — 97530 THERAPEUTIC ACTIVITIES: CPT

## 2022-09-15 PROCEDURE — 36415 COLL VENOUS BLD VENIPUNCTURE: CPT

## 2022-09-15 PROCEDURE — 1200000000 HC SEMI PRIVATE

## 2022-09-15 PROCEDURE — 97166 OT EVAL MOD COMPLEX 45 MIN: CPT

## 2022-09-15 PROCEDURE — 2500000003 HC RX 250 WO HCPCS: Performed by: INTERNAL MEDICINE

## 2022-09-15 PROCEDURE — 6370000000 HC RX 637 (ALT 250 FOR IP): Performed by: INTERNAL MEDICINE

## 2022-09-15 PROCEDURE — 80048 BASIC METABOLIC PNL TOTAL CA: CPT

## 2022-09-15 PROCEDURE — 94760 N-INVAS EAR/PLS OXIMETRY 1: CPT

## 2022-09-15 PROCEDURE — 97162 PT EVAL MOD COMPLEX 30 MIN: CPT

## 2022-09-15 PROCEDURE — 85025 COMPLETE CBC W/AUTO DIFF WBC: CPT

## 2022-09-15 PROCEDURE — 2580000003 HC RX 258: Performed by: INTERNAL MEDICINE

## 2022-09-15 PROCEDURE — 6360000002 HC RX W HCPCS: Performed by: INTERNAL MEDICINE

## 2022-09-15 RX ORDER — MECLIZINE HCL 12.5 MG/1
12.5 TABLET ORAL 3 TIMES DAILY PRN
Status: DISCONTINUED | OUTPATIENT
Start: 2022-09-15 | End: 2022-09-21 | Stop reason: HOSPADM

## 2022-09-15 RX ORDER — MIDODRINE HYDROCHLORIDE 5 MG/1
5 TABLET ORAL
Status: DISCONTINUED | OUTPATIENT
Start: 2022-09-15 | End: 2022-09-18

## 2022-09-15 RX ADMIN — SODIUM CHLORIDE, PRESERVATIVE FREE 10 ML: 5 INJECTION INTRAVENOUS at 11:02

## 2022-09-15 RX ADMIN — MIDODRINE HYDROCHLORIDE 5 MG: 5 TABLET ORAL at 17:07

## 2022-09-15 RX ADMIN — TAMSULOSIN HYDROCHLORIDE 0.4 MG: 0.4 CAPSULE ORAL at 17:06

## 2022-09-15 RX ADMIN — INSULIN LISPRO 2 UNITS: 100 INJECTION, SOLUTION INTRAVENOUS; SUBCUTANEOUS at 12:19

## 2022-09-15 RX ADMIN — BNT162B2 ORIGINAL AND OMICRON BA.4/BA.5 0.3 ML: .1125; .1125 INJECTION, SUSPENSION INTRAMUSCULAR at 12:22

## 2022-09-15 RX ADMIN — ENOXAPARIN SODIUM 30 MG: 100 INJECTION SUBCUTANEOUS at 08:29

## 2022-09-15 RX ADMIN — ENOXAPARIN SODIUM 30 MG: 100 INJECTION SUBCUTANEOUS at 21:37

## 2022-09-15 RX ADMIN — ATORVASTATIN CALCIUM 20 MG: 20 TABLET, FILM COATED ORAL at 21:37

## 2022-09-15 RX ADMIN — MIDODRINE HYDROCHLORIDE 5 MG: 5 TABLET ORAL at 12:19

## 2022-09-15 RX ADMIN — SODIUM CHLORIDE, PRESERVATIVE FREE 10 ML: 5 INJECTION INTRAVENOUS at 21:37

## 2022-09-15 RX ADMIN — AMOXICILLIN AND CLAVULANATE POTASSIUM 1 TABLET: 875; 125 TABLET, FILM COATED ORAL at 21:36

## 2022-09-15 RX ADMIN — ASPIRIN 81 MG: 81 TABLET, COATED ORAL at 08:28

## 2022-09-15 RX ADMIN — INSULIN GLARGINE 10 UNITS: 100 INJECTION, SOLUTION SUBCUTANEOUS at 21:37

## 2022-09-15 RX ADMIN — AMOXICILLIN AND CLAVULANATE POTASSIUM 1 TABLET: 875; 125 TABLET, FILM COATED ORAL at 08:28

## 2022-09-15 NOTE — PROGRESS NOTES
Occupational Therapy  Facility/Department: [facility]  Occupational Therapy Initial Assessment    Name: Helena Laughlin  : 1955  MRN: [id number]  Date of Service: 9/15/2022    Discharge Recommendations:  3-5 sessions per week, Patient would benefit from continued therapy after discharge     Helena Laughlin scored a 17/24 on the AM-PAC ADL Inpatient form. Current research shows that an AM-PAC score of 17 or less is typically not associated with a discharge to the patient's home setting. Based on the patient's AM-PAC score and their current ADL deficits, it is recommended that the patient have 3-5 sessions per week of Occupational Therapy at d/c to increase the patient's independence. Please see assessment section for further patient specific details. If patient discharges prior to next session this note will serve as a discharge summary. Please see below for the latest assessment towards goals. Patient Diagnosis(es): The primary encounter diagnosis was Syncope and collapse. A diagnosis of Dizziness was also pertinent to this visit. Past Medical History:  has a past medical history of Diabetes mellitus ([facility] [address] Gallstones, Lymphedema, Neuropathy, Pancreatitis, PVD (peripheral vascular disease) ([facility] [address] and Ulcers of both lower legs ([facility] [address] Past Surgical History:  has a past surgical history that includes Foot surgery (Left, 1967); ERCP (2014); Cholecystectomy, laparoscopic (N/A, 08/10/2021); and Pacemaker insertion. Treatment Diagnosis: Syncope and collapse      Assessment   Performance deficits / Impairments: Decreased functional mobility ; Decreased ADL status; Decreased endurance;Decreased balance  Assessment: Chandler Desir is a 74 y.o. male who presented to the ED with high fever and dizziness. He has recent history of pneunomia and had concern for recurrent infection.   Pt was admitted from - and had pacemaker placed; he then had an episode of syncope ~15 mins after returning home and returned to the hospital. He lives alone and was IND with ADL's prior to admission (sponge bathed) and ambulated in his home using a cane however he was unable to navigate the steps to enter and had several recent falls. Pt is below baseline today, most limited by decreased strength/endurance and dizziness. He required min A for fxl transfers and fxl mobility short distance using RW. Anticipate max A for LB ADLs. WIll continue to see on acute in order to address the above deficits and maximize pt's fxl performance. Pt is not safe to return home alone and demonstrates need for ongoing skilled OT 3-5 times/week at d/c to increase pt's safety and independence. Treatment Diagnosis: Syncope and collapse  Prognosis: Good  Decision Making: Medium Complexity  REQUIRES OT FOLLOW-UP: Yes  Activity Tolerance  Activity Tolerance: Patient limited by fatigue;Treatment limited secondary to medical complications (free text)  Activity Tolerance Comments: Still w/ c/o dizziness but able to tolerate OOB        Plan   Plan  Times per Week: 3-5  Current Treatment Recommendations: Strengthening, Balance training, Endurance training, Self-Care / ADL, Equipment evaluation, education, & procurement, Safety education & training     Restrictions  Restrictions/Precautions  Restrictions/Precautions: Fall Risk  Position Activity Restriction  Other position/activity restrictions: 9/15/22 orthostatics: supine 155/78, seated 125/68, standing 118/72    Subjective   General  Chart Reviewed: Yes  Patient assessed for rehabilitation services?: Yes  Additional Pertinent Hx: per H&P: \"The patient is a 74 y.o. male with hx type 2 DM, PVD, bilateral food ulcers who presents to [facility] with syncope and collapse. Patient was just discharged yesterday. He was hospitalized for fever and shortness of breath. He was noted to have a high grade AV [place] block and had a micra pacemaker placed on 9/12 by cardiology.  He was also seen with a lower extremity cellulitis, Proteus bacteremia, and chronic wounds seen by both podiatry and ID and discharged on PO Augmentin. He got home and within 15 minutes of being home raised up and had a syncopal episode. He came back to the ED for further evaluation. States that he has been getting lightheaded and dizzy both at rest and with movement. He has a room spinning sensation from movement. The lightheadedness and dizziness worsens with position change. \"  Family / Caregiver Present: No  Referring Practitioner: Thais Gates  Diagnosis: Syncope and collapse  Subjective  Subjective: Pt met b/s for OT eval/tx w/ PT. Pt in bed, agreeable to therapy. Reports \"I haven't felt good in months\"     Social/Functional History  Social/Functional History  Lives With: Alone  Type of Home: House  Home Layout: Multi-level, Able to Live on Main level with bedroom/bathroom, Laundry in basement (Washes clothes in sink; doesn't go to lower level.)  Home Access: Stairs to enter with rails (Reports 10-11 steps with handrail although pt never leaves home (only with EMS if to hospital). )  Entrance Stairs - Number of Steps: 11 DONNY  Entrance Stairs - Rails: Both (far spaced)  Bathroom Shower/Tub: Shower chair with back, Tub/Shower unit (Sponge bathes only.)  Bathroom Toilet: Bedside commode  Bathroom Equipment: Toilet raiser, Shower chair, Grab bars in shower  Bathroom Accessibility: Accessible  Home Equipment: Maxwell Segal, standard, Maryse Youssefr, 4 wheeled, [address], Tina Byrnes, quad  Neymar Help From: Personal care attendant (Wound care nurse 3x/wk. Visiting MD/Nurse 1x/month. HHA 1x/wk (get groceries,etc). )  ADL Assistance: Independent (Sponge bathes.)  Homemaking Assistance:  (No assist; does what he can.   Washes clothes in sink.)  Ambulation Assistance: Independent (With Cane in home; doesn't go out.)  Transfer Assistance: Independent  Active : No (HHA obtains groceries.)  Patient's  Info: Pt reports he does not drive  Mode of Transportation: Friends, Cab  Occupation: Unemployed  Type of Occupation: States he never worked  IADL Comments: Sleeps on recliner or couch. Additional Comments: Visiting Nurse 3x/wk. HHA 1x/wk for grocery shopping. Objective   Safety Devices  Type of Devices: All fall risk precautions in place;Call light within reach;Gait belt;Patient at risk for falls; Left in chair;Chair alarm in place;Nurse notified  Balance  Sitting: Intact  Standing: With support (RW)  Gait  Overall Level of Assistance: Minimum assistance (Pt amb. ~5 feet from bed > chair w/ CGA/min A using RW. Some decreased safety noted)     AROM: Generally decreased, functional  PROM: Generally decreased, functional  Strength: Within functional limits  Coordination: Within functional limits  Tone: Normal  Sensation: Intact  ADL  Additional Comments: Pt declined ADLs.  Anticipate independent w/ feeding, setup grooming, SBA UB bathing/dressing, max A LB bathing/dressing, mod A toileting based on ROM, strength, endurance, and episodes of dizziness     Activity Tolerance  Activity Tolerance: Patient limited by endurance  Bed mobility  Supine to Sit: Stand by assistance  Sit to Supine: Unable to assess (in recliner at end of session)  Transfers  Sit to stand: Minimal assistance  Stand to sit: Minimal assistance  Transfer Comments: RW  Vision  Vision: Impaired  Vision Exceptions: Wears glasses for reading  Hearing  Hearing: Within functional limits  Cognition  Overall Cognitive Status: WFL  Cognition Comment: Some decreased safety/insight  Orientation  Overall Orientation Status: Within Functional Limits                  Education Given To: Patient  Education Provided: Role of Therapy;Plan of Care;Transfer Training  Education Method: Verbal  Barriers to Learning: None  Education Outcome: Verbalized understanding        AM-PAC Score  AM-[facility] Inpatient Daily Activity Raw Score: 17 (09/15/22 0905)  AM-PAC Inpatient ADL T-Scale Score : 37.26 (09/15/22 7749)  ADL Inpatient CMS 0-100% Score: 50.11 (09/15/22 0905)  ADL Inpatient CMS G-Code Modifier : CK (09/15/22 0905)      Goals  Short Term Goals  Time Frame for Short term goals: Prior to d/c  Short Term Goal 1: Toileting with CGA  Short Term Goal 2: Household transfers with SBA and LRAD  Short Term Goal 3: Dressing with min A using AE prn  Short Term Goal 4: Sponge bathing with setup  Long Term Goals  Time Frame for Long term goals : LTG=STG  Patient Goals   Patient goals :  To get home       Therapy Time   Individual Concurrent Group Co-treatment   Time In 0820         Time Out 0900         Minutes 40         Timed Code Treatment Minutes: 25 Minutes (15 min eval)       Gladis Mcmahon OTR/L [id number]

## 2022-09-15 NOTE — PROGRESS NOTES
Hospitalist Progress Note      PCP: Libia Arcos MD    Date of Admission: 9/13/2022    Chief Complaint: syncope and collapse    Hospital Course: The patient is a 74 y.o. male with hx type 2 DM, PVD, bilateral food ulcers who presents to [facility] with syncope and collapse. Patient was just discharged yesterday. He was hospitalized for fever and shortness of breath. He was noted to have a high grade AV [place] block and had a micra pacemaker placed on 9/12 by cardiology. He was also seen with a lower extremity cellulitis, Proteus bacteremia, and chronic wounds seen by both podiatry and ID and discharged on PO Augmentin. He got home and within 15 minutes of being home raised up and had a syncopal episode. He came back to the ED for further evaluation. States that he has been getting lightheaded and dizzy both at rest and with movement. He has a room spinning sensation from movement. The lightheadedness and dizziness worsens with position change. Denies fever, chills, chest pain, shortness of breath, abdominal pain, nausea, vomiting, constipation, diarrhea, and dysuria. In the ED, labs were significant for a glucose of 103, hemoglobin of 8.6. CT Head without contrast was unremarkable. CTA Head and Neck with contrast was unremarkable. Subjective: Pt seen and examined. He feels ok. Still with dizziness and lightheadedness.        Medications:  Reviewed    Infusion Medications    dextrose      sodium chloride       Scheduled Medications    midodrine  5 mg Oral TID WC    tamsulosin  0.4 mg Oral QPM    [Held by provider] lisinopril  5 mg Oral Daily    insulin glargine  10 Units SubCUTAneous Nightly    atorvastatin  20 mg Oral Nightly    aspirin  81 mg Oral Daily    amoxicillin-clavulanate  1 tablet Oral BID    sodium chloride flush  10 mL IntraVENous 2 times per day    enoxaparin  30 mg SubCUTAneous BID    insulin lispro  0-8 Units SubCUTAneous TID WC    insulin lispro  0-4 Units SubCUTAneous 48* 48*   ALT 25 37 40   BILIDIR  --   --  <0.2   BILITOT 0.4 0.6 0.4   ALKPHOS 74 73 82     No results for input(s): INR in the last 72 hours. No results for input(s): Diane Kras in the last 72 hours. Urinalysis:      Lab Results   Component Value Date/Time    NITRU Negative 09/06/2022 03:41 PM    WBCUA 1 08/15/2022 11:16 AM    BACTERIA None Seen 08/15/2022 11:16 AM    RBCUA 2 08/15/2022 11:16 AM    BLOODU Negative 09/06/2022 03:41 PM    SPECGRAV 1.020 09/06/2022 03:41 PM    GLUCOSEU Negative 09/06/2022 03:41 PM       Radiology:  CTA HEAD NECK W CONTRAST   Final Result   1. No flow limiting stenosis of the cervical carotid/vertebral arteries. 2. No significant stenosis of the [id number]. CT HEAD WO CONTRAST   Final Result   No hemorrhage or mass identified      Mild small vessel ischemic change, similar to prior. Trace paranasal sinus disease         CT Head W/O Contrast   Final Result   No acute intracranial abnormality.          XR CHEST PORTABLE   Final Result   No acute abnormality                 Assessment/Plan:    Active Hospital Problems    Diagnosis Date Noted    Vertigo [R42] 11/27/2020       Syncope and collapse, chronic vertigo with associated nausea and vomiting - suspect due to central or peripheral vertigo.   -unable to get MRI due to recent pacemaker placement  -neurology consulted  -CTA Head and Neck with contrast showed no posterior circulation abnormalities  -PT/OT evaluation  -patient may benefit from vestibular rehab  -tele monitoring  -hold lisinpril, start low dose midodrine and added meclizine PRN     Complete heart block s/p micra PPM implantation 9/12  -EP consulted, recs appreciated  -pacemaker interrogation ok  -tele monitoring     Orthostatic hypotension  -will give fluid bolus  -stop lisinopril  -patient is still orthostatic /70 -> 110/62  -continue to check daily orthostatic vitals  -added low dose midodrine     Recent right foot infection -continue PO Augmentin  -wound care consult     Type 2 DM  -Lantus  -SSI  -hypoglycemia protocol  -POCT glucose checks  -carb control diet     Hyperlipidemia  -continue home statin    DVT Prophylaxis: Lovenox  Diet: ADULT DIET;  Regular; 5 carb choices (75 gm/meal)  Code Status: Full Code    PT/OT Eval Status: ordered, recommending SNF    Dispo - continue care    Sherman Marin MD

## 2022-09-15 NOTE — PROGRESS NOTES
Pt slept well intermittently this shift. Pt sitting up in bed watching television states not experiencing any pain. Changed bed linens and gave pt warmed blanket. Put new dressings with Ag and silicone border on both wounds on feet.

## 2022-09-16 LAB
ANION GAP SERPL CALCULATED.3IONS-SCNC: 4 MMOL/L (ref 3–16)
BASOPHILS ABSOLUTE: 0.1 K/UL (ref 0–0.2)
BASOPHILS RELATIVE PERCENT: 0.8 %
BUN BLDV-MCNC: 13 MG/DL (ref 7–20)
CALCIUM SERPL-MCNC: 9.1 MG/DL (ref 8.3–10.6)
CHLORIDE BLD-SCNC: 103 MMOL/L (ref 99–110)
CO2: 28 MMOL/L (ref 21–32)
CREAT SERPL-MCNC: 0.6 MG/DL (ref 0.8–1.3)
EOSINOPHILS ABSOLUTE: 0.2 K/UL (ref 0–0.6)
EOSINOPHILS RELATIVE PERCENT: 3 %
GFR AFRICAN AMERICAN: >60
GFR NON-AFRICAN AMERICAN: >60
GLUCOSE BLD-MCNC: 120 MG/DL (ref 70–99)
GLUCOSE BLD-MCNC: 122 MG/DL (ref 70–99)
GLUCOSE BLD-MCNC: 129 MG/DL (ref 70–99)
GLUCOSE BLD-MCNC: 140 MG/DL (ref 70–99)
GLUCOSE BLD-MCNC: 198 MG/DL (ref 70–99)
HCT VFR BLD CALC: 28.9 % (ref 40.5–52.5)
HEMOGLOBIN: 9.2 G/DL (ref 13.5–17.5)
LYMPHOCYTES ABSOLUTE: 1.4 K/UL (ref 1–5.1)
LYMPHOCYTES RELATIVE PERCENT: 19.9 %
MCH RBC QN AUTO: 23.4 PG (ref 26–34)
MCHC RBC AUTO-ENTMCNC: 31.9 G/DL (ref 31–36)
MCV RBC AUTO: 73.3 FL (ref 80–100)
MONOCYTES ABSOLUTE: 0.6 K/UL (ref 0–1.3)
MONOCYTES RELATIVE PERCENT: 8.2 %
NEUTROPHILS ABSOLUTE: 4.9 K/UL (ref 1.7–7.7)
NEUTROPHILS RELATIVE PERCENT: 68.1 %
PDW BLD-RTO: 19 % (ref 12.4–15.4)
PERFORMED ON: ABNORMAL
PLATELET # BLD: 249 K/UL (ref 135–450)
PMV BLD AUTO: 7 FL (ref 5–10.5)
POTASSIUM REFLEX MAGNESIUM: 4.8 MMOL/L (ref 3.5–5.1)
RBC # BLD: 3.95 M/UL (ref 4.2–5.9)
SODIUM BLD-SCNC: 135 MMOL/L (ref 136–145)
WBC # BLD: 7.2 K/UL (ref 4–11)

## 2022-09-16 PROCEDURE — 6370000000 HC RX 637 (ALT 250 FOR IP): Performed by: INTERNAL MEDICINE

## 2022-09-16 PROCEDURE — 36415 COLL VENOUS BLD VENIPUNCTURE: CPT

## 2022-09-16 PROCEDURE — 85025 COMPLETE CBC W/AUTO DIFF WBC: CPT

## 2022-09-16 PROCEDURE — 6360000002 HC RX W HCPCS: Performed by: INTERNAL MEDICINE

## 2022-09-16 PROCEDURE — 1200000000 HC SEMI PRIVATE

## 2022-09-16 PROCEDURE — 2580000003 HC RX 258: Performed by: INTERNAL MEDICINE

## 2022-09-16 PROCEDURE — 80048 BASIC METABOLIC PNL TOTAL CA: CPT

## 2022-09-16 PROCEDURE — 97530 THERAPEUTIC ACTIVITIES: CPT

## 2022-09-16 RX ADMIN — ENOXAPARIN SODIUM 30 MG: 100 INJECTION SUBCUTANEOUS at 10:07

## 2022-09-16 RX ADMIN — ENOXAPARIN SODIUM 30 MG: 100 INJECTION SUBCUTANEOUS at 20:44

## 2022-09-16 RX ADMIN — SODIUM CHLORIDE, PRESERVATIVE FREE 10 ML: 5 INJECTION INTRAVENOUS at 20:55

## 2022-09-16 RX ADMIN — SODIUM CHLORIDE, PRESERVATIVE FREE 10 ML: 5 INJECTION INTRAVENOUS at 10:08

## 2022-09-16 RX ADMIN — MIDODRINE HYDROCHLORIDE 5 MG: 5 TABLET ORAL at 18:28

## 2022-09-16 RX ADMIN — MIDODRINE HYDROCHLORIDE 5 MG: 5 TABLET ORAL at 10:07

## 2022-09-16 RX ADMIN — AMOXICILLIN AND CLAVULANATE POTASSIUM 1 TABLET: 875; 125 TABLET, FILM COATED ORAL at 10:07

## 2022-09-16 RX ADMIN — TAMSULOSIN HYDROCHLORIDE 0.4 MG: 0.4 CAPSULE ORAL at 18:28

## 2022-09-16 RX ADMIN — MIDODRINE HYDROCHLORIDE 5 MG: 5 TABLET ORAL at 12:36

## 2022-09-16 RX ADMIN — ATORVASTATIN CALCIUM 20 MG: 20 TABLET, FILM COATED ORAL at 20:44

## 2022-09-16 RX ADMIN — INSULIN GLARGINE 10 UNITS: 100 INJECTION, SOLUTION SUBCUTANEOUS at 21:57

## 2022-09-16 RX ADMIN — MECLIZINE 12.5 MG: 12.5 TABLET ORAL at 10:07

## 2022-09-16 RX ADMIN — ASPIRIN 81 MG: 81 TABLET, COATED ORAL at 10:07

## 2022-09-16 RX ADMIN — AMOXICILLIN AND CLAVULANATE POTASSIUM 1 TABLET: 875; 125 TABLET, FILM COATED ORAL at 20:44

## 2022-09-16 ASSESSMENT — PAIN SCALES - GENERAL
PAINLEVEL_OUTOF10: 0

## 2022-09-16 ASSESSMENT — PAIN SCALES - WONG BAKER: WONGBAKER_NUMERICALRESPONSE: 0

## 2022-09-16 NOTE — PLAN OF CARE
Problem: Discharge Planning  Goal: Discharge to home or other facility with appropriate resources  Outcome: Progressing  Flowsheets (Taken 9/16/2022 1039)  Discharge to home or other facility with appropriate resources:   Identify barriers to discharge with patient and caregiver   Identify discharge learning needs (meds, wound care, etc)     Problem: Pain  Goal: Verbalizes/displays adequate comfort level or baseline comfort level  Outcome: Progressing  Flowsheets (Taken 9/16/2022 1039)  Verbalizes/displays adequate comfort level or baseline comfort level:   Encourage patient to monitor pain and request assistance   Administer analgesics based on type and severity of pain and evaluate response  Note: Pt able to express presence and absence of pain using numerical pain scale. Pt pain is managed by PRN analgesics as ordered by MD. Pain reassess after each interventions. Will continue to monitor as needed. Problem: Skin/Tissue Integrity  Goal: Absence of new skin breakdown  Description: 1. Monitor for areas of redness and/or skin breakdown  2. Assess vascular access sites hourly  3. Every 4-6 hours minimum:  Change oxygen saturation probe site  4. Every 4-6 hours:  If on nasal continuous positive airway pressure, respiratory therapy assess nares and determine need for appliance change or resting period. Outcome: Progressing  Note: Skin assessment performed each shift per protocol. Pt encouraged to reposition every two hours and often. Tiffanie area kept clean and dry. Protective dressing applied on coccyx. Will continue to monitor and assess for skin breakdown. Problem: Safety - Adult  Goal: Free from fall injury  Outcome: Progressing  Flowsheets (Taken 9/16/2022 1039)  Free From Fall Injury: Instruct family/caregiver on patient safety  Note: Pt free from falls this shift. Non skid socks provided. Pt educated on use of call light as needed for assistance. Bed alarm in.   Call light always within reach. Pt able and agreeable to contact for safety appropriately.        Problem: ABCDS Injury Assessment  Goal: Absence of physical injury  Outcome: Progressing  Flowsheets (Taken 9/16/2022 1039)  Absence of Physical Injury: Implement safety measures based on patient assessment     Problem: Chronic Conditions and Co-morbidities  Goal: Patient's chronic conditions and co-morbidity symptoms are monitored and maintained or improved  Outcome: Progressing  Flowsheets (Taken 9/16/2022 1039)  Care Plan - Patient's Chronic Conditions and Co-Morbidity Symptoms are Monitored and Maintained or Improved:   Monitor and assess patient's chronic conditions and comorbid symptoms for stability, deterioration, or improvement   Collaborate with multidisciplinary team to address chronic and comorbid conditions and prevent exacerbation or deterioration

## 2022-09-16 NOTE — PROGRESS NOTES
Alexy legs wrapped with acewraps as ordered by MD. Electronically signed by Estella Chambers RN on 9/16/2022 at 4:05 PM

## 2022-09-16 NOTE — PROGRESS NOTES
Pt reports feeling dizzy and room spinning while working with PT at bedside. Pt also positive for orthostatic vitals. Pt medicated with prn Antivert and scheduled midodrine. pt in bed. Denies other needs at present. Bed alarm on. Call light and item need in reach. Electronically signed by Americo Brandon RN on 9/16/2022 at 10:20 AM

## 2022-09-16 NOTE — PROGRESS NOTES
Physical Therapy  Facility/Department: [address] MED SURG  Physical Therapy Treatment session    Name: Marcelo Haas  : 1955  MRN: [id number]  Date of Service: 2022  Marcelo Haas scored a 12/24 on the AM-PAC short mobility form. Current research shows that an AM-PAC score of 17 or less is typically not associated with a discharge to the patient's home setting. Based on the patient's AM-PAC score and their current functional mobility deficits, it is recommended that the patient have 3-5 sessions per week of Physical Therapy at d/c to increase the patient's independence. Please see assessment section for further patient specific details. If patient discharges prior to next session this note will serve as a discharge summary. Please see below for the latest assessment towards goals. Discharge Recommendations:  Patient would benefit from continued therapy after discharge, Continue to assess pending progress   PT Equipment Recommendations  Equipment Needed: No  Other: Will monitor for potential equipt needs. Patient Diagnosis(es): The primary encounter diagnosis was Syncope and collapse. A diagnosis of Dizziness was also pertinent to this visit. Past Medical History:  has a past medical history of Diabetes mellitus ([address] Gallstones, Lymphedema, Neuropathy, Pancreatitis, PVD (peripheral vascular disease) ([address] and Ulcers of both lower legs ([address] Past Surgical History:  has a past surgical history that includes Foot surgery (Left, 1967); ERCP (2014); Cholecystectomy, laparoscopic (N/A, 08/10/2021); and Pacemaker insertion. Assessment   Assessment: Pt is a 74 y.o. male who presented to the ED on 22 one hour after discharge secondary to syncopal episode. He was previously admitted  -  due to septicemia. Prior to previous admission, pt living alone in home setting, independent with ADLs and ambulation with SPC.  This date,  functioning well below baseline, significantly limited by orthostatic hypotension. Recommend continued skilled therapy 3-5x/week to maximize independence and safety with functional mobility. Treatment Diagnosis: impaired mobility  Therapy Prognosis: Fair  Requires PT Follow-Up: Yes  Activity Tolerance  Activity Tolerance: Patient limited by endurance;Treatment limited secondary to medical complications  Activity Tolerance Comments: Orthostatic Hypotension:  Sup.140/73 HR 66; Sit 113/62 with HR 93; Stand - unable to register, sat and BP 76/50 with HR 85 - Symptomatic throughout     Plan   Plan  Plan: 3-5 times per week  Current Treatment Recommendations: Strengthening, Functional mobility training, Transfer training, Gait training, Safety education & training, Patient/Caregiver education & training, Neuromuscular re-education, Balance training, Equipment evaluation, education, & procurement, Therapeutic activities  Safety Devices  Type of Devices: All fall risk precautions in place, Call light within reach, Gait belt, Patient at risk for falls, Nurse notified, Bed alarm in place, Left in bed  Restraints  Restraints Initially in Place: No     Restrictions  Restrictions/Precautions  Restrictions/Precautions: Fall Risk (High)  Position Activity Restriction  Other position/activity restrictions: 9/16/22 orthostatics:  Sup.140/73 HR 66; Sit 113/62 with HR 93; Stand - unable to register, sat and BP 76/50 with HR 85 - Symptomatic throughout     Subjective   General  Chart Reviewed: Yes  Additional Pertinent Hx: Pt is a 74 y.o. male who presented to the ED on 9/13/22 one hour after discharge secondary to syncopal episode. He was previously admitted 9/6 - 9/13 due to septicemia. Response To Previous Treatment: Patient with no complaints from previous session.   Family / Caregiver Present: No  Follows Commands: Within Functional Limits  General Comment  Comments: Supine in bed, agreeable to therapy but reports dizziness and \"Chest tightness\" of 3/10 - with further questioning it appears pain in upper abdomen. RN made aware         Social/Functional History  Social/Functional History  Lives With: Alone  Type of Home: House  Home Layout: Multi-level, Able to Live on Main level with bedroom/bathroom, Laundry in basement (Washes clothes in sink; doesn't go to lower level.)  Home Access: Stairs to enter with rails (Reports 10-11 steps with handrail although pt never leaves home (only with EMS if to hospital). )  Entrance Stairs - Number of Steps: 11 DONNY  Entrance Stairs - Rails: Both (far spaced)  Bathroom Shower/Tub: Shower chair with back, Tub/Shower unit (Sponge bathes only.)  Bathroom Toilet: Bedside commode  Bathroom Equipment: Toilet raiser, Shower chair, Grab bars in shower  Bathroom Accessibility: Accessible  Home Equipment: Chico Sero, standard, Arbutus Showman, 4 wheeled, [address], Collplant, quad  [employer] Help From: Personal care attendant (Wound care nurse 3x/wk. Visiting MD/Nurse 1x/month. HHA 1x/wk (get groceries,etc). )  ADL Assistance: Independent (Sponge bathes.)  Homemaking Assistance:  (No assist; does what he can. Washes clothes in sink.)  Ambulation Assistance: Independent (With Cane in home; doesn't go out.)  Transfer Assistance: Independent  Active : No (HHA obtains groceries.)  Patient's  Info: Pt reports he does not drive  Mode of Transportation: Friends, Cab  Occupation: Unemployed  Type of Occupation: States he never worked  IADL Comments: Sleeps on recliner or couch. Additional Comments: Visiting Nurse 3x/wk. HHA 1x/wk for grocery shopping. Objective                           Bed mobility  Supine to Sit: Supervision (With increased time and effort)  Sit to Supine: Minimal assistance  Bed Mobility Comments: Pt. reports dizziness mildly with sitting  Transfers  Sit to Stand: Minimal Assistance  Stand to sit: Minimal Assistance  Comment: Unsteady, symptomatic with orthostatics.   Pt. required sit quickly d/t fear of passing out  Ambulation  WB Status: Unable this date d/t very significant orthostatics     Balance  Sitting - Static: Good  Sitting - Dynamic: Good  Standing - Static: Fair  Standing - Dynamic: Fair;-                                                             AM-PAC Score  AM-PAC Inpatient Mobility Raw Score : 12 (09/16/22 1014)  AM-PAC Inpatient T-Scale Score : 35.33 (09/16/22 1014)  Mobility Inpatient CMS 0-100% Score: 68.66 (09/16/22 1014)  Mobility Inpatient CMS G-Code Modifier : CL (09/16/22 1014)                 Goals  Short Term Goals  Time Frame for Short term goals: by acute discharge - ongoing 9/16  Short term goal 1: Bed Mob Independent. Short term goal 2: Transfers with assist device Supervision. Short term goal 3: Amb with assist device 25-50' SBA. Patient Goals   Patient goals : non stated       Education  Patient Education  Education Given To: Patient  Education Provided: Role of Therapy;Plan of Care;Transfer Training  Education Provided Comments: Role of PT, POC, Need to call for assist.; Benefit of continued therapy at SNF to improve endurance;  Safety risk associated with returning home without assist.  Education Method: Verbal  Barriers to Learning: None  Education Outcome: Verbalized understanding;Continued education needed      Therapy Time   Individual Concurrent Group Co-treatment   Time In 0936         Time Out 1007         Minutes 31         Timed Code Treatment Minutes: Juli, [address] [facility], [id number]

## 2022-09-16 NOTE — CARE COORDINATION
Talked to pt re: therapy recs; offered CMS star rating list but decined, he wants to go to [facility] where his mother had gone a few times. Let him know it is a 4/5 star rating & left list at bedside in case [facility] can not take him. Called Patience with [address] gave verbal referral.    The Plan for Transition of Care is related to the following treatment goals: strengthening prior to returning home. The Patient was provided with a choice of provider and agrees with the discharge plan. [x] Yes [] No    Freedom of choice list was provided with basic dialogue that supports the patient's individualized plan of care/goals, treatment preferences and shares the quality data associated with the providers. [x] Yes [] No    Alla Ott RN, BSN,   [phone]  Electronically signed by Alla Ott RN on 9/16/2022 at 10:47 AM     [facility] can accept. Linn Cheung RN Care Manager Superviser, attempted to place pre-cert through [facility] but unable; call out to [place] with [facility] to notify her that they will have to initiate pre-cert vm left.     Alla Ott RN, BSN,   [phone]  Electronically signed by Alla Ott RN on 9/16/2022 at 4:21 PM

## 2022-09-16 NOTE — PROGRESS NOTES
Physician Progress Note      PATIENT:               Katherine Concepcion  CSN #:                  [id number]  :                       1955  ADMIT DATE:       2022 6:48 PM  [address] [place] DATE:  Nando Peters  PROVIDER #:        Andrea Dean MD          QUERY TEXT:    Pt admitted with diabetic ulcers to Bilateral feet. Pt noted to have   neuropathy and/or PVD. Please document in progress notes and discharge   summary the likely cause of ulcer:  [Bilateral feet ulcers associated with diabetic neuropathy[de-identified] This patient has   Bilateral feet ulcers associated with diabetic neuropathy.]]    The medical record reflects the following:  Risk Factors: DM  PVD  Neuropathy  Clinical Indicators: per RN Flow sheet \"Bilateral feet DM ulcers \"  Treatment: Skin care protocol and daily monitoring  Options provided:  -- Bilateral feet ulcers associated with diabetes  -- Bilateral feet ulcers associated with diabetic atherosclerosis  -- Bilateral feet ulcers associated with diabetic neuropathy and   atherosclerosis  -- Other - I will add my own diagnosis  -- Disagree - Not applicable / Not valid  -- Disagree - Clinically unable to determine / Unknown  -- Refer to Clinical Documentation Reviewer    PROVIDER RESPONSE TEXT:    This patient has Bilateral feet ulcers associated with diabetes.     Query created by: Javier Velasco on 9/15/2022 1:50 PM      Electronically signed by:  Andrea Dean MD 9/15/2022 9:17 PM

## 2022-09-16 NOTE — DISCHARGE INSTR - COC
Continuity of Care Form    Patient Name: Akiko Mcmahon   :  1955  MRN:  [id number]    Admit date:  2022  Discharge date:  2022    Code Status Order: Full Code   Advance Directives:     Admitting Physician:  Tasha Lui MD  PCP: Carey Ortez MD    Discharging Nurse: [facility] Unit/Room#: U3K-9237/8222-78  Discharging Unit Phone Number: [phone]    Emergency Contact:   Extended Emergency Contact Information  Primary Emergency Contact: eneida finch  Home Phone: [phone]  Mobile Phone: [phone]  Relation: Other  Preferred language: English    Past Surgical History:  Past Surgical History:   Procedure Laterality Date    CHOLECYSTECTOMY, LAPAROSCOPIC N/A 08/10/2021    LAPAROSCOPIC CHOLECYSTECTOMY WITH CHOLANGIOGRAM performed by Florin Lopez MD at [place] [address]    ERCP  2014    LITHOTRYPSY & PANCREATIC STENT PLACEMENT    FOOT SURGERY Left 1967    PACEMAKER INSERTION         Immunization History:   Immunization History   Administered Date(s) Administered    COVID-19, J&J, (age 18y+), IM, 0.5 mL 2021    COVID-19, PFIZER Bivalent BOOSTER, (age 12y+), IM, 27 mcg/0.3 mL dose 09/15/2022       Active Problems:  Patient Active Problem List   Diagnosis Code    Abdominal pain, acute R10.9    BPH (benign prostatic hyperplasia) N40.0    RLQ abdominal pain R10.31    Abnormal ECG R94.31    HTN (hypertension) I10    Diabetes mellitus type II, uncontrolled ([address] E11.65    Diabetic foot infection ([address] E11.628, L08.9    Fever and chills R50.9    Leukocytosis D72.829    Charcot foot due to diabetes mellitus ([address] E11.610    Foot ulceration, left, with unspecified severity ([address] L97.529    Demand ischemia (HCC) I24.8    Controlled type 2 diabetes mellitus with hyperglycemia, with long-term current use of insulin (HCC) E11.65, Z79.4    Sepsis without acute organ dysfunction (HCC) A41.9    Diarrhea R19.7    Chronic osteoarthritis M19.90    Chest pain R07.9    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    Atrioventricular block, Mobitz type 1, Wenckebach I44.1    Vertigo R42    Microcytic anemia D50.9    Acute calculous cholecystitis K80.00    Generalized weakness R53.1    SOB (shortness of breath) R06.02    Febrile illness R50.9    Symptomatic bradycardia R00.1    Multiple and open wound of lower limb S81.809A    Bacterial infection due to Proteus mirabilis A49.8    Foot ulcer due to secondary DM (HCC) E13.621, L97.509    Lactic acidosis E87.2    Atrial fibrillation with slow ventricular response ([facility]) I48.91    High-grade atrioventricular block I44.39    [employer] MICRA AV Z95.0       Isolation/Infection:   Isolation            No Isolation          Patient Infection Status       Infection Onset Added Last Indicated Last Indicated By Review Planned Expiration Resolved Resolved By    None active    Resolved    COVID-19 (Rule Out) 09/06/22 09/06/22 09/06/22 Respiratory Panel, Molecular, with COVID-19 (Restricted: peds pts or suitable admitted adults) (Ordered)   09/06/22 Rule-Out Test Resulted    COVID-19 (Rule Out) 09/06/22 09/06/22 09/06/22 COVID-19, Rapid (Ordered)   09/06/22 Rule-Out Test Resulted    C-diff Rule Out 08/15/22 08/15/22 08/15/22 Gastrointestinal Panel by DNA (Ordered)   08/22/22 Caleb Mccarty RN    COVID-19 (Rule Out) 08/15/22 08/15/22 08/15/22 COVID-19, Rapid (Ordered)   08/15/22 Rule-Out Test Resulted    COVID-19 (Rule Out) 08/08/21 08/08/21 08/08/21 COVID-19, Rapid (Ordered)   08/08/21 Rule-Out Test Resulted    COVID-19 (Rule Out) 11/26/20 11/26/20 11/26/20 COVID-19 (Ordered)   11/26/20 Rule-Out Test Resulted    C-diff Rule Out 07/31/20 07/31/20 07/31/20 Clostridium Difficile Toxin/Antigen (Ordered)   07/31/20 Rule-Out Test Resulted    C-diff Rule Out 07/31/20 07/31/20 07/31/20 Clostridium Difficile Toxin/Antigen (Ordered)   07/31/20 Rule-Out Test Resulted    COVID-19 (Rule Out) 06/06/20 06/06/20 06/06/20 COVID-19 (Ordered)   06/06/20 Rule-Out Test Resulted            Nurse Assessment:  Last Vital Signs: /67   Pulse 62   Temp 98.6 °F (37 °C) (Oral)   Resp 18   Ht 6' 2\" (1.88 m)   Wt 248 lb 0.3 oz (112.5 kg)   SpO2 97%   BMI 31.84 kg/m²     Last documented pain score (0-10 scale): Pain Level: 0  Last Weight:   Wt Readings from Last 1 Encounters:   09/16/22 248 lb 0.3 oz (112.5 kg)     Mental Status:  oriented and alert    IV Access:  - None    Nursing Mobility/ADLs:  Walking   Assisted  Transfer  Assisted  Bathing  Assisted  Dressing  Assisted  Toileting  Assisted  Feeding  Independent  Med Admin  Independent  Med Delivery   whole    Wound Care Documentation and Therapy:  Incision 04/04/14 Toe (Comment  which one) Right (Active)   Number of days: 3087       Wound 07/31/20 Heel Left (Active)   Number of days: 776       Wound 11/27/20 Foot Left;Plantar (Active)   Number of days: 658       Wound 08/09/21 Foot Right (Active)   Number of days: 403       Wound 08/15/22 Foot Right;Plantar (Active)   Wound Image   09/07/22 1456   Wound Etiology Diabetic 09/16/22 1020   Dressing Status Clean;Dry; Intact; New drainage noted 09/16/22 1020   Wound Cleansed Cleansed with saline 09/16/22 1020   Dressing/Treatment Hydrofiber Ag;Silicone border 22/68/15 1020   Dressing Change Due 09/17/22 09/16/22 1020   Wound Length (cm) 2 cm 09/14/22 1316   Wound Width (cm) 1 cm 09/14/22 1316   Wound Depth (cm) 0.1 cm 08/17/22 1232   Wound Surface Area (cm^2) 2 cm^2 09/14/22 1316   Change in Wound Size % (l*w) 86.67 09/14/22 1316   Wound Volume (cm^3) 0.2 cm^3 08/17/22 1232   Wound Healing % 97 08/17/22 1232   Wound Assessment [place]/red;Slough 09/16/22 1020   Drainage Amount Small 09/16/22 1020   Drainage Description Serosanguinous 09/16/22 1020   Odor Mild 09/16/22 1020   Tiffanie-wound Assessment Edematous;Fragile 09/16/22 1020   Margins Undefined edges 09/15/22 1002   Number of days: 32       Wound 08/15/22 Foot Left;Plantar (Active)   Wound Image   09/07/22 7012 Wound Etiology Diabetic 09/16/22 1020   Dressing Status Clean;Dry; Intact 09/16/22 1020   Wound Cleansed Cleansed with saline 09/16/22 1020   Dressing/Treatment Hydrofiber Ag 09/16/22 1020   Dressing Change Due 09/17/22 09/16/22 1020   Wound Length (cm) 3 cm 09/14/22 1316   Wound Width (cm) 2 cm 09/14/22 1316   Wound Depth (cm) 0.4 cm 09/14/22 1316   Wound Surface Area (cm^2) 6 cm^2 09/14/22 1316   Change in Wound Size % (l*w) 0 09/14/22 1316   Wound Volume (cm^3) 2.4 cm^3 09/14/22 1316   Wound Healing % -33 09/14/22 1316   Wound Assessment Pink/red;Purple/maroon 09/16/22 1020   Drainage Amount Small 09/16/22 1020   Drainage Description Serosanguinous 09/16/22 1020   Odor Mild 09/16/22 1020   Tiffanie-wound Assessment Hyperkeratosis (callous) 09/16/22 1020   Margins Defined edges 09/16/22 1020   Wound Thickness Description not for Pressure Injury Full thickness 09/16/22 1020   Number of days: 32       Incision 09/12/22 Groin Anterior;Right (Active)   Dressing Status Dry; Intact; Clean 09/16/22 1020   Drainage Amount None 09/16/22 1020   Tiffanie-incision Assessment Intact 09/16/22 1020   Number of days: 4       Incision 08/10/21 Abdomen Medial (Active)   Number of days: 402        Elimination:  Continence: Bowel: Yes  Bladder: Yes  Urinary Catheter: None   Colostomy/Ileostomy/Ileal Conduit: No       Date of Last BM: ***    Intake/Output Summary (Last 24 hours) at 9/16/2022 1538  Last data filed at 9/16/2022 1236  Gross per 24 hour   Intake 730 ml   Output 975 ml   Net -245 ml     I/O last 3 completed shifts:  In: -   Out: 1700 [Urine:1700]    Safety Concerns: At Risk for Falls    Impairments/Disabilities:      Hearing    Nutrition Therapy:  Current Nutrition Therapy:   - Oral Diet:  Carb Control 5 carbs/meal (2000kcals/day)    Routes of Feeding: Oral  Liquids:  Thin Liquids  Daily Fluid Restriction: no  Last Modified Barium Swallow with Video (Video Swallowing Test): not done    Treatments at the Time of Hospital Discharge:   Respiratory Treatments: ***  Oxygen Therapy:  is not on home oxygen therapy. Ventilator:    - No ventilator support    Rehab Therapies: Physical Therapy and Occupational Therapy  Weight Bearing Status/Restrictions: No weight bearing restrictions  Other Medical Equipment (for information only, NOT a DME order):  {EQUIPMENT:[id number]}  Other Treatments:       Patient's personal belongings (please select all that are sent with patient):  None    RN SIGNATURE:  Electronically signed by Stephen Bravo RN on 9/21/22 at 2:11 PM EDT    CASE MANAGEMENT/SOCIAL WORK SECTION    Inpatient Status Date: ***    Readmission Risk Assessment Score:  Readmission Risk              Risk of Unplanned Readmission:  19           Discharging to Facility/ Agency   Name: [facility]  Address:  [address] [place] [address]   Phone:  [phone]  Fax:  [phone]     / signature: Electronically signed by Adeline Meeks RN on 9/16/22 at 3:38 PM EDT    PHYSICIAN SECTION    Prognosis: Fair    Condition at Discharge: Stable    Rehab Potential (if transferring to Rehab): Fair    Recommended Labs or Other Treatments After Discharge:     Physician Certification: I certify the above information and transfer of Ryan Isabel  is necessary for the continuing treatment of the diagnosis listed and that he requires {Admit to Appropriate Level of Care:07624} for less 30 days.      Update Admission H&P: Changes in H&P as follows - SEE DC SUMMARY    PHYSICIAN SIGNATURE:  Electronically signed by Marianela Garza MD on 9/20/22 at 10:02 AM EDT

## 2022-09-16 NOTE — PROGRESS NOTES
Hospitalist Progress Note      PCP: Emelyn Blake MD    Date of Admission: 9/13/2022    Chief Complaint: syncope and collapse    Hospital Course: The patient is a 74 y.o. male with hx type 2 DM, PVD, bilateral food ulcers who presents to [facility] with syncope and collapse. Patient was just discharged yesterday. He was hospitalized for fever and shortness of breath. He was noted to have a high grade AV [place] block and had a micra pacemaker placed on 9/12 by cardiology. He was also seen with a lower extremity cellulitis, Proteus bacteremia, and chronic wounds seen by both podiatry and ID and discharged on PO Augmentin. He got home and within 15 minutes of being home raised up and had a syncopal episode. He came back to the ED for further evaluation. States that he has been getting lightheaded and dizzy both at rest and with movement. He has a room spinning sensation from movement. The lightheadedness and dizziness worsens with position change. Denies fever, chills, chest pain, shortness of breath, abdominal pain, nausea, vomiting, constipation, diarrhea, and dysuria. In the ED, labs were significant for a glucose of 103, hemoglobin of 8.6. CT Head without contrast was unremarkable. CTA Head and Neck with contrast was unremarkable. Subjective: Pt seen and examined. He feels ok. Still with dizziness and lightheadedness on standing. Still positive orthostatic vitals.       Medications:  Reviewed    Infusion Medications    dextrose      sodium chloride       Scheduled Medications    midodrine  5 mg Oral TID WC    tamsulosin  0.4 mg Oral QPM    [Held by provider] lisinopril  5 mg Oral Daily    insulin glargine  10 Units SubCUTAneous Nightly    atorvastatin  20 mg Oral Nightly    aspirin  81 mg Oral Daily    amoxicillin-clavulanate  1 tablet Oral BID    sodium chloride flush  10 mL IntraVENous 2 times per day    enoxaparin  30 mg SubCUTAneous BID    insulin lispro  0-8 Units SubCUTAneous TID WC    insulin lispro  0-4 Units SubCUTAneous Nightly     PRN Meds: meclizine, glucose, dextrose bolus **OR** dextrose bolus, glucagon (rDNA), dextrose, sodium chloride flush, sodium chloride, ondansetron, polyethylene glycol, acetaminophen **OR** acetaminophen      Intake/Output Summary (Last 24 hours) at 9/16/2022 1212  Last data filed at 9/16/2022 1020  Gross per 24 hour   Intake 490 ml   Output 600 ml   Net -110 ml         Exam:    /72   Pulse 58   Temp 98 °F (36.7 °C) (Oral)   Resp 18   Ht 6' 2\" (1.88 m)   Wt 248 lb 0.3 oz (112.5 kg)   SpO2 97%   BMI 31.84 kg/m²     General appearance: No apparent distress, appears stated age and cooperative. HEENT: Pupils equal, round, and reactive to light. Conjunctivae/corneas clear. Neck: Supple, with full range of motion. No jugular venous distention. Trachea midline. Respiratory:  Normal respiratory effort. Clear to auscultation, bilaterally without Rales/Wheezes/Rhonchi. Cardiovascular: Regular rate and rhythm with normal S1/S2 without murmurs, rubs or gallops. Abdomen: Soft, non-tender, non-distended with normal bowel sounds. Musculoskeletal: No clubbing, cyanosis or edema bilaterally. Full range of motion without deformity. Skin: Skin color, texture, turgor normal.  No rashes or lesions. Neurologic:  Neurovascularly intact without any focal sensory/motor deficits.  Cranial nerves: II-XII intact, grossly non-focal.  Psychiatric: Alert and oriented, thought content appropriate, normal insight  Capillary Refill: Brisk,< 3 seconds   Peripheral Pulses: +2 palpable, equal bilaterally       Labs:   Recent Labs     09/14/22  0751 09/15/22  0725 09/16/22  0718   WBC 8.0 6.4 7.2   HGB 8.6* 9.0* 9.2*   HCT 27.1* 28.1* 28.9*    233 249       Recent Labs     09/14/22  0751 09/15/22  0725 09/16/22  0718    138 135*   K 4.5 4.5 4.8    105 103   CO2 24 24 28   BUN 16 13 13   CREATININE 0.7* 0.6* 0.6*   CALCIUM 8.5 9.1 9.1       Recent Labs 09/13/22 1917   AST 48*   ALT 40   BILIDIR <0.2   BILITOT 0.4   ALKPHOS 82       No results for input(s): INR in the last 72 hours. No results for input(s): Daksha Chou in the last 72 hours. Urinalysis:      Lab Results   Component Value Date/Time    NITRU Negative 09/06/2022 03:41 PM    WBCUA 1 08/15/2022 11:16 AM    BACTERIA None Seen 08/15/2022 11:16 AM    RBCUA 2 08/15/2022 11:16 AM    BLOODU Negative 09/06/2022 03:41 PM    SPECGRAV 1.020 09/06/2022 03:41 PM    GLUCOSEU Negative 09/06/2022 03:41 PM       Radiology:  CTA HEAD NECK W CONTRAST   Final Result   1. No flow limiting stenosis of the cervical carotid/vertebral arteries. 2. No significant stenosis of the [id number]. CT HEAD WO CONTRAST   Final Result   No hemorrhage or mass identified      Mild small vessel ischemic change, similar to prior. Trace paranasal sinus disease         CT Head W/O Contrast   Final Result   No acute intracranial abnormality.          XR CHEST PORTABLE   Final Result   No acute abnormality                 Assessment/Plan:    Active Hospital Problems    Diagnosis Date Noted    Vertigo [R42] 11/27/2020       Syncope and collapse, chronic vertigo with associated nausea and vomiting - suspect due to central or peripheral vertigo.   -unable to get MRI due to recent pacemaker placement  -neurology consulted  -CTA Head and Neck with contrast showed no posterior circulation abnormalities  -PT/OT evaluation  -patient may benefit from vestibular rehab  -tele monitoring  -hold lisinopril, start low dose midodrine and added meclizine PRN     Complete heart block s/p micra PPM implantation 9/12  -EP consulted, recs appreciated  -pacemaker interrogation ok  -tele monitoring     Orthostatic hypotension  -will give fluid bolus  -stop lisinopril  -patient is still orthostatic today  -continue to check daily orthostatic vitals  -added low dose midodrine  -please place ACE wraps and abdominal binder     Recent right foot infection   -continue PO Augmentin  -wound care consult     Type 2 DM  -Lantus  -SSI  -hypoglycemia protocol  -POCT glucose checks  -carb control diet     Hyperlipidemia  -continue home statin    DVT Prophylaxis: Lovenox  Diet: ADULT DIET;  Regular; 5 carb choices (75 gm/meal)  Code Status: Full Code    PT/OT Eval Status: ordered, recommending SNF    Dispo - continue care    Abel Trotter MD

## 2022-09-17 LAB
GLUCOSE BLD-MCNC: 127 MG/DL (ref 70–99)
GLUCOSE BLD-MCNC: 158 MG/DL (ref 70–99)
GLUCOSE BLD-MCNC: 170 MG/DL (ref 70–99)
GLUCOSE BLD-MCNC: 217 MG/DL (ref 70–99)
PERFORMED ON: ABNORMAL

## 2022-09-17 PROCEDURE — 6360000002 HC RX W HCPCS: Performed by: INTERNAL MEDICINE

## 2022-09-17 PROCEDURE — 6370000000 HC RX 637 (ALT 250 FOR IP): Performed by: INTERNAL MEDICINE

## 2022-09-17 PROCEDURE — 1200000000 HC SEMI PRIVATE

## 2022-09-17 PROCEDURE — 2580000003 HC RX 258: Performed by: INTERNAL MEDICINE

## 2022-09-17 RX ORDER — POLYETHYLENE GLYCOL 3350 17 G/17G
17 POWDER, FOR SOLUTION ORAL DAILY
Status: DISCONTINUED | OUTPATIENT
Start: 2022-09-18 | End: 2022-09-20

## 2022-09-17 RX ORDER — SENNA PLUS 8.6 MG/1
1 TABLET ORAL ONCE
Status: COMPLETED | OUTPATIENT
Start: 2022-09-17 | End: 2022-09-17

## 2022-09-17 RX ADMIN — ENOXAPARIN SODIUM 30 MG: 100 INJECTION SUBCUTANEOUS at 08:33

## 2022-09-17 RX ADMIN — POLYETHYLENE GLYCOL 3350 17 G: 17 POWDER, FOR SOLUTION ORAL at 09:07

## 2022-09-17 RX ADMIN — MIDODRINE HYDROCHLORIDE 5 MG: 5 TABLET ORAL at 17:27

## 2022-09-17 RX ADMIN — INSULIN GLARGINE 10 UNITS: 100 INJECTION, SOLUTION SUBCUTANEOUS at 21:34

## 2022-09-17 RX ADMIN — AMOXICILLIN AND CLAVULANATE POTASSIUM 1 TABLET: 875; 125 TABLET, FILM COATED ORAL at 20:43

## 2022-09-17 RX ADMIN — ASPIRIN 81 MG: 81 TABLET, COATED ORAL at 08:33

## 2022-09-17 RX ADMIN — MIDODRINE HYDROCHLORIDE 5 MG: 5 TABLET ORAL at 08:33

## 2022-09-17 RX ADMIN — INSULIN LISPRO 2 UNITS: 100 INJECTION, SOLUTION INTRAVENOUS; SUBCUTANEOUS at 17:27

## 2022-09-17 RX ADMIN — ATORVASTATIN CALCIUM 20 MG: 20 TABLET, FILM COATED ORAL at 20:43

## 2022-09-17 RX ADMIN — AMOXICILLIN AND CLAVULANATE POTASSIUM 1 TABLET: 875; 125 TABLET, FILM COATED ORAL at 08:33

## 2022-09-17 RX ADMIN — MIDODRINE HYDROCHLORIDE 5 MG: 5 TABLET ORAL at 12:13

## 2022-09-17 RX ADMIN — TAMSULOSIN HYDROCHLORIDE 0.4 MG: 0.4 CAPSULE ORAL at 17:27

## 2022-09-17 RX ADMIN — ENOXAPARIN SODIUM 30 MG: 100 INJECTION SUBCUTANEOUS at 20:44

## 2022-09-17 RX ADMIN — SENNOSIDES 8.6 MG: 8.6 TABLET, FILM COATED ORAL at 12:13

## 2022-09-17 RX ADMIN — SODIUM CHLORIDE, PRESERVATIVE FREE 10 ML: 5 INJECTION INTRAVENOUS at 08:33

## 2022-09-17 RX ADMIN — SODIUM CHLORIDE, PRESERVATIVE FREE 10 ML: 5 INJECTION INTRAVENOUS at 20:48

## 2022-09-17 ASSESSMENT — PAIN SCALES - WONG BAKER: WONGBAKER_NUMERICALRESPONSE: 0

## 2022-09-17 ASSESSMENT — PAIN SCALES - GENERAL: PAINLEVEL_OUTOF10: 0

## 2022-09-17 NOTE — PLAN OF CARE
Problem: Discharge Planning  Goal: Discharge to home or other facility with appropriate resources  Outcome: Progressing     Problem: Pain  Goal: Verbalizes/displays adequate comfort level or baseline comfort level  Outcome: Progressing  Flowsheets  Taken 9/17/2022 1003 by Gautam Foley RN  Verbalizes/displays adequate comfort level or baseline comfort level:   Encourage patient to monitor pain and request assistance   Administer analgesics based on type and severity of pain and evaluate response  Taken 9/17/2022 0628 by Stephanie Isabel RN  Verbalizes/displays adequate comfort level or baseline comfort level: Encourage patient to monitor pain and request assistance  Note: Pt able to express presence and absence of pain using numerical pain scale. Pt pain is managed by PRN analgesics as ordered by MD. Pain reassess after each interventions. Will continue to monitor as needed. Problem: Skin/Tissue Integrity  Goal: Absence of new skin breakdown  Description: 1. Monitor for areas of redness and/or skin breakdown  2. Assess vascular access sites hourly  3. Every 4-6 hours minimum:  Change oxygen saturation probe site  4. Every 4-6 hours:  If on nasal continuous positive airway pressure, respiratory therapy assess nares and determine need for appliance change or resting period. Outcome: Progressing  Note: Skin assessment performed each shift per protocol. Pt encouraged to reposition often. Will continue to monitor and assess for skin breakdown. Problem: Safety - Adult  Goal: Free from fall injury  Outcome: Progressing  Flowsheets (Taken 9/17/2022 1003)  Free From Fall Injury: Instruct family/caregiver on patient safety  Note: Pt free from falls this shift. Non skid socks provided. Pt  educated on use  of call light as needed for assistance. Bed and chair alarm on. Call light always within reach. Pt able and agreeable to contact for safety appropriately.        Problem: ABCDS Injury Assessment  Goal: Absence of physical injury  Outcome: Progressing     Problem: Chronic Conditions and Co-morbidities  Goal: Patient's chronic conditions and co-morbidity symptoms are monitored and maintained or improved  Outcome: Progressing

## 2022-09-17 NOTE — PROGRESS NOTES
mL IntraVENous 2 times per day    enoxaparin  30 mg SubCUTAneous BID    insulin lispro  0-8 Units SubCUTAneous TID WC    insulin lispro  0-4 Units SubCUTAneous Nightly     PRN Meds: meclizine, glucose, dextrose bolus **OR** dextrose bolus, glucagon (rDNA), dextrose, sodium chloride flush, sodium chloride, ondansetron, acetaminophen **OR** acetaminophen      Intake/Output Summary (Last 24 hours) at 9/17/2022 1149  Last data filed at 9/17/2022 0900  Gross per 24 hour   Intake 830 ml   Output 1925 ml   Net -1095 ml         Exam:    BP (!) 147/68   Pulse 64   Temp 99 °F (37.2 °C) (Oral)   Resp 18   Ht 6' 2\" (1.88 m)   Wt 241 lb 10 oz (109.6 kg)   SpO2 97%   BMI 31.02 kg/m²     General appearance: No apparent distress, appears stated age and cooperative. HEENT: Pupils equal, round, and reactive to light. Conjunctivae/corneas clear. Neck: Supple, with full range of motion. No jugular venous distention. Trachea midline. Respiratory:  Normal respiratory effort. Clear to auscultation, bilaterally without Rales/Wheezes/Rhonchi. Cardiovascular: Regular rate and rhythm with normal S1/S2 without murmurs, rubs or gallops. Abdomen: Soft, non-tender, non-distended with normal bowel sounds. Musculoskeletal: No clubbing, cyanosis or edema bilaterally. Full range of motion without deformity. Skin: Skin color, texture, turgor normal.  No rashes or lesions. Neurologic:  Neurovascularly intact without any focal sensory/motor deficits.  Cranial nerves: II-XII intact, grossly non-focal.  Psychiatric: Alert and oriented, thought content appropriate, normal insight  Capillary Refill: Brisk,< 3 seconds   Peripheral Pulses: +2 palpable, equal bilaterally       Labs:   Recent Labs     09/15/22  0725 09/16/22  0718   WBC 6.4 7.2   HGB 9.0* 9.2*   HCT 28.1* 28.9*    249       Recent Labs     09/15/22  0725 09/16/22 0718    135*   K 4.5 4.8    103   CO2 24 28   BUN 13 13   CREATININE 0.6* 0.6*   CALCIUM 9.1 9.1 No results for input(s): AST, ALT, BILIDIR, BILITOT, ALKPHOS in the last 72 hours. No results for input(s): INR in the last 72 hours. No results for input(s): Shiva Garces in the last 72 hours. Urinalysis:      Lab Results   Component Value Date/Time    NITRU Negative 09/06/2022 03:41 PM    WBCUA 1 08/15/2022 11:16 AM    BACTERIA None Seen 08/15/2022 11:16 AM    RBCUA 2 08/15/2022 11:16 AM    BLOODU Negative 09/06/2022 03:41 PM    SPECGRAV 1.020 09/06/2022 03:41 PM    GLUCOSEU Negative 09/06/2022 03:41 PM       Radiology:  CTA HEAD NECK W CONTRAST   Final Result   1. No flow limiting stenosis of the cervical carotid/vertebral arteries. 2. No significant stenosis of the [id number]. CT HEAD WO CONTRAST   Final Result   No hemorrhage or mass identified      Mild small vessel ischemic change, similar to prior. Trace paranasal sinus disease         CT Head W/O Contrast   Final Result   No acute intracranial abnormality.          XR CHEST PORTABLE   Final Result   No acute abnormality                 Assessment/Plan:    Active Hospital Problems    Diagnosis Date Noted    Vertigo [R42] 11/27/2020       Syncope and collapse, chronic vertigo with associated nausea and vomiting - suspect due to central or peripheral vertigo.   -unable to get MRI due to recent pacemaker placement  -neurology consulted  -CTA Head and Neck with contrast showed no posterior circulation abnormalities  -PT/OT evaluation  -patient may benefit from vestibular rehab  -tele monitoring  -hold lisinopril, start low dose midodrine and added meclizine PRN  -continue abdominal binder and ACE wraps     Complete heart block s/p micra PPM implantation 9/12  -EP consulted, recs appreciated  -pacemaker interrogation ok  -tele monitoring     Orthostatic hypotension  -will give fluid bolus  -stop lisinopril  -patient is still orthostatic today  -continue to check daily orthostatic vitals  -added low dose midodrine  -continue ACE wraps and abdominal binder     Recent right foot infection   -continue PO Augmentin  -wound care consult     Type 2 DM  -Lantus  -SSI  -hypoglycemia protocol  -POCT glucose checks  -carb control diet     Hyperlipidemia  -continue home statin    DVT Prophylaxis: Lovenox  Diet: ADULT DIET;  Regular; 5 carb choices (75 gm/meal)  Code Status: Full Code    PT/OT Eval Status: ordered, recommending SNF    Dispo - continue care    Segun Jacobs MD

## 2022-09-17 NOTE — PROGRESS NOTES
Pt positive for orthostatic BP. Pt reports some dizziness while sitting on edge of the bed. RN encouraged pt to get out of the bed and sit up in the chair. Abdominal binder applied as ordered by MD. Pt refused to sit up in the chair-states he don't like the recliner. Pt sitting on the edge of the bed. Denies other needs at present. Bed alarm on. Call light and item need in reach. Electronically signed by Mauricio Hamm RN on 9/17/2022 at 10:00 AM

## 2022-09-17 NOTE — FLOWSHEET NOTE
09/17/22 0900   Vital Signs   Orthostatic B/P and Pulse?  Yes   Blood Pressure Lying 121/68   Pulse Lying 69 PER MINUTE   Blood Pressure Sitting 114/65   Pulse Sitting 92 PER MINUTE   Blood Pressure Standing 98/59   Pulse Standing 72 PER MINUTE   Level of Consciousness 0   Electronically signed by Mauricio Hamm RN on 9/17/2022 at 10:00 AM

## 2022-09-17 NOTE — FLOWSHEET NOTE
Patient has no complaints all night. Been using his urinal with good urine output. Denies he needs going to [facility] to have a BM. Denies pain. Declines when nurse told him he needs to have his abdominal binder on while in bed. \"Just wait till I get up. \" Got his lovenox SC injection. Had turkey sandwich for hs snacks. Only needed Lantus 10units at bedtime. Blood sugar 129.

## 2022-09-18 LAB
GLUCOSE BLD-MCNC: 114 MG/DL (ref 70–99)
GLUCOSE BLD-MCNC: 128 MG/DL (ref 70–99)
GLUCOSE BLD-MCNC: 170 MG/DL (ref 70–99)
GLUCOSE BLD-MCNC: 171 MG/DL (ref 70–99)
PERFORMED ON: ABNORMAL

## 2022-09-18 PROCEDURE — 1200000000 HC SEMI PRIVATE

## 2022-09-18 PROCEDURE — 6360000002 HC RX W HCPCS: Performed by: INTERNAL MEDICINE

## 2022-09-18 PROCEDURE — 6370000000 HC RX 637 (ALT 250 FOR IP): Performed by: INTERNAL MEDICINE

## 2022-09-18 PROCEDURE — 2580000003 HC RX 258: Performed by: INTERNAL MEDICINE

## 2022-09-18 RX ORDER — MIDODRINE HYDROCHLORIDE 10 MG/1
10 TABLET ORAL
Status: DISCONTINUED | OUTPATIENT
Start: 2022-09-18 | End: 2022-09-21 | Stop reason: HOSPADM

## 2022-09-18 RX ADMIN — SODIUM CHLORIDE, PRESERVATIVE FREE 10 ML: 5 INJECTION INTRAVENOUS at 21:02

## 2022-09-18 RX ADMIN — ATORVASTATIN CALCIUM 20 MG: 20 TABLET, FILM COATED ORAL at 21:02

## 2022-09-18 RX ADMIN — SODIUM CHLORIDE, PRESERVATIVE FREE 10 ML: 5 INJECTION INTRAVENOUS at 08:21

## 2022-09-18 RX ADMIN — ENOXAPARIN SODIUM 30 MG: 100 INJECTION SUBCUTANEOUS at 21:02

## 2022-09-18 RX ADMIN — TAMSULOSIN HYDROCHLORIDE 0.4 MG: 0.4 CAPSULE ORAL at 17:49

## 2022-09-18 RX ADMIN — MIDODRINE HYDROCHLORIDE 10 MG: 10 TABLET ORAL at 17:49

## 2022-09-18 RX ADMIN — AMOXICILLIN AND CLAVULANATE POTASSIUM 1 TABLET: 875; 125 TABLET, FILM COATED ORAL at 08:21

## 2022-09-18 RX ADMIN — ENOXAPARIN SODIUM 30 MG: 100 INJECTION SUBCUTANEOUS at 08:21

## 2022-09-18 RX ADMIN — MIDODRINE HYDROCHLORIDE 10 MG: 10 TABLET ORAL at 13:12

## 2022-09-18 RX ADMIN — POLYETHYLENE GLYCOL 3350 17 G: 17 POWDER, FOR SOLUTION ORAL at 08:21

## 2022-09-18 RX ADMIN — MIDODRINE HYDROCHLORIDE 5 MG: 5 TABLET ORAL at 08:21

## 2022-09-18 RX ADMIN — ASPIRIN 81 MG: 81 TABLET, COATED ORAL at 08:21

## 2022-09-18 RX ADMIN — AMOXICILLIN AND CLAVULANATE POTASSIUM 1 TABLET: 875; 125 TABLET, FILM COATED ORAL at 21:02

## 2022-09-18 RX ADMIN — INSULIN GLARGINE 10 UNITS: 100 INJECTION, SOLUTION SUBCUTANEOUS at 21:02

## 2022-09-18 ASSESSMENT — PAIN SCALES - GENERAL
PAINLEVEL_OUTOF10: 0

## 2022-09-18 NOTE — PLAN OF CARE
Problem: Discharge Planning  Goal: Discharge to home or other facility with appropriate resources  Outcome: Progressing  Flowsheets (Taken 9/18/2022 0629 by Burt Lewis RN)  Discharge to home or other facility with appropriate resources: Identify barriers to discharge with patient and caregiver     Problem: Pain  Goal: Verbalizes/displays adequate comfort level or baseline comfort level  Outcome: Progressing  Flowsheets  Taken 9/18/2022 0957 by Kizzy Bianchi RN  Verbalizes/displays adequate comfort level or baseline comfort level:   Encourage patient to monitor pain and request assistance   Administer analgesics based on type and severity of pain and evaluate response  Taken 9/18/2022 0334 by Burt Lewis RN  Verbalizes/displays adequate comfort level or baseline comfort level: Encourage patient to monitor pain and request assistance  Note: Pt able to express presence and absence of pain using numerical pain scale. Pt pain is managed by PRN analgesics as ordered by MD. Pain reassess after each interventions. Will continue to monitor as needed. Problem: Skin/Tissue Integrity  Goal: Absence of new skin breakdown  Description: 1. Monitor for areas of redness and/or skin breakdown  2. Assess vascular access sites hourly  3. Every 4-6 hours minimum:  Change oxygen saturation probe site  4. Every 4-6 hours:  If on nasal continuous positive airway pressure, respiratory therapy assess nares and determine need for appliance change or resting period. Outcome: Progressing  Note: Skin assessment performed each shift per protocol. Pt encouraged to reposition often. Will continue to monitor and assess for skin breakdown. Problem: Safety - Adult  Goal: Free from fall injury  Outcome: Progressing  Flowsheets (Taken 9/18/2022 0957)  Free From Fall Injury: Instruct family/caregiver on patient safety  Note: Pt free from falls this shift. Non skid socks provided.  Pt educated on use of call light as needed for assistance. Bed alarm active. Call light always within reach. Pt able and agreeable to contact for safety appropriately.        Problem: ABCDS Injury Assessment  Goal: Absence of physical injury  Outcome: Progressing     Problem: Chronic Conditions and Co-morbidities  Goal: Patient's chronic conditions and co-morbidity symptoms are monitored and maintained or improved  Outcome: Progressing  Flowsheets (Taken 9/18/2022 4360 by Gali Myers RN)  Care Plan - Patient's Chronic Conditions and Co-Morbidity Symptoms are Monitored and Maintained or Improved: Monitor and assess patient's chronic conditions and comorbid symptoms for stability, deterioration, or improvement

## 2022-09-18 NOTE — PROGRESS NOTES
Hospitalist Progress Note      PCP: Trish Earl MD    Date of Admission: 9/13/2022    Chief Complaint: syncope and collapse    Hospital Course: The patient is a 74 y.o. male with hx type 2 DM, PVD, bilateral food ulcers who presents to [facility] with syncope and collapse. Patient was just discharged yesterday. He was hospitalized for fever and shortness of breath. He was noted to have a high grade AV [place] block and had a micra pacemaker placed on 9/12 by cardiology. He was also seen with a lower extremity cellulitis, Proteus bacteremia, and chronic wounds seen by both podiatry and ID and discharged on PO Augmentin. He got home and within 15 minutes of being home raised up and had a syncopal episode. He came back to the ED for further evaluation. States that he has been getting lightheaded and dizzy both at rest and with movement. He has a room spinning sensation from movement. The lightheadedness and dizziness worsens with position change. Denies fever, chills, chest pain, shortness of breath, abdominal pain, nausea, vomiting, constipation, diarrhea, and dysuria. In the ED, labs were significant for a glucose of 103, hemoglobin of 8.6. CT Head without contrast was unremarkable. CTA Head and Neck with contrast was unremarkable. Subjective: Pt seen and examined. He feels ok. Still with dizziness and lightheadedness on standing. Still positive orthostatic vitals, but they have improved.       Medications:  Reviewed    Infusion Medications    dextrose      sodium chloride       Scheduled Medications    midodrine  10 mg Oral TID WC    polyethylene glycol  17 g Oral Daily    tamsulosin  0.4 mg Oral QPM    [Held by provider] lisinopril  5 mg Oral Daily    insulin glargine  10 Units SubCUTAneous Nightly    atorvastatin  20 mg Oral Nightly    aspirin  81 mg Oral Daily    amoxicillin-clavulanate  1 tablet Oral BID    sodium chloride flush  10 mL IntraVENous 2 times per day    enoxaparin  30 mg SubCUTAneous BID    insulin lispro  0-8 Units SubCUTAneous TID WC    insulin lispro  0-4 Units SubCUTAneous Nightly     PRN Meds: meclizine, glucose, dextrose bolus **OR** dextrose bolus, glucagon (rDNA), dextrose, sodium chloride flush, sodium chloride, ondansetron, acetaminophen **OR** acetaminophen      Intake/Output Summary (Last 24 hours) at 9/18/2022 1133  Last data filed at 9/18/2022 0829  Gross per 24 hour   Intake 1810 ml   Output 300 ml   Net 1510 ml         Exam:    /69   Pulse 64   Temp 98.3 °F (36.8 °C) (Oral)   Resp 17   Ht 6' 2\" (1.88 m)   Wt 243 lb 13.3 oz (110.6 kg)   SpO2 98%   BMI 31.31 kg/m²     General appearance: No apparent distress, appears stated age and cooperative. HEENT: Pupils equal, round, and reactive to light. Conjunctivae/corneas clear. Neck: Supple, with full range of motion. No jugular venous distention. Trachea midline. Respiratory:  Normal respiratory effort. Clear to auscultation, bilaterally without Rales/Wheezes/Rhonchi. Cardiovascular: Regular rate and rhythm with normal S1/S2 without murmurs, rubs or gallops. Abdomen: Soft, non-tender, non-distended with normal bowel sounds. Musculoskeletal: No clubbing, cyanosis or edema bilaterally. Full range of motion without deformity. Skin: Skin color, texture, turgor normal.  No rashes or lesions. Neurologic:  Neurovascularly intact without any focal sensory/motor deficits. Cranial nerves: II-XII intact, grossly non-focal.  Psychiatric: Alert and oriented, thought content appropriate, normal insight  Capillary Refill: Brisk,< 3 seconds   Peripheral Pulses: +2 palpable, equal bilaterally       Labs:   Recent Labs     09/16/22  0718   WBC 7.2   HGB 9.2*   HCT 28.9*          Recent Labs     09/16/22  0718   *   K 4.8      CO2 28   BUN 13   CREATININE 0.6*   CALCIUM 9.1       No results for input(s): AST, ALT, BILIDIR, BILITOT, ALKPHOS in the last 72 hours.     No results for input(s): INR in the last 72 hours. No results for input(s): Daksha Chou in the last 72 hours. Urinalysis:      Lab Results   Component Value Date/Time    NITRU Negative 09/06/2022 03:41 PM    WBCUA 1 08/15/2022 11:16 AM    BACTERIA None Seen 08/15/2022 11:16 AM    RBCUA 2 08/15/2022 11:16 AM    BLOODU Negative 09/06/2022 03:41 PM    SPECGRAV 1.020 09/06/2022 03:41 PM    GLUCOSEU Negative 09/06/2022 03:41 PM       Radiology:  CTA HEAD NECK W CONTRAST   Final Result   1. No flow limiting stenosis of the cervical carotid/vertebral arteries. 2. No significant stenosis of the [id number]. CT HEAD WO CONTRAST   Final Result   No hemorrhage or mass identified      Mild small vessel ischemic change, similar to prior. Trace paranasal sinus disease         CT Head W/O Contrast   Final Result   No acute intracranial abnormality.          XR CHEST PORTABLE   Final Result   No acute abnormality                 Assessment/Plan:    Active Hospital Problems    Diagnosis Date Noted    Vertigo [R42] 11/27/2020       Syncope and collapse, chronic vertigo with associated nausea and vomiting - suspect due to central or peripheral vertigo.   -unable to get MRI due to recent pacemaker placement  -neurology consulted  -CTA Head and Neck with contrast showed no posterior circulation abnormalities  -PT/OT evaluation  -patient may benefit from vestibular rehab  -tele monitoring  -hold lisinopril, start low dose midodrine and added meclizine PRN  -continue abdominal binder and ACE wraps     Complete heart block s/p micra PPM implantation 9/12  -EP consulted, recs appreciated  -pacemaker interrogation ok  -tele monitoring     Orthostatic hypotension  -will give fluid bolus  -stop lisinopril  -patient is still orthostatic, but BP is slightly better  -continue to check daily orthostatic vitals  -added low dose midodrine  -continue ACE wraps and abdominal binder     Recent right foot infection   -continue PO Augmentin  -wound care

## 2022-09-18 NOTE — CARE COORDINATION
Received vm from Puneet at [facility], pre-cert was initiated on Friday at 1630.     Allen Pop RN, BSN,   [phone]  Electronically signed by Allen Pop RN on 9/18/2022 at 8:53 AM

## 2022-09-19 LAB
ANION GAP SERPL CALCULATED.3IONS-SCNC: 10 MMOL/L (ref 3–16)
BASOPHILS ABSOLUTE: 0 K/UL (ref 0–0.2)
BASOPHILS RELATIVE PERCENT: 0.4 %
BUN BLDV-MCNC: 16 MG/DL (ref 7–20)
CALCIUM SERPL-MCNC: 9.1 MG/DL (ref 8.3–10.6)
CHLORIDE BLD-SCNC: 105 MMOL/L (ref 99–110)
CO2: 25 MMOL/L (ref 21–32)
CREAT SERPL-MCNC: 0.7 MG/DL (ref 0.8–1.3)
EOSINOPHILS ABSOLUTE: 0.2 K/UL (ref 0–0.6)
EOSINOPHILS RELATIVE PERCENT: 2.8 %
GFR AFRICAN AMERICAN: >60
GFR NON-AFRICAN AMERICAN: >60
GLUCOSE BLD-MCNC: 109 MG/DL (ref 70–99)
GLUCOSE BLD-MCNC: 135 MG/DL (ref 70–99)
GLUCOSE BLD-MCNC: 167 MG/DL (ref 70–99)
GLUCOSE BLD-MCNC: 170 MG/DL (ref 70–99)
GLUCOSE BLD-MCNC: 203 MG/DL (ref 70–99)
HCT VFR BLD CALC: 30.1 % (ref 40.5–52.5)
HEMOGLOBIN: 9.5 G/DL (ref 13.5–17.5)
LYMPHOCYTES ABSOLUTE: 1.7 K/UL (ref 1–5.1)
LYMPHOCYTES RELATIVE PERCENT: 25.8 %
MCH RBC QN AUTO: 23.4 PG (ref 26–34)
MCHC RBC AUTO-ENTMCNC: 31.7 G/DL (ref 31–36)
MCV RBC AUTO: 74 FL (ref 80–100)
MONOCYTES ABSOLUTE: 0.6 K/UL (ref 0–1.3)
MONOCYTES RELATIVE PERCENT: 8.5 %
NEUTROPHILS ABSOLUTE: 4.1 K/UL (ref 1.7–7.7)
NEUTROPHILS RELATIVE PERCENT: 62.5 %
PDW BLD-RTO: 18.9 % (ref 12.4–15.4)
PERFORMED ON: ABNORMAL
PLATELET # BLD: 285 K/UL (ref 135–450)
PMV BLD AUTO: 6.7 FL (ref 5–10.5)
POTASSIUM SERPL-SCNC: 5.1 MMOL/L (ref 3.5–5.1)
RBC # BLD: 4.08 M/UL (ref 4.2–5.9)
SODIUM BLD-SCNC: 140 MMOL/L (ref 136–145)
WBC # BLD: 6.6 K/UL (ref 4–11)

## 2022-09-19 PROCEDURE — 97530 THERAPEUTIC ACTIVITIES: CPT

## 2022-09-19 PROCEDURE — U0003 INFECTIOUS AGENT DETECTION BY NUCLEIC ACID (DNA OR RNA); SEVERE ACUTE RESPIRATORY SYNDROME CORONAVIRUS 2 (SARS-COV-2) (CORONAVIRUS DISEASE [COVID-19]), AMPLIFIED PROBE TECHNIQUE, MAKING USE OF HIGH THROUGHPUT TECHNOLOGIES AS DESCRIBED BY CMS-2020-01-R: HCPCS

## 2022-09-19 PROCEDURE — 36415 COLL VENOUS BLD VENIPUNCTURE: CPT

## 2022-09-19 PROCEDURE — 2580000003 HC RX 258: Performed by: INTERNAL MEDICINE

## 2022-09-19 PROCEDURE — 6360000002 HC RX W HCPCS: Performed by: INTERNAL MEDICINE

## 2022-09-19 PROCEDURE — 97535 SELF CARE MNGMENT TRAINING: CPT

## 2022-09-19 PROCEDURE — 1200000000 HC SEMI PRIVATE

## 2022-09-19 PROCEDURE — 6370000000 HC RX 637 (ALT 250 FOR IP): Performed by: INTERNAL MEDICINE

## 2022-09-19 PROCEDURE — 80048 BASIC METABOLIC PNL TOTAL CA: CPT

## 2022-09-19 PROCEDURE — U0005 INFEC AGEN DETEC AMPLI PROBE: HCPCS

## 2022-09-19 PROCEDURE — 85025 COMPLETE CBC W/AUTO DIFF WBC: CPT

## 2022-09-19 RX ORDER — SODIUM CHLORIDE 1000 MG
1 TABLET, SOLUBLE MISCELLANEOUS
Status: DISCONTINUED | OUTPATIENT
Start: 2022-09-19 | End: 2022-09-21 | Stop reason: HOSPADM

## 2022-09-19 RX ORDER — BISACODYL 10 MG
10 SUPPOSITORY, RECTAL RECTAL ONCE
Status: DISCONTINUED | OUTPATIENT
Start: 2022-09-19 | End: 2022-09-21 | Stop reason: HOSPADM

## 2022-09-19 RX ADMIN — POLYETHYLENE GLYCOL 3350 17 G: 17 POWDER, FOR SOLUTION ORAL at 09:50

## 2022-09-19 RX ADMIN — AMOXICILLIN AND CLAVULANATE POTASSIUM 1 TABLET: 875; 125 TABLET, FILM COATED ORAL at 21:31

## 2022-09-19 RX ADMIN — INSULIN GLARGINE 10 UNITS: 100 INJECTION, SOLUTION SUBCUTANEOUS at 21:35

## 2022-09-19 RX ADMIN — SODIUM CHLORIDE, PRESERVATIVE FREE 10 ML: 5 INJECTION INTRAVENOUS at 09:03

## 2022-09-19 RX ADMIN — INSULIN LISPRO 2 UNITS: 100 INJECTION, SOLUTION INTRAVENOUS; SUBCUTANEOUS at 09:57

## 2022-09-19 RX ADMIN — ASPIRIN 81 MG: 81 TABLET, COATED ORAL at 09:50

## 2022-09-19 RX ADMIN — Medication 1 G: at 17:03

## 2022-09-19 RX ADMIN — TAMSULOSIN HYDROCHLORIDE 0.4 MG: 0.4 CAPSULE ORAL at 17:06

## 2022-09-19 RX ADMIN — ENOXAPARIN SODIUM 30 MG: 100 INJECTION SUBCUTANEOUS at 21:32

## 2022-09-19 RX ADMIN — MIDODRINE HYDROCHLORIDE 10 MG: 10 TABLET ORAL at 13:05

## 2022-09-19 RX ADMIN — MIDODRINE HYDROCHLORIDE 10 MG: 10 TABLET ORAL at 09:50

## 2022-09-19 RX ADMIN — MIDODRINE HYDROCHLORIDE 10 MG: 10 TABLET ORAL at 17:05

## 2022-09-19 RX ADMIN — ATORVASTATIN CALCIUM 20 MG: 20 TABLET, FILM COATED ORAL at 21:31

## 2022-09-19 RX ADMIN — ENOXAPARIN SODIUM 30 MG: 100 INJECTION SUBCUTANEOUS at 09:50

## 2022-09-19 RX ADMIN — SODIUM CHLORIDE, PRESERVATIVE FREE 10 ML: 5 INJECTION INTRAVENOUS at 21:33

## 2022-09-19 RX ADMIN — AMOXICILLIN AND CLAVULANATE POTASSIUM 1 TABLET: 875; 125 TABLET, FILM COATED ORAL at 09:50

## 2022-09-19 RX ADMIN — Medication 1 G: at 13:04

## 2022-09-19 ASSESSMENT — PAIN SCALES - GENERAL
PAINLEVEL_OUTOF10: 0

## 2022-09-19 NOTE — PROGRESS NOTES
Physical Therapy  Facility/Department: [address] MED SURG  Physical Therapy Treatment Note    Name: Willis Jon  : 1955  MRN: [id number]  Date of Service: 2022    Discharge Recommendations:  Patient would benefit from continued therapy after discharge, Continue to assess pending progress    Willis Jon scored a 15/24 on the AM-PAC short mobility form. Current research shows that an AM-PAC score of 17 or less is typically not associated with a discharge to the patient's home setting. Based on the patient's AM-PAC score and their current functional mobility deficits, it is recommended that the patient have 3-5 sessions per week of Physical Therapy at d/c to increase the patient's independence. Please see assessment section for further patient specific details. If patient discharges prior to next session this note will serve as a discharge summary. Please see below for the latest assessment towards goals. Patient Diagnosis(es): The primary encounter diagnosis was Syncope and collapse. A diagnosis of Dizziness was also pertinent to this visit. Past Medical History:  has a past medical history of Diabetes mellitus ([address] Gallstones, Lymphedema, Neuropathy, Pancreatitis, PVD (peripheral vascular disease) ([address] and Ulcers of both lower legs ([address] Past Surgical History:  has a past surgical history that includes Foot surgery (Left, 1967); ERCP (2014); Cholecystectomy, laparoscopic (N/A, 08/10/2021); and Pacemaker insertion. Assessment   Body Structures, Functions, Activity Limitations Requiring Skilled Therapeutic Intervention: Decreased functional mobility ; Decreased strength;Decreased endurance;Decreased balance  Assessment: Pt is a 74 y.o. male who presented to the ED on 22 one hour after discharge secondary to syncopal episode. He was previously admitted  -  due to septicemia.  Prior to previous admission, pt living alone in home setting, independent With: Alone  Type of Home: House  Home Layout: Multi-level, Able to Live on Main level with bedroom/bathroom, Laundry in basement (Washes clothes in sink; doesn't go to lower level.)  Home Access: Stairs to enter with rails (Reports 10-11 steps with handrail although pt never leaves home (only with EMS if to hospital). )  Entrance Stairs - Number of Steps: 11 DONNY  Entrance Stairs - Rails: Both (far spaced)  Bathroom Shower/Tub: Shower chair with back, Tub/Shower unit (Sponge bathes only.)  Bathroom Toilet: Bedside commode  Bathroom Equipment: Toilet raiser, Shower chair, Grab bars in shower  Bathroom Accessibility: Accessible  Home Equipment: Delroy Necessary, standard, Shon Au, 4 wheeled, Whitaker Simon, Vaughn Lyle, quad  Neymar Help From: Personal care attendant (Wound care nurse 3x/wk. Visiting MD/Nurse 1x/month. HHA 1x/wk (get groceries,etc). )  ADL Assistance: Independent (Sponge bathes.)  Homemaking Assistance:  (No assist; does what he can. Washes clothes in sink.)  Ambulation Assistance: Independent (With Cane in home; doesn't go out.)  Transfer Assistance: Independent  Active : No (HHA obtains groceries.)  Patient's  Info: Pt reports he does not drive  Mode of Transportation: Friends, Cab  Occupation: Unemployed  Type of Occupation: States he never worked  IADL Comments: Sleeps on recliner or couch. Additional Comments: Visiting Nurse 3x/wk. HHA 1x/wk for grocery shopping.     Vision/Hearing  Vision  Vision: Impaired  Vision Exceptions: Wears glasses for reading  Hearing  Hearing: Within functional limits      Cognition   Orientation  Overall Orientation Status: Within Functional Limits  Cognition  Overall Cognitive Status: WFL  Cognition Comment: Some decreased safety/insight     Objective   Gross Assessment  AROM: Generally decreased, functional (bilateral ER of feet)  Strength: Generally decreased, functional     Bed mobility  Supine to Sit: Stand by assistance  Sit to Supine: Unable to assess (in recliner at end of session)  Bed Mobility Comments: pt reporting increasing dizziness with sitting  Transfers  Sit to Stand: Minimal Assistance  Stand to sit: Minimal Assistance  Stand Pivot Transfers: Contact guard assistance;Minimal Assistance  Comment: Unsteady, dizzy        Balance  Sitting - Static: Good  Sitting - Dynamic: Good  Standing - Static: Fair  Standing - Dynamic: Fair;-           AM-PAC Score  AM-PAC Inpatient Mobility Raw Score : 15 (09/19/22 1025)  AM-PAC Inpatient T-Scale Score : 39.45 (09/19/22 1025)  Mobility Inpatient CMS 0-100% Score: 57.7 (09/19/22 1025)  Mobility Inpatient CMS G-Code Modifier : CK (09/19/22 1025)            Goals  Short Term Goals  Time Frame for Short term goals: by acute discharge - ongoing as of 9/19/22  Short term goal 1: Bed Mob Independent. Short term goal 2: Transfers with assist device Supervision. Short term goal 3: Amb with assist device 25-50' SBA.   Patient Goals   Patient goals : none stated       Education  Patient Education  Education Given To: Patient  Education Provided: Role of Therapy;Plan of Care;Transfer Training  Education Method: Verbal  Barriers to Learning: None  Education Outcome: Verbalized understanding;Continued education needed      Therapy Time   Individual Concurrent Group Co-treatment   Time In 0955         Time Out 1020         Minutes 25         Timed Code Treatment Minutes: [id number] [facility] 28, PT

## 2022-09-19 NOTE — CARE COORDINATION
09/19/22 1148   IMM Letter   IMM Letter given to Patient/Family/Significant other/Guardian/POA/by: second IMM given & reviewed with pt, he verbalized Alejandra Peterson RN   IMM Letter date given: 09/19/22   IMM Letter time given: 1021     Elena Shepard RN, BSN,   [phone]  Electronically signed by Elena Shepard RN on 9/19/2022 at 11:49 AM

## 2022-09-19 NOTE — PROGRESS NOTES
Hospitalist Progress Note      PCP: Ryan Jain MD    Date of Admission: 9/13/2022    Chief Complaint: syncope and collapse    Hospital Course: The patient is a 74 y.o. male with hx type 2 DM, PVD, bilateral food ulcers [facility]. Patient was just discharged yesterday. He was hospitalized for fever and shortness of breath. He was noted to have a high grade AV [place] block and had a micra pacemaker placed on 9/12 by cardiology. He was also seen with a lower extremity cellulitis, Proteus bacteremia, and chronic wounds seen by both podiatry and ID and discharged on PO Augmentin. He got home and within 15 minutes of being home raised up and had a syncopal episode. He came back to the ED for further evaluation. States that he has been getting lightheaded and dizzy both at rest and with movement. He has a room spinning sensation from movement. The lightheadedness and dizziness worsens with position change. Denies fever, chills, chest pain, shortness of breath, abdominal pain, nausea, vomiting, constipation, diarrhea, and dysuria. In the ED, labs were significant for a glucose of 103, hemoglobin of 8.6. CT Head without contrast was unremarkable. CTA Head and Neck with contrast was unremarkable. Subjective: Pt seen and examined. He feels ok. Very orthostatic to the point they couldn't get a blood pressure reading while standing. However, he was not wearing his abdominal binder at the time.       Medications:  Reviewed    Infusion Medications    dextrose      sodium chloride       Scheduled Medications    bisacodyl  10 mg Rectal Once    midodrine  10 mg Oral TID     polyethylene glycol  17 g Oral Daily    tamsulosin  0.4 mg Oral QPM    [Held by provider] lisinopril  5 mg Oral Daily    insulin glargine  10 Units SubCUTAneous Nightly    atorvastatin  20 mg Oral Nightly    aspirin  81 mg Oral Daily    amoxicillin-clavulanate  1 tablet Oral BID    sodium chloride flush  10 mL IntraVENous 2 times per day    enoxaparin  30 mg SubCUTAneous BID    insulin lispro  0-8 Units SubCUTAneous TID WC    insulin lispro  0-4 Units SubCUTAneous Nightly     PRN Meds: meclizine, glucose, dextrose bolus **OR** dextrose bolus, glucagon (rDNA), dextrose, sodium chloride flush, sodium chloride, ondansetron, acetaminophen **OR** acetaminophen      Intake/Output Summary (Last 24 hours) at 9/19/2022 1054  Last data filed at 9/19/2022 0624  Gross per 24 hour   Intake 1200 ml   Output 1550 ml   Net -350 ml         Exam:    /72   Pulse 62   Temp 98.1 °F (36.7 °C) (Oral)   Resp 18   Ht 6' 2\" (1.88 m)   Wt 254 lb 6.6 oz (115.4 kg)   SpO2 98%   BMI 32.66 kg/m²     General appearance: No apparent distress, appears stated age and cooperative. HEENT: Pupils equal, round, and reactive to light. Conjunctivae/corneas clear. Neck: Supple, with full range of motion. No jugular venous distention. Trachea midline. Respiratory:  Normal respiratory effort. Clear to auscultation, bilaterally without Rales/Wheezes/Rhonchi. Cardiovascular: Regular rate and rhythm with normal S1/S2 without murmurs, rubs or gallops. Abdomen: Soft, non-tender, non-distended with normal bowel sounds. Musculoskeletal: No clubbing, cyanosis or edema bilaterally. Full range of motion without deformity. Skin: Skin color, texture, turgor normal.  No rashes or lesions. Neurologic:  Neurovascularly intact without any focal sensory/motor deficits.  Cranial nerves: II-XII intact, grossly non-focal.  Psychiatric: Alert and oriented, thought content appropriate, normal insight  Capillary Refill: Brisk,< 3 seconds   Peripheral Pulses: +2 palpable, equal bilaterally       Labs:   Recent Labs     09/19/22  0730   WBC 6.6   HGB 9.5*   HCT 30.1*          Recent Labs     09/19/22  0730      K 5.1      CO2 25   BUN 16   CREATININE 0.7*   CALCIUM 9.1       No results for input(s): AST, ALT, BILIDIR, BILITOT, ALKPHOS in the last 72 hours. No results for input(s): INR in the last 72 hours. No results for input(s): [facility] in the last 72 hours. Urinalysis:      Lab Results   Component Value Date/Time    NITRU Negative 09/06/2022 03:41 PM    WBCUA 1 08/15/2022 11:16 AM    BACTERIA None Seen 08/15/2022 11:16 AM    RBCUA 2 08/15/2022 11:16 AM    BLOODU Negative 09/06/2022 03:41 PM    SPECGRAV 1.020 09/06/2022 03:41 PM    GLUCOSEU Negative 09/06/2022 03:41 PM       Radiology:  CTA HEAD NECK W CONTRAST   Final Result   1. No flow limiting stenosis of the cervical carotid/vertebral arteries. 2. No significant stenosis of the jbagjg-hi-Jejgba. CT HEAD WO CONTRAST   Final Result   No hemorrhage or mass identified      Mild small vessel ischemic change, similar to prior. Trace paranasal sinus disease         CT Head W/O Contrast   Final Result   No acute intracranial abnormality.          XR CHEST PORTABLE   Final Result   No acute abnormality                 Assessment/Plan:    Active Hospital Problems    Diagnosis Date Noted    Vertigo [R42] 11/27/2020       Syncope and collapse, chronic vertigo with associated nausea and vomiting - suspect due to central or peripheral vertigo.   -unable to get MRI due to recent pacemaker placement  -neurology consulted  -CTA Head and Neck with contrast showed no posterior circulation abnormalities  -PT/OT evaluation  -patient may benefit from vestibular rehab  -tele monitoring  -hold lisinopril, started midodrine and added meclizine PRN  -continue abdominal binder and ACE wraps  -abdominal binder must be worn with every transfer     Complete heart block s/p micra PPM implantation 9/12  -EP consulted, recs appreciated  -pacemaker interrogation ok  -tele monitoring     Orthostatic hypotension  -will give fluid bolus  -stop lisinopril  -patient is still orthostatic, but BP is slightly better  -continue to check daily orthostatic vitals  -added low dose midodrine  -continue ACE wraps and abdominal binder  -patient must wear abdominal binder during every transfer     Recent right foot infection   -continue PO Augmentin  -wound care consult     Type 2 DM  -Lantus  -SSI  -hypoglycemia protocol  -POCT glucose checks  -carb control diet     Hyperlipidemia  -continue home statin    DVT Prophylaxis: Lovenox  Diet: ADULT DIET;  Regular; 5 carb choices (75 gm/meal)  Code Status: Full Code    PT/OT Eval Status: ordered, recommending SNF    Dispo - continue care, hopefully discharge to SNF tomorrow    Heriberto Alberto MD

## 2022-09-19 NOTE — PROGRESS NOTES
Occupational Therapy  Facility/Department: [address] MED SURG  Occupational Therapy Daily Treatment    Name: Olga Sheth  : 1955  MRN: [id number]  Date of Service: 2022    Discharge Recommendations:  3-5 sessions per week, Patient would benefit from continued therapy after discharge     Olga Sheth scored a 17/24 on the AM-PAC ADL Inpatient form. Current research shows that an AM-PAC score of 17 or less is typically not associated with a discharge to the patient's home setting. Based on the patient's AM-PAC score and their current ADL deficits, it is recommended that the patient have 3-5 sessions per week of Occupational Therapy at d/c to increase the patient's independence. Please see assessment section for further patient specific details. If patient discharges prior to next session this note will serve as a discharge summary. Please see below for the latest assessment towards goals. Patient Diagnosis(es): The primary encounter diagnosis was Syncope and collapse. A diagnosis of Dizziness was also pertinent to this visit. Past Medical History:  has a past medical history of Diabetes mellitus ([address] Gallstones, Lymphedema, Neuropathy, Pancreatitis, PVD (peripheral vascular disease) ([address] and Ulcers of both lower legs ([address] Past Surgical History:  has a past surgical history that includes Foot surgery (Left, 1967); ERCP (2014); Cholecystectomy, laparoscopic (N/A, 08/10/2021); and Pacemaker insertion. Treatment Diagnosis: Syncope and collapse      Assessment   Performance deficits / Impairments: Decreased functional mobility ; Decreased ADL status; Decreased endurance;Decreased balance  Assessment: Michi Moffett is a 74 y.o. male who presented to the ED with high fever and dizziness. He has recent history of pneunomia and had concern for recurrent infection.   Pt was admitted from - and had pacemaker placed; he then had an episode of syncope ~15 mins after returning home and returned to the hospital. He lives alone and was IND with ADL's prior to admission (sponge bathed) and ambulated in his home using a cane however he was unable to navigate the steps to enter and had several recent falls. Pt continues to be limited by dizziness and low BP. He completed bed mobility SBA and stand pivot transfer w/ CGA using RW - not safe to attempt ambulation d/t dizziness. He completed seated grooming w/ setup but anticipate mod/max A for LB ADLs. Pt is not safe to return home alone and demonstrates need for ongoing skilled OT 3-5 times/week at d/c to increase pt's safety and independence. Treatment Diagnosis: Syncope and collapse  Prognosis: Good  REQUIRES OT FOLLOW-UP: Yes  Activity Tolerance  Activity Tolerance: Treatment limited secondary to medical complications (free text)  Activity Tolerance Comments: Low BP and continued c/o dizziness        Plan   Plan  Times per Week: 3-5  Current Treatment Recommendations: Strengthening, Balance training, Endurance training, Self-Care / ADL, Equipment evaluation, education, & procurement, Safety education & training     Restrictions  Restrictions/Precautions  Restrictions/Precautions: Fall Risk  Position Activity Restriction  Other position/activity restrictions: 9/19/22 orthostatics: supine 102/62, seated 83/56, standing - unable to assess; symptomatic    Subjective   General  Chart Reviewed: Yes  Patient assessed for rehabilitation services?: Yes  Additional Pertinent Hx: per H&P: \"The patient is a 74 y.o. male with hx type 2 DM, PVD, bilateral food ulcers who presents to [facility] with syncope and collapse. Patient was just discharged yesterday. He was hospitalized for fever and shortness of breath. He was noted to have a high grade AV [place] block and had a micra pacemaker placed on 9/12 by cardiology.  He was also seen with a lower extremity cellulitis, Proteus bacteremia, and chronic wounds seen by both podiatry and ID and discharged on PO Augmentin. He got home and within 15 minutes of being home raised up and had a syncopal episode. He came back to the ED for further evaluation. States that he has been getting lightheaded and dizzy both at rest and with movement. He has a room spinning sensation from movement. The lightheadedness and dizziness worsens with position change. \"  Family / Caregiver Present: No  Referring Practitioner: Wang Sood  Diagnosis: Syncope and collapse  Subjective  Subjective: Pt met b/s for therapy. Pt in bed, agreeable to therapy. Denied pain         Objective   Safety Devices  Type of Devices: All fall risk precautions in place;Call light within reach;Gait belt;Patient at risk for falls; Left in chair;Chair alarm in place;Nurse notified  Balance  Sitting: Intact  Standing: With support (RW)  Gait  Overall Level of Assistance:  (NT d/t dizziness-completed stand pivot transfer)        ADL  Grooming: Setup  Grooming Skilled Clinical Factors: Pt washed face seated in recliner  UE Dressing: Other (Comment)  UE Dressing Skilled Clinical Factors: Assist to change hospital gown     Activity Tolerance  Activity Tolerance: Patient limited by endurance;Treatment limited secondary to medical complications  Activity Tolerance Comments: Limited by dizziness/orthostatics  Bed mobility  Supine to Sit: Stand by assistance  Sit to Supine: Unable to assess (in recliner at end of session)  Bed Mobility Comments: pt reporting increasing dizziness with sitting  Transfers  Stand Pivot Transfers: Contact guard assistance  Transfer Comments: Stand pivot from bed>chair CGA using RW.  Reports increased dizziness  Vision  Vision: Impaired  Vision Exceptions: Wears glasses for reading  Hearing  Hearing: Within functional limits  Cognition  Overall Cognitive Status: WFL  Cognition Comment: Some decreased safety/insight  Orientation  Overall Orientation Status: Within Functional Limits                  Education Given To: Patient  Education

## 2022-09-19 NOTE — CARE COORDINATION
Received call from Yeison Harris from [facility], she has not had an update yet on pre-cert.     Eleuterio Meyers RN, BSN,   [phone]  Electronically signed by Eleuterio Meyers RN on 9/19/2022 at 11:50 AM

## 2022-09-20 LAB
GLUCOSE BLD-MCNC: 113 MG/DL (ref 70–99)
GLUCOSE BLD-MCNC: 157 MG/DL (ref 70–99)
GLUCOSE BLD-MCNC: 157 MG/DL (ref 70–99)
GLUCOSE BLD-MCNC: 181 MG/DL (ref 70–99)
PERFORMED ON: ABNORMAL
SARS-COV-2, PCR: NOT DETECTED

## 2022-09-20 PROCEDURE — 6370000000 HC RX 637 (ALT 250 FOR IP): Performed by: INTERNAL MEDICINE

## 2022-09-20 PROCEDURE — 2580000003 HC RX 258: Performed by: INTERNAL MEDICINE

## 2022-09-20 PROCEDURE — 6360000002 HC RX W HCPCS: Performed by: INTERNAL MEDICINE

## 2022-09-20 PROCEDURE — 1200000000 HC SEMI PRIVATE

## 2022-09-20 PROCEDURE — 94760 N-INVAS EAR/PLS OXIMETRY 1: CPT

## 2022-09-20 RX ORDER — MIDODRINE HYDROCHLORIDE 10 MG/1
10 TABLET ORAL
Qty: 30 TABLET | Refills: 0 | Status: SHIPPED | OUTPATIENT
Start: 2022-09-20

## 2022-09-20 RX ORDER — POLYETHYLENE GLYCOL 3350 17 G/17G
17 POWDER, FOR SOLUTION ORAL 2 TIMES DAILY
Status: DISCONTINUED | OUTPATIENT
Start: 2022-09-20 | End: 2022-09-21 | Stop reason: HOSPADM

## 2022-09-20 RX ORDER — SENNA PLUS 8.6 MG/1
1 TABLET ORAL NIGHTLY
Status: DISCONTINUED | OUTPATIENT
Start: 2022-09-20 | End: 2022-09-21 | Stop reason: HOSPADM

## 2022-09-20 RX ORDER — SODIUM CHLORIDE 1000 MG
1 TABLET, SOLUBLE MISCELLANEOUS
Qty: 90 TABLET | Refills: 3 | Status: SHIPPED | OUTPATIENT
Start: 2022-09-20

## 2022-09-20 RX ORDER — MECLIZINE HCL 12.5 MG/1
12.5 TABLET ORAL 3 TIMES DAILY PRN
Qty: 30 TABLET | Refills: 0 | Status: SHIPPED | OUTPATIENT
Start: 2022-09-20 | End: 2022-09-30

## 2022-09-20 RX ADMIN — POLYETHYLENE GLYCOL 3350 17 G: 17 POWDER, FOR SOLUTION ORAL at 21:28

## 2022-09-20 RX ADMIN — SENNOSIDES 8.6 MG: 8.6 TABLET, FILM COATED ORAL at 21:27

## 2022-09-20 RX ADMIN — Medication 1 G: at 13:12

## 2022-09-20 RX ADMIN — ASPIRIN 81 MG: 81 TABLET, COATED ORAL at 08:40

## 2022-09-20 RX ADMIN — Medication 1 G: at 08:40

## 2022-09-20 RX ADMIN — AMOXICILLIN AND CLAVULANATE POTASSIUM 1 TABLET: 875; 125 TABLET, FILM COATED ORAL at 08:40

## 2022-09-20 RX ADMIN — POLYETHYLENE GLYCOL 3350 17 G: 17 POWDER, FOR SOLUTION ORAL at 08:40

## 2022-09-20 RX ADMIN — SODIUM CHLORIDE, PRESERVATIVE FREE 10 ML: 5 INJECTION INTRAVENOUS at 09:00

## 2022-09-20 RX ADMIN — INSULIN GLARGINE 10 UNITS: 100 INJECTION, SOLUTION SUBCUTANEOUS at 21:28

## 2022-09-20 RX ADMIN — TAMSULOSIN HYDROCHLORIDE 0.4 MG: 0.4 CAPSULE ORAL at 17:29

## 2022-09-20 RX ADMIN — ENOXAPARIN SODIUM 30 MG: 100 INJECTION SUBCUTANEOUS at 08:40

## 2022-09-20 RX ADMIN — ENOXAPARIN SODIUM 30 MG: 100 INJECTION SUBCUTANEOUS at 21:28

## 2022-09-20 RX ADMIN — MIDODRINE HYDROCHLORIDE 10 MG: 10 TABLET ORAL at 08:40

## 2022-09-20 RX ADMIN — Medication 1 G: at 17:29

## 2022-09-20 RX ADMIN — MIDODRINE HYDROCHLORIDE 10 MG: 10 TABLET ORAL at 13:12

## 2022-09-20 RX ADMIN — ATORVASTATIN CALCIUM 20 MG: 20 TABLET, FILM COATED ORAL at 21:27

## 2022-09-20 RX ADMIN — MIDODRINE HYDROCHLORIDE 10 MG: 10 TABLET ORAL at 17:29

## 2022-09-20 ASSESSMENT — PAIN SCALES - WONG BAKER
WONGBAKER_NUMERICALRESPONSE: 0
WONGBAKER_NUMERICALRESPONSE: 0

## 2022-09-20 ASSESSMENT — PAIN SCALES - GENERAL
PAINLEVEL_OUTOF10: 2
PAINLEVEL_OUTOF10: 0

## 2022-09-20 NOTE — PROGRESS NOTES
Uneventful shift  Patient alert and oriented x 4    Vitals as per chart   Wound care done as per order    Patient awaiting ECF placement. Patient updated on care. Report given to oncoming nurse.

## 2022-09-20 NOTE — CARE COORDINATION
MARK sent referral to [employer] as a backup as he has history with this company. Patient status remains SNF vs home with home care. Respectfully submitted,    Aruna Manley3, IMANI  [facility]   [phone]    Electronically signed by IMANI Daigle on 9/20/2022 at 4:53 PM

## 2022-09-20 NOTE — CARE COORDINATION
Precert status remains pending. Respectfully submitted,    IMANI Garza  [facility]   [phone]    Electronically signed by IMANI Hogan on 9/20/2022 at 11:20 AM

## 2022-09-20 NOTE — CARE COORDINATION
MARK advised by [facility] that they intent to deny this patient unless MD is agreeable to participate in a peer to peer. MARK sent message to MD to notify that he has to call [phone] option 5 no later than 11:30am on Wednesday 9/21. They must also give patient ID number of [id number]. Respectfully submitted,    IMANI Gore  [facility]   [phone]    Electronically signed by IMANI Alvarez on 9/20/2022 at 4:43 PM

## 2022-09-20 NOTE — PROGRESS NOTES
Nutrition Assessment     Type and Reason for Visit: Initial    Nutrition Recommendations/Plan:   Continue current diet     Malnutrition Assessment:  Malnutrition Status: No malnutrition    Nutrition Assessment:  LOS assessment. Hx diabetes and pacreatitis. Admitted with vertigo. No wt loss per EMR. On 5 carb diet with intakes %. No nutrition concerns at this time. Will follow at low risk. Consult RD if nutrition status declines. Discharge order noted. Precert in place. Nutrition Related Findings:   Glucose 181. +BM 9/17. Wound Type: Multiple, Diabetic Ulcer    Current Nutrition Therapies:    ADULT DIET;  Regular; 5 carb choices (75 gm/meal)    Anthropometric Measures:  Height: 6' 2\" (188 cm)  Current Body Wt: 243 lb (110.2 kg)   BMI: 31.2    Nutrition Diagnosis:   No nutrition diagnosis at this time    Nutrition Interventions:   Food and/or Nutrient Delivery: Continue Current Diet  Nutrition Education/Counseling: Education not indicated  Coordination of Nutrition Care: Continue to monitor while inpatient     Goals:  Goals: PO intake 50% or greater    Nutrition Monitoring and Evaluation:   Behavioral-Environmental Outcomes: None Identified  Food/Nutrient Intake Outcomes: Food and Nutrient Intake  Physical Signs/Symptoms Outcomes: Biochemical Data, Skin, Weight    Discharge Planning:    Continue current diet     Deann Mario RD, LD  Contact: [phone]

## 2022-09-20 NOTE — PLAN OF CARE
Problem: Discharge Planning  Goal: Discharge to home or other facility with appropriate resources  Outcome: Progressing  Flowsheets (Taken 9/19/2022 0920)  Discharge to home or other facility with appropriate resources:   Identify barriers to discharge with patient and caregiver   Arrange for needed discharge resources and transportation as appropriate   Identify discharge learning needs (meds, wound care, etc)     Problem: Pain  Goal: Verbalizes/displays adequate comfort level or baseline comfort level  Outcome: Progressing  Flowsheets (Taken 9/19/2022 0930)  Verbalizes/displays adequate comfort level or baseline comfort level:   Encourage patient to monitor pain and request assistance   Assess pain using appropriate pain scale   Administer analgesics based on type and severity of pain and evaluate response   Implement non-pharmacological measures as appropriate and evaluate response   Consider cultural and social influences on pain and pain management   Notify Licensed Independent Practitioner if interventions unsuccessful or patient reports new pain     Problem: Skin/Tissue Integrity  Goal: Absence of new skin breakdown  Description: 1. Monitor for areas of redness and/or skin breakdown  2. Assess vascular access sites hourly  3. Every 4-6 hours minimum:  Change oxygen saturation probe site  4. Every 4-6 hours:  If on nasal continuous positive airway pressure, respiratory therapy assess nares and determine need for appliance change or resting period.   Outcome: Progressing     Problem: Safety - Adult  Goal: Free from fall injury  Outcome: Progressing  Flowsheets (Taken 9/19/2022 0930)  Free From Fall Injury: Instruct family/caregiver on patient safety     Problem: ABCDS Injury Assessment  Goal: Absence of physical injury  Outcome: Progressing  Flowsheets (Taken 9/19/2022 0930)  Absence of Physical Injury: Implement safety measures based on patient assessment     Problem: Chronic Conditions and Co-morbidities  Goal: Patient's chronic conditions and co-morbidity symptoms are monitored and maintained or improved  Outcome: Progressing  Flowsheets (Taken 9/19/2022 0920)  Care Plan - Patient's Chronic Conditions and Co-Morbidity Symptoms are Monitored and Maintained or Improved:   Monitor and assess patient's chronic conditions and comorbid symptoms for stability, deterioration, or improvement   Collaborate with multidisciplinary team to address chronic and comorbid conditions and prevent exacerbation or deterioration   Update acute care plan with appropriate goals if chronic or comorbid symptoms are exacerbated and prevent overall improvement and discharge

## 2022-09-20 NOTE — CARE COORDINATION
[facility] PRE-CERT REQUEST SUBMITTED VIA NH ACCESS PORTAL    Submitted by [facility]    REQUESTED SETTIN [facility] [address] [facility]    ANTICIPATED ADMIT DATE TO SNF:  2022    [facility] [place] #:  [id number]    Attached clinical to request 22    Pravin Tai Sr.  Administrative Assist, Case Management  [phone]  Electronically signed by Pravin Tai on 2022 at 9:15 AM

## 2022-09-20 NOTE — PLAN OF CARE
Problem: Discharge Planning  Goal: Discharge to home or other facility with appropriate resources  9/20/2022 0358 by Yaritza Moy RN  Outcome: Progressing  Flowsheets (Taken 9/19/2022 2025)  Discharge to home or other facility with appropriate resources:   Identify barriers to discharge with patient and caregiver   Arrange for needed discharge resources and transportation as appropriate   Identify discharge learning needs (meds, wound care, etc)  9/19/2022 2009 by Kenyatta Benson RN  Outcome: Progressing  Flowsheets (Taken 9/19/2022 0920)  Discharge to home or other facility with appropriate resources:   Identify barriers to discharge with patient and caregiver   Arrange for needed discharge resources and transportation as appropriate   Identify discharge learning needs (meds, wound care, etc)     Problem: Pain  Goal: Verbalizes/displays adequate comfort level or baseline comfort level  9/20/2022 0358 by Yaritza Moy RN  Outcome: Progressing  Flowsheets  Taken 9/19/2022 2351  Verbalizes/displays adequate comfort level or baseline comfort level:   Encourage patient to monitor pain and request assistance   Assess pain using appropriate pain scale   Administer analgesics based on type and severity of pain and evaluate response   Implement non-pharmacological measures as appropriate and evaluate response   Consider cultural and social influences on pain and pain management  Taken 9/19/2022 2025  Verbalizes/displays adequate comfort level or baseline comfort level:   Encourage patient to monitor pain and request assistance   Assess pain using appropriate pain scale   Administer analgesics based on type and severity of pain and evaluate response   Implement non-pharmacological measures as appropriate and evaluate response   Consider cultural and social influences on pain and pain management  9/19/2022 2009 by Kenyatta Benson RN  Outcome: Progressing  Flowsheets (Taken 9/19/2022 0930)  Verbalizes/displays adequate comfort level or baseline comfort level:   Encourage patient to monitor pain and request assistance   Assess pain using appropriate pain scale   Administer analgesics based on type and severity of pain and evaluate response   Implement non-pharmacological measures as appropriate and evaluate response   Consider cultural and social influences on pain and pain management   Notify Licensed Independent Practitioner if interventions unsuccessful or patient reports new pain     Problem: Skin/Tissue Integrity  Goal: Absence of new skin breakdown  Description: 1. Monitor for areas of redness and/or skin breakdown  2. Assess vascular access sites hourly  3. Every 4-6 hours minimum:  Change oxygen saturation probe site  4. Every 4-6 hours:  If on nasal continuous positive airway pressure, respiratory therapy assess nares and determine need for appliance change or resting period.   9/20/2022 0358 by Nabil Doss RN  Outcome: Progressing  9/19/2022 2009 by Bernie Stephenson RN  Outcome: Progressing     Problem: Safety - Adult  Goal: Free from fall injury  9/20/2022 0358 by Nabil Doss RN  Outcome: Progressing  Flowsheets (Taken 9/20/2022 0356)  Free From Fall Injury:   Instruct family/caregiver on patient safety   Based on caregiver fall risk screen, instruct family/caregiver to ask for assistance with transferring infant if caregiver noted to have fall risk factors  9/19/2022 2009 by Bernie Stephenson RN  Outcome: Progressing  Flowsheets (Taken 9/19/2022 0930)  Free From Fall Injury: Instruct family/caregiver on patient safety     Problem: ABCDS Injury Assessment  Goal: Absence of physical injury  9/20/2022 0358 by Nabil Doss RN  Outcome: Progressing  Flowsheets (Taken 9/20/2022 0356)  Absence of Physical Injury: Implement safety measures based on patient assessment  9/19/2022 2009 by Bernie Stephenson RN  Outcome: Progressing  Flowsheets (Taken 9/19/2022 0930)  Absence of Physical Injury:

## 2022-09-20 NOTE — PROGRESS NOTES
Patient resting quietly in bed, NADN at this time. Fall precautions in place. Call light and bedside table in reach. No needs at this time. Will continue with plan of care.

## 2022-09-20 NOTE — PROGRESS NOTES
abdominal binder  -patient must wear abdominal binder during every transfer     Recent right foot infection   -continue PO Augmentin  -wound care consult     Type 2 DM  -Lantus  -SSI  -hypoglycemia protocol  -POCT glucose checks  -carb control diet     Hyperlipidemia  -continue home statin    Constipation  Increase MiraLAX to twice daily and add Senokot    DVT Prophylaxis: Lovenox  Diet: ADULT DIET; Regular; 5 carb choices (75 gm/meal)  Code Status: Full Code    Dispo -DC order placed he can go to skilled facility at any time. Awaiting pre-CERT.   He is an excellent candidate for skilled facility    Araceli Layton MD

## 2022-09-21 VITALS
OXYGEN SATURATION: 100 % | WEIGHT: 245.59 LBS | HEIGHT: 74 IN | RESPIRATION RATE: 20 BRPM | BODY MASS INDEX: 31.52 KG/M2 | HEART RATE: 64 BPM | TEMPERATURE: 97.9 F | SYSTOLIC BLOOD PRESSURE: 111 MMHG | DIASTOLIC BLOOD PRESSURE: 69 MMHG

## 2022-09-21 LAB
GLUCOSE BLD-MCNC: 113 MG/DL (ref 70–99)
GLUCOSE BLD-MCNC: 132 MG/DL (ref 70–99)
GLUCOSE BLD-MCNC: 162 MG/DL (ref 70–99)
PERFORMED ON: ABNORMAL

## 2022-09-21 PROCEDURE — 6370000000 HC RX 637 (ALT 250 FOR IP): Performed by: INTERNAL MEDICINE

## 2022-09-21 PROCEDURE — 94760 N-INVAS EAR/PLS OXIMETRY 1: CPT

## 2022-09-21 PROCEDURE — 97530 THERAPEUTIC ACTIVITIES: CPT

## 2022-09-21 PROCEDURE — 2580000003 HC RX 258: Performed by: INTERNAL MEDICINE

## 2022-09-21 PROCEDURE — 6360000002 HC RX W HCPCS: Performed by: INTERNAL MEDICINE

## 2022-09-21 RX ADMIN — POLYETHYLENE GLYCOL 3350 17 G: 17 POWDER, FOR SOLUTION ORAL at 09:23

## 2022-09-21 RX ADMIN — ENOXAPARIN SODIUM 30 MG: 100 INJECTION SUBCUTANEOUS at 09:23

## 2022-09-21 RX ADMIN — Medication 1 G: at 08:30

## 2022-09-21 RX ADMIN — MIDODRINE HYDROCHLORIDE 10 MG: 10 TABLET ORAL at 17:46

## 2022-09-21 RX ADMIN — ASPIRIN 81 MG: 81 TABLET, COATED ORAL at 09:23

## 2022-09-21 RX ADMIN — SODIUM CHLORIDE, PRESERVATIVE FREE 10 ML: 5 INJECTION INTRAVENOUS at 09:00

## 2022-09-21 RX ADMIN — Medication 1 G: at 12:30

## 2022-09-21 RX ADMIN — TAMSULOSIN HYDROCHLORIDE 0.4 MG: 0.4 CAPSULE ORAL at 17:46

## 2022-09-21 ASSESSMENT — PAIN SCALES - WONG BAKER
WONGBAKER_NUMERICALRESPONSE: 0

## 2022-09-21 NOTE — CARE COORDINATION
CASE MANAGEMENT DISCHARGE SUMMARY:    DISCHARGE DATE: 09/21/22    Discharging to Facility/ Agency   Name: [facility]  Address:  [address] [place] [address] [address] [facility]   Phone:  [phone]  Fax:  [phone]      Covid test date: 9/19/22   Results: negative    TRANSPORTATION: [address]             TIME: 2000    INSURANCE APPROVAL: going under Medicaid    HENS/PASAAR COMPLETED: 9/21/22    Joelle Wolfe RN, BSN, Case Management  [phone]    Electronically signed by Joelle Wolfe RN on 9/21/2022 at 2:49 PM

## 2022-09-21 NOTE — CARE COORDINATION
P2P completed to day by Dr. Thao Ferreira; pt was able to participate in therapy's today so today's notes sent to [facility]. Jaime Benton RN, BSN,   [phone]  Electronically signed by Jaime Benton RN on 9/21/2022 at 11:26 AM     Approval denied via [place], notified Maki aPstor with [facility]; she is verifying if we can send pt under his Medicaid. Jaime Benton RN, BSN,   [phone]  Electronically signed by Jaime Benton RN on 9/21/2022 at 1:23 PM     HENS completed on 9/20/22. Faxed LOC to [facility] at [phone]; receipt of fax successfully sent received. Jaime Benton RN, BSN,   [phone]  Electronically signed by Jaime Benton RN on 9/21/2022 at 2:37 PM     Received notification for [place] [facility] that they do not do managed DENNIS level of care; notified Maki Pastor at [facility]. She will get back with me. Jaime Benton RN, BSN,   [phone]  Electronically signed by Jaime Benton RN on 9/21/2022 at 3:19 PM     [facility] will do the level of care, pt still good to dc this evening at 2000.     Jaime Benton RN, BSN,   [phone]  Electronically signed by Jaime Benton RN on 9/21/2022 at 3:23 PM

## 2022-09-21 NOTE — CARE COORDINATION
Updated therapy notes from 9/21/22 sent via [employer] [facility] as requested from peer to peer conversation. hospitals ref # [id number]    Dilcia Moore Sr.  Administrative Assist, Case Management  [phone]  Electronically signed by Dilcia Moore on 9/21/2022 at 11:19 AM

## 2022-09-21 NOTE — PLAN OF CARE
Problem: Discharge Planning  Goal: Discharge to home or other facility with appropriate resources  9/21/2022 1512 by Gurjit Garcia RN  Outcome: Completed  9/21/2022 1511 by Gurjit Garcia RN  Outcome: Progressing     Problem: Pain  Goal: Verbalizes/displays adequate comfort level or baseline comfort level  9/21/2022 1512 by Gurjit Garcia RN  Outcome: Completed  9/21/2022 1511 by Gurjit Garcia RN  Outcome: Progressing     Problem: Skin/Tissue Integrity  Goal: Absence of new skin breakdown  Description: 1. Monitor for areas of redness and/or skin breakdown  2. Assess vascular access sites hourly  3. Every 4-6 hours minimum:  Change oxygen saturation probe site  4. Every 4-6 hours:  If on nasal continuous positive airway pressure, respiratory therapy assess nares and determine need for appliance change or resting period.   9/21/2022 1512 by Gurjit Garcia RN  Outcome: Completed  9/21/2022 1511 by Gurjit Garcia RN  Outcome: Progressing     Problem: Safety - Adult  Goal: Free from fall injury  9/21/2022 1512 by Gurjit Garcia RN  Outcome: Completed  9/21/2022 1511 by Gurjit Garcia RN  Outcome: Progressing     Problem: ABCDS Injury Assessment  Goal: Absence of physical injury  9/21/2022 1512 by Gurjit Garcia RN  Outcome: Completed  9/21/2022 1511 by Gurjit Garcia RN  Outcome: Progressing     Problem: Chronic Conditions and Co-morbidities  Goal: Patient's chronic conditions and co-morbidity symptoms are monitored and maintained or improved  9/21/2022 1512 by Gurjit Garcia RN  Outcome: Completed  9/21/2022 1511 by Gurjit Garcia RN  Outcome: Progressing

## 2022-09-21 NOTE — PROGRESS NOTES
Physical Therapy  [facility]  Physical Therapy Initial Assessment    Name: Leslye Maya  : 1955  MRN: [id number]  Date of Service: 2022 orthostatics: supine 120/67, seated 99/61, standing 115/67     Discharge Recommendations:  Patient would benefit from continued therapy after discharge, Continue to assess pending progress    Leslye Maya scored a 15/24 on the AM-PAC short mobility form. Current research shows that an AM-PAC score of 17 or less is typically not associated with a discharge to the patient's home setting. Based on the patient's AM-PAC score and their current functional mobility deficits, it is recommended that the patient have 3-5 sessions per week of Physical Therapy at d/c to increase the patient's independence. Please see assessment section for further patient specific details. If patient discharges prior to next session this note will serve as a discharge summary. Please see below for the latest assessment towards goals. Patient Diagnosis(es): The primary encounter diagnosis was Syncope and collapse. A diagnosis of Dizziness was also pertinent to this visit. Past Medical History:  has a past medical history of Diabetes mellitus ([address] Gallstones, Lymphedema, Neuropathy, Pancreatitis, PVD (peripheral vascular disease) ([address] and Ulcers of both lower legs ([address] Past Surgical History:  has a past surgical history that includes Foot surgery (Left, 1967); ERCP (2014); Cholecystectomy, laparoscopic (N/A, 08/10/2021); and Pacemaker insertion. Assessment   Body Structures, Functions, Activity Limitations Requiring Skilled Therapeutic Intervention: Decreased functional mobility ; Decreased strength;Decreased endurance;Decreased balance  Assessment: Pt is a 74 y.o. male who presented to the ED on 22 one hour after discharge secondary to syncopal episode. He was previously admitted  -  due to septicemia. Prior to previous admission, pt living alone in home setting, independent with ADLs and ambulation with SPC. Pt currently functioning well below baseline - however able to tolerate increased activity. Recommend continued skilled therapy 3-5x/week to maximize independence and safety with functional mobility. Treatment Diagnosis: impaired mobility  Therapy Prognosis: Fair  Decision Making: Medium Complexity  History: see above  Exam: see below  Clinical Presentation: evolving  Activity Tolerance  Activity Tolerance: Patient limited by endurance;Treatment limited secondary to medical complications  Activity Tolerance Comments: Limited by dizziness     Plan   Plan  Plan: 3-5 times per week  Current Treatment Recommendations: Strengthening, Functional mobility training, Transfer training, Gait training, Safety education & training, Patient/Caregiver education & training, Neuromuscular re-education, Balance training, Equipment evaluation, education, & procurement, Therapeutic activities  Safety Devices  Type of Devices: All fall risk precautions in place, Call light within reach, Gait belt, Patient at risk for falls, Left in chair, Chair alarm in place, Nurse notified  Restraints  Restraints Initially in Place: No     Restrictions  Restrictions/Precautions  Restrictions/Precautions: Fall Risk  Position Activity Restriction  Other position/activity restrictions: 9/21/22 orthostatics: supine 120/67, seated 99/61, standing 115/67     Subjective   General  Chart Reviewed: Yes  Additional Pertinent Hx: Pt is a 74 y.o. male who presented to the ED on 9/13/22 one hour after discharge secondary to syncopal episode. He was previously admitted 9/6 - 9/13 due to septicemia. Response To Previous Treatment: Patient with no complaints from previous session.   Family / Caregiver Present: No  Follows Commands: Within Functional Limits  Subjective  Subjective: Pt is agreeable to PT         Social/Functional History  Social/Functional History  Lives With: Alone  Type of Home: House  Home Layout: Multi-level, Able to Live on Main level with bedroom/bathroom, Laundry in basement (Washes clothes in sink; doesn't go to lower level.)  Home Access: Stairs to enter with rails (Reports 10-11 steps with handrail although pt never leaves home (only with EMS if to hospital). )  Entrance Stairs - Number of Steps: 11 DONNY  Entrance Stairs - Rails: Both (far spaced)  Bathroom Shower/Tub: Shower chair with back, Tub/Shower unit (Sponge bathes only.)  Bathroom Toilet: Bedside commode  Bathroom Equipment: Toilet raiser, Shower chair, Grab bars in shower  Bathroom Accessibility: Accessible  Home Equipment: Maxwell Polio, standard, Lorren Barer, 4 wheeled, [employer], Tina Rumple, quad  [address] Help From: Personal care attendant (Wound care nurse 3x/wk. Visiting MD/Nurse 1x/month. HHA 1x/wk (get groceries,etc). )  ADL Assistance: Independent (Sponge bathes.)  Homemaking Assistance:  (No assist; does what he can. Washes clothes in sink.)  Ambulation Assistance: Independent (With Cane in home; doesn't go out.)  Transfer Assistance: Independent  Active : No (HHA obtains groceries.)  Patient's  Info: Pt reports he does not drive  Mode of Transportation: Friends, Cab  Occupation: Unemployed  Type of Occupation: States he never worked  IADL Comments: Sleeps on recliner or couch. Additional Comments: Visiting Nurse 3x/wk. HHA 1x/wk for grocery shopping. Vision/Hearing  Vision  Vision: Impaired  Vision Exceptions: Wears glasses for reading  Hearing  Hearing: Within functional limits      Cognition   Orientation  Overall Orientation Status: Within Functional Limits  Cognition  Overall Cognitive Status: WFL  Cognition Comment: Some decreased safety/insight     Objective   Observation/Palpation  Posture: Fair  Observation: Dressings B Feet; poor foot/nail care.   Gross Assessment  AROM: Generally decreased, functional (bilateral ER of feet)  Strength: Generally decreased, functional       Bed mobility  Supine to Sit: Stand by assistance  Sit to Supine: Unable to assess (in recliner at end of session)  Transfers  Sit to Stand: Minimal Assistance;Maximum Assistance  Stand to sit: Minimal Assistance  Comment: min A to stand from EOB; max A to stand from recliner  Ambulation  Surface: level tile  Device: Rolling Walker  Assistance: Contact guard assistance;Minimal assistance  Gait Deviations: Slow Bianca;Decreased step length;Decreased step height  Distance: 5' + 10'  Comments: unsteady; interrupted gait. Pt reporting progressive dizziness with ambulation. Balance  Posture: Fair  Sitting - Static: Good  Sitting - Dynamic: Good  Standing - Static: Fair  Standing - Dynamic: Fair;-             AM-PAC Score  AM-PAC Inpatient Mobility Raw Score : 15 (09/21/22 0939)  AM-PAC Inpatient T-Scale Score : 39.45 (09/21/22 0939)  Mobility Inpatient CMS 0-100% Score: 57.7 (09/21/22 0939)  Mobility Inpatient CMS G-Code Modifier : CK (09/21/22 0939)            Goals  Short Term Goals  Time Frame for Short term goals: by acute discharge - ongoing as of 9/21/22  Short term goal 1: Bed Mob Independent. Short term goal 2: Transfers with assist device Supervision. Short term goal 3: Amb with assist device 25-50' SBA.   Patient Goals   Patient goals : none stated       Education  Patient Education  Education Given To: Patient  Education Provided: Role of Therapy;Plan of Care;Transfer Training  Education Method: Verbal  Barriers to Learning: None  Education Outcome: Verbalized understanding;Continued education needed      Therapy Time   Individual Concurrent Group Co-treatment   Time In 0855         Time Out 0920         Minutes 25         Timed Code Treatment Minutes: 25 Minutes       Mattie Basilio, PT

## 2022-09-27 NOTE — PROGRESS NOTES
Physician Progress Note      PATIENT:               Cookie Silver  CSN #:                  [id number]  :                       1955  ADMIT DATE:       2022 3:09 PM  [address] [place] DATE:        2022 5:38 PM  RESPONDING  PROVIDER #:        Griselda Carreon MD          QUERY TEXT:    Patient admitted with febrile illness. Documentation reflects Sepsis in ED   Provider note(s) dated 22. If possible, please document in the progress   notes and discharge summary if Sepsis was: The medical record reflects the following:  Risk Factors: discharged recently about a few weeks ago for recent pneumonia  Clinical Indicators:  HR 95, RR  22 ,  Temperature  102.4 , Procalcitonin 0.38   , Lactic acid 2.0--1.2, per ED Provider \"Time  sepsis  Identified:  \"  Treatment: In ED 1L NS Bolus  , IV Cefepime and IV Vanco  Options provided:  -- Possible Sepsis, POA unknown source  confirmed after study  -- Possible Sepsis, POA treated and resolved, please specify source treated   and resolved.   -- Sepsis ruled out after study  -- Other - I will add my own diagnosis  -- Disagree - Not applicable / Not valid  -- Disagree - Clinically unable to determine / Unknown  -- Refer to Clinical Documentation Reviewer    PROVIDER RESPONSE TEXT:    Possible Sepsis, POA cellulitis    Query created by: Angela Yap on 2022 11:59 AM      Electronically signed by:  Griselda Carreon MD 2022 12:47 PM

## 2022-10-26 NOTE — DISCHARGE SUMMARY
SNF          Physical Exam Performed:     /69   Pulse 64   Temp 97.9 °F (36.6 °C) (Oral)   Resp 20   Ht 6' 2\" (1.88 m)   Wt 245 lb 9.5 oz (111.4 kg)   SpO2 100%   BMI 31.53 kg/m²            General appearance: No apparent distress, appears stated age and cooperative. HEENT: Pupils equal, round, and reactive to light. Conjunctivae/corneas clear. Neck: Supple, with full range of motion. No jugular venous distention. Trachea midline. Respiratory:  Normal respiratory effort. Clear to auscultation, bilaterally without Rales/Wheezes/Rhonchi. Cardiovascular: Regular rate and rhythm with normal S1/S2 without murmurs, rubs or gallops. Abdomen: Soft, non-tender, non-distended with normal bowel sounds. Musculoskeletal: No clubbing, cyanosis or edema bilaterally. Full range of motion without deformity. Skin: Skin color, texture, turgor normal.  No rashes or lesions. Neurologic:  Neurovascularly intact without any focal sensory/motor deficits. Cranial nerves: II-XII intact, grossly non-focal.  Psychiatric: Alert and oriented, thought content appropriate, normal insight  Capillary Refill: Brisk,< 3 seconds   Peripheral Pulses: +2 palpable, equal bilaterally   Labs: For convenience and continuity at follow-up the following most recent labs are provided:      CBC:    Lab Results   Component Value Date/Time    WBC 6.6 09/19/2022 07:30 AM    HGB 9.5 09/19/2022 07:30 AM    HCT 30.1 09/19/2022 07:30 AM     09/19/2022 07:30 AM       Renal:    Lab Results   Component Value Date/Time     09/19/2022 07:30 AM    K 5.1 09/19/2022 07:30 AM    K 4.8 09/16/2022 07:18 AM     09/19/2022 07:30 AM    CO2 25 09/19/2022 07:30 AM    BUN 16 09/19/2022 07:30 AM    CREATININE 0.7 09/19/2022 07:30 AM    CALCIUM 9.1 09/19/2022 07:30 AM    PHOS 3.3 06/12/2020 05:08 AM         Significant Diagnostic Studies    Radiology:   CTA HEAD NECK W CONTRAST   Final Result   1.  No flow limiting stenosis of the cervical carotid/vertebral arteries. 2. No significant stenosis of the noyyur-wa-Sxewkk. CT HEAD WO CONTRAST   Final Result   No hemorrhage or mass identified      Mild small vessel ischemic change, similar to prior. Trace paranasal sinus disease         CT Head W/O Contrast   Final Result   No acute intracranial abnormality. XR CHEST PORTABLE   Final Result   No acute abnormality                Consults:     IP CONSULT TO NEUROLOGY  IP CONSULT TO CARDIOLOGY    Disposition:  snf     Condition at Discharge: Stable    Discharge Instructions/Follow-up:  snf / Ariana Khan MD      Code Status:  Prior full    Activity: activity as tolerated    Diet: regular diet      Discharge Medications:     Discharge Medication List as of 9/21/2022  7:30 PM             Details   midodrine (PROAMATINE) 10 MG tablet Take 1 tablet by mouth 3 times daily (with meals), Disp-30 tablet, R-0Print      meclizine (ANTIVERT) 12.5 MG tablet Take 1 tablet by mouth 3 times daily as needed for Dizziness, Disp-30 tablet, R-0Print      sodium chloride 1 g tablet Take 1 tablet by mouth 3 times daily (with meals), Disp-90 tablet, R-3Print                Details   lisinopril (PRINIVIL;ZESTRIL) 5 MG tablet Take 1 tablet by mouth daily, Disp-30 tablet, R-0Normal      acetaminophen (TYLENOL) 500 MG tablet Take 500 mg by mouth every 6 hours as needed for PainHistorical Med      aspirin 81 MG EC tablet Take 81 mg by mouth dailyHistorical Med      lactobacillus (CULTURELLE) CAPS capsule Take 1 capsule by mouth dailyHistorical Med      docusate sodium (COLACE) 100 MG capsule Take 100 mg by mouth in the morning and 100 mg before bedtime. Historical Med      calcium-vitamin D (OSCAL-500) 500-200 MG-UNIT per tablet Take 1 tablet by mouth in the morning. Historical Med      insulin glargine (LANTUS SOLOSTAR) 100 UNIT/ML injection pen Inject 10 Units into the skin nightlyHistorical Med      Cyanocobalamin (VITAMIN B-12) 50 MCG LOZG Take 50 mcg by mouth dailyHistorical Med      atorvastatin (LIPITOR) 20 MG tablet Take 20 mg by mouth nightly Historical Med      tamsulosin (FLOMAX) 0.4 MG capsule Take 0.4 mg by mouth every evening Historical Med      metFORMIN (GLUCOPHAGE) 500 MG tablet Take 500 mg by mouth 2 times daily (with meals)Historical Med             Time Spent on discharge is more than 45 minutes in the examination, evaluation, counseling and review of medications and discharge plan. Signed:    Buell Lanes, MD   10/26/2022      Thank you Kaylin Lovett MD for the opportunity to be involved in this patient's care. If you have any questions or concerns, please feel free to contact me at [phone].

## 2024-01-27 NOTE — ED TRIAGE NOTES
.Pepe Seymour is a 75 y.o. male brought by [facility] for nausea, vomiting, and fever that started last night. The patient states that he has a mid abd pain that is a 3/10 and achy. The patient states he vomited 4 times over night. The patient also states that he had chest pain sometime through the night that was relieved by morning. The patient lives at home and has a NP that cares for him. The patient is alert and oriented with an open and patent airway with a normal respiratory effort. The patient has bilateral lower leg ulcers on both feet. ED provider at bedside. IV access and blood work obtained. Daughter would like a call back on status.  Melba on patient's contact.     Maddi Alvarado  01/27/24 1022

## 2024-02-28 NOTE — RESULT ENCOUNTER NOTE
Reviewed Writer spoke to Carmen at [facility] lab, per notes at lab 2/13 INR was cancelled due to patient not available    Writer will fax signed order to [facility] for INR tomorrow 2/29  Asked for a note to be placed in pts lab chart/orders that they should call as pt's wife stated they were home-will provide pt's wife number on faxed order.    Huy notified that lab will come tomorrow for INR, Huy verbally agreed to include her phone number for /phlebotomist to call when arrives tomorrow

## 2024-06-19 ENCOUNTER — APPOINTMENT (OUTPATIENT)
Dept: GENERAL RADIOLOGY | Age: 69
DRG: 854 | End: 2024-06-19
Payer: MEDICARE

## 2024-06-19 ENCOUNTER — APPOINTMENT (OUTPATIENT)
Dept: CT IMAGING | Age: 69
DRG: 854 | End: 2024-06-19
Payer: MEDICARE

## 2024-06-19 ENCOUNTER — HOSPITAL ENCOUNTER (INPATIENT)
Age: 69
LOS: 5 days | Discharge: NURSING, REHAB OR LONG-TERM CARE | DRG: 854 | End: 2024-06-24
Attending: EMERGENCY MEDICINE | Admitting: FAMILY MEDICINE
Payer: MEDICARE

## 2024-06-19 DIAGNOSIS — R55 SYNCOPE AND COLLAPSE: ICD-10-CM

## 2024-06-19 DIAGNOSIS — L03.116 CELLULITIS OF FOOT, LEFT: ICD-10-CM

## 2024-06-19 DIAGNOSIS — L97.529 TYPE 2 DIABETES MELLITUS WITH LEFT DIABETIC FOOT ULCER (HCC): ICD-10-CM

## 2024-06-19 DIAGNOSIS — R65.20 SEVERE SEPSIS (HCC): Primary | ICD-10-CM

## 2024-06-19 DIAGNOSIS — A41.9 SEVERE SEPSIS (HCC): Primary | ICD-10-CM

## 2024-06-19 DIAGNOSIS — L97.522 FOOT ULCERATION, LEFT, WITH FAT LAYER EXPOSED (HCC): ICD-10-CM

## 2024-06-19 DIAGNOSIS — K63.89 COLONIC MASS: ICD-10-CM

## 2024-06-19 DIAGNOSIS — E11.621 TYPE 2 DIABETES MELLITUS WITH LEFT DIABETIC FOOT ULCER (HCC): ICD-10-CM

## 2024-06-19 DIAGNOSIS — D50.9 IRON DEFICIENCY ANEMIA, UNSPECIFIED IRON DEFICIENCY ANEMIA TYPE: ICD-10-CM

## 2024-06-19 DIAGNOSIS — R16.0 LIVER MASSES: ICD-10-CM

## 2024-06-19 DIAGNOSIS — R10.9 ACUTE ABDOMINAL PAIN: ICD-10-CM

## 2024-06-19 DIAGNOSIS — C78.7 METASTASIS TO LIVER (HCC): ICD-10-CM

## 2024-06-19 DIAGNOSIS — S00.83XA CONTUSION OF OTHER PART OF HEAD, INITIAL ENCOUNTER: ICD-10-CM

## 2024-06-19 LAB
ALBUMIN SERPL-MCNC: 3.5 G/DL (ref 3.4–5)
ALBUMIN/GLOB SERPL: 0.9 {RATIO} (ref 1.1–2.2)
ALP SERPL-CCNC: 161 U/L (ref 40–129)
ALT SERPL-CCNC: 12 U/L (ref 10–40)
ANION GAP SERPL CALCULATED.3IONS-SCNC: 18 MMOL/L (ref 3–16)
AST SERPL-CCNC: 19 U/L (ref 15–37)
BACTERIA URNS QL MICRO: NORMAL /HPF
BASOPHILS # BLD: 0 K/UL (ref 0–0.2)
BASOPHILS NFR BLD: 0.3 %
BILIRUB SERPL-MCNC: 0.7 MG/DL (ref 0–1)
BILIRUB UR QL STRIP.AUTO: NEGATIVE
BUN SERPL-MCNC: 20 MG/DL (ref 7–20)
CALCIUM SERPL-MCNC: 9.3 MG/DL (ref 8.3–10.6)
CEA SERPL-MCNC: 133 NG/ML (ref 0–5)
CHLORIDE SERPL-SCNC: 100 MMOL/L (ref 99–110)
CLARITY UR: CLEAR
CO2 SERPL-SCNC: 18 MMOL/L (ref 21–32)
COLOR UR: YELLOW
CREAT SERPL-MCNC: 1 MG/DL (ref 0.8–1.3)
CRP SERPL-MCNC: 85.1 MG/L (ref 0–5.1)
DEPRECATED RDW RBC AUTO: 20.3 % (ref 12.4–15.4)
EKG ATRIAL RATE: 117 BPM
EKG DIAGNOSIS: NORMAL
EKG Q-T INTERVAL: 456 MS
EKG QRS DURATION: 140 MS
EKG QTC CALCULATION (BAZETT): 484 MS
EKG R AXIS: 121 DEGREES
EKG T AXIS: -15 DEGREES
EKG VENTRICULAR RATE: 68 BPM
EOSINOPHIL # BLD: 0 K/UL (ref 0–0.6)
EOSINOPHIL NFR BLD: 0.2 %
EPI CELLS #/AREA URNS AUTO: 2 /HPF (ref 0–5)
FERRITIN SERPL IA-MCNC: 51 NG/ML (ref 30–400)
GFR SERPLBLD CREATININE-BSD FMLA CKD-EPI: 81 ML/MIN/{1.73_M2}
GLUCOSE BLD-MCNC: 138 MG/DL (ref 70–99)
GLUCOSE BLD-MCNC: 145 MG/DL (ref 70–99)
GLUCOSE SERPL-MCNC: 153 MG/DL (ref 70–99)
GLUCOSE UR STRIP.AUTO-MCNC: NEGATIVE MG/DL
HCT VFR BLD AUTO: 28.8 % (ref 40.5–52.5)
HGB BLD-MCNC: 8.7 G/DL (ref 13.5–17.5)
HGB UR QL STRIP.AUTO: NEGATIVE
HYALINE CASTS #/AREA URNS AUTO: 1 /LPF (ref 0–8)
INR PPP: 1.1 (ref 0.85–1.15)
IRON SATN MFR SERPL: 7 % (ref 20–50)
IRON SERPL-MCNC: 21 UG/DL (ref 59–158)
KETONES UR STRIP.AUTO-MCNC: NEGATIVE MG/DL
LACTATE BLDV-SCNC: 2.9 MMOL/L (ref 0.4–2)
LACTATE BLDV-SCNC: 3 MMOL/L (ref 0.4–2)
LACTATE BLDV-SCNC: 3.2 MMOL/L (ref 0.4–1.9)
LACTATE BLDV-SCNC: 6.6 MMOL/L (ref 0.4–1.9)
LEUKOCYTE ESTERASE UR QL STRIP.AUTO: NEGATIVE
LIPASE SERPL-CCNC: 7 U/L (ref 13–60)
LYMPHOCYTES # BLD: 0.7 K/UL (ref 1–5.1)
LYMPHOCYTES NFR BLD: 4.9 %
MCH RBC QN AUTO: 20.9 PG (ref 26–34)
MCHC RBC AUTO-ENTMCNC: 30.3 G/DL (ref 31–36)
MCV RBC AUTO: 69 FL (ref 80–100)
MONOCYTES # BLD: 0.9 K/UL (ref 0–1.3)
MONOCYTES NFR BLD: 6.2 %
NEUTROPHILS # BLD: 12.6 K/UL (ref 1.7–7.7)
NEUTROPHILS NFR BLD: 88.4 %
NITRITE UR QL STRIP.AUTO: NEGATIVE
NT-PROBNP SERPL-MCNC: 4369 PG/ML (ref 0–124)
PATH INTERP BLD-IMP: NORMAL
PATH INTERP BLD-IMP: YES
PERFORMED ON: ABNORMAL
PERFORMED ON: ABNORMAL
PH UR STRIP.AUTO: 5 [PH] (ref 5–8)
PLATELET # BLD AUTO: 341 K/UL (ref 135–450)
PMV BLD AUTO: 7.5 FL (ref 5–10.5)
POTASSIUM SERPL-SCNC: 4.4 MMOL/L (ref 3.5–5.1)
PREALB SERPL-MCNC: 9.4 MG/DL (ref 20–40)
PROT SERPL-MCNC: 7.6 G/DL (ref 6.4–8.2)
PROT UR STRIP.AUTO-MCNC: 30 MG/DL
PROTHROMBIN TIME: 14.4 SEC (ref 11.9–14.9)
RBC # BLD AUTO: 4.17 M/UL (ref 4.2–5.9)
RBC CLUMPS #/AREA URNS AUTO: 1 /HPF (ref 0–4)
SARS-COV-2 RDRP RESP QL NAA+PROBE: NOT DETECTED
SODIUM SERPL-SCNC: 136 MMOL/L (ref 136–145)
SP GR UR STRIP.AUTO: 1.06 (ref 1–1.03)
TIBC SERPL-MCNC: 296 UG/DL (ref 260–445)
TROPONIN, HIGH SENSITIVITY: 43 NG/L (ref 0–22)
UA COMPLETE W REFLEX CULTURE PNL UR: ABNORMAL
UA DIPSTICK W REFLEX MICRO PNL UR: YES
URN SPEC COLLECT METH UR: ABNORMAL
UROBILINOGEN UR STRIP-ACNC: 1 E.U./DL
WBC # BLD AUTO: 14.2 K/UL (ref 4–11)
WBC #/AREA URNS AUTO: 1 /HPF (ref 0–5)

## 2024-06-19 PROCEDURE — 2580000003 HC RX 258: Performed by: FAMILY MEDICINE

## 2024-06-19 PROCEDURE — 97162 PT EVAL MOD COMPLEX 30 MIN: CPT | Performed by: PHYSICAL THERAPIST

## 2024-06-19 PROCEDURE — 97530 THERAPEUTIC ACTIVITIES: CPT | Performed by: PHYSICAL THERAPIST

## 2024-06-19 PROCEDURE — 83540 ASSAY OF IRON: CPT

## 2024-06-19 PROCEDURE — 99285 EMERGENCY DEPT VISIT HI MDM: CPT

## 2024-06-19 PROCEDURE — 80053 COMPREHEN METABOLIC PANEL: CPT

## 2024-06-19 PROCEDURE — 93005 ELECTROCARDIOGRAM TRACING: CPT | Performed by: EMERGENCY MEDICINE

## 2024-06-19 PROCEDURE — 6370000000 HC RX 637 (ALT 250 FOR IP): Performed by: INTERNAL MEDICINE

## 2024-06-19 PROCEDURE — 71260 CT THORAX DX C+: CPT

## 2024-06-19 PROCEDURE — 2060000000 HC ICU INTERMEDIATE R&B

## 2024-06-19 PROCEDURE — 96366 THER/PROPH/DIAG IV INF ADDON: CPT

## 2024-06-19 PROCEDURE — 6360000002 HC RX W HCPCS: Performed by: INTERNAL MEDICINE

## 2024-06-19 PROCEDURE — 94760 N-INVAS EAR/PLS OXIMETRY 1: CPT

## 2024-06-19 PROCEDURE — 85610 PROTHROMBIN TIME: CPT

## 2024-06-19 PROCEDURE — 6360000002 HC RX W HCPCS: Performed by: EMERGENCY MEDICINE

## 2024-06-19 PROCEDURE — 72125 CT NECK SPINE W/O DYE: CPT

## 2024-06-19 PROCEDURE — 73630 X-RAY EXAM OF FOOT: CPT

## 2024-06-19 PROCEDURE — 97166 OT EVAL MOD COMPLEX 45 MIN: CPT

## 2024-06-19 PROCEDURE — 83605 ASSAY OF LACTIC ACID: CPT

## 2024-06-19 PROCEDURE — 6360000002 HC RX W HCPCS: Performed by: FAMILY MEDICINE

## 2024-06-19 PROCEDURE — 6370000000 HC RX 637 (ALT 250 FOR IP): Performed by: FAMILY MEDICINE

## 2024-06-19 PROCEDURE — 81001 URINALYSIS AUTO W/SCOPE: CPT

## 2024-06-19 PROCEDURE — 84484 ASSAY OF TROPONIN QUANT: CPT

## 2024-06-19 PROCEDURE — 82378 CARCINOEMBRYONIC ANTIGEN: CPT

## 2024-06-19 PROCEDURE — 83036 HEMOGLOBIN GLYCOSYLATED A1C: CPT

## 2024-06-19 PROCEDURE — 96368 THER/DIAG CONCURRENT INF: CPT

## 2024-06-19 PROCEDURE — 2580000003 HC RX 258: Performed by: EMERGENCY MEDICINE

## 2024-06-19 PROCEDURE — 83880 ASSAY OF NATRIURETIC PEPTIDE: CPT

## 2024-06-19 PROCEDURE — 83550 IRON BINDING TEST: CPT

## 2024-06-19 PROCEDURE — 96365 THER/PROPH/DIAG IV INF INIT: CPT

## 2024-06-19 PROCEDURE — 87635 SARS-COV-2 COVID-19 AMP PRB: CPT

## 2024-06-19 PROCEDURE — 70450 CT HEAD/BRAIN W/O DYE: CPT

## 2024-06-19 PROCEDURE — 86140 C-REACTIVE PROTEIN: CPT

## 2024-06-19 PROCEDURE — 82728 ASSAY OF FERRITIN: CPT

## 2024-06-19 PROCEDURE — 85025 COMPLETE CBC W/AUTO DIFF WBC: CPT

## 2024-06-19 PROCEDURE — 87040 BLOOD CULTURE FOR BACTERIA: CPT

## 2024-06-19 PROCEDURE — 84134 ASSAY OF PREALBUMIN: CPT

## 2024-06-19 PROCEDURE — 36415 COLL VENOUS BLD VENIPUNCTURE: CPT

## 2024-06-19 PROCEDURE — 97530 THERAPEUTIC ACTIVITIES: CPT

## 2024-06-19 PROCEDURE — 93010 ELECTROCARDIOGRAM REPORT: CPT | Performed by: INTERNAL MEDICINE

## 2024-06-19 PROCEDURE — 0JBR0ZZ EXCISION OF LEFT FOOT SUBCUTANEOUS TISSUE AND FASCIA, OPEN APPROACH: ICD-10-PCS | Performed by: FAMILY MEDICINE

## 2024-06-19 PROCEDURE — 83690 ASSAY OF LIPASE: CPT

## 2024-06-19 PROCEDURE — 85652 RBC SED RATE AUTOMATED: CPT

## 2024-06-19 PROCEDURE — 6360000004 HC RX CONTRAST MEDICATION: Performed by: EMERGENCY MEDICINE

## 2024-06-19 PROCEDURE — 71045 X-RAY EXAM CHEST 1 VIEW: CPT

## 2024-06-19 PROCEDURE — 6370000000 HC RX 637 (ALT 250 FOR IP): Performed by: EMERGENCY MEDICINE

## 2024-06-19 RX ORDER — INSULIN GLARGINE 100 [IU]/ML
20 INJECTION, SOLUTION SUBCUTANEOUS NIGHTLY
Status: DISCONTINUED | OUTPATIENT
Start: 2024-06-19 | End: 2024-06-24 | Stop reason: HOSPADM

## 2024-06-19 RX ORDER — POLYETHYLENE GLYCOL 3350 17 G/17G
17 POWDER, FOR SOLUTION ORAL DAILY PRN
Status: DISCONTINUED | OUTPATIENT
Start: 2024-06-19 | End: 2024-06-24 | Stop reason: HOSPADM

## 2024-06-19 RX ORDER — MAGNESIUM SULFATE IN WATER 40 MG/ML
2000 INJECTION, SOLUTION INTRAVENOUS PRN
Status: DISCONTINUED | OUTPATIENT
Start: 2024-06-19 | End: 2024-06-24 | Stop reason: HOSPADM

## 2024-06-19 RX ORDER — HYDROXYZINE HYDROCHLORIDE 10 MG/1
10 TABLET, FILM COATED ORAL 3 TIMES DAILY PRN
Status: DISCONTINUED | OUTPATIENT
Start: 2024-06-19 | End: 2024-06-24 | Stop reason: HOSPADM

## 2024-06-19 RX ORDER — ACETAMINOPHEN 650 MG/1
650 SUPPOSITORY RECTAL EVERY 6 HOURS PRN
Status: DISCONTINUED | OUTPATIENT
Start: 2024-06-19 | End: 2024-06-24 | Stop reason: HOSPADM

## 2024-06-19 RX ORDER — GLUCAGON 1 MG/ML
1 KIT INJECTION PRN
Status: DISCONTINUED | OUTPATIENT
Start: 2024-06-19 | End: 2024-06-24 | Stop reason: HOSPADM

## 2024-06-19 RX ORDER — ONDANSETRON 2 MG/ML
4 INJECTION INTRAMUSCULAR; INTRAVENOUS EVERY 6 HOURS PRN
Status: DISCONTINUED | OUTPATIENT
Start: 2024-06-19 | End: 2024-06-24 | Stop reason: HOSPADM

## 2024-06-19 RX ORDER — SODIUM CHLORIDE 9 MG/ML
INJECTION, SOLUTION INTRAVENOUS CONTINUOUS
Status: DISCONTINUED | OUTPATIENT
Start: 2024-06-19 | End: 2024-06-24 | Stop reason: HOSPADM

## 2024-06-19 RX ORDER — VANCOMYCIN 1.75 G/350ML
1250 INJECTION, SOLUTION INTRAVENOUS EVERY 12 HOURS
Status: DISCONTINUED | OUTPATIENT
Start: 2024-06-19 | End: 2024-06-20

## 2024-06-19 RX ORDER — SODIUM CHLORIDE 9 MG/ML
INJECTION, SOLUTION INTRAVENOUS PRN
Status: DISCONTINUED | OUTPATIENT
Start: 2024-06-19 | End: 2024-06-21 | Stop reason: SDUPTHER

## 2024-06-19 RX ORDER — SODIUM CHLORIDE 0.9 % (FLUSH) 0.9 %
5-40 SYRINGE (ML) INJECTION PRN
Status: DISCONTINUED | OUTPATIENT
Start: 2024-06-19 | End: 2024-06-21 | Stop reason: SDUPTHER

## 2024-06-19 RX ORDER — ATORVASTATIN CALCIUM 20 MG/1
20 TABLET, FILM COATED ORAL NIGHTLY
Status: DISCONTINUED | OUTPATIENT
Start: 2024-06-19 | End: 2024-06-19

## 2024-06-19 RX ORDER — ACETAMINOPHEN 500 MG
1000 TABLET ORAL ONCE
Status: COMPLETED | OUTPATIENT
Start: 2024-06-19 | End: 2024-06-19

## 2024-06-19 RX ORDER — POTASSIUM CHLORIDE 7.45 MG/ML
10 INJECTION INTRAVENOUS PRN
Status: DISCONTINUED | OUTPATIENT
Start: 2024-06-19 | End: 2024-06-24 | Stop reason: HOSPADM

## 2024-06-19 RX ORDER — SODIUM CHLORIDE 9 MG/ML
30 INJECTION, SOLUTION INTRAVENOUS ONCE
Status: COMPLETED | OUTPATIENT
Start: 2024-06-19 | End: 2024-06-19

## 2024-06-19 RX ORDER — SODIUM CHLORIDE 0.9 % (FLUSH) 0.9 %
5-40 SYRINGE (ML) INJECTION EVERY 12 HOURS SCHEDULED
Status: DISCONTINUED | OUTPATIENT
Start: 2024-06-19 | End: 2024-06-21 | Stop reason: SDUPTHER

## 2024-06-19 RX ORDER — MIDODRINE HYDROCHLORIDE 10 MG/1
10 TABLET ORAL
Status: DISCONTINUED | OUTPATIENT
Start: 2024-06-19 | End: 2024-06-19

## 2024-06-19 RX ORDER — ENOXAPARIN SODIUM 100 MG/ML
30 INJECTION SUBCUTANEOUS 2 TIMES DAILY
Status: DISCONTINUED | OUTPATIENT
Start: 2024-06-19 | End: 2024-06-24 | Stop reason: HOSPADM

## 2024-06-19 RX ORDER — POTASSIUM CHLORIDE 20 MEQ/1
40 TABLET, EXTENDED RELEASE ORAL PRN
Status: DISCONTINUED | OUTPATIENT
Start: 2024-06-19 | End: 2024-06-24 | Stop reason: HOSPADM

## 2024-06-19 RX ORDER — ASPIRIN 81 MG/1
81 TABLET ORAL DAILY
Status: DISCONTINUED | OUTPATIENT
Start: 2024-06-19 | End: 2024-06-24 | Stop reason: HOSPADM

## 2024-06-19 RX ORDER — TAMSULOSIN HYDROCHLORIDE 0.4 MG/1
0.4 CAPSULE ORAL
Status: DISCONTINUED | OUTPATIENT
Start: 2024-06-19 | End: 2024-06-24 | Stop reason: HOSPADM

## 2024-06-19 RX ORDER — METRONIDAZOLE 500 MG/100ML
500 INJECTION, SOLUTION INTRAVENOUS EVERY 8 HOURS
Status: DISCONTINUED | OUTPATIENT
Start: 2024-06-19 | End: 2024-06-24 | Stop reason: HOSPADM

## 2024-06-19 RX ORDER — BISACODYL 5 MG/1
10 TABLET, DELAYED RELEASE ORAL ONCE
Status: COMPLETED | OUTPATIENT
Start: 2024-06-19 | End: 2024-06-19

## 2024-06-19 RX ORDER — SODIUM CHLORIDE 1 G/1
1 TABLET ORAL
Status: DISCONTINUED | OUTPATIENT
Start: 2024-06-19 | End: 2024-06-19

## 2024-06-19 RX ORDER — ACETAMINOPHEN 325 MG/1
650 TABLET ORAL EVERY 6 HOURS PRN
Status: DISCONTINUED | OUTPATIENT
Start: 2024-06-19 | End: 2024-06-24 | Stop reason: HOSPADM

## 2024-06-19 RX ORDER — DEXTROSE MONOHYDRATE 100 MG/ML
INJECTION, SOLUTION INTRAVENOUS CONTINUOUS PRN
Status: DISCONTINUED | OUTPATIENT
Start: 2024-06-19 | End: 2024-06-24 | Stop reason: HOSPADM

## 2024-06-19 RX ORDER — LACTOBACILLUS RHAMNOSUS GG 10B CELL
1 CAPSULE ORAL DAILY
Status: DISCONTINUED | OUTPATIENT
Start: 2024-06-19 | End: 2024-06-19

## 2024-06-19 RX ORDER — IBUPROFEN 400 MG/1
400 TABLET ORAL ONCE
Status: COMPLETED | OUTPATIENT
Start: 2024-06-19 | End: 2024-06-19

## 2024-06-19 RX ORDER — ONDANSETRON 4 MG/1
4 TABLET, ORALLY DISINTEGRATING ORAL EVERY 8 HOURS PRN
Status: DISCONTINUED | OUTPATIENT
Start: 2024-06-19 | End: 2024-06-24 | Stop reason: HOSPADM

## 2024-06-19 RX ORDER — METRONIDAZOLE 500 MG/100ML
500 INJECTION, SOLUTION INTRAVENOUS ONCE
Status: COMPLETED | OUTPATIENT
Start: 2024-06-19 | End: 2024-06-19

## 2024-06-19 RX ADMIN — POLYETHYLENE GLYCOL-3350 AND ELECTROLYTES 2000 ML: 236; 6.74; 5.86; 2.97; 22.74 POWDER, FOR SOLUTION ORAL at 18:30

## 2024-06-19 RX ADMIN — TAMSULOSIN HYDROCHLORIDE 0.4 MG: 0.4 CAPSULE ORAL at 23:02

## 2024-06-19 RX ADMIN — SODIUM CHLORIDE: 9 INJECTION, SOLUTION INTRAVENOUS at 12:23

## 2024-06-19 RX ADMIN — ACETAMINOPHEN 1000 MG: 500 TABLET ORAL at 05:56

## 2024-06-19 RX ADMIN — METRONIDAZOLE 500 MG: 500 INJECTION, SOLUTION INTRAVENOUS at 06:18

## 2024-06-19 RX ADMIN — IBUPROFEN 400 MG: 400 TABLET, FILM COATED ORAL at 05:56

## 2024-06-19 RX ADMIN — CEFEPIME 2000 MG: 2 INJECTION, POWDER, FOR SOLUTION INTRAVENOUS at 22:42

## 2024-06-19 RX ADMIN — Medication 10 ML: at 23:03

## 2024-06-19 RX ADMIN — ENOXAPARIN SODIUM 30 MG: 100 INJECTION SUBCUTANEOUS at 22:37

## 2024-06-19 RX ADMIN — VANCOMYCIN HYDROCHLORIDE 1000 MG: 1 INJECTION, POWDER, LYOPHILIZED, FOR SOLUTION INTRAVENOUS at 12:24

## 2024-06-19 RX ADMIN — INSULIN GLARGINE 20 UNITS: 100 INJECTION, SOLUTION SUBCUTANEOUS at 22:36

## 2024-06-19 RX ADMIN — CEFEPIME 2000 MG: 2 INJECTION, POWDER, FOR SOLUTION INTRAVENOUS at 14:35

## 2024-06-19 RX ADMIN — CEFEPIME 2000 MG: 2 INJECTION, POWDER, FOR SOLUTION INTRAVENOUS at 06:03

## 2024-06-19 RX ADMIN — VANCOMYCIN HYDROCHLORIDE 1000 MG: 1 INJECTION, POWDER, LYOPHILIZED, FOR SOLUTION INTRAVENOUS at 06:48

## 2024-06-19 RX ADMIN — METRONIDAZOLE 500 MG: 500 INJECTION, SOLUTION INTRAVENOUS at 23:08

## 2024-06-19 RX ADMIN — ASPIRIN 81 MG: 81 TABLET, COATED ORAL at 12:08

## 2024-06-19 RX ADMIN — METRONIDAZOLE 500 MG: 500 INJECTION, SOLUTION INTRAVENOUS at 14:34

## 2024-06-19 RX ADMIN — BISACODYL 10 MG: 5 TABLET, COATED ORAL at 22:37

## 2024-06-19 RX ADMIN — VANCOMYCIN 1250 MG: 1.75 INJECTION, SOLUTION INTRAVENOUS at 23:01

## 2024-06-19 RX ADMIN — IRON SUCROSE 200 MG: 20 INJECTION, SOLUTION INTRAVENOUS at 14:13

## 2024-06-19 RX ADMIN — Medication 10 ML: at 12:30

## 2024-06-19 RX ADMIN — SODIUM CHLORIDE 30 ML/KG/HR: 9 INJECTION, SOLUTION INTRAVENOUS at 05:58

## 2024-06-19 RX ADMIN — ENOXAPARIN SODIUM 30 MG: 100 INJECTION SUBCUTANEOUS at 12:08

## 2024-06-19 RX ADMIN — IOPAMIDOL 75 ML: 755 INJECTION, SOLUTION INTRAVENOUS at 06:28

## 2024-06-19 ASSESSMENT — PAIN SCALES - GENERAL
PAINLEVEL_OUTOF10: 0

## 2024-06-19 ASSESSMENT — LIFESTYLE VARIABLES
HOW OFTEN DO YOU HAVE A DRINK CONTAINING ALCOHOL: NEVER
HOW OFTEN DO YOU HAVE A DRINK CONTAINING ALCOHOL: NEVER
HOW MANY STANDARD DRINKS CONTAINING ALCOHOL DO YOU HAVE ON A TYPICAL DAY: PATIENT DOES NOT DRINK
HOW MANY STANDARD DRINKS CONTAINING ALCOHOL DO YOU HAVE ON A TYPICAL DAY: PATIENT DOES NOT DRINK

## 2024-06-19 ASSESSMENT — PAIN - FUNCTIONAL ASSESSMENT: PAIN_FUNCTIONAL_ASSESSMENT: 0-10

## 2024-06-19 NOTE — ED NOTES
Dr. Elliott notified at this time due to approaching 1 hour sepsis window. Relayed temperature, known wound, and elevated lactic level. Dr. Elliott states he will see patient next.

## 2024-06-19 NOTE — ED NOTES
Pt awake in bed. Side rails x2. The call light is within the patient's reach, and instructions for use were provided.  The bed is in the lowest position with wheels locked.  The patient has been advised to notify staff, using the call light, if there is a need to get up or use restroom.  The patient verbalized understanding of safety precautions and how to contact staff for assistance.

## 2024-06-19 NOTE — ED NOTES
Patient presents from home via EMS. Patient appears extremely unkept. Patient with flannel shirt with numerous holes and crusted to his skin. RN had to peel shirt off of patient due to it being stuck. Patient's pants with numerous holes and soiled with urine and feces. When asked about living conditions, patient states he lives by himself and has no relatives left. Patient states he has not left his house in over \"2 years,\" but his neighbor brings him food and takes out his garbage. Patient states he is \"unable to get around well and pees in a bucket.\" Patient changed into clean gown. CHG bath administered. Warm blankets applied. MD made aware of situation and social work consulted.

## 2024-06-19 NOTE — ED NOTES
Generalized body weakness. Pt states he has been \"feeling so bad for the last couple of months.\"

## 2024-06-19 NOTE — ED NOTES
Patient's  family member, Mary, called and she was updated, she lives in [place] and will not be up to the hospital.

## 2024-06-19 NOTE — CARE COORDINATION
SW consult noted for possible placement needs. He has a history with [facility] Arechiga from 2022. He will need therapies ordered when stable.     SW will arrive at bedside momentarily to discuss options with patient.     Respectfully submitted,    Aruna CERVANTES, LISW-S  [facility]   [phone]    Electronically signed by BILLY Lehman, JUDY on 6/19/2024 at 9:04 AM

## 2024-06-19 NOTE — ED PROVIDER NOTES
[facility] EMERGENCY DEPARTMENT  EMERGENCY DEPARTMENT ENCOUNTER      Pt Name: Clayton Cheng  MRN: [id number]  Birthdate 1955  Date of evaluation: 6/19/2024  Provider: PAT VÁZQUEZ DO    CHIEF COMPLAINT       Chief Complaint   Patient presents with    Fall     Generalized body weakness. Pt states he has been \"feeling so bad for the last couple of months.\" Pt fell this morning around 0200. Pt believes he may have LOC, but is unsure.          HISTORY OF PRESENT ILLNESS   (Location/Symptom, Timing/Onset, Context/Setting, Quality, Duration, Modifying Factors, Severity)  Note limiting factors.   Clayton Cheng is a 68 y.o. male who presents to the emergency department with a complaint of \"feeling terrible\".  Patient states that he really has not felt good in the last couple of months, however, since yesterday he states that he has been aching all over and generally feels very weak.  At approximately 2 AM today he states that he was walking with his cane and suddenly realized that he was on the floor.  He is not sure if he passed out, and is not sure what happened.  He landed on a carpeted surface.  He does not believe that he hit his head but does complain of a mild headache.  He describes it as dull and throbbing.  He denies any neck or back pain.    He reports that he was going to the bathroom because he has had nausea.  He reports several episodes of dry heaves over the last 24 hours but not much is coming up.  He denies any diarrhea.  He denies any dysuria hematuria frequency urgency.    He does report some aching throbbing mid abdominal pain.  He denies any back or flank pain.  He denies any groin or testicular pain.    He does have a chronic open wound on the plantar aspect of his left foot.  He states that it was the size of a half dollar.  He has seen a podiatrist in the past but not recently.  He states that it does drain some yellow drainage but denies any recent

## 2024-06-19 NOTE — CARE COORDINATION
SW Consult acknowledged.     Attempted to meet with patient at bedside.    RN working with patient at this time.   Will follow up as time permits.     BILLY Daily, LSW, Social Work/Case Management   [phone]  Electronically signed by BILLY Daily on 6/19/2024 at 4:18 PM

## 2024-06-20 ENCOUNTER — ANESTHESIA (OUTPATIENT)
Dept: ENDOSCOPY | Age: 69
End: 2024-06-20
Payer: MEDICARE

## 2024-06-20 ENCOUNTER — ANESTHESIA EVENT (OUTPATIENT)
Dept: ENDOSCOPY | Age: 69
End: 2024-06-20
Payer: MEDICARE

## 2024-06-20 PROBLEM — E87.20 LACTIC ACID ACIDOSIS: Status: ACTIVE | Noted: 2024-06-20

## 2024-06-20 PROBLEM — C18.2 MALIGNANT NEOPLASM OF ASCENDING COLON (HCC): Status: ACTIVE | Noted: 2024-06-20

## 2024-06-20 PROBLEM — E11.621 TYPE 2 DIABETES MELLITUS WITH LEFT DIABETIC FOOT ULCER (HCC): Status: ACTIVE | Noted: 2024-06-20

## 2024-06-20 PROBLEM — D72.9 NEUTROPHILIA: Status: ACTIVE | Noted: 2024-06-20

## 2024-06-20 PROBLEM — R10.9 ACUTE ABDOMINAL PAIN: Status: ACTIVE | Noted: 2024-06-20

## 2024-06-20 PROBLEM — L97.529 TYPE 2 DIABETES MELLITUS WITH LEFT DIABETIC FOOT ULCER (HCC): Status: ACTIVE | Noted: 2024-06-20

## 2024-06-20 PROBLEM — L03.116 CELLULITIS OF FOOT, LEFT: Status: ACTIVE | Noted: 2024-06-20

## 2024-06-20 PROBLEM — R16.0 LIVER MASSES: Status: ACTIVE | Noted: 2024-06-20

## 2024-06-20 PROBLEM — C78.7 METASTASIS TO LIVER (HCC): Status: ACTIVE | Noted: 2024-06-20

## 2024-06-20 PROBLEM — D50.9 IRON DEFICIENCY ANEMIA: Status: ACTIVE | Noted: 2024-06-20

## 2024-06-20 PROBLEM — K63.89 COLONIC MASS: Status: ACTIVE | Noted: 2024-06-20

## 2024-06-20 LAB
ANION GAP SERPL CALCULATED.3IONS-SCNC: 13 MMOL/L (ref 3–16)
BASOPHILS # BLD: 0 K/UL (ref 0–0.2)
BASOPHILS NFR BLD: 0.4 %
BUN SERPL-MCNC: 18 MG/DL (ref 7–20)
CALCIUM SERPL-MCNC: 8 MG/DL (ref 8.3–10.6)
CHLORIDE SERPL-SCNC: 106 MMOL/L (ref 99–110)
CO2 SERPL-SCNC: 19 MMOL/L (ref 21–32)
CREAT SERPL-MCNC: 0.8 MG/DL (ref 0.8–1.3)
DEPRECATED RDW RBC AUTO: 20.1 % (ref 12.4–15.4)
EOSINOPHIL # BLD: 0 K/UL (ref 0–0.6)
EOSINOPHIL NFR BLD: 0.2 %
ERYTHROCYTE [SEDIMENTATION RATE] IN BLOOD BY WESTERGREN METHOD: 68 MM/HR (ref 0–20)
EST. AVERAGE GLUCOSE BLD GHB EST-MCNC: 134.1 MG/DL
FOLATE SERPL-MCNC: 12.39 NG/ML (ref 4.78–24.2)
GFR SERPLBLD CREATININE-BSD FMLA CKD-EPI: >90 ML/MIN/{1.73_M2}
GLUCOSE BLD-MCNC: 110 MG/DL (ref 70–99)
GLUCOSE BLD-MCNC: 173 MG/DL (ref 70–99)
GLUCOSE BLD-MCNC: 86 MG/DL (ref 70–99)
GLUCOSE BLD-MCNC: 90 MG/DL (ref 70–99)
GLUCOSE BLD-MCNC: 90 MG/DL (ref 70–99)
GLUCOSE BLD-MCNC: 96 MG/DL (ref 70–99)
GLUCOSE SERPL-MCNC: 96 MG/DL (ref 70–99)
HBA1C MFR BLD: 6.3 %
HCT VFR BLD AUTO: 24.9 % (ref 40.5–52.5)
HGB BLD-MCNC: 7.9 G/DL (ref 13.5–17.5)
LACTATE BLDV-SCNC: 1.7 MMOL/L (ref 0.4–2)
LYMPHOCYTES # BLD: 0.9 K/UL (ref 1–5.1)
LYMPHOCYTES NFR BLD: 7.7 %
MAGNESIUM SERPL-MCNC: 1.7 MG/DL (ref 1.8–2.4)
MCH RBC QN AUTO: 21.4 PG (ref 26–34)
MCHC RBC AUTO-ENTMCNC: 31.8 G/DL (ref 31–36)
MCV RBC AUTO: 67.2 FL (ref 80–100)
MONOCYTES # BLD: 0.9 K/UL (ref 0–1.3)
MONOCYTES NFR BLD: 7.2 %
NEUTROPHILS # BLD: 10 K/UL (ref 1.7–7.7)
NEUTROPHILS NFR BLD: 84.5 %
PATH INTERP BLD-IMP: NO
PERFORMED ON: ABNORMAL
PERFORMED ON: ABNORMAL
PERFORMED ON: NORMAL
PLATELET # BLD AUTO: 279 K/UL (ref 135–450)
PMV BLD AUTO: 7.9 FL (ref 5–10.5)
POTASSIUM SERPL-SCNC: 3.3 MMOL/L (ref 3.5–5.1)
RBC # BLD AUTO: 3.7 M/UL (ref 4.2–5.9)
SODIUM SERPL-SCNC: 138 MMOL/L (ref 136–145)
VANCOMYCIN SERPL-MCNC: 20 UG/ML
VIT B12 SERPL-MCNC: 805 PG/ML (ref 211–911)
WBC # BLD AUTO: 11.9 K/UL (ref 4–11)

## 2024-06-20 PROCEDURE — 3700000001 HC ADD 15 MINUTES (ANESTHESIA): Performed by: INTERNAL MEDICINE

## 2024-06-20 PROCEDURE — 0DBK8ZX EXCISION OF ASCENDING COLON, VIA NATURAL OR ARTIFICIAL OPENING ENDOSCOPIC, DIAGNOSTIC: ICD-10-PCS | Performed by: INTERNAL MEDICINE

## 2024-06-20 PROCEDURE — 83735 ASSAY OF MAGNESIUM: CPT

## 2024-06-20 PROCEDURE — 2709999900 HC NON-CHARGEABLE SUPPLY: Performed by: INTERNAL MEDICINE

## 2024-06-20 PROCEDURE — 82607 VITAMIN B-12: CPT

## 2024-06-20 PROCEDURE — 88342 IMHCHEM/IMCYTCHM 1ST ANTB: CPT

## 2024-06-20 PROCEDURE — 7100000000 HC PACU RECOVERY - FIRST 15 MIN: Performed by: INTERNAL MEDICINE

## 2024-06-20 PROCEDURE — 2580000003 HC RX 258: Performed by: INTERNAL MEDICINE

## 2024-06-20 PROCEDURE — 88305 TISSUE EXAM BY PATHOLOGIST: CPT

## 2024-06-20 PROCEDURE — 2580000003 HC RX 258: Performed by: FAMILY MEDICINE

## 2024-06-20 PROCEDURE — 3609012400 HC EGD TRANSORAL BIOPSY SINGLE/MULTIPLE: Performed by: INTERNAL MEDICINE

## 2024-06-20 PROCEDURE — 6370000000 HC RX 637 (ALT 250 FOR IP): Performed by: INTERNAL MEDICINE

## 2024-06-20 PROCEDURE — 6360000002 HC RX W HCPCS: Performed by: FAMILY MEDICINE

## 2024-06-20 PROCEDURE — 80202 ASSAY OF VANCOMYCIN: CPT

## 2024-06-20 PROCEDURE — 2500000003 HC RX 250 WO HCPCS

## 2024-06-20 PROCEDURE — 83605 ASSAY OF LACTIC ACID: CPT

## 2024-06-20 PROCEDURE — 88341 IMHCHEM/IMCYTCHM EA ADD ANTB: CPT

## 2024-06-20 PROCEDURE — 7100000001 HC PACU RECOVERY - ADDTL 15 MIN: Performed by: INTERNAL MEDICINE

## 2024-06-20 PROCEDURE — 9990000010 HC NO CHARGE VISIT: Performed by: PHYSICAL THERAPIST

## 2024-06-20 PROCEDURE — 82746 ASSAY OF FOLIC ACID SERUM: CPT

## 2024-06-20 PROCEDURE — 3609010600 HC COLONOSCOPY POLYPECTOMY SNARE/COLD BIOPSY: Performed by: INTERNAL MEDICINE

## 2024-06-20 PROCEDURE — 6360000002 HC RX W HCPCS: Performed by: INTERNAL MEDICINE

## 2024-06-20 PROCEDURE — 0DBL8ZX EXCISION OF TRANSVERSE COLON, VIA NATURAL OR ARTIFICIAL OPENING ENDOSCOPIC, DIAGNOSTIC: ICD-10-PCS | Performed by: INTERNAL MEDICINE

## 2024-06-20 PROCEDURE — 2580000003 HC RX 258: Performed by: ANESTHESIOLOGY

## 2024-06-20 PROCEDURE — 1200000000 HC SEMI PRIVATE

## 2024-06-20 PROCEDURE — 0DB98ZX EXCISION OF DUODENUM, VIA NATURAL OR ARTIFICIAL OPENING ENDOSCOPIC, DIAGNOSTIC: ICD-10-PCS | Performed by: INTERNAL MEDICINE

## 2024-06-20 PROCEDURE — 6360000002 HC RX W HCPCS

## 2024-06-20 PROCEDURE — 94760 N-INVAS EAR/PLS OXIMETRY 1: CPT

## 2024-06-20 PROCEDURE — 99223 1ST HOSP IP/OBS HIGH 75: CPT | Performed by: INTERNAL MEDICINE

## 2024-06-20 PROCEDURE — 36415 COLL VENOUS BLD VENIPUNCTURE: CPT

## 2024-06-20 PROCEDURE — 85025 COMPLETE CBC W/AUTO DIFF WBC: CPT

## 2024-06-20 PROCEDURE — 3700000000 HC ANESTHESIA ATTENDED CARE: Performed by: INTERNAL MEDICINE

## 2024-06-20 PROCEDURE — 6370000000 HC RX 637 (ALT 250 FOR IP): Performed by: FAMILY MEDICINE

## 2024-06-20 PROCEDURE — 80048 BASIC METABOLIC PNL TOTAL CA: CPT

## 2024-06-20 RX ORDER — SODIUM CHLORIDE 0.9 % (FLUSH) 0.9 %
5-40 SYRINGE (ML) INJECTION PRN
Status: DISCONTINUED | OUTPATIENT
Start: 2024-06-20 | End: 2024-06-23

## 2024-06-20 RX ORDER — NALOXONE HYDROCHLORIDE 0.4 MG/ML
INJECTION, SOLUTION INTRAMUSCULAR; INTRAVENOUS; SUBCUTANEOUS PRN
Status: DISCONTINUED | OUTPATIENT
Start: 2024-06-20 | End: 2024-06-23

## 2024-06-20 RX ORDER — SODIUM CHLORIDE 9 MG/ML
INJECTION, SOLUTION INTRAVENOUS PRN
Status: DISCONTINUED | OUTPATIENT
Start: 2024-06-20 | End: 2024-06-21 | Stop reason: SDUPTHER

## 2024-06-20 RX ORDER — PANTOPRAZOLE SODIUM 40 MG/1
40 TABLET, DELAYED RELEASE ORAL
Status: DISCONTINUED | OUTPATIENT
Start: 2024-06-20 | End: 2024-06-24 | Stop reason: HOSPADM

## 2024-06-20 RX ORDER — SODIUM CHLORIDE 0.9 % (FLUSH) 0.9 %
5-40 SYRINGE (ML) INJECTION EVERY 12 HOURS SCHEDULED
Status: DISCONTINUED | OUTPATIENT
Start: 2024-06-20 | End: 2024-06-21 | Stop reason: SDUPTHER

## 2024-06-20 RX ORDER — SODIUM CHLORIDE 0.9 % (FLUSH) 0.9 %
5-40 SYRINGE (ML) INJECTION PRN
Status: DISCONTINUED | OUTPATIENT
Start: 2024-06-20 | End: 2024-06-21 | Stop reason: SDUPTHER

## 2024-06-20 RX ORDER — PROPOFOL 10 MG/ML
INJECTION, EMULSION INTRAVENOUS PRN
Status: DISCONTINUED | OUTPATIENT
Start: 2024-06-20 | End: 2024-06-20 | Stop reason: SDUPTHER

## 2024-06-20 RX ORDER — LIDOCAINE HYDROCHLORIDE 20 MG/ML
INJECTION, SOLUTION INFILTRATION; PERINEURAL PRN
Status: DISCONTINUED | OUTPATIENT
Start: 2024-06-20 | End: 2024-06-20 | Stop reason: SDUPTHER

## 2024-06-20 RX ORDER — SODIUM CHLORIDE 9 MG/ML
INJECTION, SOLUTION INTRAVENOUS PRN
Status: DISCONTINUED | OUTPATIENT
Start: 2024-06-20 | End: 2024-06-23

## 2024-06-20 RX ORDER — GLYCOPYRROLATE 0.2 MG/ML
INJECTION INTRAMUSCULAR; INTRAVENOUS PRN
Status: DISCONTINUED | OUTPATIENT
Start: 2024-06-20 | End: 2024-06-20 | Stop reason: SDUPTHER

## 2024-06-20 RX ORDER — SODIUM CHLORIDE 0.9 % (FLUSH) 0.9 %
5-40 SYRINGE (ML) INJECTION EVERY 12 HOURS SCHEDULED
Status: DISCONTINUED | OUTPATIENT
Start: 2024-06-20 | End: 2024-06-23

## 2024-06-20 RX ORDER — PHENYLEPHRINE HCL IN 0.9% NACL 1 MG/10 ML
SYRINGE (ML) INTRAVENOUS PRN
Status: DISCONTINUED | OUTPATIENT
Start: 2024-06-20 | End: 2024-06-20 | Stop reason: SDUPTHER

## 2024-06-20 RX ADMIN — IRON SUCROSE 200 MG: 20 INJECTION, SOLUTION INTRAVENOUS at 09:08

## 2024-06-20 RX ADMIN — Medication 100 MCG: at 14:20

## 2024-06-20 RX ADMIN — Medication 200 MCG: at 14:32

## 2024-06-20 RX ADMIN — ASPIRIN 81 MG: 81 TABLET, COATED ORAL at 15:25

## 2024-06-20 RX ADMIN — Medication 200 MCG: at 14:29

## 2024-06-20 RX ADMIN — SODIUM CHLORIDE: 9 INJECTION, SOLUTION INTRAVENOUS at 15:40

## 2024-06-20 RX ADMIN — SODIUM CHLORIDE: 9 INJECTION, SOLUTION INTRAVENOUS at 15:43

## 2024-06-20 RX ADMIN — CEFEPIME 2000 MG: 2 INJECTION, POWDER, FOR SOLUTION INTRAVENOUS at 15:49

## 2024-06-20 RX ADMIN — PROPOFOL 75 MCG/KG/MIN: 10 INJECTION, EMULSION INTRAVENOUS at 14:09

## 2024-06-20 RX ADMIN — SODIUM CHLORIDE: 9 INJECTION, SOLUTION INTRAVENOUS at 15:37

## 2024-06-20 RX ADMIN — LIDOCAINE HYDROCHLORIDE 100 MG: 20 INJECTION, SOLUTION INFILTRATION; PERINEURAL at 14:01

## 2024-06-20 RX ADMIN — CEFEPIME 2000 MG: 2 INJECTION, POWDER, FOR SOLUTION INTRAVENOUS at 21:12

## 2024-06-20 RX ADMIN — PROPOFOL 20 MG: 10 INJECTION, EMULSION INTRAVENOUS at 14:07

## 2024-06-20 RX ADMIN — ENOXAPARIN SODIUM 30 MG: 100 INJECTION SUBCUTANEOUS at 21:06

## 2024-06-20 RX ADMIN — PROPOFOL 50 MG: 10 INJECTION, EMULSION INTRAVENOUS at 14:01

## 2024-06-20 RX ADMIN — Medication 100 MCG: at 14:24

## 2024-06-20 RX ADMIN — METRONIDAZOLE 500 MG: 500 INJECTION, SOLUTION INTRAVENOUS at 21:10

## 2024-06-20 RX ADMIN — VANCOMYCIN 1250 MG: 1.75 INJECTION, SOLUTION INTRAVENOUS at 09:12

## 2024-06-20 RX ADMIN — PANTOPRAZOLE SODIUM 40 MG: 40 TABLET, DELAYED RELEASE ORAL at 16:54

## 2024-06-20 RX ADMIN — METRONIDAZOLE 500 MG: 500 INJECTION, SOLUTION INTRAVENOUS at 15:48

## 2024-06-20 RX ADMIN — TAMSULOSIN HYDROCHLORIDE 0.4 MG: 0.4 CAPSULE ORAL at 21:06

## 2024-06-20 RX ADMIN — VANCOMYCIN 1250 MG: 1.75 INJECTION, SOLUTION INTRAVENOUS at 21:16

## 2024-06-20 RX ADMIN — Medication 100 MCG: at 14:22

## 2024-06-20 RX ADMIN — GLYCOPYRROLATE 0.2 MG: 0.2 INJECTION, SOLUTION INTRAMUSCULAR; INTRAVENOUS at 13:56

## 2024-06-20 RX ADMIN — Medication 100 MCG: at 14:13

## 2024-06-20 RX ADMIN — PROPOFOL 50 MG: 10 INJECTION, EMULSION INTRAVENOUS at 14:03

## 2024-06-20 RX ADMIN — POTASSIUM CHLORIDE 40 MEQ: 1500 TABLET, EXTENDED RELEASE ORAL at 09:58

## 2024-06-20 RX ADMIN — Medication 10 ML: at 09:08

## 2024-06-20 RX ADMIN — Medication: at 15:50

## 2024-06-20 RX ADMIN — POLYETHYLENE GLYCOL 3350, SODIUM SULFATE ANHYDROUS, SODIUM BICARBONATE, SODIUM CHLORIDE, POTASSIUM CHLORIDE 2000 ML: 236; 22.74; 6.74; 5.86; 2.97 POWDER, FOR SOLUTION ORAL at 05:17

## 2024-06-20 RX ADMIN — INSULIN GLARGINE 20 UNITS: 100 INJECTION, SOLUTION SUBCUTANEOUS at 21:06

## 2024-06-20 RX ADMIN — CEFEPIME 2000 MG: 2 INJECTION, POWDER, FOR SOLUTION INTRAVENOUS at 06:13

## 2024-06-20 RX ADMIN — METRONIDAZOLE 500 MG: 500 INJECTION, SOLUTION INTRAVENOUS at 06:14

## 2024-06-20 RX ADMIN — SODIUM CHLORIDE: 9 INJECTION, SOLUTION INTRAVENOUS at 15:41

## 2024-06-20 ASSESSMENT — LIFESTYLE VARIABLES: SMOKING_STATUS: 0

## 2024-06-20 ASSESSMENT — PAIN - FUNCTIONAL ASSESSMENT
PAIN_FUNCTIONAL_ASSESSMENT: NONE - DENIES PAIN
PAIN_FUNCTIONAL_ASSESSMENT: NONE - DENIES PAIN

## 2024-06-20 ASSESSMENT — PAIN SCALES - GENERAL: PAINLEVEL_OUTOF10: 0

## 2024-06-20 NOTE — ANESTHESIA POSTPROCEDURE EVALUATION
Department of Anesthesiology  Postprocedure Note    Patient: Clayton Cheng  MRN: [id number]  YOB: 1955  Date of evaluation: 6/20/2024    Procedure Summary       Date: 06/20/24 Room / Location: [address]    Anesthesia Start: 1356 Anesthesia Stop: 1435    Procedures:       ESOPHAGOGASTRODUODENOSCOPY BIOPSY      COLONOSCOPY POLYPECTOMY SNARE/BIOPSY Diagnosis:       Iron deficiency anemia, unspecified iron deficiency anemia type      (Iron deficiency anemia, unspecified iron deficiency anemia type [D50.9])    Surgeons: Shola Domínguez Jr., DO Responsible Provider: Adama Gutierrez MD    Anesthesia Type: MAC ASA Status: 3            Anesthesia Type: No value filed.    Idris Phase I: Idris Score: 6    Idris Phase II:      Anesthesia Post Evaluation    Patient location during evaluation: PACU  Patient participation: complete - patient participated  Level of consciousness: awake and alert  Pain score: 2  Airway patency: patent  Nausea & Vomiting: no nausea and no vomiting  Cardiovascular status: blood pressure returned to baseline  Respiratory status: acceptable  Hydration status: euvolemic  Pain management: adequate    No notable events documented.

## 2024-06-20 NOTE — H&P
Patient seen and examined by me prior to the procedure.  The patient is stable for sedation.  There have been no significant changes since my initial consultation note.  Please reference this note for full details    The patient was counseled at length about the risks of juarez Covid-19 during their perioperative period and any recovery window from their procedure.  The patient was made aware that juarez Covid-19  may worsen their prognosis for recovering from their procedure  and lend to a higher morbidity and/or mortality risk.  All material risks, benefits, and reasonable alternatives including postponing the procedure were discussed. The patient does wish to proceed with the procedure at this time.    ASA class-3  Malampati- 2     
nightly  4/20/21   Adalid Palomo MD   tamsulosin (FLOMAX) 0.4 MG capsule Take 0.4 mg by mouth every evening     Adalid Palomo MD   metFORMIN (GLUCOPHAGE) 500 MG tablet Take 500 mg by mouth 2 times daily (with meals)    Adalid Palomo MD   spironolactone (ALDACTONE) 25 MG tablet Take 25 mg by mouth daily  9/20/22  Adalid Palomo MD       Physical Exam:    Physical Exam     General: NAD  Eyes: EOMI  ENT: neck supple  Cardiovascular: Regular rate.  Respiratory: Clear to auscultation  Gastrointestinal: Soft, non tender  Genitourinary: no suprapubic tenderness  Musculoskeletal: No edema  Skin: warm, dry, bilateral plantar ulcers, no active oozing or drainage  Neuro: Alert.  Psych: Mood appropriate.       Past Medical History:   PMHx   Past Medical History:   Diagnosis Date   • Diabetes mellitus (HCC)    • Gallstones    • Lymphedema    • Neuropathy    • Pancreatitis    • PVD (peripheral vascular disease) (HCC)    • Ulcers of both lower legs (HCC)     bilateral feet     PSHX:  has a past surgical history that includes Foot surgery (Left, 01/01/1967); ERCP (04/08/2014); Cholecystectomy, laparoscopic (N/A, 08/10/2021); and Pacemaker insertion.  Allergies: No Known Allergies  Fam HX:  family history includes Colon Cancer in his mother; Lung Cancer in his father.  Soc HX:   Social History     Socioeconomic History   • Marital status: Single   Tobacco Use   • Smoking status: Never   • Smokeless tobacco: Never   Vaping Use   • Vaping Use: Never used   Substance and Sexual Activity   • Alcohol use: No   • Drug use: No   • Sexual activity: Not Currently       Medications:   Medications:   • cefepime  2,000 mg IntraVENous Q12H      Infusions:   PRN Meds:      Labs      CBC:   Recent Labs     06/19/24  0456   WBC 14.2*   HGB 8.7*        BMP:    Recent Labs     06/19/24  0456      K 4.4      CO2 18*   BUN 20   CREATININE 1.0   GLUCOSE 153*     Hepatic:   Recent Labs     06/19/24  0456

## 2024-06-20 NOTE — OP NOTE
Endoscopy Note    Patient: Clayton Cheng  : 1955  Acct#:     Procedure: Esophagogastroduodenoscopy with biopsy                       Colonoscopy with biopsy    Date:  2024     Endoscopist:   Shola Domínguez Jr, DO    Referring Physician:  Emerita Alcala MD    Indications: This is a 68 y.o. year old male who presents today with  suspected metastatic colon cancer and iron deficiency anemia .       Previous Colonoscopy: N/A  Date: N/A  Greater than 3 years: N/A    Postoperative Diagnosis:   1) The esophagus revealed LA class B erosive esophagitis.  There was no Nunez's esophagus. There were no esophageal varices noted.  There were no strictures, rings, furrowing, masses, or nodules. The gastroesophageal junction was at 39cm from the incisors.  2) There was a 2-3 cm sliding hiatal hernia noted.  3) There was some hemorrhagic gastropathy noted in the fundus and proximal body of the stomach.  3) The villous pattern appeared normal, but given symptoms, multiple small bowel biopsies were obtained to evaluate for celiac disease. 4) The ampulla was normal appearing.  5) There was a malignant appearing mass noted in the proximal to mid ascending colon.  This was 6-7 cm in length.  This was nearly fully circumferential with some luminal stenosis distally in the lesion.  This was traversed with an adult colonoscope without difficulty.   This was biopsied. 6) There were two additional mid transverse colon polyps measuring 1cm and 2cm that were biopsied and not removed.  7) Moderate internal hemorrhoids were noted.     Anesthesia:  The patient was administered TIVA per anesthesiology team.  Please see their operative records for full details of medications administered.       Consent:  The patient or their legal guardian has signed a consent, and is aware of the potential risks, benefits, alternatives, and potential complications of this procedure.  These include, but are not limited

## 2024-06-20 NOTE — ANESTHESIA PRE PROCEDURE
06/20/2024 04:29 AM    CREATININE 0.8 06/20/2024 04:29 AM    GFRAA >60 09/19/2022 07:30 AM    AGRATIO 0.9 06/19/2024 04:56 AM    LABGLOM >90 06/20/2024 04:29 AM    GLUCOSE 96 06/20/2024 04:29 AM    CALCIUM 8.0 06/20/2024 04:29 AM    BILITOT 0.7 06/19/2024 04:56 AM    ALKPHOS 161 06/19/2024 04:56 AM    AST 19 06/19/2024 04:56 AM    ALT 12 06/19/2024 04:56 AM     BMP    Lab Results   Component Value Date/Time     06/20/2024 04:29 AM    K 3.3 06/20/2024 04:29 AM     06/20/2024 04:29 AM    CO2 19 06/20/2024 04:29 AM    BUN 18 06/20/2024 04:29 AM    CREATININE 0.8 06/20/2024 04:29 AM    CALCIUM 8.0 06/20/2024 04:29 AM    GFRAA >60 09/19/2022 07:30 AM    LABGLOM >90 06/20/2024 04:29 AM    GLUCOSE 96 06/20/2024 04:29 AM     POCGlucose  Recent Labs     06/19/24  0456 06/20/24  0429   GLUCOSE 153* 96        Coags    Lab Results   Component Value Date/Time    PROTIME 14.4 06/19/2024 04:56 AM    INR 1.10 06/19/2024 04:56 AM     HCG (If Applicable) No results found for: \"PREGTESTUR\", \"PREGSERUM\", \"HCG\", \"HCGQUANT\"   ABGs No results found for: \"PHART\", \"PO2ART\", \"FVC2UQG\", \"DZR8SNZ\", \"BEART\", \"I1QGWRLB\"   Type & Screen (If Applicable)  No results found for: \"LABABO\"                         BMI: Wt Readings from Last 3 Encounters:       NPO Status:   Date of last liquid consumption: 06/19/24   Time of last liquid consumption: 2200   Date of last solid food consumption: 06/18/24      Time of last solid consumption: 1000       Anesthesia Evaluation  Patient summary reviewed   no history of anesthetic complications:   Airway: Mallampati: II  TM distance: >3 FB   Neck ROM: full  Mouth opening: > = 3 FB   Dental:      Comment: No loose teeth    Pulmonary: breath sounds clear to auscultation      (-) COPD, asthma, sleep apnea and not a current smoker                           Cardiovascular:  Exercise tolerance: good (>4 METS)  (+) hypertension:, pacemaker:, dysrhythmias:    (-) past MI, CABG/stent,  angina and no

## 2024-06-21 LAB
ANION GAP SERPL CALCULATED.3IONS-SCNC: 13 MMOL/L (ref 3–16)
BASOPHILS # BLD: 0.1 K/UL (ref 0–0.2)
BASOPHILS NFR BLD: 0.6 %
BUN SERPL-MCNC: 14 MG/DL (ref 7–20)
CALCIUM SERPL-MCNC: 7.8 MG/DL (ref 8.3–10.6)
CHLORIDE SERPL-SCNC: 108 MMOL/L (ref 99–110)
CO2 SERPL-SCNC: 17 MMOL/L (ref 21–32)
CREAT SERPL-MCNC: 0.8 MG/DL (ref 0.8–1.3)
DEPRECATED RDW RBC AUTO: 20.6 % (ref 12.4–15.4)
EOSINOPHIL # BLD: 0.2 K/UL (ref 0–0.6)
EOSINOPHIL NFR BLD: 2.2 %
GFR SERPLBLD CREATININE-BSD FMLA CKD-EPI: >90 ML/MIN/{1.73_M2}
GLUCOSE BLD-MCNC: 101 MG/DL (ref 70–99)
GLUCOSE BLD-MCNC: 106 MG/DL (ref 70–99)
GLUCOSE BLD-MCNC: 124 MG/DL (ref 70–99)
GLUCOSE BLD-MCNC: 94 MG/DL (ref 70–99)
GLUCOSE SERPL-MCNC: 97 MG/DL (ref 70–99)
HCT VFR BLD AUTO: 25.2 % (ref 40.5–52.5)
HGB BLD-MCNC: 7.7 G/DL (ref 13.5–17.5)
LYMPHOCYTES # BLD: 1.4 K/UL (ref 1–5.1)
LYMPHOCYTES NFR BLD: 12.8 %
MAGNESIUM SERPL-MCNC: 1.7 MG/DL (ref 1.8–2.4)
MCH RBC QN AUTO: 20.8 PG (ref 26–34)
MCHC RBC AUTO-ENTMCNC: 30.7 G/DL (ref 31–36)
MCV RBC AUTO: 67.9 FL (ref 80–100)
MONOCYTES # BLD: 1 K/UL (ref 0–1.3)
MONOCYTES NFR BLD: 8.7 %
NEUTROPHILS # BLD: 8.5 K/UL (ref 1.7–7.7)
NEUTROPHILS NFR BLD: 75.7 %
PATH INTERP BLD-IMP: NO
PERFORMED ON: ABNORMAL
PERFORMED ON: NORMAL
PLATELET # BLD AUTO: 267 K/UL (ref 135–450)
PMV BLD AUTO: 8.2 FL (ref 5–10.5)
POTASSIUM SERPL-SCNC: 3.3 MMOL/L (ref 3.5–5.1)
RBC # BLD AUTO: 3.72 M/UL (ref 4.2–5.9)
SODIUM SERPL-SCNC: 138 MMOL/L (ref 136–145)
WBC # BLD AUTO: 11.3 K/UL (ref 4–11)

## 2024-06-21 PROCEDURE — 2580000003 HC RX 258: Performed by: ANESTHESIOLOGY

## 2024-06-21 PROCEDURE — 1200000000 HC SEMI PRIVATE

## 2024-06-21 PROCEDURE — 6360000002 HC RX W HCPCS: Performed by: INTERNAL MEDICINE

## 2024-06-21 PROCEDURE — 85025 COMPLETE CBC W/AUTO DIFF WBC: CPT

## 2024-06-21 PROCEDURE — 94760 N-INVAS EAR/PLS OXIMETRY 1: CPT

## 2024-06-21 PROCEDURE — 6370000000 HC RX 637 (ALT 250 FOR IP): Performed by: INTERNAL MEDICINE

## 2024-06-21 PROCEDURE — 36415 COLL VENOUS BLD VENIPUNCTURE: CPT

## 2024-06-21 PROCEDURE — 99233 SBSQ HOSP IP/OBS HIGH 50: CPT | Performed by: INTERNAL MEDICINE

## 2024-06-21 PROCEDURE — 83735 ASSAY OF MAGNESIUM: CPT

## 2024-06-21 PROCEDURE — 2580000003 HC RX 258: Performed by: INTERNAL MEDICINE

## 2024-06-21 PROCEDURE — 80048 BASIC METABOLIC PNL TOTAL CA: CPT

## 2024-06-21 RX ADMIN — ASPIRIN 81 MG: 81 TABLET, COATED ORAL at 09:20

## 2024-06-21 RX ADMIN — CEFEPIME 2000 MG: 2 INJECTION, POWDER, FOR SOLUTION INTRAVENOUS at 14:00

## 2024-06-21 RX ADMIN — METRONIDAZOLE 500 MG: 500 INJECTION, SOLUTION INTRAVENOUS at 20:57

## 2024-06-21 RX ADMIN — SODIUM CHLORIDE, PRESERVATIVE FREE 10 ML: 5 INJECTION INTRAVENOUS at 09:21

## 2024-06-21 RX ADMIN — METRONIDAZOLE 500 MG: 500 INJECTION, SOLUTION INTRAVENOUS at 14:00

## 2024-06-21 RX ADMIN — ENOXAPARIN SODIUM 30 MG: 100 INJECTION SUBCUTANEOUS at 09:20

## 2024-06-21 RX ADMIN — ENOXAPARIN SODIUM 30 MG: 100 INJECTION SUBCUTANEOUS at 20:56

## 2024-06-21 RX ADMIN — IRON SUCROSE 200 MG: 20 INJECTION, SOLUTION INTRAVENOUS at 09:21

## 2024-06-21 RX ADMIN — METRONIDAZOLE 500 MG: 500 INJECTION, SOLUTION INTRAVENOUS at 06:17

## 2024-06-21 RX ADMIN — CEFEPIME 2000 MG: 2 INJECTION, POWDER, FOR SOLUTION INTRAVENOUS at 06:18

## 2024-06-21 RX ADMIN — TAMSULOSIN HYDROCHLORIDE 0.4 MG: 0.4 CAPSULE ORAL at 20:55

## 2024-06-21 RX ADMIN — CEFEPIME 2000 MG: 2 INJECTION, POWDER, FOR SOLUTION INTRAVENOUS at 20:58

## 2024-06-21 RX ADMIN — SODIUM CHLORIDE: 9 INJECTION, SOLUTION INTRAVENOUS at 11:18

## 2024-06-21 RX ADMIN — POTASSIUM CHLORIDE 40 MEQ: 1500 TABLET, EXTENDED RELEASE ORAL at 09:21

## 2024-06-21 RX ADMIN — PANTOPRAZOLE SODIUM 40 MG: 40 TABLET, DELAYED RELEASE ORAL at 06:51

## 2024-06-21 RX ADMIN — Medication: at 09:20

## 2024-06-21 NOTE — CONSULTS
[facility]    Consult Note      Requesting Physician:  Ryan Kramer MD    CHIEF COMPLAINT:  probable mets colon cancer      HISTORY OF PRESENT ILLNESS:    Mr. Cheng  is a 68 y.o. male we are seeing in consultation for probable mets colon cancer. This is a patient with history of diabetes who is presented to the hospital after having a syncopal episode at home.  He lives alone and woke up on the floor.  He does not remember exactly what happened.  He did have a fever in the ER to 101 and his white blood cell count and lactic acid were elevated and he was started on antibiotics.  CT scans were done that showed mural thickening of the mid ascending colon concerning for colon carcinoma with multiple liver lesions which have increased from a prior CT in August 2022. Within the central liver, there is a dominant mass measuring 9.2 cm but multiple other smaller lesions as well.  Incidentally, these liver lesions were seen during a hospitalization in August 2022 and he refused workup at this time.  He has been getting weaker and is having trouble walking.  He feels a little bit better today    Past Medical History:  Past Medical History:   Diagnosis Date    Diabetes mellitus (HCC)     Gallstones     Lymphedema     Neuropathy     Pancreatitis     PVD (peripheral vascular disease) (HCC)     Ulcers of both lower legs (HCC)     bilateral feet       Past Surgical History:  Past Surgical History:   Procedure Laterality Date    CHOLECYSTECTOMY, LAPAROSCOPIC N/A 08/10/2021    LAPAROSCOPIC CHOLECYSTECTOMY WITH CHOLANGIOGRAM performed by Chano Al MD at [facility] OR    ERCP  04/08/2014    LITHOTRYPSY & PANCREATIC STENT PLACEMENT    FOOT SURGERY Left 01/01/1967    PACEMAKER INSERTION         Current Medications:  Current Facility-Administered Medications   Medication Dose Route Frequency Provider 
      Infectious Diseases Inpatient Consult Note      Reason for Consult:  Fevers, sepsis, Hepatic lesions and concern for Cancer     Requesting Physician:  Dr. Kramer     Primary Care Physician:  Emerita Alcala MD    History Obtained From:  Epic and pt     CHIEF COMPLAINT:     Chief Complaint   Patient presents with    Fall     Generalized body weakness. Pt states he has been \"feeling so bad for the last couple of months.\" Pt fell this morning around 0200. Pt believes he may have LOC, but is unsure.          HISTORY OF PRESENT ILLNESS:  68 y.o. male with a history of diabetes, history of gallstones,  neuropathy history of pancreatitis, peripheral vascular disease history of diabetic foot infections, MRI compatible Micra lead pacemaker implantation, history of cholecystectomy admitted to hospital secondary to generalized weakness body aches fever Tmax 101.8 on admission.  He has poor appetite and weakness with some wt loss from x 6 months.  Lactic acid 3.2 CRP 85.1 alkaline phosphatase 161 WBC 14.2 hemoglobin 8.7 with low MCV blood culture in process chest x-ray negative, CT abdomen pelvis with IV contrast indicated multiple hepatic lesions largest 1 measuring up to 9.2 x 6.7 cm moderate calcification in the pancreatic head is also circumferential mural thickening of the ascending colon given the presentation there was a concern for malignancy with hepatic metastatic disease.  Patient had abnormal CT abdomen pelvis 2022, was evaluated at the time with gastroenterology service patient declined further workup including colonoscopies.     Past Medical History:    Past Medical History:   Diagnosis Date    Diabetes mellitus (HCC)     Gallstones     Lymphedema     Neuropathy     Pancreatitis     PVD (peripheral vascular disease) (HCC)     Ulcers of both lower legs (HCC)     bilateral feet       Past Surgical History:    Past Surgical History:   Procedure Laterality Date    CHOLECYSTECTOMY, LAPAROSCOPIC N/A 
Department of Podiatry Consult Note  Resident      Reason for Consult:  Foot infection  Requesting Physician:  Dr. Emerita Garza MD    CHIEF COMPLAINT:  Left foot ulceration    HISTORY OF PRESENT ILLNESS:    The patient is a 68 y.o. male with significant past medical history as listed below who is consulted to podiatry for a foot infection. Patient states that he has always had flat feet since birth but has been dealing with the wounds on his foot mostly since 2016. He states that at one point he has a wound VAC at help greatly with making the wound on his left foot smaller. He relates that he has a podiatrist that comes to his house every 2 months and cuts his nails and debrides the wound on his feet. They have changed the dressing with alginate in the past. He states that he does not do much walking except for around in the house but spend most of the day with his feet up. Patient denies any N/V/F/SOB/CP. Patient denies any other pedal complaints today.    Past Medical History:        Diagnosis Date    Diabetes mellitus (HCC)     Gallstones     Lymphedema     Neuropathy     Pancreatitis     PVD (peripheral vascular disease) (HCC)     Ulcers of both lower legs (HCC)     bilateral feet       Past Surgical History:        Procedure Laterality Date    CHOLECYSTECTOMY, LAPAROSCOPIC N/A 08/10/2021    LAPAROSCOPIC CHOLECYSTECTOMY WITH CHOLANGIOGRAM performed by Chano Al MD at [facility] OR    ERCP  04/08/2014    LITHOTRYPSY & PANCREATIC STENT PLACEMENT    FOOT SURGERY Left 01/01/1967    PACEMAKER INSERTION         Allergies:   Patient has no known allergies.    Medications:   Home Meds  No current facility-administered medications on file prior to encounter.     Current Outpatient Medications on File Prior to Encounter   Medication Sig Dispense Refill    acetaminophen (TYLENOL) 500 MG tablet Take 500 mg by mouth every 6 hours as needed for Pain      insulin glargine (LANTUS SOLOSTAR) 100 UNIT/ML injection pen 
Hospice [facility]    Met with patient (along with SHABANA Mary via phone) to discuss hospice philosophy, levels of care and services. Explained routine services as well as support team services. Time spent listening to events of illness, answering questions, and providing emotional support. Discussed CPR preferences, hospitalization preferences, life-sustaining treatment preferences and spiritual concerns. DNR-CC signed.     Discussed plan for discharge. Patient says he does not wish to return home. He has been declining and getting weaker for past several weeks and will not do well at home alone. Patient has two relatives in town (his cousin Mary's daughter and son). They are not able to provide the help required for hospice care at home. Patient interested in long term care placement and going with HOC once found.    Updated SW and RN regarding LTC placement. Will continue to follow and help with discharge once placement is found.     Thank you for the referral,  Yara Stanley  HOC Admissions RN  [phone]  
SW consult noted for placement. Hospice consult also noted. Attempted to speak with patient at bedside, but patient on the phone at the time and politely requested that this RN CM return at another time.    Freedom of Choice list for hospice agencies left at patient bedside for his review.    The Plan for Transition of Care is related to the following treatment goals: possible hospice services    The Patient was provided with a choice of provider and agrees   with the discharge plan. [x] Yes [] No    Freedom of choice list was provided with basic dialogue that supports the patient's individualized plan of care/goals, treatment preferences and shares the quality data associated with the providers. [x] Yes [] No    CM to follow up in the am.    Electronically signed by KALI DEJESUS RN on 6/20/2024 at 4:34 PM    
Spoke with case management, patient plans to enroll in hospice. Code status discussed and updated per attending. Please reach out for additional needs, otherwise will sign off at this time.   
  AST 19   ALT 12   BILITOT 0.7   ALKPHOS 161*     Recent Labs     06/19/24  0456   LIPASE 7.0*     Recent Labs     06/19/24  0456   PROTIME 14.4   INR 1.10     Invalid input(s): \"PTT\"  No results for input(s): \"OCCULTBLD\" in the last 72 hours.    Imaging Studies:    XR FOOT LEFT (MIN 3 VIEWS)   Final Result   Soft tissue swelling, degenerative change, and osteopenia, without gross   fracture.         CT CHEST ABDOMEN PELVIS W CONTRAST Additional Contrast? None   Final Result   Mural thickening of the mid ascending colon, with appearance concerning for   colon carcinoma.  Suggest colonoscopy for further evaluation.      Progressive hepatic metastatic disease as compared to prior exam dated   08/15/2022.      Stable bilateral subcentimeter pulmonary nodules.         CT CERVICAL SPINE WO CONTRAST   Final Result   No acute intracranial abnormality.      No acute fracture or traumatic malalignment within the cervical spine.         CT HEAD WO CONTRAST   Final Result   No acute intracranial abnormality.      No acute fracture or traumatic malalignment within the cervical spine.         XR CHEST PORTABLE   Preliminary Result   1. No acute cardiopulmonary process.   2. Cardiomegaly.                                                    Assessment:     1) Fever and possible sepsis-  suspect foot ulcer as source.  Cannot rule out GI luminal source.     2) Suspected metastatic colon cancer    3) Suspected iron deficiency anemia    4) Chronic calcific pancreatitis    5) DM with neuropathy    6) PAOD with foot ulcer      Recommendations:   1) Agree with Cefepime and Flagyl  2) Clear liquid diet  3) Prep for EGD and colonoscopy tomorrow.   4) Check iron studies  5) CEA  6) Will need oncology consult if cancer is confirmed.        Thank you for allowing me to participate in the care of your patient.  Please feel free to contact me with any questions or concerns.     Shola Domínguez Jr, DO  [facility]

## 2024-06-21 NOTE — CARE COORDINATION
Discharge Planning:    Attempted to speak with patient at bedside re: LTC placement. In-network freedom of choice list provided to patient for his review. Patient on the phone with his [facility]  and unable to provide me referral preferences at this time. Will re-visit patient for preferences later as time allows.    Electronically signed by KALI DEJESUS RN on 6/21/2024 at 3:36 PM    Spoke with patient at bedside re: LTC placement. Patient would like referrals placed to:    [facility] (preference)  [facility]-only [facility]; patient aware.    Awaiting accepting facility at this time. CM to continue to follow for updates.    Electronically signed by KALI DEJESUS RN on 6/21/2024 at 5:10 PM

## 2024-06-21 NOTE — CARE COORDINATION
Case Management Assessment  Initial Evaluation    Date/Time of Evaluation: 6/21/2024 1:25 PM  Assessment Completed by: KALI DEJESUS RN    If patient is discharged prior to next notation, then this note serves as note for discharge by case management.    Patient Name: Clayton Cheng                   YOB: 1955  Diagnosis: Syncope and collapse [R55]  Acute abdominal pain [R10.9]  Liver masses [R16.0]  Cellulitis of foot, left [L03.116]  Colonic mass [K63.89]  Type 2 diabetes mellitus with left diabetic foot ulcer (HCC) [E11.621, L97.529]  Severe sepsis (HCC) [A41.9, R65.20]  Contusion of other part of head, initial encounter [S00.83XA]                   Date / Time: 6/19/2024  4:07 AM    Patient Admission Status: Inpatient   Readmission Risk (Low < 19, Mod (19-27), High > 27): Readmission Risk Score: 15.9    Current PCP: Emerita Alcala MD  PCP verified by CM? Yes ([facility])    Chart Reviewed: Yes      History Provided by: Patient  Patient Orientation:      Patient Cognition: Alert    Hospitalization in the last 30 days (Readmission):  No    If yes, Readmission Assessment in CM Navigator will be completed.    Advance Directives:      Code Status: Full Code   Patient's Primary Decision Maker is: Named in Scanned ACP Document (no HCPOA documents on file, but patient states Eneida Shannon is his HCPOA)    Primary Decision Maker: misaelbrendaeneida - Other - [phone]    Discharge Planning:    Patient lives with: Alone Type of Home: House  Primary Care Giver: Self  Patient Support Systems include: Family Members   Current Financial resources: Medicare  Current community resources: None  Current services prior to admission: Home Care ([facility] RN for wound care)            Current DME:              Type of Home Care services:  Nursing Services    ADLS  Prior functional level: Assistance with the following:, Bathing, Dressing, Toileting, Cooking, Housework, Shopping,

## 2024-06-21 NOTE — ACP (ADVANCE CARE PLANNING)
Advance Care Planning     Advance Care Planning Activator (Inpatient)  Conversation Note      Date of ACP Conversation: 6/21/2024     Conversation Conducted with: Patient with Decision Making Capacity    ACP Activator: KALI DEJESUS RN    Health Care Decision Maker:     Current Designated Health Care Decision Maker:     Primary Decision Maker: stefeneida - [facility] - [phone]    Care Preferences    Ventilation:  \"If you were in your present state of health and suddenly became very ill and were unable to breathe on your own, what would your preference be about the use of a ventilator (breathing machine) if it were available to you?\"      Would the patient desire the use of ventilator (breathing machine)?: no    \"If your health worsens and it becomes clear that your chance of recovery is unlikely, what would your preference be about the use of a ventilator (breathing machine) if it were available to you?\"     Would the patient desire the use of ventilator (breathing machine)?: No      Resuscitation  \"CPR works best to restart the heart when there is a sudden event, like a heart attack, in someone who is otherwise healthy. Unfortunately, CPR does not typically restart the heart for people who have serious health conditions or who are very sick.\"    \"In the event your heart stopped as a result of an underlying serious health condition, would you want attempts to be made to restart your heart (answer \"yes\" for attempt to resuscitate) or would you prefer a natural death (answer \"no\" for do not attempt to resuscitate)?\" no       [] Yes   [] No   Educated Patient / Decision Maker regarding differences between Advance Directives and portable DNR orders.    Length of ACP Conversation in minutes:      Conversation Outcomes:  ACP discussion completed    Follow-up plan:    [] Schedule follow-up conversation to continue planning  [x] Referred individual to Provider for additional questions/concerns   [] Advised

## 2024-06-22 LAB
ANION GAP SERPL CALCULATED.3IONS-SCNC: 7 MMOL/L (ref 3–16)
BASOPHILS # BLD: 0.1 K/UL (ref 0–0.2)
BASOPHILS NFR BLD: 0.5 %
BUN SERPL-MCNC: 10 MG/DL (ref 7–20)
CALCIUM SERPL-MCNC: 7.8 MG/DL (ref 8.3–10.6)
CHLORIDE SERPL-SCNC: 110 MMOL/L (ref 99–110)
CO2 SERPL-SCNC: 18 MMOL/L (ref 21–32)
CREAT SERPL-MCNC: 0.8 MG/DL (ref 0.8–1.3)
DEPRECATED RDW RBC AUTO: 20.4 % (ref 12.4–15.4)
EOSINOPHIL # BLD: 0.3 K/UL (ref 0–0.6)
EOSINOPHIL NFR BLD: 3.4 %
GFR SERPLBLD CREATININE-BSD FMLA CKD-EPI: >90 ML/MIN/{1.73_M2}
GLUCOSE BLD-MCNC: 136 MG/DL (ref 70–99)
GLUCOSE BLD-MCNC: 141 MG/DL (ref 70–99)
GLUCOSE BLD-MCNC: 194 MG/DL (ref 70–99)
GLUCOSE BLD-MCNC: 88 MG/DL (ref 70–99)
GLUCOSE SERPL-MCNC: 93 MG/DL (ref 70–99)
HCT VFR BLD AUTO: 24.7 % (ref 40.5–52.5)
HGB BLD-MCNC: 7.9 G/DL (ref 13.5–17.5)
LYMPHOCYTES # BLD: 1.5 K/UL (ref 1–5.1)
LYMPHOCYTES NFR BLD: 15.1 %
MCH RBC QN AUTO: 21.5 PG (ref 26–34)
MCHC RBC AUTO-ENTMCNC: 31.8 G/DL (ref 31–36)
MCV RBC AUTO: 67.6 FL (ref 80–100)
MONOCYTES # BLD: 1 K/UL (ref 0–1.3)
MONOCYTES NFR BLD: 9.9 %
NEUTROPHILS # BLD: 7 K/UL (ref 1.7–7.7)
NEUTROPHILS NFR BLD: 71.1 %
PATH INTERP BLD-IMP: NO
PERFORMED ON: ABNORMAL
PERFORMED ON: NORMAL
PLATELET # BLD AUTO: 267 K/UL (ref 135–450)
PMV BLD AUTO: 7.5 FL (ref 5–10.5)
POTASSIUM SERPL-SCNC: 3.7 MMOL/L (ref 3.5–5.1)
RBC # BLD AUTO: 3.66 M/UL (ref 4.2–5.9)
SODIUM SERPL-SCNC: 135 MMOL/L (ref 136–145)
WBC # BLD AUTO: 9.9 K/UL (ref 4–11)

## 2024-06-22 PROCEDURE — 94760 N-INVAS EAR/PLS OXIMETRY 1: CPT

## 2024-06-22 PROCEDURE — 6370000000 HC RX 637 (ALT 250 FOR IP): Performed by: INTERNAL MEDICINE

## 2024-06-22 PROCEDURE — 1200000000 HC SEMI PRIVATE

## 2024-06-22 PROCEDURE — 36415 COLL VENOUS BLD VENIPUNCTURE: CPT

## 2024-06-22 PROCEDURE — 6360000002 HC RX W HCPCS: Performed by: INTERNAL MEDICINE

## 2024-06-22 PROCEDURE — 2580000003 HC RX 258: Performed by: ANESTHESIOLOGY

## 2024-06-22 PROCEDURE — 85025 COMPLETE CBC W/AUTO DIFF WBC: CPT

## 2024-06-22 PROCEDURE — 80048 BASIC METABOLIC PNL TOTAL CA: CPT

## 2024-06-22 PROCEDURE — 2580000003 HC RX 258: Performed by: INTERNAL MEDICINE

## 2024-06-22 RX ADMIN — TAMSULOSIN HYDROCHLORIDE 0.4 MG: 0.4 CAPSULE ORAL at 20:51

## 2024-06-22 RX ADMIN — CEFEPIME 2000 MG: 2 INJECTION, POWDER, FOR SOLUTION INTRAVENOUS at 06:31

## 2024-06-22 RX ADMIN — INSULIN GLARGINE 20 UNITS: 100 INJECTION, SOLUTION SUBCUTANEOUS at 20:50

## 2024-06-22 RX ADMIN — Medication: at 11:28

## 2024-06-22 RX ADMIN — CEFEPIME 2000 MG: 2 INJECTION, POWDER, FOR SOLUTION INTRAVENOUS at 20:58

## 2024-06-22 RX ADMIN — METRONIDAZOLE 500 MG: 500 INJECTION, SOLUTION INTRAVENOUS at 14:38

## 2024-06-22 RX ADMIN — ASPIRIN 81 MG: 81 TABLET, COATED ORAL at 11:28

## 2024-06-22 RX ADMIN — PANTOPRAZOLE SODIUM 40 MG: 40 TABLET, DELAYED RELEASE ORAL at 06:29

## 2024-06-22 RX ADMIN — METRONIDAZOLE 500 MG: 500 INJECTION, SOLUTION INTRAVENOUS at 06:30

## 2024-06-22 RX ADMIN — ENOXAPARIN SODIUM 30 MG: 100 INJECTION SUBCUTANEOUS at 11:28

## 2024-06-22 RX ADMIN — SODIUM CHLORIDE, PRESERVATIVE FREE 10 ML: 5 INJECTION INTRAVENOUS at 20:51

## 2024-06-22 RX ADMIN — ENOXAPARIN SODIUM 30 MG: 100 INJECTION SUBCUTANEOUS at 20:50

## 2024-06-22 RX ADMIN — METRONIDAZOLE 500 MG: 500 INJECTION, SOLUTION INTRAVENOUS at 20:57

## 2024-06-22 RX ADMIN — ACETAMINOPHEN 325MG 650 MG: 325 TABLET ORAL at 03:43

## 2024-06-22 RX ADMIN — CEFEPIME 2000 MG: 2 INJECTION, POWDER, FOR SOLUTION INTRAVENOUS at 14:38

## 2024-06-22 ASSESSMENT — PAIN DESCRIPTION - LOCATION: LOCATION: CHEST

## 2024-06-22 ASSESSMENT — PAIN SCALES - GENERAL
PAINLEVEL_OUTOF10: 0
PAINLEVEL_OUTOF10: 5

## 2024-06-22 ASSESSMENT — PAIN SCALES - WONG BAKER: WONGBAKER_NUMERICALRESPONSE: NO HURT

## 2024-06-22 NOTE — CARE COORDINATION
Discharge order noted. Chart reviewed. Rec'd a call from Patricia/STEPHANIE. Goal is to discharge to a facility with H[facility]. Referrals out to several facilities - awaiting acceptance at this time.    Patient requested [facility] placed [facility]:     [facility]  [facility]; patient aware.     Awaiting accepting facility at this time. CM to continue to follow for updates.    Electronically signed by Bette Green RN MSN on 6/22/2024 at 10:01 AM

## 2024-06-23 LAB
ACANTHOCYTES BLD QL SMEAR: ABNORMAL
ANION GAP SERPL CALCULATED.3IONS-SCNC: 11 MMOL/L (ref 3–16)
ANISOCYTOSIS BLD QL SMEAR: ABNORMAL
BACTERIA BLD CULT ORG #2: NORMAL
BACTERIA BLD CULT: NORMAL
BASOPHILS # BLD: 0 K/UL (ref 0–0.2)
BASOPHILS NFR BLD: 0 %
BUN SERPL-MCNC: 15 MG/DL (ref 7–20)
BURR CELLS BLD QL SMEAR: ABNORMAL
CALCIUM SERPL-MCNC: 7.9 MG/DL (ref 8.3–10.6)
CHLORIDE SERPL-SCNC: 110 MMOL/L (ref 99–110)
CO2 SERPL-SCNC: 18 MMOL/L (ref 21–32)
CREAT SERPL-MCNC: 0.7 MG/DL (ref 0.8–1.3)
DEPRECATED RDW RBC AUTO: 20.4 % (ref 12.4–15.4)
EOSINOPHIL # BLD: 0.6 K/UL (ref 0–0.6)
EOSINOPHIL NFR BLD: 7 %
GFR SERPLBLD CREATININE-BSD FMLA CKD-EPI: >90 ML/MIN/{1.73_M2}
GLUCOSE BLD-MCNC: 123 MG/DL (ref 70–99)
GLUCOSE BLD-MCNC: 125 MG/DL (ref 70–99)
GLUCOSE BLD-MCNC: 137 MG/DL (ref 70–99)
GLUCOSE BLD-MCNC: 163 MG/DL (ref 70–99)
GLUCOSE SERPL-MCNC: 122 MG/DL (ref 70–99)
HCT VFR BLD AUTO: 25.7 % (ref 40.5–52.5)
HGB BLD-MCNC: 8.2 G/DL (ref 13.5–17.5)
LYMPHOCYTES # BLD: 2.8 K/UL (ref 1–5.1)
LYMPHOCYTES NFR BLD: 30 %
MCH RBC QN AUTO: 21.9 PG (ref 26–34)
MCHC RBC AUTO-ENTMCNC: 31.8 G/DL (ref 31–36)
MCV RBC AUTO: 69.1 FL (ref 80–100)
METAMYELOCYTES NFR BLD MANUAL: 2 %
MONOCYTES # BLD: 0.7 K/UL (ref 0–1.3)
MONOCYTES NFR BLD: 8 %
NEUTROPHILS # BLD: 4.9 K/UL (ref 1.7–7.7)
NEUTROPHILS NFR BLD: 49 %
NEUTS BAND NFR BLD MANUAL: 3 % (ref 0–7)
OVALOCYTES BLD QL SMEAR: ABNORMAL
PATH INTERP BLD-IMP: NO
PERFORMED ON: ABNORMAL
PLATELET # BLD AUTO: 270 K/UL (ref 135–450)
PLATELET BLD QL SMEAR: ADEQUATE
PMV BLD AUTO: 8 FL (ref 5–10.5)
POIKILOCYTOSIS BLD QL SMEAR: ABNORMAL
POLYCHROMASIA BLD QL SMEAR: ABNORMAL
POTASSIUM SERPL-SCNC: 4 MMOL/L (ref 3.5–5.1)
RBC # BLD AUTO: 3.72 M/UL (ref 4.2–5.9)
SLIDE REVIEW: ABNORMAL
SODIUM SERPL-SCNC: 139 MMOL/L (ref 136–145)
VARIANT LYMPHS NFR BLD MANUAL: 1 % (ref 0–6)
WBC # BLD AUTO: 9 K/UL (ref 4–11)

## 2024-06-23 PROCEDURE — 36415 COLL VENOUS BLD VENIPUNCTURE: CPT

## 2024-06-23 PROCEDURE — 2580000003 HC RX 258: Performed by: INTERNAL MEDICINE

## 2024-06-23 PROCEDURE — 85025 COMPLETE CBC W/AUTO DIFF WBC: CPT

## 2024-06-23 PROCEDURE — 6360000002 HC RX W HCPCS: Performed by: INTERNAL MEDICINE

## 2024-06-23 PROCEDURE — 1200000000 HC SEMI PRIVATE

## 2024-06-23 PROCEDURE — 94760 N-INVAS EAR/PLS OXIMETRY 1: CPT

## 2024-06-23 PROCEDURE — 6370000000 HC RX 637 (ALT 250 FOR IP): Performed by: INTERNAL MEDICINE

## 2024-06-23 PROCEDURE — 80048 BASIC METABOLIC PNL TOTAL CA: CPT

## 2024-06-23 RX ADMIN — METRONIDAZOLE 500 MG: 500 INJECTION, SOLUTION INTRAVENOUS at 05:30

## 2024-06-23 RX ADMIN — METRONIDAZOLE 500 MG: 500 INJECTION, SOLUTION INTRAVENOUS at 14:40

## 2024-06-23 RX ADMIN — ENOXAPARIN SODIUM 30 MG: 100 INJECTION SUBCUTANEOUS at 08:41

## 2024-06-23 RX ADMIN — METRONIDAZOLE 500 MG: 500 INJECTION, SOLUTION INTRAVENOUS at 21:34

## 2024-06-23 RX ADMIN — ASPIRIN 81 MG: 81 TABLET, COATED ORAL at 08:41

## 2024-06-23 RX ADMIN — ENOXAPARIN SODIUM 30 MG: 100 INJECTION SUBCUTANEOUS at 20:47

## 2024-06-23 RX ADMIN — CEFEPIME 2000 MG: 2 INJECTION, POWDER, FOR SOLUTION INTRAVENOUS at 21:34

## 2024-06-23 RX ADMIN — SODIUM CHLORIDE: 9 INJECTION, SOLUTION INTRAVENOUS at 03:03

## 2024-06-23 RX ADMIN — CEFEPIME 2000 MG: 2 INJECTION, POWDER, FOR SOLUTION INTRAVENOUS at 14:42

## 2024-06-23 RX ADMIN — CEFEPIME 2000 MG: 2 INJECTION, POWDER, FOR SOLUTION INTRAVENOUS at 05:30

## 2024-06-23 RX ADMIN — TAMSULOSIN HYDROCHLORIDE 0.4 MG: 0.4 CAPSULE ORAL at 20:47

## 2024-06-23 RX ADMIN — PANTOPRAZOLE SODIUM 40 MG: 40 TABLET, DELAYED RELEASE ORAL at 05:34

## 2024-06-23 RX ADMIN — Medication: at 08:51

## 2024-06-23 RX ADMIN — SODIUM CHLORIDE: 9 INJECTION, SOLUTION INTRAVENOUS at 23:10

## 2024-06-23 RX ADMIN — INSULIN GLARGINE 20 UNITS: 100 INJECTION, SOLUTION SUBCUTANEOUS at 20:47

## 2024-06-23 NOTE — CARE COORDINATION
Discharge Planning:  D/c order noted.  Plan remains for pt to go to LTC with [facility].  MARK contacted [facility] and left a message for admissions requesting a return call.  MARK spoke with Kamryn at [facility] who confirmed no LTC beds available.  [facility] and left a message for admissions requesting a return call.    PLAN: LTC with [facility].  Awaiting call back from [facility] and [facility].  Will need BLS transport and [facility].  [facility]  [phone]  Electronically signed by BILLY Riddle on 6/23/2024 at 9:04 AM    Addendum:  MARK received a call back from Roxann with [facility].  Roxann stated she has no long term beds available.   [facility]  [phone]  Electronically signed by BILLY Riddle on 6/23/2024 at 10:11 AM    Addendum:  Received call back from Jeanne with [facility], she is unable to formally accept or decline patient at this time.  She states a decision will need to be deferred to Monday when the business office is open and can verify patient's Medicaid.    Electronically signed by Larisa Dorsey RN on 6/23/2024 at 11:06 AM

## 2024-06-24 VITALS
DIASTOLIC BLOOD PRESSURE: 60 MMHG | BODY MASS INDEX: 33.11 KG/M2 | WEIGHT: 266.32 LBS | OXYGEN SATURATION: 100 % | HEART RATE: 70 BPM | SYSTOLIC BLOOD PRESSURE: 110 MMHG | RESPIRATION RATE: 14 BRPM | HEIGHT: 75 IN | TEMPERATURE: 98 F

## 2024-06-24 LAB
ANION GAP SERPL CALCULATED.3IONS-SCNC: 10 MMOL/L (ref 3–16)
ANISOCYTOSIS BLD QL SMEAR: ABNORMAL
BASOPHILS # BLD: 0.1 K/UL (ref 0–0.2)
BASOPHILS NFR BLD: 1 %
BUN SERPL-MCNC: 13 MG/DL (ref 7–20)
CALCIUM SERPL-MCNC: 8.2 MG/DL (ref 8.3–10.6)
CHLORIDE SERPL-SCNC: 110 MMOL/L (ref 99–110)
CO2 SERPL-SCNC: 19 MMOL/L (ref 21–32)
CREAT SERPL-MCNC: 0.7 MG/DL (ref 0.8–1.3)
DEPRECATED RDW RBC AUTO: 20.3 % (ref 12.4–15.4)
EOSINOPHIL # BLD: 0.7 K/UL (ref 0–0.6)
EOSINOPHIL NFR BLD: 6 %
GFR SERPLBLD CREATININE-BSD FMLA CKD-EPI: >90 ML/MIN/{1.73_M2}
GLUCOSE BLD-MCNC: 113 MG/DL (ref 70–99)
GLUCOSE BLD-MCNC: 98 MG/DL (ref 70–99)
GLUCOSE SERPL-MCNC: 120 MG/DL (ref 70–99)
HCT VFR BLD AUTO: 26.5 % (ref 40.5–52.5)
HGB BLD-MCNC: 8.4 G/DL (ref 13.5–17.5)
HYPOCHROMIA BLD QL SMEAR: ABNORMAL
LYMPHOCYTES # BLD: 2.7 K/UL (ref 1–5.1)
LYMPHOCYTES NFR BLD: 21 %
MCH RBC QN AUTO: 21.8 PG (ref 26–34)
MCHC RBC AUTO-ENTMCNC: 31.6 G/DL (ref 31–36)
MCV RBC AUTO: 69 FL (ref 80–100)
MONOCYTES # BLD: 0.5 K/UL (ref 0–1.3)
MONOCYTES NFR BLD: 4 %
NEUTROPHILS # BLD: 8.2 K/UL (ref 1.7–7.7)
NEUTROPHILS NFR BLD: 58 %
NEUTS BAND NFR BLD MANUAL: 9 % (ref 0–7)
OVALOCYTES BLD QL SMEAR: ABNORMAL
PATH INTERP BLD-IMP: NO
PERFORMED ON: ABNORMAL
PERFORMED ON: NORMAL
PLATELET # BLD AUTO: 290 K/UL (ref 135–450)
PLATELET BLD QL SMEAR: ADEQUATE
PMV BLD AUTO: 8.2 FL (ref 5–10.5)
POIKILOCYTOSIS BLD QL SMEAR: ABNORMAL
POTASSIUM SERPL-SCNC: 3.7 MMOL/L (ref 3.5–5.1)
RBC # BLD AUTO: 3.85 M/UL (ref 4.2–5.9)
SCHISTOCYTES BLD QL SMEAR: ABNORMAL
SLIDE REVIEW: ABNORMAL
SODIUM SERPL-SCNC: 139 MMOL/L (ref 136–145)
VARIANT LYMPHS NFR BLD MANUAL: 1 % (ref 0–6)
WBC # BLD AUTO: 12.3 K/UL (ref 4–11)

## 2024-06-24 PROCEDURE — 6370000000 HC RX 637 (ALT 250 FOR IP): Performed by: INTERNAL MEDICINE

## 2024-06-24 PROCEDURE — 80048 BASIC METABOLIC PNL TOTAL CA: CPT

## 2024-06-24 PROCEDURE — 85025 COMPLETE CBC W/AUTO DIFF WBC: CPT

## 2024-06-24 PROCEDURE — 2580000003 HC RX 258: Performed by: INTERNAL MEDICINE

## 2024-06-24 PROCEDURE — 36415 COLL VENOUS BLD VENIPUNCTURE: CPT

## 2024-06-24 PROCEDURE — 94760 N-INVAS EAR/PLS OXIMETRY 1: CPT

## 2024-06-24 PROCEDURE — 6360000002 HC RX W HCPCS: Performed by: INTERNAL MEDICINE

## 2024-06-24 RX ORDER — LORAZEPAM 2 MG/ML
1 CONCENTRATE ORAL
Qty: 30 ML | Refills: 0 | Status: SHIPPED | OUTPATIENT
Start: 2024-06-24 | End: 2024-06-29

## 2024-06-24 RX ORDER — DOXYCYCLINE HYCLATE 100 MG
100 TABLET ORAL 2 TIMES DAILY
Qty: 14 TABLET | Refills: 0
Start: 2024-06-24 | End: 2024-07-01

## 2024-06-24 RX ORDER — ALBUTEROL SULFATE 90 UG/1
2 AEROSOL, METERED RESPIRATORY (INHALATION) EVERY 6 HOURS PRN
Status: DISCONTINUED | OUTPATIENT
Start: 2024-06-24 | End: 2024-06-24 | Stop reason: HOSPADM

## 2024-06-24 RX ORDER — MORPHINE SULFATE 100 MG/5ML
5 SOLUTION ORAL
Qty: 30 ML | Refills: 0 | Status: SHIPPED | OUTPATIENT
Start: 2024-06-24 | End: 2024-07-04

## 2024-06-24 RX ADMIN — PANTOPRAZOLE SODIUM 40 MG: 40 TABLET, DELAYED RELEASE ORAL at 08:32

## 2024-06-24 RX ADMIN — ASPIRIN 81 MG: 81 TABLET, COATED ORAL at 08:27

## 2024-06-24 RX ADMIN — Medication: at 08:27

## 2024-06-24 RX ADMIN — ENOXAPARIN SODIUM 30 MG: 100 INJECTION SUBCUTANEOUS at 08:27

## 2024-06-24 RX ADMIN — CEFEPIME 2000 MG: 2 INJECTION, POWDER, FOR SOLUTION INTRAVENOUS at 05:33

## 2024-06-24 RX ADMIN — METRONIDAZOLE 500 MG: 500 INJECTION, SOLUTION INTRAVENOUS at 05:33

## 2024-06-24 NOTE — CARE COORDINATION
06/24/24 1355   IMM Letter   IMM Letter given to Patient/Family/Significant other/Guardian/POA/by: Provided patient with Imm and education, declined 4 hour window, good to D/C at 4pm-   IMM Letter date given: 06/24/24   IMM Letter time given: 9546

## 2024-06-24 NOTE — PROGRESS NOTES
ONCOLOGY HEMATOLOGY CARE PROGRESS NOTE      SUBJECTIVE:  Patient feeling better.  His pain is controlled.  He denies any shortness of breath or chest pain.    ROS:     Constitutional:  No weight loss, No fever, No chills, No night sweats.  Energy level good.  Eyes:  No impairment or change in vision  ENT / Mouth:  No pain, abnormal ulceration, bleeding, nasal drip or change in voice or hearing  Cardiovascular:  No chest pain, palpitations, new edema, or calf discomfort  Respiratory:  No pain, hemoptysis, change to breathing  Breast:  No pain, discharge, change in appearance or texture  Gastrointestinal:  No pain, cramping, jaundice, change to eating and bowel habits  Urinary:  No pain, bleeding or change in continence  Genitalia: No pain, bleeding or discharge  Musculoskeletal:  No redness, pain, edema or weakness  Skin:  No pruritus, rash, change to nodules or lesions  Neurologic:  No discomfort, change in mental status, speech, sensory or motor activity  Psychiatric:  No change in concentration or change to affect or mood  Endocrine:  No hot flashes, increased thirst, or change to urine production  Hematologic: No petechiae, ecchymosis or bleeding  Lymphatic:  No lymphadenopathy or lymphedema  Allergy / Immunologic:  No eczema, hives, frequent or recurrent infections    OBJECTIVE        Physical    VITALS:  Patient Vitals for the past 24 hrs:   BP Temp Temp src Pulse Resp SpO2 Weight   06/22/24 0722 117/77 98.3 °F (36.8 °C) Oral 55 22 94 % --   06/22/24 0545 -- -- -- -- -- -- 119.1 kg (262 lb 9.1 oz)   06/22/24 0309 (!) 120/58 98.2 °F (36.8 °C) Oral 59 24 95 % --   06/21/24 1907 115/69 98.8 °F (37.1 °C) Oral 85 27 100 % --   06/21/24 1520 117/71 98.5 °F (36.9 °C) Oral 66 18 -- --       24HR INTAKE/OUTPUT:    Intake/Output Summary (Last 24 hours) at 6/22/2024 1105  Last data filed at 6/21/2024 2119  Gross per 24 hour   Intake 1493.92 ml   Output 2000 ml   Net -506.08 ml 
                                      ONCOLOGY HEMATOLOGY CARE PROGRESS NOTE      SUBJECTIVE:  Patient feels about the same.  He is quite weak.  He denies any shortness of breath or chest pain.    ROS:     Constitutional:  No weight loss, No fever, No chills, No night sweats.  Energy level good.  Eyes:  No impairment or change in vision  ENT / Mouth:  No pain, abnormal ulceration, bleeding, nasal drip or change in voice or hearing  Cardiovascular:  No chest pain, palpitations, new edema, or calf discomfort  Respiratory:  No pain, hemoptysis, change to breathing  Breast:  No pain, discharge, change in appearance or texture  Gastrointestinal:  No pain, cramping, jaundice, change to eating and bowel habits  Urinary:  No pain, bleeding or change in continence  Genitalia: No pain, bleeding or discharge  Musculoskeletal:  No redness, pain, edema or weakness  Skin:  No pruritus, rash, change to nodules or lesions  Neurologic:  No discomfort, change in mental status, speech, sensory or motor activity  Psychiatric:  No change in concentration or change to affect or mood  Endocrine:  No hot flashes, increased thirst, or change to urine production  Hematologic: No petechiae, ecchymosis or bleeding  Lymphatic:  No lymphadenopathy or lymphedema  Allergy / Immunologic:  No eczema, hives, frequent or recurrent infections    OBJECTIVE        Physical    VITALS:  Patient Vitals for the past 24 hrs:   BP Temp Temp src Pulse Resp SpO2 Weight   06/21/24 0726 105/60 98.7 °F (37.1 °C) Oral 60 20 -- --   06/21/24 0352 -- -- -- -- -- -- 119 kg (262 lb 5.6 oz)   06/21/24 0308 (!) 104/50 99.2 °F (37.3 °C) Oral 65 16 99 % --   06/20/24 2315 (!) 108/58 98 °F (36.7 °C) Oral 60 21 98 % --   06/20/24 1853 106/63 98.2 °F (36.8 °C) Oral 59 18 97 % --   06/20/24 1526 -- -- -- 62 15 98 % --   06/20/24 1518 126/75 98.2 °F (36.8 °C) Oral 59 (!) 8 99 % --   06/20/24 1455 (!) 98/50 -- -- 72 -- 97 % --   06/20/24 1450 (!) 85/52 -- -- 68 11 96 % -- 
    V2.0    [facility] [place]      Name:  Clayton Cheng /Age/Sex: 1955  (68 y.o. male)   MRN & CSN:  [id number] & [id number] Encounter Date/Time: 2024 10:24 PM EDT   Location:  [id number] PCP: Emerita Alcala MD     Attending:Ryan Kramer MD       Hospital Day: 2    Assessment and Recommendations           #. Severe sepsis likely due to cellulitis.   Potential cellulitis  - f/u blood cultures  - vanc, cefepime, flagyl  - NS at 50 cc/hr  - trend lacitic acid levels  - podiatry following.      #. Colonic mass: suspected metastatic colo malignancy.   Potential liver metastases  - likely has cancer, not diagnosed what primary tumor is  - GI consulted:  planning for EGD and colonoscopy today.      #. Acute on chronic anemia:  - low ferritin levels  - IV iron for now  - check nutritional studies     #. DM2:  - SSI      DVT Prophylaxis: Lovenox.   Code Status: Total Support.   Barrier to DC: colonoscopy pending.           Subjective:     Patient was seen and examined today. No acute events overnight. Remains afebrile with stable vital signs.     Review of Systems:      Pertinent positives and negatives discussed in HPI    Objective:     Intake/Output Summary (Last 24 hours) at 2024 2224  Last data filed at 2024 1856  Gross per 24 hour   Intake 2009.59 ml   Output 1200 ml   Net 809.59 ml      Vitals:   Vitals:    24 1455 24 1518 24 1526 24 1853   BP: (!) 98/50 126/75  106/63   Pulse: 72 59 62 59   Resp:  (!) 8 15 18   Temp:  98.2 °F (36.8 °C)  98.2 °F (36.8 °C)   TempSrc:  Oral  Oral   SpO2: 97% 99% 98% 97%   Weight:       Height:             Physical Exam:      General: awake, alert, in NAD  Eyes: EOMI  ENT: neck supple  Cardiovascular: Regular rate.  Respiratory: Clear to auscultation  Gastrointestinal: Soft, non tender, BS+  Genitourinary: no suprapubic tenderness  Musculoskeletal: No edema  Skin: warm, dry  Neuro: Alert alert, Cranial nerves intack, 
    V2.[place]    [facility] [place]      Name:  Clayton Cheng /Age/Sex: 1955  (68 y.o. male)   MRN & CSN:  [id number] & [id number] Encounter Date/Time: 2024 12:36 PM EDT   Location:  [id number] PCP: Emerita Alcala MD     Attending:Ryan Kramer MD       Hospital Day: 3    Assessment and Recommendations     Patient is a 68-year-old male past medical history of chronic anemia, diabetes who was admitted for cellulitis, sepsis, colonic mass.    #.  Severe sepsis likely due to cellulitis  # Cellulitis  #.  Suspected metastatic colon cancer  #.  Acute on chronic anemia  #.  Diabetes mellitus, with hyperglycemia    Plan:  Status post endoscopies  Suspected metastatic colon cancer  Hematology/oncology and GI following  Plan for hospice noted    DVT Prophylaxis: Lovenox.   Code Status: Total Support.   Barrier to DC: Hospice coordination pain          Subjective:     Patient was seen and examined today.  No acute events overnight.  Patient mariia afebrile stable vital signs.    Review of Systems:      Pertinent positives and negatives discussed in HPI    Objective:     Intake/Output Summary (Last 24 hours) at 2024 1236  Last data filed at 2024 0916  Gross per 24 hour   Intake 2499.59 ml   Output 700 ml   Net 1799.59 ml      Vitals:   Vitals:    24 0308 24 0352 24 0726 24 0918   BP: (!) 104/50  105/60    Pulse: 65  60 65   Resp: 16  20 12   Temp: 99.2 °F (37.3 °C)  98.7 °F (37.1 °C)    TempSrc: Oral  Oral    SpO2: 99%   100%   Weight:  119 kg (262 lb 5.6 oz)     Height:       PF:   (!) 18 L/min          Physical Exam:      General: awake, alert, in NAD  Eyes: EOMI  ENT: neck supple  Cardiovascular: Regular rate.  Respiratory: Clear to auscultation  Gastrointestinal: Soft, non tender, BS+  Genitourinary: no suprapubic tenderness  Musculoskeletal: No edema  Skin: warm, dry  Neuro: Alert alert, Cranial nerves intack, no focal motor or sensory deficits.   Psych: 
  Physical Therapy  Daily Treatment Attempt and Discharge    24    Name: Clayton Cheng   : 1955    MRN: [id number]    PT follow-up attempt. Patient moving forward with hospice. No further therapy recommended at this time. PT signing off.    Electronically signed by Ajay Beth PT on 2024 at 6:42 AM      
4 Eyes Skin Assessment     NAME:  Clayton Cheng  YOB: 1955  MEDICAL RECORD NUMBER:  [id number]    The patient is being assessed for  Admission    I agree that at least one RN has performed a thorough Head to Toe Skin Assessment on the patient. ALL assessment sites listed below have been assessed.      Areas assessed by both nurses:    Head, Face, Ears, Shoulders, Back, Chest, Arms, Elbows, Hands, Sacrum. Buttock, Coccyx, Ischium, and Legs. Feet and Heels        Does the Patient have a Wound? Yes wound(s) were present on assessment. LDA wound assessment was Initiated and completed by RN         Patient has foot wounds being managed by Podiatry. Patient also noted to have discoloration on sacrum/groin/top of knees. Appears to be hyperpigmentation. No signs of pressure related injuries.       Jorje Prevention initiated by RN: Yes  Wound Care Orders initiated by RN: No    Pressure Injury (Stage 3,4, Unstageable, DTI, NWPT, and Complex wounds) if present, place Wound referral order by RN under : No    New Ostomies, if present place, Ostomy referral order under : No     Nurse 1 eSignature: Electronically signed by Montserrat Luna RN on 6/19/24 at 4:50 PM EDT    **SHARE this note so that the co-signing nurse can place an eSignature**    Nurse 2 eSignature: Electronically signed by Dolores Garza RN on 6/19/24 at 4:52 PM EDT   
Bed time fsbs is 94. Okay to hold night time lantus per provider  
Cefepime changed to 2 gm q8h per protocol  Bela Hoff, [facility],6/19/2024,10:48 AM    
Clinical Pharmacy Note  Vancomycin Consult    Clayton Cheng is a 68 y.o. male ordered vancomycin for sepsis; consult received from Dr. Garza to manage therapy. Also receiving cefepime and metronidazole.    Allergies:  Patient has no known allergies.     Temp max:  Temp (24hrs), Av °F (37.2 °C), Min:98 °F (36.7 °C), Max:101.8 °F (38.8 °C)      Recent Labs     24  0456 24  0429   WBC 14.2* 11.9*         Recent Labs     24  0456 24  0429   BUN 20 18   CREATININE 1.0 0.8           Intake/Output Summary (Last 24 hours) at 2024 0655  Last data filed at 2024 2316  Gross per 24 hour   Intake 3555 ml   Output 500 ml   Net 3055 ml         Culture Results:  pending    Ht Readings from Last 1 Encounters:   24 1.905 m (6' 3\")        Wt Readings from Last 1 Encounters:   24 112.7 kg (248 lb 7.3 oz)         Estimated Creatinine Clearance: 116 mL/min (based on SCr of 0.8 mg/dL).    Assessment/Plan:  Day # 2 of vancomycin.  Current regimen: Vancomycin 1250 mg IV every 12 hours.    Random vanco level = 20 mg/L this am  Predicted AUC 24-48h: 487  Predicted AUC ss: 523    Continue current regimen.    Thank you for the consult.      Nichol Alexandra, PharmD.  2024  6:56 AM    
Clinical Pharmacy Note  Vancomycin Consult    Clayton Cheng is a 68 y.o. male ordered vancomycin for sepsis; consult received from Dr. Garza to manage therapy. Also receiving cefepime.    Allergies:  Patient has no known allergies.     Temp max:  Temp (24hrs), Av °F (37.8 °C), Min:98.4 °F (36.9 °C), Max:101.7 °F (38.7 °C)      Recent Labs     24  0456   WBC 14.2*       Recent Labs     24  0456   BUN 20   CREATININE 1.0         Intake/Output Summary (Last 24 hours) at 2024 1114  Last data filed at 2024 0808  Gross per 24 hour   Intake 3555 ml   Output --   Net 3555 ml       Culture Results:      Ht Readings from Last 1 Encounters:   24 1.905 m (6' 3\")        Wt Readings from Last 1 Encounters:   24 103.5 kg (228 lb 2.8 oz)         Estimated Creatinine Clearance: 90 mL/min (based on SCr of 1 mg/dL).    Assessment/Plan:  Day # 1 of vancomycin.  Vanco 1gm given in ED @0648  Ordered another 1gm dose on the floor then  Vancomycin 1250 mg IV every 12 hours.    Goal -600  Predicted  at 24h ss      Thank you for the consult.    
Hospice [facility]    Met with patient to confirm discharge plan to [facility] with HOC services. Patient is agreeable to this plan.     Consents signed by patient.     WC ordered to facility per request of family    Transportation arranged by SW @ 1600 with [facility]. [place] [facility]     Thank you for allowing HOC to participate in Raghu's care.     Mecca LIPSCOMBN, RN  [facility]  C:[phone]  Main HOC: [phone]    After 1800, weekends, and holidays: please call main HOC number for assistance.     
Occupational Therapy      OT follow-up attempt. Patient moving forward with hospice. No further therapy recommended at this time. OT signing off.     Electronically signed by Alison Rebollar OT on 6/24/2024 at 1:37 PM    
Occupational Therapy    Attempted to see for OT session however pt out of room for procedure; will continue to follow     Electronically signed by Alison Rebollar OT on 6/20/2024 at 1:15 PM    
Occupational Therapy  Facility/Department: [facility] PROGRESSIVE CARE  Occupational Therapy Initial Assessment    Name: Clayton Cheng  : 1955  MRN: [id number]  Date of Service: 2024    Discharge Recommendations:  Patient would benefit from continued therapy after discharge, 3-5 sessions per week  OT Equipment Recommendations  Other: defer to MI facility     Clayton Cheng scored a 13/24 on the AM-PAC ADL Inpatient form. Current research shows that an AM-PAC score of 17 or less is typically not associated with a discharge to the patient's home setting. Based on the patient's AM-PAC score and their current ADL deficits, it is recommended that the patient have 3-5 sessions per week of Occupational Therapy at d/c to increase the patient's independence.  Please see assessment section for further patient specific details.    If patient discharges prior to next session this note will serve as a discharge summary.  Please see below for the latest assessment towards goals.      Patient Diagnosis(es): The primary encounter diagnosis was Severe sepsis (HCC). Diagnoses of Cellulitis of foot, left, Type 2 diabetes mellitus with left diabetic foot ulcer (HCC), Acute abdominal pain, Syncope and collapse, Contusion of other part of head, initial encounter, Colonic mass, and Liver masses were also pertinent to this visit.  Past Medical History:  has a past medical history of Diabetes mellitus (HCC), Gallstones, Lymphedema, Neuropathy, Pancreatitis, PVD (peripheral vascular disease) (HCC), and Ulcers of both lower legs (HCC).  Past Surgical History:  has a past surgical history that includes Foot surgery (Left, 1967); ERCP (2014); Cholecystectomy, laparoscopic (N/A, 08/10/2021); and Pacemaker insertion.           Assessment   Performance deficits / Impairments: Decreased functional mobility ;Decreased safe awareness;Decreased balance;Decreased ADL status;Decreased endurance;Decreased high-level 
Patient met with HOC this shift.  DNR-CC signed. Emotional support provided.  Patient continues with decreased appetite. Denies pain at this time.  Patient resting in bed at this time with call light in reach and bed alarm on.     Electronically signed by Isa Peña RN on 6/21/2024 at 6:39 PM    
Physical Therapy    Clayton Cheng  [id number]  [id number]    Attempted to see for PT session however pt out of room for procedure; will continue to follow  Electronically signed by KIERRA PORTER PT on 6/20/2024 at 12:50 PM       
Physical Therapy  Facility/Department: [address] PROGRESSIVE CARE  Physical Therapy Initial Assessment    Name: Clayton Cheng  : 1955  MRN: [id number]  Date of Service: 2024    Discharge Recommendations:  Continue to assess pending progress, Subacute/Skilled Nursing Facility   PT Equipment Recommendations  Other: defer to next level of care      Clayton Cheng scored a 13/24 on the AM-PAC short mobility form. Current research shows that an AM-PAC score of 17 or less is typically not associated with a discharge to the patient's home setting. Based on the patient's AM-PAC score and their current functional mobility deficits, it is recommended that the patient have 3-5 sessions per week of Physical Therapy at d/c to increase the patient's independence.  Please see assessment section for further patient specific details.    If patient discharges prior to next session this note will serve as a discharge summary.  Please see below for the latest assessment towards goals.      Patient Diagnosis(es): The primary encounter diagnosis was Severe sepsis (HCC). Diagnoses of Cellulitis of foot, left, Type 2 diabetes mellitus with left diabetic foot ulcer (HCC), Acute abdominal pain, Syncope and collapse, Contusion of other part of head, initial encounter, Colonic mass, and Liver masses were also pertinent to this visit.  Past Medical History:  has a past medical history of Diabetes mellitus (HCC), Gallstones, Lymphedema, Neuropathy, Pancreatitis, PVD (peripheral vascular disease) (HCC), and Ulcers of both lower legs (HCC).  Past Surgical History:  has a past surgical history that includes Foot surgery (Left, 1967); ERCP (2014); Cholecystectomy, laparoscopic (N/A, 08/10/2021); and Pacemaker insertion.    Assessment   Assessment: Patient is a 68 y.o.  male who is seen in consult for abnormal CT scan and concern for colon cancer. The patient has a past medical history significant for 
Podiatric Surgery Daily Progress Note  Clayton Cheng      Subjective :   Patient seen and examined this am at the bedside. Patient denies any acute overnight events. Patient denies N/V/F/C/SOB. Patient denies calf pain, thigh pain, chest pain.     Review of Systems: A 12 point review of symptoms is unremarkable with the exception of the chief complaint. Patient specifically denies nausea, fever, vomiting, chills, shortness of breath, chest pain, abdominal pain, constipation or difficulty urinating.       Objective     /60   Pulse 65   Temp 98.7 °F (37.1 °C) (Oral)   Resp 12   Ht 1.905 m (6' 3\")   Wt 119 kg (262 lb 5.6 oz)   SpO2 100%   PF (!) 18 L/min   BMI 32.79 kg/m²      I/O:  Intake/Output Summary (Last 24 hours) at 6/21/2024 1048  Last data filed at 6/21/2024 0916  Gross per 24 hour   Intake 2499.59 ml   Output 700 ml   Net 1799.59 ml              Wt Readings from Last 3 Encounters:   06/21/24 119 kg (262 lb 5.6 oz)   09/21/22 111.4 kg (245 lb 9.5 oz)   09/13/22 111.7 kg (246 lb 4.1 oz)       LABS:    Recent Labs     06/20/24  0429 06/21/24  0505   WBC 11.9* 11.3*   HGB 7.9* 7.7*   HCT 24.9* 25.2*    267        Recent Labs     06/21/24  0505      K 3.3*      CO2 17*   BUN 14   CREATININE 0.8        Recent Labs     06/19/24  0456   INR 1.10           LOWER EXTREMITY EXAMINATION    Dressing to left LE intact. No strikethrough noted to the external dressing. No drainage noted to the internal layers of the dressing.     VASCULAR: DP and PT pulses are weakly palpable +1/4 b/l. CFT is brisk to the digits of the foot b/l. Skin temperature is warm to cool from proximal to distal with no focal calor noted. Mild non-pitting edema noted to bilateral lower extremity. No pain with calf compression b/l. No malodor appreciated.     NEUROLOGIC: Gross and epicritic sensation is absent b/l. Protective sensation is absebt at all pedal sites b/l.     DERMATOLOGIC:   Chronic venous stasis 
Podiatric Surgery Daily Progress Note  Clayton Cheng      Subjective :   Patient seen and examined this am at the bedside. Patient denies any acute overnight events. Patient denies N/V/F/C/SOB. Patient denies calf pain, thigh pain, chest pain.     Review of Systems: A 12 point review of symptoms is unremarkable with the exception of the chief complaint. Patient specifically denies nausea, fever, vomiting, chills, shortness of breath, chest pain, abdominal pain, constipation or difficulty urinating.       Objective     BP (!) 146/78   Pulse 60   Temp 98.7 °F (37.1 °C) (Oral)   Resp 22   Ht 1.905 m (6' 3\")   Wt 121.9 kg (268 lb 11.9 oz)   SpO2 99%   BMI 33.59 kg/m²      I/O:  Intake/Output Summary (Last 24 hours) at 6/23/2024 0801  Last data filed at 6/23/2024 0515  Gross per 24 hour   Intake --   Output 1300 ml   Net -1300 ml              Wt Readings from Last 3 Encounters:   06/23/24 121.9 kg (268 lb 11.9 oz)   09/21/22 111.4 kg (245 lb 9.5 oz)   09/13/22 111.7 kg (246 lb 4.1 oz)       LABS:    Recent Labs     06/22/24  0441 06/23/24  0437   WBC 9.9 9.0   HGB 7.9* 8.2*   HCT 24.7* 25.7*    270        Recent Labs     06/23/24  0437      K 4.0      CO2 18*   BUN 15   CREATININE 0.7*        No results for input(s): \"PROT\", \"INR\", \"APTT\" in the last 72 hours.          LOWER EXTREMITY EXAMINATION    Dressing to left LE intact. No strikethrough noted to the external dressing. No drainage noted to the internal layers of the dressing.     VASCULAR: DP and PT pulses are weakly palpable +1/4 b/l. CFT is brisk to the digits of the foot b/l. Skin temperature is warm to cool from proximal to distal with no focal calor noted. Mild non-pitting edema noted to bilateral lower extremity. No pain with calf compression b/l. No malodor appreciated.     NEUROLOGIC: Gross and epicritic sensation is absent b/l. Protective sensation is absebt at all pedal sites b/l.     DERMATOLOGIC:   Chronic venous stasis 
Pt awake and alert. No complaints. Will monitor.  
Pt awake in bed alert and oriented x 4. Pt denies SOB and chest pain at this time. Respirations even and unlabored at this time. Morning medications administered, pt tolerated well. Head to toe assessment completed. Stage 1/ possible DTI due to being dark in color wound noted on buttocks, mepilex applied, wound consult order placed. Pt turned on left side with wedge pillow, heels elevated off bed with pillow. Per pt podiatry came to room this room and did his wound dressing change on his left foot collagenase ointment was used. Pt states he has no other needs at this time. Call light placed within reach. Electronically signed by Lucie Mazariegos RN on 6/23/2024 at 12:27 PM      
Pt resting comfortably in bed with 2 rails up. Denies any request at this time. All questions answered. Call light and bedside table in reach, along with all personal items.   
Pt to pacu stable. Placed in trendelenburg for low bp. Medicated per CRNA. Will monitor    
Report given to   
Report received from DANITA Faulkner. Patient's skin assessed. Patient does not have wound on sacrum as previous charted this shift. Patient does have hyperkeratosis on his sacrum, groin, along with tops of his knees, armpits, neck fold, legs and feet. As the additional RN on his 4eyes assessment upon admission, the skin discoloration previous RN mentioned and showed this RN, it is chronic and is not a pressure injury. Wound care consult discontinued, LDA updated appropriately.     Electronically signed by Dolores Garza RN on 6/23/2024 at 7:30 PM    
This RN came into room to find patient having shaking chills and dry heaves (not seen by this RN but reported by patient). He requested warm blankets which he was given. Next, patient said \"I can't breath!\" He began hyperventilating. This RN attached PulseOx and noted oxygen sat to be 99% but put patient on 2L per nasal cannula for comfort. When suggested that patient may be having a panic attack, patient denied this. Dr Garza was notified and ordered prn atarax. Charge nurse Donna came to room to also evaluate patient and attempted to get patient to focus on his breathing. Blood sugar was 145. Vitals remain stable. This RN left room to give patient a chance to calm down.    This RN came into room around 1800 and patient was asleep. Will let rest a bit before starting bowel prep.   
data filed at 6/24/2024 0533  Gross per 24 hour   Intake --   Output 3175 ml   Net -3175 ml       CONSTITUTIONAL: awake, alert, cooperative, no apparent distress   EYES: pupils equal, round and reactive to light, sclera clear and conjunctiva normal  ENT: Normocephalic, without obvious abnormality, atraumatic  NECK: supple, symmetrical, no jugular venous distension and no carotid bruits   HEMATOLOGIC/LYMPHATIC: no cervical, supraclavicular or axillary lymphadenopathy   LUNGS: no increased work of breathing and clear to auscultation   CARDIOVASCULAR: regular rate and rhythm, normal S1 and S2, no murmur noted  ABDOMEN: normal bowel sounds x 4, soft, non-distended, non-tender, no masses palpated, no hepatosplenomgaly   MUSCULOSKELETAL: full range of motion noted, tone is normal  NEUROLOGIC: awake, alert, oriented to name, place and time. Motor skills grossly intact.   SKIN: Normal skin color, texture, turgor and no jaundice. appears intact   EXTREMITIES: no LE edema     DATA:  CBC:    Recent Labs     06/24/24  0444 06/23/24  0437 06/22/24  0441 06/21/24  0505   WBC 12.3* 9.0 9.9 11.3*   NEUTROABS 8.2* 4.9 7.0 8.5*   LYMPHOPCT 21.0 30.0 15.1 12.8   RBC 3.85* 3.72* 3.66* 3.72*   HGB 8.4* 8.2* 7.9* 7.7*   HCT 26.5* 25.7* 24.7* 25.2*   MCV 69.0* 69.1* 67.6* 67.9*   MCH 21.8* 21.9* 21.5* 20.8*   MCHC 31.6 31.8 31.8 30.7*   RDW 20.3* 20.4* 20.4* 20.6*    270 267 267       PT/INR:    Recent Labs     06/19/24  0456   INR 1.10     PTT:  No results for input(s): \"APTT\" in the last 720 hours.    CMP:    Recent Labs     06/24/24  0444 06/23/24  0437 06/22/24  0441 06/21/24  0505 06/20/24  0429 06/19/24  0456    139 135* 138 138 136   K 3.7 4.0 3.7 3.3* 3.3* 4.4    110 110 108 106 100   CO2 19* 18* 18* 17* 19* 18*   GLUCOSE 120* 122* 93 97 96 153*   BUN 13 15 10 14 18 20   CREATININE 0.7* 0.7* 0.8 0.8 0.8 1.0   LABGLOM >90 >90 >90 >90 >90 81   CALCIUM 8.2* 7.9* 7.8* 7.8* 8.0* 9.3   AGRATIO  --   --   --   --   --  
stasis dermatitis changes noted to bilateral lower extremities..    Left lower extremity:  Full thickness ulceration noted to the plantar medial aspect of the midfoot..  Wound base is noted to be granular with surrounding significant hyperkeratosis. No erythema. The wound does not probe to bone. No purulent drainage, tracking, tunneling, crepitus, fluctuance, or acute signs of infection noted.     Right lower extremity: Hyperkeratosis noted to the medial aspect of the hallux.  Otherwise the right lower extremity is a closed soft tissue envelope with no ulcerations or acute signs of infection     MUSCULOSKELETAL: Muscle strength is 4/5 for all pedal groups tested. No pain with palpation of the foot or ankle b/l. Ankle joint ROM is decreased in dorsiflexion with the knee extended. Charcot foot type appreciated to b/l LE.     IMAGING:  XR left foot (6/19/24)  IMPRESSION:  Soft tissue swelling, degenerative change, and osteopenia, without gross  fracture.    ASSESSMENT/PLAN  -Full thickness ulceration, left LE (High 2)  -DM type 2 with peripheral neuropathy   -Charcot foot deformity   -Onychomycosis     -Patient seen and examined at bedside this morning  -All VSS, no leukocytosis noted (WBC 9.9)  -ESR 68 and CRP 85.1  -HbA1c 6.3  -prealbumin 9.4; oral wound healing nutritional supplements ordered  -Images reviewed, impression noted above  -Arterial duplex pending  -Wound culture not obtained   -Continue IV antibiotics per primary team; IV cefepime, Flagyl  -Left LE dressed with Santyl with overlying Mepilex  -Partial heel weightbearing to left LE   -No clinical signs of infection noted to the left lower extremity.  Not source of leukocytosis.  Will continue with local wound care while in house     DISPO: Full thickness ulceration, left lower extremity (High 2) without signs of infection. All labs and imaging reviewed, impressions noted above. Recommend patient continue on IV antibiotics: IV cefepime, Flagyl. Partial 
participate in this patient's care.  If there are any questions or concerns regarding this patient, or the plan we have set in place, please feel free to contact me at [phone].     Shola Domínguez Jr, DO    
    TSH: No results found for: \"TSH\"  Troponin: No results found for: \"TROPONINT\"  Lactic Acid:   Recent Labs     06/19/24  1425 06/19/24 2000 06/20/24  0225   LACTA 3.0* 2.9* 1.7       BNP:   No results for input(s): \"PROBNP\" in the last 72 hours.    UA:  Lab Results   Component Value Date/Time    NITRU Negative 06/19/2024 09:14 AM    COLORU Yellow 06/19/2024 09:14 AM    PHUR 5.0 06/19/2024 09:14 AM    PHUR 5.5 09/06/2022 03:41 PM    WBCUA 1 06/19/2024 09:14 AM    RBCUA 1 06/19/2024 09:14 AM    BACTERIA None Seen 06/19/2024 09:14 AM    CLARITYU Clear 06/19/2024 09:14 AM    LEUKOCYTESUR Negative 06/19/2024 09:14 AM    UROBILINOGEN 1.0 06/19/2024 09:14 AM    BILIRUBINUR Negative 06/19/2024 09:14 AM    BLOODU Negative 06/19/2024 09:14 AM    GLUCOSEU Negative 06/19/2024 09:14 AM    KETUA Negative 06/19/2024 09:14 AM     Urine Cultures: No results found for: \"LABURIN\"  Blood Cultures:   Lab Results   Component Value Date/Time    BC  06/19/2024 10:26 AM     No Growth to date.  Any change in status will be called.     Lab Results   Component Value Date/Time    BLOODCULT2  06/19/2024 04:56 AM     No Growth to date.  Any change in status will be called.     Organism:   Lab Results   Component Value Date/Time    ORG Corynebacterium striatum 01/06/2023 11:00 AM    ORG Proteus mirabilis 01/06/2023 11:00 AM         Electronically signed by Ryan Kramer MD on 6/22/2024 at 1:57 PM     
last 72 hours.    UA:  Lab Results   Component Value Date/Time    NITRU Negative 06/19/2024 09:14 AM    COLORU Yellow 06/19/2024 09:14 AM    PHUR 5.0 06/19/2024 09:14 AM    PHUR 5.5 09/06/2022 03:41 PM    WBCUA 1 06/19/2024 09:14 AM    RBCUA 1 06/19/2024 09:14 AM    BACTERIA None Seen 06/19/2024 09:14 AM    CLARITYU Clear 06/19/2024 09:14 AM    LEUKOCYTESUR Negative 06/19/2024 09:14 AM    UROBILINOGEN 1.0 06/19/2024 09:14 AM    BILIRUBINUR Negative 06/19/2024 09:14 AM    BLOODU Negative 06/19/2024 09:14 AM    GLUCOSEU Negative 06/19/2024 09:14 AM    KETUA Negative 06/19/2024 09:14 AM     Urine Cultures: No results found for: \"LABURIN\"  Blood Cultures:   Lab Results   Component Value Date/Time    BC No Growth after 4 days of incubation. 06/19/2024 10:26 AM     Lab Results   Component Value Date/Time    BLOODCULT2 No Growth after 4 days of incubation. 06/19/2024 04:56 AM     Organism:   Lab Results   Component Value Date/Time    ORG Corynebacterium striatum 01/06/2023 11:00 AM    ORG Proteus mirabilis 01/06/2023 11:00 AM         Electronically signed by Ryan Kramer MD on 6/23/2024 at 12:59 PM     
or concerns.    Dr. Melita Chavez MD  Infectious Disease  [facility]  Phone: [phone]   Fax : [phone]

## 2024-06-24 NOTE — CARE COORDINATION
Case Management Discharge Note          Date / Time of Note: 6/24/2024 1:01 PM                  Patient Name: Clayton Cheng   YOB: 1955  Diagnosis: Syncope and collapse [R55]  Acute abdominal pain [R10.9]  Liver masses [R16.0]  Cellulitis of foot, left [L03.116]  Colonic mass [K63.89]  Type 2 diabetes mellitus with left diabetic foot ulcer (HCC) [E11.621, L97.529]  Severe sepsis (HCC) [A41.9, R65.20]  Contusion of other part of head, initial encounter [S00.83XA]   Date / Time: 6/19/2024  4:07 AM    Financial:  Payor: [facility] MEDICARE / Plan: [employer] DUAL COMPLETE / Product Type: *No Product type* /      Pharmacy:    [facility] [address] - P [phone] - F [phone]  [address] [place] [address]  Phone: [phone] Fax: [phone]        DISCHARGE Disposition: Skilled Nursing Facility (SNF)    Nursing Facility:   Discharging to Facility/ Agency   Name: [facility]  Address:  [address] [place] [address]   Phone:  [phone]  Fax:  [phone]    LOC at discharge: Long Term Care  MAIKOL Completed: Yes             NURSING REPORT NUMBER: [phone]               NURSING FAX NUMBER: [phone]    Notification completed in HENS/PAS?:  Yes : CM has completed HENS online through secure website for SNF admission at [facility].   Document ID #:    [id number] - PASRR    Hospice Services:  Location: Nursing Facility  Agency: [facility]  Phone: [phone]    Consents signed: Yes    Transportation:  Transportation PLAN for discharge: EMS transportation   Mode of Transport: Ambulance stretcher - S  Reason for medical transport: Bed confined: Meets the following criteria 1) unable to get out of bed without assistance or ambulate, 2) unable to safely sit up in a wheelchair, 3) unable to maintain erect seating position in a chair for time needed for transport  Name of

## 2024-06-24 NOTE — CARE COORDINATION
Discharge Planning:  MARK met with patient and discussed that [facility] can accept him today and he will be in a private room, when a Long Term Bed opens up in a semi-private room , he would be transferred.  Patient is OK with this and MARK will advise [facility].

## 2024-06-24 NOTE — DISCHARGE INSTR - COC
(cm) 2.5 cm 06/19/24 1626   Wound Width (cm) 2.5 cm 06/19/24 1626   Wound Depth (cm) 0.5 cm 06/19/24 1626   Wound Surface Area (cm^2) 6.25 cm^2 06/19/24 1626   Change in Wound Size % (l*w) -4.17 06/19/24 1626   Wound Volume (cm^3) 3.125 cm^3 06/19/24 1626   Wound Healing % -74 06/19/24 1626   Wound Assessment Dry;Pink/red;Pale granulation tissue 06/24/24 0631   Drainage Amount Scant (moist but unmeasurable) 06/24/24 0631   Tiffanie-wound Assessment Hyperkeratosis (callous) 06/21/24 1000   Margins Undefined edges 06/21/24 1000   Wound Thickness Description not for Pressure Injury Full thickness 06/20/24 1733   Number of days: 5       Incision 09/12/22 Groin Anterior;Right (Active)   Number of days: 651       Incision 08/10/21 Abdomen Medial (Active)   Number of days: 1048        Elimination:  Continence:   Bowel: Yes  Bladder: No  Urinary Catheter: None   Colostomy/Ileostomy/Ileal Conduit: No       Date of Last BM: ***    Intake/Output Summary (Last 24 hours) at 6/24/2024 1223  Last data filed at 6/24/2024 0945  Gross per 24 hour   Intake 240 ml   Output 2600 ml   Net -2360 ml     I/O last 3 completed shifts:  In: -   Out: 4475 [Urine:4475]    Safety Concerns:     At Risk for Falls    Impairments/Disabilities:      None    Nutrition Therapy:  Current Nutrition Therapy:   - Oral Diet:  General    Routes of Feeding: Oral  Liquids: No Restrictions  Daily Fluid Restriction: no  Last Modified Barium Swallow with Video (Video Swallowing Test): not done    Treatments at the Time of Hospital Discharge:   Respiratory Treatments: none  Oxygen Therapy:  is not on home oxygen therapy.  Ventilator:    - No ventilator support    Rehab Therapies: Physical Therapy  Weight Bearing Status/Restrictions: No weight bearing restrictions  Other Medical Equipment (for information only, NOT a DME order):  none  Other Treatments: none    Patient's personal belongings (please select all that are sent with patient):  None    RN SIGNATURE:

## 2024-06-24 NOTE — PLAN OF CARE
Problem: Discharge Planning  Goal: Discharge to home or other facility with appropriate resources  6/21/2024 0104 by Aury Driscoll RN  Outcome: Progressing  6/20/2024 1349 by Isa Peña RN  Outcome: Progressing     Problem: Pain  Goal: Verbalizes/displays adequate comfort level or baseline comfort level  6/21/2024 0104 by Aury Driscoll RN  Outcome: Progressing  6/20/2024 1349 by Isa Peña RN  Outcome: Progressing     Problem: Safety - Adult  Goal: Free from fall injury  6/21/2024 0104 by Aury Driscoll RN  Outcome: Progressing  6/20/2024 1349 by Isa Peña RN  Outcome: Progressing     Problem: Chronic Conditions and Co-morbidities  Goal: Patient's chronic conditions and co-morbidity symptoms are monitored and maintained or improved  6/21/2024 0104 by Aury Driscoll RN  Outcome: Progressing  6/20/2024 1349 by Isa Peña RN  Outcome: Progressing     Problem: Skin/Tissue Integrity  Goal: Absence of new skin breakdown  Description: 1.  Monitor for areas of redness and/or skin breakdown  2.  Assess vascular access sites hourly  3.  Every 4-6 hours minimum:  Change oxygen saturation probe site  4.  Every 4-6 hours:  If on nasal continuous positive airway pressure, respiratory therapy assess nares and determine need for appliance change or resting period.  6/21/2024 0104 by Aury Driscoll RN  Outcome: Progressing  6/20/2024 1349 by Isa Peña RN  Outcome: Progressing     Problem: Respiratory - Adult  Goal: Achieves optimal ventilation and oxygenation  6/21/2024 0104 by Aury Driscoll RN  Outcome: Progressing  6/20/2024 1349 by Isa Peña RN  Outcome: Progressing     Problem: Cardiovascular - Adult  Goal: Maintains optimal cardiac output and hemodynamic stability  6/21/2024 0104 by Aury Driscoll RN  Outcome: Progressing  6/20/2024 1349 by Isa Peña RN  Outcome: Progressing     Problem: Musculoskeletal - Adult  Goal: Return ADL status to a 
  Problem: Discharge Planning  Goal: Discharge to home or other facility with appropriate resources  6/21/2024 1222 by Isa Peña RN  Outcome: Progressing     Problem: Pain  Goal: Verbalizes/displays adequate comfort level or baseline comfort level  6/21/2024 1222 by Isa Peña RN  Outcome: Progressing     Problem: Safety - Adult  Goal: Free from fall injury  6/21/2024 1222 by Isa Peña RN  Outcome: Progressing     Problem: Chronic Conditions and Co-morbidities  Goal: Patient's chronic conditions and co-morbidity symptoms are monitored and maintained or improved  6/21/2024 1222 by Isa Peña RN  Outcome: Progressing     Problem: Skin/Tissue Integrity  Goal: Absence of new skin breakdown  Description: 1.  Monitor for areas of redness and/or skin breakdown  2.  Assess vascular access sites hourly  3.  Every 4-6 hours minimum:  Change oxygen saturation probe site  4.  Every 4-6 hours:  If on nasal continuous positive airway pressure, respiratory therapy assess nares and determine need for appliance change or resting period.  6/21/2024 1222 by Isa Peña RN  Outcome: Progressing     Problem: Respiratory - Adult  Goal: Achieves optimal ventilation and oxygenation  6/21/2024 1222 by Isa Peña RN  Outcome: Progressing     Problem: Cardiovascular - Adult  Goal: Maintains optimal cardiac output and hemodynamic stability  6/21/2024 1222 by Isa Peña RN  Outcome: Progressing     Problem: Musculoskeletal - Adult  Goal: Return ADL status to a safe level of function  6/21/2024 1222 by Isa Peña RN  Outcome: Progressing     Problem: Gastrointestinal - Adult  Goal: Minimal or absence of nausea and vomiting  6/21/2024 1222 by Isa Peña RN  Outcome: Progressing     Problem: Metabolic/Fluid and Electrolytes - Adult  Goal: Electrolytes maintained within normal limits  6/21/2024 1222 by Isa Peña RN  Outcome: Progressing     Problem: 
  Problem: Discharge Planning  Goal: Discharge to home or other facility with appropriate resources  6/22/2024 1642 by Dolores Garza RN  Outcome: Progressing     Problem: Pain  Goal: Verbalizes/displays adequate comfort level or baseline comfort level  6/22/2024 1642 by Dolores Garza RN  Outcome: Progressing     Problem: Safety - Adult  Goal: Free from fall injury  6/22/2024 1642 by Dolores Garza RN  Outcome: Progressing     Problem: Chronic Conditions and Co-morbidities  Goal: Patient's chronic conditions and co-morbidity symptoms are monitored and maintained or improved  6/22/2024 1642 by Dolores Garza RN  Outcome: Progressing     Problem: Skin/Tissue Integrity  Goal: Absence of new skin breakdown  Description: 1.  Monitor for areas of redness and/or skin breakdown  2.  Assess vascular access sites hourly  3.  Every 4-6 hours minimum:  Change oxygen saturation probe site  4.  Every 4-6 hours:  If on nasal continuous positive airway pressure, respiratory therapy assess nares and determine need for appliance change or resting period.  6/22/2024 1642 by Dolores Garza RN  Outcome: Progressing     Problem: Metabolic/Fluid and Electrolytes - Adult  Goal: Electrolytes maintained within normal limits  6/22/2024 1642 by Dolores Garza RN  Outcome: Progressing     Problem: Gastrointestinal - Adult  Goal: Minimal or absence of nausea and vomiting  6/22/2024 1642 by Dolores Garza RN  Outcome: Progressing     Problem: Musculoskeletal - Adult  Goal: Return ADL status to a safe level of function  6/22/2024 1642 by Dolores Garza RN  Outcome: Progressing     Problem: Cardiovascular - Adult  Goal: Maintains optimal cardiac output and hemodynamic stability  6/22/2024 1642 by Dolores Garza RN  Outcome: Progressing     
  Problem: Discharge Planning  Goal: Discharge to home or other facility with appropriate resources  6/22/2024 2201 by Trena Pedersen RN  Outcome: Progressing  6/22/2024 1642 by Dolores Garza RN  Outcome: Progressing     Problem: Pain  Goal: Verbalizes/displays adequate comfort level or baseline comfort level  6/22/2024 2201 by Trena Pedersen RN  Outcome: Progressing  6/22/2024 1642 by Dolores Garza RN  Outcome: Progressing     Problem: Safety - Adult  Goal: Free from fall injury  6/22/2024 2201 by Trena Pedersen RN  Outcome: Progressing  6/22/2024 1642 by Dolores Garza RN  Outcome: Progressing     Problem: Chronic Conditions and Co-morbidities  Goal: Patient's chronic conditions and co-morbidity symptoms are monitored and maintained or improved  6/22/2024 2201 by Trena Pedersen RN  Outcome: Progressing  6/22/2024 1642 by Dolores Garza RN  Outcome: Progressing     Problem: Skin/Tissue Integrity  Goal: Absence of new skin breakdown  Description: 1.  Monitor for areas of redness and/or skin breakdown  2.  Assess vascular access sites hourly  3.  Every 4-6 hours minimum:  Change oxygen saturation probe site  4.  Every 4-6 hours:  If on nasal continuous positive airway pressure, respiratory therapy assess nares and determine need for appliance change or resting period.  6/22/2024 2201 by Trena Pedersen RN  Outcome: Progressing  6/22/2024 1642 by Doloers Garza RN  Outcome: Progressing     Problem: Respiratory - Adult  Goal: Achieves optimal ventilation and oxygenation  6/22/2024 2201 by Trena Pedersen RN  Outcome: Progressing  6/22/2024 1642 by Dolores Garza RN  Outcome: Progressing     Problem: Cardiovascular - Adult  Goal: Maintains optimal cardiac output and hemodynamic stability  6/22/2024 2201 by Trena Pedersen RN  Outcome: Progressing  6/22/2024 1642 by Dolores Garza RN  Outcome: Progressing     Problem: Musculoskeletal - Adult  Goal: Return ADL status to a safe level of function  6/22/2024 2201 by Trena Pedersen 
  Problem: Discharge Planning  Goal: Discharge to home or other facility with appropriate resources  6/22/2024 2232 by Trena Pedersen RN  Outcome: Progressing  6/22/2024 2201 by Trena Pedersen RN  Outcome: Progressing  6/22/2024 1642 by Dolores Garza RN  Outcome: Progressing     Problem: Pain  Goal: Verbalizes/displays adequate comfort level or baseline comfort level  6/22/2024 2232 by Trena Pedersen RN  Outcome: Progressing  6/22/2024 2201 by Trena Pedersen RN  Outcome: Progressing  6/22/2024 1642 by Dolores Garza RN  Outcome: Progressing     Problem: Safety - Adult  Goal: Free from fall injury  6/22/2024 2232 by Trena Pedersen RN  Outcome: Progressing  6/22/2024 2201 by Trena Pedersen RN  Outcome: Progressing  6/22/2024 1642 by Dolores Garza RN  Outcome: Progressing     Problem: Chronic Conditions and Co-morbidities  Goal: Patient's chronic conditions and co-morbidity symptoms are monitored and maintained or improved  6/22/2024 2232 by Trena Pedersen RN  Outcome: Progressing  6/22/2024 2201 by Trena Pedersen RN  Outcome: Progressing  6/22/2024 1642 by Dolores Garza RN  Outcome: Progressing     Problem: Skin/Tissue Integrity  Goal: Absence of new skin breakdown  Description: 1.  Monitor for areas of redness and/or skin breakdown  2.  Assess vascular access sites hourly  3.  Every 4-6 hours minimum:  Change oxygen saturation probe site  4.  Every 4-6 hours:  If on nasal continuous positive airway pressure, respiratory therapy assess nares and determine need for appliance change or resting period.  6/22/2024 2232 by Trena Pedersen RN  Outcome: Progressing  6/22/2024 2201 by Trena Pedersen RN  Outcome: Progressing  6/22/2024 1642 by Dolores Garza RN  Outcome: Progressing     Problem: Respiratory - Adult  Goal: Achieves optimal ventilation and oxygenation  6/22/2024 2232 by Trena Pedersen RN  Outcome: Progressing  6/22/2024 2201 by Trena Pedersen RN  Outcome: Progressing  6/22/2024 1642 by Dolores Garza RN  Outcome: Progressing   
  Problem: Discharge Planning  Goal: Discharge to home or other facility with appropriate resources  6/23/2024 0746 by Lucie Mazariegos RN  Outcome: Progressing  Flowsheets (Taken 6/23/2024 0746)  Discharge to home or other facility with appropriate resources:   Identify barriers to discharge with patient and caregiver   Arrange for needed discharge resources and transportation as appropriate   Identify discharge learning needs (meds, wound care, etc)   Refer to discharge planning if patient needs post-hospital services based on physician order or complex needs related to functional status, cognitive ability or social support system  6/22/2024 2232 by Trena Pedersen RN  Outcome: Progressing  6/22/2024 2201 by Trena Pedersen RN  Outcome: Progressing     Problem: Pain  Goal: Verbalizes/displays adequate comfort level or baseline comfort level  6/23/2024 0746 by Lucie Mazariegos RN  Outcome: Progressing  Flowsheets (Taken 6/23/2024 0746)  Verbalizes/displays adequate comfort level or baseline comfort level:   Encourage patient to monitor pain and request assistance   Assess pain using appropriate pain scale   Administer analgesics based on type and severity of pain and evaluate response   Implement non-pharmacological measures as appropriate and evaluate response   Consider cultural and social influences on pain and pain management   Notify Licensed Independent Practitioner if interventions unsuccessful or patient reports new pain  6/22/2024 2232 by Trena Pedersen RN  Outcome: Progressing  6/22/2024 2201 by Trena Pedersen RN  Outcome: Progressing     Problem: Safety - Adult  Goal: Free from fall injury  6/23/2024 0746 by Lucie Mazariegos RN  Outcome: Progressing  Flowsheets (Taken 6/23/2024 0746)  Free From Fall Injury: Instruct family/caregiver on patient safety  6/22/2024 2232 by Trena Pedersen RN  Outcome: Progressing  6/22/2024 2201 by Trena Pedersen RN  Outcome: Progressing     Problem: Chronic Conditions and 
  Problem: Discharge Planning  Goal: Discharge to home or other facility with appropriate resources  Outcome: Progressing     Problem: Pain  Goal: Verbalizes/displays adequate comfort level or baseline comfort level  Outcome: Progressing     
  Problem: Discharge Planning  Goal: Discharge to home or other facility with appropriate resources  Outcome: Progressing     Problem: Pain  Goal: Verbalizes/displays adequate comfort level or baseline comfort level  Outcome: Progressing     Problem: Safety - Adult  Goal: Free from fall injury  Outcome: Progressing     Problem: Chronic Conditions and Co-morbidities  Goal: Patient's chronic conditions and co-morbidity symptoms are monitored and maintained or improved  Outcome: Progressing     Problem: Skin/Tissue Integrity  Goal: Absence of new skin breakdown  Description: 1.  Monitor for areas of redness and/or skin breakdown  2.  Assess vascular access sites hourly  3.  Every 4-6 hours minimum:  Change oxygen saturation probe site  4.  Every 4-6 hours:  If on nasal continuous positive airway pressure, respiratory therapy assess nares and determine need for appliance change or resting period.  Outcome: Progressing     Problem: Respiratory - Adult  Goal: Achieves optimal ventilation and oxygenation  Outcome: Progressing     Problem: Cardiovascular - Adult  Goal: Maintains optimal cardiac output and hemodynamic stability  Outcome: Progressing     Problem: Musculoskeletal - Adult  Goal: Return ADL status to a safe level of function  Outcome: Progressing     Problem: Gastrointestinal - Adult  Goal: Minimal or absence of nausea and vomiting  Outcome: Progressing     Problem: Metabolic/Fluid and Electrolytes - Adult  Goal: Electrolytes maintained within normal limits  Outcome: Progressing  Goal: Glucose maintained within prescribed range  Outcome: Progressing     
  Problem: Discharge Planning  Goal: Discharge to home or other facility with appropriate resources  Outcome: Progressing    Problem: Pain  Goal: Verbalizes/displays adequate comfort level or baseline comfort level  6/20/2024 0332 by Oma Napoles RN  Outcome: Progressing     Problem: Respiratory - Adult  Goal: Achieves optimal ventilation and oxygenation  Outcome: Progressing     Problem: Cardiovascular - Adult  Goal: Maintains optimal cardiac output and hemodynamic stability  Outcome: Progressing     Problem: Musculoskeletal - Adult  Goal: Return ADL status to a safe level of function  Outcome: Progressing     Problem: Gastrointestinal - Adult  Goal: Minimal or absence of nausea and vomiting  Outcome: Progressing     Problem: Metabolic/Fluid and Electrolytes - Adult  Goal: Electrolytes maintained within normal limits  Outcome: Progressing        
  Problem: Discharge Planning  Goal: Discharge to home or other facility with appropriate resources  Outcome: Progressing  Flowsheets (Taken 6/19/2024 2162)  Discharge to home or other facility with appropriate resources: Identify barriers to discharge with patient and caregiver     Problem: Pain  Goal: Verbalizes/displays adequate comfort level or baseline comfort level  Outcome: Progressing     Problem: Safety - Adult  Goal: Free from fall injury  Outcome: Progressing     Problem: Chronic Conditions and Co-morbidities  Goal: Patient's chronic conditions and co-morbidity symptoms are monitored and maintained or improved  Outcome: Progressing     Problem: Skin/Tissue Integrity  Goal: Absence of new skin breakdown  Description: 1.  Monitor for areas of redness and/or skin breakdown  2.  Assess vascular access sites hourly  3.  Every 4-6 hours minimum:  Change oxygen saturation probe site  4.  Every 4-6 hours:  If on nasal continuous positive airway pressure, respiratory therapy assess nares and determine need for appliance change or resting period.  Outcome: Progressing     
Attempted to see patient, off the unit in endo.   Order written for daily dressing changes with True Andres to see patient tomorrow     Liliana Landrum DPM  [phone]  
range  Outcome: Progressing

## 2024-06-27 NOTE — DISCHARGE SUMMARY
abdominal pain    Abnormal ECG    HTN (hypertension)    Diabetes mellitus type II, uncontrolled    Diabetic foot infection (HCC)    Fever and chills    Leukocytosis    Charcot foot due to diabetes mellitus (HCC)    Foot ulceration, left, with unspecified severity (HCC)    Demand ischemia (HCC)    Controlled type 2 diabetes mellitus with hyperglycemia, with long-term current use of insulin (HCC)    Sepsis without acute organ dysfunction (HCC)    Diarrhea    Chronic osteoarthritis    Chest pain    Chronic calcific pancreatitis (HCC)    Atrioventricular block, Mobitz type 1, Wenckebach    Vertigo    Microcytic anemia    Acute calculous cholecystitis    Generalized weakness    SOB (shortness of breath)    Febrile illness    Symptomatic bradycardia    Multiple and open wound of lower limb    Bacterial infection due to Proteus mirabilis    Foot ulcer due to secondary DM (HCC)    Lactic acidosis    Atrial fibrillation with slow ventricular response (HCC)    High-grade atrioventricular block    [employer] MICRA AV    Severe sepsis (HCC)    Cellulitis of foot, left    Colonic mass    Liver masses    Type 2 diabetes mellitus with left diabetic foot ulcer (HCC)    Malignant neoplasm of ascending colon (HCC)    Metastasis to liver (HCC)    Neutrophilia    Lactic acid acidosis    Iron deficiency anemia    Acute abdominal pain            Presenting symptoms:  weakness      Diagnostic workup:   CT HEAD WO CONTRAST    CT CERVICAL SPINE WO CONTRAST   CT CHEST ABDOMEN PELVIS W CONTRAST  XR FOOT LEFT        CONSULTS DURING ADMISSION :   IP CONSULT TO SOCIAL WORK  IP CONSULT TO GI  IP CONSULT TO PODIATRY  IP CONSULT TO INFECTIOUS DISEASES  IP CONSULT TO ONCOLOGY  IP CONSULT TO PALLIATIVE CARE  IP CONSULT TO HOSPICE        Patient was diagnosed with:  Severe sepsis likely due to cellulitis   Cellulitis    Suspected metastatic colon cancer    Acute on chronic anemia    Diabetes mellitus, with hyperglycemia        Treatment while

## (undated) DEVICE — SOLUTION IV IRRIG POUR BRL 0.9% SODIUM CHL 2F7124

## (undated) DEVICE — ADTEC SINGLE USE HOOK SCISSORS, SHAFT ONLY, MONOPOLAR, STRAIGHT, WORKING LENGTH: 12 1/4", (310 MM), DIAM. 5 MM, BLUNT/BLUNT, INSULATED, SINGLE ACTION, STERILE, DISPOSABLE, PACKAGE OF 10 PIECES: Brand: AESCULAP

## (undated) DEVICE — STRIP,CLOSURE,WOUND,MEDI-STRIP,1/2X4: Brand: MEDLINE

## (undated) DEVICE — TROCAR: Brand: KII FIOS FIRST ENTRY

## (undated) DEVICE — 3M™ STERI-STRIP™ COMPOUND BENZOIN TINCTURE 40 BAGS/CARTON 4 CARTONS/CASE C1544: Brand: 3M™ STERI-STRIP™

## (undated) DEVICE — BITE BLOCK ENDOSCP AD 60 FR W/ ADJ STRP PLAS GRN BLOX

## (undated) DEVICE — Device

## (undated) DEVICE — C-ARM: Brand: UNBRANDED

## (undated) DEVICE — SET INSUF TUBE HEAT ISO CONN DISP

## (undated) DEVICE — SYRINGE MED 30ML STD CLR PLAS LUERLOCK TIP N CTRL DISP

## (undated) DEVICE — SUTURE SZ 0 27IN 5/8 CIR UR-6  TAPER PT VIOLET ABSRB VICRYL J603H

## (undated) DEVICE — FORCEPS BX 240CM 2.4MM L NDL RAD JAW 4 M00513334

## (undated) DEVICE — GARMENT COMPR STD FOR 17IN CALF UNIF THER FLOTRN

## (undated) DEVICE — ENDOSCOPY KIT: Brand: MEDLINE INDUSTRIES, INC.

## (undated) DEVICE — 3M™ TEGADERM™ TRANSPARENT FILM DRESSING FRAME STYLE, 1624W, 2-3/8 IN X 2-3/4 IN (6 CM X 7 CM), 100/CT 4CT/CASE: Brand: 3M™ TEGADERM™

## (undated) DEVICE — FORMALIN CLEAR VIAL 20 ML 10%

## (undated) DEVICE — COVER LT HNDL BLU PLAS

## (undated) DEVICE — GLOVE ORANGE PI 7   MSG9070

## (undated) DEVICE — SUTURE VCRL SZ 4-0 L18IN ABSRB UD L19MM PS-2 3/8 CIR PRIM J496H

## (undated) DEVICE — TROCARS: Brand: KII® BALLOON BLUNT TIP SYSTEM

## (undated) DEVICE — APPLIER CLP M/L SHFT DIA5MM 15 LIG LIGAMAX 5

## (undated) DEVICE — INDICATED FOR USE DURING OPEN AND LAPAROSCOPIC CHOLECYSTECTOMY PROCEDURES TO INJECT RADIOPAQUE MEDIA THROUGH THE CYSTIC DUCT INTO THE BILIARY TREE.: Brand: AEROSTAT®

## (undated) DEVICE — GENERAL LAPAROSCOPY: Brand: MEDLINE INDUSTRIES, INC.

## (undated) DEVICE — SYRINGE 20ML LL S/C 50

## (undated) DEVICE — TISSUE RETRIEVAL SYSTEM: Brand: INZII RETRIEVAL SYSTEM

## (undated) DEVICE — GAUZE,SPONGE,2"X2",8PLY,STERILE,LF,2'S: Brand: MEDLINE

## (undated) DEVICE — AGENT HEMSTAT W2XL14IN OXIDIZED REGENERATED CELOS ABSRB FOR

## (undated) DEVICE — SOLUTION IV 1000ML 0.9% SOD CHL FOR IRRIG PLAS CONT

## (undated) DEVICE — TRAP POLYP ETRAP

## (undated) DEVICE — NEEDLE HYPO 25GA L1.5IN BVL ORIENTED ECLIPSE

## (undated) DEVICE — TROCAR: Brand: KII SLEEVE